# Patient Record
Sex: MALE | Race: WHITE | NOT HISPANIC OR LATINO | Employment: OTHER | URBAN - METROPOLITAN AREA
[De-identification: names, ages, dates, MRNs, and addresses within clinical notes are randomized per-mention and may not be internally consistent; named-entity substitution may affect disease eponyms.]

---

## 2017-11-06 ENCOUNTER — TRANSFERRED RECORDS (OUTPATIENT)
Dept: HEALTH INFORMATION MANAGEMENT | Facility: CLINIC | Age: 52
End: 2017-11-06

## 2018-05-03 LAB
CREAT SERPL-MCNC: 0.91 MG/DL (ref 0.6–1.2)
GLUCOSE SERPL-MCNC: 77 MG/DL (ref 70–115)
POTASSIUM SERPL-SCNC: 4.6 MMOL/L (ref 3.5–5.3)

## 2018-05-08 ENCOUNTER — OFFICE VISIT (OUTPATIENT)
Dept: ENDOCRINOLOGY | Facility: CLINIC | Age: 53
End: 2018-05-08
Payer: COMMERCIAL

## 2018-05-08 VITALS
WEIGHT: 151 LBS | HEIGHT: 68 IN | DIASTOLIC BLOOD PRESSURE: 80 MMHG | HEART RATE: 63 BPM | BODY MASS INDEX: 22.88 KG/M2 | SYSTOLIC BLOOD PRESSURE: 121 MMHG

## 2018-05-08 DIAGNOSIS — E03.9 ACQUIRED HYPOTHYROIDISM: ICD-10-CM

## 2018-05-08 DIAGNOSIS — E10.43 TYPE 1 DIABETES MELLITUS WITH DIABETIC AUTONOMIC NEUROPATHY (H): Primary | ICD-10-CM

## 2018-05-08 DIAGNOSIS — Z96.41 INSULIN PUMP STATUS: ICD-10-CM

## 2018-05-08 LAB — HBA1C MFR BLD: 9.3 % (ref 0–5.6)

## 2018-05-08 PROCEDURE — 83036 HEMOGLOBIN GLYCOSYLATED A1C: CPT | Performed by: INTERNAL MEDICINE

## 2018-05-08 PROCEDURE — 36415 COLL VENOUS BLD VENIPUNCTURE: CPT | Performed by: INTERNAL MEDICINE

## 2018-05-08 PROCEDURE — 84439 ASSAY OF FREE THYROXINE: CPT | Performed by: INTERNAL MEDICINE

## 2018-05-08 PROCEDURE — 80048 BASIC METABOLIC PNL TOTAL CA: CPT | Performed by: INTERNAL MEDICINE

## 2018-05-08 PROCEDURE — 84443 ASSAY THYROID STIM HORMONE: CPT | Performed by: INTERNAL MEDICINE

## 2018-05-08 PROCEDURE — 99215 OFFICE O/P EST HI 40 MIN: CPT | Performed by: INTERNAL MEDICINE

## 2018-05-08 RX ORDER — LEVOTHYROXINE SODIUM 137 UG/1
137 TABLET ORAL DAILY
Qty: 90 TABLET | Refills: 3 | Status: SHIPPED | OUTPATIENT
Start: 2018-05-08 | End: 2018-07-10

## 2018-05-08 NOTE — MR AVS SNAPSHOT
After Visit Summary   5/8/2018    Stone De Paz    MRN: [id number]           Patient Information     Date Of Birth          1965        Visit Information        Provider Department      5/8/2018 3:30 PM Frankie Best MD [facility]        Today's Diagnoses     Type 1 diabetes mellitus with diabetic autonomic neuropathy (H)    -  1    Acquired hypothyroidism        Insulin pump status           Follow-ups after your visit        Additional Services     DIABETES EDUCATOR REFERRAL       DIABETES SELF MANAGEMENT TRAINING (DSMT)      Your provider has referred you to Diabetes Education: FMG: Diabetes Education - All [facility] [phone]   [email or web address]   Note:  Lives in [place], needs to see CDE in [place] if possible    If an urgent visit is needed or A1C is above 12, Care Team to call the Diabetes  Education Team at [phone] or send an In Basket message to the Diabetes Education Pool (P DIAB ED-PATIENT CARE).    A  will call you to make your appointment. If it has been more than 3 business days since your referral was placed, please call the above phone number to schedule.    Type of training and number of hours: Previous Diagnosis: Follow-up DSMT - 2 hours.    Diabetes Type: Type 1   Medicare covers: 10 hours of initial DSMT in 12 month period from the time of first visit, plus 2 hours of follow-up DSMT annually, and additional hours as requested for insulin training.         Diabetes Co-Morbidities: none               A1C Goal:  <8.0       A1C is: Lab Results       Component                Value               Date                       A1C                      10.5                01/09/2014              MEDICAL NUTRITION THERAPY (MNT) for Diabetes    Medical Nutrition Therapy with a Registered Dietitian can be provided in coordination with Diabetes Self-Management Training to assist in achieving optimal  diabetes management.    MNT Type and Hours: Previous diagnosis: Annual follow-up MNT - 2 hours                       Medicare will cover: 3 hours initial MNT in 12 month period after first visit, plus 2 hours of follow-up MNT annually        Diabetes Education Topics: Insulin Pump Therapy: Current Pump User    Special Educational Needs Requiring Individual DSMT: None      Please be aware that coverage of these services is subject to the terms and limitations of your health insurance plan.  Call member services at your health plan to determine Diabetes Self-Management Training (Codes  and ) and Medical Nutrition Therapy (Codes 09835 and 11737) benefits and ask which blood glucose monitor brands are covered by your plan.  Please bring the following with you to your appointment:    (1)  List of current medications   (2)  List of Blood Glucose Monitor brands that are covered by your insurance plan  (3)  Blood Glucose Monitor and log book  (4)   Food records for the 3 days prior to your visit    The Certified Diabetes Educator may make diabetes medication adjustments per the CDE Protocol and Collaborative Practice Agreement.                  Follow-up notes from your care team     Return in about 3 months (around 8/8/2018).      Your next 10 appointments already scheduled     Aug 07, 2018  1:30 PM CDT   [facility]    [address] [place] [id number] [phone]              Who to contact     If you have questions or need follow up information about today's clinic visit or your schedule please contact [facility] directly at [phone].  Normal or non-critical lab and imaging results will be communicated to you by MyChart, letter or phone within 4 business days after the clinic has received the results. If you do not hear from us within 7 days, please contact the clinic through MyChart or phone.  "If you have a critical or abnormal lab result, we will notify you by phone as soon as possible.  Submit refill requests through [employer] or call your pharmacy and they will forward the refill request to us. Please allow 3 business days for your refill to be completed.          Additional Information About Your Visit        [employer] Information     [employer] lets you send messages to your doctor, view your test results, renew your prescriptions, schedule appointments and more. To sign up, go to [email or web address] . Click on \"Log in\" on the left side of the screen, which will take you to the Welcome page. Then click on \"Sign up Now\" on the right side of the page.     You will be asked to enter the access code listed below, as well as some personal information. Please follow the directions to create your username and password.     Your access code is: [email or web address]  Expires: 2018  8:22 PM     Your access code will  in 90 days. If you need help or a new code, please call your [facility] clinic or [phone].        Care EveryWhere ID     This is your Care EveryWhere ID. This could be used by other organizations to access your [facility] medical records  [id number]        Your Vitals Were     Pulse Height BMI (Body Mass Index)             63 5' 8\" (1.727 m) 22.96 kg/m2          Blood Pressure from Last 3 Encounters:   18 121/80   16 132/77   01/15/14 137/85    Weight from Last 3 Encounters:   18 151 lb (68.5 kg)   16 160 lb (72.6 kg)   14 153 lb (69.4 kg)              We Performed the Following     Basic metabolic panel     DIABETES EDUCATOR REFERRAL     Hemoglobin A1c     T4 free     TSH          Today's Medication Changes          These changes are accurate as of 18  8:22 PM.  If you have any questions, ask your nurse or doctor.               These medicines have changed or have updated prescriptions.        Dose/Directions    insulin aspart 100 UNITS/ML injection   Commonly " known as:  NovoLOG VIAL   This may have changed:    - medication strength  - how to take this  - additional instructions   Used for:  Type 1 diabetes mellitus with diabetic autonomic neuropathy (H)   Changed by:  Frankie Best MD        Uses with insulin pump, total daily dose approx 45 units, E10.9   Quantity:  20 mL   Refills:  11            Where to get your medicines      These medications were sent to [facility] [id number] - [place], [place] - [address]  [address], [facility] [address]     Phone:  [phone]     insulin aspart 100 UNITS/ML injection    levothyroxine 137 MCG tablet                Primary Care Provider Office Phone # Fax #    Marciano Steven Painting PA-C [phone]       [address] [place] [address]        Equal Access to Services     ROSA WHITLEY : Hadii jordyn elliott hadasho Soomaali, waaxda luqadaha, qaybta kaalmada adeegyada, britton witt hayronel nuñez . So [facility] [phone].    ATENCIÓN: Si habla español, tiene a banks disposición servicios gratuitos de asistencia lingüística. [facility] [phone].    We comply with applicable federal civil rights laws and [place] laws. We do not discriminate on the basis of race, color, national origin, age, disability, sex, sexual orientation, or gender identity.            Thank you!     Thank you for choosing [facility]  for your care. Our goal is always to provide you with excellent care. Hearing back from our patients is one way we can continue to improve our services. Please take a few minutes to complete the written survey that you may receive in the mail after your visit with us. Thank you!             Your Updated Medication List - Protect others around you: Learn how to safely use, store and throw away your medicines at www.disposemymeds.org.          This list is accurate as of 5/8/18  8:22 PM.  Always use your most recent med list.                   Brand Name Dispense Instructions for use Diagnosis     acetaminophen 325 MG tablet    TYLENOL    100 tablet    Take 2 tablets (650 mg) by mouth every 4 hours as needed    Empyema, right (H)       albuterol 108 (90 Base) MCG/ACT Inhaler    PROAIR HFA/PROVENTIL HFA/VENTOLIN HFA    1 Inhaler    Inhale 2 puffs into the lungs every 4 hours as needed for shortness of breath / dyspnea        citalopram 40 MG tablet    celeXA     Take 1 tablet by mouth daily        cyclobenzaprine 10 MG tablet    FLEXERIL     Take 1-2 tablets by mouth daily as needed        gabapentin 300 MG tablet    NEURONTIN     Take 1 tablet by mouth daily        insulin aspart 100 UNITS/ML injection    NovoLOG VIAL    20 mL    Uses with insulin pump, total daily dose approx 45 units, E10.9    Type 1 diabetes mellitus with diabetic autonomic neuropathy (H)       levothyroxine 137 MCG tablet    SYNTHROID/LEVOTHROID    90 tablet    Take 1 tablet (137 mcg) by mouth daily    Acquired hypothyroidism       lisinopril 20 MG tablet    PRINIVIL/ZESTRIL     Take 1 tablet by mouth daily        metoclopramide 10 MG tablet    REGLAN    20 tablet    Take 1 tablet (10 mg) by mouth 4 times daily as needed As needed        naproxen 500 MG tablet    NAPROSYN     Take 1 tablet by mouth 2 times daily (with meals)        omeprazole 20 MG tablet      Take 1 tablet by mouth daily        ondansetron 8 MG ODT tab    ZOFRAN-ODT     Take 1 tablet by mouth every 8 hours as needed        order for DME     1 Device    Equipment being ordered: Hospital Bed    Empyema, right (H)       oxyCODONE IR 5 MG tablet    ROXICODONE    28 tablet    Take 1-2 tablets (5-10 mg) by mouth every 3 hours as needed    Empyema, right (H)       simvastatin 40 MG tablet    ZOCOR     Take 1 tablet by mouth At Bedtime

## 2018-05-08 NOTE — PROGRESS NOTES
"Name: Stone De Paz is a 52 year old man, self-referred for evaluation of diabetes mellitus.  Previously seen by me at my former clinic ([facility]), here to establish care with [facility].    HPI:  Recent issues:  Here with ex-wife/girlfriend Ada for endocrinology evaluation  They spent winter months near [place], [place]        1995. Diagnosis of diabetes mellitus after 2nd back surgery, age 29  Initial treatment with insulin injections using NPH and Regular insulin  Switched to premixed 70/30 insulin BID dosing  1999. Started Medtronic insulin pump model 508, used base-dose Humalog insulin at meals  Had seen Dr. Iggy Briggs/[facility]    7/24/01. Initial evaluation with me at my former clinic ([facility])  Upgraded pump to Medtronic 523   Now using Medtronic 630G pump   Novolog in pump  Meals BID  Chronic high BG and hgbA1c trends despite dose changes  Tried square wave bolus  Uses carb \"exchanges\" with his pump settings  Has Contour Link? BG meter, variable testing pattern... Recently testing 4x/day  Tried Medtronic CGMS in the past, didn't like it    Previous  labs include:  Lab Results   Component Value Date    A1C 9.3 (H) 05/08/2018     01/29/2016    POTASSIUM 4.3 01/29/2016    CHLORIDE 97 01/29/2016    CO2 28 01/29/2016    ANIONGAP 10 01/29/2016     (H) 01/29/2016    BUN 14 01/29/2016    CR 0.78 01/29/2016    GFRESTIMATED >90  Non  GFR Calc   01/29/2016    GFRESTBLACK >90   GFR Calc   01/29/2016    EMILEE 9.4 01/29/2016     DM Complications:   Neuropathy    Peripheral neuropathy- decreased sensation at feet    Autonomic neuropathy- gastroparesis     Has Medtronic gastric stimulator     Sees physicians and Ms Olga Ramirez, PA/[facility]  Last eye exam date?       Thyroid:  1994. History of hypothyroidism  Chronic levothyroxine treatment, has used Levoxyl in the past  Recent labs include:  Lab Results "   Component Value Date    TSH 38.88 (H) 07/09/2018    TSH 2.28 05/08/2018    TSH 1.33 12/30/2013    TSH 0.26 10/30/2013    TSH 0.19 07/03/2013    T4 0.87 07/09/2018    T4 1.28 05/08/2018     Current dose:  Levothyroxine 0.137 mg po QAM    Also takes vit D 1000 IU QD      Lives in [place] with Ada, new dog Gayle- priest retriever  Sees Marciano Painting PAC/[facility] for general medicine evaluations  Also sees Olga Ramirez PAC/[facility].    PMH/PSH:  Past Medical History:   Diagnosis Date     Chronic neck pain     percocet for years now,      Gastroparesis due to DM (H)     gastric stimulator helps, [facility] manages that     Hypothyroidism      Neuropathy, diabetic (H)      Type 1 diabetes (H) age 31     Past Surgical History:   Procedure Laterality Date     C LUMBAR SPINE FUSION,ANTER APPRCH      2 L4-5 L5-21     COLONOSCOPY  07/18/12     FUSION CERVICAL ANTERIOR TWO LEVELS       THORACOSCOPIC DECORTICATION LUNG  1/9/2014    Procedure: THORACOSCOPIC DECORTICATION LUNG;  RIGHT VATS/RIGHT THORACOTOMY , DECORTICATION RIGHT LUNG ,WEDGE RESECTION RIGHT MIDDLE LOBE LUNG NODULE;  Surgeon: Harley Felix MD;  Location: [facility] OR       Family Hx:  No family history on file.      Social Hx:  Social History     Social History     Marital status:      Spouse name: N/A     Number of children: N/A     Years of education: N/A     Occupational History     Not on file.     Social History Main Topics     Smoking status: Former Smoker     Quit date: 1/1/2010     Smokeless tobacco: Never Used     Alcohol use No     Drug use: No     Sexual activity: Not on file     Other Topics Concern     Not on file     Social History Narrative          MEDICATIONS:  has a current medication list which includes the following prescription(s): acetaminophen, albuterol, citalopram, cyclobenzaprine, gabapentin, insulin aspart, levothyroxine, lisinopril, metoclopramide, naproxen, omeprazole, ondansetron, order for dme, oxycodone  "ir, and simvastatin.    ROS:     ROS: 10 point ROS neg other than the symptoms noted above in the HPI.    GENERAL: some fatigue, wt stable; denies fevers, chills, night sweats.   HEENT: no dysphagia, odonophagia, diplopia, neck pain  THYROID:  no apparent hyper or hypothyroid symptoms  CV: occasional chest pains; no pressure, palpitations  LUNGS: no SOB, KRAFT, cough, wheezing   ABDOMEN: some bloating and postmeal nausea; no diarrhea, constipation  EXTREMITIES: no rashes, ulcers, edema  NEUROLOGY: no headaches, denies changes in vision, tingling, extremitiy numbness   MSK: neck stiffness and back pains; no muscle weakness  SKIN: no rashes or lesions  PSYCH:  stable mood, no significant anxiety or depression  ENDOCRINE: no heat or cold intolerance    Physical Exam   VS: /80 (BP Location: Right arm, Cuff Size: Adult Regular)  Pulse 63  Ht 5' 8\" (1.727 m)  Wt 151 lb (68.5 kg)  BMI 22.96 kg/m2  GENERAL: AXOX3, NAD, slender, well dressed, answering questions appropriately, appears stated age.  THYROID:  normal gland, no apparent nodules or goiter  HEENT: neck stiff/ reduced neck ROM; neck non-tender, no exopthalmous, EOMI  CV: RRR, no rubs, gallops, no murmurs  LUNGS: CTAB, no wheezes, rales, or ronchi  ABDOMEN: soft, nontender, nondistended  EXTREMITIES: no edema, +pedal pulses  NEUROLOGY: CN grossly intact, no tremors  MSK: grossly intact  SKIN: no rashes, no lesions    LABS:    All pertinent notes, labs, and images personally reviewed by me.     A/P:  Encounter Diagnoses   Name Primary?     Type 1 diabetes mellitus with diabetic autonomic neuropathy (H) Yes     Acquired hypothyroidism      Insulin pump status        Comments:  Reviewed complicated health history and diabetes issues.  Recent avg  mg/dl    Plan:  Reviewed the overall T1DM management and insulin pump use.  Discussed optimal BG testing to assess glucose trends.  We reviewed insulin pump settings, basal rate and bolus dosing  Use of automated " "pump bolus dosing for meal/snack carb & correction dosing  Reviewed the Medtronic Carelink report data today    Recommend:  Continue insulin pump management plan.  Pump setting changes:     Bolus I:C 11a 1.2U/Ex to 1.1U/Ex, 5p 1.2U/Ex to 1.25U/Ex  Use lower Temp Basal rate for increased activity/exercise  Needs additional insulin pump and CDE diabetes management counseling   Will schedule Diabetes Education Referral at the nearby [facility]   Encourage him to bring list of food items that are challenging for carb estimating    (he uses \"exchanges\" and pump programed for Units per Exchange)  Patient makes frequent adjustments to insulin treatment regimen on basis of therapeutic glucose testing  Encouraged use of CGMS sensor:   Complete application for DexcomG5/G6.   Consider Freestyle Sukhi Pro CGMS option, place sensor for 1-2 weeks then f/u CDE evaluation.  Continue simvastatin 40 mg each evening  Arrange annual dilated eye exam, fasting lipid panel testing.    Continue current levothyroxine 0.137 mg daily dose plan  Check lab tests today for comparison  Updated the levothyroxine (and Novolog) Rx to his local pharmacy today    Addressed patient's questions today.    Labs ordered today:   Orders Placed This Encounter   Procedures     Basic metabolic panel     Hemoglobin A1c     T4 free     TSH     DIABETES EDUCATOR REFERRAL     Radiology/Consults ordered today: DIABETES EDUCATOR REFERRAL    More than 50% of the time spent with Mr. De Paz on counseling / coordinating his care.  Total appointment time was 40 minutes.    Follow-up:  3 mo.    Frankie Best MD  Endocrinology  [place]  CC: Marciano Painting       "

## 2018-05-09 LAB
ANION GAP SERPL CALCULATED.3IONS-SCNC: 8 MMOL/L (ref 3–14)
BUN SERPL-MCNC: 12 MG/DL (ref 7–30)
CALCIUM SERPL-MCNC: 9.5 MG/DL (ref 8.5–10.1)
CHLORIDE SERPL-SCNC: 99 MMOL/L (ref 94–109)
CO2 SERPL-SCNC: 28 MMOL/L (ref 20–32)
CREAT SERPL-MCNC: 0.96 MG/DL (ref 0.66–1.25)
GFR SERPL CREATININE-BSD FRML MDRD: 82 ML/MIN/1.7M2
GLUCOSE SERPL-MCNC: 172 MG/DL (ref 70–99)
POTASSIUM SERPL-SCNC: 4.1 MMOL/L (ref 3.4–5.3)
SODIUM SERPL-SCNC: 135 MMOL/L (ref 133–144)
T4 FREE SERPL-MCNC: 1.28 NG/DL (ref 0.76–1.46)
TSH SERPL DL<=0.005 MIU/L-ACNC: 2.28 MU/L (ref 0.4–4)

## 2018-05-14 ENCOUNTER — TELEPHONE (OUTPATIENT)
Dept: EDUCATION SERVICES | Facility: CLINIC | Age: 53
End: 2018-05-14

## 2018-05-14 NOTE — TELEPHONE ENCOUNTER
Diabetes Education Scheduling Outreach #1:    Call to patient to schedule. Left message with phone number to call to schedule.    Plan for 2nd outreach attempt within 1 week.    Flakita Miller  [facility]  Diabetes and Nutrition Scheduling

## 2018-05-18 ENCOUNTER — TRANSFERRED RECORDS (OUTPATIENT)
Dept: HEALTH INFORMATION MANAGEMENT | Facility: CLINIC | Age: 53
End: 2018-05-18

## 2018-06-13 NOTE — TELEPHONE ENCOUNTER
Diabetes Education Scheduling Outreach #2:    Call to patient to schedule. Left message with phone number to call to schedule.    Letter sent to patient requesting to call to schedule.    Flakita Miller  [facility]  Diabetes and Nutrition Scheduling

## 2018-06-15 ENCOUNTER — TELEPHONE (OUTPATIENT)
Dept: EDUCATION SERVICES | Facility: CLINIC | Age: 53
End: 2018-06-15

## 2018-06-15 NOTE — TELEPHONE ENCOUNTER
Message noted.  The plan to see one of the [place] Diabetes Educators (near his home) is not related to the plan for seeing a new PCP.  Regardless of his PCP practitioner, he should see the  Diabetes Educator.  Please relay message and assist with scheduling the Diabetes Education Referral.  Thanks for your assistance.    SHADE Best MD  [facility]

## 2018-06-15 NOTE — TELEPHONE ENCOUNTER
Pt returns our calls for scheduling diabetes education as referred by . Pt states he has been trying to establish a new PCP and will contact us in the future to set this up.    Flakita Miller, [facility]  Diabetes and Nutrition Scheduling

## 2018-07-09 ENCOUNTER — OFFICE VISIT (OUTPATIENT)
Dept: FAMILY MEDICINE | Facility: CLINIC | Age: 53
End: 2018-07-09
Payer: COMMERCIAL

## 2018-07-09 VITALS
RESPIRATION RATE: 16 BRPM | DIASTOLIC BLOOD PRESSURE: 70 MMHG | HEART RATE: 90 BPM | WEIGHT: 153.6 LBS | OXYGEN SATURATION: 98 % | BODY MASS INDEX: 23.35 KG/M2 | TEMPERATURE: 98.3 F | SYSTOLIC BLOOD PRESSURE: 132 MMHG

## 2018-07-09 DIAGNOSIS — G89.29 CHRONIC NECK PAIN: ICD-10-CM

## 2018-07-09 DIAGNOSIS — M54.2 CHRONIC NECK PAIN: ICD-10-CM

## 2018-07-09 DIAGNOSIS — M54.41 CHRONIC BILATERAL LOW BACK PAIN WITH BILATERAL SCIATICA: ICD-10-CM

## 2018-07-09 DIAGNOSIS — E78.5 HYPERLIPIDEMIA, UNSPECIFIED HYPERLIPIDEMIA TYPE: ICD-10-CM

## 2018-07-09 DIAGNOSIS — E10.42 TYPE 1 DIABETES MELLITUS WITH DIABETIC POLYNEUROPATHY (H): ICD-10-CM

## 2018-07-09 DIAGNOSIS — K21.9 GASTROESOPHAGEAL REFLUX DISEASE WITHOUT ESOPHAGITIS: ICD-10-CM

## 2018-07-09 DIAGNOSIS — E11.43 GASTROPARESIS DUE TO DM (H): ICD-10-CM

## 2018-07-09 DIAGNOSIS — E03.9 HYPOTHYROIDISM, UNSPECIFIED TYPE: ICD-10-CM

## 2018-07-09 DIAGNOSIS — E03.9 ACQUIRED HYPOTHYROIDISM: ICD-10-CM

## 2018-07-09 DIAGNOSIS — G44.209 TENSION HEADACHE: ICD-10-CM

## 2018-07-09 DIAGNOSIS — M54.42 CHRONIC BILATERAL LOW BACK PAIN WITH BILATERAL SCIATICA: ICD-10-CM

## 2018-07-09 DIAGNOSIS — K31.84 GASTROPARESIS DUE TO DM (H): ICD-10-CM

## 2018-07-09 DIAGNOSIS — G89.4 CHRONIC PAIN SYNDROME: Primary | ICD-10-CM

## 2018-07-09 DIAGNOSIS — G89.29 CHRONIC BILATERAL LOW BACK PAIN WITH BILATERAL SCIATICA: ICD-10-CM

## 2018-07-09 DIAGNOSIS — F32.0 MILD MAJOR DEPRESSION (H): ICD-10-CM

## 2018-07-09 LAB
ALBUMIN SERPL-MCNC: 3.6 G/DL (ref 3.4–5)
ALP SERPL-CCNC: 66 U/L (ref 40–150)
ALT SERPL W P-5'-P-CCNC: 31 U/L (ref 0–70)
ANION GAP SERPL CALCULATED.3IONS-SCNC: 8 MMOL/L (ref 3–14)
AST SERPL W P-5'-P-CCNC: 21 U/L (ref 0–45)
BILIRUB SERPL-MCNC: 0.3 MG/DL (ref 0.2–1.3)
BUN SERPL-MCNC: 8 MG/DL (ref 7–30)
CALCIUM SERPL-MCNC: 8.9 MG/DL (ref 8.5–10.1)
CHLORIDE SERPL-SCNC: 97 MMOL/L (ref 94–109)
CHOLEST SERPL-MCNC: 180 MG/DL
CO2 SERPL-SCNC: 30 MMOL/L (ref 20–32)
CREAT SERPL-MCNC: 0.92 MG/DL (ref 0.66–1.25)
CREAT UR-MCNC: 17 MG/DL
GFR SERPL CREATININE-BSD FRML MDRD: 86 ML/MIN/1.7M2
GLUCOSE SERPL-MCNC: 178 MG/DL (ref 70–99)
HDLC SERPL-MCNC: 67 MG/DL
LDLC SERPL CALC-MCNC: 99 MG/DL
MICROALBUMIN UR-MCNC: <5 MG/L
MICROALBUMIN/CREAT UR: NORMAL MG/G CR (ref 0–17)
NONHDLC SERPL-MCNC: 113 MG/DL
POTASSIUM SERPL-SCNC: 4.1 MMOL/L (ref 3.4–5.3)
PROT SERPL-MCNC: 7.3 G/DL (ref 6.8–8.8)
SODIUM SERPL-SCNC: 135 MMOL/L (ref 133–144)
T4 FREE SERPL-MCNC: 0.87 NG/DL (ref 0.76–1.46)
TRIGL SERPL-MCNC: 72 MG/DL
TSH SERPL DL<=0.005 MIU/L-ACNC: 38.88 MU/L (ref 0.4–4)

## 2018-07-09 PROCEDURE — 82043 UR ALBUMIN QUANTITATIVE: CPT | Performed by: FAMILY MEDICINE

## 2018-07-09 PROCEDURE — 84439 ASSAY OF FREE THYROXINE: CPT | Performed by: FAMILY MEDICINE

## 2018-07-09 PROCEDURE — 99205 OFFICE O/P NEW HI 60 MIN: CPT | Performed by: FAMILY MEDICINE

## 2018-07-09 PROCEDURE — 80053 COMPREHEN METABOLIC PANEL: CPT | Performed by: FAMILY MEDICINE

## 2018-07-09 PROCEDURE — 84443 ASSAY THYROID STIM HORMONE: CPT | Performed by: FAMILY MEDICINE

## 2018-07-09 PROCEDURE — 80061 LIPID PANEL: CPT | Performed by: FAMILY MEDICINE

## 2018-07-09 PROCEDURE — 36415 COLL VENOUS BLD VENIPUNCTURE: CPT | Performed by: FAMILY MEDICINE

## 2018-07-09 RX ORDER — CYCLOBENZAPRINE HCL 10 MG
10 TABLET ORAL 2 TIMES DAILY PRN
Qty: 90 TABLET | Refills: 1 | Status: SHIPPED | OUTPATIENT
Start: 2018-07-09 | End: 2019-04-10

## 2018-07-09 RX ORDER — GABAPENTIN 300 MG/1
300 CAPSULE ORAL 3 TIMES DAILY
Qty: 270 CAPSULE | Refills: 1 | COMMUNITY
Start: 2018-07-09 | End: 2018-09-21

## 2018-07-09 RX ORDER — OXYCODONE AND ACETAMINOPHEN 10; 325 MG/1; MG/1
1 TABLET ORAL EVERY 12 HOURS PRN
Refills: 0 | COMMUNITY
Start: 2018-06-11 | End: 2018-10-10

## 2018-07-09 ASSESSMENT — ANXIETY QUESTIONNAIRES
7. FEELING AFRAID AS IF SOMETHING AWFUL MIGHT HAPPEN: NOT AT ALL
3. WORRYING TOO MUCH ABOUT DIFFERENT THINGS: NOT AT ALL
5. BEING SO RESTLESS THAT IT IS HARD TO SIT STILL: NOT AT ALL
6. BECOMING EASILY ANNOYED OR IRRITABLE: NOT AT ALL
1. FEELING NERVOUS, ANXIOUS, OR ON EDGE: NOT AT ALL
IF YOU CHECKED OFF ANY PROBLEMS ON THIS QUESTIONNAIRE, HOW DIFFICULT HAVE THESE PROBLEMS MADE IT FOR YOU TO DO YOUR WORK, TAKE CARE OF THINGS AT HOME, OR GET ALONG WITH OTHER PEOPLE: SOMEWHAT DIFFICULT
2. NOT BEING ABLE TO STOP OR CONTROL WORRYING: NOT AT ALL
GAD7 TOTAL SCORE: 1

## 2018-07-09 ASSESSMENT — PATIENT HEALTH QUESTIONNAIRE - PHQ9: 5. POOR APPETITE OR OVEREATING: SEVERAL DAYS

## 2018-07-09 ASSESSMENT — PAIN SCALES - GENERAL: PAINLEVEL: SEVERE PAIN (7)

## 2018-07-09 NOTE — MR AVS SNAPSHOT
After Visit Summary   7/9/2018    Stone De Paz    MRN: [id number]           Patient Information     Date Of Birth          1965        Visit Information        Provider Department      7/9/2018 3:40 PM Ilan Raymond MD [facility]        Today's Diagnoses     Mild major depression (H)    -  1    Chronic neck pain        Tension headache        Chronic bilateral low back pain with bilateral sciatica        Gastroesophageal reflux disease without esophagitis        Hypothyroidism, unspecified type        Type 1 diabetes mellitus with diabetic polyneuropathy (H)           Follow-ups after your visit        Your next 10 appointments already scheduled     Jul 11, 2018  2:00 PM CDT   Diabetic Education with [facility] ED RESOURCE   [facility] ([facility])    [address] [place] [address] [address] [place] [id number] [phone]            Aug 07, 2018  1:30 PM CDT   Return Visit with Frankie Best MD   [facility] ([facility]    [address] [place] [id number] [phone]              Who to contact     If you have questions or need follow up information about today's clinic visit or your schedule please contact [facility] directly at [phone].  Normal or non-critical lab and imaging results will be communicated to you by MyChart, letter or phone within 4 business days after the clinic has received the results. If you do not hear from us within 7 days, please contact the clinic through MyChart or phone. If you have a critical or abnormal lab result, we will notify you by phone as soon as possible.  Submit refill requests through [employer] or call your pharmacy and they will forward the refill request to us. Please allow 3 business days for your refill to be completed.          Additional Information About Your Visit        Care EveryWhere ID     This is your Care EveryWhere ID. This  could be used by other organizations to access your [place] medical records  [id number]        Your Vitals Were     Pulse Temperature Respirations Pulse Oximetry BMI (Body Mass Index)       90 98.3  F (36.8  C) (Temporal) 16 98% 23.35 kg/m2        Blood Pressure from Last 3 Encounters:   07/09/18 132/70   05/08/18 121/80   01/29/16 132/77    Weight from Last 3 Encounters:   07/09/18 153 lb 9.6 oz (69.7 kg)   05/08/18 151 lb (68.5 kg)   01/29/16 160 lb (72.6 kg)              We Performed the Following     Albumin Random Urine Quantitative with Creat Ratio     Comprehensive metabolic panel (BMP + Alb, Alk Phos, ALT, AST, Total. Bili, TP)     DEPRESSION ACTION PLAN (DAP)     Lipid panel reflex to direct LDL Fasting     TSH with free T4 reflex          Today's Medication Changes          These changes are accurate as of 7/9/18  5:14 PM.  If you have any questions, ask your nurse or doctor.               Start taking these medicines.        Dose/Directions    cyclobenzaprine 10 MG tablet   Commonly known as:  FLEXERIL   Used for:  Tension headache   Started by:  Ilan Raymond MD        Dose:  10 mg   Take 1 tablet (10 mg) by mouth 2 times daily as needed for muscle spasms   Quantity:  90 tablet   Refills:  1       ranitidine 300 MG tablet   Commonly known as:  ZANTAC   Used for:  Gastroesophageal reflux disease without esophagitis   Started by:  Ilan Raymond MD        Dose:  300 mg   Take 1 tablet (300 mg) by mouth At Bedtime   Quantity:  30 tablet   Refills:  1            Where to get your medicines      These medications were sent to [facility], [place] [facility] [facility] [address] [facility] [address] [place] [address]     Phone:  [phone]     cyclobenzaprine 10 MG tablet    ranitidine 300 MG tablet               Information about OPIOIDS     PRESCRIPTION OPIOIDS: WHAT YOU NEED TO KNOW   We gave you an opioid (narcotic) pain medicine. It is important to manage your pain, but opioids are not always  the best choice. You should first try all the other options your care team gave you. Take this medicine for as short a time (and as few doses) as possible.     These medicines have risks:    DO NOT drive when on new or higher doses of pain medicine. These medicines can affect your alertness and reaction times, and you could be arrested for driving under the influence (DUI). If you need to use opioids long-term, talk to your care team about driving.    DO NOT operate heave machinery    DO NOT do any other dangerous activities while taking these medicines.     DO NOT drink any alcohol while taking these medicines.      If the opioid prescribed includes acetaminophen, DO NOT take with any other medicines that contain acetaminophen. Read all labels carefully. Look for the word  acetaminophen  or  Tylenol.  Ask your pharmacist if you have questions or are unsure.    You can get addicted to pain medicines, especially if you have a history of addiction (chemical, alcohol or substance dependence). Talk to your care team about ways to reduce this risk.    Store your pills in a secure place, locked if possible. We will not replace any lost or stolen medicine. If you don t finish your medicine, please throw away (dispose) as directed by your pharmacist. The [place] Pollution Control Agency has more information about safe disposal: https://www.pca.[email or web address].[place].us/living-green/managing-unwanted-medications.     All opioids tend to cause constipation. Drink plenty of water and eat foods that have a lot of fiber, such as fruits, vegetables, prune juice, apple juice and high-fiber cereal. Take a laxative (Miralax, milk of magnesia, Colace, Senna) if you don t move your bowels at least every other day.          Primary Care Provider Office Phone # Fax [facility] PA-C [phone]        [facility] [address] [place] [address]        Equal Access to Services     ROSA WHITLEY AH: Bell Dunne  wapaulda luetienneadaha, qaybta kaalmada lorena, britton whitakeraajacquelin ah. So [facility] [phone].    ATENCIÓN: Si fatuma lam, tiene a banks disposición servicios gratuitos de asistencia lingüística. Nik al [phone].    We comply with applicable federal civil rights laws and [place] laws. We do not discriminate on the basis of race, color, national origin, age, disability, sex, sexual orientation, or gender identity.            Thank you!     Thank you for choosing [facility]  for your care. Our goal is always to provide you with excellent care. Hearing back from our patients is one way we can continue to improve our services. Please take a few minutes to complete the written survey that you may receive in the mail after your visit with us. Thank you!             Your Updated Medication List - Protect others around you: Learn how to safely use, store and throw away your medicines at www.disposemymeds.org.          This list is accurate as of 7/9/18  5:14 PM.  Always use your most recent med list.                   Brand Name Dispense Instructions for use Diagnosis    acetaminophen 325 MG tablet    TYLENOL    100 tablet    Take 2 tablets (650 mg) by mouth every 4 hours as needed    Empyema, right (H)       albuterol 108 (90 Base) MCG/ACT Inhaler    PROAIR HFA/PROVENTIL HFA/VENTOLIN HFA    1 Inhaler    Inhale 2 puffs into the lungs every 4 hours as needed for shortness of breath / dyspnea        citalopram 40 MG tablet    celeXA     Take 1 tablet by mouth daily        cyclobenzaprine 10 MG tablet    FLEXERIL    90 tablet    Take 1 tablet (10 mg) by mouth 2 times daily as needed for muscle spasms    Tension headache       gabapentin 300 MG capsule    NEURONTIN    270 capsule    Take 1 capsule (300 mg) by mouth 3 times daily    Type 1 diabetes mellitus with diabetic polyneuropathy (H), Chronic bilateral low back pain with bilateral sciatica, Chronic neck pain       insulin aspart 100  UNITS/ML injection    NovoLOG VIAL    20 mL    Uses with insulin pump, total daily dose approx 45 units, E10.9    Type 1 diabetes mellitus with diabetic autonomic neuropathy (H)       levothyroxine 137 MCG tablet    SYNTHROID/LEVOTHROID    90 tablet    Take 1 tablet (137 mcg) by mouth daily    Acquired hypothyroidism       lisinopril 20 MG tablet    PRINIVIL/ZESTRIL     Take 40 mg by mouth daily        naproxen 500 MG tablet    NAPROSYN     Take 1 tablet by mouth 2 times daily (with meals)        omeprazole 20 MG tablet      Take 1 tablet by mouth daily        ondansetron 8 MG ODT tab    ZOFRAN-ODT     Take 1 tablet by mouth every 8 hours as needed        oxyCODONE-acetaminophen  MG per tablet    PERCOCET     Take 1 tablet by mouth every 12 hours as needed        ranitidine 300 MG tablet    ZANTAC    30 tablet    Take 1 tablet (300 mg) by mouth At Bedtime    Gastroesophageal reflux disease without esophagitis       simvastatin 40 MG tablet    ZOCOR     Take 1 tablet by mouth At Bedtime

## 2018-07-09 NOTE — LETTER
My Depression Action Plan  Name: Stone De Paz   Date of Birth 1965  Date: 7/9/2018    My doctor: [facility]   My clinic: [address] [address] [phone]          GREEN    ZONE   Good Control    What it looks like:     Things are going generally well. You have normal up s and down s. You may even feel depressed from time to time, but bad moods usually last less than a day.   What you need to do:  1. Continue to care for yourself (see self care plan)  2. Check your depression survival kit and update it as needed  3. Follow your physician s recommendations including any medication.  4. Do not stop taking medication unless you consult with your physician first.           YELLOW         ZONE Getting Worse    What it looks like:     Depression is starting to interfere with your life.     It may be hard to get out of bed; you may be starting to isolate yourself from others.    Symptoms of depression are starting to last most all day and this has happened for several days.     You may have suicidal thoughts but they are not constant.   What you need to do:     1. Call your care team, your response to treatment will improve if you keep your care team informed of your progress. Yellow periods are signs an adjustment may need to be made.     2. Continue your self-care, even if you have to fake it!    3. Talk to someone in your support network    4. Open up your depression survival kit           RED    ZONE Medical Alert - Get Help    What it looks like:     Depression is seriously interfering with your life.     You may experience these or other symptoms: You can t get out of bed most days, can t work or engage in other necessary activities, you have trouble taking care of basic hygiene, or basic responsibilities, thoughts of suicide or death that will not go away, self-injurious behavior.     What you need to do:  1. Call your care team and  request a same-day appointment. If they are not available (weekends or after hours) call your local crisis line, emergency room or 911.            Depression Self Care Plan / Survival Kit    Self-Care for Depression  Here s the deal. Your body and mind are really not as separate as most people think.  What you do and think affects how you feel and how you feel influences what you do and think. This means if you do things that people who feel good do, it will help you feel better.  Sometimes this is all it takes.  There is also a place for medication and therapy depending on how severe your depression is, so be sure to consult with your medical provider and/ or Behavioral Health Consultant if your symptoms are worsening or not improving.     In order to better manage my stress, I will:    Exercise  Get some form of exercise, every day. This will help reduce pain and release endorphins, the  feel good  chemicals in your brain. This is almost as good as taking antidepressants!  This is not the same as joining a gym and then never going! (they count on that by the way ) It can be as simple as just going for a walk or doing some gardening, anything that will get you moving.      Hygiene   Maintain good hygiene (Get out of bed in the morning, Make your bed, Brush your teeth, Take a shower, and Get dressed like you were going to work, even if you are unemployed).  If your clothes don't fit try to get ones that do.    Diet  I will strive to eat foods that are good for me, drink plenty of water, and avoid excessive sugar, caffeine, alcohol, and other mood-altering substances.  Some foods that are helpful in depression are: complex carbohydrates, B vitamins, flaxseed, fish or fish oil, fresh fruits and vegetables.    Psychotherapy  I agree to participate in Individual Therapy (if recommended).    Medication  If prescribed medications, I agree to take them.  Missing doses can result in serious side effects.  I understand that  drinking alcohol, or other illicit drug use, may cause potential side effects.  I will not stop my medication abruptly without first discussing it with my provider.    Staying Connected With Others  I will stay in touch with my friends, family members, and my primary care provider/team.    Use your imagination  Be creative.  We all have a creative side; it doesn t matter if it s oil painting, sand castles, or mud pies! This will also kick up the endorphins.    Witness Beauty  (AKA stop and smell the roses) Take a look outside, even in mid-winter. Notice colors, textures. Watch the squirrels and birds.     Service to others  Be of service to others.  There is always someone else in need.  By helping others we can  get out of ourselves  and remember the really important things.  This also provides opportunities for practicing all the other parts of the program.    Humor  Laugh and be silly!  Adjust your TV habits for less news and crime-drama and more comedy.    Control your stress  Try breathing deep, massage therapy, biofeedback, and meditation. Find time to relax each day.     My support system    Clinic Contact:  Phone number:    Contact 1:  Phone number:    Contact 2:  Phone number:    Ep[employer]opalian/:  Phone number:    Therapist:  Phone number:    Local crisis center:    Phone number:    Other community support:  Phone number:

## 2018-07-09 NOTE — PROGRESS NOTES
"  SUBJECTIVE:   Stone De Paz is a 52 year old male who presents to clinic today for the following health issues:    New Patient/Transfer of Care    Stone has complicated medical conditions including chronic back and neck pain, insulin-dependent diabetes type 1 which is complicated neuropathy, gastroparesis and hyperglycemia who is here today for establish care and pain management.  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  He fact had two lumbar fusions and 2 cervical laminectomies.  Failed PT and epidural injection. She has been on Percocet for years - was on much higher dose but taper down to 10/325 bid since he started medical marijuana.  He also has chronic abdominal pain due to diabetic gastroparesis.  He has a pain contract with a provider in [facility] who according the patient, prescribed him both medical marijuana and the Percocet 10/325.  Stated he moved to [place] for more than a year; is primary provider was able to resumed his medications since beginning of this year.  Stated that he has been taking the medication as prescribed and denied of drug use.  Recently, he was asked to stop by at the office for drug screen test and medication count.  Stated that his medication count was correct but the drug screens test was positive for marijuana.  Stated even though he was prescribed the medical marijuana by his primary care provider, \"his clinic decided to terminate his pain contract with is primary care provider\".  Has been able to get in touch with his primary care provider since then. Stated that he tried to appeal but \"they would not listen to him\".  He is concerned and scared and needs pain management.  Stated he still was able to get this month supply of Percocet which is 60 tablets.  He used sparingly (about 1 tablet a day) last month and still about 11 tablets left - so the total of 71 tablets left at home as of " today.  He was taken off the medical marijuana.  The pain is unbearable and taking percocet once is not adequate.  Stated that the pain is same kind of pain and its distrubution that he has had - more intense.  It is really affecting his life. He does not need the prescription today.  Stated that he was on much higher dose of Percocet for many years.  Was also on gabapentin and Flexeril. Once the medical marijuana started, he was able to stop the Flexeril and Gabapentin and drop the Percocet dose down to bid .  Stated medical marijuana has very effective with no side effect.  Still has quite a few gabapentin left at home.  Also been having a lot of muscle spasms with tension headache since stopped the medical marijuana.  Again no change in the nature and distribution of the pain.  No fever or chills.  No focal weakness.  No problem with controlling his bowel movement or urination.  Lower back that radiated down to his leg bilaterally.  Worse with weightbearing activities.  Neck pain radiates to arms with necks stiffness and bad headache.  He has been disabled for years because of this.  No diarrhea or constipation.  His gastroparesis is flaring up when the gastric stimulator is out of the battery.  He is working with the GI to get it fixed.  His heartburn is also has been acting up since the gastroparesis is acting up.    He also has diabetes mellitus type I and is currently on insulin pump.  It is being managed by the endocrinologist.  Blood sugars been high and his hemoglobin A1c 2 months ago was 8.6.  Continue to work with the endocrinologist closely. Is planning to have his eye exam in next month.  He also have diabetic neuropathy as well as radicular neuropathy on legs and arm.  He denies of chest pain or shortness of breath.  No headache or dizziness.  No acute change in his vision.  Has the nausea, but no vomiting.  No weakness.      Stated that he also had a colonoscopy done 2012 with polyps and colitis.  He  was recommended to repeat the colonoscopy in 5 years.  Denies of melena or hematochezia.  no other concern.    No medical record from [facility] available to review.    I reviewed the [place] prescription monitoring program, he has been getting 60 tablets of percocet 10/325 monthly since 7/2017.  No other controlled substance noted on the program    Problem list and histories reviewed & adjusted, as indicated.  Additional history: as documented    Patient Active Problem List   Diagnosis     Hypothyroidism     Gastroparesis due to DM (H)     Neuropathy, diabetic (H)     Chronic neck pain     Mild major depression (H)     Cervical stenosis of spine     Diabetes mellitus (H)     Exocrine pancreatic insufficiency     Diabetes mellitus with neurological manifestations, uncontrolled (H)     Hyperlipidemia     Past Surgical History:   Procedure Laterality Date     C LUMBAR SPINE FUSION,ANTER APPRCH      2 L4-5 L5-21     COLONOSCOPY  07/18/12     FUSION CERVICAL ANTERIOR TWO LEVELS       THORACOSCOPIC DECORTICATION LUNG  1/9/2014    Procedure: THORACOSCOPIC DECORTICATION LUNG;  RIGHT VATS/RIGHT THORACOTOMY , DECORTICATION RIGHT LUNG ,WEDGE RESECTION RIGHT MIDDLE LOBE LUNG NODULE;  Surgeon: Harley Felix MD;  Location:  OR       Social History   Substance Use Topics     Smoking status: Former Smoker     Quit date: 1/1/2010     Smokeless tobacco: Never Used     Alcohol use No     No family history on file.      Current Outpatient Prescriptions   Medication Sig Dispense Refill     acetaminophen (TYLENOL) 325 MG tablet Take 2 tablets (650 mg) by mouth every 4 hours as needed 100 tablet 0     citalopram (CELEXA) 40 MG tablet Take 1 tablet by mouth daily       cyclobenzaprine (FLEXERIL) 10 MG tablet Take 1 tablet (10 mg) by mouth 2 times daily as needed for muscle spasms 90 tablet 1     gabapentin (NEURONTIN) 300 MG capsule Take 1 capsule (300 mg) by mouth 3 times daily 270 capsule 1     insulin aspart (NOVOLOG VIAL) 100  UNITS/ML injection Uses with insulin pump, total daily dose approx 45 units, E10.9 20 mL 11     levothyroxine (SYNTHROID/LEVOTHROID) 137 MCG tablet Take 1 tablet (137 mcg) by mouth daily 90 tablet 3     lisinopril (PRINIVIL,ZESTRIL) 20 MG tablet Take 40 mg by mouth daily        naproxen (NAPROSYN) 500 MG tablet Take 1 tablet by mouth 2 times daily (with meals)       omeprazole 20 MG tablet Take 1 tablet by mouth daily       ondansetron (ZOFRAN-ODT) 8 MG disintegrating tablet Take 1 tablet by mouth every 8 hours as needed       oxyCODONE-acetaminophen (PERCOCET)  MG per tablet Take 1 tablet by mouth every 12 hours as needed  0     ranitidine (ZANTAC) 300 MG tablet Take 1 tablet (300 mg) by mouth At Bedtime 30 tablet 1     simvastatin (ZOCOR) 40 MG tablet Take 1 tablet by mouth At Bedtime       albuterol (PROAIR HFA, PROVENTIL HFA, VENTOLIN HFA) 108 (90 BASE) MCG/ACT inhaler Inhale 2 puffs into the lungs every 4 hours as needed for shortness of breath / dyspnea (Patient not taking: Reported on 7/9/2018) 1 Inhaler 0     [DISCONTINUED] gabapentin (NEURONTIN) 300 MG tablet Take 1 tablet by mouth daily       No Active Allergies  Recent Labs   Lab Test  07/09/18   1723  05/08/18   1633  01/29/16   1611   01/09/14   0730  01/08/14   1606   12/30/13   1045   A1C   --   9.3*   --    --   10.5*   --    --   10.2*   LDL  99   --    --    --    --    --    --    --    HDL  67   --    --    --    --    --    --    --    TRIG  72   --    --    --    --    --    --    --    ALT  31   --   49   --    --   23   < >   --    CR  0.92  0.96  0.78   < >  0.67  0.83   < >   --    GFRESTIMATED  86  82  >90  Non  GFR Calc     < >  >90  >90   < >   --    GFRESTBLACK  >90  >90  >90  African American GFR Calc     < >  >90  >90   < >   --    POTASSIUM  4.1  4.1  4.3   < >  4.6  4.4   < >   --    TSH  38.88*  2.28   --    --    --    --    --   1.33    < > = values in this interval not displayed.      BP Readings from Last  3 Encounters:   07/09/18 132/70   05/08/18 121/80   01/29/16 132/77    Wt Readings from Last 3 Encounters:   07/09/18 153 lb 9.6 oz (69.7 kg)   05/08/18 151 lb (68.5 kg)   01/29/16 160 lb (72.6 kg)          Reviewed and updated as needed this visit by clinical staff  Tobacco  Allergies  Meds  Soc Hx      Reviewed and updated as needed this visit by Provider         ROS:  Constitutional, HEENT, cardiovascular, pulmonary, gi and gu systems are negative, except as otherwise noted.    OBJECTIVE:     /70 (BP Location: Right arm, Patient Position: Sitting, Cuff Size: Adult Regular)  Pulse 90  Temp 98.3  F (36.8  C) (Temporal)  Resp 16  Wt 153 lb 9.6 oz (69.7 kg)  SpO2 98%  BMI 23.35 kg/m2  Body mass index is 23.35 kg/(m^2).   GENERAL: healthy, alert and no distress. Looks uncomfortable, constantly adjust his seat  Neck: Supple, no lymphadenopathy or thyromegaly    RESP: lungs clear to auscultation - no rales, rhonchi or wheezes. Decrease breath sound through out  CV: regular rate and rhythm, 2/6 systolic murmur,  ABDOMEN: soft, nontender, nondistended, no palpable masses and bowel sounds normal.  MS: walk with limping to the left. Uncomfortable, restless - constantly changing positions while sitting. Both legs are equally in strength. Normal plantar reflexes. No tender with palpation to knees, hips, or ankles. Positive straight leg maneuver on both legs at about 40 degree. Very tender with guarding with palpation to the lower spine. Also tender with palpation to the para-vertebral muscle bilaterally in the lower spine. No gross musculoskeletal defects and there is no edema.  Both hands are equally in strength.  Shoulder, elbow exam was normal.  Again tender with palpation to the cervical spine and its paraspinous muscle which is tight in by spastic.  No focal weakness  NEURO: Cranial nerves II through XII intact, DTR +2 throughout.  No focal neurological deficit.   Psy: Speech was normal, easily  comprehended.  Thought was intact, no suicidal/homicidal ideation.  No hallucination.  Affect was bright and anxious..    Diagnostic Test Results:  Results for orders placed or performed in visit on 07/09/18   Lipid panel reflex to direct LDL Fasting   Result Value Ref Range    Cholesterol 180 <200 mg/dL    Triglycerides 72 <150 mg/dL    HDL Cholesterol 67 >39 mg/dL    LDL Cholesterol Calculated 99 <100 mg/dL    Non HDL Cholesterol 113 <130 mg/dL   Albumin Random Urine Quantitative with Creat Ratio   Result Value Ref Range    Creatinine Urine 17 mg/dL    Albumin Urine mg/L <5 mg/L    Albumin Urine mg/g Cr Unable to calculate due to low value 0 - 17 mg/g Cr   Comprehensive metabolic panel (BMP + Alb, Alk Phos, ALT, AST, Total. Bili, TP)   Result Value Ref Range    Sodium 135 133 - 144 mmol/L    Potassium 4.1 3.4 - 5.3 mmol/L    Chloride 97 94 - 109 mmol/L    Carbon Dioxide 30 20 - 32 mmol/L    Anion Gap 8 3 - 14 mmol/L    Glucose 178 (H) 70 - 99 mg/dL    Urea Nitrogen 8 7 - 30 mg/dL    Creatinine 0.92 0.66 - 1.25 mg/dL    GFR Estimate 86 >60 mL/min/1.7m2    GFR Estimate If Black >90 >60 mL/min/1.7m2    Calcium 8.9 8.5 - 10.1 mg/dL    Bilirubin Total 0.3 0.2 - 1.3 mg/dL    Albumin 3.6 3.4 - 5.0 g/dL    Protein Total 7.3 6.8 - 8.8 g/dL    Alkaline Phosphatase 66 40 - 150 U/L    ALT 31 0 - 70 U/L    AST 21 0 - 45 U/L   TSH with free T4 reflex   Result Value Ref Range    TSH 38.88 (H) 0.40 - 4.00 mU/L   T4 free   Result Value Ref Range    T4 Free 0.87 0.76 - 1.46 ng/dL       ASSESSMENT/PLAN:     1. Chronic pain syndrome    He is known to have degenerative joint disease of the cervical and lumbar spine with radiculopathy.  Has had cervical laminectomies  and lumbar spinal fusion couple times each.  He also has chronic abdominal pain secondary to diabetes induced gastroparesis.  Been on narcotic for years.  Per patient, his pain was stable and tolerable with medical marijuana and Percocet 10/325 twice a day for which he  has been on for more than a year.  His pain contract was terminated recently by his primary care provider's office and according to the patient because of a positive marijuana on drug screen test even though he was prescribed medical marijuana by the same primary provider.   Been having more pain because has bethanie trying to use his left over pain med sparingly. No change in the nature of the pain as well is distribution.  No focal neurological deficit on the physical exam today.  He denied of drug use.  I reviewed the [place] prescription monitor program and noted that he has been getting the Percocet 10/325, 60 tablets per month regularly in the last year and a half.  There was no history of other filled controlled substance.  Stated that he was on muscle relaxant and gabapentin in the past, before he was started on medical majurana.  He did not feel the need for them anymore when he started the medical marijuana.  He still has quite a few gabapentin left at home.  He had no side effect from the gabapentin nor the Flexeril when he was taking them.    I informed him that I did not prescribe medical marijuana nor manage chronic pain.  I do not have any medical record from his previous provider to review.  Stated that he still has about 71 tablets of Percocet left which is expected to last for another 35 days.  I informed that I be happy to refer him to the pain specialist if he interested and he did.  I did not prescribe to him any pain medication today.  I will try to obtain medical records from his last provider to review.  Will consider to manage his pain until he see the pain specialist after the reviewing the medical record if appropriate.  I informed him that once he sees the pain specialist, all pain medications should be from the pain specialist.  In the meantime, restart the Flexeril and gabapentin.  Side effect discussed.  Recommend to continue his normal activities as tolerated but avoid activities that  make the symptoms worse.  Concerned symptoms need to be seen or go to the ER also discussed.  He feels comfortable with the plan and all his questions answered.  He appreciated the service today.    - oxyCODONE-acetaminophen (PERCOCET)  MG per tablet; Take 1 tablet by mouth every 12 hours as needed; Refill: 0  - cyclobenzaprine (FLEXERIL) 10 MG tablet; Take 1 tablet (10 mg) by mouth 2 times daily as needed for muscle spasms  Dispense: 90 tablet; Refill: 1  - gabapentin (NEURONTIN) 300 MG capsule; Take 1 capsule (300 mg) by mouth 3 times daily  Dispense: 270 capsule; Refill: 1    2. Mild major depression (H)  Currently taking the Celexa at 40 mg daily.  Will continue with it for now.  His PHQ 9 score 9 and FRANCISCO 7 score of 1 today.  He is known to have diabetic neuropathy with radicular neuropathy and chronic pain.  Consider to switch to Effexor or Cymbalta in the future which will have a positive effect on chronic pain and neuropathy.  Will address this more at the next visit.    - DEPRESSION ACTION PLAN (DAP)    3. Chronic neck pain  Please see #1 above for further details.  - gabapentin (NEURONTIN) 300 MG capsule; Take 1 capsule (300 mg) by mouth 3 times daily  Dispense: 270 capsule; Refill: 1    4. Tension headache  Most likely it is due to degenerative joint disease of the cervical spine.  Discussed with him about the nature of the  condition.  He has Imitrex to be to take as needed at home as he was diagnosed with migraine headache.  I discussed with him in detail about the symptom tension vs migraine headache and will continue with the Imitrex as needed.  In the meantime, start him on Flexeril for muscle relaxant.  Encouraged self massage with warm pack frequently as needed.  Stretching exercise was also demonstrated and recommended.  Discussed about physical therapy but he declined as it was not effective in the past.  Side effect from the Flexeril also discussed.    - cyclobenzaprine (FLEXERIL) 10 MG  tablet; Take 1 tablet (10 mg) by mouth 2 times daily as needed for muscle spasms  Dispense: 90 tablet; Refill: 1    5. Chronic bilateral low back pain with bilateral sciatica  Please see #1 above for further detail    - gabapentin (NEURONTIN) 300 MG capsule; Take 1 capsule (300 mg) by mouth 3 times daily  Dispense: 270 capsule; Refill: 1    6. Gastroesophageal reflux disease without esophagitis  His gastroparesis has gotten worse since the gastric stimulator ran out of the battery.  Since then, his reflux also has gotten worse.  The omeprazole has been helping and will continue with it.  Adding Zantac 300 mg at bedtime.  Encouraged to avoid spicy/greasy food.  Also recommend to avoid late meal, high sugar/caffeine intake.  He is strongly encouraged to stay away from alcohol as well.    - ranitidine (ZANTAC) 300 MG tablet; Take 1 tablet (300 mg) by mouth At Bedtime  Dispense: 30 tablet; Refill: 1    7. Hypothyroidism, unspecified type  TSH level today is high with normal free T4 level.  Will increase the levothyroxine flow from 137 to 150 mcg daily.  Recheck a TSH at that level in 2-3 months.    - TSH with free T4 reflex  - T4 free    8. Type 1 diabetes mellitus with diabetic polyneuropathy (H)  Not controlled.  Currently in on insulin pump.  Recommend to work with the endocrinologist closely.  Labs as ordered.  Gabapentin for diabetic neuropathy.    - Lipid panel reflex to direct LDL Fasting  - Albumin Random Urine Quantitative with Creat Ratio  - Comprehensive metabolic panel (BMP + Alb, Alk Phos, ALT, AST, Total. Bili, TP)  - gabapentin (NEURONTIN) 300 MG capsule; Take 1 capsule (300 mg) by mouth 3 times daily  Dispense: 270 capsule; Refill: 1    9. Hyperlipidemia, unspecified hyperlipidemia type  Continue with the simvastatin at 40 mg daily.  Check a lipid panel and CMP today.  Discussed about healthy diet and exercise.    - Lipid panel reflex to direct LDL Fasting  - Comprehensive metabolic panel (BMP + Alb, Alk  Phos, ALT, AST, Total. Bili, TP)    10.  Diabetic gastroparesis  Chronic condition that he has had for years.  Usually do better with a functional gastric stimulator.  The stimulator is running out of battery.  He overall tolerates oral intake well, no nausea or vomiting.  Informed him that both narcotic and marijuana can cause worsening of the gastroparesis.  He is aware of it.  Recommended to follow-up with his GI specialist for further management.   He has not seen the GI specialist for a while.      Ilan Chew Mai, MD  [facility]

## 2018-07-10 ENCOUNTER — TELEPHONE (OUTPATIENT)
Dept: FAMILY MEDICINE | Facility: CLINIC | Age: 53
End: 2018-07-10

## 2018-07-10 PROBLEM — G89.4 CHRONIC PAIN SYNDROME: Status: ACTIVE | Noted: 2018-07-10

## 2018-07-10 RX ORDER — LEVOTHYROXINE SODIUM 150 UG/1
137 TABLET ORAL DAILY
Qty: 30 TABLET | Refills: 0 | Status: SHIPPED | OUTPATIENT
Start: 2018-07-10 | End: 2019-02-17

## 2018-07-10 ASSESSMENT — PATIENT HEALTH QUESTIONNAIRE - PHQ9: SUM OF ALL RESPONSES TO PHQ QUESTIONS 1-9: 9

## 2018-07-10 ASSESSMENT — ANXIETY QUESTIONNAIRES: GAD7 TOTAL SCORE: 1

## 2018-07-10 NOTE — TELEPHONE ENCOUNTER
Patient returned call, message per Dr vargas was relayed to patient.  Patient states he has been taking the medication regularly, please advise new RX.  Thank you,  Urszula Harp  Patient Representative

## 2018-07-10 NOTE — TELEPHONE ENCOUNTER
Tried to reach patient, left message for patient to call the clinic back.  Ty Watson, [facility]

## 2018-07-10 NOTE — TELEPHONE ENCOUNTER
----- Message from Ilan Chew Mai, MD sent at 7/10/2018  6:56 AM CDT -----  Please let patient know that his labs showed there is no protein in his urine which is good.  His thyroid is slightly low and therefore the levothyroxine need to be readjusted.  Please find out if he has been taking the levothyroxine as scheduled.  If not, I encouraged her to take it daily as scheduled and recheck the laab gain in a month.  If he has been, please let me know so I can send a new prescription with a different dose.  His kidney function test and liver function tests were normal.  Electrolytes were also normal.  His blood sugar is high and he has been high for the last couple years.  Recommend screening for diabetes with hemoglobin A1c.  His cholesterol looks excellent.

## 2018-07-10 NOTE — TELEPHONE ENCOUNTER
1.  Please refer him to the pain management specialist as soon as possible.   Preferably Ipsine if possible.  Notes completed.     2.  Please obtain the medical record from [place] for me to review as soon as possible.  Thank you

## 2018-07-11 ENCOUNTER — ALLIED HEALTH/NURSE VISIT (OUTPATIENT)
Dept: EDUCATION SERVICES | Facility: CLINIC | Age: 53
End: 2018-07-11
Payer: COMMERCIAL

## 2018-07-11 PROCEDURE — G0108 DIAB MANAGE TRN  PER INDIV: HCPCS

## 2018-07-11 NOTE — PROGRESS NOTES
Diabetes Self Management Training: Individual Review Visit    Stone De Paz presents today for education and evaluation of glucose control related to Type 1 diabetes.    He is accompanied by self    Patient's diabetes management related comments/concerns: gastroparesis, struggle with eating out    Patient's emotional response to diabetes: expresses readiness to learn and concern for health and well-being    Patient would like this visit to be focused around the following diabetes-related behaviors and goals: Healthy Eating and Monitoring    ASSESSMENT:  Patient Problem List and Family Medical History reviewed for relevant medical history, current medical status, and diabetes risk factors.    Current Diabetes Management per Patient:  Taking diabetes medications?   yes:     Diabetes Medication(s)     Insulin Sig    insulin aspart (NOVOLOG VIAL) 100 UNITS/ML injection Uses with insulin pump, total daily dose approx 45 units, E10.9          Do you have any difficulty affording your medications or glucose monitoring supplies?     Yes - if not mostly covered     Patient glucose self monitoring as follows: not reviewed.   BG meter: Contour Next USB Link (with Medtronic Pump) meter  BG results: not reviewed     BG values are: not assessed  Patient's most recent   Lab Results   Component Value Date    A1C 9.3 05/08/2018    is not meeting goal of <8.0    Nutrition:  Patient has gastroparesis, is very sensitive to insulin.     He has been struggling lately because his gastric stimulator is not working.     Cultural/Baptism diet restrictions: No     Biggest Challenge to Healthy Eating: gastroparesis    Physical Activity:    Not discussed - has back pain, 2 fusion surgeries    Diabetes Risk Factors:  Not discussed    Relevant co-morbidities and related health problems:  Significant for:  depression, dyslipidemia and neuropathy    Diabetes Complications:  Not discussed today.    Recent health service and resource utilization  "related to diabetes (hyperglycemia, hypoglycemia, etc):   None    Vitals:  There were no vitals taken for this visit.  Estimated body mass index is 23.35 kg/(m^2) as calculated from the following:    Height as of 5/8/18: 1.727 m (5' 8\").    Weight as of 7/9/18: 69.7 kg (153 lb 9.6 oz).   Last 3 BP:   BP Readings from Last 3 Encounters:   07/09/18 132/70   05/08/18 121/80   01/29/16 132/77       History   Smoking Status     Former Smoker     Quit date: 1/1/2010   Smokeless Tobacco     Never Used       Labs:  Lab Results   Component Value Date    A1C 9.3 05/08/2018     Lab Results   Component Value Date     07/09/2018     Lab Results   Component Value Date    LDL 99 07/09/2018     HDL Cholesterol   Date Value Ref Range Status   07/09/2018 67 >39 mg/dL Final   ]  GFR Estimate   Date Value Ref Range Status   07/09/2018 86 >60 mL/min/1.7m2 Final     Comment:     Non  GFR Calc     GFR Estimate If Black   Date Value Ref Range Status   07/09/2018 >90 >60 mL/min/1.7m2 Final     Comment:      GFR Calc     Lab Results   Component Value Date    CR 0.92 07/09/2018     No results found for: MICROALBUMIN    Socio/Economic/Cultural Considerations:    Support system: not assessed    Cultural Influences/Ethnic Background:  American      Health Literacy/Numeracy:  \"With diabetes, it's helpful to use forms and log books to write down blood sugars and what you're eating at times to help understand how foods affect your blood sugars. With this in mind how confident are you at filling out medical forms, such as these, by yourself?  Quite a bit    Health Beliefs and Attitudes:   Patient Activation Measure Survey Score:  HORACIO Score (Last Two) 11/27/2013   HORACIO Raw Score 43   Activation Score 68.5   HORACIO Level 4       Stage of Change: PREPARATION (Decided to change - considering how)      Diabetes knowledge and skills assessment:     Patient is knowledgeable in diabetes management concepts related to: " Monitoring, Taking Medication and Problem Solving    Patient needs further education on the following diabetes management concepts: Healthy Eating, Monitoring and Healthy Coping    Barriers to Learning Assessment: Physical Disability and emotional/stress    Based on learning assessment above, most appropriate setting for further diabetes education would be: Individual setting.    INTERVENTION:   Education provided today on:  AADE Self-Care Behaviors:  Healthy Eating: discussion of trying juice based supplements  Monitoring: faxed order request for Dexcom personal CGM    Opportunities for ongoing education and support in diabetes-self management were discussed.    Pt verbalized understanding of concepts discussed and recommendations provided today.       Education Materials Provided:  Dexcom brochure    PLAN:  He may hear from Dexcom in next few days. Once we know if he can get that CGM we can move forward with discussing nutrition and glucose patterns further at next visit.     FOLLOW-UP:  Follow-up appointment scheduled on 7/25.    Ongoing plan for education and support: Online diabetes websites/forums, Written resources (magazines, books, etc.) and Follow-up visit with diabetes educator in 2 weeks    Destiny Warren RDN, ADRIA, FRANCAE    Time Spent: 60 minutes  Encounter Type: Individual    Any diabetes medication dose changes were made via the CDE Protocol and Collaborative Practice Agreement with the patient's primary care provider and endocrinology provider. A copy of this encounter was shared with the provider.

## 2018-07-11 NOTE — TELEPHONE ENCOUNTER
New prescription was sent to the pharmacy.  Please take it as prescribed.  Recheck the TSH level in 2-3 months.

## 2018-07-11 NOTE — MR AVS SNAPSHOT
After Visit Summary   7/11/2018    Stone De Paz    MRN: [id number]           Patient Information     Date Of Birth          1965        Visit Information        Provider Department      7/11/2018 2:00 PM NL DIABETIC ED RESOURCE [facility]        Today's Diagnoses     Diabetes mellitus with neurological manifestations, uncontrolled (H)    -  1       Follow-ups after your visit        Your next 10 appointments already scheduled     Jul 25, 2018  2:00 PM CDT   Diabetic Education with NL DIABETIC ED RESOURCE   [facility] ([facility])    [address] [place] [address] [id number] [phone]            Aug 07, 2018  1:30 PM CDT   [facility]    [address] [place] [id number] [phone]            Aug 09, 2018  2:20 PM CDT   Office Visit with Ilan Chew Mai, MD   [facility] ([facility])    [address] [id number] [phone]           Bring a current list of meds and any records pertaining to this visit. For Physicals, please bring immunization records and any forms needing to be filled out. Please arrive 10 minutes early to complete paperwork.              Who to contact     If you have questions or need follow up information about today's clinic visit or your schedule please contact [facility] directly at [phone].  Normal or non-critical lab and imaging results will be communicated to you by MyChart, letter or phone within 4 business days after the clinic has received the results. If you do not hear from us within 7 days, please contact the clinic through MyChart or phone. If you have a critical or abnormal lab result, we will notify you by phone as soon as possible.  Submit refill requests through [employer] or call your pharmacy and they will  forward the refill request to us. Please allow 3 business days for your refill to be completed.          Additional Information About Your Visit        Care EveryWhere ID     This is your Care EveryWhere ID. This could be used by other organizations to access your [place] medical records  [id number]         Blood Pressure from Last 3 Encounters:   07/09/18 132/70   05/08/18 121/80   01/29/16 132/77    Weight from Last 3 Encounters:   07/09/18 69.7 kg (153 lb 9.6 oz)   05/08/18 68.5 kg (151 lb)   01/29/16 72.6 kg (160 lb)              We Performed the Following     DIABETES EDUCATION - Individual  []        Primary Care Provider Office Phone # Fax #    Marciano Painting PA-C [phone] [address] [place] [address]        Equal Access to Services     [facility]: Hadii aad ku hadasho Soomaali, waaxda luqadaha, qaybta kaalmada adeegyada, britton pridein hayaan vidhya nuñez . So [place] -[phone].    ATENCIÓN: Si habla español, tiene a banks disposición servicios gratuitos de asistencia lingüística. Llame al [phone].    We comply with applicable federal civil rights laws and [place] laws. We do not discriminate on the basis of race, color, national origin, age, disability, sex, sexual orientation, or gender identity.            Thank you!     Thank you for choosing [facility]  for your care. Our goal is always to provide you with excellent care. Hearing back from our patients is one way we can continue to improve our services. Please take a few minutes to complete the written survey that you may receive in the mail after your visit with us. Thank you!             Your Updated Medication List - Protect others around you: Learn how to safely use, store and throw away your medicines at www.disposemymeds.org.          This list is accurate as of 7/11/18  4:53 PM.  Always use your most recent med list.                   Brand Name Dispense  Instructions for use Diagnosis    acetaminophen 325 MG tablet    TYLENOL    100 tablet    Take 2 tablets (650 mg) by mouth every 4 hours as needed    Empyema, right (H)       albuterol 108 (90 Base) MCG/ACT Inhaler    PROAIR HFA/PROVENTIL HFA/VENTOLIN HFA    1 Inhaler    Inhale 2 puffs into the lungs every 4 hours as needed for shortness of breath / dyspnea        citalopram 40 MG tablet    celeXA     Take 1 tablet by mouth daily        cyclobenzaprine 10 MG tablet    FLEXERIL    90 tablet    Take 1 tablet (10 mg) by mouth 2 times daily as needed for muscle spasms    Tension headache, Chronic pain syndrome       gabapentin 300 MG capsule    NEURONTIN    270 capsule    Take 1 capsule (300 mg) by mouth 3 times daily    Type 1 diabetes mellitus with diabetic polyneuropathy (H), Chronic bilateral low back pain with bilateral sciatica, Chronic neck pain, Chronic pain syndrome       insulin aspart 100 UNITS/ML injection    NovoLOG VIAL    20 mL    Uses with insulin pump, total daily dose approx 45 units, E10.9    Type 1 diabetes mellitus with diabetic autonomic neuropathy (H)       levothyroxine 150 MCG tablet    SYNTHROID/LEVOTHROID    30 tablet    Take 1 tablet (150 mcg) by mouth daily Please note the dose has increased.    Acquired hypothyroidism       lisinopril 20 MG tablet    PRINIVIL/ZESTRIL     Take 40 mg by mouth daily        naproxen 500 MG tablet    NAPROSYN     Take 1 tablet by mouth 2 times daily (with meals)        omeprazole 20 MG tablet      Take 1 tablet by mouth daily        ondansetron 8 MG ODT tab    ZOFRAN-ODT     Take 1 tablet by mouth every 8 hours as needed        oxyCODONE-acetaminophen  MG per tablet    PERCOCET     Take 1 tablet by mouth every 12 hours as needed    Chronic pain syndrome       ranitidine 300 MG tablet    ZANTAC    30 tablet    Take 1 tablet (300 mg) by mouth At Bedtime    Gastroesophageal reflux disease without esophagitis       simvastatin 40 MG tablet    ZOCOR     Take 1  tablet by mouth At Bedtime

## 2018-07-12 ENCOUNTER — TELEPHONE (OUTPATIENT)
Dept: FAMILY MEDICINE | Facility: CLINIC | Age: 53
End: 2018-07-12

## 2018-07-12 NOTE — TELEPHONE ENCOUNTER
Reviewed the medical record from [facility].  Drug screen test on 5/3/18, 5/18/18 showed positive for oxycodone which is appropriate.  Also positive for high-dose of marijuana - unclear if he was on medical marijuana during this time.  Based on the record, it sounds like his primary care provider was not the one who prescribed medical marijuana.  His primary care provider however was aware and in fact he/she was the who referred him to specialist for medical marijuana.  Reviewed the pain contract was signed on 5/3/18, no medical marijuana was noted.  It showed only Percocet 10/325 twice daily as needed.  It did not clearly stated who the medical marijuana subscriber was.  There is no documentation that his pain contract was terminated.      Please let patient know that I reviewed the medical record from his previous primarycare provider, I did not see an indication the his pain contract was terminated.  I also did not see that his primary care provider was the one who prescribed him the medical marijuana.  I need the medical marijuana refill history from a dispensary in the last 6 months.  I also need a note from his primary provider that he his pain contact was terminated.  This will allow me to help him with the pain management while waiting to see the pain specialist.  And again, once he sees the pain specialist, all pain medications should be through his pain specialist.

## 2018-07-13 NOTE — TELEPHONE ENCOUNTER
Please be sure that he is referred to pain specialist.  I would try to manage his pain until he is seeing the pain specialist if his pain contract with his previous primary provider terminated.

## 2018-07-13 NOTE — TELEPHONE ENCOUNTER
Patient called back stating that Dr. Mcadams was the one that prescribed it and he is fighting with them over this. He stated that his medical marijuana  in 2018 had some left over and had to go to [place] and wasn't able to bring it with him. So when he got back he started using the medical marijuana again. He last seen Dr. Mcadams on  he said he would refer him to the program again and then he got home and his nurse called saying the clinic could no longer do it. He is wondering if there is any way Dr. Raymond could help get him in with a pain specialist. Stone also said he would bring in the medical records to Dr. Raymond if he needed and would also bring the last vial of medical marijuana he has to show him. He is not looking for Dr. Raymond to prescribe the medical marijuana just wondering if Dr. Raymond can help with the pain in the mean time while trying to get set back up with a new Dr.     Stefanie Salas MA

## 2018-07-25 ENCOUNTER — MEDICAL CORRESPONDENCE (OUTPATIENT)
Dept: HEALTH INFORMATION MANAGEMENT | Facility: CLINIC | Age: 53
End: 2018-07-25

## 2018-07-26 ENCOUNTER — TELEPHONE (OUTPATIENT)
Dept: ENDOCRINOLOGY | Facility: CLINIC | Age: 53
End: 2018-07-26

## 2018-07-26 NOTE — TELEPHONE ENCOUNTER
Reason for Call:  Form, our goal is to have forms completed with 72 hours, however, some forms may require a visit or additional information.    Type of letter, form or note:  Driving [place] Dept of public safety    Who is the form from?: Patient    Where did the form come from: form was faxed in    What clinic location was the form placed at?: [facility]    Where the form was placed: Given to physician   Placed on Dr Best's Desk     What number is listed as a contact on the form?: Fax # [phone]       Additional comments: I have a copy onmy desk in case it gets lost and the family needs it filled out and faxed back tomorrow, she had faxed it in 3 weeks ago   No documentation about form from 2-3 weeks ago    Call taken on 7/26/2018 at 4:37 PM by Belem Strauss

## 2018-07-27 NOTE — TELEPHONE ENCOUNTER
Called the pt wife, both forms were faxed one on 6/22 and the other on 7/27. Will mail forms to home address so the pt can follow up with the Dmv.        Aniya Larson

## 2018-07-27 NOTE — TELEPHONE ENCOUNTER
Spoke wit Ada De Paz ( Spouse) she wants to confirm that Dr. Best received the form and filled it out, she states the pt  Is about to loose his license if form is not completed   Ph. [phone] VM is okay

## 2018-08-07 ENCOUNTER — OFFICE VISIT (OUTPATIENT)
Dept: ENDOCRINOLOGY | Facility: CLINIC | Age: 53
End: 2018-08-07
Payer: COMMERCIAL

## 2018-08-07 VITALS
DIASTOLIC BLOOD PRESSURE: 71 MMHG | SYSTOLIC BLOOD PRESSURE: 131 MMHG | BODY MASS INDEX: 22.58 KG/M2 | WEIGHT: 149 LBS | HEIGHT: 68 IN | HEART RATE: 76 BPM

## 2018-08-07 DIAGNOSIS — Z96.41 INSULIN PUMP STATUS: ICD-10-CM

## 2018-08-07 DIAGNOSIS — E10.43 TYPE 1 DIABETES MELLITUS WITH DIABETIC AUTONOMIC NEUROPATHY (H): Primary | ICD-10-CM

## 2018-08-07 DIAGNOSIS — E03.9 HYPOTHYROIDISM, UNSPECIFIED TYPE: ICD-10-CM

## 2018-08-07 PROCEDURE — 99215 OFFICE O/P EST HI 40 MIN: CPT | Performed by: INTERNAL MEDICINE

## 2018-08-07 NOTE — PROGRESS NOTES
"Name: Stone De Paz      HPI:  Recent issues:  Here with ex-wife/girlfriend Ada for endocrinology evaluation  They spent winter months near [place], [place]        1995. Diagnosis of diabetes mellitus after 2nd back surgery, age 29  Initial treatment with insulin injections using NPH and Regular insulin  Switched to premixed 70/30 insulin BID dosing  1999. Started Medtronic insulin pump model 508, used base-dose Humalog insulin at meals  Had seen Dr. Iggy Briggs/[facility]    7/24/01. Initial evaluation with me at my former clinic ([facility])  Upgraded pump to Medtronic 523   Now using Medtronic 630G pump   Novolog in pump  Meals BID  Chronic high BG and hgbA1c trends despite dose changes  Tried square wave bolus  Uses carb \"exchanges\" with his pump settings  Has Contour Link? BG meter, variable testing pattern... Recently testing 4x/day  Tried Medtronic CGMS in the past, didn't like it  Current pump settings:      Recent pump Carelink data:        Previous  labs include:  Lab Results   Component Value Date    A1C 9.3 (H) 05/08/2018     07/09/2018    POTASSIUM 4.1 07/09/2018    CHLORIDE 97 07/09/2018    CO2 30 07/09/2018    ANIONGAP 8 07/09/2018     (H) 07/09/2018    BUN 8 07/09/2018    CR 0.92 07/09/2018    GFRESTIMATED 86 07/09/2018    GFRESTBLACK >90 07/09/2018    EMILEE 8.9 07/09/2018    CHOL 180 07/09/2018    TRIG 72 07/09/2018    HDL 67 07/09/2018    LDL 99 07/09/2018    NHDL 113 07/09/2018    UCRR 17 07/09/2018    MICROL <5 07/09/2018    UMALCR Unable to calculate due to low value 07/09/2018 7/2018. Met with Destiny Warren RD, CDE at [facility]  DM Complications:   Neuropathy    Peripheral neuropathy- decreased sensation at feet    Autonomic neuropathy- gastroparesis     Has Medtronic gastric stimulator     Sees physicians and WARREN Gilmore/[facility]  Last eye exam date?       Thyroid:  1994. History of hypothyroidism  Chronic levothyroxine treatment, has used Levoxyl in " the past  Recent labs include:  Lab Results   Component Value Date    TSH 38.88 (H) 07/09/2018    TSH 2.28 05/08/2018    TSH 1.33 12/30/2013    TSH 0.26 10/30/2013    TSH 0.19 07/03/2013    T4 0.87 07/09/2018    T4 1.28 05/08/2018 7/2018. Had levothyroxine dose increase   Current dose:  Levothyroxine 0.15 mg po QAM    Also takes vit D 1000 IU QD      Lives in [place] with Ada new dog Gayle- priest retriever  Sees Marciano Painting PAC/[facility] for general medicine evaluations  Also sees Olga Ramirez PAC/[facility].    PMH/PSH:  Past Medical History:   Diagnosis Date     Chronic neck pain     percocet for years now,      Gastroparesis due to DM (H)     gastric stimulator helps, [facility] manages that     Hyperlipidemia      Hypertension      Hypothyroidism      Neuropathy, diabetic (H)      Type 1 diabetes (H) age 31     Past Surgical History:   Procedure Laterality Date     C LUMBAR SPINE FUSION,ANTER APPRCH      2 L4-5 L5-21     COLONOSCOPY  07/18/12     FUSION CERVICAL ANTERIOR TWO LEVELS       THORACOSCOPIC DECORTICATION LUNG  1/9/2014    Procedure: THORACOSCOPIC DECORTICATION LUNG;  RIGHT VATS/RIGHT THORACOTOMY , DECORTICATION RIGHT LUNG ,WEDGE RESECTION RIGHT MIDDLE LOBE LUNG NODULE;  Surgeon: Harley Felix MD;  Location:  OR       Family Hx:  No family history on file.      Social Hx:  Social History     Social History     Marital status:      Spouse name: N/A     Number of children: N/A     Years of education: N/A     Occupational History     Not on file.     Social History Main Topics     Smoking status: Former Smoker     Quit date: 1/1/2010     Smokeless tobacco: Never Used     Alcohol use No     Drug use: No     Sexual activity: No     Other Topics Concern     Not on file     Social History Narrative          MEDICATIONS:  has a current medication list which includes the following prescription(s): albuterol, citalopram, cyclobenzaprine, gabapentin, insulin aspart,  "levothyroxine, lisinopril, naproxen, omeprazole, ondansetron, ranitidine, simvastatin, and oxycodone-acetaminophen.    ROS:     ROS: 10 point ROS neg other than the symptoms noted above in the HPI.    GENERAL: some fatigue, wt stable; denies fevers, chills, night sweats.   HEENT: no dysphagia, odonophagia, diplopia, neck pain  THYROID:  no apparent hyper or hypothyroid symptoms  CV: occasional chest pains; no pressure, palpitations  LUNGS: no SOB, KRAFT, cough, wheezing   ABDOMEN: some bloating and postmeal nausea; no diarrhea, constipation  EXTREMITIES: no rashes, ulcers, edema  NEUROLOGY: no headaches, denies changes in vision, tingling, extremitiy numbness   MSK: neck stiffness and back pains; no muscle weakness  SKIN: no rashes or lesions  PSYCH:  stable mood, no significant anxiety or depression  ENDOCRINE: no heat or cold intolerance    Physical Exam   VS: /71 (BP Location: Right arm, Cuff Size: Adult Regular)  Pulse 76  Ht 1.727 m (5' 8\")  Wt 67.6 kg (149 lb)  BMI 22.66 kg/m2  GENERAL: AXOX3, NAD, slender, well dressed, answering questions appropriately, appears stated age.  THYROID:  normal gland, no apparent nodules or goiter  HEENT: neck stiff/ reduced neck ROM; neck non-tender, no exopthalmous, EOMI  ABDOMEN: soft, nontender, nondistended  NEUROLOGY: CN grossly intact, no tremors  MSK: grossly intact  SKIN: no rashes, no lesions    LABS:    All pertinent notes, labs, and images personally reviewed by me.     A/P:  Encounter Diagnoses   Name Primary?     Type 1 diabetes mellitus with diabetic autonomic neuropathy (H) Yes     Insulin pump status      Hypothyroidism, unspecified type        Comments:  Reviewed complicated health history and diabetes issues.    Plan:  Reviewed the overall T1DM management and insulin pump use.  Discussed optimal BG testing to assess glucose trends.  We reviewed insulin pump settings, basal rate and bolus dosing  Use of automated pump bolus dosing for meal/snack carb & " "correction dosing  Reviewed the Medtronic Carelink report data today    Recommend:  Continue Medtronic insulin pump management plan.  Pump setting changes:     Basals:  12p 0.55 to 0.6,    Bolus:  BG target [place] and 8p 100-175 to 100-150 range  Use lower Temp Basal rate for increased activity/exercise  Discussed use of new DexcomG5 CGMS system   Needs to finish internet instruction (or see  CDE), begin use   Discussed Dexcom glucose alarm settings, benefits   Reviewed the Dexcom and Clarity phone apps for monitoring glucose levels  Would benefit from additional CDE evaluation at [facility]   Encourage him to bring list of food items that are challenging for carb estimating    (he uses \"exchanges\" and pump programed for Units per Exchange)   Review Dexcom CGMS use  Patient makes frequent adjustments to insulin treatment regimen on basis of therapeutic glucose testing  Continue simvastatin 40 mg each evening  Arrange annual dilated eye exam, fasting lipid panel testing.    Continue current levothyroxine 0.15 mg daily dose plan  Needs repeat TSH, FT4, hgbA1c in approx 4-6 weeks also    Addressed patient's questions today.    Labs ordered today:   No orders of the defined types were placed in this encounter.    Radiology/Consults ordered today: None    More than 50% of the time spent with Mr. De Paz on counseling / coordinating his care.  Total appointment time was 40 minutes.    Follow-up:  3 mo.    Frankie Best MD  Endocrinology  [place]  CC:  Mandi SAPP      "

## 2018-08-07 NOTE — MR AVS SNAPSHOT
After Visit Summary   8/7/2018    Stone De Paz    MRN: [id number]           Patient Information     Date Of Birth          1965        Visit Information        Provider Department      8/7/2018 1:30 PM Frankie Best MD [facility]        Today's Diagnoses     Type 1 diabetes mellitus with diabetic autonomic neuropathy (H)    -  1    Insulin pump status        Hypothyroidism, unspecified type           Follow-ups after your visit        Follow-up notes from your care team     Return in about 2 months (around 10/7/2018).      Your next 10 appointments already scheduled     Aug 09, 2018  2:20 PM CDT   [facility]w [facility]   [facility]    [address] [place] [id number] [phone]           Bring a current list of meds and any records pertaining to this visit. For Physicals, please bring immunization records and any forms needing to be filled out. Please arrive 10 minutes early to complete paperwork.            Aug 09, 2018  3:00 PM CDT   Diabetic Education with [facility]   [facility] ([facility])    [address] [place] [id number] [phone]              Who to contact     If you have questions or need follow up information about today's clinic visit or your schedule please contact [facility] directly at [phone].  Normal or non-critical lab and imaging results will be communicated to you by MyChart, letter or phone within 4 business days after the clinic has received the results. If you do not hear from us within 7 days, please contact the clinic through MyChart or phone. If you have a critical or abnormal lab result, we will notify you by phone as soon as possible.  Submit refill requests through Play[employer] App or call your pharmacy and they will forward the refill request to us. Please allow 3 business days for your refill to be  "completed.          Additional Information About Your Visit        Care EveryWhere ID     This is your Care EveryWhere ID. This could be used by other organizations to access your [facility] medical records  [id number]        Your Vitals Were     Pulse Height BMI (Body Mass Index)             76 1.727 m (5' 8\") 22.66 kg/m2          Blood Pressure from Last 3 Encounters:   08/07/18 131/71   07/09/18 132/70   05/08/18 121/80    Weight from Last 3 Encounters:   08/07/18 67.6 kg (149 lb)   07/09/18 69.7 kg (153 lb 9.6 oz)   05/08/18 68.5 kg (151 lb)              Today, you had the following     No orders found for display       Primary Care Provider Office Phone # Fax #    Ilan Chew Mai, [phone] [address] [place] [address]        Equal Access to Services     [facility]: Hadii jordyn devlino Sokusum, waaxda luqadaha, qaybta kaalmada adewilenryada, britton nuñez . So [facility] [phone].    ATENCIÓN: Si habla español, tiene a banks disposición servicios gratuitos de asistencia lingüística. Llame al [phone].    We comply with applicable federal civil rights laws and [place] laws. We do not discriminate on the basis of race, color, national origin, age, disability, sex, sexual orientation, or gender identity.            Thank you!     Thank you for choosing [facility]  for your care. Our goal is always to provide you with excellent care. Hearing back from our patients is one way we can continue to improve our services. Please take a few minutes to complete the written survey that you may receive in the mail after your visit with us. Thank you!             Your Updated Medication List - Protect others around you: Learn how to safely use, store and throw away your medicines at www.disposemymeds.org.          This list is accurate as of 8/7/18  5:44 PM.  Always use your most recent med list.                   Brand Name Dispense Instructions for use " Diagnosis    albuterol 108 (90 Base) MCG/ACT Inhaler    PROAIR HFA/PROVENTIL HFA/VENTOLIN HFA    1 Inhaler    Inhale 2 puffs into the lungs every 4 hours as needed for shortness of breath / dyspnea        citalopram 40 MG tablet    celeXA     Take 1 tablet by mouth daily        cyclobenzaprine 10 MG tablet    FLEXERIL    90 tablet    Take 1 tablet (10 mg) by mouth 2 times daily as needed for muscle spasms    Tension headache, Chronic pain syndrome       gabapentin 300 MG capsule    NEURONTIN    270 capsule    Take 1 capsule (300 mg) by mouth 3 times daily    Type 1 diabetes mellitus with diabetic polyneuropathy (H), Chronic bilateral low back pain with bilateral sciatica, Chronic neck pain, Chronic pain syndrome       insulin aspart 100 UNITS/ML injection    NovoLOG VIAL    20 mL    Uses with insulin pump, total daily dose approx 45 units, E10.9    Type 1 diabetes mellitus with diabetic autonomic neuropathy (H)       levothyroxine 150 MCG tablet    SYNTHROID/LEVOTHROID    30 tablet    Take 1 tablet (150 mcg) by mouth daily Please note the dose has increased.    Acquired hypothyroidism       lisinopril 20 MG tablet    PRINIVIL/ZESTRIL     Take 40 mg by mouth daily        naproxen 500 MG tablet    NAPROSYN     Take 1 tablet by mouth 2 times daily (with meals)        omeprazole 20 MG tablet      Take 1 tablet by mouth daily        ondansetron 8 MG ODT tab    ZOFRAN-ODT     Take 1 tablet by mouth every 8 hours as needed        oxyCODONE-acetaminophen  MG per tablet    PERCOCET     Take 1 tablet by mouth every 12 hours as needed    Chronic pain syndrome       ranitidine 300 MG tablet    ZANTAC    30 tablet    Take 1 tablet (300 mg) by mouth At Bedtime    Gastroesophageal reflux disease without esophagitis       simvastatin 40 MG tablet    ZOCOR     Take 1 tablet by mouth At Bedtime

## 2018-08-07 NOTE — Clinical Note
Starting DexcomG5 today, needs data and smartphone catie review at upcoming CDE appt later this week.  TL

## 2018-08-09 ENCOUNTER — OFFICE VISIT (OUTPATIENT)
Dept: FAMILY MEDICINE | Facility: CLINIC | Age: 53
End: 2018-08-09
Payer: COMMERCIAL

## 2018-08-09 ENCOUNTER — TELEPHONE (OUTPATIENT)
Dept: FAMILY MEDICINE | Facility: CLINIC | Age: 53
End: 2018-08-09

## 2018-08-09 ENCOUNTER — ALLIED HEALTH/NURSE VISIT (OUTPATIENT)
Dept: EDUCATION SERVICES | Facility: CLINIC | Age: 53
End: 2018-08-09
Payer: COMMERCIAL

## 2018-08-09 VITALS
OXYGEN SATURATION: 98 % | RESPIRATION RATE: 16 BRPM | HEART RATE: 101 BPM | BODY MASS INDEX: 23.57 KG/M2 | TEMPERATURE: 99.1 F | WEIGHT: 155 LBS | DIASTOLIC BLOOD PRESSURE: 78 MMHG | SYSTOLIC BLOOD PRESSURE: 128 MMHG

## 2018-08-09 DIAGNOSIS — M65.30 TRIGGER FINGER, UNSPECIFIED FINGER, UNSPECIFIED LATERALITY: ICD-10-CM

## 2018-08-09 DIAGNOSIS — M65.30 TRIGGER FINGER, ACQUIRED: Primary | ICD-10-CM

## 2018-08-09 DIAGNOSIS — G89.29 CHRONIC NECK PAIN: ICD-10-CM

## 2018-08-09 DIAGNOSIS — E10.43 TYPE 1 DIABETES MELLITUS WITH DIABETIC AUTONOMIC NEUROPATHY (H): ICD-10-CM

## 2018-08-09 DIAGNOSIS — F32.0 MILD MAJOR DEPRESSION (H): ICD-10-CM

## 2018-08-09 DIAGNOSIS — G89.4 CHRONIC PAIN SYNDROME: Primary | ICD-10-CM

## 2018-08-09 DIAGNOSIS — M54.2 CHRONIC NECK PAIN: ICD-10-CM

## 2018-08-09 DIAGNOSIS — E10.43 TYPE 1 DIABETES MELLITUS WITH DIABETIC AUTONOMIC NEUROPATHY (H): Primary | ICD-10-CM

## 2018-08-09 DIAGNOSIS — M48.02 CERVICAL STENOSIS OF SPINE: ICD-10-CM

## 2018-08-09 DIAGNOSIS — E03.9 HYPOTHYROIDISM, UNSPECIFIED TYPE: ICD-10-CM

## 2018-08-09 PROCEDURE — G0108 DIAB MANAGE TRN  PER INDIV: HCPCS

## 2018-08-09 PROCEDURE — 99215 OFFICE O/P EST HI 40 MIN: CPT | Performed by: FAMILY MEDICINE

## 2018-08-09 PROCEDURE — 99207 C FOOT EXAM  NO CHARGE: CPT | Performed by: FAMILY MEDICINE

## 2018-08-09 RX ORDER — DULOXETIN HYDROCHLORIDE 20 MG/1
20 CAPSULE, DELAYED RELEASE ORAL 2 TIMES DAILY
Qty: 60 CAPSULE | Refills: 1 | Status: SHIPPED | OUTPATIENT
Start: 2018-08-09 | End: 2018-09-21

## 2018-08-09 RX ORDER — OXYCODONE HYDROCHLORIDE 5 MG/1
5 TABLET ORAL 2 TIMES DAILY PRN
Qty: 60 TABLET | Refills: 0 | Status: SHIPPED | OUTPATIENT
Start: 2018-08-09 | End: 2018-11-05

## 2018-08-09 RX ORDER — TOPIRAMATE 25 MG/1
25 TABLET, FILM COATED ORAL 2 TIMES DAILY
Qty: 60 TABLET | Refills: 1 | Status: SHIPPED | OUTPATIENT
Start: 2018-08-09 | End: 2018-09-21

## 2018-08-09 ASSESSMENT — PAIN SCALES - GENERAL: PAINLEVEL: MODERATE PAIN (5)

## 2018-08-09 NOTE — PROGRESS NOTES
SUBJECTIVE:   Stone De Paz is a 52 year old male who presents to clinic today for the following health issues:    Concern - Recheck from LOV 7/9/18 with Dr. Mandi Ritter is here today for follow-up on chronic pain and depression/anxiety.  He was seen on 7/9/18 and please see my last dictation for further details.  Since the last visit, he saw the endocrinologist and was recently put on the new insulin pump.  His blood sugars be monitored continuously.  Blood sugar remains fluctuating which is being managed closely by the endocrinologist.  Stated that he was told not to take any Tylenol products as it may cause false elevation of the blood sugar.  He stopped taking the Percocet completely, the last dose was 3 days ago.  No withdrawal symptoms.  He has chronic pain syndrome secondary to cervical stenosis for many years.  Also been on the pain medications for many years as well.  Did not followed up with the medical marijuana and is not interested to pursue that route.  In the still have a lot of pain which is affecting his life significantly.  Does not really want to see a pain specialist as it is too far of a drive.  He want to pursue alternative management.  He would like to see her neurosurgeon for possible injections or other options.  Not able to do MRI due to metal in his body. The same pain that he has had with unchanged distribution.  Gabapentin 300 mg 3 times daily has helped, but does not want higher dose due to its sedative side effect.  He takes Celexa for his anxiety/depression.and they both are well controlled.  Denied of suicidal or homicidal ideation.    He also has trigger fingers on both hands and painful.  Has had them for years and they are getting worse. Had surgery done on some of them and it helped.  He is interested to look into surgical intervention if appropriate.    Is overall doing well.  He denies of alcohol or drug use.  No headache or dizziness.  No acute change in his vision.  No  chest pain or shortness of breath.  No nausea, vomiting, diarrhea constipation.  No fever or chills.  No other concern.    Problem list and histories reviewed & adjusted, as indicated.  Additional history: as documented    Current Outpatient Prescriptions   Medication Sig Dispense Refill     albuterol (PROAIR HFA, PROVENTIL HFA, VENTOLIN HFA) 108 (90 BASE) MCG/ACT inhaler Inhale 2 puffs into the lungs every 4 hours as needed for shortness of breath / dyspnea 1 Inhaler 0     citalopram (CELEXA) 40 MG tablet Take 1 tablet by mouth daily       cyclobenzaprine (FLEXERIL) 10 MG tablet Take 1 tablet (10 mg) by mouth 2 times daily as needed for muscle spasms 90 tablet 1     insulin aspart (NOVOLOG VIAL) 100 UNITS/ML injection Uses with insulin pump, total daily dose approx 45 units, E10.9 20 mL 11     levothyroxine (SYNTHROID/LEVOTHROID) 150 MCG tablet Take 1 tablet (150 mcg) by mouth daily Please note the dose has increased. 30 tablet 0     naproxen (NAPROSYN) 500 MG tablet Take 1 tablet by mouth 2 times daily (with meals)       omeprazole 20 MG tablet Take 1 tablet by mouth daily       ondansetron (ZOFRAN-ODT) 8 MG disintegrating tablet Take 1 tablet by mouth every 8 hours as needed       ranitidine (ZANTAC) 300 MG tablet Take 1 tablet (300 mg) by mouth At Bedtime 30 tablet 1     simvastatin (ZOCOR) 40 MG tablet Take 1 tablet by mouth At Bedtime       gabapentin (NEURONTIN) 300 MG capsule Take 1 capsule (300 mg) by mouth 3 times daily 270 capsule 1     lisinopril (PRINIVIL,ZESTRIL) 20 MG tablet Take 40 mg by mouth daily        oxyCODONE-acetaminophen (PERCOCET)  MG per tablet Take 1 tablet by mouth every 12 hours as needed  0     No Known Allergies    Reviewed and updated as needed this visit by clinical staff  Tobacco  Allergies  Meds  Soc Hx      Reviewed and updated as needed this visit by Provider         ROS:  Constitutional, HEENT, cardiovascular, pulmonary, gi and gu systems are negative, except as  otherwise noted.    OBJECTIVE:     /78 (BP Location: Left arm, Patient Position: Sitting, Cuff Size: Adult Regular)  Pulse 101  Temp 99.1  F (37.3  C) (Temporal)  Resp 16  Wt 155 lb (70.3 kg)  SpO2 98%  BMI 23.57 kg/m2  Body mass index is 23.57 kg/(m^2).  GENERAL: healthy, alert and no distress  NECK: no adenopathy, supple, no lymphadenopathy or thyromegaly.  Tender with palpation to the cervical spine and its paraspinous muscle.  RESP: lungs clear to auscultation - no rales, rhonchi or wheezes  CV: regular rate and rhythm, no murmur, walk without limping.  All 4 extremities are equally in strength.  No peripheral edema and peripheral pulses strong  MS: no gross musculoskeletal defects noted, no edema.  Shoulders, elbow and wrist exams were normal.  Trigger finger was noted, especially the last 3 digits of both hands.  They are mild.  Tender with palpation however.  NEURO: Normal strength and tone, mentation intact and speech normal.  Cranial nerves II through XII intact.  DTR +2 throughout.  No focal neurological deficit.  PSYCH: mentation appears normal, affect normal/bright.  Thoughts intact, no suicidal/homicidal ideation.  No hallucination.   Foot exam: Normal monofilament test.  No dried, cracked or opened skin.    Diagnostic Test Results:  Results for orders placed or performed in visit on 07/09/18   Lipid panel reflex to direct LDL Fasting   Result Value Ref Range    Cholesterol 180 <200 mg/dL    Triglycerides 72 <150 mg/dL    HDL Cholesterol 67 >39 mg/dL    LDL Cholesterol Calculated 99 <100 mg/dL    Non HDL Cholesterol 113 <130 mg/dL   Albumin Random Urine Quantitative with Creat Ratio   Result Value Ref Range    Creatinine Urine 17 mg/dL    Albumin Urine mg/L <5 mg/L    Albumin Urine mg/g Cr Unable to calculate due to low value 0 - 17 mg/g Cr   Comprehensive metabolic panel (BMP + Alb, Alk Phos, ALT, AST, Total. Bili, TP)   Result Value Ref Range    Sodium 135 133 - 144 mmol/L    Potassium 4.1  3.4 - 5.3 mmol/L    Chloride 97 94 - 109 mmol/L    Carbon Dioxide 30 20 - 32 mmol/L    Anion Gap 8 3 - 14 mmol/L    Glucose 178 (H) 70 - 99 mg/dL    Urea Nitrogen 8 7 - 30 mg/dL    Creatinine 0.92 0.66 - 1.25 mg/dL    GFR Estimate 86 >60 mL/min/1.7m2    GFR Estimate If Black >90 >60 mL/min/1.7m2    Calcium 8.9 8.5 - 10.1 mg/dL    Bilirubin Total 0.3 0.2 - 1.3 mg/dL    Albumin 3.6 3.4 - 5.0 g/dL    Protein Total 7.3 6.8 - 8.8 g/dL    Alkaline Phosphatase 66 40 - 150 U/L    ALT 31 0 - 70 U/L    AST 21 0 - 45 U/L   TSH with free T4 reflex   Result Value Ref Range    TSH 38.88 (H) 0.40 - 4.00 mU/L   T4 free   Result Value Ref Range    T4 Free 0.87 0.76 - 1.46 ng/dL       ASSESSMENT/PLAN:     1. Chronic pain syndrome  Is known to have chronic neck and lower back pain secondary to the spinal stenosis for years.  Failed surgical interventions with couple of cervical laminectomies and couple of lumbar spinal fusion.  Has been on pain medication for years and was on Percocet 10/325 twice a day with medical marijuana.  No longer interested to pursue medical marijuana although was effective.  Stopped the Percocet because of its product and is interested to pursue non-opioid management.  Last dose of Percocet with 3 days ago. No withdrawal symptoms.  Failed physical therapy and injections in the past.  Not interested in physical therapy but is open to injection if it is appropriate.      Option for medical management discussed.  Continue with the gabapentin.  Start low-dose Cymbalta and Topamax.  Side effect discussed.  He is scheduled to see an ophthalmologist for eye exam next week.  Side effect of the medications discussed.  Continue with his normal activities as tolerated.  He was given him 60 tablets of oxycodone 5 mg to be taken as twice a day as needed.  He is aware that this is not intended for long-term treatment.  Hopefully will be able to stop it once the Cymbalta and the Topamax fully effective.    Also refer him to  "neurosurgeon for further evaluation and management.  Not a candidate for MRI due to per his report of \"metal in his body\".  Will let the neurosurgeon to determine whether CT scan is appropriate.    - DULoxetine (CYMBALTA) 20 MG EC capsule; Take 1 capsule (20 mg) by mouth 2 times daily  Dispense: 60 capsule; Refill: 1  - topiramate (TOPAMAX) 25 MG tablet; Take 1 tablet (25 mg) by mouth 2 times daily  Dispense: 60 tablet; Refill: 1  - oxyCODONE IR (ROXICODONE) 5 MG tablet; Take 1 tablet (5 mg) by mouth 2 times daily as needed for pain Should last for a month, tapering down  Dispense: 60 tablet; Refill: 0  - NEUROSURGERY REFERRAL    2. Cervical stenosis of spine  Please see #1 above for further details.    - topiramate (TOPAMAX) 25 MG tablet; Take 1 tablet (25 mg) by mouth 2 times daily  Dispense: 60 tablet; Refill: 1  - oxyCODONE IR (ROXICODONE) 5 MG tablet; Take 1 tablet (5 mg) by mouth 2 times daily as needed for pain Should last for a month, tapering down  Dispense: 60 tablet; Refill: 0  - NEUROSURGERY REFERRALH    3. Mild major depression (H)  Well controlled and is doing well with the Celexa.    Since he is known to have diabetic neuropathy with chronic pain syndrome, I discussed about option of switching to Cymbalta instead.  Never tried Cymbalta before and he is willing to give it a try.  Stop the Celexa and start him on Cymbalta 20 mg twice daily.  Follow-up in a month, will consider taper up slowly as tolerated.  Side effect also discussed.  Educated him symptoms the need to be seen or call in.    - DULoxetine (CYMBALTA) 20 MG EC capsule; Take 1 capsule (20 mg) by mouth 2 times daily  Dispense: 60 capsule; Refill: 1    4. Type 1 diabetes mellitus with diabetic autonomic neuropathy (H)  Management per the endocrinologist.  Continue with the insulin pump regimen.  Eye exam scheduled for next week.  Foot care and daily basis is recommended.  Foot exam today was normal.  Continue with the gabapentin for diabetic " neuropathy.  Cymbalta was added as mentionable.    - DULoxetine (CYMBALTA) 20 MG EC capsule; Take 1 capsule (20 mg) by mouth 2 times daily  Dispense: 60 capsule; Refill: 1  - FOOT EXAM    5. Trigger finger, unspecified finger, unspecified laterality  Symptomatic and is quite bothersome to him.  Refer to orthopedic for further evaluations and management.   - ORTHOPEDICS ADULT REFERRAL    6. Hypothyroidism, unspecified type  Recent TSH level was elevated.  Levothyroxine dose adjusted.  Recheck the TSH level in 1-2 months.      Ilan Chew Mai, MD  [facility]

## 2018-08-09 NOTE — Clinical Note
Patient started his Dexcom 2 days ago. Today we reviewed reports and strengthened carb ratios 10%, and he will experiment with dual wave bolus.  Do you think he would benefit from Fiasp or do you want to wait and discuss with him at your next visit with him?   Destiny Warren RDN, LD, CDE

## 2018-08-09 NOTE — NURSING NOTE
Chief Complaint   Patient presents with     RECHECK     LOV 7/9/18 Dr. Raymodn     Health Maintenance Due   Topic Date Due     FOOT EXAM Q1 YEAR  09/07/1966     EYE EXAM Q1 YEAR  09/07/1966     URINE DRUG SCREEN Q1 YR  09/07/1981     HIV SCREEN (SYSTEM ASSIGNED)  09/07/1984     Ty Watson, [facility]

## 2018-08-09 NOTE — MR AVS SNAPSHOT
After Visit Summary   8/9/2018    Stone De Paz    MRN: [id number]           Patient Information     Date Of Birth          1965        Visit Information        Provider Department      8/9/2018 2:20 PM Ilan Raymond MD [facility]        Today's Diagnoses     Chronic pain syndrome    -  1    Cervical stenosis of spine        Mild major depression (H)        Type 1 diabetes mellitus with diabetic autonomic neuropathy (H)           Follow-ups after your visit        Your next 10 appointments already scheduled     Aug 09, 2018  3:00 PM CDT   Diabetic Education with NL DIABETIC ED RESOURCE   [facility] [facility] ([facility])    [address] [place] [id number] [phone]              Who to contact     If you have questions or need follow up information about today's clinic visit or your schedule please contact [facility] directly at [phone].  Normal or non-critical lab and imaging results will be communicated to you by MyChart, letter or phone within 4 business days after the clinic has received the results. If you do not hear from us within 7 days, please contact the clinic through MyChart or phone. If you have a critical or abnormal lab result, we will notify you by phone as soon as possible.  Submit refill requests through [employer] or call your pharmacy and they will forward the refill request to us. Please allow 3 business days for your refill to be completed.          Additional Information About Your Visit        Care EveryWhere ID     This is your Care EveryWhere ID. This could be used by other organizations to access your [place] medical records  [id number]        Your Vitals Were     Pulse Temperature Respirations Pulse Oximetry BMI (Body Mass Index)       101 99.1  F (37.3  C) (Temporal) 16 98% 23.57 kg/m2        Blood Pressure from Last 3 Encounters:   08/09/18 128/78   08/07/18 131/71   07/09/18 132/70     Weight from Last 3 Encounters:   08/09/18 155 lb (70.3 kg)   08/07/18 149 lb (67.6 kg)   07/09/18 153 lb 9.6 oz (69.7 kg)              Today, you had the following     No orders found for display         Today's Medication Changes          These changes are accurate as of 8/9/18  2:44 PM.  If you have any questions, ask your nurse or doctor.               Start taking these medicines.        Dose/Directions    DULoxetine 20 MG EC capsule   Commonly known as:  CYMBALTA   Used for:  Chronic pain syndrome, Type 1 diabetes mellitus with diabetic autonomic neuropathy (H), Mild major depression (H)   Started by:  Ilan Raymond MD        Dose:  20 mg   Take 1 capsule (20 mg) by mouth 2 times daily   Quantity:  60 capsule   Refills:  1       oxyCODONE IR 5 MG tablet   Commonly known as:  ROXICODONE   Used for:  Chronic pain syndrome, Cervical stenosis of spine   Started by:  Ilan Raymond MD        Dose:  5 mg   Take 1 tablet (5 mg) by mouth 2 times daily as needed for pain Should last for a month, tapering down   Quantity:  60 tablet   Refills:  0       topiramate 25 MG tablet   Commonly known as:  TOPAMAX   Used for:  Cervical stenosis of spine, Chronic pain syndrome   Started by:  Ilan Raymond MD        Dose:  25 mg   Take 1 tablet (25 mg) by mouth 2 times daily   Quantity:  60 tablet   Refills:  1         Stop taking these medicines if you haven't already. Please contact your care team if you have questions.     citalopram 40 MG tablet   Commonly known as:  celeXA   Stopped by:  Ilan Raymond MD                Where to get your medicines      These medications were sent to [address]  [address] [facility] [address]     Phone:  [phone]     DULoxetine 20 MG EC capsule    topiramate 25 MG tablet         Some of these will need a paper prescription and others can be bought over the counter.  Ask your nurse if you have questions.     Bring a paper prescription for each of these  medications     oxyCODONE IR 5 MG tablet               Information about OPIOIDS     PRESCRIPTION OPIOIDS: WHAT YOU NEED TO KNOW   We gave you an opioid (narcotic) pain medicine. It is important to manage your pain, but opioids are not always the best choice. You should first try all the other options your care team gave you. Take this medicine for as short a time (and as few doses) as possible.    Some activities can increase your pain, such as bandage changes or therapy sessions. It may help to take your pain medicine 30 to 60 minutes before these activities. Reduce your stress by getting enough sleep, working on hobbies you enjoy and practicing relaxation or meditation. Talk to your care team about ways to manage your pain beyond prescription opioids.    These medicines have risks:    DO NOT drive when on new or higher doses of pain medicine. These medicines can affect your alertness and reaction times, and you could be arrested for driving under the influence (DUI). If you need to use opioids long-term, talk to your care team about driving.    DO NOT operate heavy machinery    DO NOT do any other dangerous activities while taking these medicines.    DO NOT drink any alcohol while taking these medicines.     If the opioid prescribed includes acetaminophen, DO NOT take with any other medicines that contain acetaminophen. Read all labels carefully. Look for the word  acetaminophen  or  Tylenol.  Ask your pharmacist if you have questions or are unsure.    You can get addicted to pain medicines, especially if you have a history of addiction (chemical, alcohol or substance dependence). Talk to your care team about ways to reduce this risk.    All opioids tend to cause constipation. Drink plenty of water and eat foods that have a lot of fiber, such as fruits, vegetables, prune juice, apple juice and high-fiber cereal. Take a laxative (Miralax, milk of magnesia, Colace, Senna) if you don t move your bowels at least  every other day. Other side effects include upset stomach, sleepiness, dizziness, throwing up, tolerance (needing more of the medicine to have the same effect), physical dependence and slowed breathing.    Store your pills in a secure place, locked if possible. We will not replace any lost or stolen medicine. If you don t finish your medicine, please throw away (dispose) as directed by your pharmacist. The [place] Pollution Control Agency has more information about safe disposal: [email or web address]://[email or web address].[place].us/living-green/[email or web address]-unwanted-medications         Primary Care Provider Office Phone # Fax #    Ilan Chew Mai, [phone] [address] [place] [address]        Equal Access to Services     ROSA WHITLEY : Bell Dunne, wapaulda tanjaadaha, qaybta kaalmada lorena, britton alvarez. So [facility] [phone].    ATENCIÓN: Si habla español, tiene a banks disposición servicios gratuitos de asistencia lingüística. Llame al [phone].    We comply with applicable federal civil rights laws and [place] laws. We do not discriminate on the basis of race, color, national origin, age, disability, sex, sexual orientation, or gender identity.            Thank you!     Thank you for choosing [facility]  for your care. Our goal is always to provide you with excellent care. Hearing back from our patients is one way we can continue to improve our services. Please take a few minutes to complete the written survey that you may receive in the mail after your visit with us. Thank you!             Your Updated Medication List - Protect others around you: Learn how to safely use, store and throw away your medicines at www.disposemymeds.org.          This list is accurate as of 8/9/18  2:44 PM.  Always use your most recent med list.                   Brand Name Dispense Instructions for use Diagnosis    albuterol 108 (90 Base) MCG/ACT Inhaler    PROAIR  HFA/PROVENTIL HFA/VENTOLIN HFA    1 Inhaler    Inhale 2 puffs into the lungs every 4 hours as needed for shortness of breath / dyspnea        cyclobenzaprine 10 MG tablet    FLEXERIL    90 tablet    Take 1 tablet (10 mg) by mouth 2 times daily as needed for muscle spasms    Tension headache, Chronic pain syndrome       DULoxetine 20 MG EC capsule    CYMBALTA    60 capsule    Take 1 capsule (20 mg) by mouth 2 times daily    Chronic pain syndrome, Type 1 diabetes mellitus with diabetic autonomic neuropathy (H), Mild major depression (H)       gabapentin 300 MG capsule    NEURONTIN    270 capsule    Take 1 capsule (300 mg) by mouth 3 times daily    Type 1 diabetes mellitus with diabetic polyneuropathy (H), Chronic bilateral low back pain with bilateral sciatica, Chronic neck pain, Chronic pain syndrome       insulin aspart 100 UNITS/ML injection    NovoLOG VIAL    20 mL    Uses with insulin pump, total daily dose approx 45 units, E10.9    Type 1 diabetes mellitus with diabetic autonomic neuropathy (H)       levothyroxine 150 MCG tablet    SYNTHROID/LEVOTHROID    30 tablet    Take 1 tablet (150 mcg) by mouth daily Please note the dose has increased.    Acquired hypothyroidism       lisinopril 20 MG tablet    PRINIVIL/ZESTRIL     Take 40 mg by mouth daily        naproxen 500 MG tablet    NAPROSYN     Take 1 tablet by mouth 2 times daily (with meals)        omeprazole 20 MG tablet      Take 1 tablet by mouth daily        ondansetron 8 MG ODT tab    ZOFRAN-ODT     Take 1 tablet by mouth every 8 hours as needed        oxyCODONE IR 5 MG tablet    ROXICODONE    60 tablet    Take 1 tablet (5 mg) by mouth 2 times daily as needed for pain Should last for a month, tapering down    Chronic pain syndrome, Cervical stenosis of spine       oxyCODONE-acetaminophen  MG per tablet    PERCOCET     Take 1 tablet by mouth every 12 hours as needed    Chronic pain syndrome       ranitidine 300 MG tablet    ZANTAC    30 tablet    Take 1  tablet (300 mg) by mouth At Bedtime    Gastroesophageal reflux disease without esophagitis       simvastatin 40 MG tablet    ZOCOR     Take 1 tablet by mouth At Bedtime        topiramate 25 MG tablet    TOPAMAX    60 tablet    Take 1 tablet (25 mg) by mouth 2 times daily    Cervical stenosis of spine, Chronic pain syndrome

## 2018-08-09 NOTE — PROGRESS NOTES
Diabetes Self Management Training: Insulin Pump Review Visit    Stone De Paz presents today for evaluation of glucose control and review of new Dexcom CGM related to Type 1 diabetes.    He is accompanied by self    Patient's diabetes management related comments/concerns: started his new Dexcom G5 2 days ago    Patient's emotional response to diabetes: expresses readiness to learn and concern for health and well-being    Patient would like this visit to be focused around the following diabetes-related behaviors and goals: how to approach the glucose spikes - after cereal, etc.  - spends whole day coming down.       ASSESSMENT:  Patient Problem List and Family Medical History reviewed for relevant medical history, current medical status, and diabetes risk factors.    Current Diabetes Management per Patient:  Insulin Pump Type: Medtronic Minimed 630G    BG meter: Contour Next USB Link (with Medtronic Pump)    Taking other diabetes medications?   yes:     Diabetes Medication(s)     Insulin Sig    insulin aspart (NOVOLOG VIAL) 100 UNITS/ML injection Uses with insulin pump, total daily dose approx 45 units, E10.9          Pump Problem Solving: not discussed    Blood Glucose Results and Insulin Use: See scanned pump report for details.    Current Pump Settings: See Insulin Pump - Outpatient in Medication List for complete list of settings.     Current Insulin Pump Regimen:     Insulin Use:      BG Data:      BG values are: Not in goal    Patient's most recent   Lab Results   Component Value Date    A1C 9.3 05/08/2018    is not meeting goal of <8.0    Nutrition:  Tuesday night had deep dish pizza [employer] and did 8 exchanges to bolus wizard.   Last night had [employer] double whopper and fries and did same 8 exchanges but did dual wave 50/50 for 30 minutes.   He has thought about meals on wheels. Hates cleanup after cooking so tries to do frozen dinners.     Beverages: diet beverages    Cultural/Rastafarian diet  "restrictions: No     Biggest Challenge to Healthy Eating: meal planning    Physical Activity:    Not discussed    Diabetes Risk Factors:  NA    Diabetes Complications:  Not discussed today.    Vitals:  There were no vitals taken for this visit.  Estimated body mass index is 23.57 kg/(m^2) as calculated from the following:    Height as of 8/7/18: 1.727 m (5' 8\").    Weight as of an earlier encounter on 8/9/18: 70.3 kg (155 lb).   Last 3 BP:   BP Readings from Last 3 Encounters:   08/09/18 128/78   08/07/18 131/71   07/09/18 132/70       History   Smoking Status     Former Smoker     Quit date: 1/1/2010   Smokeless Tobacco     Never Used       Labs:  Lab Results   Component Value Date    A1C 9.3 05/08/2018     Lab Results   Component Value Date     07/09/2018     Lab Results   Component Value Date    LDL 99 07/09/2018     HDL Cholesterol   Date Value Ref Range Status   07/09/2018 67 >39 mg/dL Final   ]  GFR Estimate   Date Value Ref Range Status   07/09/2018 86 >60 mL/min/1.7m2 Final     Comment:     Non  GFR Calc     GFR Estimate If Black   Date Value Ref Range Status   07/09/2018 >90 >60 mL/min/1.7m2 Final     Comment:      GFR Calc     Lab Results   Component Value Date    CR 0.92 07/09/2018     No results found for: MICROALBUMIN    Socio/Economic History:    Support system: ex wife that lives with him    Health Beliefs and Attitudes:   Patient Activation Measure Survey Score:  HORACIO Score (Last Two) 11/27/2013   HORACIO Raw Score 43   Activation Score 68.5   HORACIO Level 4       Stage of Change: ACTION (Actively working towards change)      Diabetes knowledge and skills assessment:     Patient is knowledgeable in diabetes management concepts related to: Monitoring, Taking Medication, Problem Solving and Healthy Coping    Patient needs further education on the following diabetes management concepts: Healthy Eating and Problem Solving    Barriers to Learning Assessment: No Barriers " identified    Based on learning assessment above, most appropriate setting for further diabetes education would be: Individual setting.    INTERVENTION:  Patient would benefit from increase in carbohydrate ratio and using a dual/square wave bolus.    Changes made to pump settings:  carb ratio: at 11am to 12.2 gram or 1.23 unit(s)/ex. , at 5pm to 10.8 gms/u or 1.39u/ex    Changes made to sensor settings:   none    Education provided today on:  AADE Self-Care Behaviors:  Healthy Eating: avoiding fatty foods and meal planning ideas    Education specific to insulin pump provided today on:   how to use a dual/square wave or extended bolus    Additional insulin pump education opportunities not covered today: none    Opportunities for ongoing education and support in diabetes-self management were discussed.    Pt verbalized understanding of concepts discussed and recommendations provided today.       Education Materials Provided:  No new materials provided today    PLAN:  Continue managing pump and sensor. See how more insulin/carb works.  CDE will ask endocrinologist about Fiasp.  Written instructions for pump setting changes given to patient.     FOLLOW-UP:  Follow-up appointment scheduled on 9/12.  Chart routed to referring provider.    Ongoing plan for education and support: Online diabetes websites/forums, Smartphone Navi(s), Follow-up visit with diabetes educator in 1 month and endo in 2 months    Destiny Warren RDN, ADRIA, CDE    Time Spent: 90 minutes  Encounter Type: Individual

## 2018-08-09 NOTE — MR AVS SNAPSHOT
After Visit Summary   8/9/2018    Stone De Paz    MRN: [id number]           Patient Information     Date Of Birth          1965        Visit Information        Provider Department      8/9/2018 3:00 PM NL DIABETIC ED RESOURCE [place] [facility]        Today's Diagnoses     Type 1 diabetes mellitus with diabetic autonomic neuropathy (H)    -  1       Follow-ups after your visit        Your next 10 appointments already scheduled     Aug 13, 2018  1:50 PM CDT   New Visit with Gurvinder Powell PA-C   [address] [id number] [phone]            Aug 14, 2018  1:40 PM CDT   New Visit with Jeremias Calabrese MD   [address] [id number] [phone]            Sep 07, 2018  2:40 PM CDT   Office Visit with Ilan Chew Mai, MD   [address] [id number] [phone]           Bring a current list of meds and any records pertaining to this visit. For Physicals, please bring immunization records and any forms needing to be filled out. Please arrive 10 minutes early to complete paperwork.            Sep 12, 2018  1:00 PM CDT   Diabetic Education with NL DIABETIC ED RESOURCE   [facility] [address]  [place] [id number] [phone]              Who to contact     If you have questions or need follow up information about today's clinic visit or your schedule please contact [facility] directly at [phone].  Normal or non-critical lab and imaging results will be communicated to you by MyChart, letter or phone within 4 business days after the clinic has received the results. If you do not hear from us within 7 days, please contact the clinic through [employer]  or phone. If you have a critical or abnormal lab result, we will notify you by phone as soon as possible.  Submit refill requests through [employer] or call your pharmacy and they will forward the refill request to us. Please allow 3 business days for your refill to be completed.          Additional Information About Your Visit        Care EveryWhere ID     This is your Care EveryWhere ID. This could be used by other organizations to access your [place] medical records  [id number]         Blood Pressure from Last 3 Encounters:   08/09/18 128/78   08/07/18 131/71   07/09/18 132/70    Weight from Last 3 Encounters:   08/09/18 70.3 kg (155 lb)   08/07/18 67.6 kg (149 lb)   07/09/18 69.7 kg (153 lb 9.6 oz)              We Performed the Following     DIABETES EDUCATION - Individual  []          Today's Medication Changes          These changes are accurate as of 8/9/18 11:59 PM.  If you have any questions, ask your nurse or doctor.               Start taking these medicines.        Dose/Directions    DULoxetine 20 MG EC capsule   Commonly known as:  CYMBALTA   Used for:  Chronic pain syndrome, Type 1 diabetes mellitus with diabetic autonomic neuropathy (H), Mild major depression (H)   Started by:  Ilan Raymond MD        Dose:  20 mg   Take 1 capsule (20 mg) by mouth 2 times daily   Quantity:  60 capsule   Refills:  1       oxyCODONE IR 5 MG tablet   Commonly known as:  ROXICODONE   Used for:  Chronic pain syndrome, Cervical stenosis of spine   Started by:  Ilan Raymond MD        Dose:  5 mg   Take 1 tablet (5 mg) by mouth 2 times daily as needed for pain Should last for a month, tapering down   Quantity:  60 tablet   Refills:  0       topiramate 25 MG tablet   Commonly known as:  TOPAMAX   Used for:  Cervical stenosis of spine, Chronic pain syndrome   Started by:  Ilan Raymond MD        Dose:  25 mg   Take 1 tablet (25 mg) by mouth 2 times daily   Quantity:  60 tablet   Refills:  1         Stop taking these  medicines if you haven't already. Please contact your care team if you have questions.     citalopram 40 MG tablet   Commonly known as:  celeXA   Stopped by:  Ilan Raymond MD                Where to get your medicines      These medications were sent to [facility] [place] - [address]  [address], [place] [address]     Phone:  [phone]     DULoxetine 20 MG EC capsule    topiramate 25 MG tablet         Some of these will need a paper prescription and others can be bought over the counter.  Ask your nurse if you have questions.     Bring a paper prescription for each of these medications     oxyCODONE IR 5 MG tablet                Primary Care Provider Office Phone # Fax #    Ilan Chew Mai, [phone]       [address] [place] [address]        Equal Access to Services     [facility]: Hadii jordyn elliott hadasho Soomaali, waaxda luqadaha, qaybta kaalmada adeegyada, waxcarroll nuñez . So [facility] [phone].    ATENCIÓN: Si habla español, tiene a banks disposición servicios gratuitos de asistencia lingüística. Llame al [phone].    We comply with applicable federal civil rights laws and [place] laws. We do not discriminate on the basis of race, color, national origin, age, disability, sex, sexual orientation, or gender identity.            Thank you!     Thank you for choosing [facility]  for your care. Our goal is always to provide you with excellent care. Hearing back from our patients is one way we can continue to improve our services. Please take a few minutes to complete the written survey that you may receive in the mail after your visit with us. Thank you!             Your Updated Medication List - Protect others around you: Learn how to safely use, store and throw away your medicines at www.disposemymeds.org.          This list is accurate as of 8/9/18 11:59 PM.  Always use your most recent med list.                    Brand Name Dispense Instructions for use Diagnosis    albuterol 108 (90 Base) MCG/ACT inhaler    PROAIR HFA/PROVENTIL HFA/VENTOLIN HFA    1 Inhaler    Inhale 2 puffs into the lungs every 4 hours as needed for shortness of breath / dyspnea        cyclobenzaprine 10 MG tablet    FLEXERIL    90 tablet    Take 1 tablet (10 mg) by mouth 2 times daily as needed for muscle spasms    Tension headache, Chronic pain syndrome       DULoxetine 20 MG EC capsule    CYMBALTA    60 capsule    Take 1 capsule (20 mg) by mouth 2 times daily    Chronic pain syndrome, Type 1 diabetes mellitus with diabetic autonomic neuropathy (H), Mild major depression (H)       gabapentin 300 MG capsule    NEURONTIN    270 capsule    Take 1 capsule (300 mg) by mouth 3 times daily    Type 1 diabetes mellitus with diabetic polyneuropathy (H), Chronic bilateral low back pain with bilateral sciatica, Chronic neck pain, Chronic pain syndrome       insulin aspart 100 UNITS/ML injection    NovoLOG VIAL    20 mL    Uses with insulin pump, total daily dose approx 45 units, E10.9    Type 1 diabetes mellitus with diabetic autonomic neuropathy (H)       levothyroxine 150 MCG tablet    SYNTHROID/LEVOTHROID    30 tablet    Take 1 tablet (150 mcg) by mouth daily Please note the dose has increased.    Acquired hypothyroidism       lisinopril 20 MG tablet    PRINIVIL/ZESTRIL     Take 40 mg by mouth daily        naproxen 500 MG tablet    NAPROSYN     Take 1 tablet by mouth 2 times daily (with meals)        omeprazole 20 MG tablet      Take 1 tablet by mouth daily        ondansetron 8 MG ODT tab    ZOFRAN-ODT     Take 1 tablet by mouth every 8 hours as needed        oxyCODONE IR 5 MG tablet    ROXICODONE    60 tablet    Take 1 tablet (5 mg) by mouth 2 times daily as needed for pain Should last for a month, tapering down    Chronic pain syndrome, Cervical stenosis of spine       oxyCODONE-acetaminophen  MG per tablet    PERCOCET     Take 1 tablet by mouth every 12  hours as needed    Chronic pain syndrome       ranitidine 300 MG tablet    ZANTAC    30 tablet    Take 1 tablet (300 mg) by mouth At Bedtime    Gastroesophageal reflux disease without esophagitis       simvastatin 40 MG tablet    ZOCOR     Take 1 tablet by mouth At Bedtime        topiramate 25 MG tablet    TOPAMAX    60 tablet    Take 1 tablet (25 mg) by mouth 2 times daily    Cervical stenosis of spine, Chronic pain syndrome

## 2018-08-10 NOTE — TELEPHONE ENCOUNTER
Patient calling back, gave message below and transferred patient to speciality to make appointments.

## 2018-08-10 NOTE — TELEPHONE ENCOUNTER
Referrals for Orthopedics and Neurosurgery have been made.    LONNIE on  for Stone to return call.  Please inform Stone when he returns call that these referrals have been made for him and he can schedule with Specialty whenever he is ready.

## 2018-08-10 NOTE — TELEPHONE ENCOUNTER
Please refer him to orthopedic and neurosurgeon for trigger finger and spinal stenosis with chronic neck pain neck pain respectively.  Referral ordered.

## 2018-08-20 ENCOUNTER — TRANSFERRED RECORDS (OUTPATIENT)
Dept: HEALTH INFORMATION MANAGEMENT | Facility: CLINIC | Age: 53
End: 2018-08-20

## 2018-09-11 ENCOUNTER — TELEPHONE (OUTPATIENT)
Dept: FAMILY MEDICINE | Facility: CLINIC | Age: 53
End: 2018-09-11

## 2018-09-11 NOTE — LETTER
[address] [address]  [phone]        Stone De Paz  [address] [facility] [address]      September 11, 2018      Dear Stone,    I care about your health and have reviewed your health plan, including your medical conditions, medication list, and lab results and am making recommendations based on this review, to better manage your health.    You are in particular need of attention regarding:  -Cholesterol  -Depression  -Diabetes  -Pain Management    If you've had the preventative screening completed at another facility or feel you're not due for this screening, please call our clinic at the number listed above or send us a My Chart message so we can update our records. We would like to thank you in advance for taking the time to take care of your health.  If you have any questions, please don t hesitate to contact our clinic.    Sincerely,       Ilan Raymond MD

## 2018-09-11 NOTE — TELEPHONE ENCOUNTER
Left message for patient call back. Patient is due for office visit for recheck for diabetes, pain, depression.  . Please assist patient in scheduling. If patient declines or has had elsewhere please note and route back to care team.        Ty Watson, CMA

## 2018-09-11 NOTE — TELEPHONE ENCOUNTER
Panel Management Review      Patient has the following on his problem list:     Depression / Dysthymia review    Measure:  Needs PHQ-9 score of 4 or less during index window.  Administer PHQ-9 and if score is 5 or more, send encounter to provider for next steps.    5 - 7 month window range:     PHQ-9 SCORE 11/27/2013 7/9/2018   Total Score 0 -   Total Score - 9       If PHQ-9 recheck is 5 or more, route to provider for next steps.    Patient is due for:  None    Diabetes    ASA: Failed    Last A1C  Lab Results   Component Value Date    A1C 9.3 05/08/2018    A1C 10.5 01/09/2014    A1C 10.2 12/30/2013    A1C 10.8 10/30/2013    A1C 9.8 07/03/2013     A1C tested: FAILED    Last LDL:    Lab Results   Component Value Date    CHOL 180 07/09/2018     Lab Results   Component Value Date    HDL 67 07/09/2018     Lab Results   Component Value Date    LDL 99 07/09/2018     Lab Results   Component Value Date    TRIG 72 07/09/2018     No results found for: CHOLHDLRATIO  Lab Results   Component Value Date    NHDL 113 07/09/2018       Is the patient on a Statin? YES             Is the patient on Aspirin? NO    Medications     HMG CoA Reductase Inhibitors    simvastatin (ZOCOR) 40 MG tablet          Last three blood pressure readings:  BP Readings from Last 3 Encounters:   08/09/18 128/78   08/07/18 131/71   07/09/18 132/70       Date of last diabetes office visit: 8/9/18     Tobacco History:     History   Smoking Status     Former Smoker     Quit date: 1/1/2010   Smokeless Tobacco     Never Used         Hypertension   Last three blood pressure readings:  BP Readings from Last 3 Encounters:   08/09/18 128/78   08/07/18 131/71   07/09/18 132/70     Blood pressure: Passed    HTN Guidelines:  Age 18-59 BP range:  Less than 140/90  Age 60-85 with Diabetes:  Less than 140/90  Age 60-85 without Diabetes:  less than 150/90      Composite cancer screening  Chart review shows that this patient is due/due soon for the following  None  Summary:    Patient is due/failing the following:   none    Action needed:   Patient needs office visit for recheck for diabetes, pain, depression.    Type of outreach:    Phone, left message for patient to call back.  and Sent letter.    Questions for provider review:    None                                                                                                                                    Ty Watson CMA       Chart routed to Care Team .

## 2018-09-21 ENCOUNTER — OFFICE VISIT (OUTPATIENT)
Dept: FAMILY MEDICINE | Facility: CLINIC | Age: 53
End: 2018-09-21
Payer: COMMERCIAL

## 2018-09-21 VITALS
SYSTOLIC BLOOD PRESSURE: 136 MMHG | DIASTOLIC BLOOD PRESSURE: 70 MMHG | WEIGHT: 152.8 LBS | HEART RATE: 96 BPM | TEMPERATURE: 98.7 F | OXYGEN SATURATION: 99 % | BODY MASS INDEX: 23.23 KG/M2 | RESPIRATION RATE: 16 BRPM

## 2018-09-21 DIAGNOSIS — E10.43 TYPE 1 DIABETES MELLITUS WITH DIABETIC AUTONOMIC NEUROPATHY (H): ICD-10-CM

## 2018-09-21 DIAGNOSIS — G89.4 CHRONIC PAIN SYNDROME: ICD-10-CM

## 2018-09-21 DIAGNOSIS — Z23 NEED FOR PROPHYLACTIC VACCINATION AND INOCULATION AGAINST INFLUENZA: ICD-10-CM

## 2018-09-21 DIAGNOSIS — E03.9 HYPOTHYROIDISM, UNSPECIFIED TYPE: Primary | ICD-10-CM

## 2018-09-21 DIAGNOSIS — F32.0 MILD MAJOR DEPRESSION (H): ICD-10-CM

## 2018-09-21 LAB
HBA1C MFR BLD: 8.6 % (ref 0–5.6)
T4 FREE SERPL-MCNC: 1.44 NG/DL (ref 0.76–1.46)
TSH SERPL DL<=0.005 MIU/L-ACNC: 0.15 MU/L (ref 0.4–4)

## 2018-09-21 PROCEDURE — 84439 ASSAY OF FREE THYROXINE: CPT | Performed by: FAMILY MEDICINE

## 2018-09-21 PROCEDURE — 84443 ASSAY THYROID STIM HORMONE: CPT | Performed by: FAMILY MEDICINE

## 2018-09-21 PROCEDURE — 36415 COLL VENOUS BLD VENIPUNCTURE: CPT | Performed by: FAMILY MEDICINE

## 2018-09-21 PROCEDURE — G0008 ADMIN INFLUENZA VIRUS VAC: HCPCS | Performed by: FAMILY MEDICINE

## 2018-09-21 PROCEDURE — 99214 OFFICE O/P EST MOD 30 MIN: CPT | Mod: 25 | Performed by: FAMILY MEDICINE

## 2018-09-21 PROCEDURE — 90682 RIV4 VACC RECOMBINANT DNA IM: CPT | Performed by: FAMILY MEDICINE

## 2018-09-21 PROCEDURE — 83036 HEMOGLOBIN GLYCOSYLATED A1C: CPT | Performed by: FAMILY MEDICINE

## 2018-09-21 RX ORDER — BUDESONIDE 3 MG/1
3 CAPSULE, COATED PELLETS ORAL EVERY MORNING
COMMUNITY
End: 2020-10-26

## 2018-09-21 RX ORDER — CITALOPRAM HYDROBROMIDE 40 MG/1
40 TABLET ORAL DAILY
COMMUNITY
End: 2019-06-12

## 2018-09-21 ASSESSMENT — PAIN SCALES - GENERAL: PAINLEVEL: SEVERE PAIN (6)

## 2018-09-21 ASSESSMENT — PATIENT HEALTH QUESTIONNAIRE - PHQ9: 5. POOR APPETITE OR OVEREATING: NEARLY EVERY DAY

## 2018-09-21 ASSESSMENT — ANXIETY QUESTIONNAIRES
3. WORRYING TOO MUCH ABOUT DIFFERENT THINGS: NOT AT ALL
6. BECOMING EASILY ANNOYED OR IRRITABLE: NOT AT ALL
1. FEELING NERVOUS, ANXIOUS, OR ON EDGE: SEVERAL DAYS
2. NOT BEING ABLE TO STOP OR CONTROL WORRYING: NOT AT ALL
7. FEELING AFRAID AS IF SOMETHING AWFUL MIGHT HAPPEN: NOT AT ALL
IF YOU CHECKED OFF ANY PROBLEMS ON THIS QUESTIONNAIRE, HOW DIFFICULT HAVE THESE PROBLEMS MADE IT FOR YOU TO DO YOUR WORK, TAKE CARE OF THINGS AT HOME, OR GET ALONG WITH OTHER PEOPLE: SOMEWHAT DIFFICULT
GAD7 TOTAL SCORE: 4
5. BEING SO RESTLESS THAT IT IS HARD TO SIT STILL: NOT AT ALL

## 2018-09-21 NOTE — MR AVS SNAPSHOT
After Visit Summary   9/21/2018    Stone De Paz    MRN: [id number]           Patient Information     Date Of Birth          1965        Visit Information        Provider Department      9/21/2018 1:00 PM Ilan Raymond MD [facility]        Today's Diagnoses     Need for prophylactic vaccination and inoculation against influenza    -  1    Hypothyroidism, unspecified type        Diabetes mellitus with neurological manifestations, uncontrolled (H)           Follow-ups after your visit        Who to contact     If you have questions or need follow up information about today's clinic visit or your schedule please contact [facility] directly at [phone].  Normal or non-critical lab and imaging results will be communicated to you by MyChart, letter or phone within 4 business days after the clinic has received the results. If you do not hear from us within 7 days, please contact the clinic through MyChart or phone. If you have a critical or abnormal lab result, we will notify you by phone as soon as possible.  Submit refill requests through [employer] or call your pharmacy and they will forward the refill request to us. Please allow 3 business days for your refill to be completed.          Additional Information About Your Visit        Care EveryWhere ID     This is your Care EveryWhere ID. This could be used by other organizations to access your [facility] medical records  [id number]        Your Vitals Were     Pulse Temperature Respirations Pulse Oximetry BMI (Body Mass Index)       96 98.7  F (37.1  C) (Temporal) 16 99% 23.23 kg/m2        Blood Pressure from Last 3 Encounters:   09/21/18 136/70   08/09/18 128/78   08/07/18 131/71    Weight from Last 3 Encounters:   09/21/18 152 lb 12.8 oz (69.3 kg)   08/09/18 155 lb (70.3 kg)   08/07/18 149 lb (67.6 kg)              We Performed the Following     FLU VACCINE, (RIV4) RECOMBINANT HA  , IM (FluBlok, egg free) [76302]- >18  YRS (FMG recommended  50-64 YRS)     Hemoglobin A1c     T4 free     T4 free     TSH with free T4 reflex     Vaccine Administration, Initial [85954]          Today's Medication Changes          These changes are accurate as of 9/21/18  3:14 PM.  If you have any questions, ask your nurse or doctor.               These medicines have changed or have updated prescriptions.        Dose/Directions    levothyroxine 150 MCG tablet   Commonly known as:  SYNTHROID/LEVOTHROID   This may have changed:    - how much to take  - additional instructions   Used for:  Acquired hypothyroidism        Dose:  137 mcg   Take 1 tablet (150 mcg) by mouth daily Please note the dose has increased.   Quantity:  30 tablet   Refills:  0         Stop taking these medicines if you haven't already. Please contact your care team if you have questions.     DULoxetine 20 MG EC capsule   Commonly known as:  CYMBALTA   Stopped by:  Ilan Raymond MD           gabapentin 300 MG capsule   Commonly known as:  NEURONTIN   Stopped by:  Ilan Raymond MD           topiramate 25 MG tablet   Commonly known as:  TOPAMAX   Stopped by:  Ilan Raymond MD                    Primary Care Provider Office Phone # Fax #    Ilan Chew Mai, [phone] [address] [place] [address]        Equal Access to Services     [facility] Center[facility] AH: Hadii jordyn devlino Sotaeali, waaxda luqadaha, qaybta kaalmada adeegyada, britton alvarez. So [facility] [phone].    ATENCIÓN: Si habla español, tiene a banks disposición servicios gratuitos de asistencia lingüística. [facility] [phone].    We comply with applicable federal civil rights laws and [place] laws. We do not discriminate on the basis of race, color, national origin, age, disability, sex, sexual orientation, or gender identity.            Thank you!     Thank you for choosing [facility]  for your care. Our goal is always to provide you with excellent care. Hearing  back from our patients is one way we can continue to improve our services. Please take a few minutes to complete the written survey that you may receive in the mail after your visit with us. Thank you!             Your Updated Medication List - Protect others around you: Learn how to safely use, store and throw away your medicines at www.disposemymeds.org.          This list is accurate as of 9/21/18  3:14 PM.  Always use your most recent med list.                   Brand Name Dispense Instructions for use Diagnosis    albuterol 108 (90 Base) MCG/ACT inhaler    PROAIR HFA/PROVENTIL HFA/VENTOLIN HFA    1 Inhaler    Inhale 2 puffs into the lungs every 4 hours as needed for shortness of breath / dyspnea        * amylase-lipase-protease 05926-06964 units Cpep per EC capsule    CREON 24     Take 1 capsule by mouth every other day        * amylase-lipase-protease 82369 units Cpep    PERTZYE     Take 1 capsule by mouth every other day        budesonide 3 MG EC capsule    ENTOCORT EC     Take 3 mg by mouth every morning Take 1 or 2 capsules        citalopram 40 MG tablet    celeXA     Take 40 mg by mouth daily        cyclobenzaprine 10 MG tablet    FLEXERIL    90 tablet    Take 1 tablet (10 mg) by mouth 2 times daily as needed for muscle spasms    Tension headache, Chronic pain syndrome       insulin aspart 100 UNITS/ML injection    NovoLOG VIAL    20 mL    Uses with insulin pump, total daily dose approx 45 units, E10.9    Type 1 diabetes mellitus with diabetic autonomic neuropathy (H)       levothyroxine 150 MCG tablet    SYNTHROID/LEVOTHROID    30 tablet    Take 1 tablet (150 mcg) by mouth daily Please note the dose has increased.    Acquired hypothyroidism       lisinopril 20 MG tablet    PRINIVIL/ZESTRIL     Take 40 mg by mouth daily        naproxen 500 MG tablet    NAPROSYN     Take 1 tablet by mouth 2 times daily (with meals)        omeprazole 20 MG tablet      Take 1 tablet by mouth daily        ondansetron 8 MG ODT  tab    ZOFRAN-ODT     Take 1 tablet by mouth every 8 hours as needed        oxyCODONE IR 5 MG tablet    ROXICODONE    60 tablet    Take 1 tablet (5 mg) by mouth 2 times daily as needed for pain Should last for a month, tapering down    Chronic pain syndrome, Cervical stenosis of spine       oxyCODONE-acetaminophen  MG per tablet    PERCOCET     Take 1 tablet by mouth every 12 hours as needed    Chronic pain syndrome       ranitidine 300 MG tablet    ZANTAC    30 tablet    Take 1 tablet (300 mg) by mouth At Bedtime    Gastroesophageal reflux disease without esophagitis       simvastatin 40 MG tablet    ZOCOR     Take 1 tablet by mouth At Bedtime        SUMATRIPTAN SUCCINATE PO      Take 100 mg by mouth 1/2 to 1 tablet by mouth at onset of headache. May repeat 1 dose after 2 hours if headache persists.        * Notice:  This list has 2 medication(s) that are the same as other medications prescribed for you. Read the directions carefully, and ask your doctor or other care provider to review them with you.

## 2018-09-21 NOTE — PROGRESS NOTES
SUBJECTIVE:   Stone De Paz is a 53 year old male who presents to clinic today for the following health issues:    Diabetes Follow-up    Patient is checking blood sugars: twice daily.    Blood sugar testing frequency justification: calibrate sensors of pump  Results are as follows:         am - 190-200         bedtime - 280    Diabetic concerns: blood sugar frequently over 200 and low blood sugar several less than 70 in the past few weeks     Symptoms of hypoglycemia (low blood sugar): shaky, dizzy, weak, lethargy, blurred vision, confusion     Paresthesias (numbness or burning in feet) or sores: Yes      Date of last diabetic eye exam: August 2018    BP Readings from Last 2 Encounters:   09/21/18 172/80   08/09/18 128/78     Hemoglobin A1C (%)   Date Value   05/08/2018 9.3 (H)   01/09/2014 10.5 (H)     LDL Cholesterol Calculated (mg/dL)   Date Value   07/09/2018 99     Currently being managed by the endocrinologist.  He is on the insulin pump and it is going well.  Also is seeing the diabetic educator.  He is concerned about his blood sugar, they are still high.  Denies of acute change in his vision and is up today for his eye exam.  No headache or dizziness.  No chest pain or shortness of breath.  Known to have diabetic neuropathy which is stable and tolerable.  He is not taking the gabapentin by choice.    Diabetes Management Resources  Depression and Anxiety Follow-Up    Status since last visit: No change    Other associated symptoms:None    Complicating factors:     Significant life event: No     Current substance abuse: None    He has no concern about his depression and anxiety.  He takes Celexa and as is going well.  Did not tolerate the Cymbalta.  No side effect from the Celexa.  No suicidal homicidal ideation.  The pain has limited him to exercise or do things that he used to like which caused depression at time, but it is tolerable.  He is sleeping well.      PHQ-9 7/9/2018 9/21/2018   Total Score 9 8    Q9: Suicide Ideation Not at all Not at all     FRANCISCO-7 SCORE 7/9/2018 9/21/2018   Total Score 1 4     PHQ-9  English  PHQ-9   Any Language  FRANCISCO-7  Suicide Assessment Five-step Evaluation and Treatment (SAFE-T)    Chronic Pain Follow-Up       Type / Location of Pain: mid back, neck and shoulders  Analgesia/pain control:       Recent changes:  worse      Overall control: Inadequate pain control  Activity level/function:      Daily activities:  Able to do all daily activities    Work:  not applicable  Adverse effects:  No  Adherance    Taking medication as directed?  No: states he is not taking the medication    Participating in other treatments: no -   Risk Factors:    Sleep:  Poor    Mood/anxiety:  controlled    Recent family or social stressors:  none noted    Other aggravating factors: none    Encounter-Level CSA - 11/27/2013:          Controlled Substance Agreement - Scan on 10/7/2014  2:00 PM : CONTROLLED MEDICATION AGREEMENT-11/27/13 (below)                Amount of exercise or physical activity: None    Problems taking medications regularly: No    Medication side effects: none    Diet: regular (no restrictions), carbohydrate counting and breakfast skipped    For his chronic pain wise, he opted not to see the pain specialist because of the distance driving.  He in fact has not been taking much of the pain medication as they are not really helping much. Still quite a bit of Percocet left from the last prescription.  He learned to live with it and as active as possible.  No change in its intensity or distribution.  Elected not to take the Topamax and gabapentin as well.  Not on medical marijuana currently because of the cost. It was working well for him in the past. Failed epidural injection and physical therapy, not interested in them for now. He is seeing orthopedic for his knee pain.  He is known to have severe gastroparesis with gastric pacer in situ.      He also is known to have hypothyroidism and has been  taking the medication as prescribed.  No significant change in his weight.  No heart palpitation.  No excessive hair loss, diarrhea or constipation.      Problem list and histories reviewed & adjusted, as indicated.  Additional history: as documented    Current Outpatient Prescriptions   Medication Sig Dispense Refill     albuterol (PROAIR HFA, PROVENTIL HFA, VENTOLIN HFA) 108 (90 BASE) MCG/ACT inhaler Inhale 2 puffs into the lungs every 4 hours as needed for shortness of breath / dyspnea 1 Inhaler 0     amylase-lipase-protease (CREON 24) 63772-58223 units CPEP per EC capsule Take 1 capsule by mouth every other day       amylase-lipase-protease (PERTZYE) 76556 units CPEP Take 1 capsule by mouth every other day       budesonide (ENTOCORT EC) 3 MG EC capsule Take 3 mg by mouth every morning Take 1 or 2 capsules       citalopram (CELEXA) 40 MG tablet Take 40 mg by mouth daily       cyclobenzaprine (FLEXERIL) 10 MG tablet Take 1 tablet (10 mg) by mouth 2 times daily as needed for muscle spasms 90 tablet 1     insulin aspart (NOVOLOG VIAL) 100 UNITS/ML injection Uses with insulin pump, total daily dose approx 45 units, E10.9 20 mL 11     levothyroxine (SYNTHROID/LEVOTHROID) 150 MCG tablet Take 1 tablet (150 mcg) by mouth daily Please note the dose has increased. (Patient taking differently: Take 137 mcg by mouth daily Please note the dose has increased. - taking 137 mcg) 30 tablet 0     lisinopril (PRINIVIL,ZESTRIL) 20 MG tablet Take 40 mg by mouth daily        naproxen (NAPROSYN) 500 MG tablet Take 1 tablet by mouth 2 times daily (with meals)       omeprazole 20 MG tablet Take 1 tablet by mouth daily       ondansetron (ZOFRAN-ODT) 8 MG disintegrating tablet Take 1 tablet by mouth every 8 hours as needed       ranitidine (ZANTAC) 300 MG tablet Take 1 tablet (300 mg) by mouth At Bedtime 30 tablet 1     simvastatin (ZOCOR) 40 MG tablet Take 1 tablet by mouth At Bedtime       SUMATRIPTAN SUCCINATE PO Take 100 mg by mouth  1/2 to 1 tablet by mouth at onset of headache. May repeat 1 dose after 2 hours if headache persists.       oxyCODONE IR (ROXICODONE) 5 MG tablet Take 1 tablet (5 mg) by mouth 2 times daily as needed for pain Should last for a month, tapering down (Patient not taking: Reported on 9/21/2018) 60 tablet 0     oxyCODONE-acetaminophen (PERCOCET)  MG per tablet Take 1 tablet by mouth every 12 hours as needed  0     No Known Allergies    Reviewed and updated as needed this visit by clinical staff  Tobacco  Allergies  Meds  Soc Hx      Reviewed and updated as needed this visit by Provider         ROS:  Constitutional, HEENT, cardiovascular, pulmonary, gi and gu systems are negative, except as otherwise noted.    OBJECTIVE:     /70  Pulse 96  Temp 98.7  F (37.1  C) (Temporal)  Resp 16  Wt 152 lb 12.8 oz (69.3 kg)  SpO2 99%  BMI 23.23 kg/m2  Body mass index is 23.23 kg/(m^2).  GENERAL: healthy, alert and no distress  RESP: lungs clear to auscultation - no rales, rhonchi or wheezes  CV: regular rate and rhythm, no murmur, no peripheral edema and peripheral pulses strong  ABDOMEN: soft, nontender, nondistended, normal bowel sounds.  MS: no gross musculoskeletal defects noted, no edema.  No focal weakness.  SKIN: no suspicious lesions or rashes  NEURO: Normal strength and tone, mentation intact and speech normal.  No focal neurological deficit.  PSYCH: mentation appears normal, affect normal/bright.  No suicidal/homicidal ideation.  No hallucination.    Diagnostic Test Results:  Results for orders placed or performed in visit on 09/21/18   TSH with free T4 reflex   Result Value Ref Range    TSH 0.15 (L) 0.40 - 4.00 mU/L   Hemoglobin A1c   Result Value Ref Range    Hemoglobin A1C 8.6 (H) 0 - 5.6 %   T4 free   Result Value Ref Range    T4 Free 1.44 0.76 - 1.46 ng/dL       ASSESSMENT/PLAN:     1. Hypothyroidism, unspecified type  Stable and no symptoms.  TSH is slightly low but the free T4 level is normal today.   Continue with current dose of Levothyroxine.  Recheck the THS and free T4 level in 3 months, will adjust his dose appropriate at that time.    - TSH with free T4 reflex  - **TSH with free T4 reflex FUTURE anytime; Future    2. Diabetes mellitus with neurological manifestations, uncontrolled (H)  Not controlled with a hemoglobin A1c of 8.6 today.  It has dropped down from 9.3 4 months ago.  This is being managed by the endocrinologist with the insulin pump.  He is also working with the diabetic educator.  The condition is complicated with severe gastroparesis.  I however informed him that his blood sugar and hemoglobin A1c are trending on right track.  I strongly encouraged him to work with endocrinologist and diabetic educator closely to get the blood sugar better control.  Also discussed about healthy diet and exercise.  He is up-to-date with his eye exam as well as immunizations.  Continue with the simvastatin, omeprazole with ranitidine and lisinopril.  Recommend to consider low-dose aspirin daily.  Follow-up with the endocrinologist and diabetic educator as per the recommendation.    - Hemoglobin A1c    3. Mild major depression (H)  Both of his depression and anxiety are stable and tolerable.  The PHQ 9 score and FRANCISCO 7 score today was 8 and 4 respectively.  He feels the depression and anxiety are caused by the chronic pain syndrome.  He has no suicidal/homicidal ideation.  Will continue with the Celexa.  Unfortunately, he did not tolerate the Cymbalta which would also be beneficial to his diabetic neuropathy and chronic pain syndrome.  Symptoms that need to be seen or call in discussed.  He feels comfortable with the plan.    4. Chronic pain syndrome  He has not been taking the Percocet as it has not been effective.  He used to be on higher dose of Percocet as well as medical marijuana.  He was referred to pain specialist but chose not to follow up that route because of the distance driving.  Medical marijuana  is not affordable to him.  Stated he learned to live with the pain and is doing okay.  He is interested to see the pain specialist once it is available locally.  In the meantime, encouraged him to continue his normal activities as tolerated.  Continue with naproxen as needed although I encouraged him to be very careful due to severe gastroparesis.  He elected not to take the gabapentin and the Topamax which was intended for chronic pain management.  Will continue to monitor at this point.    5. Type 1 diabetes mellitus with diabetic autonomic neuropathy (H)  Stable and is doing okay.  Did not tolerate the Cymbalta.  He does not want to be on gabapentin or Lyrica.  Recommended daily foot care.  Follow-up if the symptom is worse.    6. Need for prophylactic vaccination and inoculation against influenza  - FLU VACCINE, (RIV4) RECOMBINANT HA  , IM (FluBlok, egg free) [48898]- >18 YRS (FMG recommended  50-64 YRS)  - Vaccine Administration, Initial [[id number]]    Ilan Chew Mai, MD  [facility]

## 2018-09-21 NOTE — PROGRESS NOTES
Injectable Influenza Immunization Documentation    1.  Is the person to be vaccinated sick today?   No    2. Does the person to be vaccinated have an allergy to a component   of the vaccine?   No  Egg Allergy Algorithm Link    3. Has the person to be vaccinated ever had a serious reaction   to influenza vaccine in the past?   No    4. Has the person to be vaccinated ever had Guillain-Barré syndrome?   No    Form completed by Stefanie Salas MA   Prior to injection verified patient identity using patient's name and date of birth.  Due to injection administration, patient instructed to remain in clinic for 15 minutes  afterwards, and to report any adverse reaction to me immediately.    Stefanie Salas MA

## 2018-09-21 NOTE — NURSING NOTE
Chief Complaint   Patient presents with     Diabetes     Depression     Anxiety     Pain     Health Maintenance Due   Topic Date Due     URINE DRUG SCREEN Q1 YR  09/07/1980     INFLUENZA VACCINE (1) 09/01/2018     Ty Watson, [facility]

## 2018-09-22 ASSESSMENT — ANXIETY QUESTIONNAIRES: GAD7 TOTAL SCORE: 4

## 2018-09-22 ASSESSMENT — PATIENT HEALTH QUESTIONNAIRE - PHQ9: SUM OF ALL RESPONSES TO PHQ QUESTIONS 1-9: 8

## 2018-09-25 ENCOUNTER — TELEPHONE (OUTPATIENT)
Dept: FAMILY MEDICINE | Facility: CLINIC | Age: 53
End: 2018-09-25

## 2018-09-25 NOTE — TELEPHONE ENCOUNTER
----- Message from Ilan Chew Mai, MD sent at 9/24/2018  5:13 PM CDT -----  Please let patient know that his diabetes is improving with a hemoglobin A1c is of a 46, dropped out from 9.3 4 months ago.  Encouraged him to keep the good work.  His thyroid level is slightly high.  Continue with the current dose of the medication.  Recheck the TSH level in 3 months.

## 2018-09-25 NOTE — TELEPHONE ENCOUNTER
Stone returns call and message is relayed.  Stone states understanding and had no other questions or concerns.

## 2018-10-02 ENCOUNTER — PATIENT OUTREACH (OUTPATIENT)
Dept: EDUCATION SERVICES | Facility: CLINIC | Age: 53
End: 2018-10-02

## 2018-10-02 DIAGNOSIS — E10.43 TYPE 1 DIABETES MELLITUS WITH DIABETIC AUTONOMIC NEUROPATHY (H): Primary | ICD-10-CM

## 2018-10-02 NOTE — PROGRESS NOTES
Patient had called to ask about the faster acting insulin we had discussed. Left me message to call back.     Called patient back and left message that Dr. Best is not recommending it due to his gastroparesis.               Destiny:     Thanks for the message... And for seeing Stone.  He has significant gastroparesis, has an implanted [employer] gastric pacer.  I would not use Fiasp since it would work too fast with the delayed carbohydrate absorption.     SHADE Best MD   Endocrinology   [facility]      Left message for patient to call me back to schedule follow up appointment.     Destiny Warren RDN, LD, CDE

## 2018-10-15 ENCOUNTER — TELEPHONE (OUTPATIENT)
Dept: ENDOCRINOLOGY | Facility: CLINIC | Age: 53
End: 2018-10-15

## 2018-10-15 DIAGNOSIS — E10.43 TYPE 1 DIABETES MELLITUS WITH DIABETIC AUTONOMIC NEUROPATHY (H): Primary | ICD-10-CM

## 2018-10-15 NOTE — TELEPHONE ENCOUNTER
Fax from pharmacy requesting BG test strips (Contour Next, test 6x daily)    Not active in chart, but known diabetic    Per LOV notes 8/7/18 patient uses insulin pump    Pended generic order with pharmacy note to dispense Contour Next strips    Please review, add diagnosis and other pertinent information for insurance billing (need for 6x daily testing).    Pending Prescriptions:                       Disp   Refills    blood glucose monitoring (NO BRAND SPECIF*[address] st*3            Sig: Use to test blood sugars six times daily or as           directed          Last Written Prescription Date:  -  Last Fill Quantity: -,   # refills: -  Last Office Visit: 8-7-18  Future Office visit:       Routing refill request to provider for review/approval because:  Drug not active on patient's medication list    Billie De La Cruz RT (R)

## 2018-10-16 NOTE — TELEPHONE ENCOUNTER
Message noted.  Most recent chart note does not indicate BG testing 6x/day... Updated Ney Contour Next test strip Rx now sent to pharmacy, for testing 5x/day, with refills.    SHADE Best MD  Endocrinology  [facility]

## 2018-10-16 NOTE — TELEPHONE ENCOUNTER
Left message for patient that test strips for Contour Next meter were sent to [facility] pharmacy with refills and that if he has any questions to give us a call back at the clinic.    Immanuel Jackson CMA on 10/16/2018 at 9:57 AM

## 2018-10-26 ENCOUNTER — OFFICE VISIT (OUTPATIENT)
Dept: NEUROSURGERY | Facility: CLINIC | Age: 53
End: 2018-10-26
Payer: COMMERCIAL

## 2018-10-26 VITALS
SYSTOLIC BLOOD PRESSURE: 140 MMHG | BODY MASS INDEX: 23.46 KG/M2 | DIASTOLIC BLOOD PRESSURE: 68 MMHG | HEIGHT: 68 IN | WEIGHT: 154.8 LBS | TEMPERATURE: 96.7 F

## 2018-10-26 DIAGNOSIS — M54.2 CERVICALGIA: Primary | ICD-10-CM

## 2018-10-26 DIAGNOSIS — K21.9 GASTROESOPHAGEAL REFLUX DISEASE WITHOUT ESOPHAGITIS: ICD-10-CM

## 2018-10-26 PROCEDURE — 99203 OFFICE O/P NEW LOW 30 MIN: CPT | Performed by: PHYSICIAN ASSISTANT

## 2018-10-26 ASSESSMENT — PAIN SCALES - GENERAL: PAINLEVEL: EXTREME PAIN (8)

## 2018-10-26 NOTE — MR AVS SNAPSHOT
After Visit Summary   10/26/2018    Stone De Paz    MRN: [id number]           Patient Information     Date Of Birth          1965        Visit Information        Provider Department      10/26/2018 1:10 PM Gurvinder Powell PA-C [facility]        Today's Diagnoses     Cervicalgia    -  1       Follow-ups after your visit        Your next 10 appointments already scheduled     Nov 01, 2018  1:00 PM CDT   Office Visit with Ilan Chew Mai, MD   [facility] ([facility]    [address] [place] [id number] [phone]           Bring a current list of meds and any records pertaining to this visit. For Physicals, please bring immunization records and any forms needing to be filled out. Please arrive 10 minutes early to complete paperwork.              Future tests that were ordered for you today     Open Future Orders        Priority Expected Expires Ordered    CT Cervical Spine w/o Contrast Routine  10/26/2019 10/26/2018            Who to contact     If you have questions or need follow up information about today's clinic visit or your schedule please contact [facility] directly at [phone].  Normal or non-critical lab and imaging results will be communicated to you by MyChart, letter or phone within 4 business days after the clinic has received the results. If you do not hear from us within 7 days, please contact the clinic through MyChart or phone. If you have a critical or abnormal lab result, we will notify you by phone as soon as possible.  Submit refill requests through [employer] or call your pharmacy and they will forward the refill request to us. Please allow 3 business days for your refill to be completed.          Additional Information About Your Visit        Care EveryWhere ID     This is your Care EveryWhere ID. This could be used by other organizations to access your [facility] [employer] medical records  [id number]        Your  "Vitals Were     Temperature Height BMI (Body Mass Index)             96.7  F (35.9  C) (Temporal) 5' 8\" (1.727 m) 23.54 kg/m2          Blood Pressure from Last 3 Encounters:   10/26/18 140/68   09/21/18 136/70   08/09/18 128/78    Weight from Last 3 Encounters:   10/26/18 154 lb 12.8 oz (70.2 kg)   09/21/18 152 lb 12.8 oz (69.3 kg)   08/09/18 155 lb (70.3 kg)                 Today's Medication Changes          These changes are accurate as of 10/26/18  1:37 PM.  If you have any questions, ask your nurse or doctor.               These medicines have changed or have updated prescriptions.        Dose/Directions    levothyroxine 150 MCG tablet   Commonly known as:  SYNTHROID/LEVOTHROID   This may have changed:    - how much to take  - additional instructions   Used for:  Acquired hypothyroidism        Dose:  137 mcg   Take 1 tablet (150 mcg) by mouth daily Please note the dose has increased.   Quantity:  30 tablet   Refills:  0                Primary Care Provider Office Phone # Fax #    Ilan Chew Mai, [phone] [address] [place] [address]        Equal Access to Services     ROSA WHITLEY AH: Bell Dunne, brain chang, mounika kaalmada lorena, britton alvarez. So [facility] [phone].    ATENCIÓN: Si habla español, tiene a banks disposición servicios gratuitos de asistencia lingüística. Nik al [phone].    We comply with applicable federal civil rights laws and [place] laws. We do not discriminate on the basis of race, color, national origin, age, disability, sex, sexual orientation, or gender identity.            Thank you!     Thank you for choosing [facility]  for your care. Our goal is always to provide you with excellent care. Hearing back from our patients is one way we can continue to improve our services. Please take a few minutes to complete the written survey that you may receive in the mail after your visit with " us. Thank you!             Your Updated Medication List - Protect others around you: Learn how to safely use, store and throw away your medicines at www.disposemymeds.org.          This list is accurate as of 10/26/18  1:37 PM.  Always use your most recent med list.                   Brand Name Dispense Instructions for use Diagnosis    albuterol 108 (90 Base) MCG/ACT inhaler    PROAIR HFA/PROVENTIL HFA/VENTOLIN HFA    1 Inhaler    Inhale 2 puffs into the lungs every 4 hours as needed for shortness of breath / dyspnea        * amylase-lipase-protease 35082-52144 units Cpep per EC capsule    CREON 24     Take 1 capsule by mouth every other day        * amylase-lipase-protease 06894 units Cpep    PERTZYE     Take 1 capsule by mouth every other day        budesonide 3 MG EC capsule    ENTOCORT EC     Take 3 mg by mouth every morning Take 1 or 2 capsules        citalopram 40 MG tablet    celeXA     Take 40 mg by mouth daily        CONTOUR NEXT TEST test strip   Generic drug:  blood glucose monitoring     450 strip    Use to test blood sugar 5x times daily or as directed.    Type 1 diabetes mellitus with diabetic autonomic neuropathy (H)       cyclobenzaprine 10 MG tablet    FLEXERIL    90 tablet    Take 1 tablet (10 mg) by mouth 2 times daily as needed for muscle spasms    Tension headache, Chronic pain syndrome       insulin aspart 100 UNITS/ML injection    NovoLOG VIAL    20 mL    Uses with insulin pump, total daily dose approx 45 units, E10.9    Type 1 diabetes mellitus with diabetic autonomic neuropathy (H)       levothyroxine 150 MCG tablet    SYNTHROID/LEVOTHROID    30 tablet    Take 1 tablet (150 mcg) by mouth daily Please note the dose has increased.    Acquired hypothyroidism       lisinopril 20 MG tablet    PRINIVIL/ZESTRIL     Take 40 mg by mouth daily        naproxen 500 MG tablet    NAPROSYN     Take 1 tablet by mouth 2 times daily (with meals)        omeprazole 20 MG tablet      Take 1 tablet by mouth  daily        ondansetron 8 MG ODT tab    ZOFRAN-ODT     Take 1 tablet by mouth every 8 hours as needed        oxyCODONE IR 5 MG tablet    ROXICODONE    60 tablet    Take 1 tablet (5 mg) by mouth 2 times daily as needed for pain Should last for a month, tapering down    Chronic pain syndrome, Cervical stenosis of spine       ranitidine 300 MG tablet    ZANTAC    30 tablet    Take 1 tablet (300 mg) by mouth At Bedtime    Gastroesophageal reflux disease without esophagitis       simvastatin 40 MG tablet    ZOCOR     Take 1 tablet by mouth At Bedtime        SUMATRIPTAN SUCCINATE PO      Take 100 mg by mouth 1/2 to 1 tablet by mouth at onset of headache. May repeat 1 dose after 2 hours if headache persists.        * Notice:  This list has 2 medication(s) that are the same as other medications prescribed for you. Read the directions carefully, and ask your doctor or other care provider to review them with you.

## 2018-10-26 NOTE — LETTER
10/26/2018         RE: Stone De Paz  [address] [address] [place] [id number]        Dear Colleague,    Thank you for referring your patient, Stone De Paz, to the [facility]. Please see a copy of my visit note below.    Dr. Giorgio Noble  [facility]  Neurosurgery Clinic Visit      CC: neck pain     Primary care Provider: Ilan Raymond    Referring Provider:  Ilan Raymond      Reason For Visit:   I was asked to consult on the patient for neck pain .      HPI: Stone De Paz is a 53 year old male who presents for evaluation of his chief complaint of neck pain.  He has had chronic neck pain for several years, gradually worsening.  He does get flares of pain that he rates at 10 out of 10.  He is not having any radicular arm pain or paresthesias.  He has a history of a C5-6 ACDF done in 2012.  He has not had any recent treatment.  He has a gastric stimulator due to history of gastroparesis.  No bowel or bladder changes, no problems with balance or coordination.    Past Medical History:   Diagnosis Date     Chronic neck pain     percocet for years now,      Gastroparesis due to DM (H)     gastric stimulator helps, [facility] manages that     Hyperlipidemia      Hypertension      Hypothyroidism      Neuropathy, diabetic (H)      Type 1 diabetes (H) age 31       Past Medical History reviewed with patient during visit.    Past Surgical History:   Procedure Laterality Date     C LUMBAR SPINE FUSION,ANTER APPRCH      2 L4-5 L5-21     COLONOSCOPY  07/18/12     FUSION CERVICAL ANTERIOR TWO LEVELS       THORACOSCOPIC DECORTICATION LUNG  1/9/2014    Procedure: THORACOSCOPIC DECORTICATION LUNG;  RIGHT VATS/RIGHT THORACOTOMY , DECORTICATION RIGHT LUNG ,WEDGE RESECTION RIGHT MIDDLE LOBE LUNG NODULE;  Surgeon: Harley Felix MD;  Location: [facility] OR     Past Surgical History reviewed with patient during visit.    Current Outpatient Prescriptions   Medication     albuterol (PROAIR HFA, PROVENTIL HFA,  "VENTOLIN HFA) 108 (90 BASE) MCG/ACT inhaler     amylase-lipase-protease (CREON 24) 70305-08374 units CPEP per EC capsule     amylase-lipase-protease (PERTZYE) 81529 units CPEP     budesonide (ENTOCORT EC) 3 MG EC capsule     citalopram (CELEXA) 40 MG tablet     CONTOUR NEXT TEST test strip     cyclobenzaprine (FLEXERIL) 10 MG tablet     insulin aspart (NOVOLOG VIAL) 100 UNITS/ML injection     levothyroxine (SYNTHROID/LEVOTHROID) 150 MCG tablet     lisinopril (PRINIVIL,ZESTRIL) 20 MG tablet     naproxen (NAPROSYN) 500 MG tablet     omeprazole 20 MG tablet     ondansetron (ZOFRAN-ODT) 8 MG disintegrating tablet     ranitidine (ZANTAC) 300 MG tablet     simvastatin (ZOCOR) 40 MG tablet     SUMATRIPTAN SUCCINATE PO     oxyCODONE IR (ROXICODONE) 5 MG tablet     No current facility-administered medications for this visit.        No Known Allergies    Social History     Social History     Marital status:      Spouse name: N/A     Number of children: N/A     Years of education: N/A     Social History Main Topics     Smoking status: Former Smoker     Quit date: 1/1/2010     Smokeless tobacco: Never Used     Alcohol use No     Drug use: No     Sexual activity: No     Other Topics Concern     None     Social History Narrative       No family history on file.       ROS: 10 point ROS neg other than the symptoms noted above in the HPI.    Vital Signs: /68 (BP Location: Right arm, Patient Position: Chair, Cuff Size: Adult Regular)  Temp 96.7  F (35.9  C) (Temporal)  Ht 5' 8\" (1.727 m)  Wt 154 lb 12.8 oz (70.2 kg)  BMI 23.54 kg/m2    Examination:  Constitutional:  Alert, well nourished, NAD.  HEENT: Normocephalic, atraumatic.   Pulmonary:  Without shortness of breath, normal effort.   Lymph: no lymphadenopathy to low back or LE.   Integumentary: Skin is free of rashes or lesions.   Cardiovascular:  No pitting edema of BLE.    Psych: Normal affect, no apparent distress    Neurological:  Awake  Alert  Oriented x " 3  Speech clear  Cranial nerves II - XII grossly intact  Motor exam   Shoulder Abduction:  Right:  5/5   Left:  5/5  Biceps:                      Right:  5/5   Left:  5/5  Triceps:                     Right:  5/5   Left:  5/5  Wrist Extensors:       Right:  5/5   Left:  5/5  Wrist Flexors:           Right:  5/5   Left:  5/5  Intrinsics:                   Right:  5/5   Left:  5/5  Hip Flexor:                Right: 5/5  Left:  5/5  Hip Adductor:             Right:  5/5  Left:  5/5  Hip Abductor:             Right:  5/5  Left:  5/5  Gastroc Soleus:        Right:  5/5  Left:  5/5  Tib/Ant:                      Right:  5/5  Left:  5/5  EHL:                          Right:  5/5  Left:  5/5       Sensation normal to bilateral upper and lower extremities.    Reflexes are 2+ in the patellar and Achilles. There is no clonus. Downgoing Babinski.      Musculoskeletal:  Gait: Able to stand from a seated position. Normal non-antalgic, non-myelopathic gait.  Able to heel/toe walk without loss of balance  Cervical examination reveals decreased range of motion.  No tenderness to palpation of the cervical spine or paraspinous muscles bilaterally.      Imaging:   None available    Assessment/Plan:     Cervicalgia    Stone De Paz is a 53 year old male.  I did have a discussion with the patient regarding his symptoms.  He has had worsening neck pain over the last several years, as well as a history of a C5-6 ACDF in 2012.  He notes his pain to be around the thoracocervical junction.  He is not having any radiating arm pain or paresthesias.  He is unable to have an MRI, so we will check a CT scan.  We will anticipate recommending physical therapy and/or injections after the imaging.  He voiced agreement and understanding.          Gurvinder Powell PA-C  [facility]  [address] [address]    Tel [phone]  Pager [phone]      Again, thank you for allowing me to  participate in the care of your patient.        Sincerely,        Gurvinder Powell PA-C

## 2018-10-26 NOTE — PROGRESS NOTES
Dr. Giorgio Noble  [facility]  Neurosurgery Clinic Visit      CC: neck pain     Primary care Provider: Ilan Raymond    Referring Provider:  Ilan Raymond      Reason For Visit:   I was asked to consult on the patient for neck pain .      HPI: Stone De Paz is a 53 year old male who presents for evaluation of his chief complaint of neck pain.  He has had chronic neck pain for several years, gradually worsening.  He does get flares of pain that he rates at 10 out of 10.  He is not having any radicular arm pain or paresthesias.  He has a history of a C5-6 ACDF done in 2012.  He has not had any recent treatment.  He has a gastric stimulator due to history of gastroparesis.  No bowel or bladder changes, no problems with balance or coordination.    Past Medical History:   Diagnosis Date     Chronic neck pain     percocet for years now,      Gastroparesis due to DM (H)     gastric stimulator helps, [facility] manages that     Hyperlipidemia      Hypertension      Hypothyroidism      Neuropathy, diabetic (H)      Type 1 diabetes (H) age 31       Past Medical History reviewed with patient during visit.    Past Surgical History:   Procedure Laterality Date     C LUMBAR SPINE FUSION,ANTER APPRCH      2 L4-5 L5-21     COLONOSCOPY  07/18/12     FUSION CERVICAL ANTERIOR TWO LEVELS       THORACOSCOPIC DECORTICATION LUNG  1/9/2014    Procedure: THORACOSCOPIC DECORTICATION LUNG;  RIGHT VATS/RIGHT THORACOTOMY , DECORTICATION RIGHT LUNG ,WEDGE RESECTION RIGHT MIDDLE LOBE LUNG NODULE;  Surgeon: Harley Felix MD;  Location: [facility]     Past Surgical History reviewed with patient during visit.    Current Outpatient Prescriptions   Medication     albuterol (PROAIR HFA, PROVENTIL HFA, VENTOLIN HFA) 108 (90 BASE) MCG/ACT inhaler     amylase-lipase-protease (CREON 24) 92749-75480 units CPEP per EC capsule     amylase-lipase-protease (PERTZYE) 47525 units CPEP     budesonide (ENTOCORT EC) 3 MG EC capsule     citalopram  "(CELEXA) 40 MG tablet     CONTOUR NEXT TEST test strip     cyclobenzaprine (FLEXERIL) 10 MG tablet     insulin aspart (NOVOLOG VIAL) 100 UNITS/ML injection     levothyroxine (SYNTHROID/LEVOTHROID) 150 MCG tablet     lisinopril (PRINIVIL,ZESTRIL) 20 MG tablet     naproxen (NAPROSYN) 500 MG tablet     omeprazole 20 MG tablet     ondansetron (ZOFRAN-ODT) 8 MG disintegrating tablet     ranitidine (ZANTAC) 300 MG tablet     simvastatin (ZOCOR) 40 MG tablet     SUMATRIPTAN SUCCINATE PO     oxyCODONE IR (ROXICODONE) 5 MG tablet     No current facility-administered medications for this visit.        No Known Allergies    Social History     Social History     Marital status:      Spouse name: N/A     Number of children: N/A     Years of education: N/A     Social History Main Topics     Smoking status: Former Smoker     Quit date: 1/1/2010     Smokeless tobacco: Never Used     Alcohol use No     Drug use: No     Sexual activity: No     Other Topics Concern     None     Social History Narrative       No family history on file.       ROS: 10 point ROS neg other than the symptoms noted above in the HPI.    Vital Signs: /68 (BP Location: Right arm, Patient Position: Chair, Cuff Size: Adult Regular)  Temp 96.7  F (35.9  C) (Temporal)  Ht 5' 8\" (1.727 m)  Wt 154 lb 12.8 oz (70.2 kg)  BMI 23.54 kg/m2    Examination:  Constitutional:  Alert, well nourished, NAD.  HEENT: Normocephalic, atraumatic.   Pulmonary:  Without shortness of breath, normal effort.   Lymph: no lymphadenopathy to low back or LE.   Integumentary: Skin is free of rashes or lesions.   Cardiovascular:  No pitting edema of BLE.    Psych: Normal affect, no apparent distress    Neurological:  Awake  Alert  Oriented x 3  Speech clear  Cranial nerves II - XII grossly intact  Motor exam   Shoulder Abduction:  Right:  5/5   Left:  5/5  Biceps:                      Right:  5/5   Left:  5/5  Triceps:                     Right:  5/5   Left:  5/5  Wrist " Extensors:       Right:  5/5   Left:  5/5  Wrist Flexors:           Right:  5/5   Left:  5/5  Intrinsics:                   Right:  5/5   Left:  5/5  Hip Flexor:                Right: 5/5  Left:  5/5  Hip Adductor:             Right:  5/5  Left:  5/5  Hip Abductor:             Right:  5/5  Left:  5/5  Gastroc Soleus:        Right:  5/5  Left:  5/5  Tib/Ant:                      Right:  5/5  Left:  5/5  EHL:                          Right:  5/5  Left:  5/5       Sensation normal to bilateral upper and lower extremities.    Reflexes are 2+ in the patellar and Achilles. There is no clonus. Downgoing Babinski.      Musculoskeletal:  Gait: Able to stand from a seated position. Normal non-antalgic, non-myelopathic gait.  Able to heel/toe walk without loss of balance  Cervical examination reveals decreased range of motion.  No tenderness to palpation of the cervical spine or paraspinous muscles bilaterally.      Imaging:   None available    Assessment/Plan:     Cervicalgia    Stone De Paz is a 53 year old male.  I did have a discussion with the patient regarding his symptoms.  He has had worsening neck pain over the last several years, as well as a history of a C5-6 ACDF in 2012.  He notes his pain to be around the thoracocervical junction.  He is not having any radiating arm pain or paresthesias.  He is unable to have an MRI, so we will check a CT scan.  We will anticipate recommending physical therapy and/or injections after the imaging.  He voiced agreement and understanding.          Gurvinder Powell PA-C  [facility]  [address] [address]    Tel [phone]  Pager [phone]

## 2018-10-29 NOTE — TELEPHONE ENCOUNTER
"Zantac  Last Written Prescription Date:  7/9/2018  Last Fill Quantity: 30,  # refills: 1   Last office visit: 9/21/2018 with prescribing provider:  Mandi   Future Office Visit:  Next 5 appointments (look out 90 days)     Nov 01, 2018  1:00 PM CDT   [facility] Visit with Ilan Chew Mai, [facility]   [facility]    [address] [place] [id number] [phone]                 Prescription approved per [facility] Refill Protocol.    Requested Prescriptions   Pending Prescriptions Disp Refills     ranitidine (ZANTAC) 300 MG tablet [Pharmacy Med Name: RANITIDINE HCL 300MG TABS] 30 tablet 0     Sig: TAKE ONE TABLET BY MOUTH EVERY NIGHT AT BEDTIME    H2 Blockers Protocol Passed    10/26/2018  4:57 PM       Passed - Patient is age 12 or older       Passed - Recent (12 mo) or future (30 days) visit within the authorizing provider's specialty    Patient had office visit in the last 12 months or has a visit in the next 30 days with authorizing provider or within the authorizing provider's specialty.  See \"Patient Info\" tab in inbasket, or \"Choose Columns\" in Meds & Orders section of the refill encounter.              Юлия Carlos RN      "

## 2018-11-01 ENCOUNTER — HOSPITAL ENCOUNTER (OUTPATIENT)
Dept: CT IMAGING | Facility: CLINIC | Age: 53
Discharge: HOME | End: 2018-11-01
Attending: PHYSICIAN ASSISTANT | Admitting: PHYSICIAN ASSISTANT
Payer: MEDICARE

## 2018-11-01 ENCOUNTER — OFFICE VISIT (OUTPATIENT)
Dept: FAMILY MEDICINE | Facility: CLINIC | Age: 53
End: 2018-11-01
Payer: COMMERCIAL

## 2018-11-01 VITALS
RESPIRATION RATE: 16 BRPM | BODY MASS INDEX: 23.05 KG/M2 | TEMPERATURE: 98.6 F | SYSTOLIC BLOOD PRESSURE: 122 MMHG | DIASTOLIC BLOOD PRESSURE: 66 MMHG | HEART RATE: 82 BPM | WEIGHT: 151.6 LBS | OXYGEN SATURATION: 98 %

## 2018-11-01 DIAGNOSIS — Z53.9 ERRONEOUS ENCOUNTER--DISREGARD: Primary | ICD-10-CM

## 2018-11-01 DIAGNOSIS — M54.2 CERVICALGIA: ICD-10-CM

## 2018-11-01 PROCEDURE — 72125 CT NECK SPINE W/O DYE: CPT

## 2018-11-01 ASSESSMENT — PAIN SCALES - GENERAL: PAINLEVEL: MODERATE PAIN (5)

## 2018-11-01 NOTE — PROGRESS NOTES
SUBJECTIVE:   Stone De Paz is a 53 year old male who presents to clinic today for the following health issues:    Disability Insurance Paperwork     Rescheduled due to emergency surgery at the hospital.

## 2018-11-01 NOTE — NURSING NOTE
Chief Complaint   Patient presents with     Forms     Health Maintenance Due   Topic Date Due     URINE DRUG SCREEN Q1 YR  09/07/1981     Ty Watson, [facility]

## 2018-11-01 NOTE — MR AVS SNAPSHOT
After Visit Summary   11/1/2018    Stone De Paz    MRN: [id number]           Patient Information     Date Of Birth          1965        Visit Information        Provider Department      11/1/2018 1:00 PM Ilan Raymond MD [facility]        Today's Diagnoses     ERRONEOUS ENCOUNTER--DISREGARD    -  1       Follow-ups after your visit        Your next 10 appointments already scheduled     Nov 05, 2018  2:40 PM CST   Office Visit with Ilan Chew Mai, MD   [facility]    [address] [place] [id number] [phone]           Bring a current list of meds and any records pertaining to this visit. For Physicals, please bring immunization records and any forms needing to be filled out. Please arrive 10 minutes early to complete paperwork.              Who to contact     If you have questions or need follow up information about today's clinic visit or your schedule please contact [facility] directly at [phone].  Normal or non-critical lab and imaging results will be communicated to you by MyChart, letter or phone within 4 business days after the clinic has received the results. If you do not hear from us within 7 days, please contact the clinic through [employer] or phone. If you have a critical or abnormal lab result, we will notify you by phone as soon as possible.  Submit refill requests through [employer] or call your pharmacy and they will forward the refill request to us. Please allow 3 business days for your refill to be completed.          Additional Information About Your Visit        Care EveryWhere ID     This is your Care EveryWhere ID. This could be used by other organizations to access your [place] medical records  [id number]        Your Vitals Were     Pulse Temperature Respirations Pulse Oximetry BMI (Body Mass Index)       82 98.6  F (37  C) (Temporal) 16 98% 23.05 kg/m2        Blood Pressure from Last  3 Encounters:   11/01/18 122/66   10/26/18 140/68   09/21/18 136/70    Weight from Last 3 Encounters:   11/01/18 151 lb 9.6 oz (68.8 kg)   10/26/18 154 lb 12.8 oz (70.2 kg)   09/21/18 152 lb 12.8 oz (69.3 kg)              Today, you had the following     No orders found for display         Today's Medication Changes          These changes are accurate as of 11/1/18  8:39 PM.  If you have any questions, ask your nurse or doctor.               These medicines have changed or have updated prescriptions.        Dose/Directions    levothyroxine 150 MCG tablet   Commonly known as:  SYNTHROID/LEVOTHROID   This may have changed:    - how much to take  - additional instructions   Used for:  Acquired hypothyroidism        Dose:  137 mcg   Take 1 tablet (150 mcg) by mouth daily Please note the dose has increased.   Quantity:  30 tablet   Refills:  0                Primary Care Provider Office Phone # Fax #    Mehdiwilly Chew Mai, [phone]       [address] [place] [address]        Equal Access to Services     [facility]: Hadsima toscano Sokusum, waaxda luqpatric, qaybta kaalerik carrillo, britton nuñez . So [facility] [phone].    ATENCIÓN: Si habla español, tiene a banks disposición servicios gratuitos de asistencia lingüística. Llame al [phone].    We comply with applicable federal civil rights laws and [place] laws. We do not discriminate on the basis of race, color, national origin, age, disability, sex, sexual orientation, or gender identity.            Thank you!     Thank you for choosing [facility]  for your care. Our goal is always to provide you with excellent care. Hearing back from our patients is one way we can continue to improve our services. Please take a few minutes to complete the written survey that you may receive in the mail after your visit with us. Thank you!             Your Updated Medication List - Protect others around you:  Learn how to safely use, store and throw away your medicines at www.disposemymeds.org.          This list is accurate as of 11/1/18  8:39 PM.  Always use your most recent med list.                   Brand Name Dispense Instructions for use Diagnosis    albuterol 108 (90 Base) MCG/ACT inhaler    PROAIR HFA/PROVENTIL HFA/VENTOLIN HFA    1 Inhaler    Inhale 2 puffs into the lungs every 4 hours as needed for shortness of breath / dyspnea        * amylase-lipase-protease 91904-28704 units Cpep per EC capsule    CREON 24     Take 1 capsule by mouth every other day        * amylase-lipase-protease 72322 units Cpep    PERTZYE     Take 1 capsule by mouth every other day        budesonide 3 MG EC capsule    ENTOCORT EC     Take 3 mg by mouth every morning Take 1 or 2 capsules        citalopram 40 MG tablet    celeXA     Take 40 mg by mouth daily        CONTOUR NEXT TEST test strip   Generic drug:  blood glucose monitoring     450 strip    Use to test blood sugar 5x times daily or as directed.    Type 1 diabetes mellitus with diabetic autonomic neuropathy (H)       cyclobenzaprine 10 MG tablet    FLEXERIL    90 tablet    Take 1 tablet (10 mg) by mouth 2 times daily as needed for muscle spasms    Tension headache, Chronic pain syndrome       insulin aspart 100 UNITS/ML injection    NovoLOG VIAL    20 mL    Uses with insulin pump, total daily dose approx 45 units, E10.9    Type 1 diabetes mellitus with diabetic autonomic neuropathy (H)       levothyroxine 150 MCG tablet    SYNTHROID/LEVOTHROID    30 tablet    Take 1 tablet (150 mcg) by mouth daily Please note the dose has increased.    Acquired hypothyroidism       lisinopril 20 MG tablet    PRINIVIL/ZESTRIL     Take 40 mg by mouth daily        naproxen 500 MG tablet    NAPROSYN     Take 1 tablet by mouth 2 times daily (with meals)        omeprazole 20 MG tablet      Take 1 tablet by mouth daily        ondansetron 8 MG ODT tab    ZOFRAN-ODT     Take 1 tablet by mouth every 8  hours as needed        oxyCODONE IR 5 MG tablet    ROXICODONE    60 tablet    Take 1 tablet (5 mg) by mouth 2 times daily as needed for pain Should last for a month, tapering down    Chronic pain syndrome, Cervical stenosis of spine       ranitidine 300 MG tablet    ZANTAC    30 tablet    TAKE ONE TABLET BY MOUTH EVERY NIGHT AT BEDTIME    Gastroesophageal reflux disease without esophagitis       simvastatin 40 MG tablet    ZOCOR     Take 1 tablet by mouth At Bedtime        SUMATRIPTAN SUCCINATE PO      Take 100 mg by mouth 1/2 to 1 tablet by mouth at onset of headache. May repeat 1 dose after 2 hours if headache persists.        * Notice:  This list has 2 medication(s) that are the same as other medications prescribed for you. Read the directions carefully, and ask your doctor or other care provider to review them with you.

## 2018-11-02 ENCOUNTER — TELEPHONE (OUTPATIENT)
Dept: NEUROSURGERY | Facility: CLINIC | Age: 53
End: 2018-11-02

## 2018-11-02 DIAGNOSIS — M12.88 OTHER SPECIFIC ARTHROPATHIES, NOT ELSEWHERE CLASSIFIED, OTHER SPECIFIED SITE: ICD-10-CM

## 2018-11-02 DIAGNOSIS — M47.812 ARTHROPATHY OF CERVICAL SPINE: Primary | ICD-10-CM

## 2018-11-02 NOTE — TELEPHONE ENCOUNTER
Reason for Call:  Request for results:    Name of test or procedure: CT    Date of test of procedure: 11/01    Location of the test or procedure: [facility] to leave the result message on voice mail or with a family member? YES    Phone number Patient can be reached at:  Home number on file [phone] (home)    Additional comments: Please call pt with results. Thank You Venessa      Call taken on 11/2/2018 at 9:30 AM by Venessa Hatfield

## 2018-11-05 ENCOUNTER — OFFICE VISIT (OUTPATIENT)
Dept: FAMILY MEDICINE | Facility: CLINIC | Age: 53
End: 2018-11-05
Payer: COMMERCIAL

## 2018-11-05 VITALS
SYSTOLIC BLOOD PRESSURE: 136 MMHG | OXYGEN SATURATION: 99 % | WEIGHT: 155.4 LBS | TEMPERATURE: 98.7 F | DIASTOLIC BLOOD PRESSURE: 72 MMHG | RESPIRATION RATE: 16 BRPM | BODY MASS INDEX: 23.63 KG/M2 | HEART RATE: 88 BPM

## 2018-11-05 DIAGNOSIS — M48.02 CERVICAL STENOSIS OF SPINE: Primary | ICD-10-CM

## 2018-11-05 DIAGNOSIS — E11.43 GASTROPARESIS DUE TO DM (H): ICD-10-CM

## 2018-11-05 DIAGNOSIS — G89.4 CHRONIC PAIN SYNDROME: ICD-10-CM

## 2018-11-05 DIAGNOSIS — J98.4 LUNG DISEASE, RESTRICTIVE: ICD-10-CM

## 2018-11-05 DIAGNOSIS — K31.84 GASTROPARESIS DUE TO DM (H): ICD-10-CM

## 2018-11-05 PROCEDURE — 99213 OFFICE O/P EST LOW 20 MIN: CPT | Performed by: FAMILY MEDICINE

## 2018-11-05 RX ORDER — ALBUTEROL SULFATE 90 UG/1
2 AEROSOL, METERED RESPIRATORY (INHALATION) EVERY 4 HOURS PRN
Qty: 1 INHALER | Refills: 11 | Status: SHIPPED | OUTPATIENT
Start: 2018-11-05 | End: 2019-02-22

## 2018-11-05 RX ORDER — NAPROXEN 500 MG/1
500 TABLET ORAL 2 TIMES DAILY PRN
Qty: 180 TABLET | Refills: 1 | Status: SHIPPED | OUTPATIENT
Start: 2018-11-05 | End: 2019-10-29

## 2018-11-05 RX ORDER — OXYCODONE HYDROCHLORIDE 5 MG/1
5 TABLET ORAL 2 TIMES DAILY PRN
Qty: 60 TABLET | Refills: 0 | Status: SHIPPED | OUTPATIENT
Start: 2018-11-05 | End: 2019-01-28

## 2018-11-05 ASSESSMENT — PAIN SCALES - GENERAL: PAINLEVEL: SEVERE PAIN (7)

## 2018-11-05 NOTE — PROGRESS NOTES
SUBJECTIVE:   Stone De Paz is a 53 year old male who presents to clinic today for the following health issues:    Concern - disability Paperwork    Stone is here today with his wife for follow-up on his chronic pain.  He also has the [facility] Disability Insurance form to be filled out on his behalf.  He has been on this disability for several years due to chronic neck pain and gastroperesis.  He brought in the copy of the same disability forms that were filled out by his previous provider and I reviewed them personally.  He has a chronic pain due to cervical stenosis with history of cervical spine surgery.  He also had uncontrolled diabetes type 1 with severe gastroparesis.  The stomach stimulator has not been working.  He has not been able to work because of pain and because of the persistent nausea, vomiting due to uncontrolled gastroparesis.  Comorbidities including hypothyroidism, diabetic neuropathy, depression pancreatic insufficiency, and hyperlipidemia.  She has not been taking the pain meds much.  He felt the Percocet is no longer effective - takes it only when he is in severe pain.  No longer on medical marijuana.  He is hoping to see the pain specialist locally in the near future.  Not able to go to see the pain specialist because of the distance driving.  Same kind of pain he has had.  No fever or chills.  He requested a refill of the Percocet today.  The last prescription was 4 months ago with 60 tablets.  He has couple tablets left.  He also is seeing the spine specialist.  He refused conventional alternative management due to the side effect of medications.  Used to be on gabapentin, Cymbalta and other medications for years and they made him more sick.  He takes Flexeril and naproxen as needed.  No other concern today.    Problem list and histories reviewed & adjusted, as indicated.  Additional history: as documented    Current Outpatient Prescriptions   Medication Sig Dispense Refill      albuterol (PROAIR HFA/PROVENTIL HFA/VENTOLIN HFA) 108 (90 Base) MCG/ACT inhaler Inhale 2 puffs into the lungs every 4 hours as needed for shortness of breath / dyspnea 1 Inhaler 11     amylase-lipase-protease (PERTZYE) 52875 units CPEP Take 1 capsule by mouth every other day       budesonide (ENTOCORT EC) 3 MG EC capsule Take 3 mg by mouth every morning Take 1 or 2 capsules       citalopram (CELEXA) 40 MG tablet Take 40 mg by mouth daily       CONTOUR NEXT TEST test strip Use to test blood sugar 5x times daily or as directed. 450 strip 3     cyclobenzaprine (FLEXERIL) 10 MG tablet Take 1 tablet (10 mg) by mouth 2 times daily as needed for muscle spasms 90 tablet 1     insulin aspart (NOVOLOG VIAL) 100 UNITS/ML injection Uses with insulin pump, total daily dose approx 45 units, E10.9 20 mL 11     levothyroxine (SYNTHROID/LEVOTHROID) 150 MCG tablet Take 1 tablet (150 mcg) by mouth daily Please note the dose has increased. (Patient taking differently: Take 137 mcg by mouth daily Please note the dose has increased. - taking 137 mcg) 30 tablet 0     lisinopril (PRINIVIL,ZESTRIL) 20 MG tablet Take 40 mg by mouth daily        naproxen (NAPROSYN) 500 MG tablet Take 1 tablet (500 mg) by mouth 2 times daily as needed for moderate pain 180 tablet 1     omeprazole 20 MG tablet Take 1 tablet by mouth daily       ondansetron (ZOFRAN-ODT) 8 MG disintegrating tablet Take 1 tablet by mouth every 8 hours as needed       oxyCODONE IR (ROXICODONE) 5 MG tablet Take 1 tablet (5 mg) by mouth 2 times daily as needed for pain Should last for a month, tapering down 60 tablet 0     ranitidine (ZANTAC) 300 MG tablet TAKE ONE TABLET BY MOUTH EVERY NIGHT AT BEDTIME 30 tablet 10     simvastatin (ZOCOR) 40 MG tablet Take 1 tablet by mouth At Bedtime       SUMATRIPTAN SUCCINATE PO Take 100 mg by mouth 1/2 to 1 tablet by mouth at onset of headache. May repeat 1 dose after 2 hours if headache persists.       amylase-lipase-protease (CREON 24)  80674-58301 units CPEP per EC capsule Take 1 capsule by mouth every other day       No Known Allergies    Reviewed and updated as needed this visit by clinical staff  Allergies  Meds       Reviewed and updated as needed this visit by Provider         ROS:  Constitutional, HEENT, cardiovascular, pulmonary, gi and gu systems are negative, except as otherwise noted.    OBJECTIVE:     /72 (BP Location: Left arm, Patient Position: Sitting, Cuff Size: Adult Regular)  Pulse 88  Temp 98.7  F (37.1  C) (Temporal)  Resp 16  Wt 155 lb 6.4 oz (70.5 kg)  SpO2 99%  BMI 23.63 kg/m2  Body mass index is 23.63 kg/(m^2).  GENERAL: healthy, alert and no distress  NECK: no adenopathy, supple. Tender with palpation to the cervical spine and its para-spinous muscle.  RESP: lungs clear to auscultation - no rales, rhonchi or wheezes  CV: regular rate and rhythm, no murmur.  ABDOMEN: soft, nontender, no palpable masses or organomegaly with normal bowel sounds.  MS: no gross musculoskeletal defects noted, no edema.  No focal weakness.  NEURO: Normal strength and tone, mentation intact and speech normal.  No focal weakness.  PSYCH: mentation appears normal, affect normal/bright    Diagnostic Test Results:  No results found for this or any previous visit (from the past 24 hour(s)).    ASSESSMENT/PLAN:       ICD-10-CM    1. Cervical stenosis of spine M48.02 oxyCODONE IR (ROXICODONE) 5 MG tablet     naproxen (NAPROSYN) 500 MG tablet   2. Chronic pain syndrome G89.4 oxyCODONE IR (ROXICODONE) 5 MG tablet     naproxen (NAPROSYN) 500 MG tablet   3. Lung disease, restrictive J98.4 albuterol (PROAIR HFA/PROVENTIL HFA/VENTOLIN HFA) 108 (90 Base) MCG/ACT inhaler   4. Gastroparesis due to DM (H) E11.43     K31.84      Overall stable.  No change in medication.  The disability insurance form will fill out on her behalf.  Encouraged him to use oxycodone only as needed.  Will refer him to the pain specialist as well.      Ilan Chew Mai,  MD  [facility]

## 2018-11-05 NOTE — NURSING NOTE
Chief Complaint   Patient presents with     Forms     Health Maintenance Due   Topic Date Due     URINE DRUG SCREEN Q1 YR  09/07/1981     Ty Watson, [facility]

## 2018-11-05 NOTE — MR AVS SNAPSHOT
After Visit Summary   11/5/2018    Stone De Paz    MRN: [id number]           Patient Information     Date Of Birth          1965        Visit Information        Provider Department      11/5/2018 2:40 PM Ilan Raymond MD [facility]        Today's Diagnoses     Cervical stenosis of spine    -  1    Chronic pain syndrome        Lung disease, restrictive        Gastroparesis due to DM (H)           Follow-ups after your visit        Follow-up notes from your care team     Return if symptoms worsen or fail to improve.      Who to contact     If you have questions or need follow up information about today's clinic visit or your schedule please contact [facility] directly at [phone].  Normal or non-critical lab and imaging results will be communicated to you by MyChart, letter or phone within 4 business days after the clinic has received the results. If you do not hear from us within 7 days, please contact the clinic through MyChart or phone. If you have a critical or abnormal lab result, we will notify you by phone as soon as possible.  Submit refill requests through [employer] or call your pharmacy and they will forward the refill request to us. Please allow 3 business days for your refill to be completed.          Additional Information About Your Visit        Care EveryWhere ID     This is your Care EveryWhere ID. This could be used by other organizations to access your [place] medical records  [id number]        Your Vitals Were     Pulse Temperature Respirations Pulse Oximetry BMI (Body Mass Index)       88 98.7  F (37.1  C) (Temporal) 16 99% 23.63 kg/m2        Blood Pressure from Last 3 Encounters:   11/05/18 136/72   11/01/18 122/66   10/26/18 140/68    Weight from Last 3 Encounters:   11/05/18 155 lb 6.4 oz (70.5 kg)   11/01/18 151 lb 9.6 oz (68.8 kg)   10/26/18 154 lb 12.8 oz (70.2 kg)              Today, you had the following     No orders found for display          Today's Medication Changes          These changes are accurate as of 11/5/18 11:59 PM.  If you have any questions, ask your nurse or doctor.               These medicines have changed or have updated prescriptions.        Dose/Directions    levothyroxine 150 MCG tablet   Commonly known as:  SYNTHROID/LEVOTHROID   This may have changed:    - how much to take  - additional instructions   Used for:  Acquired hypothyroidism        Dose:  137 mcg   Take 1 tablet (150 mcg) by mouth daily Please note the dose has increased.   Quantity:  30 tablet   Refills:  0       naproxen 500 MG tablet   Commonly known as:  NAPROSYN   This may have changed:    - when to take this  - reasons to take this   Used for:  Chronic pain syndrome, Cervical stenosis of spine   Changed by:  Ilan Raymond MD        Dose:  500 mg   Take 1 tablet (500 mg) by mouth 2 times daily as needed for moderate pain   Quantity:  180 tablet   Refills:  1            Where to get your medicines      These medications were sent [facility] [address]  [facility] [address]     Phone:  [phone]     albuterol 108 (90 Base) MCG/ACT inhaler    naproxen 500 MG tablet         Some of these will need a paper prescription and others can be bought over the counter.  Ask your nurse if you have questions.     Bring a paper prescription for each of these medications     oxyCODONE IR 5 MG tablet               Information about OPIOIDS     PRESCRIPTION OPIOIDS: WHAT YOU NEED TO KNOW   We gave you an opioid (narcotic) pain medicine. It is important to manage your pain, but opioids are not always the best choice. You should first try all the other options your care team gave you. Take this medicine for as short a time (and as few doses) as possible.    Some activities can increase your pain, such as bandage changes or therapy sessions. It may help to take your pain medicine 30 to 60 minutes before these activities. Reduce your  stress by getting enough sleep, working on hobbies you enjoy and practicing relaxation or meditation. Talk to your care team about ways to manage your pain beyond prescription opioids.    These medicines have risks:    DO NOT drive when on new or higher doses of pain medicine. These medicines can affect your alertness and reaction times, and you could be arrested for driving under the influence (DUI). If you need to use opioids long-term, talk to your care team about driving.    DO NOT operate heavy machinery    DO NOT do any other dangerous activities while taking these medicines.    DO NOT drink any alcohol while taking these medicines.     If the opioid prescribed includes acetaminophen, DO NOT take with any other medicines that contain acetaminophen. Read all labels carefully. Look for the word  acetaminophen  or  Tylenol.  Ask your pharmacist if you have questions or are unsure.    You can get addicted to pain medicines, especially if you have a history of addiction (chemical, alcohol or substance dependence). Talk to your care team about ways to reduce this risk.    All opioids tend to cause constipation. Drink plenty of water and eat foods that have a lot of fiber, such as fruits, vegetables, prune juice, apple juice and high-fiber cereal. Take a laxative (Miralax, milk of magnesia, Colace, Senna) if you don t move your bowels at least every other day. Other side effects include upset stomach, sleepiness, dizziness, throwing up, tolerance (needing more of the medicine to have the same effect), physical dependence and slowed breathing.    Store your pills in a secure place, locked if possible. We will not replace any lost or stolen medicine. If you don t finish your medicine, please throw away (dispose) as directed by your pharmacist. The [place] Pollution [employer] Agency has more information about safe disposal: https://www.pca.[facility].[place].us/living-green/managing-unwanted-medications         Primary Care  Provider Office Phone # Fax #    Ilan Chew Mai, [phone] [address] [place] [address]        Equal Access to Services     [facility] : Hadsima jordyn elliott dorcas Sotaeali, waaxda luqadaha, qaybta kaalmada lorena, britton cyrjacquelin kim. So [facility] [phone].    ATENCIÓN: Si habla español, tiene a banks disposición servicios gratuitos de asistencia lingüística. Llame al [phone].    We comply with applicable federal civil rights laws and [place] laws. We do not discriminate on the basis of race, color, national origin, age, disability, sex, sexual orientation, or gender identity.            Thank you!     Thank you for choosing [facility]  for your care. Our goal is always to provide you with excellent care. Hearing back from our patients is one way we can continue to improve our services. Please take a few minutes to complete the written survey that you may receive in the mail after your visit with us. Thank you!             Your Updated Medication List - Protect others around you: Learn how to safely use, store and throw away your medicines at www.disposemymeds.org.          This list is accurate as of 11/5/18 11:59 PM.  Always use your most recent med list.                   Brand Name Dispense Instructions for use Diagnosis    albuterol 108 (90 Base) MCG/ACT inhaler    PROAIR HFA/PROVENTIL HFA/VENTOLIN HFA    1 Inhaler    Inhale 2 puffs into the lungs every 4 hours as needed for shortness of breath / dyspnea    Lung disease, restrictive       * amylase-lipase-protease 82180-02760 units Cpep per EC capsule    CREON 24     Take 1 capsule by mouth every other day        * amylase-lipase-protease 59953 units Cpep    PERTZYE     Take 1 capsule by mouth every other day        budesonide 3 MG EC capsule    ENTOCORT EC     Take 3 mg by mouth every morning Take 1 or 2 capsules        citalopram 40 MG tablet    celeXA     Take 40 mg by mouth daily         CONTOUR NEXT TEST test strip   Generic drug:  blood glucose monitoring     450 strip    Use to test blood sugar 5x times daily or as directed.    Type 1 diabetes mellitus with diabetic autonomic neuropathy (H)       cyclobenzaprine 10 MG tablet    FLEXERIL    90 tablet    Take 1 tablet (10 mg) by mouth 2 times daily as needed for muscle spasms    Tension headache, Chronic pain syndrome       insulin aspart 100 UNITS/ML injection    NovoLOG VIAL    20 mL    Uses with insulin pump, total daily dose approx 45 units, E10.9    Type 1 diabetes mellitus with diabetic autonomic neuropathy (H)       levothyroxine 150 MCG tablet    SYNTHROID/LEVOTHROID    30 tablet    Take 1 tablet (150 mcg) by mouth daily Please note the dose has increased.    Acquired hypothyroidism       lisinopril 20 MG tablet    PRINIVIL/ZESTRIL     Take 40 mg by mouth daily        naproxen 500 MG tablet    NAPROSYN    180 tablet    Take 1 tablet (500 mg) by mouth 2 times daily as needed for moderate pain    Chronic pain syndrome, Cervical stenosis of spine       omeprazole 20 MG tablet      Take 1 tablet by mouth daily        ondansetron 8 MG ODT tab    ZOFRAN-ODT     Take 1 tablet by mouth every 8 hours as needed        oxyCODONE IR 5 MG tablet    ROXICODONE    60 tablet    Take 1 tablet (5 mg) by mouth 2 times daily as needed for pain Should last for a month, tapering down    Chronic pain syndrome, Cervical stenosis of spine       ranitidine 300 MG tablet    ZANTAC    30 tablet    TAKE ONE TABLET BY MOUTH EVERY NIGHT AT BEDTIME    Gastroesophageal reflux disease without esophagitis       simvastatin 40 MG tablet    ZOCOR     Take 1 tablet by mouth At Bedtime        SUMATRIPTAN SUCCINATE PO      Take 100 mg by mouth 1/2 to 1 tablet by mouth at onset of headache. May repeat 1 dose after 2 hours if headache persists.        * Notice:  This list has 2 medication(s) that are the same as other medications prescribed for you. Read the directions carefully,  and ask your doctor or other care provider to review them with you.

## 2018-11-07 NOTE — TELEPHONE ENCOUNTER
I called and left detailed message, recommend he try MBB at C4-5 bilaterally prior to seeing Dr. Noble. Thanks.     Gurvinder Powell PA-C

## 2018-11-07 NOTE — TELEPHONE ENCOUNTER
Called and spoke with patient went over MRI results and Simone's PA-C recommendations. Patient would like to proceed with bilateral MBB @ C4-5. He will f/u with us if there is no improvement for next steps.

## 2018-11-07 NOTE — TELEPHONE ENCOUNTER
Pt would like a call back on his results and what his next steps are. I did set him up with an apt Aide on 11-29-18

## 2018-11-08 ENCOUNTER — TELEPHONE (OUTPATIENT)
Dept: FAMILY MEDICINE | Facility: CLINIC | Age: 53
End: 2018-11-08

## 2018-11-08 NOTE — TELEPHONE ENCOUNTER
Patients wife came and got paperwork and yes it was faxed to insurance.  Maria Eugenia Johnson MA 11/8/2018

## 2018-11-08 NOTE — TELEPHONE ENCOUNTER
Tried to reach patient, left message for patient to call the clinic back.  Patient has forms to  at the .  Ty Watson CMA

## 2018-11-08 NOTE — TELEPHONE ENCOUNTER
Patient calling and states his wife will  the forms, Ada De Paz. Patient also wondering if they were faxed to the insurance company?

## 2018-11-20 ENCOUNTER — HOSPITAL ENCOUNTER (OUTPATIENT)
Facility: CLINIC | Age: 53
Discharge: HOME | End: 2018-11-20
Attending: ANESTHESIOLOGY | Admitting: ANESTHESIOLOGY
Payer: MEDICARE

## 2018-11-20 ENCOUNTER — HOSPITAL ENCOUNTER (OUTPATIENT)
Dept: GENERAL RADIOLOGY | Facility: CLINIC | Age: 53
End: 2018-11-20
Attending: ANESTHESIOLOGY | Admitting: ANESTHESIOLOGY
Payer: MEDICARE

## 2018-11-20 VITALS
DIASTOLIC BLOOD PRESSURE: 82 MMHG | OXYGEN SATURATION: 97 % | RESPIRATION RATE: 20 BRPM | SYSTOLIC BLOOD PRESSURE: 128 MMHG

## 2018-11-20 DIAGNOSIS — M47.812 ARTHROPATHY OF CERVICAL SPINE: ICD-10-CM

## 2018-11-20 PROCEDURE — 64492 INJ PARAVERT F JNT C/T 3 LEV: CPT | Mod: 50 | Performed by: ANESTHESIOLOGY

## 2018-11-20 PROCEDURE — 64491 INJ PARAVERT F JNT C/T 2 LEV: CPT | Performed by: ANESTHESIOLOGY

## 2018-11-20 PROCEDURE — 64490 INJ PARAVERT F JNT C/T 1 LEV: CPT | Performed by: ANESTHESIOLOGY

## 2018-11-20 PROCEDURE — 64490 INJ PARAVERT F JNT C/T 1 LEV: CPT | Mod: 50 | Performed by: ANESTHESIOLOGY

## 2018-11-20 PROCEDURE — 77003 FLUOROGUIDE FOR SPINE INJECT: CPT | Mod: TC

## 2018-11-20 PROCEDURE — 25000128 H RX IP 250 OP 636: Performed by: ANESTHESIOLOGY

## 2018-11-20 PROCEDURE — 64491 INJ PARAVERT F JNT C/T 2 LEV: CPT | Mod: 50 | Performed by: ANESTHESIOLOGY

## 2018-11-20 PROCEDURE — 25000125 ZZHC RX 250: Performed by: ANESTHESIOLOGY

## 2018-11-20 PROCEDURE — 64492 INJ PARAVERT F JNT C/T 3 LEV: CPT | Mod: 50

## 2018-11-20 RX ORDER — IOPAMIDOL 612 MG/ML
INJECTION, SOLUTION INTRATHECAL PRN
Status: DISCONTINUED | OUTPATIENT
Start: 2018-11-20 | End: 2018-11-20 | Stop reason: HOSPADM

## 2018-11-20 RX ORDER — LIDOCAINE 40 MG/G
CREAM TOPICAL
Status: DISCONTINUED | OUTPATIENT
Start: 2018-11-20 | End: 2018-11-20 | Stop reason: HOSPADM

## 2018-11-20 RX ORDER — BUPIVACAINE HYDROCHLORIDE 7.5 MG/ML
INJECTION, SOLUTION EPIDURAL; RETROBULBAR PRN
Status: DISCONTINUED | OUTPATIENT
Start: 2018-11-20 | End: 2018-11-20 | Stop reason: HOSPADM

## 2018-11-20 RX ADMIN — LIDOCAINE HYDROCHLORIDE 1 ML: 10 INJECTION, SOLUTION EPIDURAL; INFILTRATION; INTRACAUDAL; PERINEURAL at 13:35

## 2018-11-20 NOTE — IP AVS SNAPSHOT
MRN:[id number]                      After Visit Summary   11/20/2018    Stone De Paz    MRN: [id number]           Thank you!     Thank you for choosing [facility] for your care. Our goal is always to provide you with excellent care. Hearing back from our patients is one way we can continue to improve our services. Please take a few minutes to complete the written survey that you may receive in the mail after you visit with us. Thank you!        Patient Information     Date Of Birth          1965        About your hospital stay     You were admitted on:  November 20, 2018 You last received care in the:  [facility] Phase II    You were discharged on:  November 20, 2018       Who to Call     For medical emergencies, please call 911.  For non-urgent questions about your medical care, please call your primary care provider or clinic, [phone]  For questions related to your surgery, please call your surgery clinic        Attending Provider     Provider Specialty    Jef [facility]       Primary Care Provider Office Phone # Fax #    Ilan Chew Mai, [phone]      After Care Instructions     Discharge Instructions       Review outpatient procedure discharge instructions as directed by Provider.                  Further instructions from your care team       Home Care Instructions     Please fax or mail this form to the [facility] fax: [phone]. The mailing address is : [facility], [address] [place] [address]            Procedure:  Diagnostic Block (which includes medial branch blocks, SI joint injection blocks, and nerve root injections)    Activity:  The injection was used to identify the cause of your pain:    Resume normal activity  You are to engage in activity that would have normally caused you discomfort to determine the effectiveness of the block/injection.  It is imperative to evaluate and rate your pain the  first 2 hours after the injection.     Pain:    You may experience soreness at the injection site for one or two days    You may use an ice pack for 20 minutes every 2 hours for the first 24 hours    You may use a heating pad after the first 24 hours    You may use Tylenol  (acetaminophen) every 4 hours or other pain medicines as directed by your physician after your block wears off.    Safety  You may experience numbness radiating into your legs or arms, (depending on the procedure location)  This numbness may last several hours.  Until the numb sensation returns to normal please use caution in walking, climbing stairs, stepping out of your vehicle, etc.    Please contact us if you have:  Severe pain   Fever more than 101.5 degrees Fahrenheit  Signs of infection (redness, swelling or drainage)       If you need immediate attention we recommend that you go to a hospital emergency room or dial 911.    _____ Please contact our office one week after your injection to report your percent of pain relief.   See attached One Week Procedure Injection Report  __X__ Please return your Nerve Block Pain Relief Form the day after your injection.     See attached Nerve Block Pain Relief Form     Please fax or mail this form to the [facility] fax: [phone]. Address [facility] [place] [address].     (If you do not have access to a fax machine, return form by mail to the [facility])        ## PLEASE SEND NERVE BLOCK PAIN RELIEF REPORT WITH PATIENT ##      Pending Results     No orders found from 11/18/2018 to 11/21/2018.            Admission Information     Date & Time Provider Department Dept. Phone    11/20/2018 Jef Edwards MD [facility] Phase [phone]      Your Vitals Were     Blood Pressure Respirations Pulse Oximetry             128/82 20 97%         Care EveryWhere ID     This is your Care EveryWhere ID. This could be used by other organizations to access your  [place] medical records  [id number]        Equal Access to Services     JONATHONJUNIE ANGI : Hadii aad ku hadmelvindora Dunne, wapaulda charlene, qadaysita sinapatiencesona carrillo, britton salehivanzackary alvarez. So [facility] [phone].    ATENCIÓN: Si habla español, tiene a banks disposición servicios gratuitos de asistencia lingüística. Llame al [phone].    We comply with applicable federal civil rights laws and [place] laws. We do not discriminate on the basis of race, color, national origin, age, disability, sex, sexual orientation, or gender identity.               Review of your medicines      CONTINUE these medicines which may have CHANGED, or have new prescriptions. If we are uncertain of the size of tablets/capsules you have at home, strength may be listed as something that might have changed.        Dose / Directions    levothyroxine 150 MCG tablet   Commonly known as:  SYNTHROID/LEVOTHROID   This may have changed:    - how much to take  - additional instructions   Used for:  Acquired hypothyroidism        Dose:  137 mcg   Take 1 tablet (150 mcg) by mouth daily Please note the dose has increased.   Quantity:  30 tablet   Refills:  0         CONTINUE these medicines which have NOT CHANGED        Dose / Directions    albuterol 108 (90 Base) MCG/ACT inhaler   Commonly known as:  PROAIR HFA/PROVENTIL HFA/VENTOLIN HFA   Used for:  Lung disease, restrictive        Dose:  2 puff   Inhale 2 puffs into the lungs every 4 hours as needed for shortness of breath / dyspnea   Quantity:  1 Inhaler   Refills:  11       * amylase-lipase-protease 00122-06630 units Cpep per EC capsule   Commonly known as:  CREON 24        Dose:  1 capsule   Take 1 capsule by mouth every other day   Refills:  0       * amylase-lipase-protease 95169 units Cpep   Commonly known as:  PERTZYE        Dose:  1 capsule   Take 1 capsule by mouth every other day   Refills:  0       budesonide 3 MG EC capsule   Commonly known as:  ENTOCORT EC        Dose:   3 mg   Take 3 mg by mouth every morning Take 1 or 2 capsules   Refills:  0       citalopram 40 MG tablet   Commonly known as:  celeXA        Dose:  40 mg   Take 40 mg by mouth daily   Refills:  0       CONTOUR NEXT TEST test strip   Used for:  Type 1 diabetes mellitus with diabetic autonomic neuropathy (H)   Generic drug:  blood glucose monitoring        Use to test blood sugar 5x times daily or as directed.   Quantity:  450 strip   Refills:  3       cyclobenzaprine 10 MG tablet   Commonly known as:  FLEXERIL   Used for:  Tension headache, Chronic pain syndrome        Dose:  10 mg   Take 1 tablet (10 mg) by mouth 2 times daily as needed for muscle spasms   Quantity:  90 tablet   Refills:  1       insulin aspart 100 UNITS/ML injection   Commonly known as:  NovoLOG VIAL   Used for:  Type 1 diabetes mellitus with diabetic autonomic neuropathy (H)        Uses with insulin pump, total daily dose approx 45 units, E10.9   Quantity:  20 mL   Refills:  11       lisinopril 20 MG tablet   Commonly known as:  PRINIVIL/ZESTRIL        Dose:  40 mg   Take 40 mg by mouth daily   Refills:  0       naproxen 500 MG tablet   Commonly known as:  NAPROSYN   Used for:  Chronic pain syndrome, Cervical stenosis of spine        Dose:  500 mg   Take 1 tablet (500 mg) by mouth 2 times daily as needed for moderate pain   Quantity:  180 tablet   Refills:  1       omeprazole 20 MG tablet        Dose:  1 tablet   Take 1 tablet by mouth daily   Refills:  0       ondansetron 8 MG ODT tab   Commonly known as:  ZOFRAN-ODT        Dose:  1 tablet   Take 1 tablet by mouth every 8 hours as needed   Refills:  0       oxyCODONE IR 5 MG tablet   Commonly known as:  ROXICODONE   Used for:  Chronic pain syndrome, Cervical stenosis of spine        Dose:  5 mg   Take 1 tablet (5 mg) by mouth 2 times daily as needed for pain Should last for a month, tapering down   Quantity:  60 tablet   Refills:  0       ranitidine 300 MG tablet   Commonly known as:  ZANTAC    Used for:  Gastroesophageal reflux disease without esophagitis        TAKE ONE TABLET BY MOUTH EVERY NIGHT AT BEDTIME   Quantity:  30 tablet   Refills:  10       simvastatin 40 MG tablet   Commonly known as:  ZOCOR        Dose:  1 tablet   Take 1 tablet by mouth At Bedtime   Refills:  0       SUMATRIPTAN SUCCINATE PO        Dose:  100 mg   Take 100 mg by mouth 1/2 to 1 tablet by mouth at onset of headache. May repeat 1 dose after 2 hours if headache persists.   Refills:  0       * Notice:  This list has 2 medication(s) that are the same as other medications prescribed for you. Read the directions carefully, and ask your doctor or other care provider to review them with you.             Protect others around you: Learn how to safely use, store and throw away your medicines at www.SearchMeemFitmoeds.org.             Medication List: This is a list of all your medications and when to take them. Check marks below indicate your daily home schedule. Keep this list as a reference.      Medications           Morning Afternoon Evening Bedtime As Needed    albuterol 108 (90 Base) MCG/ACT inhaler   Commonly known as:  PROAIR HFA/PROVENTIL HFA/VENTOLIN HFA   Inhale 2 puffs into the lungs every 4 hours as needed for shortness of breath / dyspnea                                * amylase-lipase-protease 48573-42176 units Cpep per EC capsule   Commonly known as:  CREON 24   Take 1 capsule by mouth every other day                                * amylase-lipase-protease 11629 units Cpep   Commonly known as:  PERTZYE   Take 1 capsule by mouth every other day                                budesonide 3 MG EC capsule   Commonly known as:  ENTOCORT EC   Take 3 mg by mouth every morning Take 1 or 2 capsules                                citalopram 40 MG tablet   Commonly known as:  celeXA   Take 40 mg by mouth daily                                CONTOUR NEXT TEST test strip   Use to test blood sugar 5x times daily or as directed.    Generic drug:  blood glucose monitoring                                cyclobenzaprine 10 MG tablet   Commonly known as:  FLEXERIL   Take 1 tablet (10 mg) by mouth 2 times daily as needed for muscle spasms                                insulin aspart 100 UNITS/ML injection   Commonly known as:  NovoLOG VIAL   Uses with insulin pump, total daily dose approx 45 units, E10.9                                levothyroxine 150 MCG tablet   Commonly known as:  SYNTHROID/LEVOTHROID   Take 1 tablet (150 mcg) by mouth daily Please note the dose has increased.                                lisinopril 20 MG tablet   Commonly known as:  PRINIVIL/ZESTRIL   Take 40 mg by mouth daily                                naproxen 500 MG tablet   Commonly known as:  NAPROSYN   Take 1 tablet (500 mg) by mouth 2 times daily as needed for moderate pain                                omeprazole 20 MG tablet   Take 1 tablet by mouth daily                                ondansetron 8 MG ODT tab   Commonly known as:  ZOFRAN-ODT   Take 1 tablet by mouth every 8 hours as needed                                oxyCODONE IR 5 MG tablet   Commonly known as:  ROXICODONE   Take 1 tablet (5 mg) by mouth 2 times daily as needed for pain Should last for a month, tapering down                                ranitidine 300 MG tablet   Commonly known as:  ZANTAC   TAKE ONE TABLET BY MOUTH EVERY NIGHT AT BEDTIME                                simvastatin 40 MG tablet   Commonly known as:  ZOCOR   Take 1 tablet by mouth At Bedtime                                SUMATRIPTAN SUCCINATE PO   Take 100 mg by mouth 1/2 to 1 tablet by mouth at onset of headache. May repeat 1 dose after 2 hours if headache persists.                                * Notice:  This list has 2 medication(s) that are the same as other medications prescribed for you. Read the directions carefully, and ask your doctor or other care provider to review them with you.

## 2018-11-20 NOTE — OP NOTE
CHIEF COMPLAINT:  Neck pain secondary to cervical spondylosis  INTERVAL HISTORY:     The history was discussed with the patient. I agree with above. Risks were discussed including risks of infection, bleeding, nerve injury, no help , or worse pain and they were willing to take these risks and proceed. They also understand this is a strictly diagnostic block.    PROCEDURE:  Cervical Medial branch blocks of the bilateral C3, C4, C5 medial branches   utilizing fluoroscopic guidance with contrast dye.   PROCEDURE DETAILS: After written informed consent was obtained from the patient, the patient was escorted to the procedure room.  The patient was placed in the sitting position. A time out was conducted to verify the patient identity, procedure to be performed, side, site, allergies and any special requirements.  The skin over the neck was prepped and draped in normal sterile fashion. Fluoroscopy was used to identify the mid point of the articular pillar with a lateral view.   The skin was anesthetized with 0.5 mL of 1% lidocaine with bicarbonate buffer.  A 25-gauge 3.5 inch Quincke needle was advanced under fluoroscopic guidance.  <0.3 cc of Omnipaque contrast dye was injected without evidence of intrathecal or intravascular spread.  A- 0.5 mL solution of 0.75% bupivacaine was injected at each articular pillar.  Then, the needle was removed.   The patient did not receive sedation during the procedure. The patient was monitored with blood pressure and pulse oximetry machines with the assistance of an RN throughout the procedure.  The patient was alert and responsive to questions throughout the procedure.   The patient tolerated the procedure well and was observed in the post-procedural area.  The patient was dismissed without apparent complications.     DIAGNOSIS:  1. Neck pain secondary to cervical spondylosis  PLAN:  1. Performed   medial branch blocks to treat the   facet joints with 0.75% bupivacaine.  We will have  the patient fill out an injection report. If there is benefit with medial branch block with 0.75% bupivacaine, will use 4% lidocaine for the 2nd diagnostic medial branch block.  If both blocks are diagnostically positive, I will proceed with radiofrequency neurolysis.  For the screening test with bupivacaine we are looking for % pain relief for at least 2 hours to be considered diagnostically positive.  For a lidocaine challenge confirmatory block we are looking for 1 hour in the % range.  2. The patient will fax or mail in their pain relief form to the [facility] to determine the next step in their care.   I did talk to him about his particular pathoanatomy and he does have some significant foraminal stenosis at these levels as well.  If the medial branch block screening test becomes a diagnostically negative resolved then I think focusing on these nerve roots in his neck would be the next step.    Jef Edwards MD  Diplomate of the American Board of Anesthesiology, Pain Medicine

## 2018-11-20 NOTE — IP AVS SNAPSHOT
[facility] Phase [facility]    [address]     [place] [address]    Phone:  [phone]                                       After Visit Summary   11/20/2018    Stone De Paz    MRN: [id number]           After Visit Summary Signature Page     I have received my discharge instructions, and my questions have been answered. I have discussed any challenges I see with this plan with the nurse or doctor.    ..........................................................................................................................................  Patient/Patient Representative Signature      ..........................................................................................................................................  Patient Representative Print Name and Relationship to Patient    ..................................................               ................................................  Date                                   Time    ..........................................................................................................................................  Reviewed by Signature/Title    ...................................................              ..............................................  Date                                               Time          22EPIC Rev 08/18

## 2018-11-20 NOTE — DISCHARGE INSTRUCTIONS
Home Care Instructions     Please fax or mail this form to the [facility] fax: [phone]. The mailing address is : [facility], [address], [place] [address]            Procedure:  Diagnostic Block (which includes medial branch blocks, SI joint injection blocks, and nerve root injections)    Activity:  The injection was used to identify the cause of your pain:    Resume normal activity  You are to engage in activity that would have normally caused you discomfort to determine the effectiveness of the block/injection.  It is imperative to evaluate and rate your pain the first 2 hours after the injection.     Pain:    You may experience soreness at the injection site for one or two days    You may use an ice pack for 20 minutes every 2 hours for the first 24 hours    You may use a heating pad after the first 24 hours    You may use Tylenol  (acetaminophen) every 4 hours or other pain medicines as directed by your physician after your block wears off.    Safety  You may experience numbness radiating into your legs or arms, (depending on the procedure location)  This numbness may last several hours.  Until the numb sensation returns to normal please use caution in walking, climbing stairs, stepping out of your vehicle, etc.    Please contact us if you have:  Severe pain   Fever more than 101.5 degrees Fahrenheit  Signs of infection (redness, swelling or drainage)       If you need immediate attention we recommend that you go to a hospital emergency room or dial 911.    _____ Please contact our office one week after your injection to report your percent of pain relief.   See attached One Week Procedure Injection Report  __X__ Please return your Nerve Block Pain Relief Form the day after your injection.     See attached Nerve Block Pain Relief Form     Please fax or mail this form to the [facility] fax: [phone]. Address [address] [place], [place] [address].      (If you do not have access to a fax machine, return form by mail to the [facility])        ## PLEASE SEND NERVE BLOCK PAIN RELIEF REPORT WITH PATIENT ##

## 2018-11-26 DIAGNOSIS — E78.5 HYPERLIPIDEMIA, UNSPECIFIED HYPERLIPIDEMIA TYPE: Primary | ICD-10-CM

## 2018-11-26 NOTE — TELEPHONE ENCOUNTER
"Simvastatin  Last office visit: 11/5/2018 with prescribing provider:  Mandi   Future Office Visit:  None  Routing refill request to provider for review/approval because:  Medication is reported/historical    Requested Prescriptions   Pending Prescriptions Disp Refills     simvastatin (ZOCOR) 40 MG tablet 90 tablet 3     Sig: Take 1 tablet (40 mg) by mouth At Bedtime    Statins Protocol Passed    11/26/2018 11:37 AM       Passed - LDL on file in past 12 months    Recent Labs   Lab Test  07/09/18   1723   LDL  99          Passed - No abnormal creatine kinase in past 12 months    No lab results found.        Passed - Recent (12 mo) or future (30 days) visit within the authorizing provider's specialty    Patient had office visit in the last 12 months or has a visit in the next 30 days with authorizing provider or within the authorizing provider's specialty.  See \"Patient Info\" tab in inbasket, or \"Choose Columns\" in Meds & Orders section of the refill encounter.         Passed - Patient is age 18 or older      Dolores Ferro RN   "

## 2018-11-27 RX ORDER — SIMVASTATIN 40 MG
40 TABLET ORAL AT BEDTIME
Qty: 90 TABLET | Refills: 1 | Status: SHIPPED | OUTPATIENT
Start: 2018-11-27 | End: 2019-05-27

## 2019-01-03 DIAGNOSIS — K21.9 GASTROESOPHAGEAL REFLUX DISEASE WITHOUT ESOPHAGITIS: ICD-10-CM

## 2019-01-03 NOTE — TELEPHONE ENCOUNTER
"Zantac  Last Written Prescription Date:  10/29/2018  Last Fill Quantity: 30,  # refills: 10   Last office visit: 11/5/2018 with prescribing provider:  Mandi   Future Office Visit:  None  Routing refill request to provider for review/approval because:  Medication requested and current medication list are different.     Requested Prescriptions   Pending Prescriptions Disp Refills     ranitidine (ZANTAC) 300 MG tablet 60 tablet 10     Sig: Take 0.5 tablets (150 mg) by mouth 2 times daily    H2 Blockers Protocol Passed - 1/3/2019 10:38 AM       Passed - Patient is age 12 or older       Passed - Recent (12 mo) or future (30 days) visit within the authorizing provider's specialty    Patient had office visit in the last 12 months or has a visit in the next 30 days with authorizing provider or within the authorizing provider's specialty.  See \"Patient Info\" tab in inbasket, or \"Choose Columns\" in Meds & Orders section of the refill encounter.        Dolores Ferro RN   "

## 2019-01-04 DIAGNOSIS — K21.9 GASTROESOPHAGEAL REFLUX DISEASE WITHOUT ESOPHAGITIS: ICD-10-CM

## 2019-01-04 NOTE — TELEPHONE ENCOUNTER
Pharmacy sent over a request stating that patient would like to take Ranitidine 300mg  BID.   Please advise on dosage and send new rx.  MP/MA

## 2019-01-10 ENCOUNTER — TELEPHONE (OUTPATIENT)
Dept: NEUROSURGERY | Facility: CLINIC | Age: 54
End: 2019-01-10

## 2019-01-10 DIAGNOSIS — M12.88 OTHER SPECIFIC ARTHROPATHIES, NOT ELSEWHERE CLASSIFIED, OTHER SPECIFIED SITE: ICD-10-CM

## 2019-01-10 DIAGNOSIS — M47.812 ARTHROPATHY OF CERVICAL SPINE: Primary | ICD-10-CM

## 2019-01-10 NOTE — TELEPHONE ENCOUNTER
Patient had 1st MBB on 11/20/18. Could not locate pain diary results from 1st MBB in chart. Called patient, LVM. Awaiting call back to discuss.

## 2019-01-10 NOTE — TELEPHONE ENCOUNTER
Reason for Call:  Other call back    Detailed comments: pt can get in tomorrow for his 2nd set of injections if the orders are placed. Thank You Venessa      Phone Number Patient can be reached at: Home number on file [phone] (home)    Best Time: any    Can we leave a detailed message on this number? YES    Call taken on 1/10/2019 at 9:36 AM by Venessa Hatfield

## 2019-01-10 NOTE — TELEPHONE ENCOUNTER
Spoke with Patient's EC Ada. She said the pain diary was faxed to our [facility]. She reports that Stone had 100 % complete relief from 1st block with Dr Edwards . He did bilateral C3,4,5. Order placed for 2nd MBB in [facility].

## 2019-01-11 ENCOUNTER — MEDICAL CORRESPONDENCE (OUTPATIENT)
Dept: HEALTH INFORMATION MANAGEMENT | Facility: CLINIC | Age: 54
End: 2019-01-11

## 2019-01-11 ENCOUNTER — HOSPITAL ENCOUNTER (OUTPATIENT)
Facility: CLINIC | Age: 54
Discharge: HOME | End: 2019-01-11
Attending: ANESTHESIOLOGY | Admitting: ANESTHESIOLOGY
Payer: MEDICARE

## 2019-01-11 ENCOUNTER — HOSPITAL ENCOUNTER (OUTPATIENT)
Dept: GENERAL RADIOLOGY | Facility: CLINIC | Age: 54
End: 2019-01-11
Attending: ANESTHESIOLOGY | Admitting: ANESTHESIOLOGY
Payer: MEDICARE

## 2019-01-11 VITALS
OXYGEN SATURATION: 97 % | DIASTOLIC BLOOD PRESSURE: 84 MMHG | TEMPERATURE: 97.8 F | RESPIRATION RATE: 14 BRPM | SYSTOLIC BLOOD PRESSURE: 129 MMHG

## 2019-01-11 DIAGNOSIS — M47.812 ARTHROPATHY OF CERVICAL SPINE: ICD-10-CM

## 2019-01-11 DIAGNOSIS — G89.29 CHRONIC NECK PAIN: Primary | ICD-10-CM

## 2019-01-11 DIAGNOSIS — M54.2 CHRONIC NECK PAIN: Primary | ICD-10-CM

## 2019-01-11 PROCEDURE — 64492 INJ PARAVERT F JNT C/T 3 LEV: CPT | Performed by: ANESTHESIOLOGY

## 2019-01-11 PROCEDURE — 25000128 H RX IP 250 OP 636: Performed by: ANESTHESIOLOGY

## 2019-01-11 PROCEDURE — 64490 INJ PARAVERT F JNT C/T 1 LEV: CPT | Performed by: ANESTHESIOLOGY

## 2019-01-11 PROCEDURE — 25000125 ZZHC RX 250: Performed by: NURSE ANESTHETIST, CERTIFIED REGISTERED

## 2019-01-11 PROCEDURE — 25000125 ZZHC RX 250: Performed by: ANESTHESIOLOGY

## 2019-01-11 PROCEDURE — 77003 FLUOROGUIDE FOR SPINE INJECT: CPT

## 2019-01-11 PROCEDURE — 64491 INJ PARAVERT F JNT C/T 2 LEV: CPT | Performed by: ANESTHESIOLOGY

## 2019-01-11 PROCEDURE — 25000125 ZZHC RX 250

## 2019-01-11 RX ORDER — IOPAMIDOL 612 MG/ML
INJECTION, SOLUTION INTRATHECAL PRN
Status: DISCONTINUED | OUTPATIENT
Start: 2019-01-11 | End: 2019-01-11 | Stop reason: HOSPADM

## 2019-01-11 RX ADMIN — LIDOCAINE HYDROCHLORIDE 1 ML: 10 INJECTION, SOLUTION EPIDURAL; INFILTRATION; INTRACAUDAL; PERINEURAL at 11:55

## 2019-01-11 NOTE — DISCHARGE INSTRUCTIONS
Home Care Instructions     Please fax or mail this form to the [facility] fax: [phone]. The mailing address is : [facility], [address], [place] [address]            Procedure:  Diagnostic Block (which includes medial branch blocks, SI joint injection blocks, and nerve root injections)    Activity:  The injection was used to identify the cause of your pain:    Resume normal activity  You are to engage in activity that would have normally caused you discomfort to determine the effectiveness of the block/injection.  It is imperative to evaluate and rate your pain the first 2 hours after the injection.     Pain:    You may experience soreness at the injection site for one or two days    You may use an ice pack for 20 minutes every 2 hours for the first 24 hours    You may use a heating pad after the first 24 hours    You may use Tylenol  (acetaminophen) every 4 hours or other pain medicines as directed by your physician after your block wears off.    Safety  You may experience numbness radiating into your legs or arms, (depending on the procedure location)  This numbness may last several hours.  Until the numb sensation returns to normal please use caution in walking, climbing stairs, stepping out of your vehicle, etc.    Please contact us if you have:  Severe pain   Fever more than 101.5 degrees Fahrenheit  Signs of infection (redness, swelling or drainage)       If you need immediate attention we recommend that you go to a hospital emergency room or dial 911.    _____ Please contact our office one week after your injection to report your percent of pain relief.   See attached One Week Procedure Injection Report  __X__ Please return your Nerve Block Pain Relief Form the day after your injection.     See attached Nerve Block Pain Relief Form     Please fax or mail this form to the [facility] fax: [phone]. Address [address] [place], [place] [address].      (If you do not have access to a fax machine, return form by mail to the [facility])        ## PLEASE SEND NERVE BLOCK PAIN RELIEF REPORT WITH PATIENT ##

## 2019-01-11 NOTE — OP NOTE
CHIEF COMPLAINT:  Neck pain secondary to cervical spondylosis  INTERVAL HISTORY:     The history was discussed with the patient. I agree with above. Risks were discussed including risks of infection, bleeding, nerve injury, no help , or worse pain and they were willing to take these risks and proceed. They also understand this is a strictly diagnostic block.    PROCEDURE:  Cervical Medial branch blocks of the bilateral C3, C4, and C5 and the bilateral third occipital nerves   utilizing fluoroscopic guidance with contrast dye.   PROCEDURE DETAILS: After written informed consent was obtained from the patient, the patient was escorted to the procedure room.  The patient was placed in the sitting position. A time out was conducted to verify the patient identity, procedure to be performed, side, site, allergies and any special requirements.  The skin over the neck was prepped and draped in normal sterile fashion. Fluoroscopy was used to identify the mid point of the articular pillar with a lateral view.   The skin was anesthetized with 0.5 mL of 1% lidocaine with bicarbonate buffer.  A 25-gauge 3.5 inch Quincke needle was advanced under fluoroscopic guidance.  <0.3 cc of Omnipaque contrast dye was injected without evidence of intrathecal or intravascular spread.  A- 0.5 mL solution of 4% lidocaine was injected at each articular pillar.  Then, the needle was removed.   The patient did not receive sedation during the procedure. The patient was monitored with blood pressure and pulse oximetry machines with the assistance of an RN throughout the procedure.  The patient was alert and responsive to questions throughout the procedure.   The patient tolerated the procedure well and was observed in the post-procedural area.  The patient was dismissed without apparent complications.     DIAGNOSIS:  1. Neck pain secondary to cervical spondylosis with a history of one positive diagnostic screening test with bupivacaine.  PLAN:  1.  Performed   medial branch blocks to treat the   facet joints with 4% lidocaine for the 2nd diagnostic medial branch block.  If both blocks are diagnostically positive, I will proceed with radiofrequency neurolysis.  2. The patient will send his pain relief form back to the [facility] for diagnostic interpretation.  Based upon the response from today's injection we will be determining the next step in his care.

## 2019-01-17 ENCOUNTER — TELEPHONE (OUTPATIENT)
Dept: NEUROSURGERY | Facility: CLINIC | Age: 54
End: 2019-01-17

## 2019-01-17 DIAGNOSIS — M12.88 OTHER SPECIFIC ARTHROPATHIES, NOT ELSEWHERE CLASSIFIED, OTHER SPECIFIED SITE: ICD-10-CM

## 2019-01-17 DIAGNOSIS — M47.812 ARTHROPATHY OF CERVICAL SPINE: Primary | ICD-10-CM

## 2019-01-17 NOTE — TELEPHONE ENCOUNTER
LvM informing patient that we received pain log scale from patient's recent MBB on 1/11/19. Patient had % relief for 3 hours, reviewed with Gurvinder SEYMOUR who also consulted with Dr. Edwards and made the recommendation to proceed with RFA.      Informed patient that he would need to complete the physical therapy that was ordered prior to proceeding with the RFA per insurance requirements. I left the phone number in message for patient to call and schedule PT. Asked patient to return call to our office with any questions or concerns. Order for RFA Placed.

## 2019-01-23 ENCOUNTER — HOSPITAL ENCOUNTER (OUTPATIENT)
Dept: PHYSICAL THERAPY | Facility: CLINIC | Age: 54
Setting detail: THERAPIES SERIES
End: 2019-01-23
Attending: ANESTHESIOLOGY
Payer: MEDICARE

## 2019-01-23 DIAGNOSIS — M54.2 CHRONIC NECK PAIN: ICD-10-CM

## 2019-01-23 DIAGNOSIS — G89.29 CHRONIC NECK PAIN: ICD-10-CM

## 2019-01-23 PROCEDURE — 97530 THERAPEUTIC ACTIVITIES: CPT | Mod: GP

## 2019-01-23 PROCEDURE — 97162 PT EVAL MOD COMPLEX 30 MIN: CPT | Mod: GP

## 2019-01-24 NOTE — PROGRESS NOTES
[facility]          OUTPATIENT PHYSICAL THERAPY ORTHOPEDIC EVALUATION  PLAN OF TREATMENT FOR OUTPATIENT REHABILITATION  (COMPLETE FOR INITIAL CLAIMS ONLY)  Patient's Last Name, First Name, M.I.  YOB: 1965  Corky De Paze  FREDO    Provider s Name:  [facility]   Medical Record No.  [id number]   Start of Care Date:  01/23/19   Onset Date:  07/23/18(History of cervical and lumbar fusions)   Type:     _X__PT   ___OT   ___SLP Medical Diagnosis:  Chronic neck pain     PT Diagnosis:  Neck pain d/t limited ROM and flexibility   Visits from SOC:  1      _________________________________________________________________________________  Plan of Treatment/Functional Goals:  joint mobilization, manual therapy, neuromuscular re-education, ROM, strengthening, stretching  Other interventions as indicated  Cryotherapy  Other modalities as indicated  Goals  Goal Identifier: 1  Goal Description: Patient will improve NDI score by at least 15 points in order to demonstrate functional improvements  Target Date: 02/20/19    Goal Identifier: 2  Goal Description: Patient will exhibit increase in cervical AROM by at least 25% in order to improve tolerance to activities such as driving and reading.   Target Date: 03/06/19    Goal Identifier: 3  Goal Description: Patient will be able to sleep 6-7 hours per night without an increase in neck pain above 2/10 upon awakening.  Target Date: 03/06/19           Therapy Frequency:  2 times/Week  Predicted Duration of Therapy Intervention:  6 weeks    Ariana Judge PT                 I CERTIFY THE NEED FOR THESE SERVICES FURNISHED UNDER        THIS PLAN OF TREATMENT AND WHILE UNDER MY CARE     (Physician co-signature of this document indicates review and certification of the therapy plan).                       Certification Date From:  01/23/19   Certification Date To:  03/09/19    Referring Provider:  Jef Edwards MD    Initial Assessment        See  Epic Evaluation Start of Care Date: 01/23/19                                                                          Thank you for your referral.     Ariana Judge PT, DPT    [facility]    O: [phone]  E: [email or web address]

## 2019-01-24 NOTE — PROGRESS NOTES
" 01/23/19 1259   General Information   Type of Visit Initial OP Ortho PT Evaluation   Start of Care Date 01/23/19   Referring Physician Jef Edwards MD   Patient/Family Goals Statement Decrease pain in neck, increase ROM, would like to ride motorcycle without pain   Orders Evaluate and Treat   Orders Comment Needs physical therapy for his neck prior to possibly having a radiofrequency ablation if he fails physical therapy.  He should go twice per week for 6 weeks unless he is not tolerating the PT.   Date of Order 01/11/19   Insurance Type Medicare;[employer]   Medical Diagnosis Chronic neck pain   Surgical/Medical history reviewed Yes   Precautions/Limitations no known precautions/limitations   Weight-Bearing Status - LUE full weight-bearing   Weight-Bearing Status - RUE full weight-bearing   Weight-Bearing Status - LLE full weight-bearing   Weight-Bearing Status - RLE full weight-bearing       Present No   Body Part(s)   Body Part(s) Cervical Spine   Presentation and Etiology   Pertinent history of current problem (include personal factors and/or comorbidities that impact the POC) Patient presents today with history of chronic neck pain. PMH: C3-5 nerve block on 1/11/19 & 11/20/18, C5-6 anterior fusion in 2012, L4-5 and L5-S1 fusion \"many years ago\", Thoracotomy d/t lung injury in 2012, Type 1 DM with neuropathy, and gastroparesis with gastric stimulator in place. Patient does not have high hopes for physical therapy due to his level of neck pain as well as his complex medical history and spinal surgeries. Dr. Edwards would like for patient to attempt conservative approach in order to avoid cervical ablation. Patient believes PT may help the tension in his neck muscles.    Impairments A. Pain;D. Decreased ROM;E. Decreased flexibility;F. Decreased strength and endurance;J. Burning;K. Numbness;L. Tingling   Functional Limitations perform activities of daily living;perform desired leisure / " "sports activities   Symptom Location Neck   How/Where did it occur From insidious onset;From Degenerative Joint Disease   Onset date of current episode/exacerbation 07/23/18  (History of cervical and lumbar fusions)   Chronicity Chronic   Pain rating (0-10 point scale) Best (/10);Worst (/10)   Best (/10) 2   Worst (/10) 9-10   Pain quality A. Sharp;B. Dull;C. Aching;D. Burning;E. Shooting;F. Stabbing   Frequency of pain/symptoms C. With activity   Pain/symptoms are: Worse during the day   Pain/symptoms exacerbated by C. Lifting;G. Certain positions   Pain/symptoms eased by F. Certain positions;I. OTC medication(s);K. Other  (Naproxen 2x/day)   Pain eased by comment Percocet (no longer taking this)   Progression of symptoms since onset: Unchanged   Current / Previous Interventions   Diagnostic Tests: X-ray   X-ray Results Results   X-ray results Multiple levels of cervical spine degeneration, fusion of C5-C6 from past sx   Prior Level of Function   Prior Level of Function-Mobility Independent   Prior Level of Function-ADLs Independent   Current Level of Function   Current Community Support Family/friend caregiver  (Lives with ex-wife)   Patient role/employment history E. Unemployed   Living environment House[facility]   Home/community accessibility 1 flight, does not use stairs   Current equipment-Gait/Locomotion None   Current equipment-ADL Shower/tub chair   Fall Risk Screen   Fall screen completed by PT   Have you fallen 2 or more times in the past year? No   Have you fallen and had an injury in the past year? No   Is patient a fall risk? No   Fall screen comments Patient reports he stood up 2 months ago and \"blacked out\" and fell to the floor, no injuries from this fall. Patient reports he does not feel he struggles with balance.    System Outcome Measures   Outcome Measures (NDI)   Cervical Spine   Observation Patient alone eval, in no apparent distress. Patient leans to L side while seated in chair. Exhibits " "very limited neck ROM while talking. Very mild tremor in hands, possible sign of opioid withdrawal as patient has recently been taken off of Percocet.    Integumentary  Healed incisions in anterior neck from past cervical fusion, Abrasions on posterior right hand \"from cooking accident\"    Posture Upright posture, upper traps elevated in seated position, patient prefers to lean to L side while seated due to rib pain.    Cervical Flexion ROM 50% limited   Cervical Extension ROM 50% limited   Cervical Right Side Bending ROM 50% limited   Cervical Left Side Bending ROM 50% limited   Cervical Right Rotation ROM 50% limited   Cervical Left Rotation ROM 50% limited   Thoracic Right Side Bending ROM WNL   Thoracic Left Sidebending ROM  25% limited   Thoracic Right Rotation 25% limited    Thoracic Left Rotation WNL   Shoulder AROM Screen Patient unable to perform AROM bilateral shoulder flexion >90 deg without increasing neck pain to 9/10. This motion brought patient to tears.    Cervical/Thoracic/Shoulder ROM Comments Unable to perform shoulder flexion past 90 degrees without 8-9/10 shooting pain into neck   Shoulder Shrug (C2-C4) Strength 5/5   Shoulder Abd (C5) Strength 4+/5   Shoulder ER (C5, C6) Strength 5/5   Shoulder IR (C5, C6) Strength 5/5   Elbow Flexion (C5, C6) Strength 5/5   Elbow Extension (C7) Strength 5/5   Wrist Extension (C6) Strength 5/5   Wrist Flexion (C7) Strength 5/5   Shoulder/Wrist/Hand Strength Comments  dynamometer: R 28-26-26, L 16-22-18   Upper Trapezius Flexibility Limited   Levator Scapula Flexibility Limited   Palpation TTP at upper traps, erector spinae, all cervical spinous processes   Planned Therapy Interventions   Planned Therapy Interventions joint mobilization;manual therapy;neuromuscular re-education;ROM;strengthening;stretching   Planned Therapy Interventions Comment Other interventions as indicated   Planned Modality Interventions   Planned Modality Interventions Cryotherapy "   Planned Modality Interventions Comments Other modalities as indicated   Clinical Impression   Criteria for Skilled Therapeutic Interventions Met yes, treatment indicated   PT Diagnosis Neck pain d/t limited ROM and flexibility   Influenced by the following impairments ROM, strength, pain, flexibility, posture   Functional limitations due to impairments ADLs, driving, sleeping, hobbies, lifting   Clinical Presentation Evolving/Changing   Clinical Presentation Rationale Patient is 53 y.o. male with history of chronic neck pain. Patient exhibits deficits in cervical ROM in all planes of motion, cervical flexibility, shoulder and thoracic spine ROM, and UE and  strength. In addition, patient demonstrates tenderness and palpable tension along bilateral cervical and shoulder musculature. Patient will benefit from skilled physical therapy intervention to address aforementioned deficits and limitations.    Clinical Decision Making (Complexity) Moderate complexity   Therapy Frequency 2 times/Week   Predicted Duration of Therapy Intervention (days/wks) 6 weeks   Risk & Benefits of therapy have been explained Yes   Patient, Family & other staff in agreement with plan of care Yes   Education Assessment   Preferred Learning Style Listening;Reading   Barriers to Learning No barriers   ORTHO GOALS   PT Ortho Eval Goals 1;2;3   Ortho Goal 1   Goal Identifier 1   Goal Description Patient will improve NDI score by at least 15 points in order to demonstrate functional improvements   Target Date 02/20/19   Ortho Goal 2   Goal Identifier 2   Goal Description Patient will exhibit increase in cervical AROM by at least 25% in order to improve tolerance to activities such as driving and reading.    Target Date 03/06/19   Ortho Goal 3   Goal Identifier 3   Goal Description Patient will be able to sleep 6-7 hours per night without an increase in neck pain above 2/10 upon awakening.   Target Date 03/06/19   Total Evaluation Time   PT  Jayde Moderate Complexity Minutes (33770) 45   Therapy Certification   Certification date from 01/23/19   Certification date to 03/09/19   Medical Diagnosis Chronic neck pain

## 2019-01-28 ENCOUNTER — OFFICE VISIT (OUTPATIENT)
Dept: FAMILY MEDICINE | Facility: CLINIC | Age: 54
End: 2019-01-28
Payer: COMMERCIAL

## 2019-01-28 VITALS
OXYGEN SATURATION: 99 % | WEIGHT: 154.6 LBS | RESPIRATION RATE: 16 BRPM | SYSTOLIC BLOOD PRESSURE: 122 MMHG | DIASTOLIC BLOOD PRESSURE: 60 MMHG | HEART RATE: 106 BPM | BODY MASS INDEX: 23.43 KG/M2 | HEIGHT: 68 IN | TEMPERATURE: 98.9 F

## 2019-01-28 DIAGNOSIS — E55.9 VITAMIN D DEFICIENCY: ICD-10-CM

## 2019-01-28 DIAGNOSIS — G89.4 CHRONIC PAIN SYNDROME: Primary | ICD-10-CM

## 2019-01-28 DIAGNOSIS — M48.02 CERVICAL STENOSIS OF SPINE: ICD-10-CM

## 2019-01-28 DIAGNOSIS — E03.9 HYPOTHYROIDISM, UNSPECIFIED TYPE: ICD-10-CM

## 2019-01-28 DIAGNOSIS — F32.0 MILD MAJOR DEPRESSION (H): ICD-10-CM

## 2019-01-28 PROCEDURE — 99214 OFFICE O/P EST MOD 30 MIN: CPT | Performed by: FAMILY MEDICINE

## 2019-01-28 RX ORDER — BUPROPION HYDROCHLORIDE 150 MG/1
150 TABLET ORAL EVERY MORNING
Qty: 90 TABLET | Refills: 3 | Status: SHIPPED | OUTPATIENT
Start: 2019-01-28 | End: 2019-12-11

## 2019-01-28 RX ORDER — OXYCODONE HYDROCHLORIDE 5 MG/1
5 TABLET ORAL 2 TIMES DAILY PRN
Qty: 60 TABLET | Refills: 0 | Status: SHIPPED | OUTPATIENT
Start: 2019-01-28 | End: 2019-04-02

## 2019-01-28 ASSESSMENT — ANXIETY QUESTIONNAIRES
7. FEELING AFRAID AS IF SOMETHING AWFUL MIGHT HAPPEN: NOT AT ALL
1. FEELING NERVOUS, ANXIOUS, OR ON EDGE: SEVERAL DAYS
6. BECOMING EASILY ANNOYED OR IRRITABLE: NOT AT ALL
2. NOT BEING ABLE TO STOP OR CONTROL WORRYING: SEVERAL DAYS
GAD7 TOTAL SCORE: 6
5. BEING SO RESTLESS THAT IT IS HARD TO SIT STILL: NOT AT ALL
3. WORRYING TOO MUCH ABOUT DIFFERENT THINGS: SEVERAL DAYS
IF YOU CHECKED OFF ANY PROBLEMS ON THIS QUESTIONNAIRE, HOW DIFFICULT HAVE THESE PROBLEMS MADE IT FOR YOU TO DO YOUR WORK, TAKE CARE OF THINGS AT HOME, OR GET ALONG WITH OTHER PEOPLE: EXTREMELY DIFFICULT

## 2019-01-28 ASSESSMENT — PATIENT HEALTH QUESTIONNAIRE - PHQ9
SUM OF ALL RESPONSES TO PHQ QUESTIONS 1-9: 18
5. POOR APPETITE OR OVEREATING: NEARLY EVERY DAY

## 2019-01-28 ASSESSMENT — MIFFLIN-ST. JEOR: SCORE: 1525.76

## 2019-01-28 ASSESSMENT — PAIN SCALES - GENERAL: PAINLEVEL: SEVERE PAIN (6)

## 2019-01-28 NOTE — NURSING NOTE
Chief Complaint   Patient presents with     Neck Pain     recheck     Health Maintenance Due   Topic Date Due     URINE DRUG SCREEN Q1 YR  09/07/1981     ZOSTER IMMUNIZATION (1 of 2) 09/07/2016     Ty Watson CMA'

## 2019-01-28 NOTE — PROGRESS NOTES
SUBJECTIVE:   Stone De Paz is a 53 year old male who presents to clinic today for the following health issues:    Chronic Pain Follow-Up       Type / Location of Pain: neck   Analgesia/pain control:       Recent changes:  worse      Overall control: Inadequate pain control  Activity level/function:      Daily activities:  Unable to perform most daily activities - chores, hobbies, social activities, driving    Work:  Unable to work  Adverse effects:  No  Adherance    Taking medication as directed?  Yes    Participating in other treatments: yes  Risk Factors:    Sleep:  Poor    Mood/anxiety:  worsened    Recent family or social stressors:  none noted    Other aggravating factors: started physical therapy  PHQ-9 SCORE 7/9/2018 9/21/2018 1/28/2019   PHQ-9 Total Score - - -   PHQ-9 Total Score 9 8 18     FRANCISCO-7 SCORE 7/9/2018 9/21/2018 1/28/2019   Total Score 1 4 6     Encounter-Level CSA - 11/27/2013:    Controlled Substance Agreement - Scan on 10/7/2014  2:00 PM: CONTROLLED MEDICATION AGREEMENT-11/27/13 (below)       Patient-Level CSA:    There are no patient-level csa.         Amount of exercise or physical activity: None    Problems taking medications regularly: No    Medication side effects: none    Diet: carbohydrate counting and breakfast skipped      Stone is here today for couple concerns.  He is known to have chronic pain secondary spine cervical spinal stenosis.  Used to be on pain medication chronically.  Been off the medication and been taking the Percocet rarely only as needed for severe pain.  Used to be on medical marijuana which he also stopped taking in the last several years.The last Percocet prescription was 3 months ago with 60 tablets that lasted until couple days ago.  Since his last clinic visit, he is had a diagnostic ablation which he responded well for about three hours which was expected.  He was recommended physical therapy before surgery could be consider due to insurance requirement.   Started physical therapy yesterday and since then the pain has unbearable.  He run out of the pain medication and needs its refill today.  He is hoping to get the surgery in the near future.  The pain affected his life tremendously.  The same kind of pain that he has had.  Pain started on the neck, radiates down to his arms bilaterally.  Tried gabapentin in the past but did not like the side effect.  Refused to try the gabapentin again.  Was prescribed Cymbalta at for diabetic neuropathy but could not afford for the co-pay.  He takes Celexa for his depression which has helped.  Been having more anxiety and depression due to the pain. Denied of suicidal/homicidal ideation although he at times was wondering if it would be better for him to be off than suffering the pain.  Stated that he has no intentions nor has any plan to hurt himself or other people.  No history of suicidal attempt and denied of active suicidal/homicidal ideation.  No hallucination.  The pain keeps him up at night and it makes him feel very depressed.    He is also is known to have diabetes mellitus and is currently on insulin pump.  Has not seen his endocrinologist for a while.  Been monitoring his blood pressure sugar and it has been doing pretty good.  Also has not seen the diabetic educator for awhile.  No acute change his vision.  He is known to have bad diabetic neuropathy but does not want to be on gabapentin or Lyrica due to their side effect.  Could not afford the Cymbalta as mentioned above.  No appointment with the endocrinologist was set up yet.    Is also known to have is has hypothyroidism.  Take medication as prescribed with no side effect.  The last thyroid check was more than 3 months ago.  Medication review was readjusted at that time.  No heart palpitation.  No significant weight change.  No diarrhea constipation or excessive hair loss.  No other concerns today.        Problem list and histories reviewed & adjusted, as  indicated.  Additional history: as documented    Current Outpatient Medications   Medication Sig Dispense Refill     amylase-lipase-protease (CREON 24) 87034-76027 units CPEP per EC capsule Take 1 capsule by mouth every other day       amylase-lipase-protease (PERTZYE) 59786 units CPEP Take 1 capsule by mouth every other day       budesonide (ENTOCORT EC) 3 MG EC capsule Take 3 mg by mouth every morning Take 1 or 2 capsules       buPROPion (WELLBUTRIN XL) 150 MG 24 hr tablet Take 1 tablet (150 mg) by mouth every morning 90 tablet 3     citalopram (CELEXA) 40 MG tablet Take 40 mg by mouth daily       CONTOUR NEXT TEST test strip Use to test blood sugar 5x times daily or as directed. 450 strip 3     cyclobenzaprine (FLEXERIL) 10 MG tablet Take 1 tablet (10 mg) by mouth 2 times daily as needed for muscle spasms 90 tablet 1     insulin aspart (NOVOLOG VIAL) 100 UNITS/ML injection Uses with insulin pump, total daily dose approx 45 units, E10.9 20 mL 11     levothyroxine (SYNTHROID/LEVOTHROID) 150 MCG tablet Take 1 tablet (150 mcg) by mouth daily Please note the dose has increased. (Patient taking differently: Take 137 mcg by mouth daily Please note the dose has increased. - taking 137 mcg) 30 tablet 0     lisinopril (PRINIVIL,ZESTRIL) 20 MG tablet Take 40 mg by mouth daily        naproxen (NAPROSYN) 500 MG tablet Take 1 tablet (500 mg) by mouth 2 times daily as needed for moderate pain 180 tablet 1     omeprazole 20 MG tablet Take 1 tablet by mouth daily       ondansetron (ZOFRAN-ODT) 8 MG disintegrating tablet Take 1 tablet by mouth every 8 hours as needed       oxyCODONE (ROXICODONE) 5 MG tablet Take 1 tablet (5 mg) by mouth 2 times daily as needed for pain Should last for a month, tapering down 60 tablet 0     ranitidine (ZANTAC) 150 MG tablet Take 1 tablet (150 mg) by mouth 2 times daily 180 tablet 3     simvastatin (ZOCOR) 40 MG tablet Take 1 tablet (40 mg) by mouth At Bedtime 90 tablet 1     SUMATRIPTAN SUCCINATE  "PO Take 100 mg by mouth 1/2 to 1 tablet by mouth at onset of headache. May repeat 1 dose after 2 hours if headache persists.       albuterol (PROAIR HFA/PROVENTIL HFA/VENTOLIN HFA) 108 (90 Base) MCG/ACT inhaler Inhale 2 puffs into the lungs every 4 hours as needed for shortness of breath / dyspnea (Patient not taking: Reported on 1/28/2019) 1 Inhaler 11     No Known Allergies    Reviewed and updated as needed this visit by clinical staff  Tobacco  Allergies  Meds  Soc Hx      Reviewed and updated as needed this visit by Provider         ROS:  Constitutional, HEENT, cardiovascular, pulmonary, gi and gu systems are negative, except as otherwise noted.    OBJECTIVE:     /60 (BP Location: Right arm, Patient Position: Sitting, Cuff Size: Adult Regular)   Pulse 106   Temp 98.9  F (37.2  C) (Temporal)   Resp 16   Ht 1.727 m (5' 8\")   Wt 70.1 kg (154 lb 9.6 oz)   SpO2 99%   BMI 23.51 kg/m    Body mass index is 23.51 kg/m .  GENERAL: healthy, alert and no distress.  Patient was tearful as he was talking about his pain and how it affects his life.  He was not in an acute respiratory distress.  He answers questions appropriate.  EYES: Eyes grossly normal to inspection, PERRL and conjunctivae and sclerae normal  HENT: ear canals and TM's normal.  Nares are non-congested. Oropharynx is pink and moist. No tender with palpation to the sinuses.  NECK: no adenopathy, supple, no lymphadenopathy or thyromegaly.  Tender with palpation of cervical spine and its paraspinous muscle.  RESP: lungs clear to auscultation - no rales, rhonchi or wheezes  CV: regular rate and rhythm, no murmur,  ABDOMEN: soft, nontender, nondistended, no palpable masses or organomegaly with normal bowel sounds.  MS: no gross musculoskeletal defects noted, no edema.  Both hands equally in strength.  Shoulder exam was normal.  Tender with palpation to thoracic spine as well.  No focal weakness.  NEURO: Normal strength and tone, mentation intact and " speech normal.  Cranial nerves II through XII intact.  DTRs +2 throughout.  No focal neurological deficit.  PSYCH: mentation appears normal, affect normal/bright.  Occasional suicidal ideation due to pain but no hallucination or homicidal ideation.  No active suicidal/homicidal ideation.  No plan or intention to hurt himself or others.  No history of suicidal attempt.    Diagnostic Test Results:  No results found for this or any previous visit (from the past 24 hour(s)).    ASSESSMENT/PLAN:       ICD-10-CM    1. Chronic pain syndrome G89.4 oxyCODONE (ROXICODONE) 5 MG tablet   2. Cervical stenosis of spine M48.02 oxyCODONE (ROXICODONE) 5 MG tablet   3. Mild major depression (H) F32.0 buPROPion (WELLBUTRIN XL) 150 MG 24 hr tablet   4. Hypothyroidism, unspecified type E03.9 TSH   5. Diabetes mellitus with neurological manifestations, uncontrolled (H) E11.49 Hemoglobin A1c    E11.65    6. Vitamin D deficiency E55.9 Vitamin D Deficiency     Is known to have chronic pain for years with spinal stenosis.  He used to be on pain med and medical marijuana chronically.  Has been off these medications for several months - tried to live with the pain. Unable to the pain specialist due to the travel distance.  He had a nerve ablation for diagnostic and which was positive.  The neurosurgeon will not consider surgery unless failing physical therapy.  He started physical therapy couple days ago and the pain has gotten significantly worse.  He been taking the Flexeril at night for muscle spasm.  Did not tolerated gabapentin or Lyrica and unable to get Cymbalta.  He is not really interested in long-term pain meds.  Physical exam today showed no focal neurological deficit.  Unchanged in the pain's nature and its distribution.  Will continue with physical therapy, normal activity as tolerated.  Continue with ibuprofen as needed.  The Percocet refilled today - no sign of misuse identified.  The last refill with 60 tablets about 3 months  ago. Denied of drug use.  Refill the Percocet today.  Also encouraged him to work with the neurosurgeon closely    Also is known to have depression and it is not controlled due to uncontrolled pain.  He is taking Celexa which has helped.  Could not afford the Cymbalta as mentioned above.  Been having on and off suicidal thoughts but never has any intentions or plan to hurt himself or others.  No hallucination.  No history of suicide attempt.  His PHQ 9 score of 18 and FRANCISCO score of 5 today.  Discussed with him about the nature of the conditions.  He again reassured me that he is not suicidal or homicidal today.  Does not want counseling.  Continue with the Celexa.  Adding the Wellbutrin; side effect discussed.  No history of seizure disorder.    Diabetes wise, I strongly encouraged him to follow with his endocrinologist and the diabetic educator as per irma recommendation.  No change his insulin regimen.  He is on insulin pump.  Check a hemoglobin A1c today and will go from there.    He feels comfortable with the plan and all his questions were answered.        Ilan Chew Mai, MD  [facility]

## 2019-01-29 ENCOUNTER — MEDICAL CORRESPONDENCE (OUTPATIENT)
Dept: HEALTH INFORMATION MANAGEMENT | Facility: CLINIC | Age: 54
End: 2019-01-29

## 2019-01-29 ENCOUNTER — HOSPITAL ENCOUNTER (OUTPATIENT)
Dept: PHYSICAL THERAPY | Facility: CLINIC | Age: 54
Setting detail: THERAPIES SERIES
End: 2019-01-29
Attending: ANESTHESIOLOGY
Payer: MEDICARE

## 2019-01-29 PROCEDURE — 97110 THERAPEUTIC EXERCISES: CPT | Mod: GP

## 2019-01-29 PROCEDURE — 97140 MANUAL THERAPY 1/> REGIONS: CPT | Mod: GP

## 2019-01-29 ASSESSMENT — ANXIETY QUESTIONNAIRES: GAD7 TOTAL SCORE: 6

## 2019-02-05 ENCOUNTER — HOSPITAL ENCOUNTER (OUTPATIENT)
Dept: PHYSICAL THERAPY | Facility: CLINIC | Age: 54
Setting detail: THERAPIES SERIES
End: 2019-02-05
Attending: ANESTHESIOLOGY
Payer: MEDICARE

## 2019-02-05 PROCEDURE — 97110 THERAPEUTIC EXERCISES: CPT | Mod: GP

## 2019-02-05 PROCEDURE — 97140 MANUAL THERAPY 1/> REGIONS: CPT | Mod: GP

## 2019-02-15 ENCOUNTER — OFFICE VISIT (OUTPATIENT)
Dept: ENDOCRINOLOGY | Facility: CLINIC | Age: 54
End: 2019-02-15
Payer: COMMERCIAL

## 2019-02-15 VITALS
WEIGHT: 149.4 LBS | HEIGHT: 68 IN | HEART RATE: 91 BPM | DIASTOLIC BLOOD PRESSURE: 75 MMHG | SYSTOLIC BLOOD PRESSURE: 115 MMHG | BODY MASS INDEX: 22.64 KG/M2

## 2019-02-15 DIAGNOSIS — K90.9 INTESTINAL MALABSORPTION, UNSPECIFIED TYPE: ICD-10-CM

## 2019-02-15 DIAGNOSIS — E03.9 HYPOTHYROIDISM, UNSPECIFIED TYPE: ICD-10-CM

## 2019-02-15 DIAGNOSIS — M54.9 ARTHRALGIA OF BACK: ICD-10-CM

## 2019-02-15 DIAGNOSIS — E10.43 TYPE 1 DIABETES MELLITUS WITH DIABETIC AUTONOMIC NEUROPATHY (H): Primary | ICD-10-CM

## 2019-02-15 LAB
ANION GAP SERPL CALCULATED.3IONS-SCNC: 6 MMOL/L (ref 3–14)
BUN SERPL-MCNC: 15 MG/DL (ref 7–30)
CALCIUM SERPL-MCNC: 9.1 MG/DL (ref 8.5–10.1)
CHLORIDE SERPL-SCNC: 98 MMOL/L (ref 94–109)
CO2 SERPL-SCNC: 27 MMOL/L (ref 20–32)
CREAT SERPL-MCNC: 0.88 MG/DL (ref 0.66–1.25)
GFR SERPL CREATININE-BSD FRML MDRD: >90 ML/MIN/{1.73_M2}
GLUCOSE SERPL-MCNC: 220 MG/DL (ref 70–99)
HBA1C MFR BLD: 8.6 % (ref 0–5.6)
POTASSIUM SERPL-SCNC: 4.5 MMOL/L (ref 3.4–5.3)
SODIUM SERPL-SCNC: 131 MMOL/L (ref 133–144)
T4 FREE SERPL-MCNC: 1.63 NG/DL (ref 0.76–1.46)
TSH SERPL DL<=0.005 MIU/L-ACNC: 0.12 MU/L (ref 0.4–4)

## 2019-02-15 PROCEDURE — 83036 HEMOGLOBIN GLYCOSYLATED A1C: CPT | Performed by: INTERNAL MEDICINE

## 2019-02-15 PROCEDURE — 84439 ASSAY OF FREE THYROXINE: CPT | Performed by: INTERNAL MEDICINE

## 2019-02-15 PROCEDURE — 99214 OFFICE O/P EST MOD 30 MIN: CPT | Performed by: INTERNAL MEDICINE

## 2019-02-15 PROCEDURE — 84443 ASSAY THYROID STIM HORMONE: CPT | Performed by: INTERNAL MEDICINE

## 2019-02-15 PROCEDURE — 80048 BASIC METABOLIC PNL TOTAL CA: CPT | Performed by: INTERNAL MEDICINE

## 2019-02-15 PROCEDURE — 36415 COLL VENOUS BLD VENIPUNCTURE: CPT | Performed by: INTERNAL MEDICINE

## 2019-02-15 PROCEDURE — 82306 VITAMIN D 25 HYDROXY: CPT | Performed by: INTERNAL MEDICINE

## 2019-02-15 PROCEDURE — 95251 CONT GLUC MNTR ANALYSIS I&R: CPT | Performed by: INTERNAL MEDICINE

## 2019-02-15 ASSESSMENT — MIFFLIN-ST. JEOR: SCORE: 1502.17

## 2019-02-15 NOTE — PROGRESS NOTES
"Name: Stone De Paz      HPI:  Recent issues:  Here for f/u diabetes and thyroid evaluation  Having more digestive symptoms recent months, wonders if gastric pacer battery dead  Wearing Dexcom sensor, likes it        1995. Diagnosis of diabetes mellitus after 2nd back surgery, age 29  Initial treatment with insulin injections using NPH and Regular insulin  Switched to premixed 70/30 insulin BID dosing  1999. Started Medtronic insulin pump model 508, used base-dose Humalog insulin at meals  Had seen Dr. Iggy Briggs/[facility]    7/24/01. Initial evaluation with me at my former clinic ([facility])  Upgraded pump to Medtronic 523   Now using Medtronic 630G pump   Novolog in pump    Meals BID, Uses carb \"exchanges\" with his pump settings  Chronic high BG and hgbA1c trends despite dose changes  Tried square wave bolus  Has Contour Link? BG meter, variable testing pattern... Recently testing 4x/day for at least 30 days  Tried Medtronic CGMS in the past, didn't like it  Current pump settings:      Recent pump Carelink data:          Recent Dexcom CGMS data:    Previous  labs include:  Lab Results   Component Value Date    A1C 8.6 (H) 02/15/2019     07/09/2018    POTASSIUM 4.1 07/09/2018    CHLORIDE 97 07/09/2018    CO2 30 07/09/2018    ANIONGAP 8 07/09/2018     (H) 07/09/2018    BUN 8 07/09/2018    CR 0.92 07/09/2018    GFRESTIMATED 86 07/09/2018    GFRESTBLACK >90 07/09/2018    EMILEE 8.9 07/09/2018    CHOL 180 07/09/2018    TRIG 72 07/09/2018    HDL 67 07/09/2018    LDL 99 07/09/2018    NHDL 113 07/09/2018    UCRR 17 07/09/2018    MICROL <5 07/09/2018    UMALCR Unable to calculate due to low value 07/09/2018     Last eye exam date?  Goes to eye clinic in [facility]  7/2018. Met with Destiny Warren RD, FRANCAE at [facility]  DM Complications:   Neuropathy    Peripheral neuropathy     Decreased sensation at feet    Autonomic neuropathy- gastroparesis     Symptoms of nausea, bloating, episodes of " diarrhea and emesis     Has Medtronic gastric stimulator     Sees physicians and Ms Olga Ramirez, PA/[facility]       Thyroid:  1994. History of hypothyroidism  Chronic levothyroxine treatment, has used Levoxyl in the past  Recent labs include:  Lab Results   Component Value Date    TSH 0.15 (L) 09/21/2018    TSH 38.88 (H) 07/09/2018    TSH 2.28 05/08/2018    TSH 1.33 12/30/2013    TSH 0.26 10/30/2013    T4 1.44 09/21/2018    T4 0.87 07/09/2018    T4 1.28 05/08/2018     Recent [facility] labs include:  Lab Results   Component Value Date    TSH 0.15 (L) 09/21/2018    T4 1.44 09/21/2018     Supplements:  Takes vitamin D 1000 U daily  7/2018. Had levothyroxine dose increase   Current dose:  Levothyroxine 0.15 mg po QAFREDO      Lives in [place], [place] with Ada, also (1) dog Gayle- priest retriever/lab mix  Sees Dr. Ilan Chew Mai/[facility] for general medicine evaluations  Also sees Olga Ramirez PAC/[facility].    PMH/PSH:  Past Medical History:   Diagnosis Date     Chronic neck pain     percocet for years now,      Gastroparesis due to DM (H)     gastric stimulator helps, [facility] manages that     Hyperlipidemia      Hypertension      Hypothyroidism      Neuropathy, diabetic (H)      Type 1 diabetes (H) age 31     Past Surgical History:   Procedure Laterality Date     C LUMBAR SPINE FUSION,ANTER APPRCH      2 L4-5 L5-21     COLONOSCOPY  07/18/12     FUSION CERVICAL ANTERIOR TWO LEVELS       INJECT BLOCK MEDIAL BRANCH CERVICAL/THORACIC/LUMBAR Bilateral 11/20/2018    Procedure: Bilateral Cervical 3-4-5 medial branch block;  Surgeon: Jef Edwards MD;  Location: [facility] OR     INJECT BLOCK MEDIAL BRANCH CERVICAL/THORACIC/LUMBAR N/A 1/11/2019    Procedure: Cervical 3,4,5 Bilateral Medial branch blocks;  Surgeon: Jef Edwards MD;  Location: [facility] OR     THORACOSCOPIC DECORTICATION LUNG  1/9/2014    Procedure: THORACOSCOPIC DECORTICATION LUNG;  RIGHT VATS/RIGHT THORACOTOMY , DECORTICATION RIGHT LUNG ,WEDGE RESECTION RIGHT MIDDLE  LOBE LUNG NODULE;  Surgeon: Harley Felix MD;  Location: [facility] OR       Family Hx:  No family history on file.      Social Hx:  Social History     Socioeconomic History     Marital status:      Spouse name: Not on file     Number of children: Not on file     Years of education: Not on file     Highest education level: Not on file   Social Needs     Financial resource strain: Not on file     Food insecurity - worry: Not on file     Food insecurity - inability: Not on file     Transportation needs - medical: Not on file     Transportation needs - non-medical: Not on file   Occupational History     Not on file   Tobacco Use     Smoking status: Former Smoker     Last attempt to quit: 2010     Years since quittin.1     Smokeless tobacco: Never Used   Substance and Sexual Activity     Alcohol use: No     Drug use: No     Sexual activity: No   Other Topics Concern     Not on file   Social History Narrative     Not on file          MEDICATIONS:  has a current medication list which includes the following prescription(s): albuterol, amylase-lipase-protease, amylase-lipase-protease, budesonide, bupropion, citalopram, contour next test, cyclobenzaprine, insulin aspart, levothyroxine, lisinopril, naproxen, omeprazole, ondansetron, oxycodone, ranitidine, simvastatin, and sumatriptan succinate.    ROS:     ROS: 10 point ROS neg other than the symptoms noted above in the HPI.    GENERAL: some fatigue, wt stable; denies fevers, chills, night sweats.   HEENT: no dysphagia, odonophagia, diplopia, neck pain  THYROID:  no apparent hyper or hypothyroid symptoms  CV: occasional chest pains; no pressure, palpitations  LUNGS: no SOB, KRAFT, cough, wheezing   ABDOMEN: some bloating and postmeal nausea; no diarrhea, constipation  EXTREMITIES: no rashes, ulcers, edema  NEUROLOGY: no headaches, denies changes in vision, tingling, extremitiy numbness   MSK: neck stiffness and back pains; no muscle weakness  SKIN: no rashes  "or lesions  PSYCH:  stable mood, no significant anxiety or depression  ENDOCRINE: no heat or cold intolerance    Physical Exam   VS: /75   Pulse 91   Ht 1.727 m (5' 8\")   Wt 67.8 kg (149 lb 6.4 oz)   BMI 22.72 kg/m    GENERAL: AXOX3, NAD, slender, well dressed, answering questions appropriately, appears stated age.  THYROID:  normal gland, no apparent nodules or goiter  HEENT: neck stiff/ reduced neck ROM; neck non-tender, no exopthalmous, EOMI  ABDOMEN: soft, nontender, nondistended, LLQ abdomen mild bulge from pacer placement  NEUROLOGY: CN grossly intact, no tremors  MSK: grossly intact  SKIN: no rashes, no lesions    LABS:    All pertinent notes, labs, and images personally reviewed by me.     A/P:  Encounter Diagnoses   Name Primary?     Type 1 diabetes mellitus with diabetic autonomic neuropathy (H) Yes     Hypothyroidism, unspecified type      Arthralgia of back      Intestinal malabsorption, unspecified type        Comments:  Reviewed complicated health history and diabetes issues.    Plan:  Reviewed the overall T1DM management and insulin pump use.  Discussed optimal BG testing to assess glucose trends.  We reviewed insulin pump settings, basal rate and bolus dosing  Use of automated pump bolus dosing for meal/snack carb & correction dosing  Reviewed the Medtronic Carelink report data today    Recommend:  Continue Medtronic insulin pump and diabetes management plan.  Pump setting changes:     Basals:  [place] 0.9 to 0.85 unit(s)/hr   Bolus:  I:C 11a 1.23 U/EX to 1.3 U/EX  Use lower Temp Basal rate for increased activity/exercise  Continue use of the DexcomG5 CGMS system, very helpful   Discussed Dexcom glucose alarm settings, benefits   Reviewed the Dexcom and Clarity phone apps for monitoring glucose levels  Would benefit from additional CDE evaluation at [facility]   Encourage him to bring list of food items that are challenging for carb estimating    (he uses \"exchanges\" and pump programed " for Units per Exchange)   Review Dexcom CGMS use  Patient makes frequent adjustments to insulin treatment regimen on basis of therapeutic glucose testing  Continue simvastatin 40 mg each evening  Continue current levothyroxine 0.15 mg daily dose plan  Arrange annual dilated eye exam, fasting lipid panel testing.  We reviewed importance of timely clinic appointments with me Q3 months, to satisfy Medicare with his diabetes device coverage    Encouraged him to make appt with [facility] to have gastric pacer interrogated, consider replacement of device  Addressed patient's questions today.    Labs ordered today:   Orders Placed This Encounter   Procedures     GLUCOSE MONITOR, 72 HOUR, PHYS INTERP     Hemoglobin A1c     TSH     T4 free     Basic metabolic panel     25- OH-Vitamin D     Radiology/Consults ordered today: None    More than 50% of the time spent with Mr. De Paz on counseling / coordinating his care.  Total appointment time was 35 minutes.    Follow-up:  3 mo.    Frankie Best MD  Endocrinology  [facility]  CC:  Mandi SAPP

## 2019-02-17 DIAGNOSIS — E03.9 ACQUIRED HYPOTHYROIDISM: ICD-10-CM

## 2019-02-17 LAB — DEPRECATED CALCIDIOL+CALCIFEROL SERPL-MC: 40 UG/L (ref 20–75)

## 2019-02-17 RX ORDER — LEVOTHYROXINE SODIUM 137 UG/1
TABLET ORAL
Qty: 90 TABLET | Refills: 3 | Status: SHIPPED | OUTPATIENT
Start: 2019-02-17 | End: 2019-02-25

## 2019-02-20 ENCOUNTER — TELEPHONE (OUTPATIENT)
Dept: NEUROSURGERY | Facility: CLINIC | Age: 54
End: 2019-02-20

## 2019-02-20 NOTE — TELEPHONE ENCOUNTER
Ex wife Ada returned call she states that patient is looking to schedule the RFA that Gurvinder SEYMOUR recommended. Patient has complete 2 MBB prior and has done PT, which has exacerbated his symptoms. Patient would like to proceed with RFA with Dr. Edwards, provided number for patient to schedule.

## 2019-02-20 NOTE — TELEPHONE ENCOUNTER
Reason for Call:  Other appointment    Detailed comments: Patient hasn't had any relief with the injections or therapy. Wondering if he could see Dr. Noble for a consultation to discuss surgery?    Phone Number Patient can be reached at: Home number on file [phone] (home)     Best Time: any    Can we leave a detailed message on this number? YES    Call taken on 2/20/2019 at 9:10 AM by Cecilia Dorsey

## 2019-02-25 ENCOUNTER — OFFICE VISIT (OUTPATIENT)
Dept: FAMILY MEDICINE | Facility: CLINIC | Age: 54
End: 2019-02-25
Payer: COMMERCIAL

## 2019-02-25 VITALS
RESPIRATION RATE: 16 BRPM | SYSTOLIC BLOOD PRESSURE: 134 MMHG | HEART RATE: 98 BPM | DIASTOLIC BLOOD PRESSURE: 76 MMHG | TEMPERATURE: 97.7 F | BODY MASS INDEX: 23.26 KG/M2 | WEIGHT: 153 LBS | OXYGEN SATURATION: 98 %

## 2019-02-25 VITALS
SYSTOLIC BLOOD PRESSURE: 134 MMHG | DIASTOLIC BLOOD PRESSURE: 76 MMHG | BODY MASS INDEX: 23.26 KG/M2 | RESPIRATION RATE: 16 BRPM | TEMPERATURE: 97.7 F | HEART RATE: 98 BPM | OXYGEN SATURATION: 98 % | WEIGHT: 153 LBS

## 2019-02-25 DIAGNOSIS — G89.29 CHRONIC NECK PAIN: ICD-10-CM

## 2019-02-25 DIAGNOSIS — G89.4 CHRONIC PAIN SYNDROME: ICD-10-CM

## 2019-02-25 DIAGNOSIS — M48.02 CERVICAL STENOSIS OF SPINE: ICD-10-CM

## 2019-02-25 DIAGNOSIS — K86.81 EXOCRINE PANCREATIC INSUFFICIENCY: ICD-10-CM

## 2019-02-25 DIAGNOSIS — E10.43 TYPE 1 DIABETES MELLITUS WITH DIABETIC AUTONOMIC NEUROPATHY (H): ICD-10-CM

## 2019-02-25 DIAGNOSIS — F32.0 MILD MAJOR DEPRESSION (H): Primary | ICD-10-CM

## 2019-02-25 DIAGNOSIS — E03.9 HYPOTHYROIDISM, UNSPECIFIED TYPE: ICD-10-CM

## 2019-02-25 DIAGNOSIS — M54.2 CHRONIC NECK PAIN: ICD-10-CM

## 2019-02-25 DIAGNOSIS — K21.9 GASTROESOPHAGEAL REFLUX DISEASE WITHOUT ESOPHAGITIS: ICD-10-CM

## 2019-02-25 DIAGNOSIS — Z01.818 PREOP GENERAL PHYSICAL EXAM: Primary | ICD-10-CM

## 2019-02-25 DIAGNOSIS — E11.43 GASTROPARESIS DUE TO DM (H): ICD-10-CM

## 2019-02-25 DIAGNOSIS — K31.84 GASTROPARESIS DUE TO DM (H): ICD-10-CM

## 2019-02-25 LAB
ANION GAP SERPL CALCULATED.3IONS-SCNC: 7 MMOL/L (ref 3–14)
BUN SERPL-MCNC: 10 MG/DL (ref 7–30)
CALCIUM SERPL-MCNC: 9.2 MG/DL (ref 8.5–10.1)
CHLORIDE SERPL-SCNC: 100 MMOL/L (ref 94–109)
CO2 SERPL-SCNC: 29 MMOL/L (ref 20–32)
CREAT SERPL-MCNC: 0.8 MG/DL (ref 0.66–1.25)
GFR SERPL CREATININE-BSD FRML MDRD: >90 ML/MIN/{1.73_M2}
GLUCOSE SERPL-MCNC: 143 MG/DL (ref 70–99)
POTASSIUM SERPL-SCNC: 4.8 MMOL/L (ref 3.4–5.3)
SODIUM SERPL-SCNC: 136 MMOL/L (ref 133–144)

## 2019-02-25 PROCEDURE — 80048 BASIC METABOLIC PNL TOTAL CA: CPT | Performed by: FAMILY MEDICINE

## 2019-02-25 PROCEDURE — 36415 COLL VENOUS BLD VENIPUNCTURE: CPT | Performed by: FAMILY MEDICINE

## 2019-02-25 PROCEDURE — 99215 OFFICE O/P EST HI 40 MIN: CPT | Performed by: FAMILY MEDICINE

## 2019-02-25 RX ORDER — LEVOTHYROXINE SODIUM 125 UG/1
TABLET ORAL
Qty: 90 TABLET | Refills: 0 | Status: SHIPPED | OUTPATIENT
Start: 2019-02-25 | End: 2019-05-27

## 2019-02-25 ASSESSMENT — ANXIETY QUESTIONNAIRES
IF YOU CHECKED OFF ANY PROBLEMS ON THIS QUESTIONNAIRE, HOW DIFFICULT HAVE THESE PROBLEMS MADE IT FOR YOU TO DO YOUR WORK, TAKE CARE OF THINGS AT HOME, OR GET ALONG WITH OTHER PEOPLE: NOT DIFFICULT AT ALL
6. BECOMING EASILY ANNOYED OR IRRITABLE: NOT AT ALL
GAD7 TOTAL SCORE: 1
1. FEELING NERVOUS, ANXIOUS, OR ON EDGE: NOT AT ALL
5. BEING SO RESTLESS THAT IT IS HARD TO SIT STILL: NOT AT ALL
3. WORRYING TOO MUCH ABOUT DIFFERENT THINGS: NOT AT ALL
2. NOT BEING ABLE TO STOP OR CONTROL WORRYING: NOT AT ALL
7. FEELING AFRAID AS IF SOMETHING AWFUL MIGHT HAPPEN: NOT AT ALL

## 2019-02-25 ASSESSMENT — PATIENT HEALTH QUESTIONNAIRE - PHQ9
SUM OF ALL RESPONSES TO PHQ QUESTIONS 1-9: 10
5. POOR APPETITE OR OVEREATING: SEVERAL DAYS

## 2019-02-25 ASSESSMENT — PAIN SCALES - GENERAL
PAINLEVEL: SEVERE PAIN (6)
PAINLEVEL: MODERATE PAIN (4)

## 2019-02-25 NOTE — NURSING NOTE
Chief Complaint   Patient presents with     Pre-Op Exam     Health Maintenance Due   Topic Date Due     URINE DRUG SCREEN Q1 YR  09/07/1981     ZOSTER IMMUNIZATION (1 of 2) 09/07/2016     Ty Watson, CMA

## 2019-02-25 NOTE — PROGRESS NOTES
SUBJECTIVE:   Stone De Paz is a 53 year old male who presents to clinic today for the following health issues:    Depression and Anxiety Follow-Up    Status since last visit: Improved     Other associated symptoms:None    Complicating factors:     Significant life event: No     Current substance abuse: None    PHQ 9/21/2018 1/28/2019 2/25/2019   PHQ-9 Total Score 8 18 10   Q9: Suicide Ideation Not at all More than half the days Not at all   F/U: Thoughts of suicide or self-harm - Yes -   F/U: Self harm-plan - No -   F/U: Self-harm action - No -   F/U: Safety concerns - No -     FRANCISCO-7 SCORE 9/21/2018 1/28/2019 2/25/2019   Total Score 4 6 1     In the past two weeks have you had thoughts of suicide or self-harm?  No.    Do you have concerns about your personal safety or the safety of others?   No  PHQ-9  English  PHQ-9   Any Language  FRANCISCO-7  Suicide Assessment Five-step Evaluation and Treatment (SAFE-T)    Amount of exercise or physical activity: None    Problems taking medications regularly: No    Medication side effects: none    Diet: regular (no restrictions) and breakfast skipped      Stone is here today for follow-up on depression, diabetes and hypothyroidism.    1.  Depression and anxiety - stated he is feeling much better since last visit.  He has been taking the Celexa and Wellbutrin as prescribed with no side effect.  He feels the addition of the Wellbutrin has helped his depression and anxiety tremendously.  He is not as anxious and he has feeling been feeling more energy.  No more anxiety attacks and is no longer having the suicidal ideation in the last 2-3 weeks which are very unusual for him.  Sleeping better.  Been more active and involved with family functions and activities around the house.  Denied of drugs or alcohol use.  No hallucination.  Overall he is very happy with the Celexa and Wellbutrin.    2.  He is known to have hypothyroidism and has been taking the levothyroxine as 137 mcg daily for  2-3 months.  Recent lab work showed that the THS level was low and her free T4 level is high.  His endocrinologist has not addressed about the result.  He is wondering if the levothyroxine dose needs to be changed.  No heart palpitation or significant weight loss.  No excessive hair loss or diarrhea.  No heat or cold intolerance.    3.  He also saw the endocrinologist recently for his DM and his hemoglobin A1c was 8.6 which was the same as it was 3 months before.  He been working with diabetic educator on insulin pump.  He feels that his blood sugar has been doing much better.  No symptoms of hypoglycemia the blood sugar has been stable around 140-150s.  No headache or dizziness.  No acute change in his vision.  No leg swelling, dyspnea or orthopnea.  His diabetes is complicated with gastroparesis and diabetic neuropathy.  He has a gastric pump that he feels not functioning right.  He is planning to follow-up with his GI specialist soon.  Tolerates oral intake well.  Decrease in appetite.  No nausea vomiting.  Continued to have diabetic neuropathy.  Did not like the side effect of the Gabapentin, Lyrica and Cymbalta and does not want to try it again.  He tolerated the neuropathy okay at this point.    4.  Overall he is doing well.  GERD is well controlled with ranitidine and omeprazole.  I feels the ranitidine actually is more effective than omeprazole.  He takes oxycodone rarely for his chronic pain and it has worked well.  He is scheduled for nerve ablation in a couple days.  He takes pancreatic enzyme supplement for his pancreatic insufficiency which also has been going well.  No other concerns today.    Problem list and histories reviewed & adjusted, as indicated.  Additional history: as documented    Current Outpatient Medications   Medication Sig Dispense Refill     amylase-lipase-protease (CREON 24) 80685-22422 units CPEP per EC capsule Take 1 capsule by mouth every other day       amylase-lipase-protease  (PERTZYE) 79372 units CPEP Take 1 capsule by mouth every other day       budesonide (ENTOCORT EC) 3 MG EC capsule Take 3 mg by mouth every morning Take 1 or 2 capsules       buPROPion (WELLBUTRIN XL) 150 MG 24 hr tablet Take 1 tablet (150 mg) by mouth every morning 90 tablet 3     citalopram (CELEXA) 40 MG tablet Take 40 mg by mouth daily       CONTOUR NEXT TEST test strip Use to test blood sugar 5x times daily or as directed. 450 strip 3     cyclobenzaprine (FLEXERIL) 10 MG tablet Take 1 tablet (10 mg) by mouth 2 times daily as needed for muscle spasms 90 tablet 1     insulin aspart (NOVOLOG VIAL) 100 UNITS/ML injection Uses with insulin pump, total daily dose approx 45 units, E10.9 20 mL 11     levothyroxine (SYNTHROID/LEVOTHROID) 125 MCG tablet Take 1-tablet by mouth daily as directed 90 tablet 0     lisinopril (PRINIVIL,ZESTRIL) 20 MG tablet Take 40 mg by mouth daily        naproxen (NAPROSYN) 500 MG tablet Take 1 tablet (500 mg) by mouth 2 times daily as needed for moderate pain 180 tablet 1     omeprazole 20 MG tablet Take 1 tablet by mouth daily       ondansetron (ZOFRAN-ODT) 8 MG disintegrating tablet Take 1 tablet by mouth every 8 hours as needed       oxyCODONE (ROXICODONE) 5 MG tablet Take 1 tablet (5 mg) by mouth 2 times daily as needed for pain Should last for a month, tapering down 60 tablet 0     ranitidine (ZANTAC) 150 MG tablet Take 1 tablet (150 mg) by mouth 2 times daily 180 tablet 3     simvastatin (ZOCOR) 40 MG tablet Take 1 tablet (40 mg) by mouth At Bedtime 90 tablet 1     SUMATRIPTAN SUCCINATE PO Take 100 mg by mouth 1/2 to 1 tablet by mouth at onset of headache. May repeat 1 dose after 2 hours if headache persists.       No Known Allergies    Reviewed and updated as needed this visit by clinical staff  Tobacco  Allergies  Meds  Soc Hx      Reviewed and updated as needed this visit by Provider         ROS:  Constitutional, HEENT, cardiovascular, pulmonary, gi and gu systems are negative,  except as otherwise noted.    OBJECTIVE:     /76 (BP Location: Right arm, Patient Position: Sitting, Cuff Size: Adult Regular)   Pulse 98   Temp 97.7  F (36.5  C) (Temporal)   Resp 16   Wt 69.4 kg (153 lb)   SpO2 98%   BMI 23.26 kg/m    Body mass index is 23.26 kg/m .  GENERAL: healthy, alert and no distress  NECK: no adenopathy, supple.  Tender with light in the cervical spine is paraspinous muscle.  RESP: lungs clear to auscultation - no rales, rhonchi or wheezes  CV: regular rate and rhythm, no murmur  ABDOMEN: soft, non-distended, mild tender with palpation diffusely with no guarding or rebound.  Normal bowel sounds.  No CVA tenderness.  MS: no gross musculoskeletal defects noted, no edema.  No focal weakness.  NEURO: Normal strength and tone, mentation intact and speech normal.  Cranial nerves II through XII intact.  DTRs +2 throughout.  No focal neurological deficit.  PSYCH: mentation appears normal, affect normal/bright.  No suicidal or homicidal ideation.  No hallucination.    Diagnostic Test Results:  Results for orders placed or performed in visit on 02/25/19   Basic metabolic panel  (Ca, Cl, CO2, Creat, Gluc, K, Na, BUN)   Result Value Ref Range    Sodium 136 133 - 144 mmol/L    Potassium 4.8 3.4 - 5.3 mmol/L    Chloride 100 94 - 109 mmol/L    Carbon Dioxide 29 20 - 32 mmol/L    Anion Gap 7 3 - 14 mmol/L    Glucose 143 (H) 70 - 99 mg/dL    Urea Nitrogen 10 7 - 30 mg/dL    Creatinine 0.80 0.66 - 1.25 mg/dL    GFR Estimate >90 >60 mL/min/[1.73_m2]    GFR Estimate If Black >90 >60 mL/min/[1.73_m2]    Calcium 9.2 8.5 - 10.1 mg/dL       ASSESSMENT/PLAN:     1. Mild major depression (H)  Also has anxiety.  Overall, he is feeling much better with  the Wellbutrin and Celexa.  No suicidal or homicidal ideation.  No hallucination.  His PHQ 9 score of 11 and a FRANCISCO 7 score of 1 today.  Tolerates the medications well.  Did not want counseling.  Will continue with the Celexa and Wellbutrin at the current  doses.  Symptoms that need to be seen or call in discussed.  Follow-up in 6 months, earlier as needed.    2. Hypothyroidism, unspecified type  Recent labs showed low TSH and elevated free T4 level.  He is not symptomatic.  Been on the levothyroxine as 137 mcg daily for 2-3 months.  Decreased its dose to 125 mcg daily and recheck the TSH level in 3 months.    - **TSH with free T4 reflex FUTURE anytime; Future    3. Type 1 diabetes mellitus with diabetic autonomic neuropathy (H)  Encouraged to continue with diabetic educator and the endocrinologist for insulin pump management.  He did not tolerate the gabapentin, Lyrica or Cymbalta in the past and does not want to try it again.  The neuropathy is tolerable at this point and therefore will continue to monitor.  Also recommend him to follow-up with the GI specialist for gastroparesis.      4. Chronic pain syndrome  Due to degenerative joint disease of the spine with stenosis.  He is scheduled to have a nerve ablation with ablation in a couple days.  Encouraged him to take the Percocet only as needed for severe pain.  Informed him that long use term of narcotic may worsen his gastroparesis.  Consider to refer to pain specialist if the ablation is ineffective.    5. Gastroparesis due to DM (H)  Due to diabetes.  No longer on merit medical marijuana.  Again, strongly encouraged him to avoid taking the opioid/narcotic chronically.  Follow-up with the GI specialist for further evaluation and management    6. GERD  Been taking both ranitidine and omeprazole.  He feels the ranitidine is more effective than the omeprazole.  Recommended to take the ranitidine twice a day and omeprazole as needed.  Emphasized on diet modification, avoiding greasy or spicy food as well as late meals.  Also avoid high caffeine/sugar intake.    7. Pancreatic insufficiency.    Stable.  Continue with the pancreatic enzyme supplement.      Ilan Chew Mai, MD  [facility]

## 2019-02-25 NOTE — NURSING NOTE
Chief Complaint   Patient presents with     Depression     Anxiety     Health Maintenance Due   Topic Date Due     URINE DRUG SCREEN Q1 YR  09/07/1981     ZOSTER IMMUNIZATION (1 of 2) 09/07/2016     Ty Watson, CMA

## 2019-02-25 NOTE — PROGRESS NOTES
[address] [address]  [phone]  Dept: [phone]    PRE-OP EVALUATION:  Today's date: 2019    Stone De Paz (: 1965) presents for pre-operative evaluation assessment as requested by Dr. Edwards.  He requires evaluation and anesthesia risk assessment prior to undergoing surgery/procedure for treatment of Arthropathy of cervical spine.    Fax number for surgical facility: available electronically  Primary Physician: Ilan Raymond  Type of Anesthesia Anticipated: to be determined    Patient has a Health Care Directive or Living Will:  NO    Preop Questions 2019   Who is doing your surgery? dr Gomez   What are you having done? Radio Frequency ablation pulsed, any nerve   Date of Surgery/Procedure: 19   Facility or Hospital where procedure/surgery will be performed: [facility]   1.  Do you have a history of Heart attack, stroke, stent, coronary bypass surgery, or other heart surgery? No   2.  Do you ever have any pain or discomfort in your chest? No   3.  Do you have a history of  Heart Failure? No   4.   Are you troubled by shortness of breath when:  walking on a level surface, or up a slight hill, or at night? No   5.  Do you currently have a cold, bronchitis or other respiratory infection? No   6.  Do you have a cough, shortness of breath, or wheezing? No   7.  Do you sometimes get pains in the calves of your legs when you walk? No   8. Do you or anyone in your family have previous history of blood clots? No   9.  Do you or does anyone in your family have a serious bleeding problem such as prolonged bleeding following surgeries or cuts? No   10. Have you ever had problems with anemia or been told to take iron pills? No   11. Have you had any abnormal blood loss such as black, tarry or bloody stools? No   12. Have you ever had a blood transfusion? No   13. Have you or any of your relatives ever had problems with anesthesia? No   14.  Do you have sleep apnea, excessive snoring or daytime drowsiness? UNKNOWN - possible - post for air gasping/snore   15. Do you have any prosthetic heart valves? No   16. Do you have prosthetic joints? No         HPI:     HPI related to upcoming procedure:     Stone is here today for pre-op physical. He is scheduled for radiofrequency ablation at the cervical spine level on 02/28/2019 with Dr. Edwards at [facility].  Stated that he has chronic neck pain with radiculopathy secondary to his degenerative joint disease of spine with spinal and foraminal stenosis for years and has gotten worse slowly.  Failed conservative management and the pain has affected his life significantly. It is expected to be the same day procedure with MAC and local anesthesia. No personal or family history of anesthesia complication or pre-matured CAD or MI.  Been taking the Entocort on and off as needed for gastroparesis - takes it rarely however.  Not taking Aspirin or other form of blood thinner.  Last dose of NSAID was last week.   Stated that he was well informed about the procedure and is ready to have the procedure done.    Prevalent chronic medical conditions include diabetes type 1, on insulin pump.  Last hemoglobin A1c about a month ago was 8.6.  His blood sugar has been prescription clearly states stable in the range of 140-150s.  Complication from her diabetes including diabetic neuropathy and gastroparesis.  He also has exocrine pancreatic insufficiency and hypothyroidism.  Overall, these chronic medical conditions are stable.    He generally is doing well and has no concern today. No headache or dizziness. No URI symptoms include running nose, nasal congestion, ST, coughing, fever or chill.  No chest pain or SOB.  No N/V/D/C and denied of having problem with urination.  He does not smoke and denied of having breathing problem.  No history of asthma or COPD.        MEDICAL HISTORY:     Patient Active Problem List     Diagnosis Date Noted     Type 1 diabetes mellitus with diabetic autonomic neuropathy (H) 08/07/2018     Priority: Medium     Insulin pump status 08/07/2018     Priority: Medium     Chronic pain syndrome 07/10/2018     Priority: Medium     Exocrine pancreatic insufficiency 11/24/2016     Priority: Medium     Mild major depression (H) 11/14/2014     Priority: Medium     Hypothyroidism      Priority: Medium     Dr Best       Gastroparesis due to DM (H)      Priority: Medium     gastric stimulator helps, [facility] manages that       Chronic neck pain      Priority: Medium     percocet for years now,        Cervical stenosis of spine 04/16/2012     Priority: Medium     Hyperlipidemia 06/06/2008     Priority: Medium      Past Medical History:   Diagnosis Date     Chronic neck pain     percocet for years now,      Gastroparesis      Gastroparesis due to DM (H)     gastric stimulator helps, [facility] manages that     Hyperlipidemia      Hypertension      Hypothyroidism      Neuropathy, diabetic (H)      Type 1 diabetes (H) age 31     Past Surgical History:   Procedure Laterality Date     C LUMBAR SPINE FUSION,ANTER APPRCH      2 L4-5 L5-21     COLONOSCOPY  07/18/12     FUSION CERVICAL ANTERIOR TWO LEVELS       INJECT BLOCK MEDIAL BRANCH CERVICAL/THORACIC/LUMBAR Bilateral 11/20/2018    Procedure: Bilateral Cervical 3-4-5 medial branch block;  Surgeon: Jef Edwards MD;  Location: [facility] OR     INJECT BLOCK MEDIAL BRANCH CERVICAL/THORACIC/LUMBAR N/A 1/11/2019    Procedure: Cervical 3,4,5 Bilateral Medial branch blocks;  Surgeon: Jef Edwards MD;  Location: [facility] OR     THORACOSCOPIC DECORTICATION LUNG  1/9/2014    Procedure: THORACOSCOPIC DECORTICATION LUNG;  RIGHT VATS/RIGHT THORACOTOMY , DECORTICATION RIGHT LUNG ,WEDGE RESECTION RIGHT MIDDLE LOBE LUNG NODULE;  Surgeon: Harley Felix MD;  Location: [facility] OR     Current Outpatient Medications   Medication Sig Dispense Refill     amylase-lipase-protease (CREON 24) 88876-88040  units CPEP per EC capsule Take 1 capsule by mouth every other day       amylase-lipase-protease (PERTZYE) 79606 units CPEP Take 1 capsule by mouth every other day       budesonide (ENTOCORT EC) 3 MG EC capsule Take 3 mg by mouth every morning Take 1 or 2 capsules       buPROPion (WELLBUTRIN XL) 150 MG 24 hr tablet Take 1 tablet (150 mg) by mouth every morning 90 tablet 3     citalopram (CELEXA) 40 MG tablet Take 40 mg by mouth daily       CONTOUR NEXT TEST test strip Use to test blood sugar 5x times daily or as directed. 450 strip 3     cyclobenzaprine (FLEXERIL) 10 MG tablet Take 1 tablet (10 mg) by mouth 2 times daily as needed for muscle spasms 90 tablet 1     insulin aspart (NOVOLOG VIAL) 100 UNITS/ML injection Uses with insulin pump, total daily dose approx 45 units, E10.9 20 mL 11     lisinopril (PRINIVIL,ZESTRIL) 20 MG tablet Take 40 mg by mouth daily        naproxen (NAPROSYN) 500 MG tablet Take 1 tablet (500 mg) by mouth 2 times daily as needed for moderate pain 180 tablet 1     omeprazole 20 MG tablet Take 1 tablet by mouth daily       ondansetron (ZOFRAN-ODT) 8 MG disintegrating tablet Take 1 tablet by mouth every 8 hours as needed       oxyCODONE (ROXICODONE) 5 MG tablet Take 1 tablet (5 mg) by mouth 2 times daily as needed for pain Should last for a month, tapering down 60 tablet 0     ranitidine (ZANTAC) 150 MG tablet Take 1 tablet (150 mg) by mouth 2 times daily 180 tablet 3     simvastatin (ZOCOR) 40 MG tablet Take 1 tablet (40 mg) by mouth At Bedtime 90 tablet 1     SUMATRIPTAN SUCCINATE PO Take 100 mg by mouth 1/2 to 1 tablet by mouth at onset of headache. May repeat 1 dose after 2 hours if headache persists.       levothyroxine (SYNTHROID/LEVOTHROID) 125 MCG tablet Take 1-tablet by mouth daily as directed 90 tablet 0     OTC products: None, except as noted above    No Known Allergies   Latex Allergy: NO    Social History     Tobacco Use     Smoking status: Former Smoker     Last attempt to quit:  2010     Years since quittin.1     Smokeless tobacco: Never Used   Substance Use Topics     Alcohol use: No     History   Drug Use No       REVIEW OF SYSTEMS:   Constitutional, neuro, ENT, endocrine, pulmonary, cardiac, gastrointestinal, genitourinary, musculoskeletal, integument and psychiatric systems are negative, except as otherwise noted.    EXAM:   /76 (BP Location: Right arm, Patient Position: Sitting, Cuff Size: Adult Regular)   Pulse 98   Temp 97.7  F (36.5  C) (Temporal)   Resp 16   Wt 69.4 kg (153 lb)   SpO2 98%   BMI 23.26 kg/m        GENERAL APPEARANCE: healthy, alert and no distress     EYES: EOMI,- PERRL     HENT: ear canals and TM's normal. Nares are non-congested. Oropharynx is pink and moist, no ulcers or lesions. No tender with palpation to the sinuses.     NECK: no adenopathy, no asymmetry.  Tender with palpation to the cervical spine and its para-spinous muscle bilaterally.     RESP: lungs clear to auscultation - no rales, rhonchi or wheezes     CV: regular rates and rhythm and no murmur.     ABDOMEN:  soft, nontender, nondistended with no palpable masses and bowel sounds normal     MS: extremities normal- no gross deformities noted.  No edema.  All 4 extremities are equally in strength.     NEURO: Normal strength and tone,  mentation intact and speech normal.  No focal neurological deficit     PSYCH: mentation appears normal. and affect normal/bright         DIAGNOSTICS:     EKG: Not indicated due to non-vascular surgery and low risk of event (age <65 and without cardiac risk factors)  Labs Resulted Today:   Results for orders placed or performed in visit on 19   Basic metabolic panel  (Ca, Cl, CO2, Creat, Gluc, K, Na, BUN)   Result Value Ref Range    Sodium 136 133 - 144 mmol/L    Potassium 4.8 3.4 - 5.3 mmol/L    Chloride 100 94 - 109 mmol/L    Carbon Dioxide 29 20 - 32 mmol/L    Anion Gap 7 3 - 14 mmol/L    Glucose 143 (H) 70 - 99 mg/dL    Urea Nitrogen 10 7 - 30  mg/dL    Creatinine 0.80 0.66 - 1.25 mg/dL    GFR Estimate >90 >60 mL/min/[1.73_m2]    GFR Estimate If Black >90 >60 mL/min/[1.73_m2]    Calcium 9.2 8.5 - 10.1 mg/dL       Recent Labs   Lab Test 02/15/19  1622 09/21/18  1355 07/09/18  1723  01/29/16  1611 01/21/14  1400  01/09/14  1009  12/24/13  1235   HGB  --   --   --   --  14.4 11.2*   < >  --    < >  --    PLT  --   --   --   --  259 786*   < >  --    < >  --    INR  --   --   --   --   --   --   --  1.09  --  1.1   *  --  135   < > 135  --    < >  --    < >  --    POTASSIUM 4.5  --  4.1   < > 4.3  --    < >  --    < >  --    CR 0.88  --  0.92   < > 0.78 0.80   < >  --    < >  --    A1C 8.6* 8.6*  --    < >  --   --   --   --    < >  --     < > = values in this interval not displayed.        IMPRESSION:   Reason for surgery/procedure: Chronic neck pain with radiculopathy secondary to spinal stenosis  Diagnosis/reason for consult: pre-op physical to evaluate for anesthesia and its jayy-operative risks.    The proposed surgical procedure is considered LOW risk.    REVISED CARDIAC RISK INDEX  The patient has the following serious cardiovascular risks for perioperative complications such as (MI, PE, VFib and 3  AV Block):  Diabetes Mellitus (on Insulin)  INTERPRETATION: 1 risks: Class II (low risk - 0.9% complication rate)    The patient has the following additional risks for perioperative complications:    The 10-year ASCVD risk score (Kalenvick AYALA Jr., et al., 2013) is: 5.7%    Values used to calculate the score:      Age: 53 years      Sex: Male      Is Non- : No      Diabetic: Yes      Tobacco smoker: No      Systolic Blood Pressure: 134 mmHg      Is BP treated: No      HDL Cholesterol: 67 mg/dL      Total Cholesterol: 180 mg/dL      ICD-10-CM    1. Preop general physical exam Z01.818 Basic metabolic panel  (Ca, Cl, CO2, Creat, Gluc, K, Na, BUN)   2. Chronic neck pain M54.2     G89.29    3. Cervical stenosis of spine M48.02         RECOMMENDATIONS:     --No history of DVT or PE history and the procedure is low risk, DVT/PE prophylaxis per surgeon's recommendation    Cardiovascular Risk  No significant cardiovascular risk identified except of insulin-dependent diabetes which is fairly stable with last month's hemoglobin A1c of 8.6.  Blood pressure has been normal and stable.  No histories of MI/CAD.  No further workup is needed for this procedure.      Pulmonary Risk  No pulmonary risk identified.  He does not smoke and there are no history of asthma/COPD.  No breathing problem.      Obstructive Sleep Apnea (or suspected sleep apnea)  Hospital staff are advised to monitor for sleep related oxygen desaturations due to suspicion of MASON.  He displayed symptoms of sleep apnea    Anemia  No history of anemia or blood transfusion.  He is okay to be transfused if necessary.      --Patient is to take all scheduled medications on the day of surgery EXCEPT for modifications listed below.  Please take your medication as prescribed.    No aspirin and NSAID (Ibuprofen, Aleve, Motrin, Naproxen, ect.) until after the the procedure  Decrease the insulin dose by 1/3 of the current dose (take around 65-70% of your current dose) for 12 hrs before the procedure  May take other meds as prescribed on am of procedure with small sip of water  Nothing to eat or drink except taking med with small sip of water for 8 hrs before the procedure.      APPROVAL GIVEN to proceed with proposed procedure, without further diagnostic evaluation    Stone is overall doing well.  He is clear for the procedure as scheduled.  No further work up is needed.  Reviewed his medication and instruction as above. A written instruction was given. May resume all of his medications after the surgery.  All of his questions were answered.         Signed Electronically by: Ilan Chew Mai, MD    Copy of this evaluation report is provided to requesting physician.    [place] Preop  Guidelines    Revised Cardiac Risk Index

## 2019-02-25 NOTE — PATIENT INSTRUCTIONS
Before Your Surgery      Call your surgeon if there is any change in your health. This includes signs of a cold or flu (such as a sore throat, runny nose, cough, rash or fever).    Do not smoke, drink alcohol or take over the counter medicine (unless your surgeon or primary care doctor tells you to) for the 24 hours before and after surgery.    If you take prescribed drugs: Follow your doctor s orders about which medicines to take and which to stop until after surgery.    Eating and drinking prior to surgery: follow the instructions from your surgeon    Take a shower or bath the night before surgery. Use the soap your surgeon gave you to gently clean your skin. If you do not have soap from your surgeon, use your regular soap. Do not shave or scrub the surgery site.  Wear clean pajamas and have clean sheets on your bed.         Please take your medication as prescribed.    No aspirin and NSAID (Ibuprofen, Aleve, Motrin, Naproxen, ect.) until after the the procedure  Decrease the insulin dose by 1/3 dose (take around 65-70% of your dose) for 12 hrs before the procedure  May take other meds as prescribed on am of procedure with small sip of water  Nothing to eat or drink except taking med with small sip of water for 8 hrs before the procedure.

## 2019-02-25 NOTE — LETTER
February 25, 2019      Stone De Paz  [address] [address] [place] [id number]        Dear ,    We are writing to inform you of your test results.    lab today showed your kidney function test and electrolytes were normal.  Blood sugar is slightly elevated but not too bad.    Resulted Orders   Basic metabolic panel  (Ca, Cl, CO2, Creat, Gluc, K, Na, BUN)   Result Value Ref Range    Sodium 136 133 - 144 mmol/L    Potassium 4.8 3.4 - 5.3 mmol/L    Chloride 100 94 - 109 mmol/L    Carbon Dioxide 29 20 - 32 mmol/L    Anion Gap 7 3 - 14 mmol/L    Glucose 143 (H) 70 - 99 mg/dL    Urea Nitrogen 10 7 - 30 mg/dL    Creatinine 0.80 0.66 - 1.25 mg/dL    GFR Estimate >90 >60 mL/min/[1.73_m2]      Comment:      Non  GFR Calc  Starting 12/18/2018, serum creatinine based estimated GFR (eGFR) will be   calculated using the Chronic Kidney Disease Epidemiology Collaboration   (CKD-EPI) equation.      GFR Estimate If Black >90 >60 mL/min/[1.73_m2]      Comment:       GFR Calc  Starting 12/18/2018, serum creatinine based estimated GFR (eGFR) will be   calculated using the Chronic Kidney Disease Epidemiology Collaboration   (CKD-EPI) equation.      Calcium 9.2 8.5 - 10.1 mg/dL       If you have any questions or concerns, please call the clinic at the number listed above.       Sincerely,        Ilan Chew Mai, MD

## 2019-02-26 PROBLEM — K21.9 GASTROESOPHAGEAL REFLUX DISEASE WITHOUT ESOPHAGITIS: Status: ACTIVE | Noted: 2019-02-26

## 2019-02-26 ASSESSMENT — ANXIETY QUESTIONNAIRES: GAD7 TOTAL SCORE: 1

## 2019-02-27 ENCOUNTER — ANESTHESIA EVENT (OUTPATIENT)
Dept: SURGERY | Facility: CLINIC | Age: 54
End: 2019-02-27
Payer: MEDICARE

## 2019-02-27 ASSESSMENT — ENCOUNTER SYMPTOMS: DYSRHYTHMIAS: 0

## 2019-02-27 ASSESSMENT — LIFESTYLE VARIABLES: TOBACCO_USE: 1

## 2019-02-27 NOTE — ADDENDUM NOTE
Encounter addended by: Ariana Judge, PT on: 2/27/2019 3:48 PM   Actions taken: Pend clinical note, Sign clinical note, Episode resolved

## 2019-02-27 NOTE — PROGRESS NOTES
Outpatient Physical Therapy Discharge Note     Patient: Stone De Paz  : 1965    Beginning/End Dates of Reporting Period:  19 to 2019    Referring Provider: Jef Edwards MD    Therapy Diagnosis: Neck pain d/t limited ROM and flexibility     Client Self Report: Patient reports neck pain has not improved. He has had several stressful situations occur this week along with cold weather, not allowing him to pause and relax. HEP is going well, no complaints with exercises.     Objective Measurements:     No Objective Measurements assessed due to limited number of follow up treatment visits.     Outcome Measures (most recent score):    Neck Disability Index (NDI): 32/50 (64%) on 19    Goals:  Goal Identifier 1   Goal Description Patient will improve NDI score by at least 15 points in order to demonstrate functional improvements   Target Date 19   Date Met      Progress: Unable to assess due to limited number of follow up visits.     Goal Identifier 2   Goal Description Patient will exhibit increase in cervical AROM by at least 25% in order to improve tolerance to activities such as driving and reading.    Target Date 19   Date Met      Progress: Unable to assess due to limited number of follow up visits.     Goal Identifier 3   Goal Description Patient will be able to sleep 6-7 hours per night without an increase in neck pain above 2/10 upon awakening.   Target Date 19   Date Met      Progress: Unable to assess due to limited number of follow up visits.       Progress Toward Goals:     Progress limited due to patient only returned for two follow up physical therapy visits. Patient has elected to go through with cervical ablation surgery, scheduled for 19.       Plan:  Discharge from therapy.    Discharge:    Reason for Discharge: Patient chooses to discontinue therapy.  Change in medical status.    Equipment Issued: None    Discharge Plan: Patient to continue home  program.        Thank you for your referral.     Ariana Judge PT, DPT    [facility]    O: [phone]  E: [email or web address]

## 2019-02-28 ENCOUNTER — HOSPITAL ENCOUNTER (OUTPATIENT)
Facility: CLINIC | Age: 54
Discharge: HOME | End: 2019-02-28
Attending: ANESTHESIOLOGY | Admitting: ANESTHESIOLOGY
Payer: MEDICARE

## 2019-02-28 ENCOUNTER — ANESTHESIA (OUTPATIENT)
Dept: SURGERY | Facility: CLINIC | Age: 54
End: 2019-02-28
Payer: MEDICARE

## 2019-02-28 ENCOUNTER — HOSPITAL ENCOUNTER (OUTPATIENT)
Dept: GENERAL RADIOLOGY | Facility: CLINIC | Age: 54
End: 2019-02-28
Attending: ANESTHESIOLOGY | Admitting: ANESTHESIOLOGY
Payer: MEDICARE

## 2019-02-28 VITALS
DIASTOLIC BLOOD PRESSURE: 89 MMHG | HEIGHT: 68 IN | WEIGHT: 153 LBS | TEMPERATURE: 98.4 F | BODY MASS INDEX: 23.19 KG/M2 | SYSTOLIC BLOOD PRESSURE: 124 MMHG | HEART RATE: 80 BPM | RESPIRATION RATE: 16 BRPM

## 2019-02-28 DIAGNOSIS — M47.812 SPONDYLOSIS OF CERVICAL JOINT WITHOUT MYELOPATHY: Primary | ICD-10-CM

## 2019-02-28 DIAGNOSIS — M47.812 ARTHROPATHY OF CERVICAL SPINE: ICD-10-CM

## 2019-02-28 PROCEDURE — 37000009 ZZH ANESTHESIA TECHNICAL FEE, EACH ADDTL 15 MIN: Performed by: ANESTHESIOLOGY

## 2019-02-28 PROCEDURE — 25800030 ZZH RX IP 258 OP 636: Performed by: ANESTHESIOLOGY

## 2019-02-28 PROCEDURE — 64634 DESTROY C/TH FACET JNT ADDL: CPT | Mod: RT | Performed by: ANESTHESIOLOGY

## 2019-02-28 PROCEDURE — 36000048 ZZH SURGERY LEVEL 1 W FLUORO 1ST 30 MIN: Performed by: ANESTHESIOLOGY

## 2019-02-28 PROCEDURE — 37000008 ZZH ANESTHESIA TECHNICAL FEE, 1ST 30 MIN: Performed by: ANESTHESIOLOGY

## 2019-02-28 PROCEDURE — 71000027 ZZH RECOVERY PHASE 2 EACH 15 MINS: Performed by: ANESTHESIOLOGY

## 2019-02-28 PROCEDURE — 25000128 H RX IP 250 OP 636: Performed by: NURSE ANESTHETIST, CERTIFIED REGISTERED

## 2019-02-28 PROCEDURE — 36000046 ZZH SURGERY LEVEL 1 EA 15 ADDTL MIN: Performed by: ANESTHESIOLOGY

## 2019-02-28 PROCEDURE — 40000277 XR SURGERY CARM FLUORO LESS THAN 5 MIN W STILLS: Mod: TC

## 2019-02-28 PROCEDURE — 27211024 ZZHC OR SUPPLY OTHER OPNP: Performed by: ANESTHESIOLOGY

## 2019-02-28 PROCEDURE — 25000125 ZZHC RX 250: Performed by: ANESTHESIOLOGY

## 2019-02-28 PROCEDURE — 64633 DESTROY CERV/THOR FACET JNT: CPT | Mod: RT | Performed by: ANESTHESIOLOGY

## 2019-02-28 PROCEDURE — 40000306 ZZH STATISTIC PRE PROC ASSESS II: Performed by: ANESTHESIOLOGY

## 2019-02-28 RX ORDER — ONDANSETRON 4 MG/1
4 TABLET, ORALLY DISINTEGRATING ORAL EVERY 30 MIN PRN
Status: DISCONTINUED | OUTPATIENT
Start: 2019-02-28 | End: 2019-02-28 | Stop reason: HOSPADM

## 2019-02-28 RX ORDER — SODIUM CHLORIDE, SODIUM LACTATE, POTASSIUM CHLORIDE, CALCIUM CHLORIDE 600; 310; 30; 20 MG/100ML; MG/100ML; MG/100ML; MG/100ML
INJECTION, SOLUTION INTRAVENOUS CONTINUOUS
Status: DISCONTINUED | OUTPATIENT
Start: 2019-02-28 | End: 2019-02-28 | Stop reason: HOSPADM

## 2019-02-28 RX ORDER — NALOXONE HYDROCHLORIDE 0.4 MG/ML
.1-.4 INJECTION, SOLUTION INTRAMUSCULAR; INTRAVENOUS; SUBCUTANEOUS
Status: DISCONTINUED | OUTPATIENT
Start: 2019-02-28 | End: 2019-02-28 | Stop reason: HOSPADM

## 2019-02-28 RX ORDER — FENTANYL CITRATE 50 UG/ML
INJECTION, SOLUTION INTRAMUSCULAR; INTRAVENOUS PRN
Status: DISCONTINUED | OUTPATIENT
Start: 2019-02-28 | End: 2019-02-28

## 2019-02-28 RX ORDER — OXYCODONE AND ACETAMINOPHEN 5; 325 MG/1; MG/1
1 TABLET ORAL EVERY 6 HOURS PRN
Qty: 12 TABLET | Refills: 0 | Status: SHIPPED | OUTPATIENT
Start: 2019-02-28 | End: 2019-04-10

## 2019-02-28 RX ORDER — SODIUM CHLORIDE 9 MG/ML
INJECTION, SOLUTION INTRAVENOUS CONTINUOUS
Status: DISCONTINUED | OUTPATIENT
Start: 2019-02-28 | End: 2019-02-28 | Stop reason: HOSPADM

## 2019-02-28 RX ORDER — LIDOCAINE HYDROCHLORIDE 20 MG/ML
INJECTION, SOLUTION INFILTRATION; PERINEURAL PRN
Status: DISCONTINUED | OUTPATIENT
Start: 2019-02-28 | End: 2019-02-28 | Stop reason: HOSPADM

## 2019-02-28 RX ORDER — ONDANSETRON 2 MG/ML
4 INJECTION INTRAMUSCULAR; INTRAVENOUS EVERY 30 MIN PRN
Status: DISCONTINUED | OUTPATIENT
Start: 2019-02-28 | End: 2019-02-28 | Stop reason: HOSPADM

## 2019-02-28 RX ADMIN — FENTANYL CITRATE 50 MCG: 50 INJECTION, SOLUTION INTRAMUSCULAR; INTRAVENOUS at 14:21

## 2019-02-28 RX ADMIN — FENTANYL CITRATE 50 MCG: 50 INJECTION, SOLUTION INTRAMUSCULAR; INTRAVENOUS at 13:55

## 2019-02-28 RX ADMIN — FENTANYL CITRATE 50 MCG: 50 INJECTION, SOLUTION INTRAMUSCULAR; INTRAVENOUS at 14:29

## 2019-02-28 RX ADMIN — SODIUM CHLORIDE, POTASSIUM CHLORIDE, SODIUM LACTATE AND CALCIUM CHLORIDE: 600; 310; 30; 20 INJECTION, SOLUTION INTRAVENOUS at 13:05

## 2019-02-28 RX ADMIN — MIDAZOLAM 1 MG: 1 INJECTION INTRAMUSCULAR; INTRAVENOUS at 13:55

## 2019-02-28 RX ADMIN — FENTANYL CITRATE 50 MCG: 50 INJECTION, SOLUTION INTRAMUSCULAR; INTRAVENOUS at 13:51

## 2019-02-28 RX ADMIN — MIDAZOLAM 1 MG: 1 INJECTION INTRAMUSCULAR; INTRAVENOUS at 13:56

## 2019-02-28 ASSESSMENT — MIFFLIN-ST. JEOR: SCORE: 1518.5

## 2019-02-28 NOTE — PROGRESS NOTES
Suggested to patient a post procedure blood glucose check because patient has a insulin pump.  Pt states he doesn't need it checked.  It was over 200 during procedure.  Given a muffin with butter post procedure and coffee.

## 2019-02-28 NOTE — OP NOTE
CHIEF COMPLAINT:  Neck pain secondary to cervical spondylosis  INTERVAL HISTORY:     The history was discussed with the patient. I agree with above. Risks were discussed including risks of infection, bleeding, nerve injury, no help , or worse pain and they were willing to take these risks and proceed.  Discussed with him that the studies show success rates are 75-80% for roughly 9-12 months.  Pain relief and success is defined is 50% or greater than the index pain that he is coming in for today.  He still wanted to proceed after these risks were discussed.    PROCEDURE:  Cervical radio frequency ablation of the right third occipital nerve and right C3, C4, C5 medial branch nerves to fully denervated at the C2-3, C3-4, C4-5 facet joints.    PROCEDURE DETAILS: After written informed consent was obtained from the patient, the patient was escorted to the procedure room.  The patient was placed in the sitting position. A time out was conducted to verify the patient identity, procedure to be performed, side, site, allergies and any special requirements.  The skin over the neck was prepped and draped in normal sterile fashion using ChloraPrep. Fluoroscopy was used to identify the mid point of the articular pillar with a lateral view.   The skin was anesthetized with 0.5 mL of 1% lidocaine with bicarbonate buffer.  I then advanced a 318-gauge radiofrequency needles with 10 mm active tips and along the right side at the 0 degree and 20 degrees ipsilateral oblique angle until they are resting at the midpoint of the C2-3 facet joint line at the waist at C3 in the waist at C4 and waist at C5.  AP and lateral films show proper needle placement away from the nerve roots motor stimulation at 2 Hz up to 2 V ruled out nerve root involvement.  I then anesthetize each nerve with 1 cc 2% lidocaine and did 3 slightly overlapping lesions at 80  C for 60 seconds lesioning all 4 nerves.  He tolerated the procedure very well once the  lesions were completed the needles were removed.  Just to reiterate what I did I did lesion each nerve and from 3 slightly different positions and from 2 different angles for a total of 6 lesions over each nerve.  Again he tolerated this very well.  Once the needles were removed sterile bandages were placed over the needle insertion site and he was brought to the recovery room in stable condition.    DIAGNOSIS:  1. Neck pain secondary to cervical spondylosis with a history of 2+ diagnostic medial branch blocks and failure of more conservative physical therapy to provide high-grade pain relief  2.  In situ C5-6 fusion.  PLAN:  1. Performed a radiofrequency ablation at 3 facet joint levels.  This fully denervated his C2 through C5 facet joints on the right side.  He will be discharged home with precautions for infection and bleeding and told to seek medical attention should any of these symptoms develop.  He also be given a small dose of an opioid for expected postoperative discomfort as well as instructed to take his Neurontin 300 mg twice daily for 10 days.  He should follow-up with me in 2 weeks to have the left side treated.

## 2019-02-28 NOTE — DISCHARGE INSTRUCTIONS
"Home Care Instructions                Procedure: Radiofrequency ablation    Activity:    Rest today    Do not work today    Resume normal activity tomorrow    Pain:    You may experience soreness at the suture site for 2-3 weeks.  There is an expected increase in pain after this procedure that most patients described as a \"sunburn feeling\".  This will nila after 2-3 weeks.    You may use an ice pack for 20 minutes every 2 hours for the first 24 hours    You may use a heating pad after the first 24 hours    You may use Tylenol  (acetaminophen) every 4 hours or other pain medicines as directed by your physician    Safety  Sedation medicine, if given may remain active for many hours.    It is important for the next 24 hours that you do not:    Drive a car    Operate machines or power tools    Consume alcohol, including beer    Sign any important papers or legal documents    You may experience numbness radiating into your legs or arms, (depending on the procedure location)  This numbness may last several hours.  Until the numb sensation returns to normal please use caution in walking, climbing stairs, stepping out of your vehicle, etc.    Common side effects of steroids:  Not everyone will experience corticosteroid side effects. If side effects are experienced they will gradually subside in the 7-10 day period following an injection.    Most common side effects include:    Flushed face and/or chest    Feeling of warmth, particularly in face but could be overall feeling of warmth    Increased blood sugar in diabetic patients    Menstrual irregularities may occur.  If taking hormone based birth control an alternate method of birth control is recommended    Sleep disturbances and/or mood swings are possible    Leg cramps    Please contact us if you have:  Severe pain   Fever more than 101.5 degrees Fahrenheit  Signs of infection (redness, swelling or drainage)      If you have questions during normal business hours " (8am-5pm Monday-Friday) contact the [facility] at [phone]. If you need help after hours, we recommend that you go to a hospital emergency room or dial 911.

## 2019-02-28 NOTE — ANESTHESIA CARE TRANSFER NOTE
Patient: Stone De Paz    Procedure(s):  RADIO FREQUENCY ABLATION CERVICAL THREE, FOUR, FIVE, AND THIRD OCCUPITAL NERVE RIGHT SIDE    Diagnosis: Arthropathy of cervical spine,Other specific arthropathies  Diagnosis Additional Information: No value filed.    Anesthesia Type:   MAC     Note:  Airway :Room Air  Patient transferred to:Phase II  Handoff Report: Identifed the Patient, Identified the Reponsible Provider, Reviewed the pertinent medical history, Discussed the surgical course, Reviewed Intra-OP anesthesia mangement and issues during anesthesia, Set expectations for post-procedure period and Allowed opportunity for questions and acknowledgement of understanding      Vitals: (Last set prior to Anesthesia Care Transfer)    CRNA VITALS  2/28/2019 1400 - 2/28/2019 1437      2/28/2019             Pulse:  84    Temp:  98.6  F (37  C)    SpO2:  99 %    Resp Rate (observed):  16    EKG:  Sinus rhythm                Electronically Signed By: CHEN Golden CRNA  February 28, 2019  2:37 PM

## 2019-02-28 NOTE — ANESTHESIA POSTPROCEDURE EVALUATION
Patient: Stone De Paz    Procedure(s):  RADIO FREQUENCY ABLATION CERVICAL THREE, FOUR, FIVE, AND THIRD OCCUPITAL NERVE RIGHT SIDE    Diagnosis:Arthropathy of cervical spine,Other specific arthropathies  Diagnosis Additional Information: No value filed.    Anesthesia Type:  MAC    Note:  Anesthesia Post Evaluation    Patient location during evaluation: Phase 2  Patient participation: Able to fully participate in evaluation  Level of consciousness: awake and alert  Pain management: adequate  Airway patency: patent  Cardiovascular status: blood pressure returned to baseline  Respiratory status: spontaneous ventilation and room air  Hydration status: acceptable  PONV: none     Anesthetic complications: None    Comments: Patient was pleased with his anesthetic today. No anesthesia concerns.         Last vitals:  Vitals:    02/28/19 1256   BP: 127/77   Resp: 16   Temp: 98.4  F (36.9  C)         Electronically Signed By: CHEN Golden CRNA  February 28, 2019  2:40 PM

## 2019-02-28 NOTE — ANESTHESIA PREPROCEDURE EVALUATION
Anesthesia Evaluation     . Pt has had prior anesthetic. Type: General and MAC    No history of anesthetic complications          ROS/MED HX    ENT/Pulmonary:     (+)tobacco use, Past use , recent URI unresolved . Other pulmonary disease (loculated pleural effusion) .   (-) asthma and sleep apnea   Neurologic:  - neg neurologic ROS     Cardiovascular:     (+) Dyslipidemia, hypertension----. : . . . :. . Previous cardiac testing Echodate:1/8/14results:Echocardiogram   Order: [id number]   Status: Final result   Visible to patient: No (Not Released) Next appt: 02/28/2019 at 01:30 PM in Radiology. ([facility])   Component 5yr ago  XCELERA      Interpretation Summary  This was essentially a normal study.  The left ventricle is normal in structure, function and size. The pericardium   appears normal. There is no pericardial effusion. There is no pericardial   constriction.  PatientHeight: 68 in  PatientWeight: 153 lbs  SystolicPressure: 113 mmHg  DiastolicPressure: 84 mmHg  HeartRate: 95 bpm  BSA 1.8 m^2        Left Ventricle  The left ventricle is normal in structure, function and size.  There is normal left ventricular wall thickness.  Left ventricular systolic function is normal.  The visual ejection fraction is estimated at 55-60%.  Normal left ventricular wall motion.  There is no thrombus seen in the left ventricle.     Right Ventricle  The right ventricle is normal in structure, function and size.     Atria  Normal left atrial size.  Right atrial size is normal.  There is no color Doppler evidence of an atrial shunt.     Mitral Valve  The mitral valve is normal in structure and function.     Tricuspid Valve  Normal tricuspid valve.  Right ventricular systolic pressure could not be approximated due to   inadequate tricuspid regurgitation.     Aortic Valve  The aortic valve is normal in structure and function.     Pulmonic Valve  The pulmonic valve is not well seen, but is grossly  normal.     Vessels  Normal size aorta.  The IVC is normal in size and reactivity with respiration, suggesting normal   central venous pressure.     Pericardium  The pericardium appears normal.  There is no pericardial effusion.  There is no pericardial constriction.     Rhythm  The rhythm was normal sinus.     Procedure  Complete Portable Echo Adult.     MMode 2D Measurements & Calculations  IVSd: 0.90 cm  LVIDd: 4.4 cm  LVIDs: 2.9 cm  LVPWd: 0.94 cm  FS: 35 %  LV mass(C)d: 133 grams  Ao root diam: 3.3 cm  LA dimension: 3.4 cm  LA/Ao: 1.0   Doppler Measurements & Calculations  MV E point: 102 cm/sec  MV A point: 85 cm/sec  MV E/A: 1.2   MV dec time: 0.16 sec     Interpreting Physician:  Augustine Block MD electronically signed on           date: results: date: results: date: results:         (-) CAD, arrhythmias, irregular heartbeat/palpitations and valvular problems/murmurs   METS/Exercise Tolerance:     Hematologic:  - neg hematologic  ROS       Musculoskeletal:   (+) , , other musculoskeletal- chronic neck pain      GI/Hepatic:     (+) GERD Asymptomatic on medication, Other GI/Hepatic (gastropareisis, has had nausea recently)       Renal/Genitourinary:  - ROS Renal section negative       Endo:     (+) type I DM, Last HgA1c: 8.6 date: 2/15/19 Using insulin - using insulin pump Normal glucose range: ,  BS today at 13:34 = 236 not previously admitted for DM/DKA Diabetic complications: gastroparesis, thyroid problem hypothyroidism, .      Psychiatric:     (+) psychiatric history depression      Infectious Disease:  - neg infectious disease ROS       Malignancy:      - no malignancy   Other:    (+) No chance of pregnancy C-spine cleared: N/A, H/O Chronic Pain,H/O chronic opiod use , no other significant disability                    Physical Exam  Normal systems: cardiovascular and pulmonary    Airway   Mallampati: II  TM distance: >3 FB  Neck ROM: limited    Dental   (+) missing and caps    Cardiovascular    Rhythm and rate: regular and normal      Pulmonary    breath sounds clear to auscultation                    Anesthesia Plan      History & Physical Review  History and physical reviewed and following examination; no interval change.    ASA Status:  3 .        Plan for MAC with Propofol induction. Reason for MAC:  Deep or markedly invasive procedure (G8)         Postoperative Care      Consents  Anesthetic plan, risks, benefits and alternatives discussed with:  Patient or representative and Patient.  Use of blood products discussed: No .   .                            .    Anesthesia Plan      History & Physical Review  History and physical reviewed and following examination; no interval change.    ASA Status:  3 .        Plan for MAC with Propofol induction. Reason for MAC:  Deep or markedly invasive procedure (G8)         Postoperative Care      Consents  Anesthetic plan, risks, benefits and alternatives discussed with:  Patient or representative and Patient.  Use of blood products discussed: No .   .

## 2019-03-15 ENCOUNTER — ANESTHESIA EVENT (OUTPATIENT)
Dept: SURGERY | Facility: CLINIC | Age: 54
End: 2019-03-15
Payer: MEDICARE

## 2019-03-15 ENCOUNTER — HOSPITAL ENCOUNTER (OUTPATIENT)
Dept: GENERAL RADIOLOGY | Facility: CLINIC | Age: 54
End: 2019-03-15
Attending: ANESTHESIOLOGY | Admitting: ANESTHESIOLOGY
Payer: MEDICARE

## 2019-03-15 ENCOUNTER — ANESTHESIA (OUTPATIENT)
Dept: SURGERY | Facility: CLINIC | Age: 54
End: 2019-03-15
Payer: MEDICARE

## 2019-03-15 ENCOUNTER — HOSPITAL ENCOUNTER (OUTPATIENT)
Facility: CLINIC | Age: 54
Discharge: HOME | End: 2019-03-15
Attending: ANESTHESIOLOGY | Admitting: ANESTHESIOLOGY
Payer: MEDICARE

## 2019-03-15 VITALS
RESPIRATION RATE: 16 BRPM | OXYGEN SATURATION: 98 % | DIASTOLIC BLOOD PRESSURE: 82 MMHG | SYSTOLIC BLOOD PRESSURE: 131 MMHG

## 2019-03-15 DIAGNOSIS — M54.2 CHRONIC NECK PAIN: Primary | ICD-10-CM

## 2019-03-15 DIAGNOSIS — G89.29 CHRONIC NECK PAIN: Primary | ICD-10-CM

## 2019-03-15 DIAGNOSIS — M47.812 ARTHROPATHY OF CERVICAL SPINE: ICD-10-CM

## 2019-03-15 LAB — GLUCOSE BLDC GLUCOMTR-MCNC: 263 MG/DL (ref 70–99)

## 2019-03-15 PROCEDURE — 25800030 ZZH RX IP 258 OP 636: Performed by: ANESTHESIOLOGY

## 2019-03-15 PROCEDURE — 71000027 ZZH RECOVERY PHASE 2 EACH 15 MINS: Performed by: ANESTHESIOLOGY

## 2019-03-15 PROCEDURE — 64633 DESTROY CERV/THOR FACET JNT: CPT | Mod: LT | Performed by: ANESTHESIOLOGY

## 2019-03-15 PROCEDURE — 37000008 ZZH ANESTHESIA TECHNICAL FEE, 1ST 30 MIN: Performed by: ANESTHESIOLOGY

## 2019-03-15 PROCEDURE — 36000046 ZZH SURGERY LEVEL 1 EA 15 ADDTL MIN: Performed by: ANESTHESIOLOGY

## 2019-03-15 PROCEDURE — 36000048 ZZH SURGERY LEVEL 1 W FLUORO 1ST 30 MIN: Performed by: ANESTHESIOLOGY

## 2019-03-15 PROCEDURE — 40000306 ZZH STATISTIC PRE PROC ASSESS II: Performed by: ANESTHESIOLOGY

## 2019-03-15 PROCEDURE — 40000277 XR SURGERY CARM FLUORO LESS THAN 5 MIN W STILLS: Mod: TC

## 2019-03-15 PROCEDURE — 27211024 ZZHC OR SUPPLY OTHER OPNP: Performed by: ANESTHESIOLOGY

## 2019-03-15 PROCEDURE — 25000128 H RX IP 250 OP 636: Performed by: NURSE ANESTHETIST, CERTIFIED REGISTERED

## 2019-03-15 PROCEDURE — 37000009 ZZH ANESTHESIA TECHNICAL FEE, EACH ADDTL 15 MIN: Performed by: ANESTHESIOLOGY

## 2019-03-15 PROCEDURE — 64634 DESTROY C/TH FACET JNT ADDL: CPT | Mod: LT | Performed by: ANESTHESIOLOGY

## 2019-03-15 PROCEDURE — 82962 GLUCOSE BLOOD TEST: CPT

## 2019-03-15 PROCEDURE — 25000125 ZZHC RX 250: Performed by: ANESTHESIOLOGY

## 2019-03-15 RX ORDER — OXYCODONE AND ACETAMINOPHEN 5; 325 MG/1; MG/1
1 TABLET ORAL EVERY 6 HOURS PRN
Qty: 18 TABLET | Refills: 0 | Status: SHIPPED | OUTPATIENT
Start: 2019-03-15 | End: 2019-04-10

## 2019-03-15 RX ORDER — SODIUM CHLORIDE, SODIUM LACTATE, POTASSIUM CHLORIDE, CALCIUM CHLORIDE 600; 310; 30; 20 MG/100ML; MG/100ML; MG/100ML; MG/100ML
INJECTION, SOLUTION INTRAVENOUS CONTINUOUS
Status: DISCONTINUED | OUTPATIENT
Start: 2019-03-15 | End: 2019-03-15 | Stop reason: HOSPADM

## 2019-03-15 RX ORDER — LIDOCAINE HYDROCHLORIDE 20 MG/ML
INJECTION, SOLUTION INFILTRATION; PERINEURAL PRN
Status: DISCONTINUED | OUTPATIENT
Start: 2019-03-15 | End: 2019-03-15 | Stop reason: HOSPADM

## 2019-03-15 RX ORDER — FENTANYL CITRATE 50 UG/ML
INJECTION, SOLUTION INTRAMUSCULAR; INTRAVENOUS PRN
Status: DISCONTINUED | OUTPATIENT
Start: 2019-03-15 | End: 2019-03-15

## 2019-03-15 RX ORDER — PROPOFOL 10 MG/ML
INJECTION, EMULSION INTRAVENOUS PRN
Status: DISCONTINUED | OUTPATIENT
Start: 2019-03-15 | End: 2019-03-15

## 2019-03-15 RX ADMIN — FENTANYL CITRATE 50 MCG: 50 INJECTION, SOLUTION INTRAMUSCULAR; INTRAVENOUS at 12:39

## 2019-03-15 RX ADMIN — SODIUM CHLORIDE, POTASSIUM CHLORIDE, SODIUM LACTATE AND CALCIUM CHLORIDE: 600; 310; 30; 20 INJECTION, SOLUTION INTRAVENOUS at 11:36

## 2019-03-15 RX ADMIN — FENTANYL CITRATE 50 MCG: 50 INJECTION, SOLUTION INTRAMUSCULAR; INTRAVENOUS at 13:15

## 2019-03-15 RX ADMIN — MIDAZOLAM 1 MG: 1 INJECTION INTRAMUSCULAR; INTRAVENOUS at 12:39

## 2019-03-15 RX ADMIN — PROPOFOL 10 MG: 10 INJECTION, EMULSION INTRAVENOUS at 12:45

## 2019-03-15 RX ADMIN — SODIUM CHLORIDE, POTASSIUM CHLORIDE, SODIUM LACTATE AND CALCIUM CHLORIDE: 600; 310; 30; 20 INJECTION, SOLUTION INTRAVENOUS at 12:39

## 2019-03-15 RX ADMIN — MIDAZOLAM 1 MG: 1 INJECTION INTRAMUSCULAR; INTRAVENOUS at 13:18

## 2019-03-15 ASSESSMENT — ENCOUNTER SYMPTOMS: DYSRHYTHMIAS: 0

## 2019-03-15 ASSESSMENT — LIFESTYLE VARIABLES: TOBACCO_USE: 1

## 2019-03-15 NOTE — PROGRESS NOTES
notified of patient's elevated blood sugar. Pt has an insulin pump.  Pt states that he has purposefully let his blood sugars run high today because he doesn't want them to drop during the procedure.   is okay with going ahead with the procedure.

## 2019-03-15 NOTE — ANESTHESIA CARE TRANSFER NOTE
Patient: Stone De Paz    Procedure(s):  radiofrequency ablation cervical 3,4,5    Diagnosis: Arthropathy of cervical spine,Other specific arthropathies  Diagnosis Additional Information: No value filed.    Anesthesia Type:   MAC     Note:  Airway :Room Air  Patient transferred to:Phase II  Comments: Care of Transfer report given to RN.  Spont Respirations. Responds   Easy.Handoff Report: Identifed the Patient, Identified the Reponsible Provider, Reviewed the pertinent medical history, Discussed the surgical course, Reviewed Intra-OP anesthesia mangement and issues during anesthesia, Set expectations for post-procedure period and Allowed opportunity for questions and acknowledgement of understanding      Vitals: (Last set prior to Anesthesia Care Transfer)    CRNA VITALS  3/15/2019 1250 - 3/15/2019 1328      3/15/2019             Pulse:  73    SpO2:  99 %    Resp Rate (observed):  19                Electronically Signed By: CHEN HEAD CRNA  March 15, 2019  1:28 PM

## 2019-03-15 NOTE — ANESTHESIA POSTPROCEDURE EVALUATION
Patient: Stone De Paz    Procedure(s):  radiofrequency ablation cervical 3,4,5    Diagnosis:Arthropathy of cervical spine,Other specific arthropathies  Diagnosis Additional Information: No value filed.    Anesthesia Type:  MAC    Note:  Anesthesia Post Evaluation    Patient location during evaluation: Phase 2  Patient participation: Able to fully participate in evaluation  Level of consciousness: awake and alert  Pain management: adequate  Airway patency: patent  Cardiovascular status: acceptable  Respiratory status: acceptable  Hydration status: acceptable  PONV: none             Last vitals:  Vitals:    03/15/19 1126   BP: (!) 155/95   Resp: 16   SpO2: 98%         Electronically Signed By: CHEN HEAD CRNA  March 15, 2019  1:29 PM

## 2019-03-15 NOTE — DISCHARGE INSTRUCTIONS
"Home Care Instructions                Procedure: Cervical radiofrequency ablation    Activity:    Rest today    Do not work today    Resume normal activity tomorrow    Pain:    You may experience soreness at the procedure site for 2-3 weeks.  Most patients describe this as a \"sunburn feeling\".  This will nila over time.    You may use an ice pack for 20 minutes every 2 hours for the first 24 hours    You may use a heating pad after the first 24 hours    You may use Tylenol  (acetaminophen) every 4 hours or other pain medicines as directed by your physician    Safety  Sedation medicine, if given may remain active for many hours.    It is important for the next 24 hours that you do not:    Drive a car    Operate machines or power tools    Consume alcohol, including beer    Sign any important papers or legal documents    You may experience numbness radiating into your legs or arms, (depending on the procedure location)  This numbness may last several hours.  Until the numb sensation returns to normal please use caution in walking, climbing stairs, stepping out of your vehicle, etc.    Common side effects of steroids:  Not everyone will experience corticosteroid side effects. If side effects are experienced they will gradually subside in the 7-10 day period following an injection.    Most common side effects include:    Flushed face and/or chest    Feeling of warmth, particularly in face but could be overall feeling of warmth    Increased blood sugar in diabetic patients    Menstrual irregularities may occur.  If taking hormone based birth control an alternate method of birth control is recommended    Sleep disturbances and/or mood swings are possible    Leg cramps    Please contact us if you have:  Severe pain   Fever more than 101.5 degrees Fahrenheit  Signs of infection (redness, swelling or drainage)      If you have questions during normal business hours (8am-5pm Monday-Friday) contact the [facility] at " [phone]. If you need help after hours, we recommend that you go to a hospital emergency room or dial 911.

## 2019-03-15 NOTE — ANESTHESIA PREPROCEDURE EVALUATION
Anesthesia Pre-Procedure Evaluation    Patient: Stone De Paz   MRN: [id number] : 1965          Preoperative Diagnosis: Arthropathy of cervical spine,Other specific arthropathies    Procedure(s):  radiofrequency ablation cervical 3 levels    Past Medical History:   Diagnosis Date     Chronic neck pain     percocet for years now,      Gastroparesis      Gastroparesis due to DM (H)     gastric stimulator helps, [facility] manages that     Hyperlipidemia      Hypertension      Hypothyroidism      Neuropathy, diabetic (H)      Type 1 diabetes (H) age 31     Past Surgical History:   Procedure Laterality Date     C LUMBAR SPINE FUSION,ANTER APPRCH      2 L4-5 L5-21     COLONOSCOPY  12     FUSION CERVICAL ANTERIOR TWO LEVELS       INJECT BLOCK MEDIAL BRANCH CERVICAL/THORACIC/LUMBAR Bilateral 2018    Procedure: Bilateral Cervical 3-4-5 medial branch block;  Surgeon: Jef Edwards MD;  Location: [facility]     INJECT BLOCK MEDIAL BRANCH CERVICAL/THORACIC/LUMBAR N/A 2019    Procedure: Cervical 3,4,5 Bilateral Medial branch blocks;  Surgeon: Jef Edwards MD;  Location: [facility]     RADIO FREQUENCY ABLATION PULSED CERVICAL Right 2019    Procedure: RADIO FREQUENCY ABLATION CERVICAL THREE, FOUR, FIVE, AND THIRD OCCUPITAL NERVE RIGHT SIDE;  Surgeon: Jef Edwards MD;  Location: [facility]     THORACOSCOPIC DECORTICATION LUNG  2014    Procedure: THORACOSCOPIC DECORTICATION LUNG;  RIGHT VATS/RIGHT THORACOTOMY , DECORTICATION RIGHT LUNG ,WEDGE RESECTION RIGHT MIDDLE LOBE LUNG NODULE;  Surgeon: Harley Felix MD;  Location: [facility]       Anesthesia Evaluation     . Pt has had prior anesthetic. Type: General and MAC    No history of anesthetic complications          ROS/MED HX    ENT/Pulmonary:     (+)tobacco use, Past use , recent URI unresolved . Other pulmonary disease (loculated pleural effusion) .   (-) asthma and sleep apnea   Neurologic:  - neg neurologic ROS     Cardiovascular:     (+) Dyslipidemia,  hypertension----. : . . . :. . Previous cardiac testing Echodate:1/8/14results:Echocardiogram   Order: [id number]   Status: Final result   Visible to patient: No (Not Released) Next appt: 02/28/2019 at 01:30 PM in Radiology. ([facility])   Component 5yr ago  XCELERA      Interpretation Summary  This was essentially a normal study.  The left ventricle is normal in structure, function and size. The pericardium   appears normal. There is no pericardial effusion. There is no pericardial   constriction.  PatientHeight: 68 in  PatientWeight: 153 lbs  SystolicPressure: 113 mmHg  DiastolicPressure: 84 mmHg  HeartRate: 95 bpm  BSA 1.8 m^2        Left Ventricle  The left ventricle is normal in structure, function and size.  There is normal left ventricular wall thickness.  Left ventricular systolic function is normal.  The visual ejection fraction is estimated at 55-60%.  Normal left ventricular wall motion.  There is no thrombus seen in the left ventricle.     Right Ventricle  The right ventricle is normal in structure, function and size.     Atria  Normal left atrial size.  Right atrial size is normal.  There is no color Doppler evidence of an atrial shunt.     Mitral Valve  The mitral valve is normal in structure and function.     Tricuspid Valve  Normal tricuspid valve.  Right ventricular systolic pressure could not be approximated due to   inadequate tricuspid regurgitation.     Aortic Valve  The aortic valve is normal in structure and function.     Pulmonic Valve  The pulmonic valve is not well seen, but is grossly normal.     Vessels  Normal size aorta.  The IVC is normal in size and reactivity with respiration, suggesting normal   central venous pressure.     Pericardium  The pericardium appears normal.  There is no pericardial effusion.  There is no pericardial constriction.     Rhythm  The rhythm was normal sinus.     Procedure  Complete Portable Echo Adult.     MMode 2D Measurements &  Calculations  IVSd: 0.90 cm  LVIDd: 4.4 cm  LVIDs: 2.9 cm  LVPWd: 0.94 cm  FS: 35 %  LV mass(C)d: 133 grams  Ao root diam: 3.3 cm  LA dimension: 3.4 cm  LA/Ao: 1.0   Doppler Measurements & Calculations  MV E point: 102 cm/sec  MV A point: 85 cm/sec  MV E/A: 1.2   MV dec time: 0.16 sec     Interpreting Physician:  Augustine Block MD electronically signed on           date: results: date: results: date: results:         (-) CAD, arrhythmias, irregular heartbeat/palpitations and valvular problems/murmurs   METS/Exercise Tolerance:     Hematologic:  - neg hematologic  ROS       Musculoskeletal:   (+) , , other musculoskeletal- chronic neck pain      GI/Hepatic:     (+) GERD Asymptomatic on medication, Other GI/Hepatic (gastropareisis, has had nausea recently)       Renal/Genitourinary:  - ROS Renal section negative       Endo:     (+) type I DM, Last HgA1c: 8.6 date: 2/15/19 Using insulin - using insulin pump Normal glucose range: ,  BS today at 13:34 = 236 not previously admitted for DM/DKA Diabetic complications: gastroparesis, thyroid problem hypothyroidism, .      Psychiatric:     (+) psychiatric history depression      Infectious Disease:  - neg infectious disease ROS       Malignancy:      - no malignancy   Other:    (+) No chance of pregnancy C-spine cleared: N/A, H/O Chronic Pain,H/O chronic opiod use , no other significant disability                         Physical Exam  Normal systems: cardiovascular and pulmonary    Airway   Mallampati: II  TM distance: >3 FB  Neck ROM: limited    Dental   (+) missing and caps    Cardiovascular   Rhythm and rate: regular and normal      Pulmonary    breath sounds clear to auscultation            Lab Results   Component Value Date    WBC 11.7 (H) 01/29/2016    HGB 14.4 01/29/2016    HCT 40.9 01/29/2016     01/29/2016    CRP 6.7 01/21/2014    SED 31 (H) 01/21/2014     02/25/2019    POTASSIUM 4.8 02/25/2019    CHLORIDE 100 02/25/2019    CO2 29 02/25/2019     "BUN 10 02/25/2019    CR 0.80 02/25/2019     (H) 02/25/2019    EMILEE 9.2 02/25/2019    ALBUMIN 3.6 07/09/2018    PROTTOTAL 7.3 07/09/2018    ALT 31 07/09/2018    AST 21 07/09/2018    ALKPHOS 66 07/09/2018    BILITOTAL 0.3 07/09/2018    LIPASE 44 (L) 01/29/2016    INR 1.09 01/09/2014    TSH 0.12 (L) 02/15/2019    T4 1.63 (H) 02/15/2019       Preop Vitals  BP Readings from Last 3 Encounters:   02/28/19 124/89   02/25/19 134/76   02/25/19 134/76    Pulse Readings from Last 3 Encounters:   02/28/19 80   02/25/19 98   02/25/19 98      Resp Readings from Last 3 Encounters:   02/28/19 16   02/25/19 16   02/25/19 16    SpO2 Readings from Last 3 Encounters:   02/25/19 98%   02/25/19 98%   01/28/19 99%      Temp Readings from Last 1 Encounters:   02/28/19 98.4  F (36.9  C) (Oral)    Ht Readings from Last 1 Encounters:   02/28/19 1.727 m (5' 8\")      Wt Readings from Last 1 Encounters:   02/28/19 69.4 kg (153 lb)    Estimated body mass index is 23.26 kg/m  as calculated from the following:    Height as of 2/28/19: 1.727 m (5' 8\").    Weight as of 2/28/19: 69.4 kg (153 lb).       Anesthesia Plan      History & Physical Review  History and physical reviewed and following examination; no interval change.    ASA Status:  3 .        Plan for MAC with Propofol induction. Maintenance will be TIVA.  Reason for MAC:  Deep or markedly invasive procedure (G8)         Postoperative Care  Postoperative pain management:  IV analgesics and Oral pain medications.      Consents  Anesthetic plan, risks, benefits and alternatives discussed with:  Patient or representative and Patient.  Use of blood products discussed: No .   .                 CHEN Rivera CRNA  "

## 2019-03-15 NOTE — OP NOTE
CHIEF COMPLAINT:  Neck pain secondary to cervical spondylosis  INTERVAL HISTORY:     The history was discussed with the patient. I agree with above. Risks were discussed including risks of infection, bleeding, nerve injury, no help , or worse pain and they were willing to take these risks and proceed.  He failed conservative care including physical therapy and then underwent medial branch blocks which were both diagnostically positive.  He meets spinal injection Society criteria for denervation of these facet joints.  He also had his right side done several weeks ago and he is having significant pain relief from those levels that have been treated.    PROCEDURE: Left-sided radiofrequency ablation of the left third occipital nerve, left C3, left C4, left C5 medial branch radiofrequency ablation.  All under fluoroscopic guidance.  PROCEDURE DETAILS: After written informed consent was obtained from the patient, the patient was escorted to the procedure room.  The patient was placed in the sitting position. A time out was conducted to verify the patient identity, procedure to be performed, side, site, allergies and any special requirements.  The skin over the neck was prepped and draped in normal sterile fashion. Fluoroscopy was used to identify the mid point of the articular pillar with a lateral view.   The skin was anesthetized with 0.5 mL of 1% lidocaine with bicarbonate buffer.  I then advanced 318-gauge radiofrequency needles with 10 mm active tips into the waists of C3, C4, C5 as well as the junction of the C2-3 facet joint line from the 0 degree and 20 degree left-sided ipsilateral oblique angle.  AP and lateral films show proper needle placement away from the nerve roots and spinal cord.  Motor stimulation up to 2 V at 2 Hz ruled out nerve root involvement.  I then anesthetize each nerve with 1 cc of 2% lidocaine and then ablated each nerve at 80  C for 60 seconds.  Each nerve was ablated from 2 different  angles and 2 slightly overlapping lesions for a total of 4 lesions over each nerve.  He tolerated all this very well there were no apparent complications is fully denervated his left C2-3, left C3-4, and left C4-5 facet joints.  Once the lesioning was completed the needles were removed and sterile bandages placed over the needle insertion site.  He was brought to the recovery room in stable condition.  DIAGNOSIS:  1. Neck pain secondary to cervical spondylosis  PLAN:  1.  Completed a radiofrequency ablation of the left C2-3, C3-4, C4-5 facet joints.  He will follow-up with me on an as-needed basis.  The normal period of time for patients to derive benefit from this is 9-15 months.  Most patients will need to have this repeated roughly once per year to manage the pain.  He was discharged home with precaution for infection and bleeding and told to seek my medical attention should any of these symptoms develop.    Complications: None

## 2019-03-25 DIAGNOSIS — M47.812 SPONDYLOSIS OF CERVICAL JOINT WITHOUT MYELOPATHY: ICD-10-CM

## 2019-03-25 DIAGNOSIS — G89.4 CHRONIC PAIN SYNDROME: ICD-10-CM

## 2019-03-25 DIAGNOSIS — M48.02 CERVICAL STENOSIS OF SPINE: ICD-10-CM

## 2019-03-25 RX ORDER — OXYCODONE AND ACETAMINOPHEN 5; 325 MG/1; MG/1
1 TABLET ORAL EVERY 6 HOURS PRN
Qty: 12 TABLET | Refills: 0 | OUTPATIENT
Start: 2019-03-25

## 2019-03-25 NOTE — TELEPHONE ENCOUNTER
oxyCODONE-acetaminophen (PERCOCET) 5-325 MG tablet      Last Written Prescription Date:  3/15/2019  Last Fill Quantity: 18,   # refills: 0  Last Office Visit: 2/25/2019  Future Office visit:    Next 5 appointments (look out 90 days)    May 13, 2019  1:00 PM CDT  Return Visit with Frankie Best MD  [facility] ([address] [address]  [phone]           Routing refill request to provider for review/approval because:  Drug not on the [facility], [facility] or [facility] refill protocol or controlled substance

## 2019-03-25 NOTE — TELEPHONE ENCOUNTER
Looks like he has been getting this prescription from Dr. Edwards.  I am not really sure who is managing his pain.  Pain medication should come from only one provider.  I do not feel comfortable to prescribe his pain medication if he keeps getting pain medication from a different providers.

## 2019-03-25 NOTE — TELEPHONE ENCOUNTER
Reason for Call:  Medication or medication refill:    Do you use a [facility]?  Name of the pharmacy and phone number for the current request:  [facility] - [phone]    Name of the medication requested: oxyCODONE-acetaminophen (PERCOCET) 5-325 MG tablet    Other request: Ada calling in request for refill    Can we leave a detailed message on this number? Not Applicable    Phone number patient can be reached at: Cell number on file:    Telephone Information:   Mobile [phone]       Best Time: any     Call taken on 3/25/2019 at 8:50 AM by Elizabeth Duran

## 2019-03-27 ENCOUNTER — TRANSFERRED RECORDS (OUTPATIENT)
Dept: HEALTH INFORMATION MANAGEMENT | Facility: CLINIC | Age: 54
End: 2019-03-27

## 2019-04-01 NOTE — TELEPHONE ENCOUNTER
Wife calling to check the statis of this. C2C on file.  She states that he only got meds from Dr. Edwards after his neck procedure. He will not be getting any more from him. He is out. She states you manage his pain meds.   Thank you,  Sydni Moctezuma- Pt Rep.

## 2019-04-02 RX ORDER — OXYCODONE HYDROCHLORIDE 5 MG/1
5 TABLET ORAL 2 TIMES DAILY PRN
Qty: 60 TABLET | Refills: 0 | Status: SHIPPED | OUTPATIENT
Start: 2019-04-02 | End: 2019-05-30

## 2019-04-02 NOTE — TELEPHONE ENCOUNTER
Ada, Stone's wife, is calling for the 3rd time, Stone has been out of his pain medication for 2 days and asking that this be refilled today asap. Please notify Ada if you are going to fill this for him, [phone].

## 2019-04-03 ENCOUNTER — TELEPHONE (OUTPATIENT)
Dept: PODIATRY | Facility: CLINIC | Age: 54
End: 2019-04-03

## 2019-04-03 NOTE — TELEPHONE ENCOUNTER
LM for patient to schedule new patient evaluation        Leesa Ely    [facility]

## 2019-04-05 ENCOUNTER — TELEPHONE (OUTPATIENT)
Dept: FAMILY MEDICINE | Facility: CLINIC | Age: 54
End: 2019-04-05

## 2019-04-05 NOTE — TELEPHONE ENCOUNTER
Reason for Call:  Same Day Appointment, Requested Provider:  Ilan Raymond M.D.     PCP: Ilan Raymond    Reason for visit: preop 4/9/19 gastric stimulator replacement Dr Kristopher GONZALEZ    Duration of symptoms: N/A    Have you been treated for this in the past? Yes    Additional comments: Patient needs to be seen today 4/5/19 or Monday 4/8/2019.  No openings with any providers in [place]    Can we leave a detailed message on this number? YES    Phone number patient can be reached at: Cell number on file:    Telephone Information:   Mobile [phone]       Best Time:     Call taken on 4/5/2019 at 11:47 AM by Michelle Cerda

## 2019-04-05 NOTE — TELEPHONE ENCOUNTER
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
## 2019-04-10 ENCOUNTER — TELEPHONE (OUTPATIENT)
Dept: FAMILY MEDICINE | Facility: CLINIC | Age: 54
End: 2019-04-10

## 2019-04-10 ENCOUNTER — OFFICE VISIT (OUTPATIENT)
Dept: FAMILY MEDICINE | Facility: CLINIC | Age: 54
End: 2019-04-10
Payer: COMMERCIAL

## 2019-04-10 VITALS
WEIGHT: 152 LBS | OXYGEN SATURATION: 99 % | BODY MASS INDEX: 23.11 KG/M2 | HEART RATE: 96 BPM | DIASTOLIC BLOOD PRESSURE: 62 MMHG | RESPIRATION RATE: 14 BRPM | SYSTOLIC BLOOD PRESSURE: 120 MMHG | TEMPERATURE: 98.1 F

## 2019-04-10 DIAGNOSIS — M54.2 CHRONIC NECK PAIN: ICD-10-CM

## 2019-04-10 DIAGNOSIS — G89.29 CHRONIC NECK PAIN: ICD-10-CM

## 2019-04-10 DIAGNOSIS — Z01.818 PREOP GENERAL PHYSICAL EXAM: Primary | ICD-10-CM

## 2019-04-10 DIAGNOSIS — G89.4 CHRONIC PAIN SYNDROME: ICD-10-CM

## 2019-04-10 DIAGNOSIS — K31.84 GASTROPARESIS DUE TO DM (H): ICD-10-CM

## 2019-04-10 DIAGNOSIS — E11.43 GASTROPARESIS DUE TO DM (H): ICD-10-CM

## 2019-04-10 DIAGNOSIS — E10.43 TYPE 1 DIABETES MELLITUS WITH DIABETIC AUTONOMIC NEUROPATHY (H): ICD-10-CM

## 2019-04-10 LAB
ANION GAP SERPL CALCULATED.3IONS-SCNC: 6 MMOL/L (ref 3–14)
BUN SERPL-MCNC: 10 MG/DL (ref 7–30)
CALCIUM SERPL-MCNC: 9.1 MG/DL (ref 8.5–10.1)
CHLORIDE SERPL-SCNC: 99 MMOL/L (ref 94–109)
CO2 SERPL-SCNC: 28 MMOL/L (ref 20–32)
CREAT SERPL-MCNC: 0.92 MG/DL (ref 0.66–1.25)
GFR SERPL CREATININE-BSD FRML MDRD: >90 ML/MIN/{1.73_M2}
GLUCOSE SERPL-MCNC: 252 MG/DL (ref 70–99)
POTASSIUM SERPL-SCNC: 4.2 MMOL/L (ref 3.4–5.3)
SODIUM SERPL-SCNC: 133 MMOL/L (ref 133–144)

## 2019-04-10 PROCEDURE — 99214 OFFICE O/P EST MOD 30 MIN: CPT | Performed by: FAMILY MEDICINE

## 2019-04-10 PROCEDURE — 93000 ELECTROCARDIOGRAM COMPLETE: CPT | Performed by: FAMILY MEDICINE

## 2019-04-10 PROCEDURE — 36415 COLL VENOUS BLD VENIPUNCTURE: CPT | Performed by: FAMILY MEDICINE

## 2019-04-10 PROCEDURE — 80048 BASIC METABOLIC PNL TOTAL CA: CPT | Performed by: FAMILY MEDICINE

## 2019-04-10 ASSESSMENT — PAIN SCALES - GENERAL: PAINLEVEL: SEVERE PAIN (7)

## 2019-04-10 NOTE — TELEPHONE ENCOUNTER
----- Message from Ilan Chew Mai, MD sent at 4/10/2019  5:42 PM CDT -----  Please let patient know that his labs showed normal kidney function test and electrolytes.  Blood sugar was high.

## 2019-04-10 NOTE — PATIENT INSTRUCTIONS
Before Your Surgery      Call your surgeon if there is any change in your health. This includes signs of a cold or flu (such as a sore throat, runny nose, cough, rash or fever).    Do not smoke, drink alcohol or take over the counter medicine (unless your surgeon or primary care doctor tells you to) for the 24 hours before and after surgery.    If you take prescribed drugs: Follow your doctor s orders about which medicines to take and which to stop until after surgery.    Eating and drinking prior to surgery: follow the instructions from your surgeon    Take a shower or bath the night before surgery. Use the soap your surgeon gave you to gently clean your skin. If you do not have soap from your surgeon, use your regular soap. Do not shave or scrub the surgery site.  Wear clean pajamas and have clean sheets on your bed.         Please take your medications as prescribed except below:     No Aspirin or NSAID (Ibuprofen, Aleve, Motrin, Naproxen, ect.) for 7 days before the procedure  Decrease the insulin dose by 40-50% of the current dose while you are fasting  May take other meds as prescribed on am of procedure with small sips of water  Nothing to eat or drink except taking med with small sip of water for 8 hrs before the procedure.

## 2019-04-10 NOTE — TELEPHONE ENCOUNTER
We havent sent anything faxed yet as patient was just seen and nothing is finished we will fax when finished.     Maria Eugenia Johnson MA 4/10/2019

## 2019-04-10 NOTE — TELEPHONE ENCOUNTER
Reason for Call:  Other pre op     Detailed comments: Rajani calling from [facility] calling. They received the pre op via fax today. It is not completed. Please complete and fax again to [phone]    Phone Number Rajani can be reached at: [phone]    Best Time: any    Can we leave a detailed message on this number? YES    Call taken on 4/10/2019 at 2:37 PM by Sydni Moctezuma

## 2019-04-10 NOTE — PROGRESS NOTES
[address] [id number] [phone]  Dept: [phone]    PRE-OP EVALUATION:  Today's date: 4/10/2019     Stone De Paz (: 1965) presents for pre-operative evaluation assessment as requested by Dr. Summers.  He requires evaluation and anesthesia risk assessment prior to undergoing surgery/procedure for treatment of left abdominal wall gastric stimulator replacement     .    Fax number for surgical facility:   Primary Physician: Ilan Raymond  Type of Anesthesia Anticipated: General    Patient has a Health Care Directive or Living Will:  NO    Preop Questions 4/10/2019   Who is doing your surgery? Dr. Summers   What are you having done? [facility]   Date of Surgery/Procedure: 2019   Facility or Hospital where procedure/surgery will be performed: [facility], [facility]   1.  Do you have a history of Heart attack, stroke, stent, coronary bypass surgery, or other heart surgery? No   2.  Do you ever have any pain or discomfort in your chest? YES - due to thoracotomy - chronic and the same   3.  Do you have a history of  Heart Failure? No   4.   Are you troubled by shortness of breath when:  walking on a level surface, or up a slight hill, or at night? YES - winded easily due decondition and chronic pain   5.  Do you currently have a cold, bronchitis or other respiratory infection? No   6.  Do you have a cough, shortness of breath, or wheezing? No   7.  Do you sometimes get pains in the calves of your legs when you walk? YES - due to back pain - sciatica.  Chronic. No leg swelling   8. Do you or anyone in your family have previous history of blood clots? No   9.  Do you or does anyone in your family have a serious bleeding problem such as prolonged bleeding following surgeries or cuts? No   10. Have you ever had problems with anemia or been told to take iron pills? No   11. Have you had any abnormal blood loss such as black, tarry or bloody stools? No   12.  "Have you ever had a blood transfusion? No   13. Have you or any of your relatives ever had problems with anesthesia? No   14. Do you have sleep apnea, excessive snoring or daytime drowsiness? UNKNOWN - snores louds   15. Do you have any prosthetic heart valves? No   16. Do you have prosthetic joints? No         HPI:     HPI related to upcoming procedure:     Sotne is here today for pre-op physical. He is scheduled for left abdominal wall gastric stimulator replacement on 4/16/19 with Dr. Summesr at [facility].  He has DM gastroparesis and has had the gastric stimulator placement for years.  It is time to replace the new one.  It is expected to be the same day procedure with general anesthesia. No personal or family history of anesthesia complication or pre-matured CAD or MI.  He takes Budesonide as needed for his \"stomach inflammation\" and the last dose was 10 days ago.  He takes it rarely.  Not taking Aspirin or other form of blood thinner.  Last dose of NSAID was this morning.   Stated that he was well informed about the procedure and is ready to have the procedure done.    Prevalent chronic medical conditions include diabetes type 1, on insulin pump.  Last hemoglobin A1c couple months ago was 8.6.  His blood sugar has been stable in the range of 140-150s.  Complication from her diabetes including diabetic neuropathy and gastroparesis.  He also has exocrine pancreatic insufficiency and hypothyroidism.  Also has chronic pain and depression and and anxiety.  He takes Wellbutrin, Celexa and Oxycodone.  Overall, his chronic medical conditions are stable.    He generally is doing well and has no concern today. No headache or dizziness. No URI symptoms include running nose, nasal congestion, ST, coughing, fever or chill.  No chest pain or SOB.  No N/V/D/C and denied of having problem with urination.  Does not smoke and denied of having breathing problem.  No history of asthma or COPD.      MEDICAL " HISTORY:     Patient Active Problem List    Diagnosis Date Noted     Gastroesophageal reflux disease without esophagitis 02/26/2019     Priority: Medium     Type 1 diabetes mellitus with diabetic autonomic neuropathy (H) 08/07/2018     Priority: Medium     Insulin pump status 08/07/2018     Priority: Medium     Chronic pain syndrome 07/10/2018     Priority: Medium     Exocrine pancreatic insufficiency 11/24/2016     Priority: Medium     Mild major depression (H) 11/14/2014     Priority: Medium     Hypothyroidism      Priority: Medium     Dr Best       Gastroparesis due to DM (H)      Priority: Medium     gastric stimulator helps, [facility] manages that       Chronic neck pain      Priority: Medium     percocet for years now,        Cervical stenosis of spine 04/16/2012     Priority: Medium     Hyperlipidemia 06/06/2008     Priority: Medium      Past Medical History:   Diagnosis Date     Arthritis      Chronic neck pain     percocet for years now,      Depressive disorder      Gastroparesis      Gastroparesis due to DM (H)     gastric stimulator helps, [facility] manages that     Hyperlipidemia      Hypertension      Hypothyroidism      Neuropathy, diabetic (H)      Type 1 diabetes (H) age 31     Past Surgical History:   Procedure Laterality Date     C LUMBAR SPINE FUSION,ANTER APPRCH      2 L4-5 L5-21     COLONOSCOPY  07/18/12     FUSION CERVICAL ANTERIOR TWO LEVELS       INJECT BLOCK MEDIAL BRANCH CERVICAL/THORACIC/LUMBAR Bilateral 11/20/2018    Procedure: Bilateral Cervical 3-4-5 medial branch block;  Surgeon: Jef Edwards MD;  Location: [facility]     INJECT BLOCK MEDIAL BRANCH CERVICAL/THORACIC/LUMBAR N/A 1/11/2019    Procedure: Cervical 3,4,5 Bilateral Medial branch blocks;  Surgeon: Jef Edwards MD;  Location: [facility]     RADIO FREQUENCY ABLATION PULSED CERVICAL Right 2/28/2019    Procedure: RADIO FREQUENCY ABLATION CERVICAL THREE, FOUR, FIVE, AND THIRD OCCUPITAL NERVE RIGHT SIDE;  Surgeon: Jef Edwards MD;   Location: [facility]     RADIO FREQUENCY ABLATION PULSED CERVICAL Left 3/15/2019    Procedure: radiofrequency ablation cervical 3,4,5;  Surgeon: Jef Edwards MD;  Location: [facility]     THORACOSCOPIC DECORTICATION LUNG  1/9/2014    Procedure: THORACOSCOPIC DECORTICATION LUNG;  RIGHT VATS/RIGHT THORACOTOMY , DECORTICATION RIGHT LUNG ,WEDGE RESECTION RIGHT MIDDLE LOBE LUNG NODULE;  Surgeon: Harley Felix MD;  Location: [facility] OR     Current Outpatient Medications   Medication Sig Dispense Refill     amylase-lipase-protease (CREON 24) [address] units CPEP per EC capsule Take 1 capsule by mouth every other day       amylase-lipase-protease (PERTZYE) 29305 units CPEP Take 1 capsule by mouth every other day       budesonide (ENTOCORT EC) 3 MG EC capsule Take 3 mg by mouth every morning Take 1 or 2 capsules       buPROPion (WELLBUTRIN XL) 150 MG 24 hr tablet Take 1 tablet (150 mg) by mouth every morning 90 tablet 3     citalopram (CELEXA) 40 MG tablet Take 40 mg by mouth daily       CONTOUR NEXT TEST test strip Use to test blood sugar 5x times daily or as directed. 450 strip 3     cyclobenzaprine (FLEXERIL) 10 MG tablet Take 1 tablet (10 mg) by mouth 2 times daily as needed for muscle spasms 90 tablet 1     insulin aspart (NOVOLOG VIAL) 100 UNITS/ML injection Uses with insulin pump, total daily dose approx 45 units, E10.9 20 mL 11     levothyroxine (SYNTHROID/LEVOTHROID) 125 MCG tablet Take 1-tablet by mouth daily as directed 90 tablet 0     lisinopril (PRINIVIL,ZESTRIL) 20 MG tablet Take 40 mg by mouth daily        naproxen (NAPROSYN) 500 MG tablet Take 1 tablet (500 mg) by mouth 2 times daily as needed for moderate pain 180 tablet 1     omeprazole 20 MG tablet Take 1 tablet by mouth daily       ondansetron (ZOFRAN-ODT) 8 MG disintegrating tablet Take 1 tablet by mouth every 8 hours as needed       oxyCODONE (ROXICODONE) 5 MG tablet Take 1 tablet (5 mg) by mouth 2 times daily as needed for pain Should last for a  month.  Follow up before med runs out 60 tablet 0     ranitidine (ZANTAC) 150 MG tablet Take 1 tablet (150 mg) by mouth 2 times daily 180 tablet 3     simvastatin (ZOCOR) 40 MG tablet Take 1 tablet (40 mg) by mouth At Bedtime 90 tablet 1     SUMATRIPTAN SUCCINATE PO Take 100 mg by mouth 1/2 to 1 tablet by mouth at onset of headache. May repeat 1 dose after 2 hours if headache persists.       OTC products: None, except as noted above    No Known Allergies   Latex Allergy: NO    Social History     Tobacco Use     Smoking status: Former Smoker     Last attempt to quit: 2010     Years since quittin.2     Smokeless tobacco: Never Used   Substance Use Topics     Alcohol use: No     History   Drug Use No       REVIEW OF SYSTEMS:   Constitutional, neuro, ENT, endocrine, pulmonary, cardiac, gastrointestinal, genitourinary, musculoskeletal, integument and psychiatric systems are negative, except as otherwise noted.    EXAM:   /62 (BP Location: Left arm, Patient Position: Sitting, Cuff Size: Adult Regular)   Pulse 96   Temp 98.1  F (36.7  C) (Temporal)   Resp 14   Wt 68.9 kg (152 lb)   SpO2 99%   BMI 23.11 kg/m        GENERAL APPEARANCE: healthy, alert and no distress     EYES: EOMI,- PERRL     HENT: ear canals and TM's normal. Nares are non-congested. Oropharynx is pink and moist, no ulcers or lesions. No tender with palpation to the sinuses.     NECK: no adenopathy, no asymmetry.  Tender with palpation to the cervical spine and its para-spinous muscle bilaterally - chronic     RESP: lungs clear to auscultation - no rales, rhonchi or wheezes     CV: regular rates and rhythm and no murmur.     ABDOMEN:  soft, nontender, nondistended with no palpable masses and bowel sounds normal     MS: extremities normal- no gross deformities noted.  No edema.  All 4 extremities are equally in strength.     NEURO: Normal strength and tone,  mentation intact and speech normal.  No focal neurological deficit     PSYCH:  mentation appears normal. and affect anxious         DIAGNOSTICS:     EKG: Normal sinus rhythm, no ST elevations or depression.    Labs Resulted Today:   Results for orders placed or performed in visit on 04/10/19   Basic metabolic panel  (Ca, Cl, CO2, Creat, Gluc, K, Na, BUN)   Result Value Ref Range    Sodium 133 133 - 144 mmol/L    Potassium 4.2 3.4 - 5.3 mmol/L    Chloride 99 94 - 109 mmol/L    Carbon Dioxide 28 20 - 32 mmol/L    Anion Gap 6 3 - 14 mmol/L    Glucose 252 (H) 70 - 99 mg/dL    Urea Nitrogen 10 7 - 30 mg/dL    Creatinine 0.92 0.66 - 1.25 mg/dL    GFR Estimate >90 >60 mL/min/[1.73_m2]    GFR Estimate If Black >90 >60 mL/min/[1.73_m2]    Calcium 9.1 8.5 - 10.1 mg/dL     Labs Drawn and in Process:   Unresulted Labs Ordered in the Past 30 Days of this Admission     No orders found from 2/9/2019 to 4/11/2019.          Recent Labs   Lab Test 02/25/19  1146 02/15/19  1622 09/21/18  1355  01/29/16  1611 01/21/14  1400  01/09/14  1009  12/24/13  1235   HGB  --   --   --   --  14.4 11.2*   < >  --    < >  --    PLT  --   --   --   --  259 786*   < >  --    < >  --    INR  --   --   --   --   --   --   --  1.09  --  1.1    131*  --    < > 135  --    < >  --    < >  --    POTASSIUM 4.8 4.5  --    < > 4.3  --    < >  --    < >  --    CR 0.80 0.88  --    < > 0.78 0.80   < >  --    < >  --    A1C  --  8.6* 8.6*   < >  --   --   --   --    < >  --     < > = values in this interval not displayed.        IMPRESSION:   Reason for surgery/procedure: DM Gastroparesis  Diagnosis/reason for consult: pre-op physical to evaluate for anesthesia and its jayy-operative risks.    The proposed surgical procedure is considered INTERMEDIATE risk.    REVISED CARDIAC RISK INDEX  The patient has the following serious cardiovascular risks for perioperative complications such as (MI, PE, VFib and 3  AV Block):  Diabetes Mellitus (on Insulin)  INTERPRETATION: 1 risks: Class II (low risk - 0.9% complication rate)    The patient has  the following additional risks for perioperative complications:  High tolerance to opioid analgesics due to on pain med chronically for neck pain      ICD-10-CM    1. Preop general physical exam Z01.818 EKG 12-lead complete w/read - Clinics     Basic metabolic panel  (Ca, Cl, CO2, Creat, Gluc, K, Na, BUN)   2. Gastroparesis due to DM (H) E11.43     K31.84    3. Chronic neck pain M54.2     G89.29    4. Chronic pain syndrome G89.4    5. Type 1 diabetes mellitus with diabetic autonomic neuropathy (H) E10.43 EKG 12-lead complete w/read - Clinics     Basic metabolic panel  (Ca, Cl, CO2, Creat, Gluc, K, Na, BUN)       RECOMMENDATIONS:     --Consult hospital rounder / IM to assist post-op medical management if inpatient is required - DM, GERD, hypothyroidism, depression and chronic pain.    --No history of DVT or PE history, DVT/PE prophylaxis post the surgeon's recommendation.  Recommended to consider leg pumps during and after the procedure until he is fully ambulatory.    Cardiovascular Risk  No cardiovascular risk identified except of insulin-dependent diabetes which is fairly stable; his last hemoglobin A1c was  8.6 couple months ago.  Blood pressure has been normal and stable.  No history of MI/CAD.  No further cardiac workup is needed for this procedure.      Pulmonary Risk  No pulmonary risk identified.  He never smokes and there is no history of asthma or COPD.  No breathing problems.      Obstructive Sleep Apnea (or suspected sleep apnea)  Hospital staff are advised to monitor for sleep related oxygen desaturations due to suspicion of MASON.  He reports symptoms of sleep apnea.      Anemia  No histories of anemia or blood transfusion.  He is okay to be transfused if necessary.      --Patient is to take all scheduled medications on the day of surgery EXCEPT for modifications listed below.    Please take your medications as prescribed except below:  No Aspirin or NSAID (Ibuprofen, Aleve, Motrin, Naproxen, ect.) for 7  days before the procedure  Decrease the insulin dose by 40-50% of the current dose while you are fasting  May take other meds as prescribed on am of procedure with small sips of water  Nothing to eat or drink except taking med with small sip of water for 8 hrs before the procedure.    APPROVAL GIVEN to proceed with proposed procedure, without further diagnostic evaluation     Stone is overall doing well.  He is clear for the procedure as scheduled.  No further work up is needed.  Reviewed his medication and instruction as above. A written instruction was given. May resume all of his medications after the surgery.  All of his questions were answered.       Signed Electronically by: Ilan Chew Mai, MD    Copy of this evaluation report is provided to requesting physician.    [facility] Preop Guidelines    Revised Cardiac Risk Index

## 2019-04-11 ENCOUNTER — OFFICE VISIT (OUTPATIENT)
Dept: PODIATRY | Facility: CLINIC | Age: 54
End: 2019-04-11
Attending: FAMILY MEDICINE
Payer: COMMERCIAL

## 2019-04-11 VITALS
TEMPERATURE: 98.8 F | WEIGHT: 150.5 LBS | DIASTOLIC BLOOD PRESSURE: 80 MMHG | BODY MASS INDEX: 22.81 KG/M2 | SYSTOLIC BLOOD PRESSURE: 132 MMHG | HEIGHT: 68 IN

## 2019-04-11 DIAGNOSIS — G89.4 CHRONIC PAIN SYNDROME: ICD-10-CM

## 2019-04-11 DIAGNOSIS — M79.18 MYOFASCIAL PAIN: ICD-10-CM

## 2019-04-11 DIAGNOSIS — G89.12 POST-THORACOTOMY PAIN: ICD-10-CM

## 2019-04-11 DIAGNOSIS — M54.50 CHRONIC BILATERAL LOW BACK PAIN WITHOUT SCIATICA: ICD-10-CM

## 2019-04-11 DIAGNOSIS — F11.90 CHRONIC, CONTINUOUS USE OF OPIOIDS: ICD-10-CM

## 2019-04-11 DIAGNOSIS — M54.2 CERVICALGIA: Primary | ICD-10-CM

## 2019-04-11 DIAGNOSIS — G89.29 CHRONIC BILATERAL LOW BACK PAIN WITHOUT SCIATICA: ICD-10-CM

## 2019-04-11 PROCEDURE — 99000 SPECIMEN HANDLING OFFICE-LAB: CPT | Performed by: PHYSICIAN ASSISTANT

## 2019-04-11 PROCEDURE — 99204 OFFICE O/P NEW MOD 45 MIN: CPT | Performed by: PHYSICIAN ASSISTANT

## 2019-04-11 PROCEDURE — 80307 DRUG TEST PRSMV CHEM ANLYZR: CPT | Mod: 90 | Performed by: PHYSICIAN ASSISTANT

## 2019-04-11 RX ORDER — TIZANIDINE 2 MG/1
2 TABLET ORAL 3 TIMES DAILY
Qty: 60 TABLET | Refills: 0 | Status: SHIPPED | OUTPATIENT
Start: 2019-04-11 | End: 2019-07-02

## 2019-04-11 ASSESSMENT — PAIN SCALES - GENERAL: PAINLEVEL: MODERATE PAIN (5)

## 2019-04-11 ASSESSMENT — MIFFLIN-ST. JEOR: SCORE: 1507.16

## 2019-04-11 NOTE — PATIENT INSTRUCTIONS
After Visit Instructions:     Thank you for coming to [facility] for your care. It is my goal to partner with you to help you reach your optimal state of health.     I am recommending multidisciplinary care at this time.  The focus of care will be to continue gradual rehabilitation and pain management with medication adjustments as needed.    Continue daily self-care, identifying contributing factors, and monitoring variations in pain level. Continue to integrate self-care into your life.      1. Schedule pain psychology assessment/visit: Try guided meditation for 3-10 minutes/day. There is an catie called ReviewZAP that can assist with this.   2. Schedule physical therapy assessment/visit: Not at this time, but we can consider in future.   3. Schedule follow-up with Natasha Padgett PA-C in 3 weeks. You will need to make this appointment.   4. Labs: urine drug screen today  5. Procedures recommended: give your nerve burning procedure more time to work.    6. Medication recommendations:   1. Tizanidine 2mg-take 1 tablet 3 times a day as needed  2. Continue Oxycodone 5mg 1-2/day  3. Try 1-2 Lidocaine patches on chest area. On for 12 hours off for 12 hours      Natasha Padgett PA-C  [facility]  [facility]    Contact information: [facility]  Clinic Number:  [phone]   Call this number with any questions about your care and for scheduling assistance. Calls are returned Monday through Friday between 8 AM and 4:30 PM. We usually get back to you within 2 business days depending on the issue/request.           Medication Refills Policy:    For non-narcotic medications, please your pharmacy directly to request a refill and the pharmacy will call the [facility] for authorization. Please allow 3-4 days for these refills.    For narcotic refills, call the nurse triage line and leave a message requesting your refill along with the name of  the pharmacy that you use. Narcotic prescriptions will be mailed to your pharmacy or you may pick them up at the clinic.  Please call 7-10 days before your refill is due  The above policy allows adequate time so that you do not run out of medication.    No Show - Late Cancellation - Late Arrival Policy  We believe regular attendance is key to your success in our program.    Any time you are unable to keep your appointment we ask that you call us at least 24 hours in advance to let us know. This will allow us to offer the appointment time to another patient. The following is our policy for missed appointments. This also applies to appointments cancelled with less than 24 hours notice.    After missing 3 appointments without calling first, we will cancel all of your future appointments at [facility].    At that point, you will not be able to resume services unless approved by your care team  We understand that unforseen circumstances arise, however, out of respect for all concerned and to provide this appointment to another patient, this policy will be enforced.    Please note that most follow up appointments are 30 minutes long. If you arrive late, your provider may not be able to see you for the entire 30 minutes. Please also note that if you arrive more than 15 minutes for any appointment, it may be rescheduled.
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
## 2019-04-16 LAB — PAIN DRUG SCR UR W RPTD MEDS: NORMAL

## 2019-05-01 NOTE — PROGRESS NOTES
"[facility]    CHIEF COMPLAINT:   Chronic neck pain    INTERVAL HISTORY:  Last seen on 4/11/19.        Recommendations/plan at the last visit included:  1. Physical Therapy:  Not at this time, but we will readdress  2. Clinical Health Psychologist to address issues of relaxation, behavorial change, coping style, and other factors important to improvement: not at this time, but I would like him to try guided meditation daily  3. Diagnostic Studies:  None at this time  4. Medication Management:   1. Continue his Oxycodone as previously prescribed  2. We can try Tizanidine 2mg three times a day as needed for muscle pain/spasms  3. He can discuss with his gastroenterologist or endocrinologist regarding the medical marijuana. I educated him that I certify for medical marijuana for intractable pain. I cannot certify him at this time for intractable pain as he has other options for pain management at this time. If he feels he benefits from it for his gastroparesis/nausea, he would need to speak with his gastroenterologist    5. Urine toxicology screen today-positive for oxycodone and THC (recreational)  6. Potential procedures: give the cervical RFA more time. He was educated that it can take up to 8 weeks for full benefit  7. Recommendations to PCP: I will take over the Oxycodone after I get his urine drug screen back.      Follow up: 3 Weeks     Since his last visit, Stone De Paz reports:    - His stomach is doing better. His neck is doing better as well. The \"burning\" pain is dying down. He states that things are doing well. He is still having issues with his left shoulder. His hat blew off his head and states that he went to grab it and it caused a lot of pain. He would like to see ortho for this. Now that his stomach is better, he needs to work on his blood sugars. He sees endocrinology this month.     He is finding that if he takes the Oxycodone if making him really itchy. He states that it has " "been driving him crazy. He states that he the continuous glucose company has recommended that he avoid acetaminophen if possible. He states that if he has Percocet, he does not get the itching. He was told that he can take acetaminophen but to keep it at lower doses and to recalibrate his glucose monitor.     Pain Information:   Pain quality: Shooting    Pain rating: intensity ranges from 2/10 to 8/10, and averages 6/10 on a 0-10 scale.   Pain today 2/10    SELF CARE:   How often do you practice SELF-CARE (relaxing, stretching, pacing, monitoring posture, taking mini-breaks) in a typical day:  daily    Annual Controlled Substance Agreement: signed 5/2/2019  UDS: 4/11/2019-reviewed and as expected    CURRENT RELEVANT PAIN MEDICATIONS:   Naproxen 500mg-taking 1/day  Oxycodone 5mg-taking 1/day-occasionally 2/day.   Imitrex-last used prior to the neck procedures    Patient is using the medication as prescribed:  YES  Is your medication helpful? YES   Medication side effects? itching    Previous Medications: (H--helped; HI--Helped initially; SWH-- somewhat helpful, NH--No help; W--worse; SE--side effects)   Opiates: Oxycodone H, Tylenol #3 NH, Hydrocodone NH, Dilaudid H SE \"ick\", Morphine H SE nausea, Tramadol NH  NSAIDS: Naproxen H SE stomach upset  Muscle Relaxants: Flexeril H SE incontinence  Anti-migraine mediations: Imitrex H  Anti-depressants: Amitriptyline NH SE rapid heart  Sleep aids: none  Anxiolytics: Valium [facility]  Neuropathics: Gabapentin NH SE \"goofy\", Lyrica ? Was too expensive  Topicals: Voltaren Gel NH, Lidocaine NH  Other medications not covered above: Tylenol H, Prednisone H SE high sugars, Medical marijuana H SE cost constraints        Past Pain Treatments:  Pain Clinic:   No   PT: Yes, last went this year  Psychologist: No, but he did have to do some psychology prior to his back surgeryes  Relaxation techniques/biofeedback: No, but tries to do meditation at times   Chiropractor: No  Acupuncture: " No  Pharmacotherapy:               Opioids: Yes                Non-opioids:    Yes   TENs Unit:Yes, was not helpful  Injections: Yes, right 3rd occipital nerve and right C3, C4, C5 RFA 02/28/2019, left 3rd occipital nerve, and left C3, C4, C5 RFA 03/15/2019. Had done low back injections in [place] as well (steroids mess with sugars therefore tries to avoid if possible).   Self-care:   Yes, cold works better (heat worsens pain)  Surgeries related to pain: Yes, C5-6 ACDF 2012. Disk replacement cervical spine 2006, low back fusions (2000, 2002)        [place] Board of Pharmacy Data Base Reviewed:    YES; As expected, no concern for misuse/abuse of controlled medications based on this report.        THE 4 As OF OPIOID MAINTENANCE ANALGESIA    Analgesia: Is pain relief clinically significant? YES   Activity: Is patient functional and able to perform Activities of Daily Living? YES   Adverse effects: Is patient free from adverse side effects from opiates? NO   Adherence to Rx protocol: Is patient adhering to Controlled Substance Agreement and taking medications ONLY as ordered? YES       Is Narcan prescribed for opiate use >50 MME daily? N/A      Daily MME: 7.5-15    Medications:  Current Outpatient Medications   Medication Sig Dispense Refill     amylase-lipase-protease (CREON 24) 87872-13951 units CPEP per EC capsule Take 1 capsule by mouth every other day       amylase-lipase-protease (PERTZYE) 53110 units CPEP Take 1 capsule by mouth every other day       budesonide (ENTOCORT EC) 3 MG EC capsule Take 3 mg by mouth every morning Take 1 or 2 capsules       buPROPion (WELLBUTRIN XL) 150 MG 24 hr tablet Take 1 tablet (150 mg) by mouth every morning 90 tablet 3     citalopram (CELEXA) 40 MG tablet Take 40 mg by mouth daily       CONTOUR NEXT TEST test strip Use to test blood sugar 5x times daily or as directed. 450 strip 3     insulin aspart (NOVOLOG VIAL) 100 UNITS/ML injection Uses with insulin pump, total daily dose  "approx 45 units, E10.9 20 mL 11     levothyroxine (SYNTHROID/LEVOTHROID) 125 MCG tablet Take 1-tablet by mouth daily as directed 90 tablet 0     lisinopril (PRINIVIL,ZESTRIL) 20 MG tablet Take 40 mg by mouth daily        naproxen (NAPROSYN) 500 MG tablet Take 1 tablet (500 mg) by mouth 2 times daily as needed for moderate pain 180 tablet 1     omeprazole 20 MG tablet Take 1 tablet by mouth daily       ondansetron (ZOFRAN-ODT) 8 MG disintegrating tablet Take 1 tablet by mouth every 8 hours as needed       oxyCODONE (ROXICODONE) 5 MG tablet Take 1 tablet (5 mg) by mouth 2 times daily as needed for pain Should last for a month.  Follow up before med runs out 60 tablet 0     ranitidine (ZANTAC) 150 MG tablet Take 1 tablet (150 mg) by mouth 2 times daily 180 tablet 3     simvastatin (ZOCOR) 40 MG tablet Take 1 tablet (40 mg) by mouth At Bedtime 90 tablet 1     SUMATRIPTAN SUCCINATE PO Take 100 mg by mouth 1/2 to 1 tablet by mouth at onset of headache. May repeat 1 dose after 2 hours if headache persists.       tiZANidine (ZANAFLEX) 2 MG tablet Take 1 tablet (2 mg) by mouth 3 times daily 60 tablet 0       Review of Systems: A 10-point review of systems was negative, with the exception of chronic pain issues.      Social History: Reviewed; unchanged from previous consultation.      Family history: Reviewed; unchanged from previous consultation.     PHYSICAL EXAM:     Vitals:   /68   Temp 98.5  F (36.9  C) (Temporal)   Ht 1.727 m (5' 8\")   Wt 68.5 kg (151 lb)   BMI 22.96 kg/m    Body mass index is 22.96 kg/m .  5' 8\"  151 lbs 0 oz      Constitutional: healthy, alert and no distress  HEENT: Head atraumatic, normocephalic. Eyes without conjunctival injection or jaundice. Neck supple. No obvious neck masses.  Skin: No rash, lesions, or petechiae of exposed skin.   Psychiatric/mental status: Alert, without lethargy or stupor. Appropriate affect. Mood normal.     DIAGNOSTIC TESTS:  Imaging Studies:   No new imaging to " review    Assessment:  Stone De Paz is a 53 year old male who presents today for follow up regarding his:    1.  Cervicalgia  2.  Myofascial pain  3.  Chronic low back pain  4.  Chronic pain syndrome  5. Chronic use of opioids      Mood and pain has been under better control.  He is also feeling significantly better now that he is no longer having the nausea vomiting issue as his gastroparesis is under better control.  He has been finding that since he is taking the oxycodone without the acetaminophen he has a lot of itching.  We did look up the user guide for his glucose monitoring system and it does not recommend using acetaminophen due to giving falsely elevated glucose readings.  Discussed with the patient that if he were to take a Percocet twice a day that means he would likely have a steady state of acetaminophen within his system therefore his glucose monitor that likely would be accurate.  I am not comfortable prescribing him and acetaminophen containing prescription.  He verbalizes understanding.  We talked about different options  I think we could try tramadol.  He is open to this.  We talked about the dosing differences between oxycodone and tramadol.    Plan:    Diagnosis reviewed, treatment option addressed, and risk/benifits discussed.  Self-care instructions given.  I am recommending a multidisciplinary treatment plan to help this patient better manage pain.      1. Physical Therapy:  NO   2. Clinical Health Psychologist:  NO    3. Diagnostic Studies:  None at this time  4. Medication Management:    1. Stop Oxycodone  2. Tramadol 50mg- take 1-2 tablets twice daily as needed for severe pain. Max of 4 tablets/day.   5. Recommendations to PCP. I will take over pain prescritpions  6. Opioid contract today.   7. Urine drug screen next visit to monitor THC levels.     Follow up with this provider:  4 Weeks     Total time spent face to face was 30 minutes and more than 50% of face to face time was spent in  counseling and/or coordination of care regarding the diagnosis and recommendations above.      Natasha Pagdett PA-C   [facility]

## 2019-05-02 ENCOUNTER — OFFICE VISIT (OUTPATIENT)
Dept: PODIATRY | Facility: CLINIC | Age: 54
End: 2019-05-02
Payer: COMMERCIAL

## 2019-05-02 VITALS
WEIGHT: 151 LBS | DIASTOLIC BLOOD PRESSURE: 68 MMHG | BODY MASS INDEX: 22.88 KG/M2 | HEIGHT: 68 IN | SYSTOLIC BLOOD PRESSURE: 124 MMHG | TEMPERATURE: 98.5 F

## 2019-05-02 DIAGNOSIS — G89.29 CHRONIC BILATERAL LOW BACK PAIN WITHOUT SCIATICA: ICD-10-CM

## 2019-05-02 DIAGNOSIS — M54.2 CERVICALGIA: ICD-10-CM

## 2019-05-02 DIAGNOSIS — M79.18 MYOFASCIAL PAIN: ICD-10-CM

## 2019-05-02 DIAGNOSIS — F11.90 CHRONIC, CONTINUOUS USE OF OPIOIDS: ICD-10-CM

## 2019-05-02 DIAGNOSIS — M54.50 CHRONIC BILATERAL LOW BACK PAIN WITHOUT SCIATICA: ICD-10-CM

## 2019-05-02 DIAGNOSIS — G89.4 CHRONIC PAIN SYNDROME: Primary | ICD-10-CM

## 2019-05-02 PROCEDURE — 99214 OFFICE O/P EST MOD 30 MIN: CPT | Performed by: PHYSICIAN ASSISTANT

## 2019-05-02 RX ORDER — TRAMADOL HYDROCHLORIDE 50 MG/1
TABLET ORAL
Qty: 75 TABLET | Refills: 0 | Status: SHIPPED | OUTPATIENT
Start: 2019-05-02 | End: 2019-05-30

## 2019-05-02 ASSESSMENT — MIFFLIN-ST. JEOR: SCORE: 1509.43

## 2019-05-02 ASSESSMENT — PAIN SCALES - GENERAL: PAINLEVEL: MILD PAIN (2)

## 2019-05-02 NOTE — LETTER
"    5/2/2019         RE: Stone De Paz  [address] [address] [place] [id number]        Dear Colleague,    Thank you for referring your patient, Stone De Paz, to the [facility]. Please see a copy of my visit note below.    [facility]    CHIEF COMPLAINT:   Chronic neck pain    INTERVAL HISTORY:  Last seen on 4/11/19.        Recommendations/plan at the last visit included:  1. Physical Therapy:  Not at this time, but we will readdress  2. Clinical Health Psychologist to address issues of relaxation, behavorial change, coping style, and other factors important to improvement: not at this time, but I would like him to try guided meditation daily  3. Diagnostic Studies:  None at this time  4. Medication Management:   1. Continue his Oxycodone as previously prescribed  2. We can try Tizanidine 2mg three times a day as needed for muscle pain/spasms  3. He can discuss with his gastroenterologist or endocrinologist regarding the medical marijuana. I educated him that I certify for medical marijuana for intractable pain. I cannot certify him at this time for intractable pain as he has other options for pain management at this time. If he feels he benefits from it for his gastroparesis/nausea, he would need to speak with his gastroenterologist    5. Urine toxicology screen today-positive for oxycodone and THC (recreational)  6. Potential procedures: give the cervical RFA more time. He was educated that it can take up to 8 weeks for full benefit  7. Recommendations to PCP: I will take over the Oxycodone after I get his urine drug screen back.      Follow up: 3 Weeks     Since his last visit, Stone De Paz reports:    - His stomach is doing better. His neck is doing better as well. The \"burning\" pain is dying down. He states that things are doing well. He is still having issues with his left shoulder. His hat blew off his head and states that he went to grab it and it caused a lot of pain. He " "would like to see ortho for this. Now that his stomach is better, he needs to work on his blood sugars. He sees endocrinology this month.     He is finding that if he takes the Oxycodone if making him really itchy. He states that it has been driving him crazy. He states that he the continuous glucose company has recommended that he avoid acetaminophen if possible. He states that if he has Percocet, he does not get the itching. He was told that he can take acetaminophen but to keep it at lower doses and to recalibrate his glucose monitor.     Pain Information:   Pain quality: Shooting    Pain rating: intensity ranges from 2/10 to 8/10, and averages 6/10 on a 0-10 scale.   Pain today 2/10    SELF CARE:   How often do you practice SELF-CARE (relaxing, stretching, pacing, monitoring posture, taking mini-breaks) in a typical day:  daily    Annual Controlled Substance Agreement: signed 5/2/2019  UDS: 4/11/2019-reviewed and as expected    CURRENT RELEVANT PAIN MEDICATIONS:   Naproxen 500mg-taking 1/day  Oxycodone 5mg-taking 1/day-occasionally 2/day.   Imitrex-last used prior to the neck procedures    Patient is using the medication as prescribed:  YES  Is your medication helpful? YES   Medication side effects? itching    Previous Medications: (H--helped; HI--Helped initially; SWH-- somewhat helpful, NH--No help; W--worse; SE--side effects)   Opiates: Oxycodone H, Tylenol #3 NH, Hydrocodone NH, Dilaudid H SE \"ick\", Morphine H SE nausea, Tramadol NH  NSAIDS: Naproxen H SE stomach upset  Muscle Relaxants: Flexeril H SE incontinence  Anti-migraine mediations: Imitrex H  Anti-depressants: Amitriptyline NH SE rapid heart  Sleep aids: none  Anxiolytics: Valium [facility]  Neuropathics: Gabapentin NH SE \"goofy\", Lyrica ? Was too expensive  Topicals: Voltaren Gel NH, Lidocaine NH  Other medications not covered above: Tylenol H, Prednisone H SE high sugars, Medical marijuana H SE cost constraints        Past Pain Treatments:  Pain Clinic:  "  No   PT: Yes, last went this year  Psychologist: No, but he did have to do some psychology prior to his back surgeryes  Relaxation techniques/biofeedback: No, but tries to do meditation at times   Chiropractor: No  Acupuncture: No  Pharmacotherapy:               Opioids: Yes                Non-opioids:    Yes   TENs Unit:Yes, was not helpful  Injections: Yes, right 3rd occipital nerve and right C3, C4, C5 RFA 02/28/2019, left 3rd occipital nerve, and left C3, C4, C5 RFA 03/15/2019. Had done low back injections in [place] as well (steroids mess with sugars therefore tries to avoid if possible).   Self-care:   Yes, cold works better (heat worsens pain)  Surgeries related to pain: Yes, C5-6 ACDF 2012. Disk replacement cervical spine 2006, low back fusions (2000, 2002)        [place] Board of Pharmacy Data Base Reviewed:    YES; As expected, no concern for misuse/abuse of controlled medications based on this report.        THE 4 As OF OPIOID MAINTENANCE ANALGESIA    Analgesia: Is pain relief clinically significant? YES   Activity: Is patient functional and able to perform Activities of Daily Living? YES   Adverse effects: Is patient free from adverse side effects from opiates? NO   Adherence to Rx protocol: Is patient adhering to Controlled Substance Agreement and taking medications ONLY as ordered? YES       Is Narcan prescribed for opiate use >50 MME daily? N/A      Daily MME: 7.5-15    Medications:  Current Outpatient Medications   Medication Sig Dispense Refill     amylase-lipase-protease (CREON 24) 25442-61141 units CPEP per EC capsule Take 1 capsule by mouth every other day       amylase-lipase-protease (PERTZYE) 70007 units CPEP Take 1 capsule by mouth every other day       budesonide (ENTOCORT EC) 3 MG EC capsule Take 3 mg by mouth every morning Take 1 or 2 capsules       buPROPion (WELLBUTRIN XL) 150 MG 24 hr tablet Take 1 tablet (150 mg) by mouth every morning 90 tablet 3     citalopram (CELEXA) 40 MG  "tablet Take 40 mg by mouth daily       CONTOUR NEXT TEST test strip Use to test blood sugar 5x times daily or as directed. 450 strip 3     insulin aspart (NOVOLOG VIAL) 100 UNITS/ML injection Uses with insulin pump, total daily dose approx 45 units, E10.9 20 mL 11     levothyroxine (SYNTHROID/LEVOTHROID) 125 MCG tablet Take 1-tablet by mouth daily as directed 90 tablet 0     lisinopril (PRINIVIL,ZESTRIL) 20 MG tablet Take 40 mg by mouth daily        naproxen (NAPROSYN) 500 MG tablet Take 1 tablet (500 mg) by mouth 2 times daily as needed for moderate pain 180 tablet 1     omeprazole 20 MG tablet Take 1 tablet by mouth daily       ondansetron (ZOFRAN-ODT) 8 MG disintegrating tablet Take 1 tablet by mouth every 8 hours as needed       oxyCODONE (ROXICODONE) 5 MG tablet Take 1 tablet (5 mg) by mouth 2 times daily as needed for pain Should last for a month.  Follow up before med runs out 60 tablet 0     ranitidine (ZANTAC) 150 MG tablet Take 1 tablet (150 mg) by mouth 2 times daily 180 tablet 3     simvastatin (ZOCOR) 40 MG tablet Take 1 tablet (40 mg) by mouth At Bedtime 90 tablet 1     SUMATRIPTAN SUCCINATE PO Take 100 mg by mouth 1/2 to 1 tablet by mouth at onset of headache. May repeat 1 dose after 2 hours if headache persists.       tiZANidine (ZANAFLEX) 2 MG tablet Take 1 tablet (2 mg) by mouth 3 times daily 60 tablet 0       Review of Systems: A 10-point review of systems was negative, with the exception of chronic pain issues.      Social History: Reviewed; unchanged from previous consultation.      Family history: Reviewed; unchanged from previous consultation.     PHYSICAL EXAM:     Vitals:   /68   Temp 98.5  F (36.9  C) (Temporal)   Ht 1.727 m (5' 8\")   Wt 68.5 kg (151 lb)   BMI 22.96 kg/m     Body mass index is 22.96 kg/m .  5' 8\"  151 lbs 0 oz      Constitutional: healthy, alert and no distress  HEENT: Head atraumatic, normocephalic. Eyes without conjunctival injection or jaundice. Neck supple. No " obvious neck masses.  Skin: No rash, lesions, or petechiae of exposed skin.   Psychiatric/mental status: Alert, without lethargy or stupor. Appropriate affect. Mood normal.     DIAGNOSTIC TESTS:  Imaging Studies:   No new imaging to review    Assessment:  Stone De Paz is a 53 year old male who presents today for follow up regarding his:    1.  Cervicalgia  2.  Myofascial pain  3.  Chronic low back pain  4.  Chronic pain syndrome  5. Chronic use of opioids      Mood and pain has been under better control.  He is also feeling significantly better now that he is no longer having the nausea vomiting issue as his gastroparesis is under better control.  He has been finding that since he is taking the oxycodone without the acetaminophen he has a lot of itching.  We did look up the user guide for his glucose monitoring system and it does not recommend using acetaminophen due to giving falsely elevated glucose readings.  Discussed with the patient that if he were to take a Percocet twice a day that means he would likely have a steady state of acetaminophen within his system therefore his glucose monitor that likely would be accurate.  I am not comfortable prescribing him and acetaminophen containing prescription.  He verbalizes understanding.  We talked about different options  I think we could try tramadol.  He is open to this.  We talked about the dosing differences between oxycodone and tramadol.    Plan:    Diagnosis reviewed, treatment option addressed, and risk/benifits discussed.  Self-care instructions given.  I am recommending a multidisciplinary treatment plan to help this patient better manage pain.      1. Physical Therapy:  NO   2. Clinical Health Psychologist:  NO    3. Diagnostic Studies:  None at this time  4. Medication Management:    1. Stop Oxycodone  2. Tramadol 50mg- take 1-2 tablets twice daily as needed for severe pain. Max of 4 tablets/day.   5. Recommendations to PCP. I will take over pain  prescritpions  6. Opioid contract today.   7. Urine drug screen next visit to monitor THC levels.     Follow up with this provider:  4 Weeks     Total time spent face to face was 30 minutes and more than 50% of face to face time was spent in counseling and/or coordination of care regarding the diagnosis and recommendations above.      Natasha Padgett PA-C   [facility]          Again, thank you for allowing me to participate in the care of your patient.        Sincerely,        Natasha Padgett PA-C

## 2019-05-02 NOTE — PATIENT INSTRUCTIONS
After Visit Instructions:     Thank you for coming to [facility] for your care. It is my goal to partner with you to help you reach your optimal state of health.     I am recommending multidisciplinary care at this time.  The focus of care will be to continue gradual rehabilitation and pain management with medication adjustments as needed.    Continue daily self-care, identifying contributing factors, and monitoring variations in pain level. Continue to integrate self-care into your life.        1. Schedule follow-up with Natasha Padgett PA-C in 4 weeks. You will need to make this appointment.   2. Medication recommendations:   1. Stop the Oxycodone  2. Will try Tramadol 50mg, 1-2 tablets twice daily as needed for severe pain. Max of 4 tablets/day.      Natasha Padgett PA-C  [facility]    Contact information: [facility]  Clinic Number:  [phone]   Call this number with any questions about your care and for scheduling assistance. Calls are returned Monday through Friday between 8 AM and 4:30 PM. We usually get back to you within 2 business days depending on the issue/request.           Medication Refills Policy:    For non-narcotic medications, please your pharmacy directly to request a refill and the pharmacy will call the Pain Management Center for authorization. Please allow 3-4 days for these refills.    For narcotic refills, call the nurse triage line and leave a message requesting your refill along with the name of the pharmacy that you use. Narcotic prescriptions will be mailed to your pharmacy or you may pick them up at the clinic.  Please call 7-10 days before your refill is due  The above policy allows adequate time so that you do not run out of medication.    No Show - Late Cancellation - Late Arrival Policy  We believe regular attendance is key to your success in our program.    Any time you are unable to keep  your appointment we ask that you call us at least 24 hours in advance to let us know. This will allow us to offer the appointment time to another patient. The following is our policy for missed appointments. This also applies to appointments cancelled with less than 24 hours notice.    After missing 3 appointments without calling first, we will cancel all of your future appointments at [facility].    At that point, you will not be able to resume services unless approved by your care team  We understand that unforseen circumstances arise, however, out of respect for all concerned and to provide this appointment to another patient, this policy will be enforced.    Please note that most follow up appointments are 30 minutes long. If you arrive late, your provider may not be able to see you for the entire 30 minutes. Please also note that if you arrive more than 15 minutes for any appointment, it may be rescheduled.

## 2019-05-02 NOTE — LETTER
[facility]  05/02/19    Patient: Stone De Paz  YOB: 1965  Medical Record Number: [id number]                                                                  Opioid / Opioid Plus Controlled Substance Agreement    I understand that my care provider has prescribed an opioid (narcotic) controlled substance to help manage my condition(s). I am taking this medicine to help me function or work. I know this is strong medicine, and that it can cause serious side effects. Opioid medicine can be sedating, addicting and may cause a dependency on the drug. They can affect my ability to drive or think, and cause depression. They need to be taken exactly as prescribed. Combining opioids with certain medicines or chemicals (such as cocaine, sedatives and tranquilizers, sleeping pills, meth) can be dangerous or even fatal. Also, if I stop opioids suddenly, I may have severe withdrawal symptoms. Last, I understand that opioids do not work for all types of pain nor for all patients. If not helpful, I may be asked to stop them.    The risks, benefits, and side effects of these medicine(s) were explained to me. I agree that:    1. I will take part in other treatments as advised by my care team. This may be psychiatry or counseling, physical therapy, behavioral therapy, group treatment or a referral to a pain clinic. I will reduce or stop my medicine when my care team tells me to do so.  2. I will take my medicines as prescribed. I will not change the dose or schedule unless my care team tells me to. There will be no refills if I  run out early.   I may be contactedwithout warning and asked to complete a urine drug test or pill count at any time.   3. I will keep all my appointments, and understand this is part of the monitoring of opioids. My care team may require an office visit for EVERY opioid/controlled substance refill. If I miss appointments or don t follow instructions, my care team  may stop my medicine.  4. I will not ask other providers to prescribe controlled substances, and I will not accept controlled substances from other people. If I need another prescribed controlled substance for a new reason, I will tell my care team within 1 business day.  5. I will use one pharmacy to fill all of my controlled substance prescriptions, and it is up to me to make sure that I do not run out of my medicines on weekends or holidays. If my care team is willing to refill my opioid prescription without a visit, I must request refills only during office hours, refills may take up to 3 days to process, and it may take up to 5 to 7 days for my medicine to be mailed and ready at my pharmacy. Prescriptions will not be mailed anywhere except my pharmacy.        [id number]  Rev 12/18         Registration to scan to EHR                             Page 1 of 2               Controlled Substance Agreement Opioid        [facility]  05/02/19  Patient: Stone De Paz  YOB: 1965  Medical Record Number: [id number]                                                                  6. I am responsible for my prescriptions. If the medicine/prescription is lost or stolen, it will not be replaced. I also agree not to share controlled substance medicines with anyone.  7. I agree to not use ANY illegal or recreational drugs. This includes marijuana, cocaine, bath salts or other drugs. I agree not to use alcohol unless my care team says I may.          I agree to give urine samples whenever asked. If I don t give a urine sample, the care team may stop my medicine.    8. If I enroll in the [place] Medical Marijuana program, I will tell my care team. I will also sign an agreement to share my medical records with my care team.   9. I will bring in my list of medicines (or my medicine bottles) each time I come to the clinic.   10. I will tell my care team right away if I become pregnant or have a  new medical problem treated outside of my regular clinic.  11. I understand that this medicine can affect my thinking and judgment. It may be unsafe for me to drive, use machinery and do dangerous tasks. I will not do any of these things until I know how the medicine affects me. If my dose changes, I will wait to see how it affects me. I will contact my care team if I have concerns about medicine side effects.    I understand that if I do not follow any of the conditions above, my prescriptions or treatment may be stopped.      I agree that my provider, clinic care team, and pharmacy may work with any city, state or federal law enforcement agency that investigates the misuse, sale, or other diversion of my controlled medicine. I will allow my provider to discuss my care with or share a copy of this agreement with any other treating provider, pharmacy or emergency room where I receive care. I agree to give up (waive) any right of privacy or confidentiality with respect to these consents.     I have read this agreement and have asked questions about anything I did not understand.      ________________________________________________________________________  Patient signature - Date/Time -  Stone De Paz                                      ________________________________________________________________________  Witness signature                                                            ________________________________________________________________________  Provider signature - Natasha Padgett PA-C      [id number]  Rev 12/18         Registration to scan to EHR                         Page 2 of 2                   Controlled Substance Agreement Opioid           Page 1 of 2  Opioid Pain Medicines (also known as Narcotics)  What You Need to Know    What are opioids?   Opioids are pain medicines that must be prescribed by a doctor.  They are also known as narcotics.    Examples are:     morphine (MS Contin,  Sneha)    oxycodone (Oxycontin)    oxycodone and acetaminophen (Percocet)    hydrocodone and acetaminophen (Vicodin, Norco)     fentanyl patch (Duragesic)     hydromorphone (Dilaudid)     methadone     What do opioids do well?   Opioids are best for short-term pain after a surgery or injury. They also work well for cancer pain. Unlike other pain medicines, they do not cause liver or kidney failure or ulcers. They may help some people with long-lasting (chronic) pain.     What do opioids NOT do well?   Opioids never get rid of pain entirely, and they do not work well for most patients with chronic pain. Opioids do not reduce swelling, one of the causes of pain. They also don t work well for nerve pain.                           For informational purposes only.  Not to replace the advice of your care provider.  Copyright [facility]. All right reserved. [employer] [id number] 02/18.      Page 2 of 2    Risks and side effects   Talk to your doctor before you start or decide to keep taking one of these medicines. Side effects include:    Lowering your breathing rate enough to cause death    Overdose, including death, especially if taking higher than prescribed doses    Long-term opioid use    Worse depression symptoms; less pleasure in things you usually enjoy    Feeling tired or sluggish    Slower thoughts or cloudy thinking    Being more sensitive to pain over time; pain is harder to control    Trouble sleeping or restless sleep    Changes in hormone levels (for example, less testosterone)    Changes in sex drive or ability to have sex    Constipation    Unsafe driving    Itching and sweating    Feeling dizzy    Nausea, vomiting and dry mouth    What else should I know about opioids?  When someone takes opioids for too long or too often, they become dependent. This means that if you stop or reduce the medicine too quickly, you will have withdrawal symptoms.    Dependence is not the same as addiction.  Addiction is when people keep using a substance that harms their body, their mind or their relations with others. If you have a history of drug or alcohol abuse, taking opioids can cause a relapse.    Over time, opioids don t work as well. Most people will need higher and higher doses. The higher the dose, the more serious the side effects. We don t know the long-term effects of opioids.      Prescribed opioids aren't the best way to manage chronic pain    Other ways to manage pain include:      Ibuprofen or acetaminophen.  You should always try this first.      Treat health problems that may be causing pain.      acupuncture or massage, deep breathing, meditation, visual imagery, aromatherapy.      Use heat or ice at the pain site      Physical therapy and exercise      Stop smoking      See a counselor or therapist                                                  People who have used opioids for a long time may have a lower quality of life, worse depression, higher levels of pain and more visits to doctors.    Never share your opioids with others. Be sure to store opioids in a secure place, locked if possible.Young children can easily swallow them and overdose.     You can overdose on opioids.  Signs of overdose include decrease or loss of consciousness, slowed breathing, trouble waking and blue lips.  If someone is worried about overdose, they should call 911.    If you are at risk for overdose, you may get naloxone (Narcan, a medicine that reverses the effects of opioids.  If you overdose, a friend or family member can give you Narcan while waiting for the ambulance.  They need to know the signs of overdose and how to give Narcan.    While you're taking opioids:    Don't use alcohol or street drugs. Taking them together can cause death.    Don't take any of these medicines unless your doctor says its okay.  Taking these with opioids can cause death.    Benzodiazepines (such as lorazepam         or  diazepam)    Muscle relaxers (such as cyclobenzaprine)    sleeping pills    other opioids    Safe disposal of opioids  Find your area drug take-back program, your pharmacy mail-back program, buy a special disposal bag (such as Deterra) from your pharmacy or flush them down the toilet.  Use the guidelines at:  www.fda.gov/drugs/resourcesforyou

## 2019-05-03 ENCOUNTER — TELEPHONE (OUTPATIENT)
Dept: PODIATRY | Facility: CLINIC | Age: 54
End: 2019-05-03

## 2019-05-03 DIAGNOSIS — M25.512 LEFT SHOULDER PAIN, UNSPECIFIED CHRONICITY: Primary | ICD-10-CM

## 2019-05-03 NOTE — TELEPHONE ENCOUNTER
Received call from patient who is following up to see if his referral for orthopedics has been sent. Phone #[phone]        Leesa Ely    [facility]

## 2019-05-13 ENCOUNTER — TELEPHONE (OUTPATIENT)
Dept: ENDOCRINOLOGY | Facility: CLINIC | Age: 54
End: 2019-05-13

## 2019-05-13 NOTE — TELEPHONE ENCOUNTER
"Reason for Call:  Other     Detailed comments: pt needs a \"quick 2 minuet appointment for insurance purposes\".  He was scheduled for 5/13, but had to cancel.   Ada is requesting for Stone to be squeezed in.    Phone Number Patient can be reached at: Ada (ex wife) CC on file: [phone]    Best Time: any    Can we leave a detailed message on this number? YES    Call taken on 5/13/2019 at 9:04 AM by Barbara Moreno      "

## 2019-05-14 NOTE — TELEPHONE ENCOUNTER
Left detailed message.    TL booked 5/15 afternoon - no availability    [facility] at 3:45pm    Please help pt schedule.    Janneth Begum MA

## 2019-05-14 NOTE — TELEPHONE ENCOUNTER
PT's wife Ada called/ pt has been very sick in the mornings and they feel he will not be able to make it at 1045/ please call to advise on afternoon appt/     [phone] ok to leave voicemail

## 2019-05-14 NOTE — TELEPHONE ENCOUNTER
"Message noted.  Unfortunately, I don't have \"squeeze in\" appointments and his diabetes evaluation is complicated.  I can see him for a brief appt at 10:45am on Wednesday 5/15/19 at the [facility] clinic.  Please offer this appt time/date/place and if OK with him... Create appt on my [facility] schedule.  Thanks.    SHADE Best MD  Endocrinology  [facility]        "

## 2019-05-20 DIAGNOSIS — I10 ESSENTIAL HYPERTENSION: Primary | ICD-10-CM

## 2019-05-20 RX ORDER — LISINOPRIL 40 MG/1
40 TABLET ORAL DAILY
Qty: 90 TABLET | Refills: 0 | Status: SHIPPED | OUTPATIENT
Start: 2019-05-20 | End: 2019-08-18

## 2019-05-22 ENCOUNTER — OFFICE VISIT (OUTPATIENT)
Dept: ENDOCRINOLOGY | Facility: CLINIC | Age: 54
End: 2019-05-22
Payer: COMMERCIAL

## 2019-05-22 VITALS
SYSTOLIC BLOOD PRESSURE: 122 MMHG | WEIGHT: 153 LBS | DIASTOLIC BLOOD PRESSURE: 71 MMHG | BODY MASS INDEX: 23.19 KG/M2 | HEART RATE: 72 BPM | HEIGHT: 68 IN

## 2019-05-22 DIAGNOSIS — Z96.41 INSULIN PUMP STATUS: ICD-10-CM

## 2019-05-22 DIAGNOSIS — E10.43 TYPE 1 DIABETES MELLITUS WITH DIABETIC AUTONOMIC NEUROPATHY (H): ICD-10-CM

## 2019-05-22 DIAGNOSIS — E03.9 ACQUIRED HYPOTHYROIDISM: Primary | ICD-10-CM

## 2019-05-22 LAB — HBA1C MFR BLD: 8.8 % (ref 0–5.6)

## 2019-05-22 PROCEDURE — 83036 HEMOGLOBIN GLYCOSYLATED A1C: CPT | Performed by: INTERNAL MEDICINE

## 2019-05-22 PROCEDURE — 36415 COLL VENOUS BLD VENIPUNCTURE: CPT | Performed by: INTERNAL MEDICINE

## 2019-05-22 PROCEDURE — 84443 ASSAY THYROID STIM HORMONE: CPT | Performed by: INTERNAL MEDICINE

## 2019-05-22 PROCEDURE — 99214 OFFICE O/P EST MOD 30 MIN: CPT | Performed by: INTERNAL MEDICINE

## 2019-05-22 PROCEDURE — 80048 BASIC METABOLIC PNL TOTAL CA: CPT | Performed by: INTERNAL MEDICINE

## 2019-05-22 PROCEDURE — 84439 ASSAY OF FREE THYROXINE: CPT | Performed by: INTERNAL MEDICINE

## 2019-05-22 ASSESSMENT — MIFFLIN-ST. JEOR: SCORE: 1518.5

## 2019-05-22 NOTE — PROGRESS NOTES
[facility]    CHIEF COMPLAINT:   Chronic neck pain    INTERVAL HISTORY:  Last seen on 5/2/19.        Recommendations/plan at the last visit included:  1. Physical Therapy:  NO   2. Clinical Health Psychologist:  NO    3. Diagnostic Studies:  None at this time  4. Medication Management:    1. Stop Oxycodone  2. Tramadol 50mg- take 1-2 tablets twice daily as needed for severe pain. Max of 4 tablets/day.   5. Recommendations to PCP. I will take over pain prescritpions  6. Opioid contract today.   7. Urine drug screen next visit to monitor THC levels.     Follow up with this provider:  4 Weeks     Since his last visit, Stone De Paz reports:    -Worsening activity due to his shoulder issues. He is working with Dr. Boston on this. Since the cervical ablation in February, he has had numbness in the back of the neck, but an itching sensation that he is unable to satisfy the itchy. He also reports having pain in the neck as well. He does however report that a lot of the pain that he is having he thinks is more from the shoulder. The burning pain is gone, but there is still a stinging sensation. No headaches, but feels that someone is forcefully pulling at his hair. The Tramadol was not helpful, therefore he went back to Oxycodone. He does not have the itching with the Oxycodone if he only sticks at 2/day.      Pain Information:   Pain quality: miserable    Pain rating: intensity ranges from 2/10 to 10/10, and averages 6/10 on a 0-10 scale.   Pain today 4/10    SELF CARE:   How often do you practice SELF-CARE (relaxing, stretching, pacing, monitoring posture, taking mini-breaks) in a typical day:  daily    Annual Controlled Substance Agreement: signed 5/2/2019  UDS: 4/11/2019-reviewed and as expected    CURRENT RELEVANT PAIN MEDICATIONS:   Naproxen 500mg-taking 1/day  Oxycodone 5mg-taking 1/day-occasionally 2/day.   Imitrex-last used prior to the neck procedures    Patient is using the medication as  "prescribed:  YES  Is your medication helpful? YES   Medication side effects? itching    Previous Medications: (H--helped; HI--Helped initially; SWH-- somewhat helpful, NH--No help; W--worse; SE--side effects)   Opiates: Oxycodone H, Tylenol #3 NH, Hydrocodone NH, Dilaudid H SE \"ick\", Morphine H SE nausea, Tramadol NH  NSAIDS: Naproxen H SE stomach upset  Muscle Relaxants: Flexeril H SE incontinence  Anti-migraine mediations: Imitrex H  Anti-depressants: Amitriptyline NH SE rapid heart  Sleep aids: none  Anxiolytics: Valium [facility]  Neuropathics: Gabapentin NH SE \"goofy\", Lyrica ? Was too expensive  Topicals: Voltaren Gel NH, Lidocaine NH  Other medications not covered above: Tylenol H, Prednisone H SE high sugars, Medical marijuana H SE cost constraints      Past Pain Treatments:  Pain Clinic:   No   PT: Yes, last went this year  Psychologist: No, but he did have to do some psychology prior to his back surgeryes  Relaxation techniques/biofeedback: No, but tries to do meditation at times   Chiropractor: No  Acupuncture: No  Pharmacotherapy:               Opioids: Yes                Non-opioids:    Yes   TENs Unit:Yes, was not helpful  Injections: Yes, right 3rd occipital nerve and right C3, C4, C5 RFA 02/28/2019, left 3rd occipital nerve, and left C3, C4, C5 RFA 03/15/2019. Had [place] back injections [place] as [place] (steroids mess with sugars therefore tries to avoid if possible).   Self-care:   Yes, cold works better (heat worsens pain)  Surgeries related to pain: Yes, C5-6 ACDF 2012. Disk replacement cervical spine 2006, low back fusions (2000, 2002)        [place] Board of Pharmacy Data Base Reviewed:    YES; As expected, no concern for misuse/abuse of controlled medications based on this report.        THE 4 As OF OPIOID MAINTENANCE ANALGESIA    Analgesia: Is pain relief clinically significant? YES   Activity: Is patient functional and able to perform Activities of Daily Living? YES   Adverse effects: Is " patient free from adverse side effects from opiates? NO   Adherence to Rx protocol: Is patient adhering to Controlled Substance Agreement and taking medications ONLY as ordered? YES       Is Narcan prescribed for opiate use >50 MME daily? N/A      Daily MME: 7.5-15    Medications:  Current Outpatient Medications   Medication Sig Dispense Refill     amylase-lipase-protease (CREON 24) 27393-11925 units CPEP per EC capsule Take 1 capsule by mouth every other day       amylase-lipase-protease (PERTZYE) 18134 units CPEP Take 1 capsule by mouth every other day       budesonide (ENTOCORT EC) 3 MG EC capsule Take 3 mg by mouth every morning Take 1 or 2 capsules       buPROPion (WELLBUTRIN XL) 150 MG 24 hr tablet Take 1 tablet (150 mg) by mouth every morning 90 tablet 3     citalopram (CELEXA) 40 MG tablet Take 40 mg by mouth daily       CONTOUR NEXT TEST test strip Use to test blood sugar 5x times daily or as directed. 450 strip 3     insulin aspart (NOVOLOG VIAL) 100 UNITS/ML injection Uses with insulin pump, total daily dose approx 45 units, E10.9 20 mL 11     levothyroxine (SYNTHROID/LEVOTHROID) 125 MCG tablet Take 1-tablet by mouth daily as directed 90 tablet 0     lisinopril (PRINIVIL,ZESTRIL) 20 MG tablet Take 40 mg by mouth daily        lisinopril (PRINIVIL/ZESTRIL) 40 MG tablet Take 1 tablet (40 mg) by mouth daily 90 tablet 0     naproxen (NAPROSYN) 500 MG tablet Take 1 tablet (500 mg) by mouth 2 times daily as needed for moderate pain 180 tablet 1     omeprazole 20 MG tablet Take 1 tablet by mouth daily       ondansetron (ZOFRAN-ODT) 8 MG disintegrating tablet Take 1 tablet by mouth every 8 hours as needed       oxyCODONE (ROXICODONE) 5 MG tablet Take 1 tablet (5 mg) by mouth 2 times daily as needed for pain Should last for a month.  Follow up before med runs out 60 tablet 0     ranitidine (ZANTAC) 150 MG tablet Take 1 tablet (150 mg) by mouth 2 times daily 180 tablet 3     simvastatin (ZOCOR) 40 MG tablet Take 1  "tablet (40 mg) by mouth At Bedtime 90 tablet 1     SUMATRIPTAN SUCCINATE PO Take 100 mg by mouth 1/2 to 1 tablet by mouth at onset of headache. May repeat 1 dose after 2 hours if headache persists.       tiZANidine (ZANAFLEX) 2 MG tablet Take 1 tablet (2 mg) by mouth 3 times daily 60 tablet 0     traMADol (ULTRAM) 50 MG tablet 1-2 tablets every 6 hours as needed for severe pain. Max 4/day. 75 tablet 0       Review of Systems: A 10-point review of systems was negative, with the exception of chronic pain issues.      Social History: Reviewed; unchanged from previous consultation.      Family history: Reviewed; unchanged from previous consultation.     PHYSICAL EXAM:     Vitals:   /58   Temp 98.6  F (37  C) (Temporal)   Ht 1.727 m (5' 8\")   Wt 69.4 kg (153 lb)   BMI 23.26 kg/m        Constitutional: healthy, alert and no distress  HEENT: Head atraumatic, normocephalic. Eyes without conjunctival injection or jaundice. Neck supple. No obvious neck masses.  Skin: No rash, lesions, or petechiae of exposed skin.   Psychiatric/mental status: Alert, without lethargy or stupor. Appropriate affect. Mood normal.     DIAGNOSTIC TESTS:  Imaging Studies:   No new imaging to review    Assessment:  Stone De Paz is a 53 year old male who presents today for follow up regarding his:    1.  Cervical stenosis   2.  Myofascial pain  3.  Chronic low back pain  4.  Chronic pain syndrome  5. Chronic use of opioids    Pain is worse due to shoulder issues. He will be following with orthopedics today regarding that. Oxycodone works better for pain. He will limit to 2/day as he gets itching if he has more. He has not tried Voltaren gel on his neck yet.       Plan:    Diagnosis reviewed, treatment option addressed, and risk/benifits discussed.  Self-care instructions given.  I am recommending a multidisciplinary treatment plan to help this patient better manage pain.      1. Physical Therapy:  NO   2. Clinical Health Psychologist:  NO  "   3. Diagnostic Studies:  None at this time  4. Medication Management:    1. Oxycodone 2/day. Stop Tramadol.  2. Try Voltaren Gel 1%, apply 2g to skin 4 times daily as needed  5. Recommendations to PCP. See above  6. Urine drug screen today to monitor THC levels.     Follow up with this provider:  4 Weeks     Total time spent face to face was 20 minutes and more than 50% of face to face time was spent in counseling and/or coordination of care regarding the diagnosis and recommendations above.      Natasha Padgett PA-C   [facility]

## 2019-05-22 NOTE — PROGRESS NOTES
"Name: Stone De Paz      HPI:  Recent issues:  Here for f/u diabetes and thyroid evaluation  Had gastric pacer batteries replaced approx 6 weeks ago   Feeling better, but wonders about \"collitis\", takes budesonide (enteric steroid) but higher glucose levels  More neck pain/stiffness today  Wearing Dexcom sensor, likes it        1995. Diagnosis of diabetes mellitus after 2nd back surgery, age 29  Initial treatment with insulin injections using NPH and Regular insulin  Switched to premixed 70/30 insulin BID dosing  1999. Started Medtronic insulin pump model 508, used base-dose Humalog insulin at meals  Had seen Dr. Iggy Briggs/[facility]    7/24/01. Initial evaluation with me at my former clinic ([facility])  Upgraded pump to Medtronic 523   Now using Medtronic 630G pump   Novolog in pump    Meals BID, Uses carb \"exchanges\" with his pump settings  Chronic high BG and hgbA1c trends despite dose changes  Tried square wave bolus  Has Contour Link? BG meter, variable testing pattern... Recently testing 4x/day for at least 30 days  Tried Medtronic CGMS in the past, didn't like it  Previous pump settings:      Recent pump Carelink data:    Recent Dexcom CGMS data:      Previous  labs include:  Lab Results   Component Value Date    A1C 8.8 (H) 05/22/2019     04/10/2019    POTASSIUM 4.2 04/10/2019    CHLORIDE 99 04/10/2019    CO2 28 04/10/2019    ANIONGAP 6 04/10/2019     (H) 04/10/2019    BUN 10 04/10/2019    CR 0.92 04/10/2019    GFRESTIMATED >90 04/10/2019    GFRESTBLACK >90 04/10/2019    EMILEE 9.1 04/10/2019    CHOL 180 07/09/2018    TRIG 72 07/09/2018    HDL 67 07/09/2018    LDL 99 07/09/2018    NHDL 113 07/09/2018    UCRR 17 07/09/2018    MICROL <5 07/09/2018    UMALCR Unable to calculate due to low value 07/09/2018     Last eye exam date?  Goes to eye clinic in [facility]  7/2018. Met with Destiny aWrren RD, CDE at [facility]  DM Complications:   Neuropathy    Peripheral " neuropathy     Decreased sensation at feet    Autonomic neuropathy- gastroparesis     Symptoms of nausea, bloating, episodes of diarrhea and emesis     Has Medtronic gastric stimulator     Sees physicians and Ms WARREN Rosario/[facility]       Thyroid:  1994. History of hypothyroidism  Chronic levothyroxine treatment, has used Levoxyl in the past  Recent labs include:  Lab Results   Component Value Date    TSH 0.12 (L) 02/15/2019    TSH 0.15 (L) 09/21/2018    TSH 38.88 (H) 07/09/2018    TSH 2.28 05/08/2018    TSH 1.33 12/30/2013    T4 1.63 (H) 02/15/2019    T4 1.44 09/21/2018    T4 0.87 07/09/2018    T4 1.28 05/08/2018     Recent [facility] labs include:  Lab Results   Component Value Date    TSH 0.12 (L) 02/15/2019    T4 1.63 (H) 02/15/2019     Supplements:  Takes vitamin D 1000 U daily  7/2018. Had levothyroxine dose increase   Current dose:  Levothyroxine 0.137 mg po MP      Lives in [place], [place] with Ada, also (1) dog Gayle- priest retriever/lab mix  Sees Dr. Ilan Chew Mai/[facility] for general medicine evaluations  Also sees Olga VIRK/[place] [facility].    PMH/PSH:  Past Medical History:   Diagnosis Date     Arthritis      Chronic neck pain     percocet for years now,      Depressive disorder      Gastroparesis      Gastroparesis due to DM (H)     gastric stimulator helps, [facility] manages that     Hyperlipidemia      Hypertension      Hypothyroidism      Neuropathy, diabetic (H)      Type 1 diabetes (H) age 31     Past Surgical History:   Procedure Laterality Date     C LUMBAR SPINE FUSION,ANTER APPRCH      2 L4-5 L5-21     COLONOSCOPY  07/18/12     FUSION CERVICAL ANTERIOR TWO LEVELS       INJECT BLOCK MEDIAL BRANCH CERVICAL/THORACIC/LUMBAR Bilateral 11/20/2018    Procedure: Bilateral Cervical 3-4-5 medial branch block;  Surgeon: Jef Edwards MD;  Location: [facility] OR     INJECT BLOCK MEDIAL BRANCH CERVICAL/THORACIC/LUMBAR N/A 1/11/2019    Procedure: Cervical 3,4,5 Bilateral Medial branch blocks;  Surgeon:  Jef Edwards MD;  Location: [facility] OR     RADIO FREQUENCY ABLATION PULSED CERVICAL Right 2019    Procedure: RADIO FREQUENCY ABLATION CERVICAL THREE, FOUR, FIVE, AND THIRD OCCUPITAL NERVE RIGHT SIDE;  Surgeon: Jef Edwards MD;  Location: [facility] OR     RADIO FREQUENCY ABLATION PULSED CERVICAL Left 3/15/2019    Procedure: radiofrequency ablation cervical 3,4,5;  Surgeon: Jef Edwards MD;  Location: [facility] OR     THORACOSCOPIC DECORTICATION LUNG  2014    Procedure: THORACOSCOPIC DECORTICATION LUNG;  RIGHT VATS/RIGHT THORACOTOMY , DECORTICATION RIGHT LUNG ,WEDGE RESECTION RIGHT MIDDLE LOBE LUNG NODULE;  Surgeon: Harley Felix MD;  Location: [facility] OR       Family Hx:  Family History   Problem Relation Age of Onset     Hypertension Mother      Diabetes Father         type 2     Hypertension Father      Cerebrovascular Disease Maternal Grandmother      Unknown/Adopted Maternal Grandfather      Unknown/Adopted Paternal Grandmother      Unknown/Adopted Paternal Grandfather      Liver Disease Brother      Heart Disease Sister      Thyroid Disease Sister      Thyroid Disease Sister      Thyroid Disease Sister          Social Hx:  Social History     Socioeconomic History     Marital status:      Spouse name: Not on file     Number of children: Not on file     Years of education: Not on file     Highest education level: Not on file   Occupational History     Not on file   Social Needs     Financial resource strain: Not on file     Food insecurity:     Worry: Not on file     Inability: Not on file     Transportation needs:     Medical: Not on file     Non-medical: Not on file   Tobacco Use     Smoking status: Former Smoker     Last attempt to quit: 2010     Years since quittin.3     Smokeless tobacco: Never Used   Substance and Sexual Activity     Alcohol use: No     Drug use: No     Sexual activity: Never   Lifestyle     Physical activity:     Days per week: Not on file     Minutes per session: Not  "on file     Stress: Not on file   Relationships     Social connections:     Talks on phone: Not on file     Gets together: Not on file     Attends Caodaism service: Not on file     Active member of club or organization: Not on file     Attends meetings of clubs or organizations: Not on file     Relationship status: Not on file     Intimate partner violence:     Fear of current or ex partner: Not on file     Emotionally abused: Not on file     Physically abused: Not on file     Forced sexual activity: Not on file   Other Topics Concern     Parent/sibling w/ CABG, MI or angioplasty before 65F 55M? Not Asked   Social History Narrative     Not on file          MEDICATIONS:  has a current medication list which includes the following prescription(s): amylase-lipase-protease, amylase-lipase-protease, budesonide, bupropion, citalopram, contour next test, insulin aspart, levothyroxine, lisinopril, naproxen, ondansetron, ranitidine, simvastatin, sumatriptan succinate, tizanidine, tramadol, omeprazole, and oxycodone.    ROS:     ROS: 10 point ROS neg other than the symptoms noted above in the HPI.    GENERAL: some fatigue, wt stable; denies fevers, chills, night sweats.   HEENT: no dysphagia, odonophagia, diplopia, neck pain  THYROID:  no apparent hyper or hypothyroid symptoms  CV: occasional chest pains; no pressure, palpitations  LUNGS: no SOB, KRAFT, cough, wheezing   ABDOMEN: some diarrhea \"collitis\" and postmeal nausea; no diarrhea, constipation  EXTREMITIES: no rashes, ulcers, edema  NEUROLOGY: no headaches, denies changes in vision, tingling, extremitiy numbness   MSK: neck pain/stiffness and back pains; no muscle weakness  SKIN: no rashes or lesions  PSYCH:  stable mood, no significant anxiety or depression  ENDOCRINE: no heat or cold intolerance    Physical Exam   VS: /71   Pulse 72   Ht 1.727 m (5' 8\")   Wt 69.4 kg (153 lb)   BMI 23.26 kg/m    GENERAL: AXOX3, NAD, slender, well dressed, answering questions " "appropriately, appears stated age.  THYROID:  normal gland, no apparent nodules or goiter  HEENT: neck stiff/ reduced neck ROM; neck non-tender, no exopthalmous  ABDOMEN: soft, nontender, nondistended, LLQ abdomen mild bulge from pacer placement  NEUROLOGY: CN grossly intact, no tremors  MSK: grossly intact  SKIN: no rashes, no lesions    LABS:    All pertinent notes, labs, and images personally reviewed by me.     A/P:  Encounter Diagnoses   Name Primary?     Type 1 diabetes mellitus with diabetic autonomic neuropathy (H)      Insulin pump status      Acquired hypothyroidism Yes       Comments:  Reviewed complicated health history and diabetes issues.    Plan:  Reviewed the overall T1DM management and insulin pump use.  Discussed optimal BG testing to assess glucose trends.  We reviewed insulin pump settings, basal rate and bolus dosing  Use of automated pump bolus dosing for meal/snack carb & correction dosing  Reviewed the [employer] Carelink report data today  Reviewed recent Dexcom CGMS glucose sensor trends, in detail    Recommend:  Continue Medtronic insulin pump and diabetes management plan.  Pump setting basal rate changes:   Standard basal- for budesonide med use and higher glucose trends    Change MN 0.85 to 0.925, 10p 0.75 to 0.825 unit(s)/hr   Pattern A- use when not on budesonide med, and don't use lower Temp Basal rate    Same settings as Standard, but change MN to 0.5 and 10p to 0.5 unit(s)/hr  Use the lower Temp Basal rate for increased activity/exercise  Continue use of the DexcomG5 CGMS system, very helpful   Discussed Dexcom glucose alarm settings, benefits   Patient makes frequent adjustments to insulin regimen on basis of therapeutic glucose testing  Would benefit from additional CDE evaluation at [facility]   Encourage him to bring list of food items that are challenging for carb estimating    (he uses \"exchanges\" and pump programed for Units per Exchange)   Review Dexcom CGMS " use  Check labs today  Continue simvastatin 40 mg each evening  Continue current levothyroxine 0.137 mg daily dose plan  Arrange annual dilated eye exam, fasting lipid panel testing.  We reviewed importance of timely clinic appointments with me Q3 months, to satisfy Medicare with his diabetes device coverage    Encouraged him to make routine f/u appointments with [place] [facility]   Addressed patient's questions today.    Patient Instructions   Use Standard Basal Pattern when taking budesonide capsule   For collitis symptoms  Use Pattern A when NOT taking budesonide capsule      Next endocrinology evaluation in late August 2019    I will be reassigned from the [facility] to the [facility] starting in August 2019.  I will no longer be seeing patients in [place] after August 1, 2019.  My work schedule:  [facility] -Monday, Tuesday, Friday and [facility]- Wednesdays    A new endocrinologist will be working at [facility] and [facility], starting in late July or early August 2019.  You may schedule future appointments with the new endocrinologist or travel to the [facility] or [facility] to see me for evaluations.   Appointment scheduling:  [facility]    [phone]  [facility]):  [phone]  [facility]   [phone]        Labs ordered today:   Orders Placed This Encounter   Procedures     Hemoglobin A1c     Basic metabolic panel     TSH     T4 free     Radiology/Consults ordered today: None    More than 50% of the time spent with Mr. De Paz on counseling / coordinating his care.  Total appointment time was 35 minutes.    Follow-up:  3 mo.    Frankie Best MD  Endocrinology  [facility]/[place]

## 2019-05-22 NOTE — PATIENT INSTRUCTIONS
Use Standard Basal Pattern when taking budesonide capsule   For collitis symptoms  Use Pattern A when NOT taking budesonide capsule      Next endocrinology evaluation in late August 2019    I will be reassigned from the [facility] to the [facility] starting in August 2019.  I will no longer be seeing patients in [place] after August 1, 2019.  My work schedule:  [facility] -Monday, Tuesday, Friday and [facility]- Wednesdays    A new endocrinologist will be working at [facility] and [facility], starting in late July or early August 2019.  You may schedule future appointments with the new endocrinologist or travel to the [facility] or [facility] to see me for evaluations.   Appointment scheduling:  [facility]    [phone]  [facility]):  [phone]  [facility]   [phone]

## 2019-05-23 ENCOUNTER — TELEPHONE (OUTPATIENT)
Dept: FAMILY MEDICINE | Facility: CLINIC | Age: 54
End: 2019-05-23

## 2019-05-23 ENCOUNTER — TELEPHONE (OUTPATIENT)
Dept: PALLIATIVE MEDICINE | Facility: OTHER | Age: 54
End: 2019-05-23

## 2019-05-23 DIAGNOSIS — G43.109 MIGRAINE WITH AURA AND WITHOUT STATUS MIGRAINOSUS, NOT INTRACTABLE: Primary | ICD-10-CM

## 2019-05-23 LAB
ANION GAP SERPL CALCULATED.3IONS-SCNC: 6 MMOL/L (ref 3–14)
BUN SERPL-MCNC: 12 MG/DL (ref 7–30)
CALCIUM SERPL-MCNC: 9.3 MG/DL (ref 8.5–10.1)
CHLORIDE SERPL-SCNC: 96 MMOL/L (ref 94–109)
CO2 SERPL-SCNC: 29 MMOL/L (ref 20–32)
CREAT SERPL-MCNC: 0.9 MG/DL (ref 0.66–1.25)
GFR SERPL CREATININE-BSD FRML MDRD: >90 ML/MIN/{1.73_M2}
GLUCOSE SERPL-MCNC: 202 MG/DL (ref 70–99)
POTASSIUM SERPL-SCNC: 4.7 MMOL/L (ref 3.4–5.3)
SODIUM SERPL-SCNC: 131 MMOL/L (ref 133–144)
T4 FREE SERPL-MCNC: 1.49 NG/DL (ref 0.76–1.46)
TSH SERPL DL<=0.005 MIU/L-ACNC: 0.16 MU/L (ref 0.4–4)

## 2019-05-23 RX ORDER — SUMATRIPTAN 100 MG/1
100 TABLET, FILM COATED ORAL
Qty: 9 TABLET | Refills: 3 | Status: SHIPPED | OUTPATIENT
Start: 2019-05-23

## 2019-05-23 NOTE — TELEPHONE ENCOUNTER
Called pt. States that he is taking 2 tramadol in the morning and, more often than not, 2 tabs later in the afternoon. States Natasha did not want him taking 4/day but he has not been able to do that. States that the tramadol really has not helped to relieve the pain. Denies side effects.     Discussed the itching from oxycodone that he reported at the last visit. Pt states that it was worse when he was taking 3-4/day. Pt believes that if he just uses 2/day, the itching will be minimal and tolerable.     Pt states that he still has some oxycodone left and would have enough to make it to the 5/30 appt with Natasha but wanted to get the OK to return to using oxycodone before making any changes.     Let him know that I will have Natasha review this message and get back to him with the plan.     WILLIAM ArndtN, RN-BC  Patient Care Supervisor/Care Coordinator  [facility] [place]

## 2019-05-23 NOTE — TELEPHONE ENCOUNTER
Reason for Call:  Other call back    Detailed comments: pt would like to know if he can restart his Oxycodone, the tramadol is not working     Phone Number Patient can be reached at: Cell number on file:    Telephone Information:   Mobile [phone]       Best Time: any     Can we leave a detailed message on this number? YES    Call taken on 5/23/2019 at 8:52 AM by Venessa Hatfield

## 2019-05-23 NOTE — TELEPHONE ENCOUNTER
Reason for Call:  Medication or medication refill:    Do you use a [facility]?  Name of the pharmacy and phone number for the current request:  [facility] - [phone]    Name of the medication requested: SUMATRIPTAN    Other request: aware VM out of clinic today    Can we leave a detailed message on this number? YES    Phone number patient can be reached at: Home number on file [phone] (home)    Best Time:     Call taken on 5/23/2019 at 8:45 AM by Kamryn Menjivar

## 2019-05-24 NOTE — TELEPHONE ENCOUNTER
I am okay with the Oxycodone instead of the Tramadol. He cannot take both of them. He should also check and make sure the Oxycodone that he has does not have acetaminophen in it due to the potential of having inaccurate blood glucose readings with his monitor.     Natasha Padgett PA-C on 5/24/2019 at 8:02 AM

## 2019-05-24 NOTE — TELEPHONE ENCOUNTER
Called pt and relayed provider's message. Pt states that he has the  Oxycodone without tylenol.     Pt will plan to come to clinic as scheduled next week.     KAROL Arndt, RN-BC  Patient Care Supervisor/Care Coordinator  [facility] [place]

## 2019-05-27 DIAGNOSIS — E78.5 HYPERLIPIDEMIA, UNSPECIFIED HYPERLIPIDEMIA TYPE: ICD-10-CM

## 2019-05-27 DIAGNOSIS — E03.9 HYPOTHYROIDISM, UNSPECIFIED TYPE: Primary | ICD-10-CM

## 2019-05-27 RX ORDER — LEVOTHYROXINE SODIUM 125 UG/1
TABLET ORAL
Qty: 90 TABLET | Refills: 1 | Status: SHIPPED | OUTPATIENT
Start: 2019-05-27 | End: 2019-08-02

## 2019-05-29 RX ORDER — SIMVASTATIN 40 MG
TABLET ORAL
Qty: 90 TABLET | Refills: 1 | Status: SHIPPED | OUTPATIENT
Start: 2019-05-29 | End: 2019-11-20

## 2019-05-29 NOTE — TELEPHONE ENCOUNTER
Stone calls to check on the status of this refill.  Chart states prescription was faxed to [facility].  Per Stone [facility] has not received anything.

## 2019-05-29 NOTE — TELEPHONE ENCOUNTER
"Zocor  Last Written Prescription Date:  11/27/2018  Last Fill Quantity: 90,  # refills: 1   Last office visit: 4/10/2019 with prescribing provider:  Mandi   Future Office Visit:   Next 5 appointments (look out 90 days)    May 30, 2019 10:00 AM CDT  Return Visit with Natasha Padgett PA-C  [facility] ([facility] [address] [place] [id number] [phone]      Prescription approved per [facility] Refill Protocol.    Requested Prescriptions   Pending Prescriptions Disp Refills     simvastatin (ZOCOR) 40 MG tablet [Pharmacy Med Name: SIMVASTATIN 40MG TABS] 90 tablet 1     Sig: TAKE ONE TABLET BY MOUTH AT BEDTIME       [facility] Protocol Passed - 5/27/2019  2:07 PM        Passed - LDL on file in past 12 months     Recent Labs   Lab Test 07/09/18  1723   LDL 99           Passed - No abnormal creatine kinase in past 12 months     No lab results found.         Passed - Recent (12 mo) or future (30 days) visit within the authorizing provider's specialty     Patient had office visit in the last 12 months or has a visit in the next 30 days with authorizing provider or within the authorizing provider's specialty.  See \"Patient Info\" tab in inbasket, or \"Choose Columns\" in Meds & Orders section of the refill encounter.          Passed - Medication is active on med list        Passed - Patient is age 18 or older      Dolores Ferro RN   "

## 2019-05-30 ENCOUNTER — OFFICE VISIT (OUTPATIENT)
Dept: PODIATRY | Facility: CLINIC | Age: 54
End: 2019-05-30
Payer: COMMERCIAL

## 2019-05-30 ENCOUNTER — OFFICE VISIT (OUTPATIENT)
Dept: ORTHOPEDICS | Facility: CLINIC | Age: 54
End: 2019-05-30
Payer: COMMERCIAL

## 2019-05-30 ENCOUNTER — ANCILLARY PROCEDURE (OUTPATIENT)
Dept: GENERAL RADIOLOGY | Facility: CLINIC | Age: 54
End: 2019-05-30
Attending: ORTHOPAEDIC SURGERY
Payer: COMMERCIAL

## 2019-05-30 VITALS
DIASTOLIC BLOOD PRESSURE: 58 MMHG | WEIGHT: 153 LBS | SYSTOLIC BLOOD PRESSURE: 100 MMHG | HEIGHT: 68 IN | TEMPERATURE: 98.6 F | BODY MASS INDEX: 23.19 KG/M2

## 2019-05-30 VITALS
SYSTOLIC BLOOD PRESSURE: 100 MMHG | HEIGHT: 68 IN | WEIGHT: 153 LBS | BODY MASS INDEX: 23.19 KG/M2 | DIASTOLIC BLOOD PRESSURE: 58 MMHG

## 2019-05-30 DIAGNOSIS — M25.512 CHRONIC LEFT SHOULDER PAIN: Primary | ICD-10-CM

## 2019-05-30 DIAGNOSIS — M79.18 MYOFASCIAL PAIN: Primary | ICD-10-CM

## 2019-05-30 DIAGNOSIS — M54.50 CHRONIC BILATERAL LOW BACK PAIN WITHOUT SCIATICA: ICD-10-CM

## 2019-05-30 DIAGNOSIS — M25.512 LEFT SHOULDER PAIN: ICD-10-CM

## 2019-05-30 DIAGNOSIS — G89.29 CHRONIC BILATERAL LOW BACK PAIN WITHOUT SCIATICA: ICD-10-CM

## 2019-05-30 DIAGNOSIS — G89.29 CHRONIC LEFT SHOULDER PAIN: Primary | ICD-10-CM

## 2019-05-30 DIAGNOSIS — M48.02 CERVICAL STENOSIS OF SPINE: ICD-10-CM

## 2019-05-30 DIAGNOSIS — E10.43 TYPE 1 DIABETES MELLITUS WITH DIABETIC AUTONOMIC NEUROPATHY (H): ICD-10-CM

## 2019-05-30 DIAGNOSIS — G89.4 CHRONIC PAIN SYNDROME: ICD-10-CM

## 2019-05-30 DIAGNOSIS — F11.90 CHRONIC, CONTINUOUS USE OF OPIOIDS: ICD-10-CM

## 2019-05-30 PROCEDURE — 73030 X-RAY EXAM OF SHOULDER: CPT | Mod: TC

## 2019-05-30 PROCEDURE — 99213 OFFICE O/P EST LOW 20 MIN: CPT | Performed by: PHYSICIAN ASSISTANT

## 2019-05-30 PROCEDURE — 99000 SPECIMEN HANDLING OFFICE-LAB: CPT | Performed by: PHYSICIAN ASSISTANT

## 2019-05-30 PROCEDURE — 80307 DRUG TEST PRSMV CHEM ANLYZR: CPT | Mod: 90 | Performed by: PHYSICIAN ASSISTANT

## 2019-05-30 PROCEDURE — 99203 OFFICE O/P NEW LOW 30 MIN: CPT | Performed by: ORTHOPAEDIC SURGERY

## 2019-05-30 RX ORDER — OXYCODONE HYDROCHLORIDE 5 MG/1
5 TABLET ORAL 2 TIMES DAILY PRN
Qty: 60 TABLET | Refills: 0 | Status: SHIPPED | OUTPATIENT
Start: 2019-05-30 | End: 2019-06-28

## 2019-05-30 ASSESSMENT — MIFFLIN-ST. JEOR
SCORE: 1518.5
SCORE: 1518.5

## 2019-05-30 ASSESSMENT — PAIN SCALES - GENERAL: PAINLEVEL: MODERATE PAIN (4)

## 2019-05-30 NOTE — TELEPHONE ENCOUNTER
Reason for Call:  Medication or medication refill:    Do you use a [facility]?  Name of the pharmacy and phone number for the current request:       JAN 2019 - [place], [place] - [address]      Name of the medication requested: Novalog    Other request:     Can we leave a detailed message on this number? YES    Phone number patient can be reached at: Home number on file [phone] (home)    Best Time: any    Call taken on 5/30/2019 at 8:48 AM by Barbara Moreno
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
## 2019-05-30 NOTE — TELEPHONE ENCOUNTER
"Last Written Prescription Date:  5/08/18  Last Fill Quantity: 20 mL,  # refills: 11   Last office visit: 5/22/2019 with prescribing provider:  Estephania   Future Office Visit:   Next 5 appointments (look out 90 days)    Jun 27, 2019 11:30 AM CDT  Return Visit with Natasha Padgett PA-C  [facility] [address] [place] [id number] [phone]         Requested Prescriptions   Pending Prescriptions Disp Refills     insulin aspart (NOVOLOG VIAL) 100 UNITS/ML vial 20 mL 11     Sig: Uses with insulin pump, total daily dose approx 45 units, E10.9       Short Acting Insulin Protocol Passed - 5/30/2019  9:34 AM        Passed - Blood pressure less than 140/90 in past 6 months     BP Readings from Last 3 Encounters:   05/30/19 100/58   05/30/19 100/58   05/22/19 122/71                 Passed - LDL on file in past 12 months     Recent Labs   Lab Test 07/09/18  1723   LDL 99             Passed - Microalbumin on file in past 12 months     Recent Labs   Lab Test 07/09/18  1734   MICROL <5   UMALCR Unable to calculate due to low value             Passed - Serum creatinine on file in past 12 months     Recent Labs   Lab Test 05/22/19  1644   CR 0.90             Passed - HgbA1C in past 3 or 6 months     If HgbA1C is 8 or greater, it needs to be on file within the past 3 months.  If less than 8, must be on file within the past 6 months.     Recent Labs   Lab Test 05/22/19  1644   A1C 8.8*             Passed - Medication is active on med list        Passed - Patient is age 18 or older        Passed - Recent (6 mo) or future (30 days) visit within the authorizing provider's specialty     Patient had office visit in the last 6 months or has a visit in the next 30 days with authorizing provider or within the authorizing provider's specialty.  See \"Patient Info\" tab in inbasket, or \"Choose Columns\" in Meds & Orders section of the refill encounter.              "

## 2019-05-30 NOTE — PATIENT INSTRUCTIONS
After Visit Instructions:     Thank you for coming to [facility] for your care. It is my goal to partner with you to help you reach your optimal state of health.     I am recommending multidisciplinary care at this time.  The focus of care will be to continue gradual rehabilitation and pain management with medication adjustments as needed.    Continue daily self-care, identifying contributing factors, and monitoring variations in pain level. Continue to integrate self-care into your life.        1. Schedule follow-up with Natasha Padgett PA-C in 4 weeks. You will need to make this appointment.   2. Labs: urine drug screen  3. Medication recommendations:   1. Stop the Tramadol  2. Oxycodone 5mg twice daily  3. Try voltaren gel on your neck. Apply 2g 4 times daily as needed      Natasha Padgett PA-C  [facility]    Contact information: [facility]  Clinic Number:  [phone]   Call this number with any questions about your care and for scheduling assistance. Calls are returned Monday through Friday between 8 AM and 4:30 PM. We usually get back to you within 2 business days depending on the issue/request.           Medication Refills Policy:    For non-narcotic medications, please your pharmacy directly to request a refill and the pharmacy will call the [facility] for authorization. Please allow 3-4 days for these refills.    For narcotic refills, call the nurse triage line and leave a message requesting your refill along with the name of the pharmacy that you use. Narcotic prescriptions will be mailed to your pharmacy or you may pick them up at the clinic.  Please call 7-10 days before your refill is due  The above policy allows adequate time so that you do not run out of medication.    No Show - Late Cancellation - Late Arrival Policy  We believe regular attendance is key to your success in our program.    Any time  you are unable to keep your appointment we ask that you call us at least 24 hours in advance to let us know. This will allow us to offer the appointment time to another patient. The following is our policy for missed appointments. This also applies to appointments cancelled with less than 24 hours notice.    After missing 3 appointments without calling first, we will cancel all of your future appointments at [facility].    At that point, you will not be able to resume services unless approved by your care team  We understand that unforseen circumstances arise, however, out of respect for all concerned and to provide this appointment to another patient, this policy will be enforced.    Please note that most follow up appointments are 30 minutes long. If you arrive late, your provider may not be able to see you for the entire 30 minutes. Please also note that if you arrive more than 15 minutes for any appointment, it may be rescheduled.

## 2019-05-30 NOTE — LETTER
5/30/2019         RE: Stone De Paz  [address] [address] [place] [id number]        Dear Colleague,    Thank you for referring your patient, Stone De Paz, to the [facility]. Please see a copy of my visit note below.    [facility]    CHIEF COMPLAINT:   Chronic neck pain    INTERVAL HISTORY:  Last seen on 5/2/19.        Recommendations/plan at the last visit included:  1. Physical Therapy:  NO   2. Clinical Health Psychologist:  NO    3. Diagnostic Studies:  None at this time  4. Medication Management:    1. Stop Oxycodone  2. Tramadol 50mg- take 1-2 tablets twice daily as needed for severe pain. Max of 4 tablets/day.   5. Recommendations to PCP. I will take over pain prescritpions  6. Opioid contract today.   7. Urine drug screen next visit to monitor THC levels.     Follow up with this provider:  4 Weeks     Since his last visit, Stone De Pza reports:    -Worsening activity due to his shoulder issues. He is working with Dr. [place] on this. Since the cervical ablation in February, he has had numbness in the back of the neck, but an itching sensation that he is unable to satisfy the itchy. He also reports having pain in the neck as well. He does however report that a lot of the pain that he is having he thinks is more from the shoulder. The burning pain is gone, but there is still a stinging sensation. No headaches, but feels that someone is forcefully pulling at his hair. The Tramadol was not helpful, therefore he went back to Oxycodone. He does not have the itching with the Oxycodone if he only sticks at 2/day.      Pain Information:   Pain quality: miserable    Pain rating: intensity ranges from 2/10 to 10/10, and averages 6/10 on a 0-10 scale.   Pain today 4/10    SELF CARE:   How often do you practice SELF-CARE (relaxing, stretching, pacing, monitoring posture, taking mini-breaks) in a typical day:  daily    Annual Controlled Substance Agreement: signed 5/2/2019  UDS:  "4/11/2019-reviewed and as expected    CURRENT RELEVANT PAIN MEDICATIONS:   Naproxen 500mg-taking 1/day  Oxycodone 5mg-taking 1/day-occasionally 2/day.   Imitrex-last used prior to the neck procedures    Patient is using the medication as prescribed:  YES  Is your medication helpful? YES   Medication side effects? itching    Previous Medications: (H--helped; HI--Helped initially; SWH-- somewhat helpful, NH--No help; W--worse; SE--side effects)   Opiates: Oxycodone H, Tylenol #3 NH, Hydrocodone NH, Dilaudid H SE \"ick\", Morphine H SE nausea, Tramadol NH  NSAIDS: Naproxen H SE stomach upset  Muscle Relaxants: Flexeril H SE incontinence  Anti-migraine mediations: Imitrex H  Anti-depressants: Amitriptyline NH SE rapid heart  Sleep aids: none  Anxiolytics: Valium [facility]  Neuropathics: Gabapentin NH SE \"goofy\", Lyrica ? Was too expensive  Topicals: Voltaren Gel NH, Lidocaine NH  Other medications not covered above: Tylenol H, Prednisone H SE high sugars, Medical marijuana H SE cost constraints      Past Pain Treatments:  Pain Clinic:   No   PT: Yes, last went this year  Psychologist: No, but he did have to do some psychology prior to his back surgeryes  Relaxation techniques/biofeedback: No, but tries to do meditation at times   Chiropractor: No  Acupuncture: No  Pharmacotherapy:               Opioids: Yes                Non-opioids:    Yes   TENs Unit:Yes, was not helpful  Injections: Yes, right 3rd occipital nerve and right C3, C4, C5 RFA 02/28/2019, left 3rd occipital nerve, and left C3, C4, C5 RFA 03/15/2019. Had done low back injections in [place] as well (steroids mess with sugars therefore tries to avoid if possible).   Self-care:   Yes, cold works better (heat worsens pain)  Surgeries related to pain: Yes, C5-6 ACDF 2012. Disk replacement cervical spine 2006, low back fusions (2000, 2002)        [place] Board of Pharmacy Data Base Reviewed:    YES; As expected, no concern for misuse/abuse of controlled medications " based on this report.        THE 4 As OF OPIOID MAINTENANCE ANALGESIA    Analgesia: Is pain relief clinically significant? YES   Activity: Is patient functional and able to perform Activities of Daily Living? YES   Adverse effects: Is patient free from adverse side effects from opiates? NO   Adherence to Rx protocol: Is patient adhering to Controlled Substance Agreement and taking medications ONLY as ordered? YES       Is Narcan prescribed for opiate use >50 MME daily? N/A      Daily MME: 7.5-15    Medications:  Current Outpatient Medications   Medication Sig Dispense Refill     amylase-lipase-protease (CREON 24) 25145-77300 units CPEP per EC capsule Take 1 capsule by mouth every other day       amylase-lipase-protease (PERTZYE) 58808 units CPEP Take 1 capsule by mouth every other day       budesonide (ENTOCORT EC) 3 MG EC capsule Take 3 mg by mouth every morning Take 1 or 2 capsules       buPROPion (WELLBUTRIN XL) 150 MG 24 hr tablet Take 1 tablet (150 mg) by mouth every morning 90 tablet 3     citalopram (CELEXA) 40 MG tablet Take 40 mg by mouth daily       CONTOUR NEXT TEST test strip Use to test blood sugar 5x times daily or as directed. 450 strip 3     insulin aspart (NOVOLOG VIAL) 100 UNITS/ML injection Uses with insulin pump, total daily dose approx 45 units, E10.9 20 mL 11     levothyroxine (SYNTHROID/LEVOTHROID) 125 MCG tablet Take 1-tablet by mouth daily as directed 90 tablet 0     lisinopril (PRINIVIL,ZESTRIL) 20 MG tablet Take 40 mg by mouth daily        lisinopril (PRINIVIL/ZESTRIL) 40 MG tablet Take 1 tablet (40 mg) by mouth daily 90 tablet 0     naproxen (NAPROSYN) 500 MG tablet Take 1 tablet (500 mg) by mouth 2 times daily as needed for moderate pain 180 tablet 1     omeprazole 20 MG tablet Take 1 tablet by mouth daily       ondansetron (ZOFRAN-ODT) 8 MG disintegrating tablet Take 1 tablet by mouth every 8 hours as needed       oxyCODONE (ROXICODONE) 5 MG tablet Take 1 tablet (5 mg) by mouth 2 times  "daily as needed for pain Should last for a month.  Follow up before med runs out 60 tablet 0     ranitidine (ZANTAC) 150 MG tablet Take 1 tablet (150 mg) by mouth 2 times daily 180 tablet 3     simvastatin (ZOCOR) 40 MG tablet Take 1 tablet (40 mg) by mouth At Bedtime 90 tablet 1     SUMATRIPTAN SUCCINATE PO Take 100 mg by mouth 1/2 to 1 tablet by mouth at onset of headache. May repeat 1 dose after 2 hours if headache persists.       tiZANidine (ZANAFLEX) 2 MG tablet Take 1 tablet (2 mg) by mouth 3 times daily 60 tablet 0     traMADol (ULTRAM) 50 MG tablet 1-2 tablets every 6 hours as needed for severe pain. Max 4/day. 75 tablet 0       Review of Systems: A 10-point review of systems was negative, with the exception of chronic pain issues.      Social History: Reviewed; unchanged from previous consultation.      Family history: Reviewed; unchanged from previous consultation.     PHYSICAL EXAM:     Vitals:   /58   Temp 98.6  F (37  C) (Temporal)   Ht 1.727 m (5' 8\")   Wt 69.4 kg (153 lb)   BMI 23.26 kg/m         Constitutional: healthy, alert and no distress  HEENT: Head atraumatic, normocephalic. Eyes without conjunctival injection or jaundice. Neck supple. No obvious neck masses.  Skin: No rash, lesions, or petechiae of exposed skin.   Psychiatric/mental status: Alert, without lethargy or stupor. Appropriate affect. Mood normal.     DIAGNOSTIC TESTS:  Imaging Studies:   No new imaging to review    Assessment:  Stone De Paz is a 53 year old male who presents today for follow up regarding his:    1.  Cervical stenosis   2.  Myofascial pain  3.  Chronic low back pain  4.  Chronic pain syndrome  5. Chronic use of opioids    Pain is worse due to shoulder issues. He will be following with orthopedics today regarding that. Oxycodone works better for pain. He will limit to 2/day as he gets itching if he has more. He has not tried Voltaren gel on his neck yet.       Plan:    Diagnosis reviewed, treatment option " addressed, and risk/benifits discussed.  Self-care instructions given.  I am recommending a multidisciplinary treatment plan to help this patient better manage pain.      1. Physical Therapy:  NO   2. Clinical Health Psychologist:  NO    3. Diagnostic Studies:  None at this time  4. Medication Management:    1. Oxycodone 2/day. Stop Tramadol.  2. Try Voltaren Gel 1%, apply 2g to skin 4 times daily as needed  5. Recommendations to PCP. See above  6. Urine drug screen today to monitor THC levels.     Follow up with this provider:  4 Weeks     Total time spent face to face was 20 minutes and more than 50% of face to face time was spent in counseling and/or coordination of care regarding the diagnosis and recommendations above.      Natasha Padgett PA-C   [facility]        Again, thank you for allowing me to participate in the care of your patient.        Sincerely,        Natasha Padgett PA-C

## 2019-05-31 NOTE — TELEPHONE ENCOUNTER
Pharmacy didn't receive it so faxed again to fax number [phone].  I got this number from the pharmacy.    I called pharmacy and they have received it now.    Okay to leave a detailed message so I informed patient that the script has been received by the pharmacy.  Audi Cardozo, CMA

## 2019-05-31 NOTE — TELEPHONE ENCOUNTER
[facility] is calling the prescription is not there.  They are very frustrated.    Please refax it and call to verify that they have it, after faxing it.  Then call Stone to confirm that they have it.    Thank you,  Michelle ROSARIO

## 2019-06-05 LAB — PAIN DRUG SCR UR W RPTD MEDS: NORMAL

## 2019-06-06 ENCOUNTER — TELEPHONE (OUTPATIENT)
Dept: ENDOCRINOLOGY | Facility: CLINIC | Age: 54
End: 2019-06-06

## 2019-06-06 NOTE — TELEPHONE ENCOUNTER
"Dr. Best,    Please clarify Levothyroxine dose. Per OV note 5/22/19 \"Continue current levothyroxine 0.137 mg daily dose plan\"  Per med list, Levothyroxine dose is 125 mcg, as of 5/27/19.    Is pt to lower dose to 125 instead of 137?    Please advise    Thank you,  Gadiel YOO RN  "

## 2019-06-06 NOTE — TELEPHONE ENCOUNTER
Reason for Call:  Med Question    Detailed comments: [facility] is calling on behalf of patient to clarify what dosing the patient should be on for levothyroxine (SYNTHROID/LEVOTHROID) 125 MCG tablet    Pt is currently on 137       Phone Number Patient can be reached at: [phone] ( [facility])    Best Time: any    Can we leave a detailed message on this number? YES    Call taken on 6/6/2019 at 9:08 AM by Jazmine Krishna

## 2019-06-11 DIAGNOSIS — F32.0 MILD MAJOR DEPRESSION (H): Primary | ICD-10-CM

## 2019-06-11 NOTE — TELEPHONE ENCOUNTER
"Requested Prescriptions   Pending Prescriptions Disp Refills     citalopram (CELEXA) 40 MG tablet       Sig: Take 1 tablet (40 mg) by mouth daily   Last Written Prescription Date:  NA  Last Fill Quantity: NA,  # refills: NA   Last office visit: 4/10/2019 with prescribing provider:     Future Office Visit:   Next 5 appointments (look out 90 days)    Jun 27, 2019 11:30 AM CDT  Return Visit with Natasha Padgett PA-C  [facility] ([address] [address]  [phone]             SSRIs Protocol Passed - 6/11/2019  3:07 PM        Passed - Recent (12 mo) or future (30 days) visit within the authorizing provider's specialty     Patient had office visit in the last 12 months or has a visit in the next 30 days with authorizing provider or within the authorizing provider's specialty.  See \"Patient Info\" tab in inbasket, or \"Choose Columns\" in Meds & Orders section of the refill encounter.              Passed - Medication is active on med list        Passed - Patient is age 18 or older        Routing refill request to provider for review/approval because:  Medication is reported/historical    Юлия Carlos RN        "

## 2019-06-12 RX ORDER — CITALOPRAM HYDROBROMIDE 40 MG/1
40 TABLET ORAL DAILY
Qty: 31 TABLET | Refills: 0 | Status: SHIPPED | OUTPATIENT
Start: 2019-06-12 | End: 2019-08-22

## 2019-06-26 NOTE — PROGRESS NOTES
[facility]    CHIEF COMPLAINT:   Chronic neck pain    INTERVAL HISTORY:  Last seen on 5/30/19.        Recommendations/plan at the last visit included:  1. Physical Therapy:  NO   2. Clinical Health Psychologist:  NO    3. Diagnostic Studies:  None at this time  4. Medication Management:    1. Oxycodone 2/day. Stop Tramadol.  2. Try Voltaren Gel 1%, apply 2g to skin 4 times daily as needed  5. Recommendations to PCP. See above  6. Urine drug screen today to monitor THC levels.     Follow up with this provider:  4 Weeks     Since his last visit, Stone De Paz reports:    -Overall things are about the same. He states that he has been really frustrated over his pain.  He states that he does not feel that he can stop the cannabis as it significantly helps with the nausea and vomiting.  He also feels that it helps with his pain.  He feels that the combination of the oxycodone and the cannabis manages his pain well.  He is frustrated as he knows I want him to come off of the marijuana but he finds it to be somewhat helpful.  No other new medical conditions since I saw him last.  He was told he is limited on what can be further done for his shoulder.        Pain Information:   Pain quality: miserable    Pain rating: intensity ranges from 3/10 to 10/10, and averages 6/10 on a 0-10 scale.   Pain today 4/10    SELF CARE:   How often do you practice SELF-CARE (relaxing, stretching, pacing, monitoring posture, taking mini-breaks) in a typical day:  As much as able-has been less lately    Annual Controlled Substance Agreement: signed 5/2/2019  UDS: 4/11/2019-reviewed and as expected    CURRENT RELEVANT PAIN MEDICATIONS:   Naproxen 500mg-taking 1/day  Oxycodone 5mg-taking 1-2/day-lately has been more 2/day   Imitrex-last used prior to the neck procedures    Patient is using the medication as prescribed:  YES  Is your medication helpful? YES   Medication side effects? itching    Previous Medications: (H--helped;  "HI--Helped initially; SWH-- somewhat helpful, NH--No help; W--worse; SE--side effects)   Opiates: Oxycodone H, Tylenol #3 NH, Hydrocodone NH, Dilaudid H SE \"ick\", Morphine H SE nausea, Tramadol NH  NSAIDS: Naproxen H SE stomach upset  Muscle Relaxants: Flexeril H SE incontinence  Anti-migraine mediations: Imitrex H  Anti-depressants: Amitriptyline NH SE rapid heart  Sleep aids: none  Anxiolytics: Valium [facility]  Neuropathics: Gabapentin NH SE \"goofy\", Lyrica ? Was too expensive  Topicals: Voltaren Gel NH, Lidocaine NH  Other medications not covered above: Tylenol H, Prednisone H SE high sugars, Medical marijuana H SE cost constraints      Past Pain Treatments:  Pain Clinic:   No   PT: Yes, last went this year  Psychologist: No, but he did have to do some psychology prior to his back surgeryes  Relaxation techniques/biofeedback: No, but tries to do meditation at times   Chiropractor: No  Acupuncture: No  Pharmacotherapy:               Opioids: Yes                Non-opioids:    Yes   TENs Unit:Yes, was not helpful  Injections: Yes, right 3rd occipital nerve and right C3, C4, C5 RFA 02/28/2019, left 3rd occipital nerve, and left C3, C4, C5 RFA 03/15/2019. Had done low back injections in [place] as well (steroids mess with sugars therefore tries to avoid if possible).   Self-care:   Yes, cold works better (heat worsens pain)  Surgeries related to pain: Yes, C5-6 ACDF 2012. Disk replacement cervical spine 2006, low back fusions (2000, 2002)        [place] Board of Pharmacy Data Base Reviewed:    YES; As expected, no concern for misuse/abuse of controlled medications based on this report.        THE 4 As OF OPIOID MAINTENANCE ANALGESIA    Analgesia: Is pain relief clinically significant? YES   Activity: Is patient functional and able to perform Activities of Daily Living? YES   Adverse effects: Is patient free from adverse side effects from opiates? NO   Adherence to Rx protocol: Is patient adhering to Controlled " Substance Agreement and taking medications ONLY as ordered? YES       Is Narcan prescribed for opiate use >50 MME daily? N/A      Daily MME: 7.5-15    Medications:  Current Outpatient Medications   Medication Sig Dispense Refill     amylase-lipase-protease (CREON 24) 76930-37936 units CPEP per EC capsule Take 1 capsule by mouth every other day       amylase-lipase-protease (PERTZYE) 27605 units CPEP Take 1 capsule by mouth every other day       budesonide (ENTOCORT EC) 3 MG EC capsule Take 3 mg by mouth every morning Take 1 or 2 capsules       buPROPion (WELLBUTRIN XL) 150 MG 24 hr tablet Take 1 tablet (150 mg) by mouth every morning 90 tablet 3     citalopram (CELEXA) 40 MG tablet Take 1 tablet (40 mg) by mouth daily 31 tablet 0     CONTOUR NEXT TEST test strip Use to test blood sugar 5x times daily or as directed. 450 strip 3     insulin aspart (NOVOLOG VIAL) 100 UNITS/ML vial Uses with insulin pump, total daily dose approx 45 units, E10.9 20 mL 6     levothyroxine (SYNTHROID/LEVOTHROID) 125 MCG tablet Take 1-tablet by mouth daily as directed 90 tablet 1     lisinopril (PRINIVIL/ZESTRIL) 40 MG tablet Take 1 tablet (40 mg) by mouth daily 90 tablet 0     naproxen (NAPROSYN) 500 MG tablet Take 1 tablet (500 mg) by mouth 2 times daily as needed for moderate pain 180 tablet 1     omeprazole 20 MG tablet Take 1 tablet by mouth daily       ondansetron (ZOFRAN-ODT) 8 MG disintegrating tablet Take 1 tablet by mouth every 8 hours as needed       oxyCODONE (ROXICODONE) 5 MG tablet Take 1 tablet (5 mg) by mouth 2 times daily as needed for pain Should last for a month.  Follow up before med runs out 60 tablet 0     ranitidine (ZANTAC) 150 MG tablet Take 1 tablet (150 mg) by mouth 2 times daily 180 tablet 3     simvastatin (ZOCOR) 40 MG tablet TAKE ONE TABLET BY MOUTH AT BEDTIME 90 tablet 1     SUMAtriptan (IMITREX) 100 MG tablet Take 1 tablet (100 mg) by mouth at onset of headache 1/2 to 1 tablet by mouth at onset of headache.  "May repeat 1 dose after 2 hours if headache persists. 9 tablet 3     tiZANidine (ZANAFLEX) 2 MG tablet Take 1 tablet (2 mg) by mouth 3 times daily 60 tablet 0       Review of Systems: A 10-point review of systems was negative, with the exception of chronic pain issues, headache, chest pain, abdominal pain, nausea, vomiting, diarrhea, depression, diabetes, steroid use, thyroid disease, urinary frequency and incontinence, weakness and numbness and tingling, and stress.      Social History: Reviewed; unchanged from previous consultation.      Family history: Reviewed; unchanged from previous consultation.     PHYSICAL EXAM:     Vitals:   /74   Temp 98.3  F (36.8  C) (Temporal)   Ht 1.727 m (5' 8\")   Wt 68.5 kg (151 lb)   BMI 22.96 kg/m        Constitutional: healthy, alert and no distress  HEENT: Head atraumatic, normocephalic. Eyes without conjunctival injection or jaundice. Neck supple. No obvious neck masses.  Skin: No rash, lesions, or petechiae of exposed skin.   Psychiatric/mental status: Alert, without lethargy or stupor. Appropriate affect. Mood normal.  Tearful at times.    DIAGNOSTIC TESTS:  Imaging Studies:   No new imaging to review    Assessment:  Stone De Paz is a 53 year old male who presents today for follow up regarding his:    1.  Cervical stenosis   2.  Myofascial pain  3.  Chronic low back pain  4.  Chronic pain syndrome  5. Chronic use of opioids    Overall the patient's condition is the same.  We had a discussion today about recreational cannabis use while he is on opiate pain medications.  I educated the patient that if he does not stop recreational marijuana I will not continue to prescribe opiates.  Discussed the risks of the combination including overdose from the marijuana being laced with other drugs.  He verbalizes understanding of this.  He states that he would prefer to go back on medical cannabis.        Plan:    Diagnosis reviewed, treatment option addressed, and risk/benifits " discussed.  Self-care instructions given.  I am recommending a multidisciplinary treatment plan to help this patient better manage pain.      1. Physical Therapy:  NO   2. Clinical Health Psychologist:  NO    3. Diagnostic Studies:  None at this time  4. Medication Management:    1. Oxycodone 2/day. Okay to use 3 occasionally if needed, however continue 60 tabs/month  2. I attempted to certify the patient for medical cannabis today however he was previously certified (certification  in 2018), therefore due to his email address [place] being within the system he did not let me certified.  He will contact the [place] Department of Health to see what needs to be done so he can get certified for medical cannabis.  I again educated the patient that I will not continue to prescribe the oxycodone if he uses recreational marijuana.  He verbalized his understanding.  5. Recommendations to PCP. See above      Follow up with this provider: 4 Weeks     Total time spent face to face was 25 minutes and more than 50% of face to face time was spent in counseling and/or coordination of care regarding the diagnosis and recommendations above.      Natasha Padgett PA-C   [facility]

## 2019-06-28 ENCOUNTER — OFFICE VISIT (OUTPATIENT)
Dept: PALLIATIVE MEDICINE | Facility: CLINIC | Age: 54
End: 2019-06-28
Payer: COMMERCIAL

## 2019-06-28 ENCOUNTER — TELEPHONE (OUTPATIENT)
Dept: FAMILY MEDICINE | Facility: OTHER | Age: 54
End: 2019-06-28

## 2019-06-28 VITALS
BODY MASS INDEX: 22.88 KG/M2 | DIASTOLIC BLOOD PRESSURE: 74 MMHG | TEMPERATURE: 98.3 F | HEIGHT: 68 IN | WEIGHT: 151 LBS | SYSTOLIC BLOOD PRESSURE: 136 MMHG

## 2019-06-28 DIAGNOSIS — G89.29 CHRONIC BILATERAL LOW BACK PAIN WITHOUT SCIATICA: ICD-10-CM

## 2019-06-28 DIAGNOSIS — M54.50 CHRONIC BILATERAL LOW BACK PAIN WITHOUT SCIATICA: ICD-10-CM

## 2019-06-28 DIAGNOSIS — G89.4 CHRONIC PAIN SYNDROME: ICD-10-CM

## 2019-06-28 DIAGNOSIS — M48.02 CERVICAL STENOSIS OF SPINE: ICD-10-CM

## 2019-06-28 DIAGNOSIS — M79.18 MYOFASCIAL PAIN: Primary | ICD-10-CM

## 2019-06-28 DIAGNOSIS — F11.90 CHRONIC, CONTINUOUS USE OF OPIOIDS: ICD-10-CM

## 2019-06-28 PROCEDURE — 99214 OFFICE O/P EST MOD 30 MIN: CPT | Performed by: PHYSICIAN ASSISTANT

## 2019-06-28 RX ORDER — OXYCODONE HYDROCHLORIDE 5 MG/1
5 TABLET ORAL EVERY 6 HOURS PRN
Qty: 60 TABLET | Refills: 0 | Status: SHIPPED | OUTPATIENT
Start: 2019-06-28 | End: 2019-07-25

## 2019-06-28 ASSESSMENT — MIFFLIN-ST. JEOR: SCORE: 1509.43

## 2019-06-28 ASSESSMENT — PAIN SCALES - GENERAL: PAINLEVEL: MODERATE PAIN (4)

## 2019-06-28 NOTE — TELEPHONE ENCOUNTER
Pt called about Medical Cannabis Program.    Pt called them and they told Pt to have Natasha place a 1 at the end of his email and continue as she is certifying him for the first time.      The program will merge Pt's accoumts after this is done.    Pt stated that the email should be [email or web address]    Will forward to Natasha Alejandre RN  Care Coordinator   [facility]

## 2019-06-28 NOTE — TELEPHONE ENCOUNTER
Please let patient know that I certified him for medical cannabis.     Natasha Padgett PA-C on 6/28/2019 at 3:16 PM

## 2019-06-28 NOTE — PATIENT INSTRUCTIONS
After Visit Instructions:     Thank you for coming to [facility] for your care. It is my goal to partner with you to help you reach your optimal state of health.     I am recommending multidisciplinary care at this time.  The focus of care will be to continue gradual rehabilitation and pain management with medication adjustments as needed.    Continue daily self-care, identifying contributing factors, and monitoring variations in pain level. Continue to integrate self-care into your life.        Schedule follow-up with Natasha Padgett PA-C in 4 weeks. You will need to make this appointment.     Contact the [facility] for medical cannabis regarding your email address in their system.    Medication recommendations:     Oxycodone 5mg every 6 hours as needed for severe pain. Max of 3/day. 60 tabs to last 30 days.      Natasha Padgett PA-C  [facility]  [facility]    Contact information: [facility]  Clinic Number:  [phone]     Call with any questions about your care and for scheduling assistance.     Calls are returned Monday through Friday between 8 AM and 4:30 PM. We usually get back to you within 2 business days depending on the issue/request.    If we are prescribing your medications:    For opioid medication refills, call the clinic or send a [employer] message 7 days in advance.  Please include:    Name of requested medication    Name of the pharmacy.    For non-opioid medications, call your pharmacy directly to request a refill. Please allow 3-4 days to be processed.     Per [place] State Law:    All controlled substance prescriptions must be filled within 30 days of being written.      For those controlled substances allowing refills, pickup must occur within 30 days of last fill.      We believe regular attendance is key to your success in our program!      Any time you are unable to keep your appointment we ask that  you call us at least 24 hours in advance to cancel.This will allow us to offer the appointment time to another patient.   Multiple missed appointments may lead to dismissal from the clinic.

## 2019-07-02 ENCOUNTER — OFFICE VISIT (OUTPATIENT)
Dept: FAMILY MEDICINE | Facility: CLINIC | Age: 54
End: 2019-07-02
Payer: COMMERCIAL

## 2019-07-02 VITALS
DIASTOLIC BLOOD PRESSURE: 68 MMHG | SYSTOLIC BLOOD PRESSURE: 128 MMHG | BODY MASS INDEX: 22.56 KG/M2 | WEIGHT: 148.38 LBS | OXYGEN SATURATION: 96 % | RESPIRATION RATE: 14 BRPM | TEMPERATURE: 98.2 F | HEART RATE: 78 BPM

## 2019-07-02 DIAGNOSIS — H60.501 ACUTE OTITIS EXTERNA OF RIGHT EAR, UNSPECIFIED TYPE: Primary | ICD-10-CM

## 2019-07-02 PROCEDURE — 99213 OFFICE O/P EST LOW 20 MIN: CPT | Performed by: FAMILY MEDICINE

## 2019-07-02 RX ORDER — OFLOXACIN 3 MG/ML
5 SOLUTION AURICULAR (OTIC) DAILY
Qty: 5 ML | Refills: 0 | Status: SHIPPED | OUTPATIENT
Start: 2019-07-02 | End: 2019-12-11

## 2019-07-02 ASSESSMENT — PAIN SCALES - GENERAL: PAINLEVEL: SEVERE PAIN (7)

## 2019-07-02 NOTE — PROGRESS NOTES
Subjective     Stone De Paz is a 53 year old male who presents to clinic today for the following health issues:    HPI   Chief Complaint   Patient presents with     Otalgia     x2d. Rt ear pain. Has had a bloody discharge. Swelling. Cant hear.      Stone is here today for right ear pain.  Been there for 2 days and it is getting worse.  No known triggering factor.  Sharp pain that is usually worse with lifting of the pina.  Noticed some bloody drainage earlier.  Used the Q-tip occasionally.  No runny nose or nasal congestion.  No sinus pain or pressure.  No problem with hearing.  The right ear feels plugged up however.  No headache or dizziness.  No coughing, chest pain or shortness of breath.  No fever or chill.  No other concerns.    Current Outpatient Medications   Medication Sig Dispense Refill     amylase-lipase-protease (CREON 24) 87186-56370 units CPEP per EC capsule Take 1 capsule by mouth every other day       amylase-lipase-protease (PERTZYE) 16240 units CPEP Take 1 capsule by mouth every other day       budesonide (ENTOCORT EC) 3 MG EC capsule Take 3 mg by mouth every morning Take 1 or 2 capsules       buPROPion (WELLBUTRIN XL) 150 MG 24 hr tablet Take 1 tablet (150 mg) by mouth every morning 90 tablet 3     citalopram (CELEXA) 40 MG tablet Take 1 tablet (40 mg) by mouth daily 31 tablet 0     CONTOUR NEXT TEST test strip Use to test blood sugar 5x times daily or as directed. 450 strip 3     insulin aspart (NOVOLOG VIAL) 100 UNITS/ML vial Uses with insulin pump, total daily dose approx 45 units, E10.9 20 mL 6     levothyroxine (SYNTHROID/LEVOTHROID) 125 MCG tablet Take 1-tablet by mouth daily as directed 90 tablet 1     lisinopril (PRINIVIL/ZESTRIL) 40 MG tablet Take 1 tablet (40 mg) by mouth daily 90 tablet 0     naproxen (NAPROSYN) 500 MG tablet Take 1 tablet (500 mg) by mouth 2 times daily as needed for moderate pain 180 tablet 1     ofloxacin (FLOXIN) 0.3 % otic solution Place 5 drops into the right ear  daily 5 mL 0     oxyCODONE (ROXICODONE) 5 MG tablet Take 1 tablet (5 mg) by mouth every 6 hours as needed for pain Should last for a month.  MAX of 3/day. Okay to fill 6/28/2019. To last until 7/29/2019. 60 tablet 0     ranitidine (ZANTAC) 150 MG tablet Take 1 tablet (150 mg) by mouth 2 times daily 180 tablet 3     simvastatin (ZOCOR) 40 MG tablet TAKE ONE TABLET BY MOUTH AT BEDTIME 90 tablet 1     ondansetron (ZOFRAN-ODT) 8 MG disintegrating tablet Take 1 tablet by mouth every 8 hours as needed       SUMAtriptan (IMITREX) 100 MG tablet Take 1 tablet (100 mg) by mouth at onset of headache 1/2 to 1 tablet by mouth at onset of headache. May repeat 1 dose after 2 hours if headache persists. (Patient not taking: Reported on 7/2/2019) 9 tablet 3     No Known Allergies    Reviewed and updated as needed this visit by Provider         Review of Systems   ROS COMP: Constitutional, HEENT, cardiovascular, pulmonary, gi and gu systems are negative, except as otherwise noted.      Objective    /68   Pulse 78   Temp 98.2  F (36.8  C) (Tympanic)   Resp 14   Wt 67.3 kg (148 lb 6 oz)   SpO2 96%   BMI 22.56 kg/m    Body mass index is 22.56 kg/m .  Physical Exam    GENERAL: healthy, alert and no distress.  Speak in full sentences.  HENT: left ear canal and TM are normal.  Right TM was also normal.  Right ear canal is irritated with redness and swelling.  No drainage.  A lot of pain with lifting up the pina.  Nares are non-congestive.  Oropharynx is pink and moist.  No tonsilar redness, exudate or hypertrophy.  No tender with palpation to the sinuses.  NECK: no adenopathy.  RESP: lungs clear to auscultation - no rales, rhonchi or wheezes  CV: regular rate and rhythm, no murmur.    Diagnostic Test Results:  none         Assessment & Plan       ICD-10-CM    1. Acute otitis externa of right ear, unspecified type H60.501 ofloxacin (FLOXIN) 0.3 % otic solution     Most likely due to irritation from using a Q-tip.  Discussed with  him about the nature of the condition.  Encourage to avoid using the Q-tip.  No submerge his head underneath the water until the symptoms resolved.  Will start him on Ofloxacin otic. Tylenol or Motrin as needed for pain and fever.  Follow-up as needed.      No follow-ups on file.    Ilan Chew Mai, MD  [facility]

## 2019-07-22 NOTE — PROGRESS NOTES
[facility]    CHIEF COMPLAINT:   Chronic neck pain    INTERVAL HISTORY:  Last seen on 19.        Recommendations/plan at the last visit included:  1. Physical Therapy:  NO   2. Clinical Health Psychologist:  NO    3. Diagnostic Studies:  None at this time  4. Medication Management:    1. Oxycodone 2/day. Okay to use 3 occasionally if needed, however continue 60 tabs/month  2. I attempted to certify the patient for medical cannabis today however he was previously certified (certification  in 2018), therefore due to his email address [place] being within the system he did not let me certified.  He will contact the [place] Department of Health to see what needs to be done so he can get certified for medical cannabis.  I again educated the patient that I will not continue to prescribe the oxycodone if he uses recreational marijuana.  He verbalized his understanding.  5. Recommendations to PCP. See above      Follow up with this provider: 4 Weeks    Since his last visit, Stone De Paz reports:    -he is no change to slightly worse.     He did start the medical cannabis. He states that he had a couple of days without any THC and had a lot of nausea and vomiting. Within hours of starting the medical cannabis he felt much better. He states that he doesn't do well in the heat, so he hasn't been feeling well from that.  He states that he would like more Oxycodone.       Pain Information:   Pain quality: miserable    Pain rating: intensity ranges from 2-3/10 to 10/10, and averages 7/10 on a 0-10 scale.   Pain today 7/10    SELF CARE:   How often do you practice SELF-CARE (relaxing, stretching, pacing, monitoring posture, taking mini-breaks) in a typical day:  As much as able-has been less lately    Annual Controlled Substance Agreement: signed 2019  UDS: 2019-reviewed and as expected    CURRENT RELEVANT PAIN MEDICATIONS:   Naproxen 500mg-taking 1/day  Oxycodone 5mg-taking  "1-2/day-lately has been more 2-3/day   Imitrex-last used prior to the neck procedures    Patient is using the medication as prescribed:  YES  Is your medication helpful? YES   Medication side effects? itching    Previous Medications: (H--helped; HI--Helped initially; SWH-- somewhat helpful, NH--No help; W--worse; SE--side effects)   Opiates: Oxycodone H, Tylenol #3 NH, Hydrocodone NH, Dilaudid H SE \"ick\", Morphine H SE nausea, Tramadol NH  NSAIDS: Naproxen H SE stomach upset  Muscle Relaxants: Flexeril H SE incontinence  Anti-migraine mediations: Imitrex H  Anti-depressants: Amitriptyline NH SE rapid heart  Sleep aids: none  Anxiolytics: Valium [facility]  Neuropathics: Gabapentin NH SE \"goofy\", Lyrica ? Was too expensive  Topicals: Voltaren Gel NH, Lidocaine NH  Other medications not covered above: Tylenol H, Prednisone H SE high sugars, Medical marijuana H SE cost constraints      Past Pain Treatments:  Pain Clinic:   No   PT: Yes, last went this year  Psychologist: No, but he did have to do some psychology prior to his back surgeryes  Relaxation techniques/biofeedback: No, but tries to do meditation at times   Chiropractor: No  Acupuncture: No  Pharmacotherapy:               Opioids: Yes                Non-opioids:    Yes   TENs Unit:Yes, was not helpful  Injections: Yes, right 3rd occipital nerve and right C3, C4, C5 RFA 02/28/2019, left 3rd occipital nerve, and left C3, C4, C5 RFA 03/15/2019. Had done low back injections in [place] as well (steroids mess with sugars therefore tries to avoid if possible).   Self-care:   Yes, cold works better (heat worsens pain)  Surgeries related to pain: Yes, C5-6 ACDF 2012. Disk replacement cervical spine 2006, low back fusions (2000, 2002)        [place] Board of Pharmacy Data Base Reviewed:    YES; As expected, no concern for misuse/abuse of controlled medications based on this report.        THE 4 As OF OPIOID MAINTENANCE ANALGESIA    Analgesia: Is pain relief clinically " significant? YES   Activity: Is patient functional and able to perform Activities of Daily Living? YES   Adverse effects: Is patient free from adverse side effects from opiates? NO   Adherence to Rx protocol: Is patient adhering to Controlled Substance Agreement and taking medications ONLY as ordered? YES       Is Narcan prescribed for opiate use >50 MME daily? N/A      Daily MME: 7.5-15    Medications:  Current Outpatient Medications   Medication Sig Dispense Refill     amylase-lipase-protease (CREON 24) 52761-16137 units CPEP per EC capsule Take 1 capsule by mouth every other day       amylase-lipase-protease (PERTZYE) 20407 units CPEP Take 1 capsule by mouth every other day       budesonide (ENTOCORT EC) 3 MG EC capsule Take 3 mg by mouth every morning Take 1 or 2 capsules       buPROPion (WELLBUTRIN XL) 150 MG 24 hr tablet Take 1 tablet (150 mg) by mouth every morning 90 tablet 3     citalopram (CELEXA) 40 MG tablet Take 1 tablet (40 mg) by mouth daily 31 tablet 0     CONTOUR NEXT TEST test strip Use to test blood sugar 5x times daily or as directed. 450 strip 3     insulin aspart (NOVOLOG VIAL) 100 UNITS/ML vial Uses with insulin pump, total daily dose approx 45 units, E10.9 20 mL 6     levothyroxine (SYNTHROID/LEVOTHROID) 125 MCG tablet Take 1-tablet by mouth daily as directed 90 tablet 1     lisinopril (PRINIVIL/ZESTRIL) 40 MG tablet Take 1 tablet (40 mg) by mouth daily 90 tablet 0     medical cannabis (Patient's own supply) See Admin Instructions (The purpose of this order is to document that the patient reports taking medical cannabis.  This is not a prescription, and is not used to certify that the patient has a qualifying medical condition.)       naproxen (NAPROSYN) 500 MG tablet Take 1 tablet (500 mg) by mouth 2 times daily as needed for moderate pain 180 tablet 1     ofloxacin (FLOXIN) 0.3 % otic solution Place 5 drops into the right ear daily 5 mL 0     ondansetron (ZOFRAN-ODT) 8 MG disintegrating  "tablet Take 1 tablet by mouth every 8 hours as needed       oxyCODONE (ROXICODONE) 5 MG tablet Take 1 tablet (5 mg) by mouth every 6 hours as needed for pain Should last for a month.  MAX of 3/day. Okay to fill 7/26/2019. To last until 8/28/2019. 70 tablet 0     ranitidine (ZANTAC) 150 MG tablet Take 1 tablet (150 mg) by mouth 2 times daily 180 tablet 3     simvastatin (ZOCOR) 40 MG tablet TAKE ONE TABLET BY MOUTH AT BEDTIME 90 tablet 1     SUMAtriptan (IMITREX) 100 MG tablet Take 1 tablet (100 mg) by mouth at onset of headache 1/2 to 1 tablet by mouth at onset of headache. May repeat 1 dose after 2 hours if headache persists. (Patient not taking: Reported on 7/2/2019) 9 tablet 3       Review of Systems: A 10-point review of systems was negative, with the exception of chronic pain issues, headache, dizziness, itching, abdominal pain, nausea, diabetes, steroid use, depression, thyroid disease, urinary frequency, urgency and incontinence, weakness and numbness and tingling, and depression.      Social History: Reviewed; unchanged from previous consultation.      Family history: Reviewed; unchanged from previous consultation.     PHYSICAL EXAM:     Vitals:   /70   Temp 98.8  F (37.1  C) (Temporal)   Ht 1.727 m (5' 8\")   Wt 66.5 kg (146 lb 8 oz)   BMI 22.28 kg/m        Constitutional: healthy, alert and no distress  HEENT: Head atraumatic, normocephalic. Eyes without conjunctival injection or jaundice. Neck supple. No obvious neck masses.  Skin: No rash, lesions, or petechiae of exposed skin.   Psychiatric/mental status: Alert, without lethargy or stupor. Appropriate affect. Mood normal.  Tearful at times.    DIAGNOSTIC TESTS:  Imaging Studies:   No new imaging to review    Assessment:  Stone De Paz is a 53 year old male who presents today for follow up regarding his:    1.  Cervical stenosis   2.  Myofascial pain  3.  Chronic low back pain  4.  Chronic pain syndrome  5. Chronic use of opioids    Overall the " patient's condition is the same.  He started the medical cannabis and finds it to be helpful. Discussed the goal is to reduce Oxycodone use while on the cannabis. I did agree to 1 month increase on the Oxycodone. Discussed that we can try Cymbalta, but I would like him to discuss this change with his PCP.      Plan:    Diagnosis reviewed, treatment option addressed, and risk/benifits discussed.  Self-care instructions given.  I am recommending a multidisciplinary treatment plan to help this patient better manage pain.      6. Physical Therapy:  NO   7. Clinical Health Psychologist:  NO, but I do think this is something we should consider    8. Diagnostic Studies:  None at this time  9. Medication Management:    1. Oxycodone 2/day. Okay to use 3 occasionally if needed, increase 70 tabs/month for this month only  2. Continue medical cannabis  3.  Talk with Dr. Raymond about switching Celexa to Cymbalta.   10. Recommendations to PCP. See above  11. Stone to follow up with Primary Care provider regarding elevated blood pressure.        Follow up with this provider: 4 Weeks     Total time spent face to face was 15 minutes and more than 50% of face to face time was spent in counseling and/or coordination of care regarding the diagnosis and recommendations above.      Natasha Padgett PA-C   [facility]

## 2019-07-25 ENCOUNTER — TELEPHONE (OUTPATIENT)
Dept: FAMILY MEDICINE | Facility: CLINIC | Age: 54
End: 2019-07-25

## 2019-07-25 ENCOUNTER — OFFICE VISIT (OUTPATIENT)
Dept: PALLIATIVE MEDICINE | Facility: CLINIC | Age: 54
End: 2019-07-25
Payer: COMMERCIAL

## 2019-07-25 VITALS
BODY MASS INDEX: 22.2 KG/M2 | HEIGHT: 68 IN | TEMPERATURE: 98.8 F | WEIGHT: 146.5 LBS | DIASTOLIC BLOOD PRESSURE: 70 MMHG | SYSTOLIC BLOOD PRESSURE: 146 MMHG

## 2019-07-25 DIAGNOSIS — G89.4 CHRONIC PAIN SYNDROME: ICD-10-CM

## 2019-07-25 DIAGNOSIS — F11.90 CHRONIC, CONTINUOUS USE OF OPIOIDS: ICD-10-CM

## 2019-07-25 DIAGNOSIS — F33.1 MODERATE EPISODE OF RECURRENT MAJOR DEPRESSIVE DISORDER (H): Primary | ICD-10-CM

## 2019-07-25 DIAGNOSIS — M48.02 CERVICAL STENOSIS OF SPINE: Primary | ICD-10-CM

## 2019-07-25 DIAGNOSIS — M79.18 MYOFASCIAL PAIN: ICD-10-CM

## 2019-07-25 DIAGNOSIS — G89.29 CHRONIC BILATERAL LOW BACK PAIN WITHOUT SCIATICA: ICD-10-CM

## 2019-07-25 DIAGNOSIS — M54.50 CHRONIC BILATERAL LOW BACK PAIN WITHOUT SCIATICA: ICD-10-CM

## 2019-07-25 PROCEDURE — 99214 OFFICE O/P EST MOD 30 MIN: CPT | Performed by: PHYSICIAN ASSISTANT

## 2019-07-25 RX ORDER — DULOXETIN HYDROCHLORIDE 20 MG/1
20 CAPSULE, DELAYED RELEASE ORAL 2 TIMES DAILY
Qty: 60 CAPSULE | Refills: 1 | Status: SHIPPED | OUTPATIENT
Start: 2019-07-25 | End: 2019-10-17

## 2019-07-25 RX ORDER — OXYCODONE HYDROCHLORIDE 5 MG/1
5 TABLET ORAL EVERY 6 HOURS PRN
Qty: 70 TABLET | Refills: 0 | Status: SHIPPED | OUTPATIENT
Start: 2019-07-25 | End: 2019-08-22

## 2019-07-25 ASSESSMENT — PAIN SCALES - GENERAL: PAINLEVEL: SEVERE PAIN (7)

## 2019-07-25 ASSESSMENT — MIFFLIN-ST. JEOR: SCORE: 1489.02

## 2019-07-25 NOTE — PATIENT INSTRUCTIONS
After Visit Instructions:     Thank you for coming to [facility] for your care. It is my goal to partner with you to help you reach your optimal state of health.     I am recommending multidisciplinary care at this time.  The focus of care will be to continue gradual rehabilitation and pain management with medication adjustments as needed.    Continue daily self-care, identifying contributing factors, and monitoring variations in pain level. Continue to integrate self-care into your life.        Schedule pain psychology assessment/visit: may want to consider     Schedule follow-up with Natasha Padgett PA-C in 4 weeks. You will need to make this appointment.     Medication recommendations:     Oxycodone 5mg-1-2 tabs daily. Max of 3/day. 70 tablets to last 30 days    Continue Medical cannabis    Talk with Dr. Raymond regarding switching from Celexa to Cymbalta      Natasha Padgett PA-C  [facility]    Contact information: [facility]  Clinic Number:  [phone]     Call with any questions about your care and for scheduling assistance.     Calls are returned Monday through Friday between 8 AM and 4:30 PM. We usually get back to you within 2 business days depending on the issue/request.    If we are prescribing your medications:    For opioid medication refills, call the clinic or send a [employer] message 7 days in advance.  Please include:    Name of requested medication    Name of the pharmacy.    For non-opioid medications, call your pharmacy directly to request a refill. Please allow 3-4 days to be processed.     Per [place] State Law:    All controlled substance prescriptions must be filled within 30 days of being written.      For those controlled substances allowing refills, pickup must occur within 30 days of last fill.      We believe regular attendance is key to your success in our program!      Any time you are unable to keep your  appointment we ask that you call us at least 24 hours in advance to cancel.This will allow us to offer the appointment time to another patient.   Multiple missed appointments may lead to dismissal from the clinic.

## 2019-07-25 NOTE — TELEPHONE ENCOUNTER
Reason for Call:  Other prescription    Detailed comments: Stone had his visit with Pain Specialist today and was advised to stop taking Citalopram and change to Cymbalta.      [facility]    Stone is aware Dr Raymond is not in today.    Phone Number Patient can be reached at: Home number on file [phone] (home)    Best Time: any    Can we leave a detailed message on this number? YES    Call taken on 7/25/2019 at 12:18 PM by Brigida Arreola

## 2019-07-26 NOTE — TELEPHONE ENCOUNTER
Per chart- okay to leave detailed message on VM- Advised patient of providers note. Informed to call clinic if patient has any further questions or concerns.   Delaney Torres on 7/26/2019 at 9:15 AM

## 2019-07-26 NOTE — TELEPHONE ENCOUNTER
That would be fine with me.  Prescription was sent to the pharmacy - please take it as prescribed.  Stop the Celexa

## 2019-08-02 ENCOUNTER — TELEPHONE (OUTPATIENT)
Dept: FAMILY MEDICINE | Facility: CLINIC | Age: 54
End: 2019-08-02

## 2019-08-02 DIAGNOSIS — E03.9 HYPOTHYROIDISM, UNSPECIFIED TYPE: ICD-10-CM

## 2019-08-02 RX ORDER — LEVOTHYROXINE SODIUM 125 UG/1
TABLET ORAL
Qty: 90 TABLET | Refills: 0 | Status: SHIPPED | OUTPATIENT
Start: 2019-08-02 | End: 2019-12-02

## 2019-08-02 NOTE — TELEPHONE ENCOUNTER
Last Written Prescription Date:  5/27/2019  Last Fill Quantity: 90,  # refills: 1   Last office visit: 5/22/2019 with prescribing provider:    Future Office Visit:   Next 5 appointments (look out 90 days)    Aug 22, 2019 11:30 AM CDT  [facility] Visit with Natasha Padgett PA-C  [facility] [address] [place] [id number] [phone]           Requested Prescriptions   Pending Prescriptions Disp Refills     levothyroxine (SYNTHROID/LEVOTHROID) 125 MCG tablet 90 tablet 1     Sig: Take 1-tablet by mouth daily as directed       There is no refill protocol information for this order

## 2019-08-02 NOTE — TELEPHONE ENCOUNTER
Reason for Call:  Medication or medication refill:    Do you use a [facility]?  Name of the pharmacy and phone number for the current request:        [facility] [address] [facility] [address] [facility]          Name of the medication requested: levothyroxine (SYNTHROID/LEVOTHROID) 125 MCG tablet   (90 tablets)    Other request: Pt is out of the medication and needs the refill sent asap     Can we leave a detailed message on this number? YES    Phone number patient can be reached at: Home number on file [phone] (home)    Best Time: anytime    Call taken on 8/2/2019 at 11:02 AM by Dinah Taveras

## 2019-08-02 NOTE — TELEPHONE ENCOUNTER
Prescription approved per [facility] Refill Protocol.  Sent to new pharmacy.      Rosi DELAROSA RN,BSN

## 2019-08-06 ENCOUNTER — TELEPHONE (OUTPATIENT)
Dept: ENDOCRINOLOGY | Facility: CLINIC | Age: 54
End: 2019-08-06

## 2019-08-06 DIAGNOSIS — F33.1 MODERATE EPISODE OF RECURRENT MAJOR DEPRESSIVE DISORDER (H): Primary | ICD-10-CM

## 2019-08-06 NOTE — TELEPHONE ENCOUNTER
Reason for Call:  Medication or medication refill:    Do you use a [facility]?  Name of the pharmacy and phone number for the current request:  [facility] - [phone]    Name of the medication requested: buPROPion (WELLBUTRIN XL    Other request: Due to cost is transferring to [facility].  Needs new script written for buPROPion (WELLBUTRIN XL 75 mg, to take two tabs daily, quantity of 60 with 11 refills     Can we leave a detailed message on this number? call Stone if questions    Phone number patient can be reached at: Other phone number:  [phone]     Best Time: any time    Call taken on 8/6/2019 at 9:37 AM by Aranza Rob

## 2019-08-06 NOTE — TELEPHONE ENCOUNTER
Called the [facility] pharmacy and they will be getting the medication from [facility]. NO further action is needed as of right now.     Flakita Gooden, CMA

## 2019-08-06 NOTE — TELEPHONE ENCOUNTER
Reason for Call:  Form, our goal is to have forms completed with 72 hours, however, some forms may require a visit or additional information.    Type of letter, form or note:  [employer] Public [employer] Diabetes Form    Who is the form from?: Patient    Where did the form come from: form was mailed in    What clinic location was the form placed at?: [facility]    Where the form was placed: Placed on Dr Best's  MA Desk    What number is listed as a contact on the form?: [phone] Please call if any questions       Additional comments: They asked that the form be faxed in to Fax # [phone]  also Mail a copy to [employer] envelope enclosed and mail a copy to Stone Envelope enclosed     Call taken on 8/6/2019 at 5:26 PM by Belem Strauss

## 2019-08-08 RX ORDER — BUPROPION HYDROCHLORIDE 75 MG/1
75 TABLET ORAL 2 TIMES DAILY
Qty: 60 TABLET | Refills: 5 | Status: SHIPPED | OUTPATIENT
Start: 2019-08-08 | End: 2019-12-11

## 2019-08-08 NOTE — TELEPHONE ENCOUNTER
I talked to the patients ex spouse Ada, consent to communicate verified.  She stated that in order to do the good Rx at [facility] his script needs to be changed to the 75 mg tabs two times daily.  I have bryan up the script.  Dr. Raymond please sign if you feel appropriate.  Gerald Goff MA

## 2019-08-18 DIAGNOSIS — I10 ESSENTIAL HYPERTENSION: ICD-10-CM

## 2019-08-19 RX ORDER — LISINOPRIL 40 MG/1
TABLET ORAL
Qty: 90 TABLET | Refills: 0 | Status: SHIPPED | OUTPATIENT
Start: 2019-08-19 | End: 2019-11-20

## 2019-08-19 NOTE — TELEPHONE ENCOUNTER
"Lisinopril  Last Written Prescription Date:  5/20/2019  Last Fill Quantity: 90,  # refills: 0   Last office visit: 7/2/2019 with prescribing provider:  Mandi   Future Office Visit:   Next 5 appointments (look out 90 days)    Aug 22, 2019 11:30 AM CDT  [facility] Nat[facility] PA-C  [facility] [address] [place] [id number] [phone]   Oct 29, 2019  1:30 PM CDT  Return Visit with Frankie Best MD  [facility] [address] [place] [id number] [phone]           Requested Prescriptions   Pending Prescriptions Disp Refills     lisinopril (PRINIVIL/ZESTRIL) 40 MG tablet [Pharmacy Med Name: LISINOPRIL 40MG TABS] 90 tablet 0     Sig: TAKE ONE TABLET BY MOUTH ONCE DAILY       ACE Inhibitors (Including Combos) Protocol Failed - 8/18/2019  7:36 AM        Failed - Blood pressure under 140/90 in past 12 months     BP Readings from Last 3 Encounters:   07/25/19 146/70   07/02/19 128/68   06/28/19 136/74                 Passed - Recent (12 mo) or future (30 days) visit within the authorizing provider's specialty     Patient had office visit in the last 12 months or has a visit in the next 30 days with authorizing provider or within the authorizing provider's specialty.  See \"Patient Info\" tab in inbasket, or \"Choose Columns\" in Meds & Orders section of the refill encounter.              Passed - Medication is active on med list        Passed - Patient is age 18 or older        Passed - Normal serum creatinine on file in past 12 months     Recent Labs   Lab Test 05/22/19  1644   CR 0.90             Passed - Normal serum potassium on file in past 12 months     Recent Labs   Lab Test 05/22/19  1644   POTASSIUM 4.7             Prescription approved per RN refill protocol.    Alyssa Cerrato RN on 8/19/2019 at 2:00 PM    "

## 2019-08-21 NOTE — PROGRESS NOTES
[facility]    CHIEF COMPLAINT:   Chronic neck pain    INTERVAL HISTORY:  Last seen on 7/25/19.        Recommendations/plan at the last visit included:  1. Physical Therapy:  NO   2. Clinical Health Psychologist:  NO, but I do think this is something we should consider    3. Diagnostic Studies:  None at this time  4. Medication Management:    1. Oxycodone 2/day. Okay to use 3 occasionally if needed, increase 70 tabs/month for this month only  2. Continue medical cannabis  3.  Talk with Dr. Raymond about switching Celexa to Cymbalta.   5. Recommendations to PCP. See above  6. Stone to follow up with Primary Care provider regarding elevated blood pressure.        Follow up with this provider: 4 Weeks    Since his last visit, Stone De Paz reports:    -He states that everything has been much better. He states that the cannabis has been very helpful for him. Having this consistently has been much easier for him. He states that this is very beneficial for him. He reports a night and day difference.     He started Cymbalta this week. He is tolerating this well so far.       Pain Information:   Pain quality: exhausting    Pain rating: intensity ranges from 3/10 to 10/10, and averages 4/10 on a 0-10 scale.   Pain today 4/10    SELF CARE:   How often do you practice SELF-CARE (relaxing, stretching, pacing, monitoring posture, taking mini-breaks) in a typical day:  Has been able to do more    Annual Controlled Substance Agreement: signed 5/2/2019  UDS: 4/11/2019-reviewed and as expected    CURRENT RELEVANT PAIN MEDICATIONS:   Naproxen 500mg-taking 1/day  Oxycodone 5mg-taking 2 in AM and will occasionally take a 3rd in the afternoon    Imitrex-last used prior to the neck procedures    Patient is using the medication as prescribed:  YES  Is your medication helpful? YES   Medication side effects? itching    Previous Medications: (H--helped; HI--Helped initially; SWH-- somewhat helpful, NH--No help; W--worse;  "SE--side effects)   Opiates: Oxycodone H, Tylenol #3 NH, Hydrocodone NH, Dilaudid H SE \"ick\", Morphine H SE nausea, Tramadol NH  NSAIDS: Naproxen H SE stomach upset  Muscle Relaxants: Flexeril H SE incontinence  Anti-migraine mediations: Imitrex H  Anti-depressants: Amitriptyline NH SE rapid heart  Sleep aids: none  Anxiolytics: Valium [facility]  Neuropathics: Gabapentin NH SE \"goofy\", Lyrica ? Was too expensive  Topicals: Voltaren Gel NH, Lidocaine NH  Other medications not covered above: Tylenol H, Prednisone H SE high sugars, Medical marijuana H SE cost constraints      Past Pain Treatments:  Pain Clinic:   No   PT: Yes, last went this year  Psychologist: No, but he did have to do some psychology prior to his back surgeryes  Relaxation techniques/biofeedback: No, but tries to do meditation at times   Chiropractor: No  Acupuncture: No  Pharmacotherapy:               Opioids: Yes                Non-opioids:    Yes   TENs Unit:Yes, was not helpful  Injections: Yes, right 3rd occipital nerve and right C3, C4, C5 RFA 02/28/2019, left 3rd occipital nerve, and left C3, C4, C5 RFA 03/15/2019. Had done low back injections in [place] as well (steroids mess with sugars therefore tries to avoid if possible).   Self-care:   Yes, cold works better (heat worsens pain)  Surgeries related to pain: Yes, C5-6 ACDF 2012. Disk replacement cervical spine 2006, low back fusions (2000, 2002)        [place] Board of Pharmacy Data Base Reviewed:    YES; As expected, no concern for misuse/abuse of controlled medications based on this report.        THE 4 As OF OPIOID MAINTENANCE ANALGESIA    Analgesia: Is pain relief clinically significant? YES   Activity: Is patient functional and able to perform Activities of Daily Living? YES   Adverse effects: Is patient free from adverse side effects from opiates? NO   Adherence to Rx protocol: Is patient adhering to Controlled Substance Agreement and taking medications ONLY as ordered? YES       Is " Narcan prescribed for opiate use >50 MME daily? N/A      Daily MME: 15-22.5    Medications:  Current Outpatient Medications   Medication Sig Dispense Refill     amylase-lipase-protease (CREON 24) 43097-26233 units CPEP per EC capsule Take 1 capsule by mouth every other day       amylase-lipase-protease (PERTZYE) 70824 units CPEP Take 1 capsule by mouth every other day       budesonide (ENTOCORT EC) 3 MG EC capsule Take 3 mg by mouth every morning Take 1 or 2 capsules       buPROPion (WELLBUTRIN XL) 150 MG 24 hr tablet Take 1 tablet (150 mg) by mouth every morning 90 tablet 3     buPROPion (WELLBUTRIN) 75 MG tablet Take 1 tablet (75 mg) by mouth 2 times daily 60 tablet 5     CONTOUR NEXT TEST test strip Use to test blood sugar 5x times daily or as directed. 450 strip 3     DULoxetine (CYMBALTA) 20 MG capsule Take 1 capsule (20 mg) by mouth 2 times daily Please stop the the Celexa 60 capsule 1     insulin aspart (NOVOLOG VIAL) 100 UNITS/ML vial Uses with insulin pump, total daily dose approx 45 units, E10.9 20 mL 6     levothyroxine (SYNTHROID/LEVOTHROID) 125 MCG tablet Take 1-tablet by mouth daily as directed 90 tablet 0     lisinopril (PRINIVIL/ZESTRIL) 40 MG tablet TAKE ONE TABLET BY MOUTH ONCE DAILY 90 tablet 0     medical cannabis (Patient's own supply) See Admin Instructions (The purpose of this order is to document that the patient reports taking medical cannabis.  This is not a prescription, and is not used to certify that the patient has a qualifying medical condition.)       naproxen (NAPROSYN) 500 MG tablet Take 1 tablet (500 mg) by mouth 2 times daily as needed for moderate pain 180 tablet 1     ofloxacin (FLOXIN) 0.3 % otic solution Place 5 drops into the right ear daily 5 mL 0     ondansetron (ZOFRAN-ODT) 8 MG disintegrating tablet Take 1 tablet by mouth every 8 hours as needed       oxyCODONE (ROXICODONE) 5 MG tablet Take 1 tablet (5 mg) by mouth every 6 hours as needed for pain Should last for a month.  " MAX of 3/day. Okay to fill 7/26/2019. To last until 8/28/2019. 70 tablet 0     ranitidine (ZANTAC) 150 MG tablet Take 1 tablet (150 mg) by mouth 2 times daily 180 tablet 3     simvastatin (ZOCOR) 40 MG tablet TAKE ONE TABLET BY MOUTH AT BEDTIME 90 tablet 1     SUMAtriptan (IMITREX) 100 MG tablet Take 1 tablet (100 mg) by mouth at onset of headache 1/2 to 1 tablet by mouth at onset of headache. May repeat 1 dose after 2 hours if headache persists. (Patient not taking: Reported on 7/2/2019) 9 tablet 3       Review of Systems: A 10-point review of systems was negative, with the exception of chronic pain issues, fatigue, headache, itching, shortness of breath, chest pain, abdominal pain, nausea, vomiting, diarrhea, diabetes, steroid use, thyroid disease, urinary frequency, weakness, numbness/tingling, and stress       Social History: Reviewed; unchanged from previous consultation.      Family history: Reviewed; unchanged from previous consultation.     PHYSICAL EXAM:     Vitals:   /68   Temp 99.2  F (37.3  C) (Temporal)   Ht 1.727 m (5' 8\")   Wt 66.2 kg (146 lb)   BMI 22.20 kg/m        Constitutional: healthy, alert and no distress  HEENT: Head atraumatic, normocephalic. Eyes without conjunctival injection or jaundice. Neck supple. No obvious neck masses.  Skin: No rash, lesions, or petechiae of exposed skin.   Psychiatric/mental status: Alert, without lethargy or stupor. Appropriate affect. Mood normal.  Tearful at times.    DIAGNOSTIC TESTS:  Imaging Studies:   No new imaging to review    Assessment:  Stone De Paz is a 53 year old male who presents today for follow up regarding his:    1.  Cervical stenosis   2.  Myofascial pain  3.  Chronic low back pain  4.  Chronic pain syndrome  5. Chronic use of opioids    Pain is better since his last visit.  He would like to continue on this current treatment plan.  Okay to continue on this current plan.  We will continue this for 2 months and meet up again.  At the " next visit we will discuss if we need to increase his Cymbalta or continue on the current dosing.      Plan:    Diagnosis reviewed, treatment option addressed, and risk/benifits discussed.  Self-care instructions given.  I am recommending a multidisciplinary treatment plan to help this patient better manage pain.      1. Physical Therapy:  NO   2. Clinical Health Psychologist:  NO, but I do think this is something we should consider    3. Diagnostic Studies:  None at this time  4. Medication Management:    1. Oxycodone 2/day. Okay to use 3 occasionally if needed  2. Continue medical cannabis  3.  Continue Cymbalta, will discuss at next visit if we need to increase.   5. Recommendations to PCP. See above          Follow up with this provider: 8 Weeks     Total time spent face to face was 10 minutes and more than 50% of face to face time was spent in counseling and/or coordination of care regarding the diagnosis and recommendations above.      Natasha Padgett PA-C   [facility]

## 2019-08-22 ENCOUNTER — OFFICE VISIT (OUTPATIENT)
Dept: PALLIATIVE MEDICINE | Facility: CLINIC | Age: 54
End: 2019-08-22
Payer: COMMERCIAL

## 2019-08-22 VITALS
WEIGHT: 146 LBS | DIASTOLIC BLOOD PRESSURE: 68 MMHG | SYSTOLIC BLOOD PRESSURE: 118 MMHG | BODY MASS INDEX: 22.13 KG/M2 | TEMPERATURE: 99.2 F | HEIGHT: 68 IN

## 2019-08-22 DIAGNOSIS — G89.4 CHRONIC PAIN SYNDROME: ICD-10-CM

## 2019-08-22 DIAGNOSIS — M48.02 CERVICAL STENOSIS OF SPINE: Primary | ICD-10-CM

## 2019-08-22 DIAGNOSIS — F11.90 CHRONIC, CONTINUOUS USE OF OPIOIDS: ICD-10-CM

## 2019-08-22 DIAGNOSIS — M54.50 CHRONIC BILATERAL LOW BACK PAIN WITHOUT SCIATICA: ICD-10-CM

## 2019-08-22 DIAGNOSIS — G89.29 CHRONIC BILATERAL LOW BACK PAIN WITHOUT SCIATICA: ICD-10-CM

## 2019-08-22 DIAGNOSIS — M79.18 MYOFASCIAL PAIN: ICD-10-CM

## 2019-08-22 PROCEDURE — 99213 OFFICE O/P EST LOW 20 MIN: CPT | Performed by: PHYSICIAN ASSISTANT

## 2019-08-22 RX ORDER — OXYCODONE HYDROCHLORIDE 5 MG/1
5 TABLET ORAL EVERY 6 HOURS PRN
Qty: 70 TABLET | Refills: 0 | Status: SHIPPED | OUTPATIENT
Start: 2019-08-22 | End: 2019-09-25

## 2019-08-22 ASSESSMENT — PAIN SCALES - GENERAL: PAINLEVEL: MODERATE PAIN (4)

## 2019-08-22 ASSESSMENT — MIFFLIN-ST. JEOR: SCORE: 1486.75

## 2019-08-22 NOTE — PATIENT INSTRUCTIONS
After Visit Instructions:     Thank you for coming to [facility] for your care. It is my goal to partner with you to help you reach your optimal state of health.     I am recommending multidisciplinary care at this time.  The focus of care will be to continue gradual rehabilitation and pain management with medication adjustments as needed.    Continue daily self-care, identifying contributing factors, and monitoring variations in pain level. Continue to integrate self-care into your life.        Schedule follow-up with Natasha Padgett PA-C in 8 weeks. You will need to make this appointment.     Medication recommendations:     Continue medical cannabis    Oxycodone 5mg-take 2 tablets in the morning. Can occasionally take a 3rd tablet. 70 tablets to last 30 days. (make sure you let me know 1 week prior to needing a refill)      Natasha Padgett PA-C  [facility]  [facility]    Contact information: [facility]  Clinic Number:  [phone]     Call with any questions about your care and for scheduling assistance.     Calls are returned Monday through Friday between 8 AM and 4:30 PM. We usually get back to you within 2 business days depending on the issue/request.    If we are prescribing your medications:    For opioid medication refills, call the clinic or send a Clinicbook message 7 days in advance.  Please include:    Name of requested medication    Name of the pharmacy.    For non-opioid medications, call your pharmacy directly to request a refill. Please allow 3-4 days to be processed.     Per [place] State Law:    All controlled substance prescriptions must be filled within 30 days of being written.      For those controlled substances allowing refills, pickup must occur within 30 days of last fill.      We believe regular attendance is key to your success in our program!      Any time you are unable to keep your appointment we ask that you call  us at least 24 hours in advance to cancel.This will allow us to offer the appointment time to another patient.   Multiple missed appointments may lead to dismissal from the clinic.

## 2019-09-13 ENCOUNTER — TRANSFERRED RECORDS (OUTPATIENT)
Dept: HEALTH INFORMATION MANAGEMENT | Facility: CLINIC | Age: 54
End: 2019-09-13

## 2019-09-17 ENCOUNTER — TELEPHONE (OUTPATIENT)
Dept: FAMILY MEDICINE | Facility: CLINIC | Age: 54
End: 2019-09-17

## 2019-09-17 NOTE — TELEPHONE ENCOUNTER
Patient is due for a PHQ-9.  Index start date:5/29/2019  Index end date:9/26/2019    Please call patient.

## 2019-09-23 DIAGNOSIS — M48.02 CERVICAL STENOSIS OF SPINE: ICD-10-CM

## 2019-09-23 DIAGNOSIS — G89.4 CHRONIC PAIN SYNDROME: ICD-10-CM

## 2019-09-23 NOTE — TELEPHONE ENCOUNTER
Reason for Call:  Other call back    Detailed comments: Pt would like Refill of his Oxy    Phone Number Patient can be reached at: Home number on file [phone] (home)    Best Time: NA    Can we leave a detailed message on this number? YES    Call taken on 9/23/2019 at 12:55 PM by Letitia Alfaro

## 2019-09-24 ASSESSMENT — PATIENT HEALTH QUESTIONNAIRE - PHQ9: SUM OF ALL RESPONSES TO PHQ QUESTIONS 1-9: 0

## 2019-09-24 NOTE — TELEPHONE ENCOUNTER
I have attempted to contact pt to update a PHQ-9. I left a message for pt to return my call. Please transfer pt to the [facility] team if I'm unavailable to take the call.

## 2019-09-24 NOTE — TELEPHONE ENCOUNTER
Pt returned my call and updated a PHQ-9.    PHQ-9 SCORE 9/24/2019   PHQ-9 Total Score -   PHQ-9 Total Score 0     Ambar Matthews CMA ([facility])

## 2019-09-25 RX ORDER — OXYCODONE HYDROCHLORIDE 5 MG/1
5 TABLET ORAL EVERY 6 HOURS PRN
Qty: 70 TABLET | Refills: 0 | Status: SHIPPED | OUTPATIENT
Start: 2019-09-25 | End: 2019-10-17

## 2019-09-25 NOTE — TELEPHONE ENCOUNTER
Will route to the [facility] for processing a refill of oxycodone to the [facility] pharmacy in [place].    WILLIAM DacostaN, RN  Care Coordinator  [facility]

## 2019-09-25 NOTE — TELEPHONE ENCOUNTER
Refill appropriate. Sent to [facility].    Natasha Padgett PA-C on 9/25/2019 at 2:37 PM

## 2019-09-25 NOTE — TELEPHONE ENCOUNTER
Received call from patient requesting refill(s) of oxyCODONE (ROXICODONE) 5 MG tablet     Last picked up from pharmacy on 08/28/19     Pt last seen by prescribing provider on 08/22/19    Next appt scheduled for 10/17/19     checked in the past 6 months? Yes If no, print current report and give to RN    Last urine drug screen date 05/30/19  Current opioid agreement on file (completed within the last year) Yes Date of opioid agreement: 05/03/19    Processing (pick one and delete the others):      E-prescribe to     [facility] [id number] [facility] [address]  Phone: [phone] Fax: [phone]    Will route to nursing [place] for review and preparation of prescription(s).     -----  Patient has only picked up Rx from   [facility] [address] [place] [address] in the past.    Shari Bain [facility]  [place]

## 2019-09-25 NOTE — TELEPHONE ENCOUNTER
Medication refill information reviewed.     Due date for oxyCODONE (ROXICODONE) 5 MG tablet  is 9/27/19     Prescriptions prepped for review.     Will route to provider.

## 2019-09-25 NOTE — TELEPHONE ENCOUNTER
Patient calling to inquire about the fact that this can't be called into the pharmacy? Please advise patient.

## 2019-10-16 NOTE — PROGRESS NOTES
"[facility]    CHIEF COMPLAINT:   Chronic neck pain    INTERVAL HISTORY:  Last seen on 8/22/19.        Recommendations/plan at the last visit included:  1. Physical Therapy:  NO   2. Clinical Health Psychologist:  NO, but I do think this is something we should consider    3. Diagnostic Studies:  None at this time  4. Medication Management:    1. Oxycodone 2/day. Okay to use 3 occasionally if needed  2. Continue medical cannabis  3.  Continue Cymbalta, will discuss at next visit if we need to increase.   5. Recommendations to PCP. See above      Follow up with this provider: 8 Weeks    Since his last visit, Stone De Paz reports:    -He states that overall things have been going well. The Cymbalta is going well. He states that his pain has been stable. He states that he thinks the neck ablation is wearing off, but it is still tolerable.     He states that he has been profusely sweating as well.     Pain Information:   Pain quality: miserable    Pain rating: intensity ranges from 2/10 to 10/10, and averages 5/10 on a 0-10 scale.   Pain today 3/10    SELF CARE:   How often do you practice SELF-CARE (relaxing, stretching, pacing, monitoring posture, taking mini-breaks) in a typical day:  Is learning to pace himself.     Annual Controlled Substance Agreement: signed 5/2/2019  UDS: 4/11/2019-reviewed and as expected    CURRENT RELEVANT PAIN MEDICATIONS:   Naproxen 500mg-taking 1/day  Oxycodone 5mg-taking 2 in AM and will occasionally take a 3rd in the afternoon    Imitrex-last used prior to the neck procedures    Patient is using the medication as prescribed:  YES  Is your medication helpful? YES   Medication side effects? itching    Previous Medications: (H--helped; HI--Helped initially; SWH-- somewhat helpful, NH--No help; W--worse; SE--side effects)   Opiates: Oxycodone H, Tylenol #3 NH, Hydrocodone NH, Dilaudid H SE \"ick\", Morphine H SE nausea, Tramadol NH  NSAIDS: Naproxen H SE stomach " "upset  Muscle Relaxants: Flexeril H SE incontinence  Anti-migraine mediations: Imitrex H  Anti-depressants: Amitriptyline NH SE rapid heart  Sleep aids: none  Anxiolytics: Valium [facility]  Neuropathics: Gabapentin NH SE \"goofy\", Lyrica ? Was too expensive  Topicals: Voltaren Gel NH, Lidocaine NH  Other medications not covered above: Tylenol H, Prednisone H SE high sugars, Medical marijuana H SE cost constraints      Past Pain Treatments:  Pain Clinic:   No   PT: Yes, last went this year  Psychologist: No, but he did have to do some psychology prior to his back surgeryes  Relaxation techniques/biofeedback: No, but tries to do meditation at times   Chiropractor: No  Acupuncture: No  Pharmacotherapy:               Opioids: Yes                Non-opioids:    Yes   TENs Unit:Yes, was not helpful  Injections: Yes, right 3rd occipital nerve and right C3, C4, C5 RFA 02/28/2019, left 3rd occipital nerve, and left C3, C4, C5 RFA 03/15/2019. Had done low back injections in [place] as well (steroids mess with sugars therefore tries to avoid if possible).   Self-care:   Yes, cold works better (heat worsens pain)  Surgeries related to pain: Yes, C5-6 ACDF 2012. Disk replacement cervical spine 2006, low back fusions (2000, 2002)    [place] Board of Pharmacy Data Base Reviewed:    YES; As expected, no concern for misuse/abuse of controlled medications based on this report.        THE 4 As OF OPIOID MAINTENANCE ANALGESIA    Analgesia: Is pain relief clinically significant? YES   Activity: Is patient functional and able to perform Activities of Daily Living? YES   Adverse effects: Is patient free from adverse side effects from opiates? NO   Adherence to Rx protocol: Is patient adhering to Controlled Substance Agreement and taking medications ONLY as ordered? YES       Is Narcan prescribed for opiate use >50 MME daily? N/A      Daily MME: 15-22.5    Medications:  Current Outpatient Medications   Medication Sig Dispense Refill     " amylase-lipase-protease (CREON 24) 57864-61576 units CPEP per EC capsule Take 1 capsule by mouth every other day       amylase-lipase-protease (PERTZYE) 97892 units CPEP Take 1 capsule by mouth every other day       budesonide (ENTOCORT EC) 3 MG EC capsule Take 3 mg by mouth every morning Take 1 or 2 capsules       buPROPion (WELLBUTRIN XL) 150 MG 24 hr tablet Take 1 tablet (150 mg) by mouth every morning 90 tablet 3     buPROPion (WELLBUTRIN) 75 MG tablet Take 1 tablet (75 mg) by mouth 2 times daily 60 tablet 5     CONTOUR NEXT TEST test strip Use to test blood sugar 5x times daily or as directed. 450 strip 3     DULoxetine (CYMBALTA) 20 MG capsule Take 1 capsule (20 mg) by mouth 2 times daily Please stop the the Celexa 60 capsule 1     insulin aspart (NOVOLOG VIAL) 100 UNITS/ML vial Uses with insulin pump, total daily dose approx 45 units, E10.9 20 mL 6     levothyroxine (SYNTHROID/LEVOTHROID) 125 MCG tablet Take 1-tablet by mouth daily as directed 90 tablet 0     lisinopril (PRINIVIL/ZESTRIL) 40 MG tablet TAKE ONE TABLET BY MOUTH ONCE DAILY 90 tablet 0     medical cannabis (Patient's own supply) See Admin Instructions (The purpose of this order is to document that the patient reports taking medical cannabis.  This is not a prescription, and is not used to certify that the patient has a qualifying medical condition.)       naproxen (NAPROSYN) 500 MG tablet Take 1 tablet (500 mg) by mouth 2 times daily as needed for moderate pain 180 tablet 1     ofloxacin (FLOXIN) 0.3 % otic solution Place 5 drops into the right ear daily 5 mL 0     ondansetron (ZOFRAN-ODT) 8 MG disintegrating tablet Take 1 tablet by mouth every 8 hours as needed       oxyCODONE (ROXICODONE) 5 MG tablet Take 1 tablet (5 mg) by mouth every 6 hours as needed for pain MAX of 3/day. Fill 9/25/2019. Start. 9/27/19. 30 day supply. 70 tablet 0     ranitidine (ZANTAC) 150 MG tablet Take 1 tablet (150 mg) by mouth 2 times daily 180 tablet 3     simvastatin  "(ZOCOR) 40 MG tablet TAKE ONE TABLET BY MOUTH AT BEDTIME 90 tablet 1     SUMAtriptan (IMITREX) 100 MG tablet Take 1 tablet (100 mg) by mouth at onset of headache 1/2 to 1 tablet by mouth at onset of headache. May repeat 1 dose after 2 hours if headache persists. (Patient not taking: Reported on 7/2/2019) 9 tablet 3       Review of Systems: A 10-point review of systems was negative, with the exception of chronic pain issues, headache, vision changes, itching, abdominal pain, nausea, vomiting, diabetes, thyroid disease, urinary frequency, weakness, numbness/tingling, and stress     Social History: Reviewed; unchanged from previous consultation.      Family history: Reviewed; unchanged from previous consultation.     PHYSICAL EXAM:     Vitals:   /68   Temp 98.6  F (37  C) (Temporal)   Ht 1.727 m (5' 8\")   Wt 69.9 kg (154 lb)   BMI 23.42 kg/m        Constitutional: healthy, alert and no distress  HEENT: Head atraumatic, normocephalic. Eyes without conjunctival injection or jaundice. Neck supple. No obvious neck masses.  Skin: No rash, lesions, or petechiae of exposed skin.   Psychiatric/mental status: Alert, without lethargy or stupor. Appropriate affect. Mood normal.      DIAGNOSTIC TESTS:  Imaging Studies:   No new imaging to review    Assessment:  Stone De Paz is a 53 year old male who presents today for follow up regarding his:    1.  Cervical stenosis   2.  Myofascial pain  3.  Chronic low back pain  4.  Chronic pain syndrome  5. Chronic use of opioids    Patient's pain continues to be stable.  He is tolerating his medications well.  We discussed the importance of pacing himself and doing activities.  He states that he is learning to do this.  He does overdo it at times.    He also reports that he has been sweating profusely.  He has been treating this to his diabetes however it sounds like to be getting worse.  Strongly encouraged him to follow-up with his primary care provider regarding " this.      Plan:    Diagnosis reviewed, treatment option addressed, and risk/benifits discussed.  Self-care instructions given.  I am recommending a multidisciplinary treatment plan to help this patient better manage pain.      1. Physical Therapy:  NO   2. Clinical Health Psychologist:  NO  3. Diagnostic Studies:  None at this time  4. Medication Management:    1. Oxycodone 2/day. Okay to use 3 occasionally if needed  2. Continue medical cannabis  3.  Continue Cymbalta at current dosing  5. Recommendations to PCP. See above          Follow up with this provider: 8 Weeks     Total time spent face to face was 15 minutes and more than 50% of face to face time was spent in counseling and/or coordination of care regarding the diagnosis and recommendations above.      Natasha Padgett PA-C   [facility]

## 2019-10-17 ENCOUNTER — OFFICE VISIT (OUTPATIENT)
Dept: PALLIATIVE MEDICINE | Facility: CLINIC | Age: 54
End: 2019-10-17
Payer: COMMERCIAL

## 2019-10-17 VITALS
WEIGHT: 154 LBS | DIASTOLIC BLOOD PRESSURE: 68 MMHG | BODY MASS INDEX: 23.34 KG/M2 | TEMPERATURE: 98.6 F | HEIGHT: 68 IN | SYSTOLIC BLOOD PRESSURE: 122 MMHG

## 2019-10-17 DIAGNOSIS — G89.29 CHRONIC BILATERAL LOW BACK PAIN WITHOUT SCIATICA: ICD-10-CM

## 2019-10-17 DIAGNOSIS — F11.90 CHRONIC, CONTINUOUS USE OF OPIOIDS: ICD-10-CM

## 2019-10-17 DIAGNOSIS — M79.18 MYOFASCIAL PAIN: Primary | ICD-10-CM

## 2019-10-17 DIAGNOSIS — M48.02 CERVICAL STENOSIS OF SPINE: ICD-10-CM

## 2019-10-17 DIAGNOSIS — M54.50 CHRONIC BILATERAL LOW BACK PAIN WITHOUT SCIATICA: ICD-10-CM

## 2019-10-17 DIAGNOSIS — G89.4 CHRONIC PAIN SYNDROME: ICD-10-CM

## 2019-10-17 PROCEDURE — 99213 OFFICE O/P EST LOW 20 MIN: CPT | Performed by: PHYSICIAN ASSISTANT

## 2019-10-17 RX ORDER — OXYCODONE HYDROCHLORIDE 5 MG/1
5 TABLET ORAL EVERY 6 HOURS PRN
Qty: 70 TABLET | Refills: 0 | Status: SHIPPED | OUTPATIENT
Start: 2019-10-17 | End: 2019-10-25

## 2019-10-17 RX ORDER — DULOXETIN HYDROCHLORIDE 20 MG/1
20 CAPSULE, DELAYED RELEASE ORAL 2 TIMES DAILY
Qty: 60 CAPSULE | Refills: 1 | Status: SHIPPED | OUTPATIENT
Start: 2019-10-17 | End: 2020-02-10

## 2019-10-17 ASSESSMENT — MIFFLIN-ST. JEOR: SCORE: 1518.04

## 2019-10-17 ASSESSMENT — PAIN SCALES - GENERAL: PAINLEVEL: MILD PAIN (3)

## 2019-10-17 NOTE — PATIENT INSTRUCTIONS
After Visit Instructions:     Thank you for coming to [facility] for your care. It is my goal to partner with you to help you reach your optimal state of health.     I am recommending multidisciplinary care at this time.  The focus of care will be to continue gradual rehabilitation and pain management with medication adjustments as needed.    Continue daily self-care, identifying contributing factors, and monitoring variations in pain level. Continue to integrate self-care into your life.          Schedule follow-up with Natasha Padgett PA-C in 8 weeks. You will need to make this appointment.     Medication recommendations:     Continue Cymbalta    Continue Oxycodone    Continue medical cannabis      Natasha Padgett PA-C  [facility]  [place]    Contact information: [facility] [place]  Clinic Number:  [phone]     Call with any questions about your care and for scheduling assistance.     Calls are returned Monday through Friday between 8 AM and 4:30 PM. We usually get back to you within 2 business days depending on the issue/request.    If we are prescribing your medications:    For opioid medication refills, call the clinic or send a [employer] message 7 days in advance.  Please include:    Name of requested medication    Name of the pharmacy.    For non-opioid medications, call your pharmacy directly to request a refill. Please allow 3-4 days to be processed.     Per [place] State Law:    All controlled substance prescriptions must be filled within 30 days of being written.      For those controlled substances allowing refills, pickup must occur within 30 days of last fill.      We believe regular attendance is key to your success in our program!      Any time you are unable to keep your appointment we ask that you call us at least 24 hours in advance to cancel.This will allow us to offer the appointment time to another patient.   Multiple missed  appointments may lead to dismissal from the clinic.

## 2019-10-25 DIAGNOSIS — G89.4 CHRONIC PAIN SYNDROME: ICD-10-CM

## 2019-10-25 DIAGNOSIS — M48.02 CERVICAL STENOSIS OF SPINE: ICD-10-CM

## 2019-10-25 RX ORDER — OXYCODONE HYDROCHLORIDE 5 MG/1
5 TABLET ORAL EVERY 6 HOURS PRN
Qty: 70 TABLET | Refills: 0 | Status: SHIPPED | OUTPATIENT
Start: 2019-10-25 | End: 2019-11-18

## 2019-10-25 NOTE — TELEPHONE ENCOUNTER
PAtient would like Oxycodone Rx sent to [facility] instead of [facility], [place]. Will cancel previously ordered Rx and send request to provider.  Hannah Toney CMA

## 2019-10-25 NOTE — TELEPHONE ENCOUNTER
This is an appropriate change request. Refill sent to [facility] in [place].    Natasha Padgett PA-C on 10/25/2019 at 2:14 PM

## 2019-10-29 DIAGNOSIS — M48.02 CERVICAL STENOSIS OF SPINE: ICD-10-CM

## 2019-10-29 DIAGNOSIS — G89.4 CHRONIC PAIN SYNDROME: ICD-10-CM

## 2019-10-29 RX ORDER — NAPROXEN 500 MG/1
TABLET ORAL
Qty: 180 TABLET | Refills: 1 | Status: SHIPPED | OUTPATIENT
Start: 2019-10-29 | End: 2020-09-25

## 2019-10-29 NOTE — TELEPHONE ENCOUNTER
"Naproxen  Last Written Prescription Date:  11/05/2018  Last Fill Quantity: 180,  # refills: 1   Last office visit: 7/2/2019 with prescribing provider:  Mandi   Future Office Visit:   Next 5 appointments (look out 90 days)    Dec 16, 2019 11:30 AM CST  Return Visit with Naatsha Padgett PA-C  [facility] ([facility] [address] [place] [id number] [phone]      Routing refill request to provider for review/approval because:  Labs not current:  CBC, ALT, AST.    Requested Prescriptions   Pending Prescriptions Disp Refills     naproxen (NAPROSYN) 500 MG tablet [Pharmacy Med Name: NAPROXEN 500MG TABS] 180 tablet 1     Sig: TAKE ONE TABLET BY MOUTH TWICE A DAY AS NEEDED FOR MODERATE PAIN       NSAID Medications Failed - 10/29/2019  9:48 AM        Failed - Normal ALT on file in past 12 months     Recent Labs   Lab Test 07/09/18  1723   ALT 31           Failed - Normal AST on file in past 12 months     Recent Labs   Lab Test 07/09/18  1723   AST 21           Failed - Normal CBC on file in past 12 months     Recent Labs   Lab Test 01/29/16  1611   WBC 11.7*   RBC 4.62   HGB 14.4   HCT 40.9              Passed - Blood pressure under 140/90 in past 12 months     BP Readings from Last 3 Encounters:   10/17/19 122/68   08/22/19 118/68   07/25/19 146/70           Passed - Recent (12 mo) or future (30 days) visit within the authorizing provider's specialty     Patient has had an office visit with the authorizing provider or a provider within the authorizing providers department within the previous 12 mos or has a future within next 30 days. See \"Patient Info\" tab in inbasket, or \"Choose Columns\" in Meds & Orders section of the refill encounter.          Passed - Patient is age 6-64 years        Passed - Medication is active on med list        Passed - Normal serum creatinine on file in past 12 months     Recent Labs   Lab Test 05/22/19  1644   CR 0.90         Dolores Ferro " RN

## 2019-11-18 ENCOUNTER — OFFICE VISIT (OUTPATIENT)
Dept: PALLIATIVE MEDICINE | Facility: CLINIC | Age: 54
End: 2019-11-18
Payer: COMMERCIAL

## 2019-11-18 VITALS
SYSTOLIC BLOOD PRESSURE: 150 MMHG | BODY MASS INDEX: 23.34 KG/M2 | TEMPERATURE: 98.4 F | WEIGHT: 154 LBS | HEIGHT: 68 IN | DIASTOLIC BLOOD PRESSURE: 90 MMHG

## 2019-11-18 DIAGNOSIS — G89.4 CHRONIC PAIN SYNDROME: ICD-10-CM

## 2019-11-18 DIAGNOSIS — M62.838 SPASM OF MUSCLE: Primary | ICD-10-CM

## 2019-11-18 DIAGNOSIS — M48.02 CERVICAL STENOSIS OF SPINE: ICD-10-CM

## 2019-11-18 PROCEDURE — 99214 OFFICE O/P EST MOD 30 MIN: CPT | Performed by: PHYSICIAN ASSISTANT

## 2019-11-18 RX ORDER — METHOCARBAMOL 500 MG/1
TABLET, FILM COATED ORAL
Qty: 60 TABLET | Refills: 0 | Status: SHIPPED | OUTPATIENT
Start: 2019-11-18 | End: 2020-08-31

## 2019-11-18 RX ORDER — OXYCODONE HYDROCHLORIDE 5 MG/1
5 TABLET ORAL EVERY 6 HOURS PRN
Qty: 70 TABLET | Refills: 0 | Status: SHIPPED | OUTPATIENT
Start: 2019-11-18 | End: 2019-12-16

## 2019-11-18 ASSESSMENT — MIFFLIN-ST. JEOR: SCORE: 1518.04

## 2019-11-18 ASSESSMENT — PAIN SCALES - GENERAL: PAINLEVEL: EXTREME PAIN (8)

## 2019-11-18 NOTE — PATIENT INSTRUCTIONS
After Visit Instructions:     Thank you for coming to [facility] for your care. It is my goal to partner with you to help you reach your optimal state of health.     I am recommending multidisciplinary care at this time.  The focus of care will be to continue gradual rehabilitation and pain management with medication adjustments as needed.    Continue daily self-care, identifying contributing factors, and monitoring variations in pain level. Continue to integrate self-care into your life.        Schedule follow-up with Natasha Padgett PA-C in 5 weeks. You will need to make this appointment.     Medication recommendations:     Stop the Flexeril and Tizanidine. Start Methocarbamol 500mg: take 1000mg three times daily for the next 2-3 days then go down to 1-1.5 tablets three times a day      Natasha Padgett PA-C  [facility]  [place]    Contact information: [facility]  Clinic Number:  [phone]     Call with any questions about your care and for scheduling assistance.     Calls are returned Monday through Friday between 8 AM and 4:30 PM. We usually get back to you within 2 business days depending on the issue/request.    If we are prescribing your medications:    For opioid medication refills, call the clinic or send a [employer] message 7 days in advance.  Please include:    Name of requested medication    Name of the pharmacy.    For non-opioid medications, call your pharmacy directly to request a refill. Please allow 3-4 days to be processed.     Per [place] State Law:    All controlled substance prescriptions must be filled within 30 days of being written.      For those controlled substances allowing refills, pickup must occur within 30 days of last fill.      We believe regular attendance is key to your success in our program!      Any time you are unable to keep your appointment we ask that you call us at least 24 hours in advance to  cancel.This will allow us to offer the appointment time to another patient.   Multiple missed appointments may lead to dismissal from the clinic.

## 2019-11-18 NOTE — PROGRESS NOTES
[facility]    CHIEF COMPLAINT:   Chronic neck pain    INTERVAL HISTORY:  Last seen on 8/22/19.        Recommendations/plan at the last visit included:  1. Physical Therapy:  NO   2. Clinical Health Psychologist:  NO  3. Diagnostic Studies:  None at this time  4. Medication Management:    1.   Oxycodone 2/day. Okay to use 3 occasionally if needed  2.   Continue medical cannabis  3.  Continue Cymbalta at current dosing  5. Recommendations to PCP. See above           Follow up with this provider: 8 Weeks     Since his last visit, Stone De Paz reports:    -Patient presents on an acute basis for concerns of muscle spasms in his chest wall. He reports the last 3 days have been unbearable. He had a thoracotomy years ago, he had an infection and his lung collapsed. He states that it hurts to breathe as his whole chest hurts. He denies any shortness of breath. He states that the muscle continues to spasm. He has tried flexeril and tizanidine. He states that they have not helped. He states that he was up all night crying. He states it settled down a little bit this morning, but it came right back. He states that once in awhile it just kicks and his rib pushes out. He states that he feels that something is wrong with his lung. He denies any fevers or coughing. He was vomiting last week and is not sure if this triggered the spasm.     He has been off of the medical cannabis for 2 weeks now due to cost.      Pain Information:   Pain quality: spasms   Pain today 8/10    SELF CARE:   How often do you practice SELF-CARE (relaxing, stretching, pacing, monitoring posture, taking mini-breaks) in a typical day:  Decreased due to severe pain    Annual Controlled Substance Agreement: signed 5/2/2019  UDS: 4/11/2019-reviewed and as expected    CURRENT RELEVANT PAIN MEDICATIONS:   Naproxen 500mg-taking 1/day  Oxycodone 5mg-taking 2 in AM and will occasionally take a 3rd in the afternoon    Imitrex-last used prior to  "the neck procedures  Medical Cannabis    Patient is using the medication as prescribed:  YES  Is your medication helpful? YES   Medication side effects? itching    Previous Medications: (H--helped; HI--Helped initially; SWH-- somewhat helpful, NH--No help; W--worse; SE--side effects)   Opiates: Oxycodone H, Tylenol #3 NH, Hydrocodone NH, Dilaudid H SE \"ick\", Morphine H SE nausea, Tramadol NH  NSAIDS: Naproxen H SE stomach upset  Muscle Relaxants: Flexeril H SE incontinence, Tizanidine NH  Anti-migraine mediations: Imitrex H  Anti-depressants: Amitriptyline NH SE rapid heart  Sleep aids: none  Anxiolytics: Valium [facility]  Neuropathics: Gabapentin NH SE \"goofy\", Lyrica ? Was too expensive  Topicals: Voltaren Gel NH, Lidocaine NH  Other medications not covered above: Tylenol H, Prednisone H SE high sugars, Medical marijuana H SE cost constraints      Past Pain Treatments:  Pain Clinic:   No   PT: Yes, last went this year  Psychologist: No, but he did have to do some psychology prior to his back surgeryes  Relaxation techniques/biofeedback: No, but tries to do meditation at times   Chiropractor: No  Acupuncture: No  Pharmacotherapy:               Opioids: Yes                Non-opioids:    Yes   TENs Unit:Yes, was not helpful  Injections: Yes, right 3rd occipital nerve and right C3, C4, C5 RFA 02/28/2019, left 3rd occipital nerve, and left C3, C4, C5 RFA 03/15/2019. Had done low back injections in [place] as well (steroids mess with sugars therefore tries to avoid if possible).   Self-care:   Yes, cold works better (heat worsens pain)  Surgeries related to pain: Yes, C5-6 ACDF 2012. Disk replacement cervical spine 2006, low back fusions (2000, 2002)    [place] Board of Pharmacy Data Base Reviewed:    YES; As expected, no concern for misuse/abuse of controlled medications based on this report.        THE 4 As OF OPIOID MAINTENANCE ANALGESIA    Analgesia: Is pain relief clinically significant? YES   Activity: Is patient " functional and able to perform Activities of Daily Living? YES   Adverse effects: Is patient free from adverse side effects from opiates? NO   Adherence to Rx protocol: Is patient adhering to Controlled Substance Agreement and taking medications ONLY as ordered? YES       Is Narcan prescribed for opiate use >50 MME daily? N/A      Daily MME: 15-22.5    Medications:  Current Outpatient Medications   Medication Sig Dispense Refill     methocarbamol (ROBAXIN) 500 MG tablet Take 1-2 tablets three times daily as needed 60 tablet 0     oxyCODONE (ROXICODONE) 5 MG tablet Take 1 tablet (5 mg) by mouth every 6 hours as needed for pain MAX of 3/day. Fill 11/25/2019. Start. 11/26/19. 30 day supply. 70 tablet 0     amylase-lipase-protease (CREON 24) [address]308-34507 units CPEP per EC capsule Take 1 capsule by mouth every other day       amylase-lipase-protease (PERTZYE) 34513 units CPEP Take 1 capsule by mouth every other day       budesonide (ENTOCORT EC) 3 MG EC capsule Take 3 mg by mouth every morning Take 1 or 2 capsules       buPROPion (WELLBUTRIN XL) 150 MG 24 hr tablet Take 1 tablet (150 mg) by mouth every morning 90 tablet 3     buPROPion (WELLBUTRIN) 75 MG tablet Take 1 tablet (75 mg) by mouth 2 times daily 60 tablet 5     CONTOUR NEXT TEST test strip Use to test blood sugar 5x times daily or as directed. 450 strip 3     DULoxetine (CYMBALTA) 20 MG capsule Take 1 capsule (20 mg) by mouth 2 times daily 60 capsule 1     insulin aspart (NOVOLOG VIAL) 100 UNITS/ML vial Uses with insulin pump, total daily dose approx 45 units, E10.9 20 mL 6     levothyroxine (SYNTHROID/LEVOTHROID) 125 MCG tablet Take 1-tablet by mouth daily as directed 90 tablet 0     lisinopril (PRINIVIL/ZESTRIL) 40 MG tablet TAKE ONE TABLET BY MOUTH ONCE DAILY 90 tablet 0     medical cannabis (Patient's own supply) See Admin Instructions (The purpose of this order is to document that the patient reports taking medical cannabis.  This is not a prescription, and  "is not used to certify that the patient has a qualifying medical condition.)       naproxen (NAPROSYN) 500 MG tablet TAKE ONE TABLET BY MOUTH TWICE A DAY AS NEEDED FOR MODERATE PAIN 180 tablet 1     ofloxacin (FLOXIN) 0.3 % otic solution Place 5 drops into the right ear daily 5 mL 0     ondansetron (ZOFRAN-ODT) 8 MG disintegrating tablet Take 1 tablet by mouth every 8 hours as needed       ranitidine (ZANTAC) 150 MG tablet Take 1 tablet (150 mg) by mouth 2 times daily 180 tablet 3     simvastatin (ZOCOR) 40 MG tablet TAKE ONE TABLET BY MOUTH AT BEDTIME 90 tablet 1     SUMAtriptan (IMITREX) 100 MG tablet Take 1 tablet (100 mg) by mouth at onset of headache 1/2 to 1 tablet by mouth at onset of headache. May repeat 1 dose after 2 hours if headache persists. (Patient not taking: Reported on 7/2/2019) 9 tablet 3       Review of Systems: A 10-point review of systems was negative, with the exception of chronic pain issues, headache, vision changes, itching, abdominal pain, nausea, vomiting, diabetes, thyroid disease, urinary frequency, weakness, numbness/tingling, sweating and stress     Social History: Reviewed; unchanged from previous consultation.      Family history: Reviewed; unchanged from previous consultation.     PHYSICAL EXAM:     Vitals:   BP (!) 150/90   Temp 98.4  F (36.9  C) (Temporal)   Ht 1.727 m (5' 8\")   Wt 69.9 kg (154 lb)   BMI 23.42 kg/m        Constitutional: healthy, alert and no distress  HEENT: Head atraumatic, normocephalic. Eyes without conjunctival injection or jaundice. Neck supple. No obvious neck masses.  Skin: No rash, lesions, or petechiae of exposed skin.   Psychiatric/mental status: Alert, without lethargy or stupor. Appropriate affect. Mood normal.    Respiratory: Lungs are clear to auscultation bilaterally  Musculoskeletal: hi has muscle twitching in the right lower rib.    DIAGNOSTIC TESTS:  Imaging Studies:   No new imaging to review    Assessment:  Stone De Paz is a 53 year old " male who presents today for follow up regarding his:    1.   Muscle spasm  2.   Chronic Pain Syndrome  3. Cervical stenosis of spine    Patient is here today on an acute basis for concerns of muscle spasm on his right lower rib.  He does state that he had vomiting last week around the time that this started.  He does also report that he is prone to dehydration due to his diabetes.  He also has been sweating a lot.  I think the combination of these things has likely made the patient somewhat dehydrated.  I encouraged the patient to drink lots of water.  We will also try different muscle relaxant.  He can try methocarbamol 500 mg with instructions to take 1 to 2 tablets 3 times daily.  I encouraged him to take the thousand milligrams for the next couple of days.  Discussed that if he starts having any diffuse chest pain or pressure or shortness of breath he needs to be evaluated immediately.  Certainly if this pain progresses into something more than the muscle spasm that should be further evaluated in the emergency room as well.  He verbalized his understanding.        Plan:    Diagnosis reviewed, treatment option addressed, and risk/benifits discussed.  Self-care instructions given.  I am recommending a multidisciplinary treatment plan to help this patient better manage pain.      1. Physical Therapy:  NO   2. Clinical Health Psychologist:  NO  3. Diagnostic Studies:  None at this time  4. Medication Management:    1. Oxycodone 2/day. Okay to use 3 occasionally if needed  2. Continue medical cannabis when able  3.  Continue Cymbalta at current dosing  4. Robaxin 500mg: take 2 tablets TID for the next 2-3 days, then 1 tablet TID PRN  5. Recommendations to PCP. See above  6. Stone to follow up with Primary Care provider regarding elevated blood pressure.        Follow up with this provider: 5 Weeks     Total time spent face to face was 15 minutes and more than 50% of face to face time was spent in counseling and/or  coordination of care regarding the diagnosis and recommendations above.      Natasha Padgett PA-C   [facility]

## 2019-11-20 DIAGNOSIS — E78.5 HYPERLIPIDEMIA, UNSPECIFIED HYPERLIPIDEMIA TYPE: ICD-10-CM

## 2019-11-20 DIAGNOSIS — I10 ESSENTIAL HYPERTENSION: ICD-10-CM

## 2019-11-20 RX ORDER — SIMVASTATIN 40 MG
TABLET ORAL
Qty: 30 TABLET | Refills: 0 | Status: SHIPPED | OUTPATIENT
Start: 2019-11-20 | End: 2020-07-21

## 2019-11-20 RX ORDER — LISINOPRIL 40 MG/1
TABLET ORAL
Qty: 30 TABLET | Refills: 0 | Status: SHIPPED | OUTPATIENT
Start: 2019-11-20 | End: 2020-01-06

## 2019-11-20 NOTE — TELEPHONE ENCOUNTER
"Za refill given per RN protocol.   Patient has an appt scheduled for: 12/9/2019    Zocor  Last Written Prescription Date:  5/29/2019  Last Fill Quantity: 90,  # refills: 1   Last office visit: 7/2/2019 with prescribing provider:  Mandi   lisinopril  Last Written Prescription Date:  8/19/19  Last Fill Quantity: 90,  # refills: 0   Last office visit: 7/2/2019 with prescribing provider:  Mandi   Future Office Visit:   Next 5 appointments (look out 90 days)    Dec 09, 2019  1:40 PM CST  [facility] Ilan Chew Mai, [facility]  [facility] [address] [place] [id number]  [phone]   Dec 16, 2019 11:30 AM CST  Return Visit with Natasha Padgett PA-C  [facility] ([address] [id number] [phone]   Jan 28, 2020  1:30 PM CST  Return Visit with Frankie Best MD  [facility] ([facility]) [address] [place] [id number] [phone]           Requested Prescriptions   Pending Prescriptions Disp Refills     simvastatin (ZOCOR) 40 MG tablet [Pharmacy Med Name: SIMVASTATIN 40MG TABS] 90 tablet 1     Sig: TAKE ONE TABLET BY MOUTH AT BEDTIME       Statins Protocol Failed - 11/20/2019  7:33 AM        Failed - LDL on file in past 12 months     Recent Labs   Lab Test 07/09/18  1723   LDL 99             Passed - No abnormal creatine kinase in past 12 months     No lab results found.             Passed - Recent (12 mo) or future (30 days) visit within the authorizing provider's specialty     Patient has had an office visit with the authorizing provider or a provider within the authorizing providers department within the previous 12 mos or has a future within next 30 days. See \"Patient Info\" tab in inbasket, or \"Choose Columns\" in Meds & Orders section of the refill encounter.              Passed - Medication is active on med list        Passed - Patient is " "age 18 or older        lisinopril (PRINIVIL/ZESTRIL) 40 MG tablet [Pharmacy Med Name: LISINOPRIL 40MG TABS] 90 tablet 0     Sig: TAKE ONE TABLET BY MOUTH ONCE DAILY       ACE Inhibitors (Including Combos) Protocol Failed - 11/20/2019  7:33 AM        Failed - Blood pressure under 140/90 in past 12 months     BP Readings from Last 3 Encounters:   11/18/19 (!) 150/90   10/17/19 122/68   08/22/19 118/68                 Passed - Recent (12 mo) or future (30 days) visit within the authorizing provider's specialty     Patient has had an office visit with the authorizing provider or a provider within the authorizing providers department within the previous 12 mos or has a future within next 30 days. See \"Patient Info\" tab in inbasket, or \"Choose Columns\" in Meds & Orders section of the refill encounter.              Passed - Medication is active on med list        Passed - Patient is age 18 or older        Passed - Normal serum creatinine on file in past 12 months     Recent Labs   Lab Test 05/22/19  1644   CR 0.90             Passed - Normal serum potassium on file in past 12 months     Recent Labs   Lab Test 05/22/19  1644   POTASSIUM 4.7             Alyssa Cerrato RN on 11/20/2019 at 12:58 PM    "

## 2019-12-02 DIAGNOSIS — E03.9 HYPOTHYROIDISM, UNSPECIFIED TYPE: ICD-10-CM

## 2019-12-02 RX ORDER — LEVOTHYROXINE SODIUM 125 UG/1
TABLET ORAL
Qty: 30 TABLET | Refills: 0 | Status: SHIPPED | OUTPATIENT
Start: 2019-12-02 | End: 2020-01-06

## 2019-12-03 NOTE — TELEPHONE ENCOUNTER
"Levothyroxine  Last Written Prescription Date:  08/02/2019  Last Fill Quantity: 90,  # refills: 0   Last office visit: 7/2/2019 with prescribing provider:  Mandi   Future Office Visit:   Next 5 appointments (look out 90 days)    Dec 10, 2019  2:40 PM CST  Office Visit with Ilan Chew Mai, MD  [facility] ([address] [address]  [phone]   Dec 16, 2019 11:30 AM CST  Return Visit with Natasha Padgett PA-C  [facility] ([facility]) [address] [place] [id number] [phone]   Jan 28, 2020  1:30 PM CST  Return Visit with Frankie Best MD  [facility] ([facility]) [address] [place] [phone]      Routing refill request to provider for review/approval because:  Labs out of range:  TSH    Requested Prescriptions   Pending Prescriptions Disp Refills     levothyroxine (SYNTHROID/LEVOTHROID) 125 MCG tablet 90 tablet 0     Sig: Take 1-tablet by mouth daily as directed       Thyroid Protocol Failed - 12/2/2019 10:34 AM        Failed - Normal TSH on file in past 12 months     Recent Labs   Lab Test 05/22/19  1644   TSH 0.16*           Passed - Patient is 12 years or older        Passed - Recent (12 mo) or future (30 days) visit within the authorizing provider's specialty     Patient has had an office visit with the authorizing provider or a provider within the authorizing providers department within the previous 12 mos or has a future within next 30 days. See \"Patient Info\" tab in inbasket, or \"Choose Columns\" in Meds & Orders section of the refill encounter.          Passed - Medication is active on med list      Dolores Ferro RN   "

## 2019-12-06 ENCOUNTER — NURSE TRIAGE (OUTPATIENT)
Dept: FAMILY MEDICINE | Facility: CLINIC | Age: 54
End: 2019-12-06

## 2019-12-06 DIAGNOSIS — E10.43 TYPE 1 DIABETES MELLITUS WITH DIABETIC AUTONOMIC NEUROPATHY (H): ICD-10-CM

## 2019-12-06 NOTE — TELEPHONE ENCOUNTER
"  Additional Information    Systolic BP >= 160 OR Diastolic >= 100    Answer Assessment - Initial Assessment Questions  1. BLOOD PRESSURE: \"What is the blood pressure?\" \"Did you take at least two measurements 5 minutes apart?\"      186/92  2. ONSET: \"When did you take your blood pressure?\"      Just now  3. HOW: \"How did you obtain the blood pressure?\" (e.g., visiting nurse, automatic home BP monitor)      bp at home monitor cuff  4. HISTORY: \"Do you have a history of high blood pressure?\"      Yes on meds  5. MEDICATIONS: \"Are you taking any medications for blood pressure?\" \"Have you missed any doses recently?\"      yes  6. OTHER SYMPTOMS: \"Do you have any symptoms?\" (e.g., headache, chest pain, blurred vision, difficulty breathing, weakness)      Chronic pain  7. PREGNANCY: \"Is there any chance you are pregnant?\" \"When was your last menstrual period?\"    Protocols used: HIGH BLOOD PRESSURE-A-OH    "

## 2019-12-06 NOTE — TELEPHONE ENCOUNTER
Reason for Call:  Medication refill:     Name of the pharmacy and phone number for the current request:     JAN 2019 - [place], [place] - [address]    Name of the medication requested:   CONTOUR NEXT TEST test strip 450        Can we leave a detailed message on this number? YES    Phone number patient can be reached at: Cell number on file:    Telephone Information:   Mobile [phone]       Best Time: any    Call taken on 12/6/2019 at 4:18 PM by Emily Márquez

## 2019-12-06 NOTE — TELEPHONE ENCOUNTER
RN TRIAGE CALL:    Patient Contact    Attempt # 1    Was call answered?  No.  Left message on both [employer] voicemail and [employer] voicemail with information to call clinic triage RN back at [phone]    Юлия Carlos RN

## 2019-12-06 NOTE — TELEPHONE ENCOUNTER
Contour Next test strips    Last Written Prescription Date:  10/15/18  Last Fill Quantity: 450 strips,  # refills: 3   Last office visit: 5/22/2019 with prescribing provider:  Estephania   Future Office Visit:   Next 5 appointments (look out 90 days)    Dec 10, 2019  2:40 PM CST  Office Visit with Ilan Chew Mai, MD  [facility] [address] [place] [id number] [phone]   Dec 16, 2019 11:30 AM CST  Return Visit with Natasha Padgett PA-C  [facility] [address] [place] [id number] [phone]   Jan 28, 2020  1:30 PM CST  [facility] with [facility] Best [facility] [address] [place] [id number] [phone]

## 2019-12-06 NOTE — TELEPHONE ENCOUNTER
Reason for call:  Patient reporting a symptom    Symptom or request: elevated BP this /102 & neck pain     Duration (how long have symptoms been present):     Have you been treated for this before? No    Additional comments: Wife calling to speak to a nurse. She was not with pt at time of call. She asks that a triage nurse call Stone with advice.     Phone Number patient can be reached at:  [phone]    Best Time:  Any     Can we leave a detailed message on this number:  YES    Call taken on 12/6/2019 at 11:17 AM by Sydni Moctezuma

## 2019-12-06 NOTE — TELEPHONE ENCOUNTER
Patient blood pressure elevated today at 186/92. He is in a lot of pain and has a history of chronic pain.  He has no other symptoms of shortness of breath, chest pain, blurred vision.    He will try to get pain controlled and use non pharmaceutical relaxation and pain relief techniques. He has an appointment with PCP on Tuesday and will tack his blood pressure for provider to review. He will contact clinic with any changes or increase of symptoms.  Sarah Adan RN BSN

## 2019-12-11 ENCOUNTER — OFFICE VISIT (OUTPATIENT)
Dept: FAMILY MEDICINE | Facility: CLINIC | Age: 54
End: 2019-12-11
Payer: COMMERCIAL

## 2019-12-11 VITALS
WEIGHT: 162.8 LBS | SYSTOLIC BLOOD PRESSURE: 126 MMHG | OXYGEN SATURATION: 99 % | BODY MASS INDEX: 24.75 KG/M2 | HEART RATE: 87 BPM | TEMPERATURE: 96.4 F | RESPIRATION RATE: 16 BRPM | DIASTOLIC BLOOD PRESSURE: 70 MMHG

## 2019-12-11 DIAGNOSIS — E10.43 TYPE 1 DIABETES MELLITUS WITH DIABETIC AUTONOMIC NEUROPATHY (H): Primary | ICD-10-CM

## 2019-12-11 DIAGNOSIS — R06.81 APNEA: ICD-10-CM

## 2019-12-11 DIAGNOSIS — G89.4 CHRONIC PAIN SYNDROME: ICD-10-CM

## 2019-12-11 DIAGNOSIS — E78.5 HYPERLIPIDEMIA, UNSPECIFIED HYPERLIPIDEMIA TYPE: ICD-10-CM

## 2019-12-11 DIAGNOSIS — E03.9 HYPOTHYROIDISM, UNSPECIFIED TYPE: ICD-10-CM

## 2019-12-11 DIAGNOSIS — R61 NIGHT SWEATS: ICD-10-CM

## 2019-12-11 DIAGNOSIS — Z23 NEED FOR PROPHYLACTIC VACCINATION AND INOCULATION AGAINST INFLUENZA: ICD-10-CM

## 2019-12-11 PROCEDURE — 99214 OFFICE O/P EST MOD 30 MIN: CPT | Mod: 25 | Performed by: FAMILY MEDICINE

## 2019-12-11 PROCEDURE — 99207 C FOOT EXAM  NO CHARGE: CPT | Performed by: FAMILY MEDICINE

## 2019-12-11 PROCEDURE — G0008 ADMIN INFLUENZA VIRUS VAC: HCPCS | Performed by: FAMILY MEDICINE

## 2019-12-11 PROCEDURE — 90682 RIV4 VACC RECOMBINANT DNA IM: CPT | Performed by: FAMILY MEDICINE

## 2019-12-11 ASSESSMENT — PAIN SCALES - GENERAL: PAINLEVEL: MODERATE PAIN (5)

## 2019-12-11 NOTE — PROGRESS NOTES
Subjective     Stone De Paz is a 54 year old male who presents to clinic today for the following health issues:    HPI   Chief Complaint   Patient presents with     Sleep Apnea     has concerns about possible sleep apnea, states that he doesn't feel rested once he wakes up and states that sometimes he can feel  himself taking a deep breath and it wakes him up sometimes.     Excessive Sweating     states that he has night sweats and sweats excessively in the summer time when he is out working. States that he gets chest pain/pressure with excercise.  States that he has neuropathy from diabetes so he is unsure of what is causing this.      Stone is here today with his wife for couple concerns.  First concern is about possible having sleep apnea.  Per his wife, although his does not snore very much, he has had multiple episodes of gasping for air while sleeping pretty much every night and at times, he stopped breathing.  Patient stated that he also at time woke himself up from gasping for air. He had a sleep study many years ago and it was normal.  No significant weight change.  Patient states that he does not feeling rested in the morning despite of many hours of sleeping.  He feels excessive fatigue and sleepiness during the day.  He has complicated medical condition include chronic pain syndrome.  He is interested to be referred for evaluation for sleep apnea.    Also has concern about excessive sweating, mainly at night.  It was very bad over the summer, it is getting better slowly.  He also sweated easily and excessively with minimal exertion.  He typically is not active because of chronic pain, not sure if it was because of deconditioning.  He also is known to have diabetic neuropathy.      He also has hypothyroidism and has been taking medication as prescribed.  No weight change.  No heart palpitation.  No diarrhea or constipation.  He is seeing the pain specialist for his pain chronic pain syndrome.  He takes  Robaxin, oxycodone and medical marijuana for his pain and it is controlled for the most part.    He also have diabetes type 1 and has been on insulin pump.  He has been working with the endocrinologist and diabetic educator.  States that his BS has been pretty good and stable.  No problem with using the pump.  Denied of headache or dizziness.  No acute change in his vision.  No chest pain or shortness of breath.  UTD for his eye exam.  Diabetic neuropathy is stable and tolerable.  No other concerns today.        Current Outpatient Medications   Medication Sig Dispense Refill     amylase-lipase-protease (CREON 24) 11889-43545 units CPEP per EC capsule Take 1 capsule by mouth every other day       amylase-lipase-protease (PERTZYE) 13888 units CPEP Take 1 capsule by mouth every other day       budesonide (ENTOCORT EC) 3 MG EC capsule Take 3 mg by mouth every morning Take 1 or 2 capsules       CONTOUR NEXT TEST test strip Use to test blood sugar 5x times daily or as directed. 450 strip 3     DULoxetine (CYMBALTA) 20 MG capsule Take 1 capsule (20 mg) by mouth 2 times daily 60 capsule 1     insulin aspart (NOVOLOG VIAL) 100 UNITS/ML vial Uses with insulin pump, total daily dose approx 45 units, E10.9 20 mL 6     levothyroxine (SYNTHROID/LEVOTHROID) 125 MCG tablet Take 1-tablet by mouth daily as directed.  Please follow up as scheduled 30 tablet 0     lisinopril (PRINIVIL/ZESTRIL) 40 MG tablet TAKE ONE TABLET BY MOUTH ONCE DAILY 30 tablet 0     medical cannabis (Patient's own supply) See Admin Instructions (The purpose of this order is to document that the patient reports taking medical cannabis.  This is not a prescription, and is not used to certify that the patient has a qualifying medical condition.)       methocarbamol (ROBAXIN) 500 MG tablet Take 1-2 tablets three times daily as needed 60 tablet 0     naproxen (NAPROSYN) 500 MG tablet TAKE ONE TABLET BY MOUTH TWICE A DAY AS NEEDED FOR MODERATE PAIN 180 tablet 1      ondansetron (ZOFRAN-ODT) 8 MG disintegrating tablet Take 1 tablet by mouth every 8 hours as needed       simvastatin (ZOCOR) 40 MG tablet TAKE ONE TABLET BY MOUTH AT BEDTIME 30 tablet 0     SUMAtriptan (IMITREX) 100 MG tablet Take 1 tablet (100 mg) by mouth at onset of headache 1/2 to 1 tablet by mouth at onset of headache. May repeat 1 dose after 2 hours if headache persists. 9 tablet 3     oxyCODONE (ROXICODONE) 5 MG tablet Take 1 tablet (5 mg) by mouth every 6 hours as needed for pain MAX of 3/day. Fill 12/23/2019. Start. 12/26/19. 30 day supply. 70 tablet 0     No Known Allergies  Recent Labs   Lab Test 05/22/19  1644 04/10/19  1431  02/15/19  1622 09/21/18  1355 07/09/18  1723  01/29/16  1611  01/08/14  1606   A1C 8.8*  --   --  8.6* 8.6*  --    < >  --    < >  --    LDL  --   --   --   --   --  99  --   --   --   --    HDL  --   --   --   --   --  67  --   --   --   --    TRIG  --   --   --   --   --  72  --   --   --   --    ALT  --   --   --   --   --  31  --  49  --  23   CR 0.90 0.92   < > 0.88  --  0.92   < > 0.78   < > 0.83   GFRESTIMATED >90 >90   < > >90  --  86   < > >90  Non  GFR Calc     < > >90   GFRESTBLACK >90 >90   < > >90  --  >90   < > >90  African American GFR Calc     < > >90   POTASSIUM 4.7 4.2   < > 4.5  --  4.1   < > 4.3   < > 4.4   TSH 0.16*  --   --  0.12* 0.15* 38.88*   < >  --   --   --     < > = values in this interval not displayed.      BP Readings from Last 3 Encounters:   12/16/19 122/74   12/11/19 126/70   11/18/19 (!) 150/90    Wt Readings from Last 3 Encounters:   12/16/19 75.8 kg (167 lb)   12/11/19 73.8 kg (162 lb 12.8 oz)   11/18/19 69.9 kg (154 lb)         Reviewed and updated as needed this visit by Provider         Review of Systems   ROS COMP: Constitutional, HEENT, cardiovascular, pulmonary, gi and gu systems are negative, except as otherwise noted.      Objective    /70   Pulse 87   Temp 96.4  F (35.8  C) (Temporal)   Resp 16   Wt 73.8 kg  (162 lb 12.8 oz)   SpO2 99%   BMI 24.75 kg/m    Body mass index is 24.75 kg/m .  Physical Exam   GENERAL: alert and no distress  EYES: Eyes grossly normal to inspection, PERRL and conjunctivae and sclerae normal  HENT: ear canals and TM's normal.  Nares are non-congested. Oropharynx is pink and moist. No tender with palpation to the sinuses.  NECK: no adenopathy and thyroid normal to palpation  RESP: lungs clear to auscultation - no rales, rhonchi or wheezes  CV: regular rate and rhythm, no murmur  ABDOMEN: soft, nontender, normal bowel sound  MS: no gross musculoskeletal defects noted.  No focal weakness  NEURO: Normal strength and tone, mentation intact and speech normal.  Cranial nerves II through XII intact.  DTR +2 throughout.  No focal neurological deficit.  Diabetic foot exam: Monofilament exam was normal, no cracked or open skin.       Diagnostic Test Results:  Labs reviewed in Epic  No results found for any visits on 12/11/19.        Assessment & Plan     1. Type 1 diabetes mellitus with diabetic autonomic neuropathy (H)  Last hemoglobin A1c was 7 months ago and it was 8.2.  I encouraged him to work closely with the endocrinologist and diabetic educator to get his blood sugar better control.  I emphrasized the importance of getting his DM under controlled, educated him about the potential long and short term consequences of uncontrolled DM as well as the goal for his fasting blood sugar and HgB A1C.  Continue with the insulin pump.  Encourage to eat 3 healthy meals a day and avoid high sugar/carb food or snacks.  Also enocurage to excercise daily.  UTD for retinal exam and recommend to have have it done yearly.  Foot care daily is recommended.  Continue with Lisinopril.  UTD for his immunizations.  Labs as ordered.      - Albumin Random Urine Quantitative with Creat Ratio; Future  - **Comprehensive metabolic panel FUTURE anytime; Future  - **A1C FUTURE anytime; Future  - FOOT EXAM    2. Hypothyroidism,  unspecified type  Stable and no symptoms.  Last TSH check was more than a year ago.  Continue with current dose of Levothyroxine.  TSH level checked today and will readjust its dose appropriately once the result is available.     - **TSH with free T4 reflex FUTURE anytime; Future    3. Hyperlipidemia, unspecified hyperlipidemia type  Stable and is doing well with the simvastatin at 40 mg daily.  Last cholesterol check was over a year ago.  Rechecked the cholesterol level today and will adjust the medication dose appropriately based on its result.  In the meantime, emphasized on healthy diet and daily exercise.    - Lipid panel reflex to direct LDL Fasting; Future  - **Comprehensive metabolic panel FUTURE anytime; Future    4. Apnea  Clinical presentation is suggestive of sleep apnea.  He is not overweight or obese.  He is on multiple high-risk medications.  Referred him to sleep specialist for further evaluation.    - SLEEP ENT REFERRAL    5. Night sweats  Most likely due to deconditioning and it was worse over the summer when he was more active.  It is getting better slowly now.  Certainly it could be due to medication side effect as well as diabetic neuropathy.  Check thyroid level, CMP, CBC and magnesium level.  Since it is getting better slowly, will continue to monitor for now.  Call in or follow-up if gets worse or persists.    - **TSH with free T4 reflex FUTURE anytime; Future  - **Comprehensive metabolic panel FUTURE anytime; Future  - Magnesium; Future  - CBC with platelets and differential; Future    6. Chronic pain syndrome  Being managed by the pain specialist and I encouraged him to work with her closely.    7. Need for prophylactic vaccination and inoculation against influenza    - INFLUENZA QUAD, RECOMBINANT, P-FREE (RIV4) (FLUBLOCK) [09572]  - Vaccine Administration, Initial [13878]     BMI:   Estimated body mass index is 24.75 kg/m  as calculated from the following:    Height as of 11/18/19: 1.727 m  "(5' 8\").    Weight as of this encounter: 73.8 kg (162 lb 12.8 oz).     Return in about 3 months (around 3/11/2020) for Physical Exam.    Ilan Chew Mai, MD  [facility]        "

## 2019-12-16 ENCOUNTER — OFFICE VISIT (OUTPATIENT)
Dept: PALLIATIVE MEDICINE | Facility: CLINIC | Age: 54
End: 2019-12-16
Payer: COMMERCIAL

## 2019-12-16 VITALS
SYSTOLIC BLOOD PRESSURE: 122 MMHG | WEIGHT: 167 LBS | BODY MASS INDEX: 25.31 KG/M2 | TEMPERATURE: 98.3 F | HEIGHT: 68 IN | DIASTOLIC BLOOD PRESSURE: 74 MMHG

## 2019-12-16 DIAGNOSIS — F11.90 CHRONIC, CONTINUOUS USE OF OPIOIDS: ICD-10-CM

## 2019-12-16 DIAGNOSIS — G89.29 CHRONIC BILATERAL LOW BACK PAIN WITHOUT SCIATICA: ICD-10-CM

## 2019-12-16 DIAGNOSIS — G89.4 CHRONIC PAIN SYNDROME: ICD-10-CM

## 2019-12-16 DIAGNOSIS — M48.02 CERVICAL STENOSIS OF SPINE: ICD-10-CM

## 2019-12-16 DIAGNOSIS — M79.18 MYOFASCIAL PAIN: Primary | ICD-10-CM

## 2019-12-16 DIAGNOSIS — M54.50 CHRONIC BILATERAL LOW BACK PAIN WITHOUT SCIATICA: ICD-10-CM

## 2019-12-16 PROCEDURE — 99213 OFFICE O/P EST LOW 20 MIN: CPT | Performed by: PHYSICIAN ASSISTANT

## 2019-12-16 RX ORDER — OXYCODONE HYDROCHLORIDE 5 MG/1
5 TABLET ORAL EVERY 6 HOURS PRN
Qty: 70 TABLET | Refills: 0 | Status: SHIPPED | OUTPATIENT
Start: 2019-12-16 | End: 2020-01-20

## 2019-12-16 ASSESSMENT — MIFFLIN-ST. JEOR: SCORE: 1577.01

## 2019-12-16 ASSESSMENT — PAIN SCALES - GENERAL: PAINLEVEL: MILD PAIN (2)

## 2019-12-16 NOTE — PROGRESS NOTES
[facility]    CHIEF COMPLAINT:   Chronic neck pain    INTERVAL HISTORY:  Last seen on 10/17/19.        Recommendations/plan at the last visit included:  1. Physical Therapy:  NO   2. Clinical Health Psychologist:  NO  3. Diagnostic Studies:  None at this time  4. Medication Management:    1. Oxycodone 2/day. Okay to use 3 occasionally if needed  2. Continue medical cannabis  3.  Continue Cymbalta at current dosing  5. Recommendations to PCP. See above        Follow up with this provider: 8 Weeks     Since his last visit, Stone De Paz reports:    -He states that the chest spasms have been coming and going. He states that they have not been aggravating. The robaxin has been helpful. He states that he did have some dizziness and nausea with it. He states that he took 2 at a time when he had that. He is now taking 1 as needed and it helps without the side effects. He states that his biggest problem is sleeping. He states that he feels he has sleep apnea. He states that he feels he has had this for a really long time. He will be having a sleep study done.     He did talk to his PCP regarding the excessive sweating. He will have some labs checked in 2-3 weeks.     Pain Information:   Pain quality: miserable    Pain rating: intensity ranges from 1/10 to 10/10, and averages 5/10 on a 0-10 scale.   Pain today 2/10    SELF CARE:   How often do you practice SELF-CARE (relaxing, stretching, pacing, monitoring posture, taking mini-breaks) in a typical day:  Is learning to pace himself.     Annual Controlled Substance Agreement: signed 5/2/2019  UDS: 4/11/2019-reviewed and as expected    CURRENT RELEVANT PAIN MEDICATIONS:  Robaxin 500mg-takes 1 as needed   Naproxen 500mg-taking 1/day  Oxycodone 5mg-taking 2 in AM and will occasionally take a 3rd in the afternoon    Imitrex-last used prior to the neck procedures  Medical Cannabis    Patient is using the medication as prescribed:  YES  Is your medication  "helpful? YES   Medication side effects? itching    Previous Medications: (H--helped; HI--Helped initially; [facility]-- somewhat helpful, NH--No help; W--worse; SE--side effects)   Opiates: Oxycodone H, Tylenol #3 NH, Hydrocodone NH, Dilaudid H SE \"ick\", Morphine H SE nausea, Tramadol NH  NSAIDS: Naproxen H SE stomach upset  Muscle Relaxants: Flexeril H SE incontinence  Anti-migraine mediations: Imitrex H  Anti-depressants: Amitriptyline NH SE rapid heart  Sleep aids: none  Anxiolytics: Valium [facility]  Neuropathics: Gabapentin [place] SE \"goofy\", Lyrica ? Was too expensive  Topicals: Voltaren Gel [place], Lidocaine [place]  Other medications not covered above: Tylenol H, Prednisone H SE high sugars, Medical marijuana H SE cost constraints    Past Pain Treatments:  Pain Clinic:   No   PT: Yes, last went this year  Psychologist: No, but he did have to do some psychology prior to his back surgeryes  Relaxation techniques/biofeedback: No, but tries to do meditation at times   Chiropractor: No  Acupuncture: No  Pharmacotherapy:               Opioids: Yes                Non-opioids:    Yes   TENs Unit:Yes, was not helpful  Injections: Yes, right 3rd occipital nerve and right C3, C4, C5 RFA 02/28/2019, left 3rd occipital nerve, and left C3, C4, C5 RFA 03/15/2019. Had done low back injections in [place] as well (steroids mess with sugars therefore tries to avoid if possible).   Self-care:   Yes, cold works better (heat worsens pain)  Surgeries related to pain: Yes, C5-6 ACDF 2012. Disk replacement cervical spine 2006, low back fusions (2000, 2002)    [place] Board of Pharmacy Data Base Reviewed:    YES; As expected, no concern for misuse/abuse of controlled medications based on this report.    THE 4 As OF OPIOID MAINTENANCE ANALGESIA    Analgesia: Is pain relief clinically significant? YES   Activity: Is patient functional and able to perform Activities of Daily Living? YES   Adverse effects: Is patient free from adverse side effects from " opiates? NO   Adherence to Rx protocol: Is patient adhering to Controlled Substance Agreement and taking medications ONLY as ordered? YES       Is Narcan prescribed for opiate use >50 MME daily? N/A      Daily MME: 15-22.5    Medications:  Current Outpatient Medications   Medication Sig Dispense Refill     amylase-lipase-protease (CREON 24) 06569-67184 units CPEP per EC capsule Take 1 capsule by mouth every other day       amylase-lipase-protease (PERTZYE) 02322 units CPEP Take 1 capsule by mouth every other day       budesonide (ENTOCORT EC) 3 MG EC capsule Take 3 mg by mouth every morning Take 1 or 2 capsules       CONTOUR NEXT TEST test strip Use to test blood sugar 5x times daily or as directed. 450 strip 3     DULoxetine (CYMBALTA) 20 MG capsule Take 1 capsule (20 mg) by mouth 2 times daily 60 capsule 1     insulin aspart (NOVOLOG VIAL) 100 UNITS/ML vial Uses with insulin pump, total daily dose approx 45 units, E10.9 20 mL 6     levothyroxine (SYNTHROID/LEVOTHROID) 125 MCG tablet Take 1-tablet by mouth daily as directed.  Please follow up as scheduled 30 tablet 0     lisinopril (PRINIVIL/ZESTRIL) 40 MG tablet TAKE ONE TABLET BY MOUTH ONCE DAILY 30 tablet 0     medical cannabis (Patient's own supply) See Admin Instructions (The purpose of this order is to document that the patient reports taking medical cannabis.  This is not a prescription, and is not used to certify that the patient has a qualifying medical condition.)       methocarbamol (ROBAXIN) 500 MG tablet Take 1-2 tablets three times daily as needed 60 tablet 0     naproxen (NAPROSYN) 500 MG tablet TAKE ONE TABLET BY MOUTH TWICE A DAY AS NEEDED FOR MODERATE PAIN 180 tablet 1     ondansetron (ZOFRAN-ODT) 8 MG disintegrating tablet Take 1 tablet by mouth every 8 hours as needed       oxyCODONE (ROXICODONE) 5 MG tablet Take 1 tablet (5 mg) by mouth every 6 hours as needed for pain MAX of 3/day. Fill 11/25/2019. Start. 11/26/19. 30 day supply. 70 tablet 0  "    simvastatin (ZOCOR) 40 MG tablet TAKE ONE TABLET BY MOUTH AT BEDTIME 30 tablet 0     SUMAtriptan (IMITREX) 100 MG tablet Take 1 tablet (100 mg) by mouth at onset of headache 1/2 to 1 tablet by mouth at onset of headache. May repeat 1 dose after 2 hours if headache persists. 9 tablet 3       Review of Systems: A 10-point review of systems was negative, with the exception of chronic pain issues, fatigue, headache, dizziness, itching, chest pain, abdominal pain, nausea, vomiting, diabetes, urinary frequency, weakness, and numbness/tingling.      Social History: Reviewed; unchanged from previous consultation.      Family history: Reviewed; unchanged from previous consultation.     PHYSICAL EXAM:     Vitals:   /74   Temp 98.3  F (36.8  C) (Temporal)   Ht 1.727 m (5' 8\")   Wt 75.8 kg (167 lb)   BMI 25.39 kg/m        Constitutional: healthy, alert and no distress  HEENT: Head atraumatic, normocephalic. Eyes without conjunctival injection or jaundice. Neck supple. No obvious neck masses.  Skin: No rash, lesions, or petechiae of exposed skin.   Psychiatric/mental status: Alert, without lethargy or stupor. Appropriate affect. Mood normal.      DIAGNOSTIC TESTS:  Imaging Studies:   No new imaging to review    Assessment:  Stone De Paz is a 53 year old male who presents today for follow up regarding his:    1.  Cervical stenosis   2.  Myofascial pain  3.  Chronic low back pain  4.  Chronic pain syndrome  5. Chronic use of opioids    Pain is overall stable. No changes today. He is tolerating his medications well.       Plan:    Diagnosis reviewed, treatment option addressed, and risk/benifits discussed.  Self-care instructions given.  I am recommending a multidisciplinary treatment plan to help this patient better manage pain.      1. Physical Therapy:  NO   2. Clinical Health Psychologist:  NO  3. Diagnostic Studies:  None at this time  4. Medication Management:    1. Oxycodone 2/day. Okay to use 3 occasionally if " needed  2. Continue medical cannabis  3.  Continue Cymbalta at current dosing  4. Continue Robaxin 500mg-1 tablet TID PRN  5. Recommendations to PCP. See above      Follow up with this provider: 8 Weeks     Total time spent face to face was 15 minutes and more than 50% of face to face time was spent in counseling and/or coordination of care regarding the diagnosis and recommendations above.      Natasha Padgett PA-C   [facility]

## 2020-01-06 ENCOUNTER — TELEPHONE (OUTPATIENT)
Dept: FAMILY MEDICINE | Facility: CLINIC | Age: 55
End: 2020-01-06

## 2020-01-06 DIAGNOSIS — F41.1 GENERALIZED ANXIETY DISORDER: ICD-10-CM

## 2020-01-06 DIAGNOSIS — E10.43 TYPE 1 DIABETES MELLITUS WITH DIABETIC AUTONOMIC NEUROPATHY (H): ICD-10-CM

## 2020-01-06 DIAGNOSIS — E78.5 HYPERLIPIDEMIA, UNSPECIFIED HYPERLIPIDEMIA TYPE: ICD-10-CM

## 2020-01-06 DIAGNOSIS — I10 ESSENTIAL HYPERTENSION: ICD-10-CM

## 2020-01-06 DIAGNOSIS — E78.5 HYPERLIPIDEMIA LDL GOAL <100: Primary | ICD-10-CM

## 2020-01-06 DIAGNOSIS — R61 NIGHT SWEATS: ICD-10-CM

## 2020-01-06 DIAGNOSIS — E03.9 HYPOTHYROIDISM, UNSPECIFIED TYPE: ICD-10-CM

## 2020-01-06 LAB
ALBUMIN SERPL-MCNC: 3.7 G/DL (ref 3.4–5)
ALP SERPL-CCNC: 67 U/L (ref 40–150)
ALT SERPL W P-5'-P-CCNC: 20 U/L (ref 0–70)
ANION GAP SERPL CALCULATED.3IONS-SCNC: 5 MMOL/L (ref 3–14)
AST SERPL W P-5'-P-CCNC: 12 U/L (ref 0–45)
BASOPHILS # BLD AUTO: 0.1 10E9/L (ref 0–0.2)
BASOPHILS NFR BLD AUTO: 0.7 %
BILIRUB SERPL-MCNC: 0.4 MG/DL (ref 0.2–1.3)
BUN SERPL-MCNC: 16 MG/DL (ref 7–30)
CALCIUM SERPL-MCNC: 9.2 MG/DL (ref 8.5–10.1)
CHLORIDE SERPL-SCNC: 101 MMOL/L (ref 94–109)
CHOLEST SERPL-MCNC: 223 MG/DL
CO2 SERPL-SCNC: 28 MMOL/L (ref 20–32)
CREAT SERPL-MCNC: 0.97 MG/DL (ref 0.66–1.25)
CREAT UR-MCNC: 39 MG/DL
DIFFERENTIAL METHOD BLD: NORMAL
EOSINOPHIL NFR BLD AUTO: 5.5 %
ERYTHROCYTE [DISTWIDTH] IN BLOOD BY AUTOMATED COUNT: 12.5 % (ref 10–15)
GFR SERPL CREATININE-BSD FRML MDRD: 89 ML/MIN/{1.73_M2}
GLUCOSE SERPL-MCNC: 272 MG/DL (ref 70–99)
HBA1C MFR BLD: 10.2 % (ref 0–5.6)
HCT VFR BLD AUTO: 40 % (ref 40–53)
HDLC SERPL-MCNC: 53 MG/DL
HGB BLD-MCNC: 13.5 G/DL (ref 13.3–17.7)
IMM GRANULOCYTES # BLD: 0 10E9/L (ref 0–0.4)
IMM GRANULOCYTES NFR BLD: 0.3 %
LDLC SERPL CALC-MCNC: 155 MG/DL
LYMPHOCYTES # BLD AUTO: 2.3 10E9/L (ref 0.8–5.3)
LYMPHOCYTES NFR BLD AUTO: 31.2 %
MAGNESIUM SERPL-MCNC: 1.6 MG/DL (ref 1.6–2.3)
MCH RBC QN AUTO: 29.9 PG (ref 26.5–33)
MCHC RBC AUTO-ENTMCNC: 33.8 G/DL (ref 31.5–36.5)
MCV RBC AUTO: 89 FL (ref 78–100)
MICROALBUMIN UR-MCNC: <5 MG/L
MICROALBUMIN/CREAT UR: NORMAL MG/G CR (ref 0–17)
MONOCYTES # BLD AUTO: 0.6 10E9/L (ref 0–1.3)
MONOCYTES NFR BLD AUTO: 7.4 %
NEUTROPHILS # BLD AUTO: 4.1 10E9/L (ref 1.6–8.3)
NEUTROPHILS NFR BLD AUTO: 54.9 %
NONHDLC SERPL-MCNC: 170 MG/DL
NRBC # BLD AUTO: 0 10*3/UL
NRBC BLD AUTO-RTO: 0 /100
PLATELET # BLD AUTO: 238 10E9/L (ref 150–450)
POTASSIUM SERPL-SCNC: 4.3 MMOL/L (ref 3.4–5.3)
PROT SERPL-MCNC: 7.6 G/DL (ref 6.8–8.8)
RBC # BLD AUTO: 4.52 10E12/L (ref 4.4–5.9)
SODIUM SERPL-SCNC: 134 MMOL/L (ref 133–144)
TRIGL SERPL-MCNC: 76 MG/DL
TSH SERPL DL<=0.005 MIU/L-ACNC: 1.5 MU/L (ref 0.4–4)
WBC # BLD AUTO: 7.4 10E9/L (ref 4–11)

## 2020-01-06 PROCEDURE — 82043 UR ALBUMIN QUANTITATIVE: CPT | Performed by: FAMILY MEDICINE

## 2020-01-06 PROCEDURE — 85025 COMPLETE CBC W/AUTO DIFF WBC: CPT | Performed by: FAMILY MEDICINE

## 2020-01-06 PROCEDURE — 80061 LIPID PANEL: CPT | Performed by: FAMILY MEDICINE

## 2020-01-06 PROCEDURE — 83036 HEMOGLOBIN GLYCOSYLATED A1C: CPT | Performed by: FAMILY MEDICINE

## 2020-01-06 PROCEDURE — 83735 ASSAY OF MAGNESIUM: CPT | Performed by: FAMILY MEDICINE

## 2020-01-06 PROCEDURE — 36415 COLL VENOUS BLD VENIPUNCTURE: CPT | Performed by: FAMILY MEDICINE

## 2020-01-06 PROCEDURE — 80050 GENERAL HEALTH PANEL: CPT | Performed by: FAMILY MEDICINE

## 2020-01-06 RX ORDER — CITALOPRAM HYDROBROMIDE 40 MG/1
40 TABLET ORAL DAILY
Qty: 90 TABLET | Refills: 1 | Status: SHIPPED | OUTPATIENT
Start: 2020-01-06 | End: 2020-06-29

## 2020-01-06 RX ORDER — LISINOPRIL 40 MG/1
40 TABLET ORAL DAILY
Qty: 90 TABLET | Refills: 1 | Status: SHIPPED | OUTPATIENT
Start: 2020-01-06 | End: 2020-06-29

## 2020-01-06 RX ORDER — ATORVASTATIN CALCIUM 80 MG/1
80 TABLET, FILM COATED ORAL DAILY
Qty: 90 TABLET | Refills: 1 | Status: SHIPPED | OUTPATIENT
Start: 2020-01-06 | End: 2020-06-29

## 2020-01-06 RX ORDER — LEVOTHYROXINE SODIUM 125 UG/1
TABLET ORAL
Qty: 90 TABLET | Refills: 1 | Status: SHIPPED | OUTPATIENT
Start: 2020-01-06 | End: 2020-07-06

## 2020-01-06 NOTE — TELEPHONE ENCOUNTER
----- Message from Ilan Chew Mai, MD sent at 1/6/2020 12:54 PM CST -----  Please call with results. Please ensure that his CBC was normal, no anemia.      Ilan Raymond MD.

## 2020-01-06 NOTE — TELEPHONE ENCOUNTER
Patient called back and was given the message.  He will check with this insurance formulary and get back to you on the medication switch.

## 2020-01-06 NOTE — TELEPHONE ENCOUNTER
Patient would like to go ahead with the Atorvastatin, please call to [facility] in [place]. Is requesting a 90 day supply.   Also is requesting refills for 90 days supply on the following, Lisinopril, Levothyroxine, and would like to go back to Citalopram, due to cost of Duloxetine.

## 2020-01-06 NOTE — TELEPHONE ENCOUNTER
----- Message from Ilan Chew Mai, MD sent at 1/6/2020 12:19 PM CST -----  Please call with results. Please ensure that his labs showed that the protein level in the urine was normal.  Thyroid level was also normal, no change in medication dose.  Kidney function test, liver function test and electrolyte were normal.  Diabetes is not controlled with a hemoglobin A1c of 10.2, went up from 8.8 about 7 months ago.  The goal for his hemoglobin A1c to be less than 7.0.  I encourage him work with the endocrinologist and diabetic educator closely to get the diabetes better control.  Magnesium level was normal.  Cholesterol level remained to be quite elevated.  I recommend to change medication from simvastatin to atorvastatin 80 mg daily, let me know if he is interested.  Encouraged him to continue working on healthy diet and try to be as active as he can.  Recheck the cholesterol level in 6 months.  Thank you     Ilan Raymond MD.

## 2020-01-20 ENCOUNTER — TELEPHONE (OUTPATIENT)
Dept: FAMILY MEDICINE | Facility: OTHER | Age: 55
End: 2020-01-20

## 2020-01-20 DIAGNOSIS — M48.02 CERVICAL STENOSIS OF SPINE: ICD-10-CM

## 2020-01-20 DIAGNOSIS — G89.4 CHRONIC PAIN SYNDROME: ICD-10-CM

## 2020-01-20 RX ORDER — OXYCODONE HYDROCHLORIDE 5 MG/1
5 TABLET ORAL EVERY 6 HOURS PRN
Qty: 70 TABLET | Refills: 0 | Status: SHIPPED | OUTPATIENT
Start: 2020-01-20 | End: 2020-02-10 | Stop reason: ALTCHOICE

## 2020-01-20 NOTE — TELEPHONE ENCOUNTER
Received call from patient requesting refill(s) of oxyCODONE (ROXICODONE) 5 MG tablet     Last dispensed from pharmacy on 12/23/2019    Pt last seen by prescribing provider on 12/16/2019  Next appt scheduled for 2/10/2020     checked in the past 6 months? Yes If no, print current report and give to RN    Last urine drug screen date 5/30/2019  Current opioid agreement on file (completed within the last year) Yes Date of opioid agreement: 5/2/2019    Processing (pick one and delete the others):      E-prescribe to [facility], [place] [facility]    Hannah Toney MA on 1/20/2020 at 9:06 AM

## 2020-01-20 NOTE — TELEPHONE ENCOUNTER
Reason for call:  Medication   If this is a refill request, has the caller requested the refill from the pharmacy already? No  Will the patient be using a [facility]? No  Name of the pharmacy and phone number for the current request: [facility] in [place]    Name of the medication requested: oxyCODONE (ROXICODONE) 5 MG tablet    Other request:     Phone number to reach patient:  Cell number on file:    Telephone Information:   Mobile [phone]       Best Time:      Can we leave a detailed message on this number?  YES         Za Salinas    [facility]

## 2020-01-28 ENCOUNTER — OFFICE VISIT (OUTPATIENT)
Dept: ENDOCRINOLOGY | Facility: CLINIC | Age: 55
End: 2020-01-28
Payer: COMMERCIAL

## 2020-01-28 VITALS
BODY MASS INDEX: 23.79 KG/M2 | WEIGHT: 157 LBS | HEART RATE: 71 BPM | SYSTOLIC BLOOD PRESSURE: 138 MMHG | DIASTOLIC BLOOD PRESSURE: 82 MMHG | HEIGHT: 68 IN

## 2020-01-28 DIAGNOSIS — E10.43 TYPE 1 DIABETES MELLITUS WITH DIABETIC AUTONOMIC NEUROPATHY (H): Primary | ICD-10-CM

## 2020-01-28 DIAGNOSIS — Z96.41 INSULIN PUMP STATUS: ICD-10-CM

## 2020-01-28 DIAGNOSIS — E03.9 HYPOTHYROIDISM, UNSPECIFIED TYPE: ICD-10-CM

## 2020-01-28 PROCEDURE — 95250 CONT GLUC MNTR PHYS/QHP EQP: CPT | Performed by: INTERNAL MEDICINE

## 2020-01-28 PROCEDURE — 99214 OFFICE O/P EST MOD 30 MIN: CPT | Performed by: INTERNAL MEDICINE

## 2020-01-28 ASSESSMENT — MIFFLIN-ST. JEOR: SCORE: 1531.65

## 2020-01-28 NOTE — PROGRESS NOTES
"Name: Stone De Paz      HPI:  Recent issues:  Here for f/u diabetes and thyroid evaluation  Had gastric pacer batteries replaced approx 6 weeks ago   Feeling better, but wonders about \"collitis\", takes budesonide (enteric steroid) but higher glucose levels  More neck pain/stiffness today  Wearing Dexcom sensor, likes it        1995. Diagnosis of diabetes mellitus after 2nd back surgery, age 29  Initial treatment with insulin injections using NPH and Regular insulin  Switched to premixed 70/30 insulin BID dosing  1999. Started Medtronic insulin pump model 508, used base-dose Humalog insulin at meals  Had seen Dr. Iggy Briggs/[facility]    7/24/01. Initial evaluation with me at my former clinic ([facility])  Upgraded pump to Medtronic 523   Now using Medtronic 630G pump   Novolog in pump    Meals BID, Uses carb \"exchanges\" with his pump settings  Chronic high BG and hgbA1c trends despite dose changes  Tried square wave bolus  Has Contour Link? BG meter, variable testing pattern... Recently testing 4x/day for at least 30 days  Tried Medtronic CGMS in the past, didn't like it  Previous pump settings:      Recent pump Carelink data:        Not using Dexcom sensor device now    Previous  labs include:  Lab Results   Component Value Date    A1C 10.2 (H) 01/06/2020     01/06/2020    POTASSIUM 4.3 01/06/2020    CHLORIDE 101 01/06/2020    CO2 28 01/06/2020    ANIONGAP 5 01/06/2020     (H) 01/06/2020    BUN 16 01/06/2020    CR 0.97 01/06/2020    GFRESTIMATED 89 01/06/2020    GFRESTBLACK >90 01/06/2020    EMILEE 9.2 01/06/2020    CHOL 223 (H) 01/06/2020    TRIG 76 01/06/2020    HDL 53 01/06/2020     (H) 01/06/2020    NHDL 170 (H) 01/06/2020    UCRR 39 01/06/2020    MICROL <5 01/06/2020    UMALCR Unable to calculate due to low value 01/06/2020     Last eye exam date?  Goes to eye clinic in [facility]  7/2018. Met with Destiny Warren RD, CDE at [facility]  DM " Complications:   Neuropathy    Peripheral neuropathy     Decreased sensation at feet    Autonomic neuropathy- gastroparesis     Symptoms of nausea, bloating, episodes of diarrhea and emesis     Has Medtronic gastric stimulator     Sees physicians and Ms WARREN Rosario/[facility]       Thyroid:  1994. History of hypothyroidism  Chronic levothyroxine treatment, has used Levoxyl in the past  Recent labs include:  Lab Results   Component Value Date    TSH 1.50 01/06/2020    TSH 0.16 (L) 05/22/2019    TSH 0.12 (L) 02/15/2019    TSH 0.15 (L) 09/21/2018    TSH 38.88 (H) 07/09/2018    T4 1.49 (H) 05/22/2019    T4 1.63 (H) 02/15/2019    T4 1.44 09/21/2018    T4 0.87 07/09/2018    T4 1.28 05/08/2018     Recent [facility] labs include:  Lab Results   Component Value Date    TSH 1.50 01/06/2020    T4 1.49 (H) 05/22/2019     Supplements:  Takes vitamin D 1000 U daily  7/2018. Had levothyroxine dose increase   Current dose:  Levothyroxine 0.137 mg po MP      Lives in [place], [place] with Ada, also (1) adopted dog   Sees Dr. Ilan Chew Mai/[facility] for general medicine evaluations  Also sees Olga VIRK/[facility].    PMH/PSH:  Past Medical History:   Diagnosis Date     Arthritis      Chronic neck pain     percocet for years now,      Depressive disorder      Gastroparesis      Gastroparesis due to DM (H)     gastric stimulator helps, [facility] manages that     Hyperlipidemia      Hypertension      Hypothyroidism      Neuropathy, diabetic (H)      Type 1 diabetes (H) age 31     Past Surgical History:   Procedure Laterality Date     C LUMBAR SPINE FUSION,ANTER APPRCH      2 L4-5 L5-21     COLONOSCOPY  07/18/12     FUSION CERVICAL ANTERIOR TWO LEVELS       INJECT BLOCK MEDIAL BRANCH CERVICAL/THORACIC/LUMBAR Bilateral 11/20/2018    Procedure: Bilateral Cervical 3-4-5 medial branch block;  Surgeon: Jef Edwards MD;  Location: [facility] OR     INJECT BLOCK MEDIAL BRANCH CERVICAL/THORACIC/LUMBAR N/A 1/11/2019    Procedure: Cervical  3,4,5 Bilateral Medial branch blocks;  Surgeon: Jef Edwards MD;  Location: [facility]     RADIO FREQUENCY ABLATION PULSED CERVICAL Right 2/28/2019    Procedure: RADIO FREQUENCY ABLATION CERVICAL THREE, FOUR, FIVE, AND THIRD OCCUPITAL NERVE RIGHT SIDE;  Surgeon: Jef Edwards MD;  Location: [facility]     RADIO FREQUENCY ABLATION PULSED CERVICAL Left 3/15/2019    Procedure: radiofrequency ablation cervical 3,4,5;  Surgeon: Jef Edwards MD;  Location: [facility]     THORACOSCOPIC DECORTICATION LUNG  1/9/2014    Procedure: THORACOSCOPIC DECORTICATION LUNG;  RIGHT VATS/RIGHT THORACOTOMY , DECORTICATION RIGHT LUNG ,WEDGE RESECTION RIGHT MIDDLE LOBE LUNG NODULE;  Surgeon: Harley Felix MD;  Location: [facility] OR       Family Hx:  Family History   Problem Relation Age of Onset     Hypertension Mother      Diabetes Father         type 2     Hypertension Father      Cerebrovascular Disease Maternal Grandmother      Unknown/Adopted Maternal Grandfather      Unknown/Adopted Paternal Grandmother      Unknown/Adopted Paternal Grandfather      Liver Disease Brother      Heart Disease Sister      Thyroid Disease Sister      Thyroid Disease Sister      Thyroid Disease Sister          Social Hx:  Social History     Socioeconomic History     Marital status:      Spouse name: Not on file     Number of children: Not on file     Years of education: Not on file     Highest education level: Not on file   Occupational History     Not on file   Social Needs     Financial resource strain: Not on file     Food insecurity:     Worry: Not on file     Inability: Not on file     Transportation needs:     Medical: Not on file     Non-medical: Not on file   Tobacco Use     Smoking status: Former Smoker     Last attempt to quit: 1/1/2010     Years since quitting: 10.0     Smokeless tobacco: Never Used   Substance and Sexual Activity     Alcohol use: No     Drug use: Yes     Types: Marijuana     Sexual activity: Never   Lifestyle     Physical  "activity:     Days per week: Not on file     Minutes per session: Not on file     Stress: Not on file   Relationships     Social connections:     Talks on phone: Not on file     Gets together: Not on file     Attends Taoist service: Not on file     Active member of club or organization: Not on file     Attends meetings of clubs or organizations: Not on file     Relationship status: Not on file     Intimate partner violence:     Fear of current or ex partner: Not on file     Emotionally abused: Not on file     Physically abused: Not on file     Forced sexual activity: Not on file   Other Topics Concern     Parent/sibling w/ CABG, MI or angioplasty before 65F 55M? Not Asked   Social History Narrative     Not on file          MEDICATIONS:  has a current medication list which includes the following prescription(s): amylase-lipase-protease, atorvastatin, budesonide, citalopram, contour next test, duloxetine, insulin aspart, levothyroxine, lisinopril, medical cannabis, methocarbamol, naproxen, ondansetron, oxycodone, simvastatin, sumatriptan, and amylase-lipase-protease.    ROS:     ROS: 10 point ROS neg other than the symptoms noted above in the HPI.    GENERAL: some fatigue, wt stable; denies fevers, chills, night sweats.   HEENT: no dysphagia, odonophagia, diplopia, neck pain  THYROID:  no apparent hyper or hypothyroid symptoms  CV: occasional chest pains; no pressure, palpitations  LUNGS: no SOB, KRAFT, cough, wheezing   ABDOMEN: some diarrhea \"collitis\" and postmeal nausea; no diarrhea, constipation  EXTREMITIES: no rashes, ulcers, edema  NEUROLOGY: no headaches, denies changes in vision, tingling, extremitiy numbness   MSK: neck pain/stiffness and back pains; no muscle weakness  SKIN: no rashes or lesions  PSYCH:  stable mood, no significant anxiety or depression  ENDOCRINE: no heat or cold intolerance    Physical Exam   VS: /82   Pulse 71   Ht 1.727 m (5' 8\")   Wt 71.2 kg (157 lb)   BMI 23.87 kg/m  "   GENERAL: AXOX3, NAD, slender, well dressed, answering questions appropriately, appears stated age.  THYROID:  normal gland, no apparent nodules or goiter  HEENT: neck stiff/ reduced neck ROM; neck non-tender, no exopthalmous  ABDOMEN: soft, nontender, nondistended, LLQ abdomen mild bulge from pacer placement  NEUROLOGY: CN grossly intact, no tremors  MSK: grossly intact  SKIN: no rashes, no lesions    LABS:    All pertinent notes, labs, and images personally reviewed by me.     A/P:  Encounter Diagnoses   Name Primary?     Type 1 diabetes mellitus with diabetic autonomic neuropathy (H) Yes     Insulin pump status      Hypothyroidism, unspecified type        Comments:  Reviewed complicated health history and diabetes issues.    Plan:  Reviewed the overall T1DM management and insulin pump use.  Discussed optimal BG testing to assess glucose trends.  We reviewed insulin pump settings, basal rate and bolus dosing  Use of automated pump bolus dosing for meal/snack carb & correction dosing  Reviewed the Medtronic Carelink report data today  Reviewed recent Dexcom CGMS glucose sensor trends, in detail    Recommend:  Continue Medtronic insulin pump and diabetes management plan.  Pump setting changes:   Bolus AI 4 to 3.5 hrs  Use basal pattern 1 when more exercise/activity and pattern 2 when less activity/inactive  Contact our office with update if restarting the budesinide medication for diarrhea problem  Discussed CGMS sensor options   Patient makes frequent adjustments to insulin regimen on basis of therapeutic glucose testing   Advised starting the LibrePersonal CGMS sensor, discussed and he's interested   Gave him free Sukhi sensor, placed on his right upper arm.  Activated sensor and gave him [place]   Discussed the smart phone Sukhi catie for use with sensor   Message me with Sukhi update in 1 week, then consider Sukhi Rx to local pharmacy  Would benefit from additional CDE evaluation at [facility]  No labs  ordered today  Continue simvastatin 40 mg each evening  Continue current levothyroxine 0.137 mg daily dose plan  Arrange annual dilated eye exam, fasting lipid panel testing.  We reviewed importance of timely clinic appointments with me Q3 months, to satisfy Medicare with his diabetes device coverage    Encouraged him to make routine f/u appointments with [facility]   Addressed patient's questions today.    Labs ordered today:   Orders Placed This Encounter   Procedures     GLUCOSE MONITORING CONTINUOUS, >=72 HR     Radiology/Consults ordered today: None    More than 50% of the time spent with Mr. De Paz on counseling / coordinating his care.  Total appointment time was 30 minutes.    Follow-up:  2 mo.    VIKTOR Best MD, MS  Endocrinology  [facility]

## 2020-01-30 NOTE — PROGRESS NOTES
[facility]    CHIEF COMPLAINT:   Chronic neck pain    INTERVAL HISTORY:  Last seen on 12/16/19.        Recommendations/plan at the last visit included:  1. Physical Therapy:  NO   2. Clinical Health Psychologist:  NO  3. Diagnostic Studies:  None at this time  4. Medication Management:    1. Oxycodone 2/day. Okay to use 3 occasionally if needed  2. Continue medical cannabis  3.  Continue Cymbalta at current dosing  4. Continue Robaxin 500mg-1 tablet TID PRN  5. Recommendations to PCP. See above      Follow up with this provider: 8 Weeks    Since his last visit, Stone De Paz reports:    -He has been doing really well. He still does get some shooting pains. He states that the spasms have been better. He is using the robaxin as needed but states that overall they have been a lot better. He denies any new changes since his last visit. He feels that his neck is getting worse, but would like to wait before proceeding with a repeat RFA.    He states that he tenses up worse in the winter. [place]     Pain Information:   Pain quality: miserable    Pain rating: intensity ranges from 2/10 to 10/10, and averages 5/10 on a 0-10 scale.   Pain today 2/10    SELF CARE:   How often do you practice SELF-CARE (relaxing, stretching, pacing, monitoring posture, taking mini-breaks) in a typical day:  Is learning to pace himself. He states the winter is hard for him.     Annual Controlled Substance Agreement: signed 2/10/2020  UDS: 2/10/2020    CURRENT RELEVANT PAIN MEDICATIONS:  Robaxin 500mg-takes 1 as needed   Naproxen 500mg-taking 1/day  Oxycodone 5mg-taking 2 in AM and will occasionally take a 3rd in the afternoon    Imitrex-last used prior to the neck procedures  Medical Cannabis    Patient is using the medication as prescribed:  YES  Is your medication helpful? YES   Medication side effects? itching    Previous Medications: (H--helped; HI--Helped initially; SWH--  "somewhat helpful, NH--No help; W--worse; SE--side effects)   Opiates: Oxycodone H, Tylenol #3 NH, Hydrocodone NH, Dilaudid H SE \"ick\", Morphine H SE nausea, Tramadol NH  NSAIDS: Naproxen H SE stomach upset  Muscle Relaxants: Flexeril H SE incontinence  Anti-migraine mediations: Imitrex H  Anti-depressants: Amitriptyline NH SE rapid heart  Sleep aids: none  Anxiolytics: Valium [facility]  Neuropathics: Gabapentin NH SE \"goofy\", Lyrica ? Was too expensive  Topicals: Voltaren Gel NH, Lidocaine NH  Other medications not covered above: Tylenol H, Prednisone H SE high sugars, Medical marijuana H SE cost constraints    Past Pain Treatments:  Pain Clinic:   No   PT: Yes, last went this year  Psychologist: No, but he did have to do some psychology prior to his back surgeryes  Relaxation techniques/biofeedback: No, but tries to do meditation at times   Chiropractor: No  Acupuncture: No  Pharmacotherapy:               Opioids: Yes                Non-opioids:    Yes   TENs Unit:Yes, was not helpful  Injections: Yes, right 3rd occipital nerve and right C3, C4, C5 RFA 02/28/2019, left 3rd occipital nerve, and left C3, C4, C5 RFA 03/15/2019. Had done low back injections in [place] as well (steroids mess with sugars therefore tries to avoid if possible).   Self-care:   Yes, cold works better (heat worsens pain)  Surgeries related to pain: Yes, C5-6 ACDF 2012. Disk replacement cervical spine 2006, low back fusions (2000, 2002)    [place] Board of Pharmacy Data Base Reviewed:    YES; As expected, no concern for misuse/abuse of controlled medications based on this report.    THE 4 As OF OPIOID MAINTENANCE ANALGESIA    Analgesia: Is pain relief clinically significant? YES   Activity: Is patient functional and able to perform Activities of Daily Living? YES   Adverse effects: Is patient free from adverse side effects from opiates? NO   Adherence to Rx protocol: Is patient adhering to Controlled Substance Agreement and taking medications " ONLY as ordered? YES       Is Narcan prescribed for opiate use >50 MME daily? N/A      Daily MME: 15-22.5    Medications:  Current Outpatient Medications   Medication Sig Dispense Refill     amylase-lipase-protease (CREON 24) 30022-49059 units CPEP per EC capsule Take 1 capsule by mouth every other day       amylase-lipase-protease (PERTZYE) 53981 units CPEP Take 1 capsule by mouth every other day       atorvastatin (LIPITOR) 80 MG tablet Take 1 tablet (80 mg) by mouth daily Please stop the Simvastatin 90 tablet 1     budesonide (ENTOCORT EC) 3 MG EC capsule Take 3 mg by mouth every morning Take 1 or 2 capsules       citalopram (CELEXA) 40 MG tablet Take 1 tablet (40 mg) by mouth daily 90 tablet 1     Continuous Blood Gluc  (FREESTYLE REUBEN READER) AMBER 1 Device continuous prn (replace annually if needed) 1 Device 1     Continuous Blood Gluc Sensor (FREESTYLE REUBEN 14 DAY SENSOR) MISC 1 each every 14 days 2 each 11     CONTOUR NEXT TEST test strip Use to test blood sugar 5x times daily or as directed. 450 strip 3     DULoxetine (CYMBALTA) 20 MG capsule Take 1 capsule (20 mg) by mouth 2 times daily 60 capsule 1     insulin aspart (NOVOLOG VIAL) 100 UNITS/ML vial Uses with insulin pump, total daily dose approx 45 units, E10.9 20 mL 6     levothyroxine (SYNTHROID/LEVOTHROID) 125 MCG tablet Take 1-tablet by mouth daily as directed.  Please follow up as scheduled 90 tablet 1     lisinopril (PRINIVIL/ZESTRIL) 40 MG tablet Take 1 tablet (40 mg) by mouth daily 90 tablet 1     medical cannabis (Patient's own supply) See Admin Instructions (The purpose of this order is to document that the patient reports taking medical cannabis.  This is not a prescription, and is not used to certify that the patient has a qualifying medical condition.)       methocarbamol (ROBAXIN) 500 MG tablet Take 1-2 tablets three times daily as needed 60 tablet 0     naproxen (NAPROSYN) 500 MG tablet TAKE ONE TABLET BY MOUTH TWICE A DAY AS  "NEEDED FOR MODERATE PAIN 180 tablet 1     ondansetron (ZOFRAN-ODT) 8 MG ODT tab Take 1 tablet (8 mg) by mouth every 8 hours as needed (for nauesa) 60 tablet 0     oxyCODONE (ROXICODONE) 5 MG tablet Take 1 tablet (5 mg) by mouth every 6 hours as needed for pain MAX of 3/day. Fill 1/24/2020. Start 1/25/2020. 30 day supply. 70 tablet 0     simvastatin (ZOCOR) 40 MG tablet TAKE ONE TABLET BY MOUTH AT BEDTIME 30 tablet 0     SUMAtriptan (IMITREX) 100 MG tablet Take 1 tablet (100 mg) by mouth at onset of headache 1/2 to 1 tablet by mouth at onset of headache. May repeat 1 dose after 2 hours if headache persists. 9 tablet 3       Review of Systems: A 10-point review of systems was negative, with the exception of chronic pain issues, headache, dizziness, itching, chest pain, abdominal pain, nausea, vomiting, diarrhea, diabetes, thyroid disease,  urinary frequency and urgency, weakness, and numbness/tingling.      Social History: Reviewed; unchanged from previous consultation.      Family history: Reviewed; unchanged from previous consultation.     PHYSICAL EXAM:     Vitals:   /76   Temp 98  F (36.7  C)   Ht 1.727 m (5' 8\")   Wt 73.9 kg (163 lb)   BMI 24.78 kg/m      Constitutional: healthy, alert and no distress  HEENT: Head atraumatic, normocephalic. Eyes without conjunctival injection or jaundice. Neck supple. No obvious neck masses.  Skin: No rash, lesions, or petechiae of exposed skin.   Psychiatric/mental status: Alert, without lethargy or stupor. Appropriate affect. Mood normal.      DIAGNOSTIC TESTS:  Imaging Studies:   No new imaging to review    Assessment:  Stone De Paz is a 53 year old male who presents today for follow up regarding his:    1.  Cervical stenosis   2.  Myofascial pain  3.  Chronic low back pain  4.  Chronic pain syndrome  5. Chronic use of opioids    Pain is overall stable. No changes today. He is tolerating his medications well.  Discussed that he can talk with his pharmacy regarding a " caregiver for his medications for when he is in [place]. Another option would be to find a provider in [place] while he is there.        Plan:    Diagnosis reviewed, treatment option addressed, and risk/benifits discussed.  Self-care instructions given.  I am recommending a multidisciplinary treatment plan to help this patient better manage pain.      1. Physical Therapy:  NO   2. Clinical Health Psychologist:  NO  3. Diagnostic Studies:  None at this time  4. Medication Management:    1. Percocet 5-325 2/day. Okay to use 3 occasionally if needed. Changed to Percocet as he got a new glucose monitor therefore does not need to avoid tylenol  2. Continue medical cannabis  3. Continue Robaxin 500mg-1 tablet TID PRN  5. Recommendations to PCP. See above  6. CSA re-signed today  7. UDS updated today      Follow up with this provider: 12 Weeks     Total time spent face to face was 20 minutes and more than 50% of face to face time was spent in counseling and/or coordination of care regarding the diagnosis and recommendations above.      Natasha Padgett PA-C   [facility]

## 2020-01-31 DIAGNOSIS — E10.43 TYPE 1 DIABETES MELLITUS WITH DIABETIC AUTONOMIC NEUROPATHY (H): Primary | ICD-10-CM

## 2020-01-31 RX ORDER — FLASH GLUCOSE SENSOR
1 KIT MISCELLANEOUS
Qty: 2 EACH | Refills: 11 | Status: SHIPPED | OUTPATIENT
Start: 2020-01-31 | End: 2020-04-22

## 2020-01-31 RX ORDER — FLASH GLUCOSE SENSOR
1 KIT MISCELLANEOUS CONTINUOUS PRN
Qty: 1 DEVICE | Refills: 1 | Status: SHIPPED | OUTPATIENT
Start: 2020-01-31 | End: 2022-07-14

## 2020-01-31 NOTE — TELEPHONE ENCOUNTER
Routing refill request to provider for review/approval because:  Device not on pt's med list and not on the [facility] refill protocol     Brigida TALBOT RN

## 2020-01-31 NOTE — TELEPHONE ENCOUNTER
Reason for Call:  Medication or medication refill:    Do you use a [facility]?  Name of the pharmacy and phone number for the current request:     [facility] - [place], [place] - [address]    Name of the medication requested: Sukhi fernandes    Other request:     Can we leave a detailed message on this number? YES    Phone number Ada velez can be reached at: [phone]  *C2C verified    Best Time: any    Call taken on 1/31/2020 at 8:08 AM by Oma Gann

## 2020-02-10 ENCOUNTER — OFFICE VISIT (OUTPATIENT)
Dept: PALLIATIVE MEDICINE | Facility: CLINIC | Age: 55
End: 2020-02-10
Payer: COMMERCIAL

## 2020-02-10 VITALS
BODY MASS INDEX: 24.71 KG/M2 | DIASTOLIC BLOOD PRESSURE: 76 MMHG | HEIGHT: 68 IN | SYSTOLIC BLOOD PRESSURE: 127 MMHG | WEIGHT: 163 LBS | TEMPERATURE: 98 F

## 2020-02-10 DIAGNOSIS — M79.18 MYOFASCIAL PAIN: ICD-10-CM

## 2020-02-10 DIAGNOSIS — G89.29 CHRONIC BILATERAL LOW BACK PAIN WITHOUT SCIATICA: ICD-10-CM

## 2020-02-10 DIAGNOSIS — G89.4 CHRONIC PAIN SYNDROME: ICD-10-CM

## 2020-02-10 DIAGNOSIS — M48.02 CERVICAL STENOSIS OF SPINE: Primary | ICD-10-CM

## 2020-02-10 DIAGNOSIS — F11.90 CHRONIC, CONTINUOUS USE OF OPIOIDS: ICD-10-CM

## 2020-02-10 DIAGNOSIS — M54.50 CHRONIC BILATERAL LOW BACK PAIN WITHOUT SCIATICA: ICD-10-CM

## 2020-02-10 PROCEDURE — 99213 OFFICE O/P EST LOW 20 MIN: CPT | Performed by: PHYSICIAN ASSISTANT

## 2020-02-10 PROCEDURE — 80307 DRUG TEST PRSMV CHEM ANLYZR: CPT | Performed by: PHYSICIAN ASSISTANT

## 2020-02-10 PROCEDURE — 99000 SPECIMEN HANDLING OFFICE-LAB: CPT | Performed by: PHYSICIAN ASSISTANT

## 2020-02-10 RX ORDER — OXYCODONE AND ACETAMINOPHEN 5; 325 MG/1; MG/1
1 TABLET ORAL EVERY 6 HOURS PRN
Qty: 70 TABLET | Refills: 0 | Status: SHIPPED | OUTPATIENT
Start: 2020-02-10 | End: 2020-03-20

## 2020-02-10 ASSESSMENT — PAIN SCALES - GENERAL: PAINLEVEL: MILD PAIN (2)

## 2020-02-10 ASSESSMENT — MIFFLIN-ST. JEOR: SCORE: 1558.86

## 2020-02-10 NOTE — LETTER
[facility]  02/10/20    Patient: Stone De Paz  YOB: 1965  Medical Record Number: [id number]                                                                  Opioid / Opioid Plus Controlled Substance Agreement    I understand that my care provider has prescribed an opioid (narcotic) controlled substance to help manage my condition(s). I am taking this medicine to help me function or work. I know this is strong medicine, and that it can cause serious side effects. Opioid medicine can be sedating, addicting and may cause a dependency on the drug. They can affect my ability to drive or think, and cause depression. They need to be taken exactly as prescribed. Combining opioids with certain medicines or chemicals (such as cocaine, sedatives and tranquilizers, sleeping pills, meth) can be dangerous or even fatal. Also, if I stop opioids suddenly, I may have severe withdrawal symptoms. Last, I understand that opioids do not work for all types of pain nor for all patients. If not helpful, I may be asked to stop them.      The risks, benefits, and side effects of these medicine(s) were explained to me. I agree that:    1. I will take part in other treatments as advised by my care team. This may be psychiatry or counseling, physical therapy, behavioral therapy, group treatment or a referral to a pain clinic. I will reduce or stop my medicine when my care team tells me to do so.  2. I will take my medicines as prescribed. I will not change the dose or schedule unless my care team tells me to. There will be no refills if I  run out early.   I may be contactedwithout warning and asked to complete a urine drug test or pill count at any time.   3. I will keep all my appointments, and understand this is part of the monitoring of opioids. My care team may require an office visit for EVERY opioid/controlled substance refill. If I miss appointments or don t follow instructions, my care team  may stop my medicine.  4. I will not ask other providers to prescribe controlled substances, and I will not accept controlled substances from other people. If I need another prescribed controlled substance for a new reason, I will tell my care team within 1 business day.  5. I will use one pharmacy to fill all of my controlled substance prescriptions, and it is up to me to make sure that I do not run out of my medicines on weekends or holidays. If my care team is willing to refill my opioid prescription without a visit, I must request refills only during office hours, refills may take up to 3 days to process, and it may take up to 5 to 7 days for my medicine to be mailed and ready at my pharmacy. Prescriptions will not be mailed anywhere except my pharmacy.        [id number]  Rev 12/18         Registration to scan to EHR                             Page 1 of 2               Controlled Substance Agreement Opioid        [facility]  02/10/20  Patient: Stone De Paz  YOB: 1965  Medical Record Number: [id number]                                                                  6. I am responsible for my prescriptions. If the medicine/prescription is lost or stolen, it will not be replaced. I also agree not to share controlled substance medicines with anyone.  7. I agree to not use ANY illegal or recreational drugs. This includes marijuana, cocaine, bath salts or other drugs. I agree not to use alcohol unless my care team says I may.          I agree to give urine samples whenever asked. If I don t give a urine sample, the care team may stop my medicine.    8. If I enroll in the [place] Medical Marijuana program, I will tell my care team. I will also sign an agreement to share my medical records with my care team.   9. I will bring in my list of medicines (or my medicine bottles) each time I come to the clinic.   10. I will tell my care team right away if I become pregnant or have a  new medical problem treated outside of my regular clinic.  11. I understand that this medicine can affect my thinking and judgment. It may be unsafe for me to drive, use machinery and do dangerous tasks. I will not do any of these things until I know how the medicine affects me. If my dose changes, I will wait to see how it affects me. I will contact my care team if I have concerns about medicine side effects.    I understand that if I do not follow any of the conditions above, my prescriptions or treatment may be stopped.      I agree that my provider, clinic care team, and pharmacy may work with any city, state or federal law enforcement agency that investigates the misuse, sale, or other diversion of my controlled medicine. I will allow my provider to discuss my care with or share a copy of this agreement with any other treating provider, pharmacy or emergency room where I receive care. I agree to give up (waive) any right of privacy or confidentiality with respect to these consents.     I have read this agreement and have asked questions about anything I did not understand.      ________________________________________________________________________  Patient signature - Date/Time -  Stone De Paz                                      ________________________________________________________________________  Witness signature                                                            ________________________________________________________________________  Provider signature - Natasha Padgett PA-C      [id number]  Rev 12/18         Registration to scan to EHR                         Page 2 of 2                   Controlled Substance Agreement Opioid           Page 1 of 2  Opioid Pain Medicines (also known as Narcotics)  What You Need to Know    What are opioids?   Opioids are pain medicines that must be prescribed by a doctor.  They are also known as narcotics.    Examples are:     morphine (MS Contin,  Sneha)    oxycodone (Oxycontin)    oxycodone and acetaminophen (Percocet)    hydrocodone and acetaminophen (Vicodin, Norco)     fentanyl patch (Duragesic)     hydromorphone (Dilaudid)     methadone     What do opioids do well?   Opioids are best for short-term pain after a surgery or injury. They also work well for cancer pain. Unlike other pain medicines, they do not cause liver or kidney failure or ulcers. They may help some people with long-lasting (chronic) pain.     What do opioids NOT do well?   Opioids never get rid of pain entirely, and they do not work well for most patients with chronic pain. Opioids do not reduce swelling, one of the causes of pain. They also don t work well for nerve pain.                           For informational purposes only.  Not to replace the advice of your care provider.  Copyright 201 [facility]. All right reserved. "[employer] 175858-Hnc 02/18.      Page 2 of 2    Risks and side effects   Talk to your doctor before you start or decide to keep taking one of these medicines. Side effects include:    Lowering your breathing rate enough to cause death    Overdose, including death, especially if taking higher than prescribed doses    Long-term opioid use    Worse depression symptoms; less pleasure in things you usually enjoy    Feeling tired or sluggish    Slower thoughts or cloudy thinking    Being more sensitive to pain over time; pain is harder to control    Trouble sleeping or restless sleep    Changes in hormone levels (for example, less testosterone)    Changes in sex drive or ability to have sex    Constipation    Unsafe driving    Itching and sweating    Feeling dizzy    Nausea, vomiting and dry mouth    What else should I know about opioids?  When someone takes opioids for too long or too often, they become dependent. This means that if you stop or reduce the medicine too quickly, you will have withdrawal symptoms.    Dependence is not the same as addiction.  Addiction is when people keep using a substance that harms their body, their mind or their relations with others. If you have a history of drug or alcohol abuse, taking opioids can cause a relapse.    Over time, opioids don t work as well. Most people will need higher and higher doses. The higher the dose, the more serious the side effects. We don t know the long-term effects of opioids.      Prescribed opioids aren't the best way to manage chronic pain    Other ways to manage pain include:      Ibuprofen or acetaminophen.  You should always try this first.      Treat health problems that may be causing pain.      acupuncture or massage, deep breathing, meditation, visual imagery, aromatherapy.      Use heat or ice at the pain site      Physical therapy and exercise      Stop smoking      See a counselor or therapist                                                  People who have used opioids for a long time may have a lower quality of life, worse depression, higher levels of pain and more visits to doctors.    Never share your opioids with others. Be sure to store opioids in a secure place, locked if possible.Young children can easily swallow them and overdose.     You can overdose on opioids.  Signs of overdose include decrease or loss of consciousness, slowed breathing, trouble waking and blue lips.  If someone is worried about overdose, they should call 911.    If you are at risk for overdose, you may get naloxone (Narcan, a medicine that reverses the effects of opioids.  If you overdose, a friend or family member can give you Narcan while waiting for the ambulance.  They need to know the signs of overdose and how to give Narcan.    While you're taking opioids:    Don't use alcohol or street drugs. Taking them together can cause death.    Don't take any of these medicines unless your doctor says its okay.  Taking these with opioids can cause death.    Benzodiazepines (such as lorazepam         or  diazepam)    Muscle relaxers (such as cyclobenzaprine)    sleeping pills    other opioids    Safe disposal of opioids  Find your area drug take-back program, your pharmacy mail-back program, buy a special disposal bag (such as Deterra) from your pharmacy or flush them down the toilet.  Use the guidelines at:  www.fda.gov/drugs/resourcesforyou

## 2020-02-10 NOTE — PATIENT INSTRUCTIONS
After Visit Instructions:     Thank you for coming to [facility] for your care. It is my goal to partner with you to help you reach your optimal state of health.     I am recommending multidisciplinary care at this time.  The focus of care will be to continue gradual rehabilitation and pain management with medication adjustments as needed.    Continue daily self-care, identifying contributing factors, and monitoring variations in pain level. Continue to integrate self-care into your life.        Schedule follow-up with Natasha Padgett PA-C in 12 weeks. You will need to make this appointment.     Labs: urine drug screen    Procedures recommended: none at this time     Medication recommendations:     Change to Percocet 5-325: 1 tab every 6 hours as needed. Max of 3/day.     Continue medical cannabis      Natasha Padgett PA-C  [facility]    Contact information: [facility]  Clinic Number:  [phone]     Call with any questions about your care and for scheduling assistance.     Calls are returned Monday through Friday between 8 AM and 4:30 PM. We usually get back to you within 2 business days depending on the issue/request.    If we are prescribing your medications:    For opioid medication refills, call the clinic or send a [employer] message 7 days in advance.  Please include:    Name of requested medication    Name of the pharmacy.    For non-opioid medications, call your pharmacy directly to request a refill. Please allow 3-4 days to be processed.     Per [place] State Law:    All controlled substance prescriptions must be filled within 30 days of being written.      For those controlled substances allowing refills, pickup must occur within 30 days of last fill.      We believe regular attendance is key to your success in our program!      Any time you are unable to keep your appointment we ask that you call us at least 24 hours in  advance to cancel.This will allow us to offer the appointment time to another patient.   Multiple missed appointments may lead to dismissal from the clinic.

## 2020-02-13 LAB — PAIN DRUG SCR UR W RPTD MEDS: NORMAL

## 2020-02-20 ENCOUNTER — NURSE TRIAGE (OUTPATIENT)
Dept: FAMILY MEDICINE | Facility: CLINIC | Age: 55
End: 2020-02-20

## 2020-02-20 DIAGNOSIS — K21.9 GASTROESOPHAGEAL REFLUX DISEASE WITHOUT ESOPHAGITIS: Primary | ICD-10-CM

## 2020-02-20 NOTE — TELEPHONE ENCOUNTER
Reason for Call:  Medication or medication refill:    Do you use a [facility]?  Name of the pharmacy and phone number for the current request:  [facility] - [phone]    Name of the medication requested: generic for pepcid     Other request: Ada calling on behalf of Stone stating he has been experiencing severe stomach pain and requesting this to be sent tin to help. Patient was advised Dr Raymond will be returning tomorrow. Please advise     Can we leave a detailed message on this number? Not Applicable    Phone number patient can be reached at: Home number on file [phone] (home) or Other phone number:       Best Time:      Call taken on 2/20/2020 at 9:02 AM by Elizabeth Duran

## 2020-02-24 RX ORDER — OMEPRAZOLE 20 MG/1
20 TABLET, DELAYED RELEASE ORAL DAILY
Qty: 30 TABLET | Refills: 3 | Status: SHIPPED | OUTPATIENT
Start: 2020-02-24 | End: 2020-06-16

## 2020-02-24 NOTE — TELEPHONE ENCOUNTER
"S-(situation): pt calling for RX to replace his recalled Ranitidine for PPI.      B-(background):  Hx of stomach ulcers. Off med for 2 weeks and now symptomatic. Asking for RX.     A-(assessment): He said his insurance suggested Cimetidine.    R-(recommendations): RN will forward to Dr. Raymond as out of RN scope of practice to prescribe . I do NOT see the Ranitidine on his active med list.  Note: He  Uses [facility] in [place].  IRINA Magaña      Answer Assessment - Initial Assessment Questions  1. LOCATION: \"Where does it hurt?\"       \" I have been taking Ranitidine for upset stomach. They said it is recalled now and I want to know what else I can take?\"   2. RADIATION: \"Does the pain shoot anywhere else?\" (e.g., chest, back)      He is having stomach pains since not taking meds. Hx of stomach ulcers in the past.   3. ONSET: \"When did the pain begin?\" (Minutes, hours or days ago)       2 weeks.   4. SUDDEN: \"Gradual or sudden onset?\"      Gradual onset of pain.s   5. PATTERN \"Does the pain come and go, or is it constant?\"     - If constant: \"Is it getting better, staying the same, or worsening?\"       (Note: Constant means the pain never goes away completely; most serious pain is constant and it progresses)      - If intermittent: \"How long does it last?\" \"Do you have pain now?\"      (Note: Intermittent means the pain goes away completely between bouts)      Intermittent.   6. SEVERITY: \"How bad is the pain?\"  (e.g., Scale 1-10; mild, moderate, or severe)     - MILD (1-3): doesn't interfere with normal activities, abdomen soft and not tender to touch      - MODERATE (4-7): interferes with normal activities or awakens from sleep, tender to touch      - SEVERE (8-10): excruciating pain, doubled over, unable to do any normal activities        Moderate pain, 4 to 7   7. RECURRENT SYMPTOM: \"Have you ever had this type of abdominal pain before?\" If so, ask: \"When was the last time?\" and \"What happened that time?\" " "      Yes, with his ulcers.   8. CAUSE: \"What do you think is causing the abdominal pain?\"       Ulcer?  9. RELIEVING/AGGRAVATING FACTORS: \"What makes it better or worse?\" (e.g., movement, antacids, bowel movement)       Thinks its because he is NOT using the medication.   10. OTHER SYMPTOMS: \"Has there been any vomiting, diarrhea, constipation, or urine problems?\"        He is vomiting at times, but has gastroparesis.    Protocols used: ABDOMINAL PAIN - MALE-A-OH    "

## 2020-02-25 NOTE — TELEPHONE ENCOUNTER
Omeprazole was sent to the pharmacy, please take it as prescribed.  Follow-up if not improve or get worse.

## 2020-02-25 NOTE — TELEPHONE ENCOUNTER
Called patient and relayed information.  No further action is needed as of right now.     Flakita Gooden, CMA

## 2020-03-20 DIAGNOSIS — M48.02 CERVICAL STENOSIS OF SPINE: ICD-10-CM

## 2020-03-20 DIAGNOSIS — G89.4 CHRONIC PAIN SYNDROME: ICD-10-CM

## 2020-03-20 NOTE — TELEPHONE ENCOUNTER
Reason for Call:  Other call back    Detailed comments: Pt would like refill of percocet sent to [facility] [place]     Phone Number Patient can be reached at: Home number on file [phone] (home)    Best Time: NA    Can we leave a detailed message on this number? Not Applicable    Call taken on 3/20/2020 at 7:45 AM by Letitia Alfaro

## 2020-03-20 NOTE — TELEPHONE ENCOUNTER
Medication refill information reviewed.     Due date for oxyCODONE-acetaminophen (PERCOCET) 5-325 MG tablet is 3/24/20     Prescriptions prepped for review.     Will route to provider.

## 2020-03-20 NOTE — TELEPHONE ENCOUNTER
Received call from patient requesting refill(s) of oxyCODONE-acetaminophen (PERCOCET) 5-325 MG tablet     Last dispensed from pharmacy on 02/21/2020    Pt last seen by prescribing provider on 02/10/2020  Next appt scheduled for 05/11/2020     checked in the past 6 months? Yes If no, print current report and give to RN    Last urine drug screen date 02/10/2020  Current opioid agreement on file (completed within the last year) Yes Date of opioid agreement: 02/10/2020    Processing (pick one and delete the others):      [facility] [place] [facility]      Will route to nursing pool for review and preparation of prescription(s).     Delaney Torres on 3/20/2020 at 9:42 AM

## 2020-03-23 RX ORDER — OXYCODONE AND ACETAMINOPHEN 5; 325 MG/1; MG/1
1 TABLET ORAL EVERY 6 HOURS PRN
Qty: 70 TABLET | Refills: 0 | Status: SHIPPED | OUTPATIENT
Start: 2020-03-23 | End: 2020-04-22

## 2020-04-15 DIAGNOSIS — E10.43 TYPE 1 DIABETES MELLITUS WITH DIABETIC AUTONOMIC NEUROPATHY (H): ICD-10-CM

## 2020-04-16 NOTE — TELEPHONE ENCOUNTER
"Requested Prescriptions   Pending Prescriptions Disp Refills     insulin aspart (NOVOLOG VIAL) 100 UNITS/ML vial [Pharmacy Med Name: NOVOLOG 100UNIT/ML SOLN] 20 mL 6     Sig: USE WITH INSULIN PUMP, TOTAL DAILY DOSE APPROX 45 UNITS       Short Acting Insulin Protocol Failed - 4/15/2020 12:55 PM        Failed - HgbA1C in past 3 or 6 months     If HgbA1C is 8 or greater, it needs to be on file within the past 3 months.  If less than 8, must be on file within the past 6 months.     Recent Labs   Lab Test 01/06/20  1046   A1C 10.2*             Passed - Serum creatinine on file in past 12 months     Recent Labs   Lab Test 01/06/20  1046   CR 0.97       Ok to refill medication if creatinine is low          Passed - Medication is active on med list        Passed - Patient is age 18 or older        Passed - Recent (6 mo) or future (30 days) visit within the authorizing provider's specialty     Patient had office visit in the last 6 months or has a visit in the next 30 days with authorizing provider or within the authorizing provider's specialty.  See \"Patient Info\" tab in inbasket, or \"Choose Columns\" in Meds & Orders section of the refill encounter.             Medication is being filled for 1 time refill only due to:  Patient needs video visit with Dr. Best for follow up.  "

## 2020-04-21 ENCOUNTER — NURSE TRIAGE (OUTPATIENT)
Dept: NURSING | Facility: CLINIC | Age: 55
End: 2020-04-21

## 2020-04-21 DIAGNOSIS — G89.4 CHRONIC PAIN SYNDROME: ICD-10-CM

## 2020-04-21 DIAGNOSIS — M48.02 CERVICAL STENOSIS OF SPINE: ICD-10-CM

## 2020-04-21 NOTE — TELEPHONE ENCOUNTER
Patients ex-wife Ada  calling as he is due for  for refill on Percocet. Consent to communicate is on file.   [facility] [place] is where the Rx is filled. I will route note to Natasha Padgett who usually writes the Rx.   Ada states he does still have some Perocet left for today.     Neeta Larios RN on 4/21/2020 at 4:21 PM

## 2020-04-22 ENCOUNTER — TELEPHONE (OUTPATIENT)
Dept: ENDOCRINOLOGY | Facility: CLINIC | Age: 55
End: 2020-04-22

## 2020-04-22 DIAGNOSIS — E10.43 TYPE 1 DIABETES MELLITUS WITH DIABETIC AUTONOMIC NEUROPATHY (H): ICD-10-CM

## 2020-04-22 RX ORDER — FLASH GLUCOSE SENSOR
1 KIT MISCELLANEOUS
Qty: 6 EACH | Refills: 2 | Status: SHIPPED | OUTPATIENT
Start: 2020-04-22 | End: 2021-05-10

## 2020-04-22 RX ORDER — OXYCODONE AND ACETAMINOPHEN 5; 325 MG/1; MG/1
1 TABLET ORAL EVERY 6 HOURS PRN
Qty: 70 TABLET | Refills: 0 | Status: SHIPPED | OUTPATIENT
Start: 2020-04-22 | End: 2020-05-11

## 2020-04-22 NOTE — TELEPHONE ENCOUNTER
Reason for Call:  Medication or medication refill:    Do you use a [facility]?  Name of the pharmacy and phone number for the current request:  [facility] - [place], [place] - [address]    Name of the medication requested: Continuous Blood Gluc Sensor (FREESTYLE REUBEN 14 DAY SENSOR) [facility]     Other request: is requesting in case one falls off    Can we leave a detailed message on this number? YES    Phone number patient's wife can be reached at: [phone]  *C2C verified    Best Time: any    Call taken on 4/22/2020 at 9:35 AM by Oma Gann

## 2020-04-22 NOTE — TELEPHONE ENCOUNTER
Spoke with Pt:     Pt requests 3 month supplies-     Will send and he can see if insurance will allow this type of fill     Cindi TAMAYO RN

## 2020-04-22 NOTE — TELEPHONE ENCOUNTER
Left message on voicemail . Rx was E-Prepcribed to:       [facility] - [place] - [address]  [address]  [facility] [address]  Phone: [phone] Fax: [phone]      Patient notified of dispense and start date.

## 2020-05-11 ENCOUNTER — VIRTUAL VISIT (OUTPATIENT)
Dept: PALLIATIVE MEDICINE | Facility: CLINIC | Age: 55
End: 2020-05-11
Payer: COMMERCIAL

## 2020-05-11 VITALS — WEIGHT: 150 LBS | HEIGHT: 68 IN | BODY MASS INDEX: 22.73 KG/M2

## 2020-05-11 DIAGNOSIS — M48.02 CERVICAL STENOSIS OF SPINE: ICD-10-CM

## 2020-05-11 DIAGNOSIS — G89.4 CHRONIC PAIN SYNDROME: ICD-10-CM

## 2020-05-11 PROCEDURE — 99213 OFFICE O/P EST LOW 20 MIN: CPT | Mod: 95 | Performed by: PHYSICIAN ASSISTANT

## 2020-05-11 RX ORDER — OXYCODONE AND ACETAMINOPHEN 5; 325 MG/1; MG/1
1 TABLET ORAL EVERY 6 HOURS PRN
Qty: 70 TABLET | Refills: 0 | Status: SHIPPED | OUTPATIENT
Start: 2020-05-11 | End: 2020-06-23

## 2020-05-11 ASSESSMENT — MIFFLIN-ST. JEOR: SCORE: 1499.9

## 2020-05-11 ASSESSMENT — PAIN SCALES - GENERAL: PAINLEVEL: MODERATE PAIN (4)

## 2020-05-11 NOTE — PROGRESS NOTES
"Stone De Paz is a 54 year old male who is being evaluated via a billable telephone visit.      The patient has been notified of following:     \"This telephone visit will be conducted via a call between you and your physician/provider. We have found that certain health care needs can be provided without the need for a physical exam.  This service lets us provide the care you need with a short phone conversation.  If a prescription is necessary we can send it directly to your pharmacy.  If lab work is needed we can place an order for that and you can then stop by our lab to have the test done at a later time.    Telephone visits are billed at different rates depending on your insurance coverage. During this emergency period, for some insurers they may be billed the same as an in-person visit.  Please reach out to your insurance provider with any questions.    If during the course of the call the physician/provider feels a telephone visit is not appropriate, you will not be charged for this service.\"    Patient has given verbal consent for Telephone visit?  Yes    What phone number would you like to be contacted at? [phone]    How would you like to obtain your AVS? [facility]    CHIEF COMPLAINT:   Chronic neck pain    INTERVAL HISTORY:  Last seen on 02/10/2020.        Recommendations/plan at the last visit included:  1. Physical Therapy:  NO   2. Clinical Health Psychologist:  NO  3. Diagnostic Studies:  None at this time  4. Medication Management:    1.   Percocet 5-325 2/day. Okay to use 3 occasionally if needed. Changed to Percocet as he got a new glucose monitor therefore does not need to avoid tylenol  2.   Continue medical cannabis  3. Continue Robaxin 500mg-1 tablet TID PRN  5. Recommendations to PCP. See above  6. CSA re-signed today  7. UDS updated today        Follow up with this provider: 12 Weeks     Since his last visit, Stone De Paz reports:    He has been about the " "same. He states that nothing changed. He states that he has not had issues getting his medical cannabis although it takes a little bit to get it ordered. He states that with the marijuana is helping with his nausea. He has not been using as much zofran. He states that due to not using as much zofran he is not having as much diarrhea.      Annual Controlled Substance Agreement: signed 2/10/2020  UDS: 2/10/2020    CURRENT RELEVANT PAIN MEDICATIONS:  Robaxin 500mg-takes 1 as needed   Naproxen 500mg-taking 1/day  Oxycodone 5mg-taking 2 in AM and will occasionally take a 3rd in the afternoon    Imitrex-last used prior to the neck procedures  Medical Cannabis    Patient is using the medication as prescribed:  YES  Is your medication helpful? YES   Medication side effects? itching    Previous Medications: (H--helped; HI--Helped initially; SWH-- somewhat helpful, NH--No help; W--worse; SE--side effects)   Opiates: Oxycodone H, Tylenol #3 NH, Hydrocodone NH, Dilaudid H SE \"ick\", Morphine H SE nausea, Tramadol NH  NSAIDS: Naproxen H SE stomach upset  Muscle Relaxants: Flexeril H SE incontinence  Anti-migraine mediations: Imitrex H  Anti-depressants: Amitriptyline NH SE rapid heart  Sleep aids: none  Anxiolytics: Valium [facility]  Neuropathics: Gabapentin NH SE \"goofy\", Lyrica ? Was too expensive  Topicals: Voltaren Gel NH, Lidocaine NH  Other medications not covered above: Tylenol H, Prednisone H SE high sugars, Medical marijuana H SE cost constraints    Past Pain Treatments:  Pain Clinic:   No   PT: Yes, last went this year  Psychologist: No, but he did have to do some psychology prior to his back surgeryes  Relaxation techniques/biofeedback: No, but tries to do meditation at times   Chiropractor: No  Acupuncture: No  Pharmacotherapy:               Opioids: Yes                Non-opioids:    Yes   TENs Unit:Yes, was not helpful  Injections: Yes, right 3rd occipital nerve and right C3, C4, C5 RFA 02/28/2019, left 3rd occipital " nerve, and left C3, C4, C5 RFA 03/15/2019. Had done low back injections in [place] as well (steroids mess with sugars therefore tries to avoid if possible).   Self-care:   Yes, cold works better (heat worsens pain)  Surgeries related to pain: Yes, C5-6 ACDF 2012. Disk replacement cervical spine 2006, low back fusions (2000, 2002)    [place] Board of Pharmacy Data Base Reviewed:    YES; As expected, no concern for misuse/abuse of controlled medications based on this report.    THE 4 As OF OPIOID MAINTENANCE ANALGESIA    Analgesia: Is pain relief clinically significant? YES   Activity: Is patient functional and able to perform Activities of Daily Living? YES   Adverse effects: Is patient free from adverse side effects from opiates? NO   Adherence to Rx protocol: Is patient adhering to Controlled Substance Agreement and taking medications ONLY as ordered? YES       Is Narcan prescribed for opiate use >50 MME daily? N/A      Daily MME: 15-22.5    Medications:  Current Outpatient Medications   Medication Sig Dispense Refill     amylase-lipase-protease (CREON 24) 26777-28286 units CPEP per EC capsule Take 1 capsule by mouth every other day       amylase-lipase-protease (PERTZYE) 68703 units CPEP Take 1 capsule by mouth every other day       atorvastatin (LIPITOR) 80 MG tablet Take 1 tablet (80 mg) by mouth daily Please stop the Simvastatin 90 tablet 1     budesonide (ENTOCORT EC) 3 MG EC capsule Take 3 mg by mouth every morning Take 1 or 2 capsules       citalopram (CELEXA) 40 MG tablet Take 1 tablet (40 mg) by mouth daily 90 tablet 1     Continuous Blood Gluc  (FREESTYLE REUBEN READER) AMBER 1 Device continuous prn (replace annually if needed) 1 Device 1     Continuous Blood Gluc Sensor (FREESTYLE REUBEN 14 DAY SENSOR) MISC 1 each every 14 days 6 each 2     CONTOUR NEXT TEST test strip Use to test blood sugar 5x times daily or as directed. 450 strip 3     insulin aspart (NOVOLOG VIAL) 100 UNITS/ML vial USE WITH  INSULIN PUMP, TOTAL DAILY DOSE APPROX 45 UNITS 20 mL 3     levothyroxine (SYNTHROID/LEVOTHROID) 125 MCG tablet Take 1-tablet by mouth daily as directed.  Please follow up as scheduled 90 tablet 1     lisinopril (PRINIVIL/ZESTRIL) 40 MG tablet Take 1 tablet (40 mg) by mouth daily 90 tablet 1     medical cannabis (Patient's own supply) See Admin Instructions (The purpose of this order is to document that the patient reports taking medical cannabis.  This is not a prescription, and is not used to certify that the patient has a qualifying medical condition.)       methocarbamol (ROBAXIN) 500 MG tablet Take 1-2 tablets three times daily as needed 60 tablet 0     naproxen (NAPROSYN) 500 MG tablet TAKE ONE TABLET BY MOUTH TWICE A DAY AS NEEDED FOR MODERATE PAIN 180 tablet 1     omeprazole (PRILOSEC OTC) 20 MG EC tablet Take 1 tablet (20 mg) by mouth daily 30 tablet 3     ondansetron (ZOFRAN-ODT) 8 MG ODT tab Take 1 tablet (8 mg) by mouth every 8 hours as needed (for nauesa) 60 tablet 0     oxyCODONE-acetaminophen (PERCOCET) 5-325 MG tablet Take 1 tablet by mouth every 6 hours as needed for severe pain . Max of 3/day. Fill 4/22/20. Start 4/23/20 70 tablet 0     simvastatin (ZOCOR) 40 MG tablet TAKE ONE TABLET BY MOUTH AT BEDTIME 30 tablet 0     SUMAtriptan (IMITREX) 100 MG tablet Take 1 tablet (100 mg) by mouth at onset of headache 1/2 to 1 tablet by mouth at onset of headache. May repeat 1 dose after 2 hours if headache persists. 9 tablet 3     DIAGNOSTIC TESTS:  Imaging Studies:   No new imaging to review    Assessment:  Stone De Paz is a 53 year old male who presents today for follow up regarding his:    1.  Cervical stenosis   2.  Myofascial pain  3.  Chronic low back pain  4.  Chronic pain syndrome  5. Chronic use of opioids      Pain is overall stable. No changes today. He is tolerating his medications well. Re-certified for medical cannabis today.       Plan:    Diagnosis reviewed, treatment option addressed, and  risk/benifits discussed.  Self-care instructions given.  I am recommending a multidisciplinary treatment plan to help this patient better manage pain.      1. Physical Therapy:  NO   2. Clinical Health Psychologist:  NO  3. Diagnostic Studies:  None at this time  4. Medication Management:    1. Percocet 5-325 2/day. Okay to use 3 occasionally if needed.   2. Continue medical cannabis-recertified today  3. Continue Robaxin 500mg-1 tablet TID PRN  5. Recommendations to PCP. See above        Follow up with this provider: 12 Weeks   Phone call duration: 12 minutes    Natasha Padgett PA-C   [facility]

## 2020-06-15 DIAGNOSIS — K21.9 GASTROESOPHAGEAL REFLUX DISEASE WITHOUT ESOPHAGITIS: ICD-10-CM

## 2020-06-16 NOTE — TELEPHONE ENCOUNTER
Prescription approved per [facility] Refill Protocol.  Christiane Duran RN

## 2020-06-22 DIAGNOSIS — M48.02 CERVICAL STENOSIS OF SPINE: ICD-10-CM

## 2020-06-22 DIAGNOSIS — G89.4 CHRONIC PAIN SYNDROME: ICD-10-CM

## 2020-06-22 NOTE — TELEPHONE ENCOUNTER
Received call from patient requesting refill(s) of oxyCODONE-acetaminophen (PERCOCET) 5-325 MG tablet     Last picked up from pharmacy on 05/22/20    Pt last seen by prescribing provider on 05/11/20    Next appt scheduled for 08/03/20     checked in the past 6 months? Yes If no, print current report and give to RN    Last urine drug screen date 02/10/20  Current opioid agreement on file (completed within the last year) Yes Date of opioid agreement: 02/10/20    Processing (pick one and delete the others):      E-prescribe to     [address] - [address]  [address]  [facility] [address]  Phone: [phone] Fax: [phone]        Will route to nursing pool for review and preparation of prescription(s).

## 2020-06-22 NOTE — TELEPHONE ENCOUNTER
Reason for Call:  Other prescription    Detailed comments: Percocet refill request    Phone Number Patient can be reached at: Home number on file [phone] (home)    Best Time: any    Can we leave a detailed message on this number? YES    Call taken on 6/22/2020 at 9:00 AM by Malou Waldron

## 2020-06-22 NOTE — TELEPHONE ENCOUNTER
Please process a refill of Percocet 5-325 mg to [facility] in [place].     WILLIAM DacostaN, RN  Care Coordinator  [facility] [place]

## 2020-06-23 RX ORDER — OXYCODONE AND ACETAMINOPHEN 5; 325 MG/1; MG/1
1 TABLET ORAL EVERY 6 HOURS PRN
Qty: 70 TABLET | Refills: 0 | Status: SHIPPED | OUTPATIENT
Start: 2020-06-23 | End: 2020-07-20

## 2020-06-23 NOTE — TELEPHONE ENCOUNTER
Left message for patient informing him of medication refill.   Delaney Torres, CMA on 6/23/2020 at 3:22 PM

## 2020-06-23 NOTE — TELEPHONE ENCOUNTER
Medication refill information reviewed.     Due date for percocet 5/325 mg is anytime after 6/22/20     Prescription prepped for review.     Will route to provider.     WILLIAM ArndtN, RN-BC  Patient Care Supervisor  [facility] [place]

## 2020-06-29 DIAGNOSIS — I10 ESSENTIAL HYPERTENSION: ICD-10-CM

## 2020-06-29 DIAGNOSIS — F41.1 GENERALIZED ANXIETY DISORDER: ICD-10-CM

## 2020-06-29 DIAGNOSIS — E78.5 HYPERLIPIDEMIA LDL GOAL <100: ICD-10-CM

## 2020-06-29 RX ORDER — CITALOPRAM HYDROBROMIDE 40 MG/1
TABLET ORAL
Qty: 90 TABLET | Refills: 1 | Status: SHIPPED | OUTPATIENT
Start: 2020-06-29 | End: 2020-12-28

## 2020-06-29 RX ORDER — LISINOPRIL 40 MG/1
TABLET ORAL
Qty: 90 TABLET | Refills: 1 | Status: SHIPPED | OUTPATIENT
Start: 2020-06-29 | End: 2020-12-28

## 2020-06-29 RX ORDER — ATORVASTATIN CALCIUM 80 MG/1
TABLET, FILM COATED ORAL
Qty: 90 TABLET | Refills: 1 | Status: SHIPPED | OUTPATIENT
Start: 2020-06-29 | End: 2020-12-28

## 2020-06-29 NOTE — TELEPHONE ENCOUNTER
Prescription approved per [facility] Refill Protocol.    Dolores Ferro RN

## 2020-07-06 DIAGNOSIS — E03.9 HYPOTHYROIDISM, UNSPECIFIED TYPE: ICD-10-CM

## 2020-07-06 RX ORDER — LEVOTHYROXINE SODIUM 125 UG/1
TABLET ORAL
Qty: 90 TABLET | Refills: 1 | Status: SHIPPED | OUTPATIENT
Start: 2020-07-06 | End: 2021-01-11

## 2020-07-20 DIAGNOSIS — G89.4 CHRONIC PAIN SYNDROME: ICD-10-CM

## 2020-07-20 DIAGNOSIS — M48.02 CERVICAL STENOSIS OF SPINE: ICD-10-CM

## 2020-07-20 RX ORDER — OXYCODONE AND ACETAMINOPHEN 5; 325 MG/1; MG/1
1 TABLET ORAL EVERY 6 HOURS PRN
Qty: 70 TABLET | Refills: 0 | Status: SHIPPED | OUTPATIENT
Start: 2020-07-20 | End: 2020-08-03

## 2020-07-20 NOTE — TELEPHONE ENCOUNTER
Medication refill information reviewed.     Due date for oxyCODONE-acetaminophen (PERCOCET) 5-325 MG tablet   is 7/23/20     Prescriptions prepped for review.     Will route to provider.

## 2020-07-20 NOTE — TELEPHONE ENCOUNTER
Received call from patient requesting refill(s) of oxyCODONE-acetaminophen (PERCOCET) 5-325 MG tablet      Last dispensed from pharmacy on 06/23/2020    Pt last seen by prescribing provider on 05/11/2020  Next appt scheduled for 08/03/2020     checked in the past 6 months? Yes If no, print current report and give to RN    Last urine drug screen date 02/10/2020  Current opioid agreement on file (completed within the last year) Yes Date of opioid agreement: 02/10/2020    Processing (pick one and delete the others):      E-prescribe to [facility] pharmacy    Will route to nursing [place] for review and preparation of prescription(s).       Delaney Torrse, CMA on 7/20/2020 at 12:24 PM

## 2020-07-21 ENCOUNTER — VIRTUAL VISIT (OUTPATIENT)
Dept: ENDOCRINOLOGY | Facility: CLINIC | Age: 55
End: 2020-07-21
Payer: COMMERCIAL

## 2020-07-21 ENCOUNTER — TELEPHONE (OUTPATIENT)
Dept: FAMILY MEDICINE | Facility: CLINIC | Age: 55
End: 2020-07-21

## 2020-07-21 DIAGNOSIS — E03.9 HYPOTHYROIDISM, UNSPECIFIED TYPE: ICD-10-CM

## 2020-07-21 DIAGNOSIS — E10.43 TYPE 1 DIABETES MELLITUS WITH DIABETIC AUTONOMIC NEUROPATHY (H): Primary | ICD-10-CM

## 2020-07-21 DIAGNOSIS — Z96.41 INSULIN PUMP STATUS: ICD-10-CM

## 2020-07-21 PROCEDURE — 99213 OFFICE O/P EST LOW 20 MIN: CPT | Mod: 95 | Performed by: INTERNAL MEDICINE

## 2020-07-21 NOTE — PROGRESS NOTES
"Stone De Paz is a 54 year old male who is being evaluated via a billable telephone visit.      The patient has been notified of following:     \"This telephone visit will be conducted via a call between you and your physician/provider. We have found that certain health care needs can be provided without the need for a physical exam.  This service lets us provide the care you need with a short phone conversation.  If a prescription is necessary we can send it directly to your pharmacy.  If lab work is needed we can place an order for that and you can then stop by our lab to have the test done at a later time.    Telephone visits are billed at different rates depending on your insurance coverage. During this emergency period, for some insurers they may be billed the same as an in-person visit.  Please reach out to your insurance provider with any questions.    If during the course of the call the physician/provider feels a telephone visit is not appropriate, you will not be charged for this service.\"    Patient has given verbal consent for Telephone visit?  Yes    What phone number would you like to be contacted at? [phone]    How would you like to obtain your AVS? Review verbally      Recent issues:  Here for f/u diabetes and thyroid evaluation, with Ada today  Using the Freestyle Sukhi sensor and likes it  More neck pain/stiffness today           1995. Diagnosis of diabetes mellitus after 2nd back surgery, age 29  Initial treatment with insulin injections using NPH and Regular insulin  Switched to premixed 70/30 insulin BID dosing  1999. Started Medtronic insulin pump model 508, used base-dose Humalog insulin at meals  Had seen Dr. Iggy Briggs/[facility]     7/24/01. Initial evaluation with me at my former clinic ([facility])  Upgraded pump to Medtronic 523   Now using Medtronic 630G pump              Novolog in pump     Meals BID, Uses carb \"exchanges\" with his pump settings  Chronic high BG and " hgbA1c trends despite dose changes  Tried square wave bolus  Has Contour Link? BG meter, variable testing pattern... Recently testing 4x/day for at least 30 days  Tried Medtronic CGMS in the past, didn't like it    Previous pump settings:        Recent pump Carelink data:  Information not available     Had used the Dexcom sensor, but issues getting supplies and Rx documentation  ~2/2020. Changed to the Freestyle Sukhi sensor with [place] device   Recent 7d avg  mg/dl, 14d 213 mg/dl, 30d 217 mg/d    Previous  labs include:  Lab Results   Component Value Date    A1C 10.2 (H) 01/06/2020     01/06/2020    POTASSIUM 4.3 01/06/2020    CHLORIDE 101 01/06/2020    CO2 28 01/06/2020    ANIONGAP 5 01/06/2020     (H) 01/06/2020    BUN 16 01/06/2020    CR 0.97 01/06/2020    GFRESTIMATED 89 01/06/2020    GFRESTBLACK >90 01/06/2020    EMILEE 9.2 01/06/2020    CHOL 223 (H) 01/06/2020    TRIG 76 01/06/2020    HDL 53 01/06/2020     (H) 01/06/2020    NHDL 170 (H) 01/06/2020    UCRR 39 01/06/2020    MICROL <5 01/06/2020    UMALCR Unable to calculate due to low value 01/06/2020     Last eye exam date?  Goes to eye clinic in [facility]  7/2018. Met with Destiny Warren RD, CDE at [facility]  DM Complications:              Neuropathy                          Peripheral neuropathy                                      Decreased sensation at feet                          Autonomic neuropathy- gastroparesis                                      Symptoms of nausea, bloating, episodes of diarrhea and emesis                                      Has Medtronic gastric stimulator                                      Sees physicians and WARREN Gilmore/[place] [facility]                   Thyroid:  1994. History of hypothyroidism  Chronic levothyroxine treatment, has used Levoxyl in the past    Recent  labs include:  Lab Results   Component Value Date    TSH 1.50 01/06/2020    T4 1.49 (H) 05/22/2019     Supplements:   Takes vitamin D 1000 U daily  7/2018. Had levothyroxine dose increase   Current dose:  Levothyroxine 0.125 mg each morning          Lives in [place], [place] with Ada, also (1) adopted dog   Sees Dr. Ilan Chew Mai/[facility] for general medicine evaluations  Also sees Olga Ramirez [facility]/[place] [facility].        ROS: 10 point ROS neg other than the symptoms noted above in the HPI.     GENERAL: some fatigue, wt stable; denies fevers, chills, night sweats.   HEENT: no dysphagia, odonophagia, diplopia, neck pain  THYROID:  no apparent hyper or hypothyroid symptoms  CV: occasional chest pains; no pressure, palpitations  LUNGS: no SOB, KRAFT, cough, wheezing   ABDOMEN: some diarrhea and postmeal nausea; no diarrhea, constipation  EXTREMITIES: no rashes, ulcers, edema  NEUROLOGY: no headaches, denies changes in vision, tingling, extremitiy numbness   MSK: neck pain/stiffness and back pains; no muscle weakness  SKIN: no rashes or lesions  PSYCH:  stable mood, no significant anxiety or depression  ENDOCRINE: no heat or cold intolerance        LABS:     All pertinent notes, labs, and images personally reviewed by me.      A/P:       Encounter Diagnoses   Name      Type 1 diabetes mellitus with diabetic autonomic neuropathy (H)      Insulin pump status      Hypothyroidism, unspecified type          Comments:  Reviewed complicated health history and diabetes issues.     Plan:  Reviewed the overall T1DM management and insulin pump use.  Discussed optimal BG testing to assess glucose trends.  We reviewed insulin pump settings, basal rate and bolus dosing  Use of automated pump bolus dosing for meal/snack carb & correction dosing  Reviewed the Medtronic Carelink report data today  Reviewed recent Dexcom CGMS glucose sensor trends, in detail     Recommend:  Continue Medtronic insulin pump and diabetes management plan.  No pump setting changes at this time    We have discussed using basal pattern 1 when more exercise/activity and  pattern 2 when less activity/inactive  Contact our office with update if future restart of the budesinide medication for diarrhea problem  Continue use of the Freestyle Sukhi sensor   Consider upgrading his smartphone to enable use of Sukhi catie   Use SG value entered into Medtronic pump for bolus wizard use  Plan fasting lab appt at his local  clinic soon   Lab orders placed  Would benefit from additional CDE evaluation at [facility]  Continue the atorvastatin med use  Continue current levothyroxine 0.125 mg daily dose plan  Arrange annual dilated eye exam, fasting lipid panel testing.  We reviewed importance of timely clinic appointments with me Q3 months, to satisfy Medicare with his diabetes device coverage     Encouraged him to make routine f/u appointments with [place] [facility] clinic   Addressed patient's questions today      Total time of call between patient and provider was 15 minutes     This telephone visit chart note is the equivalent of a 24497 office visit billing code      VIKTOR Best MD, MS  Endocrinology  [facility]

## 2020-07-21 NOTE — TELEPHONE ENCOUNTER
Wife Ada is calling on behalf of Stone to see if Dr. Raymond wants to add any more labs. They only want to come into the clinic once to have them completed. Dr. Best is ordering labs and they want to make sure they get everything done at one time.     Please call the home number and you may leave a message if labs were added or not. They will scheduled a lab visit later this week.

## 2020-07-21 NOTE — LETTER
"    7/21/2020         RE: Stone De Paz  [address] [place] [id number]        Dear Colleague,    Thank you for referring your patient, Stone De Paz, to the [facility] [place]. Please see a copy of my visit note below.    Stone De Paz is a 54 year old male who is being evaluated via a billable telephone visit.      The patient has been notified of following:     \"This telephone visit will be conducted via a call between you and your physician/provider. We have found that certain health care needs can be provided without the need for a physical exam.  This service lets us provide the care you need with a short phone conversation.  If a prescription is necessary we can send it directly to your pharmacy.  If lab work is needed we can place an order for that and you can then stop by our lab to have the test done at a later time.    Telephone visits are billed at different rates depending on your insurance coverage. During this emergency period, for some insurers they may be billed the same as an in-person visit.  Please reach out to your insurance provider with any questions.    If during the course of the call the physician/provider feels a telephone visit is not appropriate, you will not be charged for this service.\"    Patient has given verbal consent for Telephone visit?  Yes    What phone number would you like to be contacted at? [phone]    How would you like to obtain your AVS? Review verbally      Recent issues:  Here for f/u diabetes and thyroid evaluation, with Ada today  Using the Freestyle Sukhi sensor and likes it  More neck pain/stiffness today           1995. Diagnosis of diabetes mellitus after 2nd back surgery, age 29  Initial treatment with insulin injections using NPH and Regular insulin  Switched to premixed 70/30 insulin BID dosing  1999. Started Medtronic insulin pump model 508, used base-dose Humalog insulin at meals  Had seen Dr. Iggy Briggs/[facility]     7/24/01. " "Initial evaluation with me at my former clinic ([facility])  Upgraded pump to Medtronic 523   Now using Medtronic 630G pump              Novolog in pump     Meals BID, Uses carb \"exchanges\" with his pump settings  Chronic high BG and hgbA1c trends despite dose changes  Tried square wave bolus  Has Contour Link? BG meter, variable testing pattern... Recently testing 4x/day for at least 30 days  Tried Medtronic CGMS in the past, didn't like it    Previous pump settings:        Recent pump Carelink data:  Information not available     Had used the Dexcom sensor, but issues getting supplies and Rx documentation  ~2/2020. Changed to the Freestyle Sukhi sensor with [place] device   Recent 7d avg  mg/dl, 14d 213 mg/dl, 30d 217 mg/d    Previous  labs include:  Lab Results   Component Value Date    A1C 10.2 (H) 01/06/2020     01/06/2020    POTASSIUM 4.3 01/06/2020    CHLORIDE 101 01/06/2020    CO2 28 01/06/2020    ANIONGAP 5 01/06/2020     (H) 01/06/2020    BUN 16 01/06/2020    CR 0.97 01/06/2020    GFRESTIMATED 89 01/06/2020    GFRESTBLACK >90 01/06/2020    EMILEE 9.2 01/06/2020    CHOL 223 (H) 01/06/2020    TRIG 76 01/06/2020    HDL 53 01/06/2020     (H) 01/06/2020    NHDL 170 (H) 01/06/2020    UCRR 39 01/06/2020    MICROL <5 01/06/2020    UMALCR Unable to calculate due to low value 01/06/2020     Last eye exam date?  Goes to eye clinic in [facility]  7/2018. Met with Destiny Warren RD, CDE at [facility]  DM Complications:              Neuropathy                          Peripheral neuropathy                                      Decreased sensation at feet                          Autonomic neuropathy- gastroparesis                                      Symptoms of nausea, bloating, episodes of diarrhea and emesis                                      Has Medtronic gastric stimulator                                      Sees physicians and WARREN Gilmore/[place] [facility]              "      Thyroid:  1994. History of hypothyroidism  Chronic levothyroxine treatment, has used Levoxyl in the past    Recent [facility] labs include:  Lab Results   Component Value Date    TSH 1.50 01/06/2020    T4 1.49 (H) 05/22/2019     Supplements:  Takes vitamin D 1000 U daily  7/2018. Had levothyroxine dose increase   Current dose:  Levothyroxine 0.125 mg each morning          Lives in [place], [place] with Ada, also (1) adopted dog   Sees Dr. Ilan Chew Mai/[facility] for general medicine evaluations  Also sees Olga Ramirez PAC/[place] [facility].        ROS: 10 point ROS neg other than the symptoms noted above in the HPI.     GENERAL: some fatigue, wt stable; denies fevers, chills, night sweats.   HEENT: no dysphagia, odonophagia, diplopia, neck pain  THYROID:  no apparent hyper or hypothyroid symptoms  CV: occasional chest pains; no pressure, palpitations  LUNGS: no SOB, KRAFT, cough, wheezing   ABDOMEN: some diarrhea and postmeal nausea; no diarrhea, constipation  EXTREMITIES: no rashes, ulcers, edema  NEUROLOGY: no headaches, denies changes in vision, tingling, extremitiy numbness   MSK: neck pain/stiffness and back pains; no muscle weakness  SKIN: no rashes or lesions  PSYCH:  stable mood, no significant anxiety or depression  ENDOCRINE: no heat or cold intolerance        LABS:     All pertinent notes, labs, and images personally reviewed by me.      A/P:       Encounter Diagnoses   Name      Type 1 diabetes mellitus with diabetic autonomic neuropathy (H)      Insulin pump status      Hypothyroidism, unspecified type          Comments:  Reviewed complicated health history and diabetes issues.     Plan:  Reviewed the overall T1DM management and insulin pump use.  Discussed optimal BG testing to assess glucose trends.  We reviewed insulin pump settings, basal rate and bolus dosing  Use of automated pump bolus dosing for meal/snack carb & correction dosing  Reviewed the [facility] Carelink report data today  Reviewed  recent Dexcom CGMS glucose sensor trends, in detail     Recommend:  Continue Medtronic insulin pump and diabetes management plan.  No pump setting changes at this time    We have discussed using basal pattern 1 when more exercise/activity and pattern 2 when less activity/inactive  Contact our office with update if future restart of the budesinide medication for diarrhea problem  Continue use of the Freestyle Sukhi sensor   Consider upgrading his smartphone to enable use of Sukhi catie   Use SG value entered into Medtronic pump for bolus wizard use  Plan fasting lab appt at his local  clinic soon   Lab orders placed  Would benefit from additional CDE evaluation at [facility]  Continue the atorvastatin med use  Continue current levothyroxine 0.125 mg daily dose plan  Arrange annual dilated eye exam, fasting lipid panel testing.  We reviewed importance of timely clinic appointments with me Q3 months, to satisfy Medicare with his diabetes device coverage     Encouraged him to make routine f/u appointments with [place] [facility]   Addressed patient's questions today      Total time of call between patient and provider was 15 minutes     This telephone visit chart note is the equivalent of a 46541 office visit billing code      VIKTOR Best MD, MS  Endocrinology  [facility]    Again, thank you for allowing me to participate in the care of your patient.        Sincerely,        Frankie Best MD

## 2020-07-27 NOTE — TELEPHONE ENCOUNTER
I reviewed the labs that were ordered by Dr. Best - no new lab is needed from me.   Please get these labs done asap.  thanks

## 2020-08-03 ENCOUNTER — VIRTUAL VISIT (OUTPATIENT)
Dept: PALLIATIVE MEDICINE | Facility: CLINIC | Age: 55
End: 2020-08-03
Payer: COMMERCIAL

## 2020-08-03 VITALS — BODY MASS INDEX: 22.73 KG/M2 | HEIGHT: 68 IN | WEIGHT: 150 LBS

## 2020-08-03 DIAGNOSIS — M48.02 CERVICAL STENOSIS OF SPINE: Primary | ICD-10-CM

## 2020-08-03 DIAGNOSIS — M47.812 ARTHROPATHY OF CERVICAL FACET JOINT: ICD-10-CM

## 2020-08-03 DIAGNOSIS — G89.29 CHRONIC BILATERAL LOW BACK PAIN WITHOUT SCIATICA: ICD-10-CM

## 2020-08-03 DIAGNOSIS — M54.50 CHRONIC BILATERAL LOW BACK PAIN WITHOUT SCIATICA: ICD-10-CM

## 2020-08-03 DIAGNOSIS — M79.18 MYOFASCIAL PAIN: ICD-10-CM

## 2020-08-03 DIAGNOSIS — G89.4 CHRONIC PAIN SYNDROME: ICD-10-CM

## 2020-08-03 DIAGNOSIS — F11.90 CHRONIC, CONTINUOUS USE OF OPIOIDS: ICD-10-CM

## 2020-08-03 PROCEDURE — 99213 OFFICE O/P EST LOW 20 MIN: CPT | Mod: 95 | Performed by: PHYSICIAN ASSISTANT

## 2020-08-03 RX ORDER — OXYCODONE AND ACETAMINOPHEN 5; 325 MG/1; MG/1
1 TABLET ORAL EVERY 6 HOURS PRN
Qty: 70 TABLET | Refills: 0 | Status: SHIPPED | OUTPATIENT
Start: 2020-08-03 | End: 2020-09-22

## 2020-08-03 ASSESSMENT — PAIN SCALES - GENERAL: PAINLEVEL: MILD PAIN (3)

## 2020-08-03 ASSESSMENT — MIFFLIN-ST. JEOR: SCORE: 1499.9

## 2020-08-03 NOTE — PROGRESS NOTES
"Stone De Paz is a 54 year old male who is being evaluated via a billable telephone visit.      The patient has been notified of following:     \"This telephone visit will be conducted via a call between you and your physician/provider. We have found that certain health care needs can be provided without the need for a physical exam.  This service lets us provide the care you need with a short phone conversation.  If a prescription is necessary we can send it directly to your pharmacy.  If lab work is needed we can place an order for that and you can then stop by our lab to have the test done at a later time.    Telephone visits are billed at different rates depending on your insurance coverage. During this emergency period, for some insurers they may be billed the same as an in-person visit.  Please reach out to your insurance provider with any questions.    If during the course of the call the physician/provider feels a telephone visit is not appropriate, you will not be charged for this service.\"    Patient has given verbal consent for Telephone visit?  Yes    What phone number would you like to be contacted at? [phone]    How would you like to obtain your AVS? [facility]    CHIEF COMPLAINT:   Chronic neck pain    INTERVAL HISTORY:  Last seen on 05/11/2020.        Recommendations/plan at the last visit included:  1. Physical Therapy:  NO   2. Clinical Health Psychologist:  NO  3. Diagnostic Studies:  None at this time  4. Medication Management:    1.   Percocet 5-325 2/day. Okay to use 3 occasionally if needed.   2.   Continue medical cannabis-recertified today  3. Continue Robaxin 500mg-1 tablet TID PRN  5. Recommendations to PCP. See above           Follow up with this provider: 12 Weeks     Since his last visit, Stone De Paz reports:    He has been relatively the same. He states that his pain is manageable. He denies any new side effects since his last visit. He is " "having both neck and low back pain right now.       Pain Information:              Pain today 3/10       Annual Controlled Substance Agreement: signed 2/10/2020  UDS: 2/10/2020    CURRENT RELEVANT PAIN MEDICATIONS:  Naproxen 500mg-taking 1/day  Oxycodone 5mg-taking 2 in AM and will occasionally take a 3rd in the afternoon    Imitrex-last used about 3-4 days ago  Medical Cannabis    Patient is using the medication as prescribed:  YES  Is your medication helpful? YES   Medication side effects? itching    Previous Medications: (H--helped; HI--Helped initially; SWH-- somewhat helpful, NH--No help; W--worse; SE--side effects)   Opiates: Oxycodone H, Tylenol #3 NH, Hydrocodone NH, Dilaudid H SE \"ick\", Morphine H SE nausea, Tramadol NH  NSAIDS: Naproxen H SE stomach upset  Muscle Relaxants: Flexeril H SE incontinence  Anti-migraine mediations: Imitrex H  Anti-depressants: Amitriptyline NH SE rapid heart  Sleep aids: none  Anxiolytics: Valium [facility]  Neuropathics: Gabapentin NH SE \"goofy\", Lyrica ? Was too expensive  Topicals: Voltaren Gel NH, Lidocaine NH  Other medications not covered above: Tylenol H, Prednisone H SE high sugars, Medical marijuana H SE cost constraints    Past Pain Treatments:  Pain Clinic:   No   PT: Yes, last went this year  Psychologist: No, but he did have to do some psychology prior to his back surgeryes  Relaxation techniques/biofeedback: No, but tries to do meditation at times   Chiropractor: No  Acupuncture: No  Pharmacotherapy:               Opioids: Yes                Non-opioids:    Yes   TENs Unit:Yes, was not helpful  Injections: Yes, right 3rd occipital nerve and right C3, C4, C5 RFA 02/28/2019, left 3rd occipital nerve, and left C3, C4, C5 RFA 03/15/2019. Had done low back injections in [place] as well (steroids mess with sugars therefore tries to avoid if possible).   Self-care:   Yes, cold works better (heat worsens pain)  Surgeries related to pain: Yes, C5-6 ACDF 2012. Disk replacement " cervical spine 2006, low back fusions (2000, 2002)    [place] Board of Pharmacy Data Base Reviewed:    YES; As expected, no concern for misuse/abuse of controlled medications based on this report.    THE 4 As OF OPIOID MAINTENANCE ANALGESIA    Analgesia: Is pain relief clinically significant? YES   Activity: Is patient functional and able to perform Activities of Daily Living? YES   Adverse effects: Is patient free from adverse side effects from opiates? NO   Adherence to Rx protocol: Is patient adhering to Controlled Substance Agreement and taking medications ONLY as ordered? YES       Is Narcan prescribed for opiate use >50 MME daily? N/A      Daily MME: 15-22.5    Medications:  Current Outpatient Medications   Medication Sig Dispense Refill     amylase-lipase-protease (CREON 24) 31141-22559 units CPEP per EC capsule Take 1 capsule by mouth every other day       amylase-lipase-protease (PERTZYE) 34773 units CPEP Take 1 capsule by mouth every other day       atorvastatin (LIPITOR) 80 MG tablet TAKE ONE TABLET BY MOUTH AT BEDTIME - PLEASE STOP THE SIMVASTATIN 90 tablet 1     budesonide (ENTOCORT EC) 3 MG EC capsule Take 3 mg by mouth every morning Take 1 or 2 capsules       citalopram (CELEXA) 40 MG tablet TAKE ONE TABLET BY MOUTH ONCE DAILY 90 tablet 1     Continuous Blood Gluc  (FREESTYLE REUBEN READER) AMBER 1 Device continuous prn (replace annually if needed) 1 Device 1     Continuous Blood Gluc Sensor (FREESTYLE REUBEN 14 DAY SENSOR) MISC 1 each every 14 days 6 each 2     CONTOUR NEXT TEST test strip Use to test blood sugar 5x times daily or as directed. 450 strip 3     insulin aspart (NOVOLOG VIAL) 100 UNITS/ML vial USE WITH INSULIN PUMP, TOTAL DAILY DOSE APPROX 45 UNITS 20 mL 3     levothyroxine (SYNTHROID/LEVOTHROID) 125 MCG tablet TAKE ONE TABLET BY MOUTH ONCE DAILY AS DIRECTED - FOLLOW UP AS SCHEDULED 90 tablet 1     lisinopril (ZESTRIL) 40 MG tablet TAKE ONE TABLET BY MOUTH ONCE DAILY 90 tablet 1      medical cannabis (Patient's own supply) See Admin Instructions (The purpose of this order is to document that the patient reports taking medical cannabis.  This is not a prescription, and is not used to certify that the patient has a qualifying medical condition.)       methocarbamol (ROBAXIN) 500 MG tablet Take 1-2 tablets three times daily as needed 60 tablet 0     naproxen (NAPROSYN) 500 MG tablet TAKE ONE TABLET BY MOUTH TWICE A DAY AS NEEDED FOR MODERATE PAIN 180 tablet 1     omeprazole (PRILOSEC) 20 MG DR capsule TAKE ONE CAPSULE BY MOUTH ONCE DAILY 30 capsule 3     ondansetron (ZOFRAN-ODT) 8 MG ODT tab Take 1 tablet (8 mg) by mouth every 8 hours as needed (for nauesa) 60 tablet 0     oxyCODONE-acetaminophen (PERCOCET) 5-325 MG tablet Take 1 tablet by mouth every 6 hours as needed for severe pain . Max of 3/day.May fill 7/21/20 and start 7/23/20. 70 tablet 0     SUMAtriptan (IMITREX) 100 MG tablet Take 1 tablet (100 mg) by mouth at onset of headache 1/2 to 1 tablet by mouth at onset of headache. May repeat 1 dose after 2 hours if headache persists. 9 tablet 3     DIAGNOSTIC TESTS:  Imaging Studies:   No new imaging to review    Assessment:  Stone De Paz is a 54 year old male who presents today for follow up regarding his:    1.  Cervical stenosis   2.  Myofascial pain  3.  Chronic low back pain  4 arthropathy of cervical facet joint  5.  Chronic pain syndrome  6. Chronic use of opioids    Pain is overall stable. No changes today. He is tolerating his medications well.     Patient got around 80% pain relief for over a year from his previous RFA in 02/03 of 2019. He would like to repeat it.        Plan:    Diagnosis reviewed, treatment option addressed, and risk/benifits discussed.  Self-care instructions given.  I am recommending a multidisciplinary treatment plan to help this patient better manage pain.      1. Physical Therapy:  NO   2. Clinical Health Psychologist:  NO  3. Diagnostic Studies:  None at  this time  4. Medication Management:    1. Percocet 5-325 2/day. Okay to use 3 occasionally if needed.   2. Continue medical cannabis    5. Recommendations to PCP. See above  6. Procedures: referred for repeat cervical RFA        Follow up with this provider: 12 Weeks   Phone call duration: 17 minutes    Natasha Padgett PA-C   [facility]

## 2020-08-06 DIAGNOSIS — E03.9 HYPOTHYROIDISM, UNSPECIFIED TYPE: ICD-10-CM

## 2020-08-06 DIAGNOSIS — E10.43 TYPE 1 DIABETES MELLITUS WITH DIABETIC AUTONOMIC NEUROPATHY (H): ICD-10-CM

## 2020-08-06 LAB
ANION GAP SERPL CALCULATED.3IONS-SCNC: 6 MMOL/L (ref 3–14)
BUN SERPL-MCNC: 13 MG/DL (ref 7–30)
CALCIUM SERPL-MCNC: 8.8 MG/DL (ref 8.5–10.1)
CHLORIDE SERPL-SCNC: 101 MMOL/L (ref 94–109)
CHOLEST SERPL-MCNC: 134 MG/DL
CO2 SERPL-SCNC: 28 MMOL/L (ref 20–32)
CREAT SERPL-MCNC: 0.88 MG/DL (ref 0.66–1.25)
CREAT UR-MCNC: 24 MG/DL
GFR SERPL CREATININE-BSD FRML MDRD: >90 ML/MIN/{1.73_M2}
GLUCOSE SERPL-MCNC: 137 MG/DL (ref 70–99)
HBA1C MFR BLD: NORMAL % (ref 0–5.6)
HDLC SERPL-MCNC: 69 MG/DL
LDLC SERPL CALC-MCNC: 57 MG/DL
MICROALBUMIN UR-MCNC: <5 MG/L
MICROALBUMIN/CREAT UR: NORMAL MG/G CR (ref 0–17)
NONHDLC SERPL-MCNC: 65 MG/DL
POTASSIUM SERPL-SCNC: 4.6 MMOL/L (ref 3.4–5.3)
SODIUM SERPL-SCNC: 135 MMOL/L (ref 133–144)
T4 FREE SERPL-MCNC: 1.13 NG/DL (ref 0.76–1.46)
TRIGL SERPL-MCNC: 41 MG/DL
TSH SERPL DL<=0.005 MIU/L-ACNC: 1.27 MU/L (ref 0.4–4)

## 2020-08-06 PROCEDURE — 82043 UR ALBUMIN QUANTITATIVE: CPT | Performed by: INTERNAL MEDICINE

## 2020-08-06 PROCEDURE — 36415 COLL VENOUS BLD VENIPUNCTURE: CPT | Performed by: INTERNAL MEDICINE

## 2020-08-06 PROCEDURE — 84439 ASSAY OF FREE THYROXINE: CPT | Performed by: INTERNAL MEDICINE

## 2020-08-06 PROCEDURE — 80048 BASIC METABOLIC PNL TOTAL CA: CPT | Performed by: INTERNAL MEDICINE

## 2020-08-06 PROCEDURE — 83036 HEMOGLOBIN GLYCOSYLATED A1C: CPT | Mod: QW | Performed by: INTERNAL MEDICINE

## 2020-08-06 PROCEDURE — 84443 ASSAY THYROID STIM HORMONE: CPT | Performed by: INTERNAL MEDICINE

## 2020-08-06 PROCEDURE — 80061 LIPID PANEL: CPT | Performed by: INTERNAL MEDICINE

## 2020-08-11 ENCOUNTER — TELEPHONE (OUTPATIENT)
Dept: SURGERY | Facility: CLINIC | Age: 55
End: 2020-08-11

## 2020-08-11 DIAGNOSIS — Z11.59 ENCOUNTER FOR SCREENING FOR OTHER VIRAL DISEASES: Primary | ICD-10-CM

## 2020-08-11 NOTE — TELEPHONE ENCOUNTER
Pt scheduled for  Left RFA  Date: 9/4/20   Time: 1230  Dr. Edwards    Pt scheduled for RIGHT RFA   Date: 9/10/20  Time: 1130  Dr. Edwards    Instructed pt to have H&P and  for procedure.  Patient informed of COVID testing process.

## 2020-08-31 ENCOUNTER — OFFICE VISIT (OUTPATIENT)
Dept: INTERNAL MEDICINE | Facility: CLINIC | Age: 55
End: 2020-08-31
Payer: COMMERCIAL

## 2020-08-31 VITALS
RESPIRATION RATE: 16 BRPM | TEMPERATURE: 97.6 F | WEIGHT: 148 LBS | DIASTOLIC BLOOD PRESSURE: 66 MMHG | SYSTOLIC BLOOD PRESSURE: 114 MMHG | HEIGHT: 68 IN | BODY MASS INDEX: 22.43 KG/M2 | HEART RATE: 79 BPM | OXYGEN SATURATION: 98 %

## 2020-08-31 DIAGNOSIS — E10.43 TYPE 1 DIABETES MELLITUS WITH DIABETIC AUTONOMIC NEUROPATHY (H): ICD-10-CM

## 2020-08-31 DIAGNOSIS — G89.4 CHRONIC PAIN SYNDROME: ICD-10-CM

## 2020-08-31 DIAGNOSIS — G89.29 CHRONIC NECK PAIN: ICD-10-CM

## 2020-08-31 DIAGNOSIS — Z11.59 ENCOUNTER FOR SCREENING FOR OTHER VIRAL DISEASES: ICD-10-CM

## 2020-08-31 DIAGNOSIS — Z96.41 INSULIN PUMP STATUS: ICD-10-CM

## 2020-08-31 DIAGNOSIS — K31.84 GASTROPARESIS: ICD-10-CM

## 2020-08-31 DIAGNOSIS — M54.2 CHRONIC NECK PAIN: ICD-10-CM

## 2020-08-31 DIAGNOSIS — Z01.818 PREOP GENERAL PHYSICAL EXAM: Primary | ICD-10-CM

## 2020-08-31 DIAGNOSIS — E03.9 HYPOTHYROIDISM, UNSPECIFIED TYPE: ICD-10-CM

## 2020-08-31 PROCEDURE — 99214 OFFICE O/P EST MOD 30 MIN: CPT | Performed by: INTERNAL MEDICINE

## 2020-08-31 PROCEDURE — U0003 INFECTIOUS AGENT DETECTION BY NUCLEIC ACID (DNA OR RNA); SEVERE ACUTE RESPIRATORY SYNDROME CORONAVIRUS 2 (SARS-COV-2) (CORONAVIRUS DISEASE [COVID-19]), AMPLIFIED PROBE TECHNIQUE, MAKING USE OF HIGH THROUGHPUT TECHNOLOGIES AS DESCRIBED BY CMS-2020-01-R: HCPCS | Performed by: ANESTHESIOLOGY

## 2020-08-31 ASSESSMENT — MIFFLIN-ST. JEOR: SCORE: 1490.82

## 2020-08-31 ASSESSMENT — PATIENT HEALTH QUESTIONNAIRE - PHQ9: SUM OF ALL RESPONSES TO PHQ QUESTIONS 1-9: 0

## 2020-08-31 ASSESSMENT — PAIN SCALES - GENERAL: PAINLEVEL: MODERATE PAIN (4)

## 2020-08-31 NOTE — PROGRESS NOTES
[address] [address]  [phone]  Dept: [phone]    PRE-OP EVALUATION:  Today's date: 2020    Stone De Paz (: 1965) presents for pre-operative evaluation assessment as requested by Dr. Edwards.  He requires evaluation and anesthesia risk assessment prior to undergoing surgery/procedure for treatment of neck pain .    Proposed Surgery/ Procedure: RADIOFREQUENCY ABLATION CERVICAL  Date of Surgery/ Procedure: 2020 & 9/10/2020  Hospital/Surgical Facility: [facility]  Surgery Fax Number: Note does not need to be faxed, will be available electronically in Epic.  Primary Physician: Ilan Raymond  Type of Anesthesia Anticipated: Local with MAC    Preoperative Questionnaire:   No - Have you ever had a heart attack or stroke?  No - Have you ever had surgery on your heart or blood vessels, such as a stent, coronary (heart) bypass, or surgery on an artery in the head, neck, heart, or legs?  Yes - Do you have chest pain when you are physically active?  No - Do you have a history of heart failure?  No - Do you currently have a cold, bronchitis, or symptoms of other respiratory (head and chest) infections?  No - Do you have a cough, shortness of breath, or wheezing?  No - Do you or anyone in your family have a history of blood clots?  No - Do you or anyone in your family have a serious bleeding problem, such as long-lasting bleeding after surgeries or cuts?  No - Have you ever had anemia or been told to take iron pills?  No - Have you had any abnormal blood loss such as black, tarry or bloody stools, or abnormal vaginal bleeding?  Yes - Have you ever had a blood transfusion?  Yes - Are you willing to have a blood transfusion if it is medically needed before, during, or after your surgery?  No - Have you or anyone in your family ever had problems with anesthesia (sedation for surgery)?  Yes - Do you have sleep apnea, excessive snoring, or daytime drowsiness?    Abdominal simulator, Hardware in back - Do you have any artifical heart valves or other implanted medical devices, such as a pacemaker, defibrillator, or continuous glucose monitor?  No - Do you have any artifical joints?  No - Are you allergic to latex?  No - Is there any chance that you may be pregnant?    Patient has a Health Care Directive or Living Will:  NO    HPI:     HPI related to upcoming procedure: Patient is a history of chronic neck pain believed to be the result of cervical radiculopathy.  He is anticipating epidural steroid injection for the treatment of this.      See problem list for active medical problems.  Problems all longstanding and stable, except as noted/documented.  See ROS for pertinent symptoms related to these conditions.      MEDICAL HISTORY:     Patient Active Problem List    Diagnosis Date Noted     Gastroesophageal reflux disease without esophagitis 02/26/2019     Priority: Medium     Type 1 diabetes mellitus with diabetic autonomic neuropathy (H) 08/07/2018     Priority: Medium     Insulin pump status 08/07/2018     Priority: Medium     Chronic pain syndrome 07/10/2018     Priority: Medium     Exocrine pancreatic insufficiency 11/24/2016     Priority: Medium     Mild major depression (H) 11/14/2014     Priority: Medium     Hypothyroidism      Priority: Medium     Dr Best       Gastroparesis      Priority: Medium     gastric stimulator helps, [facility] manages that       Chronic neck pain      Priority: Medium     percocet for years now,        Cervical stenosis of spine 04/16/2012     Priority: Medium     Hyperlipidemia 06/06/2008     Priority: Medium      Past Medical History:   Diagnosis Date     Arthritis      Chronic neck pain     percocet for years now,      Depressive disorder      Gastroparesis      Gastroparesis due to DM (H)     gastric stimulator helps, [facility] manages that     Hyperlipidemia      Hypertension      Hypothyroidism      Neuropathy, diabetic (H)      Type 1  diabetes (H) age 31     Past Surgical History:   Procedure Laterality Date     C LUMBAR SPINE FUSION,ANTER APPRCH      2 L4-5 L5-21     COLONOSCOPY  07/18/12     FUSION CERVICAL ANTERIOR TWO LEVELS       INJECT BLOCK MEDIAL BRANCH CERVICAL/THORACIC/LUMBAR Bilateral 11/20/2018    Procedure: Bilateral Cervical 3-4-5 medial branch block;  Surgeon: Jef Edwards MD;  Location: [facility]     INJECT BLOCK MEDIAL BRANCH CERVICAL/THORACIC/LUMBAR N/A 1/11/2019    Procedure: Cervical 3,4,5 Bilateral Medial branch blocks;  Surgeon: Jef Edwards MD;  Location[facility]     RADIO FREQUENCY ABLATION PULSED CERVICAL Right 2/28/2019    Procedure: RADIO FREQUENCY ABLATION CERVICAL THREE, FOUR, FIVE, AND THIRD OCCUPITAL NERVE RIGHT SIDE;  Surgeon: Jef Edwards MD;  [facility]     RADIO FREQUENCY ABLATION PULSED CERVICAL Left 3/15/2019    Procedure: radiofrequency ablation cervical 3,4,5;  Surgeon: Jef Edwards MD;  [facility]     THORACOSCOPIC DECORTICATION LUNG  1/9/2014    Procedure: THORACOSCOPIC DECORTICATION LUNG;  RIGHT VATS/RIGHT THORACOTOMY , DECORTICATION RIGHT LUNG ,WEDGE RESECTION RIGHT MIDDLE LOBE LUNG NODULE;  Surgeon: Harley Felix MD;  Location: [facility]     Current Outpatient Medications   Medication Sig Dispense Refill     amylase-lipase-protease (PERTZYE) 45474 units CPEP Take 1 capsule by mouth every other day       atorvastatin (LIPITOR) 80 MG tablet TAKE ONE TABLET BY MOUTH AT BEDTIME - PLEASE STOP THE SIMVASTATIN 90 tablet 1     citalopram (CELEXA) 40 MG tablet TAKE ONE TABLET BY MOUTH ONCE DAILY 90 tablet 1     Continuous Blood Gluc  (FREESTYLE REUBEN READER) AMBER 1 Device continuous prn (replace annually if needed) 1 Device 1     Continuous Blood Gluc Sensor (FREESTYLE REUBEN 14 DAY SENSOR) MISC 1 each every 14 days 6 each 2     CONTOUR NEXT TEST test strip Use to test blood sugar 5x times daily or as directed. 450 strip 3     insulin aspart (NOVOLOG VIAL) 100 UNITS/ML vial USE WITH  INSULIN PUMP, TOTAL DAILY DOSE APPROX 45 UNITS 20 mL 3     levothyroxine (SYNTHROID/LEVOTHROID) 125 MCG tablet TAKE ONE TABLET BY MOUTH ONCE DAILY AS DIRECTED - FOLLOW UP AS SCHEDULED 90 tablet 1     lisinopril (ZESTRIL) 40 MG tablet TAKE ONE TABLET BY MOUTH ONCE DAILY 90 tablet 1     medical cannabis (Patient's own supply) See Admin Instructions (The purpose of this order is to document that the patient reports taking medical cannabis.  This is not a prescription, and is not used to certify that the patient has a qualifying medical condition.)       naproxen (NAPROSYN) 500 MG tablet TAKE ONE TABLET BY MOUTH TWICE A DAY AS NEEDED FOR MODERATE PAIN 180 tablet 1     ondansetron (ZOFRAN-ODT) 8 MG ODT tab Take 1 tablet (8 mg) by mouth every 8 hours as needed (for nauesa) 60 tablet 0     oxyCODONE-acetaminophen (PERCOCET) 5-325 MG tablet Take 1 tablet by mouth every 6 hours as needed for severe pain . Max of 3/day.May fill 8/21/20 and start 8/22/20. 70 tablet 0     SUMAtriptan (IMITREX) 100 MG tablet Take 1 tablet (100 mg) by mouth at onset of headache 1/2 to 1 tablet by mouth at onset of headache. May repeat 1 dose after 2 hours if headache persists. 9 tablet 3     amylase-lipase-protease (CREON 24) 63127-11756 units CPEP per EC capsule Take 1 capsule by mouth every other day       budesonide (ENTOCORT EC) 3 MG EC capsule Take 3 mg by mouth every morning Take 1 or 2 capsules       omeprazole (PRILOSEC) 20 MG DR capsule TAKE ONE CAPSULE BY MOUTH ONCE DAILY 30 capsule 3     OTC products: None, except as noted above    No Known Allergies   Latex Allergy: NO    Social History     Tobacco Use     Smoking status: Former Smoker     Last attempt to quit: 1/1/2010     Years since quitting: 10.6     Smokeless tobacco: Never Used   Substance Use Topics     Alcohol use: No     History   Drug Use     Types: Marijuana       REVIEW OF SYSTEMS:   CONSTITUTIONAL: NEGATIVE for fever, chills, change in weight  INTEGUMENTARY/SKIN: NEGATIVE  "for worrisome rashes, moles or lesions  EYES: NEGATIVE for vision changes or irritation  ENT/MOUTH: NEGATIVE for ear, mouth and throat problems  RESP: NEGATIVE for significant cough or SOB  CV: NEGATIVE for chest pain, palpitations or peripheral edema  GI: Patient has a history of diabetic gastroparesis with flow through diarrhea.  Has a gastric stimulator in place but it is nonfunctional.  : NEGATIVE for frequency, dysuria, or hematuria  MUSCULOSKELETAL: Has chronic neck pain as well as some chronic low back pain.  He has cervical stenosis.  NEURO: NEGATIVE for weakness, dizziness or paresthesias  ENDOCRINE: NEGATIVE for temperature intolerance, skin/hair changes  HEME: NEGATIVE for bleeding problems  PSYCHIATRIC: NEGATIVE for changes in mood or affect    EXAM:   /66 (BP Location: Right arm, Patient Position: Sitting, Cuff Size: Adult Regular)   Pulse 79   Temp 97.6  F (36.4  C) (Temporal)   Resp 16   Ht 1.727 m (5' 8\")   Wt 67.1 kg (148 lb)   SpO2 98%   BMI 22.50 kg/m      GENERAL APPEARANCE: healthy, alert and no distress     EYES: EOMI,  PERRL     HENT: ear canals and TM's normal and nose and mouth without ulcers or lesions     NECK: no adenopathy, no asymmetry, masses, or scars and thyroid normal to palpation     RESP: lungs clear to auscultation - no rales, rhonchi or wheezes     CV: regular rates and rhythm, normal S1 S2, no S3 or S4 and no murmur, click or rub     ABDOMEN:  soft, nontender, no HSM or masses and bowel sounds normal     MS: Cervical range of motion is markedly decreased secondary to pain.     SKIN: no suspicious lesions or rashes     NEURO: Normal strength and tone, sensory exam grossly normal, mentation intact and speech normal     PSYCH: mentation appears normal. and affect normal/bright     LYMPHATICS: No cervical adenopathy    DIAGNOSTICS:   No labs or EKG required for low risk surgery (cataract, skin procedure, breast biopsy, etc)    Recent Labs   Lab Test 08/06/20  1248 " 01/06/20  1046  01/29/16  1611  01/09/14  1009  12/24/13  1235   HGB  --  13.5  --  14.4   < >  --    < >  --    PLT  --  238  --  259   < >  --    < >  --    INR  --   --   --   --   --  1.09  --  1.1    134   < > 135   < >  --    < >  --    POTASSIUM 4.6 4.3   < > 4.3   < >  --    < >  --    CR 0.88 0.97   < > 0.78   < >  --    < >  --    A1C Results confirmed by repeat test 10.2*   < >  --   --   --    < >  --     < > = values in this interval not displayed.        IMPRESSION:   Reason for surgery/procedure: Persistent cervical pain and radiation in the shoulders as result of cervical stenosis.  Diagnosis/reason for consult: Perioperative risk assessment in a 54-year-old male with:  1. Preop general physical exam    2. Chronic neck pain      3. Chronic pain syndrome      4. Type 1 diabetes mellitus with diabetic autonomic neuropathy (H)    5. Insulin pump status    6. Gastroparesis    7. Hypothyroidism, unspecified type      The proposed surgical procedure is considered LOW risk.    REVISED CARDIAC RISK INDEX  The patient has the following serious cardiovascular risks for perioperative complications such as (MI, PE, VFib and 3  AV Block):  No serious cardiac risks  INTERPRETATION: 0 risks: Class I (very low risk - 0.4% complication rate)    The patient has the following additional risks for perioperative complications:  No identified additional risks      ICD-10-CM    1. Preop general physical exam  Z01.818        RECOMMENDATIONS:         --Patient is to take all scheduled medications on the day of surgery EXCEPT for modifications listed below.  Patient is instructed to discontinue insulin pump the morning of the procedure.  Patient is instructed to discontinue lisinopril the day before procedure.  Patient is not on antiplatelet therapy at this time.  All other medications may be taken scheduled.            APPROVAL GIVEN to proceed with proposed procedure, without further diagnostic evaluation       Signed  Electronically by: Monty Kumar DO    Copy of this evaluation report is provided to requesting physician.    [facility] Preop Guidelines    Revised Cardiac Risk Index

## 2020-08-31 NOTE — PATIENT INSTRUCTIONS

## 2020-09-02 LAB
SARS-COV-2 RNA SPEC QL NAA+PROBE: NOT DETECTED
SPECIMEN SOURCE: NORMAL

## 2020-09-03 ENCOUNTER — ANESTHESIA EVENT (OUTPATIENT)
Dept: SURGERY | Facility: CLINIC | Age: 55
End: 2020-09-03
Payer: MEDICARE

## 2020-09-03 ASSESSMENT — LIFESTYLE VARIABLES: TOBACCO_USE: 1

## 2020-09-03 ASSESSMENT — ENCOUNTER SYMPTOMS: DYSRHYTHMIAS: 0

## 2020-09-04 ENCOUNTER — ANESTHESIA (OUTPATIENT)
Dept: SURGERY | Facility: CLINIC | Age: 55
End: 2020-09-04
Payer: MEDICARE

## 2020-09-04 ENCOUNTER — HOSPITAL ENCOUNTER (OUTPATIENT)
Facility: CLINIC | Age: 55
Discharge: HOME | End: 2020-09-04
Attending: ANESTHESIOLOGY | Admitting: ANESTHESIOLOGY
Payer: MEDICARE

## 2020-09-04 ENCOUNTER — HOSPITAL ENCOUNTER (OUTPATIENT)
Dept: GENERAL RADIOLOGY | Facility: CLINIC | Age: 55
End: 2020-09-04
Attending: ANESTHESIOLOGY | Admitting: ANESTHESIOLOGY
Payer: MEDICARE

## 2020-09-04 ENCOUNTER — TELEPHONE (OUTPATIENT)
Dept: PALLIATIVE MEDICINE | Facility: CLINIC | Age: 55
End: 2020-09-04

## 2020-09-04 VITALS
OXYGEN SATURATION: 97 % | SYSTOLIC BLOOD PRESSURE: 126 MMHG | DIASTOLIC BLOOD PRESSURE: 77 MMHG | RESPIRATION RATE: 16 BRPM | HEART RATE: 81 BPM | TEMPERATURE: 98 F

## 2020-09-04 DIAGNOSIS — G89.29 CHRONIC NECK PAIN: Primary | ICD-10-CM

## 2020-09-04 DIAGNOSIS — M47.812 ARTHROPATHY OF CERVICAL FACET JOINT: ICD-10-CM

## 2020-09-04 DIAGNOSIS — M54.2 CHRONIC NECK PAIN: Primary | ICD-10-CM

## 2020-09-04 PROCEDURE — 64633 DESTROY CERV/THOR FACET JNT: CPT | Mod: RT | Performed by: ANESTHESIOLOGY

## 2020-09-04 PROCEDURE — 25000125 ZZHC RX 250: Performed by: ANESTHESIOLOGY

## 2020-09-04 PROCEDURE — 36000046 ZZH SURGERY LEVEL 1 EA 15 ADDTL MIN: Performed by: ANESTHESIOLOGY

## 2020-09-04 PROCEDURE — 25000128 H RX IP 250 OP 636: Performed by: NURSE ANESTHETIST, CERTIFIED REGISTERED

## 2020-09-04 PROCEDURE — 37000009 ZZH ANESTHESIA TECHNICAL FEE, EACH ADDTL 15 MIN: Performed by: ANESTHESIOLOGY

## 2020-09-04 PROCEDURE — 36000048 ZZH SURGERY LEVEL 1 W FLUORO 1ST 30 MIN: Performed by: ANESTHESIOLOGY

## 2020-09-04 PROCEDURE — 71000027 ZZH RECOVERY PHASE 2 EACH 15 MINS: Performed by: ANESTHESIOLOGY

## 2020-09-04 PROCEDURE — 40000277 XR SURGERY CARM FLUORO LESS THAN 5 MIN W STILLS: Mod: TC

## 2020-09-04 PROCEDURE — 37000008 ZZH ANESTHESIA TECHNICAL FEE, 1ST 30 MIN: Performed by: ANESTHESIOLOGY

## 2020-09-04 PROCEDURE — 40000306 ZZH STATISTIC PRE PROC ASSESS II: Performed by: ANESTHESIOLOGY

## 2020-09-04 PROCEDURE — 25800030 ZZH RX IP 258 OP 636: Performed by: PAIN MEDICINE

## 2020-09-04 PROCEDURE — 64634 DESTROY C/TH FACET JNT ADDL: CPT | Mod: RT | Performed by: ANESTHESIOLOGY

## 2020-09-04 RX ORDER — OXYCODONE AND ACETAMINOPHEN 5; 325 MG/1; MG/1
1 TABLET ORAL EVERY 6 HOURS PRN
Qty: 12 TABLET | Refills: 0 | Status: SHIPPED | OUTPATIENT
Start: 2020-09-04 | End: 2020-09-07

## 2020-09-04 RX ORDER — LIDOCAINE HYDROCHLORIDE 20 MG/ML
INJECTION, SOLUTION INFILTRATION; PERINEURAL PRN
Status: DISCONTINUED | OUTPATIENT
Start: 2020-09-04 | End: 2020-09-04 | Stop reason: HOSPADM

## 2020-09-04 RX ORDER — ONDANSETRON 4 MG/1
4 TABLET, ORALLY DISINTEGRATING ORAL EVERY 30 MIN PRN
Status: DISCONTINUED | OUTPATIENT
Start: 2020-09-04 | End: 2020-09-04 | Stop reason: HOSPADM

## 2020-09-04 RX ORDER — ONDANSETRON 2 MG/ML
4 INJECTION INTRAMUSCULAR; INTRAVENOUS EVERY 30 MIN PRN
Status: DISCONTINUED | OUTPATIENT
Start: 2020-09-04 | End: 2020-09-04 | Stop reason: HOSPADM

## 2020-09-04 RX ORDER — NALOXONE HYDROCHLORIDE 0.4 MG/ML
.1-.4 INJECTION, SOLUTION INTRAMUSCULAR; INTRAVENOUS; SUBCUTANEOUS
Status: DISCONTINUED | OUTPATIENT
Start: 2020-09-04 | End: 2020-09-04 | Stop reason: HOSPADM

## 2020-09-04 RX ORDER — FENTANYL CITRATE 50 UG/ML
INJECTION, SOLUTION INTRAMUSCULAR; INTRAVENOUS PRN
Status: DISCONTINUED | OUTPATIENT
Start: 2020-09-04 | End: 2020-09-04

## 2020-09-04 RX ORDER — LIDOCAINE 40 MG/G
CREAM TOPICAL
Status: DISCONTINUED | OUTPATIENT
Start: 2020-09-04 | End: 2020-09-04 | Stop reason: HOSPADM

## 2020-09-04 RX ORDER — SODIUM CHLORIDE, SODIUM LACTATE, POTASSIUM CHLORIDE, CALCIUM CHLORIDE 600; 310; 30; 20 MG/100ML; MG/100ML; MG/100ML; MG/100ML
INJECTION, SOLUTION INTRAVENOUS CONTINUOUS
Status: DISCONTINUED | OUTPATIENT
Start: 2020-09-04 | End: 2020-09-04 | Stop reason: HOSPADM

## 2020-09-04 RX ORDER — GABAPENTIN 300 MG/1
300 CAPSULE ORAL
Qty: 20 CAPSULE | Refills: 1 | Status: SHIPPED | OUTPATIENT
Start: 2020-09-04 | End: 2020-10-26

## 2020-09-04 RX ADMIN — MIDAZOLAM 1 MG: 1 INJECTION INTRAMUSCULAR; INTRAVENOUS at 12:14

## 2020-09-04 RX ADMIN — SODIUM CHLORIDE, POTASSIUM CHLORIDE, SODIUM LACTATE AND CALCIUM CHLORIDE: 600; 310; 30; 20 INJECTION, SOLUTION INTRAVENOUS at 11:44

## 2020-09-04 RX ADMIN — FENTANYL CITRATE 50 MCG: 50 INJECTION, SOLUTION INTRAMUSCULAR; INTRAVENOUS at 12:12

## 2020-09-04 RX ADMIN — MIDAZOLAM 1 MG: 1 INJECTION INTRAMUSCULAR; INTRAVENOUS at 12:05

## 2020-09-04 RX ADMIN — FENTANYL CITRATE 50 MCG: 50 INJECTION, SOLUTION INTRAMUSCULAR; INTRAVENOUS at 12:05

## 2020-09-04 NOTE — ANESTHESIA POSTPROCEDURE EVALUATION
Patient: Stone De Paz    Procedure(s):  RADIOFREQUENCY ABLATION CERVICAL 3,4 5 right SIDE    Diagnosis:Arthropathy of cervical facet joint [M47.812]  Diagnosis Additional Information: No value filed.    Anesthesia Type:  MAC    Note:  Anesthesia Post Evaluation    Patient location during evaluation: Phase 2 and Bedside  Patient participation: Able to fully participate in evaluation  Level of consciousness: awake  Pain management: adequate  Airway patency: patent  Cardiovascular status: acceptable and hemodynamically stable  Respiratory status: acceptable, room air and nonlabored ventilation  Hydration status: stable  PONV: none     Anesthetic complications: None    Comments: Patient was happy with the anesthesia care received and no anesthesia related complications were noted.  I will follow up with the patient again if it is needed.        Last vitals:  Vitals:    09/04/20 1134   BP: 125/73   Resp: 18   Temp: 98  F (36.7  C)         Electronically Signed By: CHEN Pickett CRNA  September 4, 2020  12:54 PM

## 2020-09-04 NOTE — ANESTHESIA PREPROCEDURE EVALUATION
Anesthesia Pre-Procedure Evaluation    Patient: Stone De Paz   MRN: [id number] : 1965          Preoperative Diagnosis: Arthropathy of cervical facet joint [M47.812]    Procedure(s):  RADIOFREQUENCY ABLATION CERVICAL 3,4 5 LEFT SIDE    Past Medical History:   Diagnosis Date     Arthritis      Chronic neck pain     percocet for years now,      Depressive disorder      Gastroparesis      Gastroparesis due to DM (H)     gastric stimulator helps, [facility] manages that     Hyperlipidemia      Hypertension      Hypothyroidism      Neuropathy, diabetic (H)      Type 1 diabetes (H) age 31     Past Surgical History:   Procedure Laterality Date     C LUMBAR SPINE FUSION,ANTER APPRCH      2 L4-5 L5-21     COLONOSCOPY  12     FUSION CERVICAL ANTERIOR TWO LEVELS       INJECT BLOCK MEDIAL BRANCH CERVICAL/THORACIC/LUMBAR Bilateral 2018    Procedure: Bilateral Cervical 3-4-5 medial branch block;  Surgeon: Jef Edwards MD;  [facility]     INJECT BLOCK MEDIAL BRANCH CERVICAL/THORACIC/LUMBAR N/A 2019    Procedure: Cervical 3,4,5 Bilateral Medial branch blocks;  Surgeon: Jef Edwards MD;  Location: [facility]     RADIO FREQUENCY ABLATION PULSED CERVICAL Right 2019    Procedure: RADIO FREQUENCY ABLATION CERVICAL THREE, FOUR, FIVE, AND THIRD OCCUPITAL NERVE RIGHT SIDE;  Surgeon: Jef Edwards MD;  [facility]     RADIO FREQUENCY ABLATION PULSED CERVICAL Left 3/15/2019    Procedure: radiofrequency ablation cervical 3,4,5;  Surgeon: Jef Edwards MD;  Location:  OR     THORACOSCOPIC DECORTICATION LUNG  2014    Procedure: THORACOSCOPIC DECORTICATION LUNG;  RIGHT VATS/RIGHT THORACOTOMY , DECORTICATION RIGHT LUNG ,WEDGE RESECTION RIGHT MIDDLE LOBE LUNG NODULE;  Surgeon: Harley Felix MD;  Location:  OR       Anesthesia Evaluation     . Pt has had prior anesthetic. Type: General and MAC    No history of anesthetic complications          ROS/MED HX    ENT/Pulmonary:     (+)tobacco use,  Past use , recent URI unresolved . Other pulmonary disease (loculated pleural effusion) .   (-) asthma and sleep apnea   Neurologic:  - neg neurologic ROS     Cardiovascular:     (+) Dyslipidemia, hypertension----. : . . . :. . Previous cardiac testing Echodate:1/8/14results:Echocardiogram   Order: [id number]   Status: Final result   Visible to patient: No (Not Released) Next appt: 02/28/2019 at 01:30 PM in Radiology. ([facility])   Component 5yr ago  XCELERA      Interpretation Summary  This was essentially a normal study.  The left ventricle is normal in structure, function and size. The pericardium   appears normal. There is no pericardial effusion. There is no pericardial   constriction.  PatientHeight: 68 in  PatientWeight: 153 lbs  SystolicPressure: 113 mmHg  DiastolicPressure: 84 mmHg  HeartRate: 95 bpm  BSA 1.8 m^2        Left Ventricle  The left ventricle is normal in structure, function and size.  There is normal left ventricular wall thickness.  Left ventricular systolic function is normal.  The visual ejection fraction is estimated at 55-60%.  Normal left ventricular wall motion.  There is no thrombus seen in the left ventricle.     Right Ventricle  The right ventricle is normal in structure, function and size.     Atria  Normal left atrial size.  Right atrial size is normal.  There is no color Doppler evidence of an atrial shunt.     Mitral Valve  The mitral valve is normal in structure and function.     Tricuspid Valve  Normal tricuspid valve.  Right ventricular systolic pressure could not be approximated due to   inadequate tricuspid regurgitation.     Aortic Valve  The aortic valve is normal in structure and function.     Pulmonic Valve  The pulmonic valve is not well seen, but is grossly normal.     Vessels  Normal size aorta.  The IVC is normal in size and reactivity with respiration, suggesting normal   central venous pressure.     Pericardium  The pericardium appears normal.  There  is no pericardial effusion.  There is no pericardial constriction.     Rhythm  The rhythm was normal sinus.     Procedure  Complete Portable Echo Adult.     MMode 2D Measurements & Calculations  IVSd: 0.90 cm  LVIDd: 4.4 cm  LVIDs: 2.9 cm  LVPWd: 0.94 cm  FS: 35 %  LV mass(C)d: 133 grams  Ao root diam: 3.3 cm  LA dimension: 3.4 cm  LA/Ao: 1.0   Doppler Measurements & Calculations  MV E point: 102 cm/sec  MV A point: 85 cm/sec  MV E/A: 1.2   MV dec time: 0.16 sec     Interpreting Physician:  Augustine Block MD electronically signed on           date: results:ECG reviewed date:4/10/19 results:SR possible septal infarct date: results:         (-) CAD, arrhythmias, irregular heartbeat/palpitations and valvular problems/murmurs   METS/Exercise Tolerance:     Hematologic:  - neg hematologic  ROS       Musculoskeletal:   (+)  other musculoskeletal- chronic neck pain      GI/Hepatic:     (+) GERD Asymptomatic on medication, Other GI/Hepatic (gastropareisis, has had nausea recently)       Renal/Genitourinary:  - ROS Renal section negative       Endo:     (+) type I DM, Last HgA1c: 10.2 date: 8/06/20 Using insulin - using insulin pump Normal glucose range: , today at 1100 B/S 120 not previously admitted for DM/DKA Diabetic complications: gastroparesis, thyroid problem hypothyroidism, .      Psychiatric:     (+) psychiatric history depression      Infectious Disease:  - neg infectious disease ROS       Malignancy:      - no malignancy   Other:    (+) No chance of pregnancy C-spine cleared: N/A, H/O Chronic Pain,H/O chronic opiod use , no other significant disability                         Physical Exam  Normal systems: cardiovascular and pulmonary    Airway   Mallampati: II  TM distance: >3 FB  Neck ROM: limited    Dental     Cardiovascular   Rhythm and rate: regular and normal      Pulmonary    breath sounds clear to auscultation            Lab Results   Component Value Date    WBC 7.4 01/06/2020    HGB 13.5 01/06/2020  "   HCT 40.0 01/06/2020     01/06/2020    CRP 6.7 01/21/2014    SED 31 (H) 01/21/2014     08/06/2020    POTASSIUM 4.6 08/06/2020    CHLORIDE 101 08/06/2020    CO2 28 08/06/2020    BUN 13 08/06/2020    CR 0.88 08/06/2020     (H) 08/06/2020    EMILEE 8.8 08/06/2020    MAG 1.6 01/06/2020    ALBUMIN 3.7 01/06/2020    PROTTOTAL 7.6 01/06/2020    ALT 20 01/06/2020    AST 12 01/06/2020    ALKPHOS 67 01/06/2020    BILITOTAL 0.4 01/06/2020    LIPASE 44 (L) 01/29/2016    INR 1.09 01/09/2014    TSH 1.27 08/06/2020    T4 1.13 08/06/2020       Preop Vitals  BP Readings from Last 3 Encounters:   08/31/20 114/66   02/10/20 127/76   01/28/20 138/82    Pulse Readings from Last 3 Encounters:   08/31/20 79   01/28/20 71   12/11/19 87      Resp Readings from Last 3 Encounters:   08/31/20 16   12/11/19 16   07/02/19 14    SpO2 Readings from Last 3 Encounters:   08/31/20 98%   12/11/19 99%   07/02/19 96%      Temp Readings from Last 1 Encounters:   08/31/20 97.6  F (36.4  C) (Temporal)    Ht Readings from Last 1 Encounters:   08/31/20 1.727 m (5' 8\")      Wt Readings from Last 1 Encounters:   08/31/20 67.1 kg (148 lb)    Estimated body mass index is 22.5 kg/m  as calculated from the following:    Height as of 8/31/20: 1.727 m (5' 8\").    Weight as of 8/31/20: 67.1 kg (148 lb).       Anesthesia Plan      History & Physical Review  History and physical reviewed and following examination; no interval change.    ASA Status:  3 .    NPO Status:  > 8 hours    Plan for MAC Reason for MAC:  Difficulty with conscious sedation (QS)           Postoperative Care  Postoperative pain management:  IV analgesics.      Consents  Anesthetic plan, risks, benefits and alternatives discussed with:  Patient.  Use of blood products discussed: No .   .                 CHEN Pickett CRNA  "

## 2020-09-04 NOTE — ANESTHESIA CARE TRANSFER NOTE
Patient: Stone De Paz    Procedure(s):  RADIOFREQUENCY ABLATION CERVICAL 3,4 5 right SIDE    Diagnosis: Arthropathy of cervical facet joint [M47.812]  Diagnosis Additional Information: No value filed.    Anesthesia Type:   MAC     Note:  Airway :Room Air  Patient transferred to:Phase II  Handoff Report: Identifed the Patient, Identified the Reponsible Provider, Reviewed the pertinent medical history, Discussed the surgical course, Reviewed Intra-OP anesthesia mangement and issues during anesthesia, Set expectations for post-procedure period and Allowed opportunity for questions and acknowledgement of understanding      Vitals: (Last set prior to Anesthesia Care Transfer)    CRNA VITALS  9/4/2020 1220 - 9/4/2020 1253      9/4/2020             Resp Rate (observed):  (!) 5                Electronically Signed By: CHEN Pickett CRNA  September 4, 2020  12:53 PM

## 2020-09-04 NOTE — DISCHARGE INSTRUCTIONS
"Home Care Instructions                Procedure: Radiofrequency ablation    Activity:    Rest today    Do not work today    Resume normal activity tomorrow    Pain:    You may experience soreness at the injection site for 1 to 4 weeks.  Most patients described the postoperative pain as a \"sunburn feeling\".  This will go away over 1 to 4 weeks.    You may use an ice pack for 20 minutes every 2 hours for the first 24 hours    You may use a heating pad after the first 24 hours    You may use Tylenol  (acetaminophen) every 4 hours or other pain medicines as directed by your physician    Safety  Sedation medicine, if given may remain active for many hours.    It is important for the next 24 hours that you do not:    Drive a car    Operate machines or power tools    Consume alcohol, including beer    Sign any important papers or legal documents    You may experience numbness radiating into your legs or arms, (depending on the procedure location)  This numbness may last several hours.  Until the numb sensation returns to normal please use caution in walking, climbing stairs, stepping out of your vehicle, etc.    Common side effects of steroids:  Not everyone will experience corticosteroid side effects. If side effects are experienced they will gradually subside in the 7-10 day period following an injection.    Most common side effects include:    Flushed face and/or chest    Feeling of warmth, particularly in face but could be overall feeling of warmth    Increased blood sugar in diabetic patients    Menstrual irregularities may occur.  If taking hormone based birth control an alternate method of birth control is recommended    Sleep disturbances and/or mood swings are possible    Leg cramps    Please contact us if you have:  Severe pain   Fever more than 101.5 degrees Fahrenheit  Signs of infection (redness, swelling or drainage)      If you have questions during normal business hours (8am-5pm Monday-Friday) contact the " [facility] at [phone]. If you need help after hours, we recommend that you go to a hospital emergency room or dial 911.

## 2020-09-04 NOTE — OP NOTE
CHIEF COMPLAINT:  Neck pain secondary to cervical spondylosis  INTERVAL HISTORY:     The history was discussed with the patient. I agree with above. Risks were discussed including risks of infection, bleeding, nerve injury, no help , or worse pain and they were willing to take these risks and proceed.  Discussed with him that the studies show success rates are 75-80% for roughly 9-12 months.  Actually received about 15 months of pain relief from the first treatment.  I explained to him that for repeat radiofrequency ablations there is about a 90% success rate. Pain relief and success is defined is 50% or greater than the index pain that he is coming in for today.  He still wanted to proceed after these risks were discussed.     PROCEDURE:  Cervical radio frequency ablation of the right third occipital nerve and right C3, C4, C5 medial branch nerves to fully denervated at the C2-3, C3-4, C4-5 facet joints.     PROCEDURE DETAILS: After written informed consent was obtained from the patient, the patient was escorted to the procedure room.  The patient was placed in the sitting position. A time out was conducted to verify the patient identity, procedure to be performed, side, site, allergies and any special requirements.  The skin over the neck was prepped and draped in normal sterile fashion using ChloraPrep. Fluoroscopy was used to identify the mid point of the articular pillar with a lateral view.   The skin was anesthetized with 0.5 mL of 1% lidocaine with bicarbonate buffer.  I then advanced a 318-gauge radiofrequency needles with 10 mm active tips and along the right side at the 0 degree and 20 degrees ipsilateral oblique angle until they are resting at the midpoint of the C2-3 facet joint line at the waist at C3 in the waist at C4 and waist at C5.  AP and lateral films show proper needle placement away from the nerve roots motor stimulation at 2 Hz up to 2 V ruled out nerve root involvement.  I then anesthetize  each nerve with 1 cc 2% lidocaine and did 3 slightly overlapping lesions at 80  C for 60 seconds lesioning all 4 nerves.  He tolerated the procedure very well once the lesions were completed the needles were removed.  Just to reiterate what I did I did lesion each nerve and from 3 slightly different positions and from 2 different angles for a total of 6 lesions over each nerve.  Again he tolerated this very well.  Once the needles were removed sterile bandages were placed over the needle insertion site and he was brought to the recovery room in stable condition.     DIAGNOSIS:  1. Neck pain secondary to cervical spondylosis with a history of 15 months of pain relief from a previous radiofrequency ablation   2.  In situ C5-6 fusion.  PLAN:  1. Performed a radiofrequency ablation at 3 facet joint levels.  This fully denervated his C2 through C5 facet joints on the right side.  He will be discharged home with precautions for infection and bleeding and told to seek medical attention should any of these symptoms develop.  He also be given a small dose of an opioid for expected postoperative discomfort as well as instructed to take his Neurontin 300 mg twice daily for 10 days.  He should follow-up with me in 2 weeks to have the left side treated.

## 2020-09-04 NOTE — TELEPHONE ENCOUNTER
percocet 5 mg prescribed by Dr Edwards, pt just wanted you aware per his contract with you. He had an ablation done today 9/4/2020. Thank You Venessa

## 2020-09-04 NOTE — TELEPHONE ENCOUNTER
Noted, will make sure Natasha is aware when she returns.    Danae Thomas MD  [facility]

## 2020-09-06 DIAGNOSIS — Z11.59 ENCOUNTER FOR SCREENING FOR OTHER VIRAL DISEASES: ICD-10-CM

## 2020-09-06 PROCEDURE — U0003 INFECTIOUS AGENT DETECTION BY NUCLEIC ACID (DNA OR RNA); SEVERE ACUTE RESPIRATORY SYNDROME CORONAVIRUS 2 (SARS-COV-2) (CORONAVIRUS DISEASE [COVID-19]), AMPLIFIED PROBE TECHNIQUE, MAKING USE OF HIGH THROUGHPUT TECHNOLOGIES AS DESCRIBED BY CMS-2020-01-R: HCPCS | Performed by: ANESTHESIOLOGY

## 2020-09-07 LAB
SARS-COV-2 RNA SPEC QL NAA+PROBE: NOT DETECTED
SPECIMEN SOURCE: NORMAL

## 2020-09-09 ENCOUNTER — ANESTHESIA EVENT (OUTPATIENT)
Dept: SURGERY | Facility: CLINIC | Age: 55
End: 2020-09-09
Payer: MEDICARE

## 2020-09-09 ASSESSMENT — ENCOUNTER SYMPTOMS: DYSRHYTHMIAS: 0

## 2020-09-09 ASSESSMENT — LIFESTYLE VARIABLES: TOBACCO_USE: 1

## 2020-09-10 ENCOUNTER — HOSPITAL ENCOUNTER (OUTPATIENT)
Facility: CLINIC | Age: 55
Discharge: HOME | End: 2020-09-10
Attending: ANESTHESIOLOGY | Admitting: ANESTHESIOLOGY
Payer: MEDICARE

## 2020-09-10 ENCOUNTER — HOSPITAL ENCOUNTER (OUTPATIENT)
Dept: GENERAL RADIOLOGY | Facility: CLINIC | Age: 55
End: 2020-09-10
Attending: ANESTHESIOLOGY | Admitting: ANESTHESIOLOGY
Payer: MEDICARE

## 2020-09-10 ENCOUNTER — ANESTHESIA (OUTPATIENT)
Dept: SURGERY | Facility: CLINIC | Age: 55
End: 2020-09-10
Payer: MEDICARE

## 2020-09-10 VITALS
HEART RATE: 66 BPM | SYSTOLIC BLOOD PRESSURE: 126 MMHG | DIASTOLIC BLOOD PRESSURE: 77 MMHG | OXYGEN SATURATION: 98 % | TEMPERATURE: 98 F | RESPIRATION RATE: 16 BRPM

## 2020-09-10 DIAGNOSIS — M47.812 ARTHROPATHY OF CERVICAL FACET JOINT: ICD-10-CM

## 2020-09-10 LAB — GLUCOSE BLDC GLUCOMTR-MCNC: 230 MG/DL (ref 70–99)

## 2020-09-10 PROCEDURE — 40000306 ZZH STATISTIC PRE PROC ASSESS II: Performed by: ANESTHESIOLOGY

## 2020-09-10 PROCEDURE — 64634 DESTROY C/TH FACET JNT ADDL: CPT | Mod: LT | Performed by: ANESTHESIOLOGY

## 2020-09-10 PROCEDURE — 25000125 ZZHC RX 250: Performed by: ANESTHESIOLOGY

## 2020-09-10 PROCEDURE — 25000128 H RX IP 250 OP 636: Performed by: NURSE ANESTHETIST, CERTIFIED REGISTERED

## 2020-09-10 PROCEDURE — 71000027 ZZH RECOVERY PHASE 2 EACH 15 MINS: Performed by: ANESTHESIOLOGY

## 2020-09-10 PROCEDURE — 37000009 ZZH ANESTHESIA TECHNICAL FEE, EACH ADDTL 15 MIN: Performed by: ANESTHESIOLOGY

## 2020-09-10 PROCEDURE — 40000277 XR SURGERY CARM FLUORO LESS THAN 5 MIN W STILLS: Mod: TC

## 2020-09-10 PROCEDURE — 64633 DESTROY CERV/THOR FACET JNT: CPT | Mod: LT | Performed by: ANESTHESIOLOGY

## 2020-09-10 PROCEDURE — 25000125 ZZHC RX 250: Performed by: NURSE ANESTHETIST, CERTIFIED REGISTERED

## 2020-09-10 PROCEDURE — 36000048 ZZH SURGERY LEVEL 1 W FLUORO 1ST 30 MIN: Performed by: ANESTHESIOLOGY

## 2020-09-10 PROCEDURE — 36000046 ZZH SURGERY LEVEL 1 EA 15 ADDTL MIN: Performed by: ANESTHESIOLOGY

## 2020-09-10 PROCEDURE — 25800030 ZZH RX IP 258 OP 636: Performed by: NURSE ANESTHETIST, CERTIFIED REGISTERED

## 2020-09-10 PROCEDURE — 82962 GLUCOSE BLOOD TEST: CPT

## 2020-09-10 PROCEDURE — 37000008 ZZH ANESTHESIA TECHNICAL FEE, 1ST 30 MIN: Performed by: ANESTHESIOLOGY

## 2020-09-10 RX ORDER — FENTANYL CITRATE 50 UG/ML
INJECTION, SOLUTION INTRAMUSCULAR; INTRAVENOUS PRN
Status: DISCONTINUED | OUTPATIENT
Start: 2020-09-10 | End: 2020-09-10

## 2020-09-10 RX ORDER — PROPOFOL 10 MG/ML
INJECTION, EMULSION INTRAVENOUS PRN
Status: DISCONTINUED | OUTPATIENT
Start: 2020-09-10 | End: 2020-09-10

## 2020-09-10 RX ORDER — LIDOCAINE HYDROCHLORIDE 20 MG/ML
INJECTION, SOLUTION INFILTRATION; PERINEURAL PRN
Status: DISCONTINUED | OUTPATIENT
Start: 2020-09-10 | End: 2020-09-10 | Stop reason: HOSPADM

## 2020-09-10 RX ORDER — SODIUM CHLORIDE, SODIUM LACTATE, POTASSIUM CHLORIDE, CALCIUM CHLORIDE 600; 310; 30; 20 MG/100ML; MG/100ML; MG/100ML; MG/100ML
INJECTION, SOLUTION INTRAVENOUS CONTINUOUS
Status: DISCONTINUED | OUTPATIENT
Start: 2020-09-10 | End: 2020-09-10 | Stop reason: HOSPADM

## 2020-09-10 RX ORDER — ONDANSETRON 4 MG/1
4 TABLET, ORALLY DISINTEGRATING ORAL EVERY 30 MIN PRN
Status: CANCELLED | OUTPATIENT
Start: 2020-09-10

## 2020-09-10 RX ORDER — MEPERIDINE HYDROCHLORIDE 50 MG/ML
12.5 INJECTION INTRAMUSCULAR; INTRAVENOUS; SUBCUTANEOUS
Status: CANCELLED | OUTPATIENT
Start: 2020-09-10

## 2020-09-10 RX ORDER — LIDOCAINE 40 MG/G
CREAM TOPICAL
Status: DISCONTINUED | OUTPATIENT
Start: 2020-09-10 | End: 2020-09-10 | Stop reason: HOSPADM

## 2020-09-10 RX ORDER — SODIUM CHLORIDE, SODIUM LACTATE, POTASSIUM CHLORIDE, CALCIUM CHLORIDE 600; 310; 30; 20 MG/100ML; MG/100ML; MG/100ML; MG/100ML
INJECTION, SOLUTION INTRAVENOUS CONTINUOUS
Status: CANCELLED | OUTPATIENT
Start: 2020-09-10

## 2020-09-10 RX ORDER — NALOXONE HYDROCHLORIDE 0.4 MG/ML
.1-.4 INJECTION, SOLUTION INTRAMUSCULAR; INTRAVENOUS; SUBCUTANEOUS
Status: CANCELLED | OUTPATIENT
Start: 2020-09-10 | End: 2020-09-11

## 2020-09-10 RX ORDER — ONDANSETRON 2 MG/ML
4 INJECTION INTRAMUSCULAR; INTRAVENOUS EVERY 30 MIN PRN
Status: CANCELLED | OUTPATIENT
Start: 2020-09-10

## 2020-09-10 RX ADMIN — FENTANYL CITRATE 50 MCG: 50 INJECTION, SOLUTION INTRAMUSCULAR; INTRAVENOUS at 12:37

## 2020-09-10 RX ADMIN — LIDOCAINE HYDROCHLORIDE 40 MG: 20 INJECTION, SOLUTION INFILTRATION; PERINEURAL at 12:38

## 2020-09-10 RX ADMIN — MIDAZOLAM 1 MG: 1 INJECTION INTRAMUSCULAR; INTRAVENOUS at 12:34

## 2020-09-10 RX ADMIN — PROPOFOL 20 MG: 10 INJECTION, EMULSION INTRAVENOUS at 12:39

## 2020-09-10 RX ADMIN — LIDOCAINE HYDROCHLORIDE 5 ML: 10 INJECTION, SOLUTION EPIDURAL; INFILTRATION; INTRACAUDAL; PERINEURAL at 11:57

## 2020-09-10 RX ADMIN — FENTANYL CITRATE 50 MCG: 50 INJECTION, SOLUTION INTRAMUSCULAR; INTRAVENOUS at 12:46

## 2020-09-10 RX ADMIN — MIDAZOLAM 1 MG: 1 INJECTION INTRAMUSCULAR; INTRAVENOUS at 12:37

## 2020-09-10 RX ADMIN — SODIUM CHLORIDE, POTASSIUM CHLORIDE, SODIUM LACTATE AND CALCIUM CHLORIDE: 600; 310; 30; 20 INJECTION, SOLUTION INTRAVENOUS at 11:57

## 2020-09-10 NOTE — ANESTHESIA POSTPROCEDURE EVALUATION
Patient: Stone De Paz    Procedure(s):  RADIOFREQUENCY ABLATION CERVICAL 3,4 5 LEFT SIDE    Diagnosis:Arthropathy of cervical facet joint [M47.812]  Diagnosis Additional Information: No value filed.    Anesthesia Type:  MAC    Note:  Anesthesia Post Evaluation    Patient participation: Able to fully participate in evaluation  Level of consciousness: awake and alert  Pain management: adequate  Airway patency: patent  Cardiovascular status: blood pressure returned to baseline  Respiratory status: spontaneous ventilation and room air  Hydration status: acceptable  PONV: none     Anesthetic complications: None    Comments: Patient was very pleased with his anesthetic today. No concerns.         Last vitals:  Vitals:    09/10/20 1319 09/10/20 1330 09/10/20 1345   BP: 135/85 125/85 126/77   Pulse: 66 66 66   Resp: 16     Temp:      SpO2: 97% 97% 98%         Electronically Signed By: CHEN Golden CRNA  September 10, 2020  3:02 PM

## 2020-09-10 NOTE — ANESTHESIA PREPROCEDURE EVALUATION
Anesthesia Pre-Procedure Evaluation    Patient: Stone De Paz   MRN: [id number] : 1965          Preoperative Diagnosis: Arthropathy of cervical facet joint [M47.812]    Procedure(s):  RADIOFREQUENCY ABLATION CERVICAL 3,4 5 RIGHT SIDE    Past Medical History:   Diagnosis Date     Arthritis      Chronic neck pain     percocet for years now,      Depressive disorder      Gastroparesis      Gastroparesis due to DM (H)     gastric stimulator helps, [facility] manages that     Hyperlipidemia      Hypertension      Hypothyroidism      Neuropathy, diabetic (H)      Type 1 diabetes (H) age 31     Past Surgical History:   Procedure Laterality Date     C LUMBAR SPINE FUSION,ANTER APPRCH      2 L4-5 L5-21     COLONOSCOPY  12     FUSION CERVICAL ANTERIOR TWO LEVELS       INJECT BLOCK MEDIAL BRANCH CERVICAL/THORACIC/LUMBAR Bilateral 2018    Procedure: Bilateral Cervical 3-4-5 medial branch block;  Surgeon: Jef Edwards MD;  Location: [facility] OR     INJECT BLOCK MEDIAL BRANCH CERVICAL/THORACIC/LUMBAR N/A 2019    Procedure: Cervical 3,4,5 Bilateral Medial branch blocks;  Surgeon: Jef Edwards MD;  Location: [facility]     RADIO FREQUENCY ABLATION PULSED CERVICAL Right 2019    Procedure: RADIO FREQUENCY ABLATION CERVICAL THREE, FOUR, FIVE, AND THIRD OCCUPITAL NERVE RIGHT SIDE;  Surgeon: Jef Edwards MD;  Location: [facility]     RADIO FREQUENCY ABLATION PULSED CERVICAL Left 3/15/2019    Procedure: radiofrequency ablation cervical 3,4,5;  Surgeon: Jef Edwards MD;  Location: [facility]     RADIO FREQUENCY ABLATION PULSED CERVICAL Right 2020    Procedure: RADIOFREQUENCY ABLATION CERVICAL 3,4 5 right SIDE;  Surgeon: Jef Edwards MD;  Location: [facility]     THORACOSCOPIC DECORTICATION LUNG  2014    Procedure: THORACOSCOPIC DECORTICATION LUNG;  RIGHT VATS/RIGHT THORACOTOMY , DECORTICATION RIGHT LUNG ,WEDGE RESECTION RIGHT MIDDLE LOBE LUNG NODULE;  Surgeon: Harley Felix MD;  Location:  OR        Anesthesia Evaluation     . Pt has had prior anesthetic. Type: General and MAC    No history of anesthetic complications          ROS/MED HX    ENT/Pulmonary:     (+)tobacco use, Past use , recent URI unresolved . Other pulmonary disease (loculated pleural effusion) .   (-) asthma and sleep apnea   Neurologic:  - neg neurologic ROS     Cardiovascular:     (+) Dyslipidemia, hypertension----. : . . . :. . Previous cardiac testing Echodate:1/8/14results:Echocardiogram   Order: [id number]   Status: Final result   Visible to patient: No (Not Released) Next appt: 02/28/2019 at 01:30 PM in Radiology. ([facility] [place] [facility])   Component 5yr ago  XCELERA      Interpretation Summary  This was essentially a normal study.  The left ventricle is normal in structure, function and size. The pericardium   appears normal. There is no pericardial effusion. There is no pericardial   constriction.  PatientHeight: 68 in  PatientWeight: 153 lbs  SystolicPressure: 113 mmHg  DiastolicPressure: 84 mmHg  HeartRate: 95 bpm  BSA 1.8 m^2        Left Ventricle  The left ventricle is normal in structure, function and size.  There is normal left ventricular wall thickness.  Left ventricular systolic function is normal.  The visual ejection fraction is estimated at 55-60%.  Normal left ventricular wall motion.  There is no thrombus seen in the left ventricle.     Right Ventricle  The right ventricle is normal in structure, function and size.     Atria  Normal left atrial size.  Right atrial size is normal.  There is no color Doppler evidence of an atrial shunt.     Mitral Valve  The mitral valve is normal in structure and function.     Tricuspid Valve  Normal tricuspid valve.  Right ventricular systolic pressure could not be approximated due to   inadequate tricuspid regurgitation.     Aortic Valve  The aortic valve is normal in structure and function.     Pulmonic Valve  The pulmonic valve is not well seen, but is grossly  normal.     Vessels  Normal size aorta.  The IVC is normal in size and reactivity with respiration, suggesting normal   central venous pressure.     Pericardium  The pericardium appears normal.  There is no pericardial effusion.  There is no pericardial constriction.     Rhythm  The rhythm was normal sinus.     Procedure  Complete Portable Echo Adult.     MMode 2D Measurements & Calculations  IVSd: 0.90 cm  LVIDd: 4.4 cm  LVIDs: 2.9 cm  LVPWd: 0.94 cm  FS: 35 %  LV mass(C)d: 133 grams  Ao root diam: 3.3 cm  LA dimension: 3.4 cm  LA/Ao: 1.0   Doppler Measurements & Calculations  MV E point: 102 cm/sec  MV A point: 85 cm/sec  MV E/A: 1.2   MV dec time: 0.16 sec     Interpreting Physician:  Augustine Block MD electronically signed on           date: results:ECG reviewed date:4/10/19 results:SR possible septal infarct date: results:         (-) CAD, arrhythmias, irregular heartbeat/palpitations and valvular problems/murmurs   METS/Exercise Tolerance:     Hematologic:  - neg hematologic  ROS       Musculoskeletal:   (+)  other musculoskeletal- chronic neck pain      GI/Hepatic:     (+) GERD Asymptomatic on medication, Other GI/Hepatic (gastropareisis, has had nausea recently)       Renal/Genitourinary:  - ROS Renal section negative   (+) Pt has no history of transplant,       Endo:     (+) type I DM, Last HgA1c: 10.2 date: 8/06/20 Using insulin - using insulin pump Normal glucose range: 8/6  not previously admitted for DM/DKA Diabetic complications: gastroparesis, thyroid problem hypothyroidism, .      Psychiatric:     (+) psychiatric history depression      Infectious Disease:  - neg infectious disease ROS       Malignancy:      - no malignancy   Other:    (+) No chance of pregnancy C-spine cleared: N/A, H/O Chronic Pain,H/O chronic opiod use , no other significant disability                         Physical Exam  Normal systems: cardiovascular, pulmonary and dental    Airway   Mallampati: II  TM distance: >3  "FB  Neck ROM: full    Dental     Cardiovascular   Rhythm and rate: regular and normal      Pulmonary    breath sounds clear to auscultation            Lab Results   Component Value Date    WBC 7.4 01/06/2020    HGB 13.5 01/06/2020    HCT 40.0 01/06/2020     01/06/2020    CRP 6.7 01/21/2014    SED 31 (H) 01/21/2014     08/06/2020    POTASSIUM 4.6 08/06/2020    CHLORIDE 101 08/06/2020    CO2 28 08/06/2020    BUN 13 08/06/2020    CR 0.88 08/06/2020     (H) 08/06/2020    EMILEE 8.8 08/06/2020    MAG 1.6 01/06/2020    ALBUMIN 3.7 01/06/2020    PROTTOTAL 7.6 01/06/2020    ALT 20 01/06/2020    AST 12 01/06/2020    ALKPHOS 67 01/06/2020    BILITOTAL 0.4 01/06/2020    LIPASE 44 (L) 01/29/2016    INR 1.09 01/09/2014    TSH 1.27 08/06/2020    T4 1.13 08/06/2020       Preop Vitals  BP Readings from Last 3 Encounters:   09/04/20 126/77   08/31/20 114/66   02/10/20 127/76    Pulse Readings from Last 3 Encounters:   09/04/20 81   08/31/20 79   01/28/20 71      Resp Readings from Last 3 Encounters:   09/04/20 16   08/31/20 16   12/11/19 16    SpO2 Readings from Last 3 Encounters:   09/04/20 97%   08/31/20 98%   12/11/19 99%      Temp Readings from Last 1 Encounters:   09/04/20 98  F (36.7  C) (Oral)    Ht Readings from Last 1 Encounters:   08/31/20 1.727 m (5' 8\")      Wt Readings from Last 1 Encounters:   08/31/20 67.1 kg (148 lb)    Estimated body mass index is 22.5 kg/m  as calculated from the following:    Height as of 8/31/20: 1.727 m (5' 8\").    Weight as of 8/31/20: 67.1 kg (148 lb).       Anesthesia Plan      History & Physical Review  History and physical reviewed and following examination; no interval change.    ASA Status:  3 .    NPO Status:  > 8 hours    Plan for MAC with Intravenous and Propofol induction. Maintenance will be TIVA.  Reason for MAC:  Deep or markedly invasive procedure (G8)  PONV prophylaxis:  Ondansetron (or other 5HT-3)         Postoperative Care  Postoperative pain management:  Oral " pain medications and IV analgesics.      Consents  Anesthetic plan, risks, benefits and alternatives discussed with:  Patient..                 CHEN Golden CRNA

## 2020-09-10 NOTE — OP NOTE
CHIEF COMPLAINT:  Neck pain secondary to cervical spondylosis  INTERVAL HISTORY:     The history was discussed with the patient. I agree with above. Risks were discussed including risks of infection, bleeding, nerve injury, no help , or worse pain and they were willing to take these risks and proceed.  Discussed with him that the studies show success rates are 75-80% for roughly 9-12 months.  Actually received about 15 months of pain relief from the first treatment.  I explained to him that for repeat radiofrequency ablations there is about a 90% success rate. Pain relief and success is defined is 50% or greater than the index pain that he is coming in for today.  He still wanted to proceed after these risks were discussed.     PROCEDURE:  Cervical radio frequency ablation of the left third occipital nerve and left C3, C4, C5 medial branch nerves to fully denervated at the C2-3, C3-4, C4-5 facet joints.     PROCEDURE DETAILS: After written informed consent was obtained from the patient, the patient was escorted to the procedure room.  The patient was placed in the sitting position. A time out was conducted to verify the patient identity, procedure to be performed, side, site, allergies and any special requirements.  The skin over the neck was prepped and draped in normal sterile fashion using ChloraPrep. Fluoroscopy was used to identify the mid point of the articular pillar with a lateral view.   The skin was anesthetized with 0.5 mL of 1% lidocaine with bicarbonate buffer.  I then advanced three 18-gauge radiofrequency needles with 10 mm active tips and along his left side at the 0 degree and 20 degrees ipsilateral oblique angle until they are resting at the midpoint of the C2-3 facet joint line at the waist at C3 in the waist at C4 and waist at C5.  AP and lateral films show proper needle placement away from the nerve roots motor stimulation at 2 Hz up to 2 V ruled out nerve root involvement.  I then anesthetize  each nerve with 1 cc 2% lidocaine and did 3 slightly overlapping lesions at 80  C for 60 seconds lesioning all 4 nerves.  He tolerated the procedure very well once the lesions were completed the needles were removed.  Just to reiterate what I did I did lesion each nerve and from 3 slightly different positions and from 2 different angles for a total of 6 lesions over each nerve.  Again he tolerated this very well.  Once the needles were removed sterile bandages were placed over the needle insertion site and he was brought to the recovery room in stable condition.     DIAGNOSIS:  1. Neck pain secondary to cervical spondylosis with a history of 15 months of pain relief from a previous radiofrequency ablation   2.  In situ C5-6 fusion.  PLAN:  1. Performed a radiofrequency ablation at 3 facet joint levels.  This fully denervated his C2 through C5 facet joints on the left side.  He will be discharged home with precautions for infection and bleeding and told to seek medical attention should any of these symptoms develop.  He also be given a small dose of an opioid for expected postoperative discomfort as well as instructed to take his Neurontin 300 mg twice daily for 10 days.  He should follow-up with me in 2 weeks to have the left side treated.

## 2020-09-10 NOTE — DISCHARGE INSTRUCTIONS
"Home Care Instructions                Procedure: Radiofrequency ablation    Activity:    Rest today    Do not work today    Resume normal activity tomorrow    Pain:    You may experience soreness at the injection site for 1 to 4 weeks.  Most patients described the postoperative pain as a \"sunburn feeling\".  This will go away over 1 to 4 weeks.    You may use an ice pack for 20 minutes every 2 hours for the first 24 hours    You may use a heating pad after the first 24 hours    You may use Tylenol  (acetaminophen) every 4 hours or other pain medicines as directed by your physician    Safety  Sedation medicine, if given may remain active for many hours.    It is important for the next 24 hours that you do not:    Drive a car    Operate machines or power tools    Consume alcohol, including beer    Sign any important papers or legal documents    You may experience numbness radiating into your legs or arms, (depending on the procedure location)  This numbness may last several hours.  Until the numb sensation returns to normal please use caution in walking, climbing stairs, stepping out of your vehicle, etc.    Common side effects of steroids:  Not everyone will experience corticosteroid side effects. If side effects are experienced they will gradually subside in the 7-10 day period following an injection.    Most common side effects include:    Flushed face and/or chest    Feeling of warmth, particularly in face but could be overall feeling of warmth    Increased blood sugar in diabetic patients    Menstrual irregularities may occur.  If taking hormone based birth control an alternate method of birth control is recommended    Sleep disturbances and/or mood swings are possible    Leg cramps    Please contact us if you have:  Severe pain   Fever more than 101.5 degrees Fahrenheit  Signs of infection (redness, swelling or drainage)      If you have questions during normal business hours (8am-5pm Monday-Friday) contact the " [facility] at [phone]. If you need help after hours, we recommend that you go to a hospital emergency room or dial 911.

## 2020-09-10 NOTE — ANESTHESIA CARE TRANSFER NOTE
Patient: Stone De Paz    Procedure(s):  RADIOFREQUENCY ABLATION CERVICAL 3,4 5 LEFT SIDE    Diagnosis: Arthropathy of cervical facet joint [M47.812]  Diagnosis Additional Information: No value filed.    Anesthesia Type:   MAC     Note:  Airway :Room Air  Patient transferred to:Phase II  Handoff Report: Identifed the Patient, Identified the Reponsible Provider, Reviewed the pertinent medical history, Discussed the surgical course, Reviewed Intra-OP anesthesia mangement and issues during anesthesia, Set expectations for post-procedure period and Allowed opportunity for questions and acknowledgement of understanding      Vitals: (Last set prior to Anesthesia Care Transfer)    CRNA VITALS  9/10/2020 1249 - 9/10/2020 1324      9/10/2020             SpO2:  100 %    Resp Rate (observed):  (!) 4                Electronically Signed By: CHEN Golden CRNA  September 10, 2020  1:24 PM

## 2020-09-18 NOTE — PROGRESS NOTES
Please have patient work closely with the endocrinologist and diabetic educator to get his blood sugar better control.  If he still has concern, please follow-up to discuss about it.  Thank you

## 2020-09-22 DIAGNOSIS — M48.02 CERVICAL STENOSIS OF SPINE: ICD-10-CM

## 2020-09-22 DIAGNOSIS — G89.4 CHRONIC PAIN SYNDROME: ICD-10-CM

## 2020-09-22 RX ORDER — OXYCODONE AND ACETAMINOPHEN 5; 325 MG/1; MG/1
1 TABLET ORAL EVERY 6 HOURS PRN
Qty: 70 TABLET | Refills: 0 | Status: SHIPPED | OUTPATIENT
Start: 2020-09-22 | End: 2020-10-21

## 2020-09-22 NOTE — TELEPHONE ENCOUNTER
Received call from patient requesting refill(s) of oxyCODONE-acetaminophen (PERCOCET) 5-325 MG tablet      Last dispensed from pharmacy on 08/21/2020    Pt last seen by prescribing provider on 08/03/2020  Next appt scheduled for 10/26/2020     checked in the past 6 months? Yes If no, print current report and give to RN    Last urine drug screen date 02/10/2020  Current opioid agreement on file (completed within the last year) Yes Date of opioid agreement: 02/10/2020    E-prescribe to [facility]    Will route to nursing [place] for review and preparation of prescription(s).       Delaney Torres, CMA on 9/22/2020 at 9:05 AM

## 2020-09-22 NOTE — TELEPHONE ENCOUNTER
Medication refill information reviewed.     Due date for oxyCODONE-acetaminophen (PERCOCET) 5-325 MG tablet is anytime. Last filled 8/21/20     Prescriptions prepped for review.     Will route to provider.

## 2020-10-01 ENCOUNTER — TRANSFERRED RECORDS (OUTPATIENT)
Dept: HEALTH INFORMATION MANAGEMENT | Facility: CLINIC | Age: 55
End: 2020-10-01

## 2020-10-09 ENCOUNTER — TELEPHONE (OUTPATIENT)
Dept: FAMILY MEDICINE | Facility: CLINIC | Age: 55
End: 2020-10-09

## 2020-10-12 DIAGNOSIS — K21.9 GASTROESOPHAGEAL REFLUX DISEASE WITHOUT ESOPHAGITIS: ICD-10-CM

## 2020-10-19 ENCOUNTER — VIRTUAL VISIT (OUTPATIENT)
Dept: ENDOCRINOLOGY | Facility: CLINIC | Age: 55
End: 2020-10-19
Payer: COMMERCIAL

## 2020-10-19 ENCOUNTER — MEDICAL CORRESPONDENCE (OUTPATIENT)
Dept: HEALTH INFORMATION MANAGEMENT | Facility: CLINIC | Age: 55
End: 2020-10-19

## 2020-10-19 DIAGNOSIS — E10.43 TYPE 1 DIABETES MELLITUS WITH DIABETIC AUTONOMIC NEUROPATHY (H): Primary | ICD-10-CM

## 2020-10-19 DIAGNOSIS — Z96.41 INSULIN PUMP STATUS: ICD-10-CM

## 2020-10-19 DIAGNOSIS — E03.9 HYPOTHYROIDISM, UNSPECIFIED TYPE: ICD-10-CM

## 2020-10-19 PROCEDURE — 99213 OFFICE O/P EST LOW 20 MIN: CPT | Mod: 95 | Performed by: INTERNAL MEDICINE

## 2020-10-19 RX ORDER — PROCHLORPERAZINE 25 MG/1
1 SUPPOSITORY RECTAL CONTINUOUS PRN
Qty: 1 EACH | Refills: 3 | Status: SHIPPED | OUTPATIENT
Start: 2020-10-19 | End: 2021-01-17

## 2020-10-19 RX ORDER — PROCHLORPERAZINE 25 MG/1
SUPPOSITORY RECTAL
Qty: 1 DEVICE | Refills: 0 | Status: SHIPPED | OUTPATIENT
Start: 2020-10-19 | End: 2024-04-02

## 2020-10-19 RX ORDER — PROCHLORPERAZINE 25 MG/1
1 SUPPOSITORY RECTAL CONTINUOUS PRN
Qty: 3 EACH | Refills: 5 | Status: SHIPPED | OUTPATIENT
Start: 2020-10-19 | End: 2021-01-17

## 2020-10-19 NOTE — PROGRESS NOTES
" Stone De Paz is a 55 year old male who is being evaluated via a billable telephone visit.      The patient has been notified of following:     \"This telephone visit will be conducted via a call between you and your physician/provider. We have found that certain health care needs can be provided without the need for a physical exam.  This service lets us provide the care you need with a short phone conversation.  If a prescription is necessary we can send it directly to your pharmacy.  If lab work is needed we can place an order for that and you can then stop by our lab to have the test done at a later time.    Telephone visits are billed at different rates depending on your insurance coverage. During this emergency period, for some insurers they may be billed the same as an in-person visit.  Please reach out to your insurance provider with any questions.    If during the course of the call the physician/provider feels a telephone visit is not appropriate, you will not be charged for this service.\"    Patient has given verbal consent for Telephone visit?  Yes    What phone number would you like to be contacted at? [phone]    How would you like to obtain your AVS? Reviewed verbally      Recent issues:  Here for f/u diabetes and thyroid evaluation  Problems with rashes at Freestyle Sukhi sites, red [place] area which lasts several weeks post use  Hasn't worn Sukhi for ~2 weeks  Still has issues with neck pain/stiffness              1995. Diagnosis of diabetes mellitus after 2nd back surgery, age 29  Initial treatment with insulin injections using NPH and Regular insulin  Switched to premixed 70/30 insulin BID dosing  1999. Started Medtronic insulin pump model 508, used base-dose Humalog insulin at meals  Had seen Dr. Iggy Briggs/[facility]     7/24/01. Initial evaluation with me at my former clinic ([facility])  Upgraded pump to Medtronic 523   Now using Medtronic 630G pump              Novolog in " "pump     Meals BID, Uses carb \"exchanges\" with his pump settings  Chronic high BG and hgbA1c trends despite dose changes  Tried square wave bolus  Has Contour Link? BG meter, variable testing pattern... Recently testing 4x/day for at least 30 days  Glucose sensor (CGMS) use:   Tried Medtronic CGMS in the past, didn't like it   Switched to DexcomG5, used for approx 6 months but issues with getting supply order   Early 2019, started Freestyle Sukhi but issues with skin site redness    Previous pump settings:       No recent Carelink data available     Previous  labs include:  Lab Results   Component Value Date    A1C Results confirmed by repeat test 08/06/2020     08/06/2020    POTASSIUM 4.6 08/06/2020    CHLORIDE 101 08/06/2020    CO2 28 08/06/2020    ANIONGAP 6 08/06/2020     (H) 08/06/2020    BUN 13 08/06/2020    CR 0.88 08/06/2020    GFRESTIMATED >90 08/06/2020    GFRESTBLACK >90 08/06/2020    EMILEE 8.8 08/06/2020    CHOL 134 08/06/2020    TRIG 41 08/06/2020    HDL 69 08/06/2020    LDL 57 08/06/2020    NHDL 65 08/06/2020    UCRR 24 08/06/2020    MICROL <5 08/06/2020    UMALCR Unable to calculate due to low value 08/06/2020     Last eye exam date?  Goes to eye clinic in [facility]  7/2018. Met with Destiny Warren RD, CDE at [facility]  DM Complications:              Neuropathy                          Peripheral neuropathy                                      Decreased sensation at feet                          Autonomic neuropathy- gastroparesis                                      Symptoms of nausea, bloating, episodes of diarrhea and emesis                                      Has Medtronic gastric stimulator                                      Sees physicians and WARREN Gilmore/[place] [facility]                   Thyroid:  1994. History of hypothyroidism  Chronic levothyroxine treatment, has used Levoxyl in the past  Supplements:  Takes vitamin D 1000 U daily  7/2018. Had levothyroxine dose " "increase   Recent labs include:  Lab Results   Component Value Date    TSH 1.27 08/06/2020    T4 1.13 08/06/2020     Current dose:  Levothyroxine 0.137 mg daily        Lives in [place], [place] with Ada, also (1) adopted dog   Sees Dr. Ilan Chew Mai/[facility] for general medicine evaluations  Also sees Olga Ramirez PAC/[place] [facility].        ROS: 10 point ROS neg other than the symptoms noted above in the HPI.     GENERAL: some fatigue, wt stable; denies fevers, chills, night sweats.   HEENT: no dysphagia, odonophagia, diplopia, neck pain  THYROID:  no apparent hyper or hypothyroid symptoms  CV: occasional chest pains; no pressure, palpitations  LUNGS: no SOB, KRAFT, cough, wheezing   ABDOMEN: some diarrhea \"collitis\" and postmeal nausea; no diarrhea, constipation  EXTREMITIES: no rashes, ulcers, edema  NEUROLOGY: no headaches, denies changes in vision, tingling, extremitiy numbness   MSK: neck pain/stiffness and back pains; no muscle weakness  SKIN: no rashes or lesions  PSYCH:  stable mood, no significant anxiety or depression  ENDOCRINE: no heat or cold intolerance          LABS:     All pertinent notes, labs, and images personally reviewed by me.      A/P:       Encounter Diagnoses   Name        Type 1 diabetes mellitus with diabetic autonomic neuropathy (H)      Insulin pump status      Hypothyroidism, unspecified type           Comments:  Reviewed complicated health history and diabetes issues.     Plan:  Reviewed the overall T1DM management and insulin pump use.  Discussed optimal BG testing to assess glucose trends.  We reviewed insulin pump settings, basal rate and bolus dosing  Use of automated pump bolus dosing for meal/snack carb & correction dosing  Reviewed the Medtronic Carelink report data today  Reviewed recent Dexcom CGMS glucose sensor trends, in detail     Recommend:  Continue Medtronic insulin pump and diabetes management plan.  No pump setting changes at this time    Use basal pattern 1 " when more exercise/activity and pattern 2 when less activity/inactive  Contact our office with update if restarting the budesinide medication for diarrhea problem  Since Cameron sensor adhesive causing localized skin reactions, I advised stopping Cameron use and consider another sensor option  Discussed CGMS sensor options              Patient makes frequent adjustments to insulin regimen on basis of therapeutic glucose testing   Change plan to the DexcomG6 system, which should be easier and more accurate than the previously used G5.  He agreed to proceed.   New DexcomG6 Rx's sent to the  Specialty Pharmacy, and will send fax the Dexcom Order Form also   Watch YouTube videos for the DexcomG6   Notify our office when the new DexcomG6 kit arrives and we will provide additional Diab Education on its use    Would benefit from additional CDE evaluation at [facility]  No labs ordered today  Continue simvastatin 40 mg each evening  Continue current levothyroxine 0.137 mg daily dose plan  Arrange annual dilated eye exam, fasting lipid panel testing.  We reviewed importance of timely clinic appointments with me Q3 months, to satisfy Medicare with his diabetes device coverage     Encouraged him to make routine f/u appointments with [place] [facility]   Addressed patient's questions today.        More than 50% of the time spent with Mr. De Paz on counseling / coordinating his care.  Total appointment time was 21 minutes.     Follow-up:  1/2021     VIKTOR Best MD, MS  Endocrinology  [facility]

## 2020-10-20 ENCOUNTER — TELEPHONE (OUTPATIENT)
Dept: ENDOCRINOLOGY | Facility: CLINIC | Age: 55
End: 2020-10-20

## 2020-10-20 DIAGNOSIS — M48.02 CERVICAL STENOSIS OF SPINE: ICD-10-CM

## 2020-10-20 DIAGNOSIS — G89.4 CHRONIC PAIN SYNDROME: ICD-10-CM

## 2020-10-20 NOTE — TELEPHONE ENCOUNTER
Reason for call:  Medication   If this is a refill request, has the caller requested the refill from the pharmacy already? No  Will the patient be using a [facility]? No  Name of the pharmacy and phone number for the current request: [facility] - [place], [place] - [address]    Name of the medication requested: oxyCODONE-acetaminophen (PERCOCET) 5-325 MG tablet    Other request: call when ready    Phone number to reach patient:  Home number on file [phone] (home)    Can we leave a detailed message on this number?  YES    Travel screening: Not Applicable         Tez YOO    [facility]

## 2020-10-20 NOTE — TELEPHONE ENCOUNTER
MEDICAL    PRIOR AUTHORIZATION REQUIRED - See Reason for Call Comments for specific products. Billing with K codes.    INSURANCE: [facility]  ID: [id number]  GROUP: [id number]          [facility] TEAM  [phone]    Route determinations back to [employer] Diabetes pool ([id number])

## 2020-10-20 NOTE — TELEPHONE ENCOUNTER
PA Initiation    Medication: Dexcom G6 , Dexcom G6 Transmitter, Dexcom G6 Sensors  Insurance Company: [facility] - Phone [phone] Fax [phone]  Pharmacy Filling the Rx: [facility] - [place], [place] - [address]  Filling Pharmacy Phone:    Filling Pharmacy Fax:    Start Date: 10/20/2020    Central Prior Authorization Team   Phone: [phone]      Attached most recent visit notes.    Please note:

## 2020-10-21 RX ORDER — OXYCODONE AND ACETAMINOPHEN 5; 325 MG/1; MG/1
1 TABLET ORAL EVERY 6 HOURS PRN
Qty: 70 TABLET | Refills: 0 | Status: SHIPPED | OUTPATIENT
Start: 2020-10-21 | End: 2020-11-20

## 2020-10-21 NOTE — TELEPHONE ENCOUNTER
Received call from patient requesting refill(s) of  oxyCODONE-acetaminophen (PERCOCET) 5-325 MG tablet    Last dispensed from pharmacy on 09/22/20    Pt last seen by prescribing provider on 08/03/20  Next appt scheduled for 10/28/20     checked in the past 6 months? Yes If no, print current report and give to RN    Last urine drug screen date 02/10/20  Current opioid agreement on file (completed within the last year) Yes Date of opioid agreement: 02/10/20    E-prescribe to pharmacy-[facility] - [place], [place] - [address]     Will route to nursing [place] for review and preparation of prescription(s).

## 2020-10-21 NOTE — TELEPHONE ENCOUNTER
Script Eprescribed to pharmacy    Will send this to MA team to notify patient.    Pending Prescriptions:                       Disp   Refills    oxyCODONE-acetaminophen (PERCOCET) 5-325 *70 tab*0            Sig: Take 1 tablet by mouth every 6 hours as needed           for severe pain . Max of 3/day.May fill 10/21/20           and start 10/22/20.      Lynne Huddleston MD  [facility]

## 2020-10-21 NOTE — TELEPHONE ENCOUNTER
Medication refill information reviewed.     Due date for  oxyCODONE-acetaminophen (PERCOCET) 5-325 MG tablet is 10/22/20     Prescriptions prepped for review.     Will route to provider.

## 2020-10-21 NOTE — TELEPHONE ENCOUNTER
Prior Authorization Approval    Authorization Effective Date: 10/20/2020  Authorization Expiration Date: 10/19/2021  Medication: Dexcom G6 , Dexcom G6 Transmitter, Dexcom G6 Sensors  Approved Dose/Quantity:   Reference #: [id number]   Insurance Company: [employer] [place] - Phone [phone] Fax [phone]  Which Pharmacy is filling the prescription (Not needed for infusion/clinic administered): [facility] - [place], [place] - [address]

## 2020-10-22 NOTE — TELEPHONE ENCOUNTER
Informed patient that their refill of oxyCODONE-acetaminophen (PERCOCET) 5-325  was E-scribed to the pharmacy. Patient was informed of fill and start date.    Shari Bain [facility]  [place]

## 2020-10-26 ENCOUNTER — OFFICE VISIT (OUTPATIENT)
Dept: FAMILY MEDICINE | Facility: CLINIC | Age: 55
End: 2020-10-26
Payer: COMMERCIAL

## 2020-10-26 ENCOUNTER — TELEPHONE (OUTPATIENT)
Dept: FAMILY MEDICINE | Facility: CLINIC | Age: 55
End: 2020-10-26

## 2020-10-26 VITALS
TEMPERATURE: 98.5 F | RESPIRATION RATE: 14 BRPM | OXYGEN SATURATION: 97 % | WEIGHT: 150 LBS | HEART RATE: 100 BPM | SYSTOLIC BLOOD PRESSURE: 130 MMHG | DIASTOLIC BLOOD PRESSURE: 74 MMHG | BODY MASS INDEX: 22.81 KG/M2

## 2020-10-26 DIAGNOSIS — E10.43 TYPE 1 DIABETES MELLITUS WITH DIABETIC AUTONOMIC NEUROPATHY (H): ICD-10-CM

## 2020-10-26 DIAGNOSIS — K21.9 GASTROESOPHAGEAL REFLUX DISEASE WITHOUT ESOPHAGITIS: ICD-10-CM

## 2020-10-26 DIAGNOSIS — Z11.59 ENCOUNTER FOR SCREENING FOR OTHER VIRAL DISEASES: ICD-10-CM

## 2020-10-26 DIAGNOSIS — K31.84 GASTROPARESIS: ICD-10-CM

## 2020-10-26 DIAGNOSIS — Z00.00 ENCOUNTER FOR MEDICARE ANNUAL WELLNESS EXAM: Primary | ICD-10-CM

## 2020-10-26 DIAGNOSIS — F32.0 MILD MAJOR DEPRESSION (H): ICD-10-CM

## 2020-10-26 DIAGNOSIS — I10 ESSENTIAL HYPERTENSION: ICD-10-CM

## 2020-10-26 DIAGNOSIS — K86.81 EXOCRINE PANCREATIC INSUFFICIENCY: ICD-10-CM

## 2020-10-26 DIAGNOSIS — Z12.5 SCREENING FOR PROSTATE CANCER: ICD-10-CM

## 2020-10-26 DIAGNOSIS — E78.5 HYPERLIPIDEMIA, UNSPECIFIED HYPERLIPIDEMIA TYPE: ICD-10-CM

## 2020-10-26 DIAGNOSIS — E03.9 HYPOTHYROIDISM, UNSPECIFIED TYPE: ICD-10-CM

## 2020-10-26 DIAGNOSIS — G89.4 CHRONIC PAIN SYNDROME: ICD-10-CM

## 2020-10-26 LAB
HBA1C MFR BLD: 9.3 % (ref 0–5.6)
PSA SERPL-ACNC: 0.85 UG/L (ref 0–4)

## 2020-10-26 PROCEDURE — G0103 PSA SCREENING: HCPCS | Performed by: FAMILY MEDICINE

## 2020-10-26 PROCEDURE — 83036 HEMOGLOBIN GLYCOSYLATED A1C: CPT | Performed by: FAMILY MEDICINE

## 2020-10-26 PROCEDURE — 36415 COLL VENOUS BLD VENIPUNCTURE: CPT | Performed by: FAMILY MEDICINE

## 2020-10-26 PROCEDURE — G0439 PPPS, SUBSEQ VISIT: HCPCS | Performed by: FAMILY MEDICINE

## 2020-10-26 PROCEDURE — 99213 OFFICE O/P EST LOW 20 MIN: CPT | Mod: 25 | Performed by: FAMILY MEDICINE

## 2020-10-26 PROCEDURE — 86706 HEP B SURFACE ANTIBODY: CPT | Performed by: FAMILY MEDICINE

## 2020-10-26 PROCEDURE — 90682 RIV4 VACC RECOMBINANT DNA IM: CPT | Performed by: FAMILY MEDICINE

## 2020-10-26 PROCEDURE — G0008 ADMIN INFLUENZA VIRUS VAC: HCPCS | Performed by: FAMILY MEDICINE

## 2020-10-26 ASSESSMENT — ENCOUNTER SYMPTOMS
HEMATURIA: 0
CHILLS: 1
HEMATOCHEZIA: 0
EYE PAIN: 0
DIZZINESS: 0
CONSTIPATION: 0
DIARRHEA: 1
COUGH: 0
NERVOUS/ANXIOUS: 1
ABDOMINAL PAIN: 1

## 2020-10-26 ASSESSMENT — ACTIVITIES OF DAILY LIVING (ADL)
CURRENT_FUNCTION: PREPARING MEALS REQUIRES ASSISTANCE
CURRENT_FUNCTION: HOUSEWORK REQUIRES ASSISTANCE
CURRENT_FUNCTION: SHOPPING REQUIRES ASSISTANCE
CURRENT_FUNCTION: MEDICATION ADMINISTRATION REQUIRES ASSISTANCE
CURRENT_FUNCTION: LAUNDRY REQUIRES ASSISTANCE

## 2020-10-26 ASSESSMENT — PAIN SCALES - GENERAL: PAINLEVEL: MODERATE PAIN (4)

## 2020-10-26 NOTE — PATIENT INSTRUCTIONS
Patient Education   Personalized Prevention Plan  You are due for the preventive services outlined below.  Your care team is available to assist you in scheduling these services.  If you have already completed any of these items, please share that information with your care team to update in your medical record.  Health Maintenance Due   Topic Date Due     Hepatitis B Vaccine (1 of 3 - Risk 3-dose series) 09/07/1985     Zoster (Shingles) Vaccine (1 of 2) 09/07/2016     Flu Vaccine (1) 09/01/2020     Eye Exam  09/13/2020     Your Health Risk Assessment indicates you feel you are not in good health    A healthy lifestyle helps keep the body fit and the mind alert. It helps protect you from disease, helps you fight disease, and helps prevent chronic disease (disease that doesn't go away) from getting worse. This is important as you get older and begin to notice twinges in muscles and joints and a decline in the strength and stamina you once took for granted. A healthy lifestyle includes good healthcare, good nutrition, weight control, recreation, and regular exercise. Avoid harmful substances and do what you can to keep safe. Another part of a healthy lifestyle is stay mentally active and socially involved.    Good healthcare     Have a wellness visit every year.     If you have new symptoms, let us know right away. Don't wait until the next checkup.     Take medicines exactly as prescribed and keep your medicines in a safe place. Tell us if your medicine causes problems.   Healthy diet and weight control     Eat 3 or 4 small, nutritious, low-fat, high-fiber meals a day. Include a variety of fruits, vegetables, and whole-grain foods.     Make sure you get enough calcium in your diet. Calcium, vitamin D, and exercise help prevent osteoporosis (bone thinning).     If you live alone, try eating with others when you can. That way you get a good meal and have company while you eat it.     Try to keep a healthy weight. If  you eat more calories than your body uses for energy, it will be stored as fat and you will gain weight.     Recreation   Recreation is not limited to sports and team events. It includes any activity that provides relaxation, interest, enjoyment, and exercise. Recreation provides an outlet for physical, mental, and social energy. It can give a sense of worth and achievement. It can help you stay healthy.    Mental Exercise and Social Involvement  Mental and emotional health is as important as physical health. Keep in touch with friends and family. Stay as active as possible. Continue to learn and challenge yourself.   Things you can do to stay mentally active are:    Learn something new, like a foreign language or musical instrument.     Play SCRABBLE or do crossword puzzles. If you cannot find people to play these games with you at home, you can play them with others on your computer through the Internet.     Join a games club--anything from card games to chess or checkers or lawn bowling.     Start a new hobby.     Go back to school.     Volunteer.     Read.   Keep up with world events.    Understanding USDA MyPlate  The USDA (U.S. Department of Agriculture) has guidelines to help you make healthy food choices. These are called MyPlate. MyPlate shows the food groups that make up healthy meals using the image of a place setting. Before you eat, think about the healthiest choices for what to put onto your plate or into your cup or bowl. To learn more about building a healthy plate, visit www.choosemyplate.gov.    The food groups    Fruits. Any fruit or 100% fruit juice counts as part of the Fruit Group. Fruits may be fresh, canned, frozen, or dried, and may be whole, cut-up, or pureed. Make half your plate fruits and vegetables.    Vegetables. Any vegetable or 100% vegetable juice counts as a member of the Vegetable Group. Vegetables may be fresh, frozen, canned, or dried. They can be served raw or cooked and may be  whole, cut-up, or mashed. Make half your plate fruits and vegetables.    Grains. All foods made from grains are part of the Grains Group. These include wheat, rice, oats, cornmeal, and barley such as bread, pasta, oatmeal, cereal, tortillas, and grits. Grains should be no more than a quarter of your plate. At least half of your grains should be whole grains.    Protein. This group includes meat, poultry, seafood, beans and peas, eggs, processed soy products (like tofu), nuts (including nut butters), and seeds. Make protein choices no more than a quarter of your plate. Meat and poultry choices should be lean or low fat.    Dairy. All fluid milk products and foods made from milk that contain calcium, like yogurt and cheese, are part of the Dairy Group. (Foods that have little calcium, such as cream, butter, and cream cheese, are not part of the group.) Most dairy choices should be low-fat or fat-free.    Oils. These are fats that are liquid at room temperature. They include canola, corn, olive, soybean, and sunflower oil. Foods that are mainly oil include mayonnaise, certain salad dressings, and soft margarines. You should have only 5 to 7 teaspoons of oils a day. You probably already get this much from the food you eat.  Date Last Reviewed: 8/1/2017 2000-2019 [employer]. [address] [place] [address]. All rights reserved. This information is not intended as a substitute for professional medical care. Always follow your healthcare professional's instructions.        Activities of Daily Living    Your Health Risk Assessment indicates you have difficulties with activities of daily living such as housework, bathing, preparing meals, taking medication, etc. Please make a follow up appointment for us to address this issue in more detail.  Your Health Risk Assessment indicates you feel you are not in good emotional health.    Recreation   Recreation is not limited to sports and team events. It  includes any activity that provides relaxation, interest, enjoyment, and exercise. Recreation provides an outlet for physical, mental, and social energy. It can give a sense of worth and achievement. It can help you stay healthy.    Mental Exercise and Social Involvement  Mental and emotional health is as important as physical health. Keep in touch with friends and family. Stay as active as possible. Continue to learn and challenge yourself.   Things you can do to stay mentally active are:    Learn something new, like a foreign language or musical instrument.     Play SCRABBLE or do crossword puzzles. If you cannot find people to play these games with you at home, you can play them with others on your computer through the Internet.     Join a games club--anything from card games to chess or checkers or lawn bowling.     Start a new hobby.     Go back to school.     Volunteer.     Read.   Keep up with world events.

## 2020-10-26 NOTE — PROGRESS NOTES
"SUBJECTIVE:   Stone De Paz is a 55 year old male who presents for Preventive Visit.      Patient has been advised of split billing requirements and indicates understanding: Yes   Are you in the first 12 months of your Medicare coverage?  No    Healthy Habits:     In general, how would you rate your overall health?  Poor    Frequency of exercise:  2-3 days/week    Duration of exercise:  15-30 minutes    Do you usually eat at least 4 servings of fruit and vegetables a day, include whole grains    & fiber and avoid regularly eating high fat or \"junk\" foods?  No    Taking medications regularly:  Yes    Medication side effects:  None and Lightheadedness    Ability to successfully perform activities of daily living:  Shopping requires assistance, preparing meals requires assistance, housework requires assistance, laundry requires assistance and medication administration requires assistance    Home Safety:  No safety concerns identified    Hearing Impairment:  No hearing concerns    In the past 6 months, have you been bothered by leaking of urine?  No    In general, how would you rate your overall mental or emotional health?  Fair      PHQ-2 Total Score: 1    Additional concerns today:  No     Fall risk  Fallen 2 or more times in the past year?: No  Any fall with injury in the past year?: No    Cognitive Screening   1) Repeat 3 items (Leader, Season, Table)    2) Clock draw: NORMAL  3) 3 item recall: Recalls 3 objects  Results: 3 items recalled: COGNITIVE IMPAIRMENT LESS LIKELY    Mini-CogTM Copyright SABINO Lundberg. Licensed by the author for use in [facility]; reprinted with permission (kallie@.[email or web address]). All rights reserved.      Do you have sleep apnea, excessive snoring or daytime drowsiness?: no    Reviewed and updated as needed this visit by clinical staff  Tobacco  Allergies  Meds      Soc Hx        Reviewed and updated as needed this visit by Provider                Social History     Tobacco Use     Smoking " status: Former Smoker     Quit date: 1/1/2010     Years since quitting: 10.8     Smokeless tobacco: Never Used   Substance Use Topics     Alcohol use: No     If you drink alcohol do you typically have >3 drinks per day or >7 drinks per week? No    Alcohol Use 10/26/2020   Prescreen: >3 drinks/day or >7 drinks/week? Not Applicable   No flowsheet data found.      Stone is here today for his general physical with general follow up.  He has a complicated medical history which includes diabetes type 1, pancreatic insufficiency, chronic pain syndrome, GERD with gastroparesis, hypothyroidism, hyperlipidemia and depression.  He is seeing the endocrinologist for his diabetic and iscurrently on the insulin pump which is doing pretty good.  His blood sugar has been running around 140-150 fasting.  Occasionally he would low blood sugar to the 70s, mainly at night and is symptomatic with it.   Been working closely with his endocrinologist and diabetic educator to better control his blood sugar.  No acute change in his vision.  Has not had an eye exam done for a while.  Has chronic numbness on hands and feet, unclear if it is diabetic neuropathy versus radiculopathy from degenerative joint disease of the spine.  No chest pain or shortness of breath.  No dyspnea, orthopnea or leg swelling.    He also has had as chronic acid reflux with gastroparesis.  He had a gastric pump but the battery has not been functional.  He is planning to follow up with his GI specialist to have it repaired.  He been using medical marijuana to control his gastroparesis and nausea/vomiting which has been working well.  He also seen the pain specialist for his chronic pain.  He takes oxycodone regularly.  Did not tolerate the gabapentin, Lyrica or Cymbalta.    Stated that his depression is doing okay.  It is tolerable with the Celexa which has helped.  He currently lives with his ex-wife who has been very supportive.  No suicidal or homicidal ideation.  No  hallucination.  No drugs or alcohol.  Not on counseling by choince    Otherwise, he is doingok. His last physical exam was a years ago.  No major medical care or procedure done since the last physical. No headache or dizziness.  No running nose, nasal congestion, coughing, fever or chill. No CP/SOB. No diarrhea/constipation and has no problem with urination.  No abdominal pain. No leg swelling . Not exercising due to donta. No alcohol, drug or tobacco smoking. No problem with sleeping.  Feels safe at home.  No weight change. No other concern today    Current providers sharing in care for this patient include:   Patient Care Team:  Ilan Raymond MD as PCP - General (Family Practice)  Ilan Raymond MD as Assigned PCP  Destiny Warren RD as Diabetes Educator (Dietitian, Registered)    The following health maintenance items are reviewed in Epic and correct as of today:  Health Maintenance   Topic Date Due     MEDICARE ANNUAL WELLNESS VISIT  09/07/1984     HEPATITIS B IMMUNIZATION (1 of 3 - Risk 3-dose series) 09/07/1985     ZOSTER IMMUNIZATION (1 of 2) 09/07/2016     INFLUENZA VACCINE (1) 09/01/2020     EYE EXAM  09/13/2020     DIABETIC FOOT EXAM  12/11/2020     A1C  02/06/2021     URINE DRUG SCREEN  02/10/2021     PHQ-9  02/28/2021     BMP  08/06/2021     LIPID  08/06/2021     MICROALBUMIN  08/06/2021     COLORECTAL CANCER SCREENING  07/18/2022     DTAP/TDAP/TD IMMUNIZATION (2 - Td) 01/23/2027     DEPRESSION ACTION PLAN  Completed     Pneumococcal Vaccine: Pediatrics (0 to 5 Years) and At-Risk Patients (6 to 64 Years)  Completed     HIV SCREENING  Addressed     IPV IMMUNIZATION  Aged Out     MENINGITIS IMMUNIZATION  Aged Out     BP Readings from Last 3 Encounters:   10/26/20 130/74   09/10/20 126/77   09/04/20 126/77    Wt Readings from Last 3 Encounters:   10/26/20 68 kg (150 lb)   08/31/20 67.1 kg (148 lb)   08/03/20 68 kg (150 lb)                  Patient Active Problem List   Diagnosis     Hypothyroidism      Gastroparesis     Chronic neck pain     Mild major depression (H)     Cervical stenosis of spine     Exocrine pancreatic insufficiency     Hyperlipidemia     Chronic pain syndrome     Type 1 diabetes mellitus with diabetic autonomic neuropathy (H)     Insulin pump status     Gastroesophageal reflux disease without esophagitis     Past Surgical History:   Procedure Laterality Date     C LUMBAR SPINE FUSION,ANTER APPRCH      2 L4-5 L5-21     COLONOSCOPY  07/18/12     FUSION CERVICAL ANTERIOR TWO LEVELS       INJECT BLOCK MEDIAL BRANCH CERVICAL/THORACIC/LUMBAR Bilateral 11/20/2018    Procedure: Bilateral Cervical 3-4-5 medial branch block;  Surgeon: Jef Edwards MD;  Location: [facility]     INJECT BLOCK MEDIAL BRANCH CERVICAL/THORACIC/LUMBAR N/A 1/11/2019    Procedure: Cervical 3,4,5 Bilateral Medial branch blocks;  Surgeon: Jef Edwards MD;  Location: [facility]     RADIO FREQUENCY ABLATION PULSED CERVICAL Right 2/28/2019    Procedure: RADIO FREQUENCY ABLATION CERVICAL THREE, FOUR, FIVE, AND THIRD OCCUPITAL NERVE RIGHT SIDE;  Surgeon: Jef Edwards MD;  Location: [facility]     RADIO FREQUENCY ABLATION PULSED CERVICAL Left 3/15/2019    Procedure: radiofrequency ablation cervical 3,4,5;  Surgeon: Jef Edwards MD;  Location: [facility]     RADIO FREQUENCY ABLATION PULSED CERVICAL Right 9/4/2020    Procedure: RADIOFREQUENCY ABLATION CERVICAL 3,4 5 right SIDE;  Surgeon: Jef Edwards MD;  Location: [facility]     RADIO FREQUENCY ABLATION PULSED CERVICAL Left 9/10/2020    Procedure: RADIOFREQUENCY ABLATION CERVICAL 3,4 5 LEFT SIDE;  Surgeon: Jef Edwards MD;  Location: [facility]     THORACOSCOPIC DECORTICATION LUNG  1/9/2014    Procedure: THORACOSCOPIC DECORTICATION LUNG;  RIGHT VATS/RIGHT THORACOTOMY , DECORTICATION RIGHT LUNG ,WEDGE RESECTION RIGHT MIDDLE LOBE LUNG NODULE;  Surgeon: Harley Felix MD;  Location:  OR       Social History     Tobacco Use     Smoking status: Former Smoker     Quit date: 1/1/2010     Years  since quitting: 10.8     Smokeless tobacco: Never Used   Substance Use Topics     Alcohol use: No     Family History   Problem Relation Age of Onset     Hypertension Mother      Diabetes Father         type 2     Hypertension Father      Cerebrovascular Disease Maternal Grandmother      Unknown/Adopted Maternal Grandfather      Unknown/Adopted Paternal Grandmother      Unknown/Adopted Paternal Grandfather      Liver Disease Brother      Heart Disease Sister      Thyroid Disease Sister      Thyroid Disease Sister      Thyroid Disease Sister          Current Outpatient Medications   Medication Sig Dispense Refill     atorvastatin (LIPITOR) 80 MG tablet TAKE ONE TABLET BY MOUTH AT BEDTIME - PLEASE STOP THE SIMVASTATIN 90 tablet 1     citalopram (CELEXA) 40 MG tablet TAKE ONE TABLET BY MOUTH ONCE DAILY 90 tablet 1     Continuous Blood Gluc  (DEXCOM G6 ) AMBER Use to read glucose levels per 's instructions 1 Device 0     Continuous Blood Gluc  (FREESTYLE REUBEN READER) AMBER 1 Device continuous prn (replace annually if needed) 1 Device 1     Continuous Blood Gluc Sensor (DEXCOM G6 SENSOR) MISC 1 Device continuous prn (Use for continuous glucose testing, change every 10-days) Dispense 3 box (of 3 sensors) per 3 months 3 each 5     Continuous Blood Gluc Sensor (FREESTYLE REUBEN 14 DAY SENSOR) MISC 1 each every 14 days 6 each 2     Continuous Blood Gluc Transmit (DEXCOM G6 TRANSMITTER) MISC 1 Device continuous prn (use for continuous glucose testing, change every 90 days) 1 each 3     CONTOUR NEXT TEST test strip Use to test blood sugar 5x times daily or as directed. 450 strip 3     insulin aspart (NOVOLOG VIAL) 100 UNITS/ML vial USE WITH INSULIN PUMP TOTAL DAILY DOSE APPROX 45 UNITS 20 mL 0     levothyroxine (SYNTHROID/LEVOTHROID) 125 MCG tablet TAKE ONE TABLET BY MOUTH ONCE DAILY AS DIRECTED - FOLLOW UP AS SCHEDULED 90 tablet 1     lisinopril (ZESTRIL) 40 MG tablet TAKE ONE TABLET BY MOUTH  ONCE DAILY 90 tablet 1     medical cannabis (Patient's own supply) See Admin Instructions (The purpose of this order is to document that the patient reports taking medical cannabis.  This is not a prescription, and is not used to certify that the patient has a qualifying medical condition.)       naproxen (NAPROSYN) 500 MG tablet TAKE ONE TABLET BY MOUTH TWICE A DAY AS NEEDED FOR MODERATE PAIN 180 tablet 0     omeprazole (PRILOSEC) 20 MG DR capsule TAKE ONE CAPSULE BY MOUTH ONCE DAILY 30 capsule 3     ondansetron (ZOFRAN-ODT) 8 MG ODT tab Take 1 tablet (8 mg) by mouth every 8 hours as needed (for nauesa) 60 tablet 0     oxyCODONE-acetaminophen (PERCOCET) 5-325 MG tablet Take 1 tablet by mouth every 6 hours as needed for severe pain . Max of 3/day.May fill 10/21/20 and start 10/22/20. 70 tablet 0     SUMAtriptan (IMITREX) 100 MG tablet Take 1 tablet (100 mg) by mouth at onset of headache 1/2 to 1 tablet by mouth at onset of headache. May repeat 1 dose after 2 hours if headache persists. 9 tablet 3     amylase-lipase-protease (CREON 24) 44410-86356 units CPEP per EC capsule Take 1 capsule by mouth every other day       amylase-lipase-protease (PERTZYE) 88181 units CPEP Take 1 capsule by mouth every other day       No Known Allergies  Recent Labs   Lab Test 10/26/20  1429 08/06/20  1248 01/06/20  1046 07/09/18  1723 07/09/18  1723 01/29/16  1611 01/29/16  1611   A1C 9.3* Results confirmed by repeat test 10.2*   < >  --    < >  --    LDL  --  57 155*  --  99  --   --    HDL  --  69 53  --  67  --   --    TRIG  --  41 76  --  72  --   --    ALT  --   --  20  --  31  --  49   CR  --  0.88 0.97   < > 0.92   < > 0.78   GFRESTIMATED  --  >90 89   < > 86   < > >90  Non  GFR Calc     GFRESTBLACK  --  >90 >90   < > >90   < > >90  African American GFR Calc     POTASSIUM  --  4.6 4.3   < > 4.1   < > 4.3   TSH  --  1.27 1.50   < > 38.88*   < >  --     < > = values in this interval not displayed.      Pneumonia  "Vaccine: UTD    Review of Systems   Constitutional: Positive for chills.   HENT: Negative for congestion and ear pain.    Eyes: Negative for pain.   Respiratory: Negative for cough.    Cardiovascular: Positive for chest pain.   Gastrointestinal: Positive for abdominal pain and diarrhea. Negative for constipation and hematochezia.   Genitourinary: Negative for hematuria.   Neurological: Negative for dizziness.   Psychiatric/Behavioral: The patient is nervous/anxious.        OBJECTIVE:   /74   Pulse 100   Temp 98.5  F (36.9  C) (Temporal)   Resp 14   Wt 68 kg (150 lb)   SpO2 97%   BMI 22.81 kg/m   Estimated body mass index is 22.81 kg/m  as calculated from the following:    Height as of 8/31/20: 1.727 m (5' 8\").    Weight as of this encounter: 68 kg (150 lb).  Physical Exam  GENERAL: healthy, alert and no distress  EYES: Eyes grossly normal to inspection, PERRL and conjunctivae and sclerae normal.  No nystagmus.  All 4 visual fields are intact  HENT: Ear canals and TM's normal.  Nares are non-congested. Oropharynx is pink and moist. No tender with palpation to the sinuses.  NECK: no adenopathy, supple, no lymphadenopathy or thyromegaly.  Tender with palpation to the cervical spine or its paraspinous muscle bilaterally - chronic  RESP: lungs clear to auscultation - no rales, rhonchi or wheezes  CV: regular rate and rhythm, no murmur.  ABDOMEN: soft, nondistended, nontender, no palpable masses or organomegaly with normal bowel sounds.  MS: no gross musculoskeletal defects noted, no edema.  Walk with no limping, normal gait.  All 4 extremities are equally in strength. Ankle, knees, hips, shoulders, elbows and wrists exams normal.  Back is straight, no lordosis or scoliosis.  No tender with palpation spine diffusely.  SKIN: no suspicious lesions or rashes  NEURO: Normal strength and tone, mentation intact and speech normal.  Cranial nerves II through XII intact, DTR +2 throughout, no focal neurological " deficit.  PSYCH: mentation appears normal, affect normal/bright.  Thoughts intact, no hallucination.  No suicidal or homicidal ideation.  LYMPH: no cervical, supraclavicular or axillary adenopathy.   (male): offered but he declined - he has no concern about it.      Diagnostic Test Results:  Labs reviewed in Epic  Results for orders placed or performed in visit on 10/26/20   PSA, screen     Status: None   Result Value Ref Range    PSA 0.85 0 - 4 ug/L   Hemoglobin A1c     Status: Abnormal   Result Value Ref Range    Hemoglobin A1C 9.3 (H) 0 - 5.6 %   Hepatitis B Surface Antibody     Status: None   Result Value Ref Range    Hepatitis B Surface Antibody 0.00 <8.00 m[IU]/mL       ASSESSMENT / PLAN:   1. Encounter for Medicare annual wellness exam  Stone has a very complex medical history and overall he is doing ok.  He will need hepatitis B vaccinations - no immunity activity on the lab result.  Recommended to get the shingles vaccination through his pharmacy.  Topics appropriate for his age discussed include safety issue and healthy lifestyle modification as well as falling and depression prevention. Recommended daily exercise for at least 30 minutes - or as tolerated.  Emphasized on healthy diet with adequate fluid intake and resting. Feels safe at home.  Follow in 1 year, earlier as needed.  Labs as ordered below.  Last colonoscopy was 5 years ago with sessile polyps through [facility].  He is planning to follow-up with them for colonoscopy and gastroparesis.  Discussed about the pros and cons of prostate cancer screening with PSA.  All of his questions were answered.    - Hepatitis B Surface Antibody    2. Type 1 diabetes mellitus with diabetic autonomic neuropathy (H)  Hemoglobin A1c is at 9.3 today.  He has type 1 diabetes and has been on the insulin pump for a while.  His insulin pump is being managed by the endocrinologist and diabetic educator.  I encouraged him to follow work closely with his  endocrinologist and the diabetic educator to get the hemoglobin A1c down to below 7.5.  In the meantime, emphasized on healthy lifestyle modification as discussed above.  Encouraged him to get the eye exam as soon as possible.  Continue to monitor his blood sugar closely.  Foot care on a daily basis were also recommended.    - Hemoglobin A1c    3. Exocrine pancreatic insufficiency  Overall it is stable.  Continue with the Creon supplement.  Follow-up with his GI specialist as needed.    4. Chronic pain syndrome  Due to DJd of the spine.  He has had it for years.  It is being managed by the pain specialist.  He is on oxycodone and medical marijuana.  For the most part, the pain is tolerable/controlled.  Again, encouraged to work closely with the pain specialist as per her recommendation.  Did not tolerate Cymbalta, Lyrica, gabapentin or TCA.    5. Gastroesophageal reflux disease without esophagitis  Chronic and stable.  Encouraged to avoid late meals as well as fatty, greasy, or spicy food.  Also encouraged to avoid excessive NSAID intake.  He denies of alcohol consumption.  Continue omeprazole as prescribed.. Encourage to call in symptoms persists or worse.      6. Gastroparesis  Nausea and vomiting are controlled with medical marijuana and Zofran.  Gastric pump was working well but has not been functional.  Recommended to follow-up with the GI specialist for this.    7. Hypothyroidism, unspecified type  Recent TSH level was normal.  No change on levothyroxine dose.    8. Hyperlipidemia, unspecified hyperlipidemia type  Recent his cholesterol level was also normal.  He tolerates the Lipitor well and will continue with it for now.  Healthy lifestyle modification as discussed above.    9. Mild major depression (H)  Stable and tolerates the Celexa well.  No suicidal or homicidal ideation.  No hallucination.  Declined counseling.  Will continue with the Celexa at the current dose.  Tried multiple medicines for this in  "the past but they were either intolerable or ineffective.    10. Screening for prostate cancer  Discussed with him about the pros and cons of prostate screening cancer with PSA.  Also educated about the potential false positive which may require unnecessary work-up.  He was informed of negative/normal PSA leve does not rule out prostate cancer completely although it is very unlikely.  He understands and is willing to take the risk.     - PSA, screen    11. Encounter for screening for other viral diseases     - Hepatitis B Surface Antibody    12.  HTN  BP is normal and stable. Continue with the current doses of lisinopril, been tolerating it well.  Emphasized on healthy/low salt diet and daily excercise.  Avoid high sugar/caffeine intake. Lab as ordered.  Follow up in 6 month, earlier as needed.     Patient has been advised of split billing requirements and indicates understanding: Yes  COUNSELING:  Reviewed preventive health counseling, as reflected in patient instructions       Regular exercise       Healthy diet/nutrition       Vision screening       Hearing screening       Dental care       Fall risk prevention       Aspirin Prophylaxsis       Alcohol Use       Hepatitis C screening       Colon cancer screening       Prostate cancer screening       Osteoporosis Prevention/Bone Health    Estimated body mass index is 22.81 kg/m  as calculated from the following:    Height as of 8/31/20: 1.727 m (5' 8\").    Weight as of this encounter: 68 kg (150 lb).        He reports that he quit smoking about 10 years ago. He has never used smokeless tobacco.      Appropriate preventive services were discussed with this patient, including applicable screening as appropriate for cardiovascular disease, diabetes, osteopenia/osteoporosis, and glaucoma.  As appropriate for age/gender, discussed screening for colorectal cancer, prostate cancer, breast cancer, and cervical cancer. Checklist reviewing preventive services available has " been given to the patient.    Reviewed patients plan of care and provided an AVS. The Basic Care Plan (routine screening as documented in Health Maintenance) for Stone meets the Care Plan requirement. This Care Plan has been established and reviewed with the Patient.    Counseling Resources:  ATP IV Guidelines  Pooled Cohorts Equation Calculator  Breast Cancer Risk Calculator  Breast Cancer: Medication to Reduce Risk  FRAX Risk Assessment  ICSI Preventive Guidelines  Dietary Guidelines for Americans, 2010  [employer]'s MyPlate  ASA Prophylaxis  Lung CA Screening    Ilan Chew Mai, MD  [facility]    Identified Health Risks:

## 2020-10-26 NOTE — TELEPHONE ENCOUNTER
----- Message from Ilan Chew Mai, MD sent at 10/26/2020  3:56 PM CDT -----  Please let patient know that his prostate enzyme level was normal.  Hemoglobin A1c was 9.3, dropped down from 10.2 about 9 months ago.  Please work with the endocrinologist closely to get his diabetes better control.  Thank you     Ilan Raymond MD.

## 2020-10-26 NOTE — PROGRESS NOTES
"    The patient was provided with suggestions to help him develop a healthy physical lifestyle.  The patient was counseled and encouraged to consider modifying their diet and eating habits. He was provided with information on recommended healthy diet options.  The patient reports that he has difficulty with activities of daily living. I have asked that the patient make a follow up appointment in as needed where this issue will be further evaluated and addressed.  The patient was provided with suggestions to help him develop a healthy emotional lifestyle.      Answers for HPI/ROS submitted by the patient on 10/26/2020   Annual Exam:  In general, how would you rate your overall physical health?: poor  Frequency of exercise:: 2-3 days/week  Do you usually eat at least 4 servings of fruit and vegetables a day, include whole grains & fiber, and avoid regularly eating high fat or \"junk\" foods? : No  Taking medications regularly:: Yes  Medication side effects:: None, Lightheadedness  Activities of Daily Living: shopping requires assistance, preparing meals requires assistance, housework requires assistance, laundry requires assistance, medication administration requires assistance  Home safety: no safety concerns identified  Hearing Impairment:: no hearing concerns  In the past 6 months, have you been bothered by leaking of urine?: No  abdominal pain: Yes  Blood in stool: No  Blood in urine: No  chest pain: Yes  chills: Yes  congestion: No  constipation: No  cough: No  diarrhea: Yes  dizziness: No  ear pain: No  eye pain: No  nervous/anxious: Yes  In general, how would you rate your overall mental or emotional health?: fair  Additional concerns today:: No  Duration of exercise:: 15-30 minutes    "

## 2020-10-27 ENCOUNTER — TELEPHONE (OUTPATIENT)
Dept: FAMILY MEDICINE | Facility: CLINIC | Age: 55
End: 2020-10-27

## 2020-10-27 LAB — HBV SURFACE AB SERPL IA-ACNC: 0 M[IU]/ML

## 2020-10-27 NOTE — TELEPHONE ENCOUNTER
----- Message from Ilan Chew Mai, MD sent at 10/27/2020 12:01 PM CDT -----  Please let patient know that the lab suggested he is not immunized to hepatitis B.  I recommend to start hepatitis B vaccination series.       Ilan Raymond MD.

## 2020-10-27 NOTE — PROGRESS NOTES
"Stone De Paz is a 55 year old male who is being evaluated via a billable telephone visit.      The patient has been notified of following:     \"This telephone visit will be conducted via a call between you and your physician/provider. We have found that certain health care needs can be provided without the need for a physical exam.  This service lets us provide the care you need with a short phone conversation.  If a prescription is necessary we can send it directly to your pharmacy.  If lab work is needed we can place an order for that and you can then stop by our lab to have the test done at a later time.    Telephone visits are billed at different rates depending on your insurance coverage. During this emergency period, for some insurers they may be billed the same as an in-person visit.  Please reach out to your insurance provider with any questions.    If during the course of the call the physician/provider feels a telephone visit is not appropriate, you will not be charged for this service.\"    Patient has given verbal consent for Telephone visit?  Yes    What phone number would you like to be contacted at? [phone]    How would you like to obtain your AVS? Patient stated they do not want to have an AVS at this time.    Duration of phone visit: 18 minutes    [facility]  [facility]  Follow up clinic visit    Date of visit: 10/27/2020    Chief complaint:   Chief Complaint   Patient presents with     Pain     Telephone visit due to Covid-19       Interval history:  Stone De Paz is a 55 year old male with a history of DM1 with autonomic neuropathy, depression, cervical stenosis and GERD who follows with Natasha Padgett PA-C for:  - chronic neck pain, cervical facet arthropathy.   - chronic low back pain  - chronic use of opioids  Last seen by Natasha on 8/3/2020.  he is seeing me while Natasha " "is on leave.       Recommendations/plan at the last visit included:     1. Physical Therapy:  NO   2. Clinical Health Psychologist:  NO  3. Diagnostic Studies:  None at this time  4. Medication Management:    1.   Percocet 5-325 2/day. Okay to use 3 occasionally if needed.   2.   Continue medical cannabis     5. Recommendations to PCP. See above  6. Procedures: referred for repeat cervical RFA    Since his last visit, Stone De Paz reports:  - chronic pain all over (s/p thoracotomy - has rib pain)  - primary concern is neck   - also LB  - s/p Left C3,4,5 cervical RFA - 9/10/2020. Had substantial pain afterwards. It is a deep soreness (like his mm are pulled apart, thinks this is procedure related). Still is having this pain. Lies down to manage. It is improving with time  - Hasn't seen Dr. Edwards about it  - Seen in the ED on 10/1/2020 for neck pain. Assessed to be likely spasm.      Pain scores:  Pain intensity on average is 4 on a scale of 0-10.     Current pain treatments:   CURRENT RELEVANT PAIN MEDICATIONS:  Naproxen 500mg-taking 1/day  Oxycodone 5mg-taking 2 in AM and will occasionally take a 3rd in the afternoon  - #70 for 30 days  Imitrex-last used about 3-4 days ago  Medical Cannabis    Patient is using the medication as prescribed:  YES  Is your medication helpful? YES   Side Effects: High-feeling from cannabis    Past pain treatments:  Previous Medications: (H--helped; HI--Helped initially; SWH-- somewhat helpful, NH--No help; W--worse; SE--side effects)   Opiates: Oxycodone H, Tylenol #3 NH, Hydrocodone NH, Dilaudid H SE \"ick\", Morphine H SE nausea, Tramadol NH  NSAIDS: Naproxen H SE stomach upset  Muscle Relaxants: Flexeril H SE incontinence  Anti-migraine mediations: Imitrex H  Anti-depressants: Amitriptyline NH SE rapid heart  Sleep aids: none  Anxiolytics: Valium [facility]  Neuropathics: Gabapentin NH SE \"goofy\", Lyrica ? Was too expensive  Topicals: Voltaren Gel NH, Lidocaine NH  Other medications not " covered above: Tylenol H, Prednisone H SE high sugars, Medical marijuana H SE cost constraints     Past Pain Treatments:  Pain Clinic:   No   PT: Yes, last went this year  Psychologist: No, but he did have to do some psychology prior to his back surgeryes  Relaxation techniques/biofeedback: No, but tries to do meditation at times   Chiropractor: No  Acupuncture: No  Pharmacotherapy:               Opioids: Yes                Non-opioids:    Yes   TENs Unit:Yes, was not helpful  Injections: Yes, right 3rd occipital nerve and right C3, C4, C5 RFA 02/28/2019, left 3rd occipital nerve, and left C3, C4, C5 RFA 03/15/2019. Had done low back injections in [place] as well (steroids mess with sugars therefore tries to avoid if possible).   Self-care:   Yes, cold works better (heat worsens pain)  Surgeries related to pain: Yes, C5-6 ACDF 2012. Disk replacement cervical spine 2006, low back fusions (2000, 2002)    Medications:  Current Outpatient Medications   Medication Sig Dispense Refill     amylase-lipase-protease (CREON 24) 46879-38856 units CPEP per EC capsule Take 1 capsule by mouth every other day       amylase-lipase-protease (PERTZYE) 28752 units CPEP Take 1 capsule by mouth every other day       atorvastatin (LIPITOR) 80 MG tablet TAKE ONE TABLET BY MOUTH AT BEDTIME - PLEASE STOP THE SIMVASTATIN 90 tablet 1     citalopram (CELEXA) 40 MG tablet TAKE ONE TABLET BY MOUTH ONCE DAILY 90 tablet 1     Continuous Blood Gluc  (DEXCOM G6 ) AMBER Use to read glucose levels per 's instructions 1 Device 0     Continuous Blood Gluc  (FREESTYLE REUBEN READER) AMBER 1 Device continuous prn (replace annually if needed) 1 Device 1     Continuous Blood Gluc Sensor (DEXCOM G6 SENSOR) MISC 1 Device continuous prn (Use for continuous glucose testing, change every 10-days) Dispense 3 box (of 3 sensors) per 3 months 3 each 5     Continuous Blood Gluc Sensor (FREESTYLE REUBEN 14 DAY SENSOR) MISC 1 each every  14 days 6 each 2     Continuous Blood Gluc Transmit (DEXCOM G6 TRANSMITTER) MISC 1 Device continuous prn (use for continuous glucose testing, change every 90 days) 1 each 3     CONTOUR NEXT TEST test strip Use to test blood sugar 5x times daily or as directed. 450 strip 3     insulin aspart (NOVOLOG VIAL) 100 UNITS/ML vial USE WITH INSULIN PUMP TOTAL DAILY DOSE APPROX 45 UNITS 20 mL 0     levothyroxine (SYNTHROID/LEVOTHROID) 125 MCG tablet TAKE ONE TABLET BY MOUTH ONCE DAILY AS DIRECTED - FOLLOW UP AS SCHEDULED 90 tablet 1     lisinopril (ZESTRIL) 40 MG tablet TAKE ONE TABLET BY MOUTH ONCE DAILY 90 tablet 1     medical cannabis (Patient's own supply) See Admin Instructions (The purpose of this order is to document that the patient reports taking medical cannabis.  This is not a prescription, and is not used to certify that the patient has a qualifying medical condition.)       naproxen (NAPROSYN) 500 MG tablet TAKE ONE TABLET BY MOUTH TWICE A DAY AS NEEDED FOR MODERATE PAIN 180 tablet 0     omeprazole (PRILOSEC) 20 MG DR capsule TAKE ONE CAPSULE BY MOUTH ONCE DAILY 30 capsule 3     ondansetron (ZOFRAN-ODT) 8 MG ODT tab Take 1 tablet (8 mg) by mouth every 8 hours as needed (for nauesa) 60 tablet 0     oxyCODONE-acetaminophen (PERCOCET) 5-325 MG tablet Take 1 tablet by mouth every 6 hours as needed for severe pain . Max of 3/day.May fill 10/21/20 and start 10/22/20. 70 tablet 0     SUMAtriptan (IMITREX) 100 MG tablet Take 1 tablet (100 mg) by mouth at onset of headache 1/2 to 1 tablet by mouth at onset of headache. May repeat 1 dose after 2 hours if headache persists. 9 tablet 3       Medical History: any changes in medical history since they were last seen? Thinks his gastric stimulator isn't working. Has plans to have this done    Review of Systems:  The 14 system ROS was reviewed from the intake questionnaire, and is positive for: nausea and vomiting, diarrhea occasionally  Any bowel or bladder problems: no  Mood:  up and down    Physical Exam:  There were no vitals taken for this visit.  Psych: affect full. Mood is good  Neuro: speech is fluent    Controlled Substance Agreement:   Encounter-Level CSA - 11/27/2013:    Controlled Substance Agreement - Scan on 10/7/2014  2:00 PM: CONTROLLED MEDICATION AGREEMENT-11/27/13     Patient-Level CSA:    Controlled Substance Agreement - Opioid - Scan on 2/10/2020  1:21 PM  Controlled Substance Agreement - Opioid - Scan on 5/10/2019  1:12 PM  Controlled Substance Agreement - Opioid - Scan on 5/2/2019  2:33 PM          UDS:   Pain Drug SCR UR W RPTD Meds   Date Value Ref Range Status   02/10/2020 FINAL  Final     Comment:     (Note)  ====================================================================  [facility] COMP DRUG ANALYSIS,UR  ====================================================================  Test                             Result       Flag       Units        Drug Present and Declared for Prescription Verification   Carboxy-THC                    2352         EXPECTED   ng/mg creat    Carboxy-THC is a metabolite of tetrahydrocannabinol  (THC).    Source of THC is most commonly illicit, but THC is also present    in a scheduled prescription medication.   Oxycodone                      1143         EXPECTED   ng/mg creat   Oxymorphone                    679          EXPECTED   ng/mg creat   Noroxycodone                   1607         EXPECTED   ng/mg creat   Noroxymorphone                 260          EXPECTED   ng/mg creat    Sources of oxycodone are scheduled prescription medications.    Oxymorphone, noroxycodone, and noroxymorphone are expected    metabolites of oxycodone. Oxymorphone is also available as a    scheduled prescription medication.  Drug Present not Declared for Prescription Verification   Citalopram                     PRESENT      UNEXPECTED               Desmethylcitalopram            PRESENT      UNEXPECTED                Desmethylcitalopram is an expected  metabolite of citalopram or    the enantiomeric form, escitalopram.   Naproxen                       PRESENT      UNEXPECTED              ====================================================================  Test                      Result    Flag   Units      Ref Range        Creatinine              42               mg/dL      >=20            ====================================================================  Declared Medications:  The flagging and interpretation on this report are based on the  following declared medications.  Unexpected results may arise from  inaccuracies in the declared medications.  **Note: The testing scope of this panel includes these medications:  Marijuana (THC)  Oxycodone  ====================================================================  For clinical consultation, please call [phone].  ====================================================================  Analysis performed by [employer] [place] [address]          THE 4 As OF OPIOID MAINTENANCE ANALGESIA    Analgesia: Is pain relief clinically significant? YES   Activity: Is patient functional and able to perform Activities of Daily Living? YES   Adverse effects: Is patient free from adverse side effects from opiates? YES   Adherence to Rx protocol: Is patient adhering to Controlled Substance Agreement and taking medications ONLY as ordered? YES    MME: 15    Is Narcan prescribed for opiate use >50 MME daily? N/A     :  [place] Prescription Drug Monitoring Program (PDMP) Link  Checked and as expected      Assessment:   Stone De Paz is a 55 year old male who is seen at the pain clinic for:       Chronic pain syndrome  Chronic neck pain  Myofascial pain  Chronic, continuous use of opioids    He had substantial axial neck pain after his cervical RFA. Improving slowly. I encouraged him to follow up with Dr. Edwards about this. He is otherwise stable and likely ready to transition back to PCP management in the  near future.     Plan:  Additional Workup:   1. - Diagnostic Studies:  no  2. - Laboratory studies:  no  3. - Urine toxicology screen today: no     Therapies:   4. - Physical Therapy: no  5. - Clinical Health Psychologist to address issues of relaxation, behavioral change, coping style, and other factors important to improvement: not at this time    Medication Management:   6. - Continue Percocet 5/325mg. Max #3 per day. #70 tabs for 30 days  7. - Continue medical marijuana    Further procedures recommended:   8. - none    Follow up: with Natasha in 3 months    Lynne Huddleston MD  [facility]

## 2020-10-27 NOTE — TELEPHONE ENCOUNTER
LM for patient to call [phone] please relay message below when patient returns call.  Rosalee Schuler, CMA

## 2020-10-28 ENCOUNTER — VIRTUAL VISIT (OUTPATIENT)
Dept: PALLIATIVE MEDICINE | Facility: CLINIC | Age: 55
End: 2020-10-28
Payer: COMMERCIAL

## 2020-10-28 DIAGNOSIS — G89.4 CHRONIC PAIN SYNDROME: Primary | ICD-10-CM

## 2020-10-28 DIAGNOSIS — M54.2 CHRONIC NECK PAIN: ICD-10-CM

## 2020-10-28 DIAGNOSIS — M79.18 MYOFASCIAL PAIN: ICD-10-CM

## 2020-10-28 DIAGNOSIS — F11.90 CHRONIC, CONTINUOUS USE OF OPIOIDS: ICD-10-CM

## 2020-10-28 DIAGNOSIS — G89.29 CHRONIC NECK PAIN: ICD-10-CM

## 2020-10-28 PROCEDURE — 99213 OFFICE O/P EST LOW 20 MIN: CPT | Mod: 95 | Performed by: STUDENT IN AN ORGANIZED HEALTH CARE EDUCATION/TRAINING PROGRAM

## 2020-10-28 ASSESSMENT — PAIN SCALES - GENERAL: PAINLEVEL: MODERATE PAIN (4)

## 2020-10-28 NOTE — PATIENT INSTRUCTIONS
1. Continue your medications as you are currently using  2. I encourage you to make an appointment with Dr. Edwards to discuss your pain exacerbation post-procedure.    Follow up with Natasha in 3 months    Contact the clinic in the mean time if you have questions, concerns or other issues that come up.     Lynne Huddleston MD  [facility]    ----------------------------------------------------------------  Clinic Number:  [phone]     Call with any questions about your care and for scheduling assistance.     Calls are returned Monday through Friday between 8 AM and 4:30 PM. We usually get back to you within 2 business days depending on the issue/request.    If we are prescribing your medications:    For opioid medication refills, call the clinic or send a Sawerly message 7 days in advance.  Please include:    Name of requested medication    Name of the pharmacy.    For non-opioid medications, call your pharmacy directly to request a refill. Please allow 3-4 days to be processed.     Per [place] State Law:    All controlled substance prescriptions must be filled within 30 days of being written.      For those controlled substances allowing refills, pickup must occur within 30 days of last fill.      We believe regular attendance is key to your success in our program!      Any time you are unable to keep your appointment we ask that you call us at least 24 hours in advance to cancel.This will allow us to offer the appointment time to another patient.     Multiple missed appointments may lead to dismissal from the clinic.

## 2020-11-09 PROBLEM — I10 ESSENTIAL HYPERTENSION: Status: ACTIVE | Noted: 2020-11-09

## 2020-11-10 DIAGNOSIS — K21.9 GASTROESOPHAGEAL REFLUX DISEASE WITHOUT ESOPHAGITIS: ICD-10-CM

## 2020-11-10 NOTE — TELEPHONE ENCOUNTER
"  Requested Prescriptions   Pending Prescriptions Disp Refills     omeprazole (PRILOSEC) 20 MG DR capsule 30 capsule 3     Sig: TAKE ONE CAPSULE BY MOUTH ONCE DAILY   10/26/2020    PPI Protocol Passed - 11/10/2020 11:18 AM        Passed - Not on Clopidogrel (unless Pantoprazole ordered)        Passed - No diagnosis of osteoporosis on record        Passed - Recent (12 mo) or future (30 days) visit within the authorizing provider's specialty     Patient has had an office visit with the authorizing provider or a provider within the authorizing providers department within the previous 12 mos or has a future within next 30 days. See \"Patient Info\" tab in inbasket, or \"Choose Columns\" in Meds & Orders section of the refill encounter.              Passed - Medication is active on med list        Passed - Patient is age 18 or older         Resending prescription to reflect current refills and patient request for 90 day supply  Юлия Carlos RN      "

## 2020-11-20 DIAGNOSIS — M48.02 CERVICAL STENOSIS OF SPINE: ICD-10-CM

## 2020-11-20 DIAGNOSIS — G89.4 CHRONIC PAIN SYNDROME: ICD-10-CM

## 2020-11-20 RX ORDER — OXYCODONE AND ACETAMINOPHEN 5; 325 MG/1; MG/1
1 TABLET ORAL EVERY 6 HOURS PRN
Qty: 70 TABLET | Refills: 0 | Status: SHIPPED | OUTPATIENT
Start: 2020-11-20 | End: 2020-12-18

## 2020-11-20 NOTE — TELEPHONE ENCOUNTER
Informed patient that their refill of oxyCODONE-acetaminophen (PERCOCET) 5-325 M was E-scribed to the pharmacy. Patient was informed of fill and start date.    Shari YOO [facility]  [place]

## 2020-11-20 NOTE — TELEPHONE ENCOUNTER
Received Brows[employer]hart message from patient requesting refill(s) for oxyCODONE-acetaminophen (PERCOCET) 5-325 MG tablet     Last dispensed from pharmacy on 10/22/2020    Pt last seen by prescribing provider on 10/28/2020  Next appt scheduled for 1/27/2021     checked in the past 6 months? YES If no, print current report and give to RN    Last urine drug screen date 2/10/2020  Current opioid agreement on file? Yes Date of opioid agreement: 2/10/2020    E-prescribe to:    [facility] [place] - [address]    Will route to nursing pool for review and preparation of prescription(s).

## 2020-11-20 NOTE — TELEPHONE ENCOUNTER
Medication refill information reviewed.     Due date for oxyCODONE-acetaminophen (PERCOCET) 5-325 MG tablet  is 11/21/20     Prescriptions prepped for review.     Will route to provider.

## 2020-11-20 NOTE — TELEPHONE ENCOUNTER
Reason for call:  Medication   If this is a refill request, has the caller requested the refill from the pharmacy already? No  Will the patient be using a [facility]? No  Name of the pharmacy and phone number for the current request: [facility] - [place], [place] - [address]    Name of the medication requested: oxyCODONE-acetaminophen (PERCOCET) 5-325 MG tablet    Other request: none    Phone number to reach patient:  Cell number on file:    Telephone Information:   Mobile [phone]       Best Time:  anytime    Can we leave a detailed message on this number?  YES    Travel screening: Not Applicable     Leesa SIMON    [facility]

## 2020-11-20 NOTE — TELEPHONE ENCOUNTER
Script Eprescribed to pharmacy    Will send this to MA team to notify patient.    Signed Prescriptions:                        Disp   Refills    oxyCODONE-acetaminophen (PERCOCET) 5-325 M*70 tab*0        Sig: Take 1 tablet by mouth every 6 hours as needed for           severe pain . Max of 3/day.May fill 11/19/20 and           start 11/21/20.  Authorizing Provider: RICHARD HYMAN MD  [facility]

## 2020-12-07 NOTE — LETTER
[facility]  [address] [place] [address]  [phone]          6/13/2018      Stone De Paz  [address] [address] [place] [id number]        Dear Stone,       Your physician requested that you make an appointment to see a Nurse Educator and/or Dietitian in the Diabetes and Nutrition Self-Management Program.  Our records indicate that we have attempted to contact you without success.  We will continue to make every effort to assist you in the successful management of diabetes and nutrition needs.  If you wish to commit to a future appointment, please call [phone].  If you are not interested in participating in our program, please inform your physician at your next visit.        Sincerely,         [facility]  For information on any other [facility] services, including physician referral, class registrations and general information, please call [facility] at [phone] or visit our website at [email or web address].  It is your responsibility to verify coverage with your insurance provider prior to your appointment.   
Prescription called to pharmacy

## 2020-12-18 DIAGNOSIS — M48.02 CERVICAL STENOSIS OF SPINE: ICD-10-CM

## 2020-12-18 DIAGNOSIS — G89.4 CHRONIC PAIN SYNDROME: ICD-10-CM

## 2020-12-18 RX ORDER — OXYCODONE AND ACETAMINOPHEN 5; 325 MG/1; MG/1
1 TABLET ORAL EVERY 6 HOURS PRN
Qty: 70 TABLET | Refills: 0 | Status: SHIPPED | OUTPATIENT
Start: 2020-12-18 | End: 2021-01-18

## 2020-12-18 NOTE — TELEPHONE ENCOUNTER
Routing to provider to review medication prepped per below    Percocet 5-325, #70, Refill:No  Sig:. Max of 3/day.May fill 12/19/20 and start 12/21/20.  Last picked up 11/20/20 with start on 11/21/20  Due 12/21/20    Per last OV note 10/28/20:  Medication Management:   6. - Continue Percocet 5/325mg. Max #3 per day. #70 tabs for 30 days    Jena ROBERSON, RN Care Coordinator  [facility]  Pain Management

## 2020-12-18 NOTE — TELEPHONE ENCOUNTER
Script Eprescribed to pharmacy    Will send this to MA team to notify patient.  Patient is overdue for follow up. Please have him schedule follow up appointment prior to next refill.     Signed Prescriptions:                        Disp   Refills    oxyCODONE-acetaminophen (PERCOCET) 5-325 M*70 tab*0        Sig: Take 1 tablet by mouth every 6 hours as needed for           severe pain . Max of 3/day.May fill 12/19/20 and           start 12/21/20.  Authorizing Provider: RICHARD HYMAN MD  [facility]

## 2020-12-18 NOTE — TELEPHONE ENCOUNTER
Received call from patient requesting refill(s) of  oxyCODONE-acetaminophen (PERCOCET) 5-325 MG tablet    Last dispensed from pharmacy on 11/20/20    Patient's last office/virtual visit by prescribing provider on 10/28/20  Next office/virtual appointment scheduled for 01/27/21     checked in the past 6 months? Yes If no, print current report and give to RN    Last urine drug screen date 02/10/20  Current opioid agreement on file (completed within the last year) Yes Date of opioid agreement: 02/10/20    [facility]prescribe to pharmacy-[facility] - [place], [place] - [address]     Will route to nursing pool for review and preparation of prescription(s).

## 2020-12-18 NOTE — TELEPHONE ENCOUNTER
Reason for call:  Medication   If this is a refill request, has the caller requested the refill from the pharmacy already? No  Will the patient be using a [facility]? No  Name of the pharmacy and phone number for the current request: [facility] - [place], [place] - [address]    Name of the medication requested: oxyCODONE-acetaminophen (PERCOCET) 5-325 MG tablet    Other request:     Phone number to reach patient:  Cell number on file:    Telephone Information:   Mobile [phone]       Best Time:      Can we leave a detailed message on this number?  YES    Travel screening: Not Applicable

## 2020-12-21 DIAGNOSIS — E10.43 TYPE 1 DIABETES MELLITUS WITH DIABETIC AUTONOMIC NEUROPATHY (H): ICD-10-CM

## 2020-12-21 NOTE — TELEPHONE ENCOUNTER
Requested Prescriptions   Pending Prescriptions Disp Refills     insulin aspart (NOVOLOG VIAL) 100 UNITS/ML vial 20 mL 0     Sig: USE WITH INSULIN PUMP TOTAL DAILY DOSE APPROX 45 UNITS       There is no refill protocol information for this order        Last Written Prescription Date:  9/29/2020  Last Fill Quantity: 20 ml,  # refills:    Last office visit: 10/26/2020 with prescribing provider:       Future Office Visit:

## 2020-12-21 NOTE — TELEPHONE ENCOUNTER
Prescription approved per [facility] Refill Protocol.    Dolores Ferro RN

## 2020-12-28 DIAGNOSIS — F41.1 GENERALIZED ANXIETY DISORDER: ICD-10-CM

## 2020-12-28 DIAGNOSIS — I10 ESSENTIAL HYPERTENSION: ICD-10-CM

## 2020-12-28 DIAGNOSIS — E78.5 HYPERLIPIDEMIA LDL GOAL <100: ICD-10-CM

## 2020-12-28 RX ORDER — CITALOPRAM HYDROBROMIDE 40 MG/1
TABLET ORAL
Qty: 90 TABLET | Refills: 1 | Status: SHIPPED | OUTPATIENT
Start: 2020-12-28 | End: 2021-06-28

## 2020-12-28 RX ORDER — ATORVASTATIN CALCIUM 80 MG/1
TABLET, FILM COATED ORAL
Qty: 90 TABLET | Refills: 1 | Status: SHIPPED | OUTPATIENT
Start: 2020-12-28 | End: 2021-06-28

## 2020-12-28 RX ORDER — LISINOPRIL 40 MG/1
TABLET ORAL
Qty: 90 TABLET | Refills: 1 | Status: SHIPPED | OUTPATIENT
Start: 2020-12-28 | End: 2021-06-28

## 2020-12-28 NOTE — TELEPHONE ENCOUNTER
Prescription approved per [facility] Refill Protocol.    Dolores Ferro RN

## 2021-01-11 DIAGNOSIS — E03.9 HYPOTHYROIDISM, UNSPECIFIED TYPE: ICD-10-CM

## 2021-01-11 RX ORDER — LEVOTHYROXINE SODIUM 125 UG/1
TABLET ORAL
Qty: 90 TABLET | Refills: 1 | Status: SHIPPED | OUTPATIENT
Start: 2021-01-11 | End: 2021-07-11

## 2021-01-11 NOTE — TELEPHONE ENCOUNTER
Prescription approved per [facility] Refill Protocol.    Dolores Ferro RN

## 2021-01-18 DIAGNOSIS — M48.02 CERVICAL STENOSIS OF SPINE: ICD-10-CM

## 2021-01-18 DIAGNOSIS — G89.4 CHRONIC PAIN SYNDROME: ICD-10-CM

## 2021-01-18 RX ORDER — OXYCODONE AND ACETAMINOPHEN 5; 325 MG/1; MG/1
1 TABLET ORAL EVERY 6 HOURS PRN
Qty: 70 TABLET | Refills: 0 | Status: SHIPPED | OUTPATIENT
Start: 2021-01-18 | End: 2021-02-18

## 2021-01-18 NOTE — TELEPHONE ENCOUNTER
Received call from patient requesting refill(s) of oxyCODONE-acetaminophen (PERCOCET) 5-325 MG tablet     Last dispensed from pharmacy on 12/18/2020    Patient's last office/virtual visit by prescribing provider on 10/28/2020  Next office/virtual appointment scheduled for 1/27/2021    Last urine drug screen date 2/10/2020  Current opioid agreement on file (completed within the last year) Yes Date of opioid agreement: 2/10/2020    E-prescribe to  [address]    pharmacy    Will route to nursing [place] for review and preparation of prescription(s).

## 2021-01-18 NOTE — TELEPHONE ENCOUNTER
Refill appropriate. Sent to requested pharmacy.    [place] Prescription Monitoring Program checked on 1/18/21.    Natasha Padgett, PAC

## 2021-01-18 NOTE — TELEPHONE ENCOUNTER
Reason for call:  Medication   If this is a refill request, has the caller requested the refill from the pharmacy already? No  Will the patient be using a [facility]? No  Name of the pharmacy and phone number for the current request: [facility] - [place], [place] - [address]    Name of the medication requested: oxyCODONE-acetaminophen (PERCOCET) 5-325 MG tablet    Other request: none    Phone number to reach patient:  Home number on file [phone] (home)    Best Time:  anytime    Can we leave a detailed message on this number?  YES    Travel screening: Not Applicable     Leesa SIMON    [facility]

## 2021-01-18 NOTE — TELEPHONE ENCOUNTER
Medication refill information reviewed.     Due date for  oxyCODONE-acetaminophen (PERCOCET) 5-325 MG tablet  is 1/20/21     Prescriptions prepped for review.     Will route to provider.

## 2021-01-18 NOTE — TELEPHONE ENCOUNTER
LM generic on vm, please relay message below.   Lisa/MA  [facility] [place]

## 2021-01-19 NOTE — TELEPHONE ENCOUNTER
LONNIE generic on un id eliseo Gonzlaez/MA  [facility] [place]

## 2021-01-27 ENCOUNTER — VIRTUAL VISIT (OUTPATIENT)
Dept: PALLIATIVE MEDICINE | Facility: CLINIC | Age: 56
End: 2021-01-27
Payer: COMMERCIAL

## 2021-01-27 DIAGNOSIS — M54.2 CHRONIC NECK PAIN: Primary | ICD-10-CM

## 2021-01-27 DIAGNOSIS — M62.838 SPASM OF MUSCLE: ICD-10-CM

## 2021-01-27 DIAGNOSIS — G89.29 CHRONIC NECK PAIN: Primary | ICD-10-CM

## 2021-01-27 DIAGNOSIS — G89.4 CHRONIC PAIN SYNDROME: ICD-10-CM

## 2021-01-27 PROCEDURE — 99214 OFFICE O/P EST MOD 30 MIN: CPT | Mod: 95 | Performed by: STUDENT IN AN ORGANIZED HEALTH CARE EDUCATION/TRAINING PROGRAM

## 2021-01-27 ASSESSMENT — PAIN SCALES - GENERAL: PAINLEVEL: MODERATE PAIN (4)

## 2021-01-27 NOTE — PATIENT INSTRUCTIONS
1. Schedule acupuncture with Dr. Maura Bone:   [email or web address]  Appointments Phone Line: [phone]    2. Continue to use topical medications, TENS, stretching and massage.     Follow up with Natasha Padgett in 3 months     Lynne Huddleston MD  [facility]  ----------------------------------------------------------------  Clinic Number:  [phone]     Call with any questions about your care and for scheduling assistance.     Calls are returned Monday through Friday between 8 AM and 4:30 PM. We usually get back to you within 2 business days depending on the issue/request.    If we are prescribing your medications:    For opioid medication refills, call the clinic or send a [employer] message 7 days in advance.  Please include:    Name of requested medication    Name of the pharmacy.    For non-opioid medications, call your pharmacy directly to request a refill. Please allow 3-4 days to be processed.     Per [place] State Law:    All controlled substance prescriptions must be filled within 30 days of being written.      For those controlled substances allowing refills, pickup must occur within 30 days of last fill.      We believe regular attendance is key to your success in our program!      Any time you are unable to keep your appointment we ask that you call us at least 24 hours in advance to cancel.This will allow us to offer the appointment time to another patient.     Multiple missed appointments may lead to dismissal from the clinic.

## 2021-01-27 NOTE — PROGRESS NOTES
[facility]  Follow up clinic visit    Date of visit: 1/27/2021     Chief complaint:   Chief Complaint   Patient presents with     Pain     Telephone visit due to COVID-19        Interval history:  Stone De Paz is a 55 year old male with a history of DM1 with autonomic neuropathy, depression, cervical stenosis and GERD who follows with Natasha Padgett PA-C for:  - chronic neck pain, cervical facet arthropathy.   - chronic low back pain  - chronic use of opioids  Last seen by me on 10/27/2020.      Interval pain history:   - chronic pain all over (s/p thoracotomy - has rib pain)  - primary concern is neck   - also LB  - s/p Left C3,4,5 cervical RFA - 9/10/2020. Had substantial pain afterwards. It is a deep soreness (like his mm are pulled apart, thinks this is procedure related). Still is having this pain. Lies down to manage. It is improving with time  - Hasn't seen Dr. Edwards about it  - Seen in the ED on 10/1/2020 for neck pain. Assessed to be likely spasm.      Recommendations/plan at the last visit included:     Additional Workup:   1. - Diagnostic Studies:  no  2. - Laboratory studies:  no  3. - Urine toxicology screen today: no   Therapies:   4. - Physical Therapy: no  5. - Clinical Health Psychologist to address issues of relaxation, behavioral change, coping style, and other factors important to improvement: not at this time  Medication Management:   6. - Continue Percocet 5/325mg. Max #3 per day. #70 tabs for 30 days  7. - Continue medical marijuana  Further procedures recommended:   8. - none    Since his last visit, Stone De Paz reports:  -  Thinks there is something else going on in his neck that wasn't addressed with the procedure. .  - Feels like a pulled mm but thinks that is is nerve related. Radiates down into neck and head and arm and chest. Starts at base of his neck. History of cervical fusion. Woke up at night with this pain.   - No change  "in his and tingling (in bilateral fingers and hands) related to neuropathy  - no new weakness.   - needs to hold his neck rigid. Looking to the left exacerbates his pain.   - wondering if acupuncture would   - no new SOB or KRAFT  - feels like a torn mm in his neck     Pain scores:  Pain intensity on average is 4 on a scale of 0-10.     Current pain treatments:   CURRENT RELEVANT PAIN MEDICATIONS:  Naproxen 500mg-taking 1/day  Oxycodone 5mg-taking 2 in AM and will occasionally take a 3rd in the afternoon  - #70 for 30 days  Imitrex-last used about 3-4 days ago  Medical Cannabis - helps with stomach pain (gastric stimulator is not helpful)    Patient is using the medication as prescribed:  YES  Is your medication helpful? YES   Side Effects: High-feeling from cannabis    Past pain treatments:  Previous Medications: (H--helped; HI--Helped initially; SWH-- somewhat helpful, NH--No help; W--worse; SE--side effects)   Opiates: Oxycodone H, Tylenol #3 NH, Hydrocodone NH, Dilaudid H SE \"ick\", Morphine H SE nausea, Tramadol NH  NSAIDS: Naproxen H SE stomach upset  Muscle Relaxants: Flexeril H SE incontinence  Anti-migraine mediations: Imitrex H  Anti-depressants: Amitriptyline NH SE rapid heart  Sleep aids: none  Anxiolytics: Valium [facility]  Neuropathics: Gabapentin NH SE \"goofy\", Lyrica ? Was too expensive  Topicals: Voltaren Gel NH, Lidocaine NH, topical gel from medical cannabis dispensary  Other medications not covered above: Tylenol H, Prednisone H SE high sugars, Medical marijuana H SE cost constraints     Past Pain Treatments:  Pain Clinic:   No   PT: Yes, last went this year  Psychologist: No, but he did have to do some psychology prior to his back surgeryes  Relaxation techniques/biofeedback: No, but tries to do meditation at times   Chiropractor: No  Acupuncture: No  Pharmacotherapy:               Opioids: Yes                Non-opioids:    Yes   TENs Unit: Yes, was not helpful  Injections: Yes, right 3rd occipital " nerve and right C3, C4, C5 RFA 02/28/2019, left 3rd occipital nerve, and left C3, C4, C5 RFA 03/15/2019. Had done low back injections in [place] as well (steroids mess with sugars therefore tries to avoid if possible).   Self-care:   Yes, cold works better (heat worsens pain)  Surgeries related to pain: Yes, C5-6 ACDF 2012. Disk replacement cervical spine 2006, low back fusions (2000, 2002)    Medications:  Current Outpatient Medications   Medication Sig Dispense Refill     amylase-lipase-protease (CREON 24) 34216-76810 units CPEP per EC capsule Take 1 capsule by mouth every other day       amylase-lipase-protease (PERTZYE) 52402 units CPEP Take 1 capsule by mouth every other day       atorvastatin (LIPITOR) 80 MG tablet TAKE ONE TABLET BY MOUTH AT BEDTIME 90 tablet 1     citalopram (CELEXA) 40 MG tablet TAKE ONE TABLET BY MOUTH ONCE DAILY 90 tablet 1     Continuous Blood Gluc  (DEXCOM G6 ) AMBER Use to read glucose levels per 's instructions 1 Device 0     Continuous Blood Gluc  (FREESTYLE REUBEN READER) AMBER 1 Device continuous prn (replace annually if needed) 1 Device 1     Continuous Blood Gluc Sensor (FREESTYLE REUBEN 14 DAY SENSOR) MISC 1 each every 14 days 6 each 2     CONTOUR NEXT TEST test strip Use to test blood sugar 5x times daily or as directed. 450 strip 3     insulin aspart (NOVOLOG VIAL) 100 UNITS/ML vial USE WITH INSULIN PUMP TOTAL DAILY DOSE APPROX 45 UNITS 20 mL 1     levothyroxine (SYNTHROID/LEVOTHROID) 125 MCG tablet TAKE ONE TABLET BY MOUTH ONCE DAILY 90 tablet 1     lisinopril (ZESTRIL) 40 MG tablet TAKE ONE TABLET BY MOUTH ONCE DAILY 90 tablet 1     medical cannabis (Patient's own supply) See Admin Instructions (The purpose of this order is to document that the patient reports taking medical cannabis.  This is not a prescription, and is not used to certify that the patient has a qualifying medical condition.)       naproxen (NAPROSYN) 500 MG tablet TAKE ONE  TABLET BY MOUTH TWICE A DAY AS NEEDED FOR MODERATE PAIN 180 tablet 0     omeprazole (PRILOSEC) 20 MG DR capsule TAKE ONE CAPSULE BY MOUTH ONCE DAILY 90 capsule 1     ondansetron (ZOFRAN-ODT) 8 MG ODT tab Take 1 tablet (8 mg) by mouth every 8 hours as needed (for nauesa) 60 tablet 0     oxyCODONE-acetaminophen (PERCOCET) 5-325 MG tablet Take 1 tablet by mouth every 6 hours as needed for severe pain . Max of 3/day.May fill 01/18/21 and start 01/20/21. 70 tablet 0     SUMAtriptan (IMITREX) 100 MG tablet Take 1 tablet (100 mg) by mouth at onset of headache 1/2 to 1 tablet by mouth at onset of headache. May repeat 1 dose after 2 hours if headache persists. 9 tablet 3       Medical History: any changes in medical history since they were last seen? Thinks his gastric stimulator isn't working. Has plans to have this done    Review of Systems:  The 14 system ROS was reviewed from the intake questionnaire, and is positive for: nausea and vomiting, diarrhea occasionally  Any bowel or bladder problems: no  Mood: up and down    Physical Exam:  There were no vitals taken for this visit.  Psych: affect full. Mood is good  Neuro: speech is fluent    Controlled Substance Agreement:   Encounter-Level CSA - 11/27/2013:    Controlled Substance Agreement - Scan on 10/7/2014  2:00 PM: CONTROLLED MEDICATION AGREEMENT-11/27/13     Patient-Level CSA:    Controlled Substance Agreement - Opioid - Scan on 2/10/2020  1:21 PM  Controlled Substance Agreement - Opioid - Scan on 5/10/2019  1:12 PM  Controlled Substance Agreement - Opioid - Scan on 5/2/2019  2:33 PM          UDS:   Pain Drug SCR UR W RPTD Meds   Date Value Ref Range Status   02/10/2020 FINAL  Final     Comment:     (Note)  ====================================================================  [facility] COMP DRUG ANALYSIS,UR  ====================================================================  Test                             Result       Flag       Units        Drug Present and  Declared for Prescription Verification   Carboxy-THC                    2352         EXPECTED   ng/mg creat    Carboxy-THC is a metabolite of tetrahydrocannabinol  (THC).    Source of THC is most commonly illicit, but THC is also present    in a scheduled prescription medication.   Oxycodone                      1143         EXPECTED   ng/mg creat   Oxymorphone                    679          EXPECTED   ng/mg creat   Noroxycodone                   1607         EXPECTED   ng/mg creat   Noroxymorphone                 260          EXPECTED   ng/mg creat    Sources of oxycodone are scheduled prescription medications.    Oxymorphone, noroxycodone, and noroxymorphone are expected    metabolites of oxycodone. Oxymorphone is also available as a    scheduled prescription medication.  Drug Present not Declared for Prescription Verification   Citalopram                     PRESENT      UNEXPECTED               Desmethylcitalopram            PRESENT      UNEXPECTED                Desmethylcitalopram is an expected metabolite of citalopram or    the enantiomeric form, escitalopram.   Naproxen                       PRESENT      UNEXPECTED              ====================================================================  Test                      Result    Flag   Units      Ref Range        Creatinine              42               mg/dL      >=20            ====================================================================  Declared Medications:  The flagging and interpretation on this report are based on the  following declared medications.  Unexpected results may arise from  inaccuracies in the declared medications.  **Note: The testing scope of this panel includes these medications:  Marijuana (THC)  Oxycodone  ====================================================================  For clinical consultation, please call [phone].  ====================================================================  Analysis performed by  [employer] [place] [address]          THE 4 As OF OPIOID MAINTENANCE ANALGESIA    Analgesia: Is pain relief clinically significant? YES   Activity: Is patient functional and able to perform Activities of Daily Living? YES   Adverse effects: Is patient free from adverse side effects from opiates? YES   Adherence to Rx protocol: Is patient adhering to Controlled Substance Agreement and taking medications ONLY as ordered? YES    MME: 15    Is Narcan prescribed for opiate use >50 MME daily? N/A     Assessment:   Stone De Paz is a 55 year old male who is seen at the pain clinic for:       Chronic neck pain  Chronic pain syndrome  Spasm of muscle    New right-sided neck pain likely myofascial.     Plan:  Additional Workup:   1. - Diagnostic Studies:  no  2. - Laboratory studies:  no  3. - Urine toxicology screen today: no   Therapies:   4. - Acupuncture referral - Dr. Maura Bone  5. - Clinical Health Psychologist to address issues of relaxation, behavioral change, coping style, and other factors important to improvement: not at this time  Medication Management:   6. - Continue Percocet 5/325mg. Max #3 per day. #70 tabs for 30 days. Just refilled on 1/18/2021  7. - Continue medical marijuana  Further procedures recommended:   8. - none    Follow up: with Natasha in 3 months    Lynne Huddleston MD  [facility]

## 2021-01-27 NOTE — PROGRESS NOTES
Stone is a 55 year old who is being evaluated via a billable telephone visit.      What phone number would you like to be contacted at? [phone]  How would you like to obtain your AVS? Mail a copy     Juan Novoa MA    Phone call duration: 24 minutes

## 2021-02-05 DIAGNOSIS — G89.4 CHRONIC PAIN SYNDROME: ICD-10-CM

## 2021-02-05 DIAGNOSIS — M48.02 CERVICAL STENOSIS OF SPINE: ICD-10-CM

## 2021-02-05 RX ORDER — NAPROXEN 500 MG/1
TABLET ORAL
Qty: 180 TABLET | Refills: 0 | Status: SHIPPED | OUTPATIENT
Start: 2021-02-05 | End: 2021-07-26

## 2021-02-05 NOTE — TELEPHONE ENCOUNTER
Routing refill request to provider for review/approval because:  Labs not current:  ALT, AST, CBC    Dolores Ferro RN

## 2021-02-18 ENCOUNTER — TELEPHONE (OUTPATIENT)
Dept: FAMILY MEDICINE | Facility: OTHER | Age: 56
End: 2021-02-18

## 2021-02-18 DIAGNOSIS — M48.02 CERVICAL STENOSIS OF SPINE: ICD-10-CM

## 2021-02-18 DIAGNOSIS — Z79.891 ENCOUNTER FOR LONG-TERM OPIATE ANALGESIC USE: Primary | ICD-10-CM

## 2021-02-18 DIAGNOSIS — G89.4 CHRONIC PAIN SYNDROME: ICD-10-CM

## 2021-02-18 RX ORDER — OXYCODONE AND ACETAMINOPHEN 5; 325 MG/1; MG/1
1 TABLET ORAL EVERY 6 HOURS PRN
Qty: 70 TABLET | Refills: 0 | Status: SHIPPED | OUTPATIENT
Start: 2021-02-18 | End: 2021-03-18

## 2021-02-18 NOTE — TELEPHONE ENCOUNTER
Received call from patient requesting refill(s) of oxyCODONE-acetaminophen (PERCOCET) 5-325 MG tablet     Last dispensed from pharmacy on 1/18/21    Patient's last office/virtual visit by prescribing provider on 1/27/21  Next office/virtual appointment scheduled for none listed    Last urine drug screen date 2/10/20  Current opioid agreement on file (completed within the last year) No Date of opioid agreement: 2/10/20    E-prescribe to [address] [place] [address] pharmacy    Will route to [facility] for review and preparation of prescription(s).

## 2021-02-18 NOTE — TELEPHONE ENCOUNTER
Medication refill information reviewed.     Due date for oxyCODONE-acetaminophen (PERCOCET) 5-325 MG tablet  is 02/19/21     Prescriptions prepped for review.     Will route to provider.

## 2021-02-18 NOTE — LETTER
[facility]  02/24/21    Patient: Stone De Paz  YOB: 1965  Medical Record Number: [id number]                                                                  Opioid / Opioid Plus Controlled Substance Agreement    I understand that my care provider has prescribed an opioid (narcotic) controlled substance to help manage my condition(s). I am taking this medicine to help me function or work. I know this is strong medicine, and that it can cause serious side effects. Opioid medicine can be sedating, addicting and may cause a dependency on the drug. They can affect my ability to drive or think, and cause depression. They need to be taken exactly as prescribed. Combining opioids with certain medicines or chemicals (such as cocaine, sedatives and tranquilizers, sleeping pills, meth) can be dangerous or even fatal. Also, if I stop opioids suddenly, I may have severe withdrawal symptoms. Last, I understand that opioids do not work for all types of pain nor for all patients. If not helpful, I may be asked to stop them.    The risks, benefits, and side effects of these medicine(s) were explained to me. I agree that:    1. I will take part in other treatments as advised by my care team. This may be psychiatry or counseling, physical therapy, behavioral therapy, group treatment or a referral to a pain clinic. I will reduce or stop my medicine when my care team tells me to do so.  2. I will take my medicines as prescribed. I will not change the dose or schedule unless my care team tells me to. There will be no refills if I  run out early.   I may be contactedwithout warning and asked to complete a urine drug test or pill count at any time.   3. I will keep all my appointments, and understand this is part of the monitoring of opioids. My care team may require an office visit for EVERY opioid/controlled substance refill. If I miss appointments or don t follow instructions, my care team may stop  my medicine.  4. I will not ask other providers to prescribe controlled substances, and I will not accept controlled substances from other people. If I need another prescribed controlled substance for a new reason, I will tell my care team within 1 business day.  5. I will use one pharmacy to fill all of my controlled substance prescriptions, and it is up to me to make sure that I do not run out of my medicines on weekends or holidays. If my care team is willing to refill my opioid prescription without a visit, I must request refills only during office hours, refills may take up to 3 days to process, and it may take up to 5 to 7 days for my medicine to be mailed and ready at my pharmacy. Prescriptions will not be mailed anywhere except my pharmacy.        [id number]  Rev 12/18         Registration to scan to EHR                             Page 1 of 2               Controlled Substance Agreement [facility]  02/24/21  Patient: Stone De Paz  YOB: 1965  Medical Record Number: [id number]                                                                  6. I am responsible for my prescriptions. If the medicine/prescription is lost or stolen, it will not be replaced. I also agree not to share controlled substance medicines with anyone.  7. I agree to not use ANY illegal or recreational drugs. This includes marijuana, cocaine, bath salts or other drugs. I agree not to use alcohol unless my care team says I may.          I agree to give urine samples whenever asked. If I don t give a urine sample, the care team may stop my medicine.    8. If I enroll in the [place] Medical Marijuana program, I will tell my care team. I will also sign an agreement to share my medical records with my care team.   9. I will bring in my list of medicines (or my medicine bottles) each time I come to the clinic.   10. I will tell my care team right away if I become pregnant or have a new medical  problem treated outside of my regular clinic.  11. I understand that this medicine can affect my thinking and judgment. It may be unsafe for me to drive, use machinery and do dangerous tasks. I will not do any of these things until I know how the medicine affects me. If my dose changes, I will wait to see how it affects me. I will contact my care team if I have concerns about medicine side effects.    I understand that if I do not follow any of the conditions above, my prescriptions or treatment may be stopped.      I agree that my provider, clinic care team, and pharmacy may work with any city, state or federal law enforcement agency that investigates the misuse, sale, or other diversion of my controlled medicine. I will allow my provider to discuss my care with or share a copy of this agreement with any other treating provider, pharmacy or emergency room where I receive care. I agree to give up (waive) any right of privacy or confidentiality with respect to these consents.     I have read this agreement and have asked questions about anything I did not understand.      ________________________________________________________________________  Patient signature - Date/Time -  Stone De Paz                                      ________________________________________________________________________  Witness signature                                                            ________________________________________________________________________  Provider signature - Natasha Padgett PA-C      [id number]  Rev 12/18         Registration to scan to EHR                         Page 2 of 2                   Controlled Substance Agreement Opioid           Page 1 of 2  Opioid Pain Medicines (also known as Narcotics)  What You Need to Know    What are opioids?   Opioids are pain medicines that must be prescribed by a doctor.  They are also known as narcotics.    Examples are:     morphine (MS Contin, Sneha)    oxycodone  (Oxycontin)    oxycodone and acetaminophen (Percocet)    hydrocodone and acetaminophen (Vicodin, Norco)     fentanyl patch (Duragesic)     hydromorphone (Dilaudid)     methadone     What do opioids do well?   Opioids are best for short-term pain after a surgery or injury. They also work well for cancer pain. Unlike other pain medicines, they do not cause liver or kidney failure or ulcers. They may help some people with long-lasting (chronic) pain.     What do opioids NOT do well?   Opioids never get rid of pain entirely, and they do not work well for most patients with chronic pain. Opioids do not reduce swelling, one of the causes of pain. They also don t work well for nerve pain.                           For informational purposes only.  Not to replace the advice of your care provider.  Copyright 201 [facility]. All right reserved. [employer] [id number] 02/18.      Page 2 of 2    Risks and side effects   Talk to your doctor before you start or decide to keep taking one of these medicines. Side effects include:    Lowering your breathing rate enough to cause death    Overdose, including death, especially if taking higher than prescribed doses    Long-term opioid use    Worse depression symptoms; less pleasure in things you usually enjoy    Feeling tired or sluggish    Slower thoughts or cloudy thinking    Being more sensitive to pain over time; pain is harder to control    Trouble sleeping or restless sleep    Changes in hormone levels (for example, less testosterone)    Changes in sex drive or ability to have sex    Constipation    Unsafe driving    Itching and sweating    Feeling dizzy    Nausea, vomiting and dry mouth    What else should I know about opioids?  When someone takes opioids for too long or too often, they become dependent. This means that if you stop or reduce the medicine too quickly, you will have withdrawal symptoms.    Dependence is not the same as addiction. Addiction is when people  keep using a substance that harms their body, their mind or their relations with others. If you have a history of drug or alcohol abuse, taking opioids can cause a relapse.    Over time, opioids don t work as well. Most people will need higher and higher doses. The higher the dose, the more serious the side effects. We don t know the long-term effects of opioids.      Prescribed opioids aren't the best way to manage chronic pain    Other ways to manage pain include:      Ibuprofen or acetaminophen.  You should always try this first.      Treat health problems that may be causing pain.      acupuncture or massage, deep breathing, meditation, visual imagery, aromatherapy.      Use heat or ice at the pain site      Physical therapy and exercise      Stop smoking      See a counselor or therapist                                                  People who have used opioids for a long time may have a lower quality of life, worse depression, higher levels of pain and more visits to doctors.    Never share your opioids with others. Be sure to store opioids in a secure place, locked if possible.Young children can easily swallow them and overdose.     You can overdose on opioids.  Signs of overdose include decrease or loss of consciousness, slowed breathing, trouble waking and blue lips.  If someone is worried about overdose, they should call 911.    If you are at risk for overdose, you may get naloxone (Narcan, a medicine that reverses the effects of opioids.  If you overdose, a friend or family member can give you Narcan while waiting for the ambulance.  They need to know the signs of overdose and how to give Narcan.    While you're taking opioids:    Don't use alcohol or street drugs. Taking them together can cause death.    Don't take any of these medicines unless your doctor says its okay.  Taking these with opioids can cause death.    Benzodiazepines (such as lorazepam         or diazepam)    Muscle relaxers (such as  cyclobenzaprine)    sleeping pills    other opioids    Safe disposal of opioids  Find your area drug take-back program, your pharmacy mail-back program, buy a special disposal bag (such as Deterra) from your pharmacy or flush them down the toilet.  Use the guidelines at:  www.fda.gov/drugs/resourcesforyou

## 2021-02-18 NOTE — TELEPHONE ENCOUNTER
Reason for call:  Medication   If this is a refill request, has the caller requested the refill from the pharmacy already? No  Will the patient be using a [facility]? No  Name of the pharmacy and phone number for the current request: [facility] - [place], [place] - [address]    Name of the medication requested: oxyCODONE-acetaminophen (PERCOCET) 5-325 MG tablet    Other request:     Phone number to reach patient:  Cell number on file:    Telephone Information:   Mobile [phone]       Best Time:      Can we leave a detailed message on this number?  YES    Travel screening: Not Applicable

## 2021-02-18 NOTE — TELEPHONE ENCOUNTER
Patient notified of approved rx on home numerical id vm..  ACase/MA  [facility] [place]

## 2021-02-18 NOTE — TELEPHONE ENCOUNTER
Refill appropriate. Sent to requested pharmacy.    [place] Prescription Monitoring Program checked on 2/18/21.    Natasha Padgett, PAC

## 2021-02-23 NOTE — TELEPHONE ENCOUNTER
Outreach X1. Left a  requesting call back. Provided call back number.    We need to update his annual requirements.    WILLIAM DacostaN, RN  Care Coordinator  [facility] [place]

## 2021-02-23 NOTE — TELEPHONE ENCOUNTER
She would like to be cerified for medical cannabis and reports it was already all discussed at her last office visit.     Pain is 7 on a 0-10 scale   [email or web address]    KAROL Dacosta, RN  Care Coordinator  [facility] [place]

## 2021-02-24 NOTE — TELEPHONE ENCOUNTER
Outreach X2. Left a  requesting call back. Provided call back number.    We need to update his annual requirements.    WILLIAM DacostaN, RN  Care Coordinator  [facility] [place]

## 2021-02-24 NOTE — TELEPHONE ENCOUNTER
UDS ordered.    Reviewed controlled substance agreement with patient and the patient stated understanding. Signed agreement on his behalf due to COVID.    WILLIAM DacostaN, RN  Care Coordinator  [facility] [place]

## 2021-02-25 DIAGNOSIS — Z79.891 ENCOUNTER FOR LONG-TERM OPIATE ANALGESIC USE: ICD-10-CM

## 2021-02-25 PROCEDURE — 80307 DRUG TEST PRSMV CHEM ANLYZR: CPT | Mod: 90 | Performed by: PHYSICIAN ASSISTANT

## 2021-02-25 PROCEDURE — 99000 SPECIMEN HANDLING OFFICE-LAB: CPT | Performed by: PHYSICIAN ASSISTANT

## 2021-03-01 ENCOUNTER — OFFICE VISIT (OUTPATIENT)
Dept: FAMILY MEDICINE | Facility: OTHER | Age: 56
End: 2021-03-01
Payer: COMMERCIAL

## 2021-03-01 VITALS
RESPIRATION RATE: 20 BRPM | WEIGHT: 157 LBS | DIASTOLIC BLOOD PRESSURE: 78 MMHG | HEART RATE: 84 BPM | BODY MASS INDEX: 23.79 KG/M2 | SYSTOLIC BLOOD PRESSURE: 124 MMHG | OXYGEN SATURATION: 99 % | TEMPERATURE: 98.3 F | HEIGHT: 68 IN

## 2021-03-01 DIAGNOSIS — E03.9 ACQUIRED HYPOTHYROIDISM: ICD-10-CM

## 2021-03-01 DIAGNOSIS — Z01.818 PREOP GENERAL PHYSICAL EXAM: Primary | ICD-10-CM

## 2021-03-01 DIAGNOSIS — K86.81 EXOCRINE PANCREATIC INSUFFICIENCY: ICD-10-CM

## 2021-03-01 DIAGNOSIS — Z11.59 NEED FOR HEPATITIS C SCREENING TEST: ICD-10-CM

## 2021-03-01 DIAGNOSIS — K21.9 GASTROESOPHAGEAL REFLUX DISEASE WITHOUT ESOPHAGITIS: ICD-10-CM

## 2021-03-01 DIAGNOSIS — Z23 NEED FOR VACCINATION: ICD-10-CM

## 2021-03-01 DIAGNOSIS — K31.84 GASTROPARESIS: ICD-10-CM

## 2021-03-01 DIAGNOSIS — E10.43 TYPE 1 DIABETES MELLITUS WITH DIABETIC AUTONOMIC NEUROPATHY (H): ICD-10-CM

## 2021-03-01 LAB
ALBUMIN SERPL-MCNC: 3.7 G/DL (ref 3.4–5)
ALP SERPL-CCNC: 64 U/L (ref 40–150)
ALT SERPL W P-5'-P-CCNC: 25 U/L (ref 0–70)
ANION GAP SERPL CALCULATED.3IONS-SCNC: 4 MMOL/L (ref 3–14)
AST SERPL W P-5'-P-CCNC: 13 U/L (ref 0–45)
BILIRUB SERPL-MCNC: 0.6 MG/DL (ref 0.2–1.3)
BUN SERPL-MCNC: 13 MG/DL (ref 7–30)
CALCIUM SERPL-MCNC: 8.9 MG/DL (ref 8.5–10.1)
CHLORIDE SERPL-SCNC: 101 MMOL/L (ref 94–109)
CO2 SERPL-SCNC: 29 MMOL/L (ref 20–32)
COMPREHEN DRUG ANALYSIS UR: NORMAL
CREAT SERPL-MCNC: 0.94 MG/DL (ref 0.66–1.25)
ERYTHROCYTE [DISTWIDTH] IN BLOOD BY AUTOMATED COUNT: 12.4 % (ref 10–15)
GFR SERPL CREATININE-BSD FRML MDRD: >90 ML/MIN/{1.73_M2}
GLUCOSE SERPL-MCNC: 268 MG/DL (ref 70–99)
HBA1C MFR BLD: 8.7 % (ref 0–5.6)
HCT VFR BLD AUTO: 37.2 % (ref 40–53)
HGB BLD-MCNC: 12.6 G/DL (ref 13.3–17.7)
MCH RBC QN AUTO: 30.3 PG (ref 26.5–33)
MCHC RBC AUTO-ENTMCNC: 33.9 G/DL (ref 31.5–36.5)
MCV RBC AUTO: 89 FL (ref 78–100)
PLATELET # BLD AUTO: 225 10E9/L (ref 150–450)
POTASSIUM SERPL-SCNC: 4.9 MMOL/L (ref 3.4–5.3)
PROT SERPL-MCNC: 7.2 G/DL (ref 6.8–8.8)
RBC # BLD AUTO: 4.16 10E12/L (ref 4.4–5.9)
SODIUM SERPL-SCNC: 134 MMOL/L (ref 133–144)
WBC # BLD AUTO: 6.7 10E9/L (ref 4–11)

## 2021-03-01 PROCEDURE — 99214 OFFICE O/P EST MOD 30 MIN: CPT | Performed by: PHYSICIAN ASSISTANT

## 2021-03-01 PROCEDURE — 36415 COLL VENOUS BLD VENIPUNCTURE: CPT | Performed by: PHYSICIAN ASSISTANT

## 2021-03-01 PROCEDURE — 85027 COMPLETE CBC AUTOMATED: CPT | Performed by: PHYSICIAN ASSISTANT

## 2021-03-01 PROCEDURE — 80053 COMPREHEN METABOLIC PANEL: CPT | Performed by: PHYSICIAN ASSISTANT

## 2021-03-01 PROCEDURE — 83036 HEMOGLOBIN GLYCOSYLATED A1C: CPT | Performed by: PHYSICIAN ASSISTANT

## 2021-03-01 ASSESSMENT — MIFFLIN-ST. JEOR: SCORE: 1526.65

## 2021-03-01 NOTE — PROGRESS NOTES
[address]  [facility] [address]  Phone: [phone]  Primary Provider: Ilan Raymond  Pre-op Performing Provider: EWA ALVARADO      PREOPERATIVE EVALUATION:  Today's date: 3/1/2021    Stone De Paz is a 55 year old male who presents for a preoperative evaluation.    Surgical Information:  Surgery/Procedure:  Gastric Stimulator Replacement   Surgery Location: [facility]    Surgeon: Dr. Summers   Surgery Date: 3/8/2021  Time of Surgery: 1400  Where patient plans to recover: At home with family  Fax number for surgical facility: [facility]     Type of Anesthesia Anticipated: General    Assessment & Plan     The proposed surgical procedure is considered LOW risk.    Preop general physical exam  Exocrine pancreatic insufficiency  Gastroesophageal reflux disease without esophagitis  Gastroparesis  Type 1 diabetes mellitus with diabetic autonomic neuropathy (H)  Acquired hypothyroidism  Need for hepatitis C screening test  Need for vaccination  Labs to recheck stability of his diabetes today.  Declines screening for hepatitis C and declines any need for vaccination today.  Advised that he follow-up with his primary care provider as needed.  Cleared for surgical intervention at this point in time.  Keep in mind that his surgery is a week or more away he could certainly become ill between now and then.  Careful observation and assessment at the time of his surgical intervention during the preoperative phases recommended.  - Hemoglobin A1c  - Comprehensive metabolic panel  - CBC with platelets       Implanted Device:  Gastric stimulator      Risks and Recommendations:  The patient has the following additional risks and recommendations for perioperative complications:   - No identified additional risk factors other than previously addressed    Medication Instructions:  Patient is to take all scheduled medications on the day of surgery  Would have him  hold off on taking his lisinopril until after surgical intervention.  The remainder of his labs should be fine for the day of but he is encouraged to contact his surgeon.  Encouraged him to reach out to his diabetic educator in regards to his insulin pump.  Encouraged him to avoid medical cannabis prior to surgery.    RECOMMENDATION:  APPROVAL GIVEN to proceed with proposed procedure, without further diagnostic evaluation.    Subjective     HPI related to upcoming procedure: Gastric stimulator near battery replacement.    Preop Questions 3/1/2021   1. Have you ever had a heart attack or stroke? No   2. Have you ever had surgery on your heart or blood vessels, such as a stent placement, a coronary artery bypass, or surgery on an artery in your head, neck, heart, or legs? No   3. Do you have chest pain with activity? No   4. Do you have a history of  heart failure? No   5. Do you currently have a cold, bronchitis or symptoms of other infection? No   6. Do you have a cough, shortness of breath, or wheezing? No   7. Do you or anyone in your family have previous history of blood clots? No   8. Do you or does anyone in your family have a serious bleeding problem such as prolonged bleeding following surgeries or cuts? No   9. Have you ever had problems with anemia or been told to take iron pills? No   10. Have you had any abnormal blood loss such as black, tarry or bloody stools? No   11. Have you ever had a blood transfusion? No   12. Are you willing to have a blood transfusion if it is medically needed before, during, or after your surgery? Yes   13. Have you or any of your relatives ever had problems with anesthesia? No   14. Do you have sleep apnea, excessive snoring or daytime drowsiness? No   15. Do you have any artifical heart valves or other implanted medical devices like a pacemaker, defibrillator, or continuous glucose monitor? YES -    15a. What type of device do you have? Gastric stimulator./ continuous glucose  monitor/ insulin pump   15b. Name of the clinic that manages your device:  Dr Best and Dr. Ramirez   16. Do you have artificial joints? No   17. Are you allergic to latex? No       Health Care Directive:  Patient does not have a Health Care Directive or Living Will: Discussed advance care planning with patient; however, patient declined at this time.    Preoperative Review of :   reviewed - controlled substances reflected in medication list.    Status of Chronic Conditions:  See problem list for active medical problems.  Problems all longstanding and stable, except as noted/documented.  See ROS for pertinent symptoms related to these conditions.      Review of Systems  CONSTITUTIONAL: NEGATIVE for fever, chills, change in weight  INTEGUMENTARY/SKIN: NEGATIVE for worrisome rashes, moles or lesions  EYES: NEGATIVE for vision changes or irritation  ENT/MOUTH: NEGATIVE for ear, mouth and throat problems  RESP: NEGATIVE for significant cough or SOB  BREAST: NEGATIVE for masses, tenderness or discharge  CV: NEGATIVE for chest pain, palpitations or peripheral edema  GI: NEGATIVE for nausea, abdominal pain, heartburn, or change in bowel habits  : NEGATIVE for frequency, dysuria, or hematuria  MUSCULOSKELETAL: NEGATIVE for significant arthralgias or myalgia  NEURO: NEGATIVE for weakness, dizziness or paresthesias  ENDOCRINE: NEGATIVE for temperature intolerance, skin/hair changes  HEME: NEGATIVE for bleeding problems  PSYCHIATRIC: NEGATIVE for changes in mood or affect    Patient Active Problem List    Diagnosis Date Noted     Essential hypertension 11/09/2020     Priority: Medium     Gastroesophageal reflux disease without esophagitis 02/26/2019     Priority: Medium     Type 1 diabetes mellitus with diabetic autonomic neuropathy (H) 08/07/2018     Priority: Medium     Insulin pump status 08/07/2018     Priority: Medium     Chronic pain syndrome 07/10/2018     Priority: Medium     Exocrine pancreatic insufficiency  11/24/2016     Priority: Medium     Mild major depression (H) 11/14/2014     Priority: Medium     Hypothyroidism      Priority: Medium     Dr Best       Gastroparesis      Priority: Medium     gastric stimulator helps, [facility] manages that       Chronic neck pain      Priority: Medium     percocet for years now,        Cervical stenosis of spine 04/16/2012     Priority: Medium     Hyperlipidemia 06/06/2008     Priority: Medium      Past Medical History:   Diagnosis Date     Arthritis      Chronic neck pain     percocet for years now,      Depressive disorder      Gastroparesis      Gastroparesis due to DM (H)     gastric stimulator helps, [facility] manages that     Hyperlipidemia      Hypertension      Hypothyroidism      Neuropathy, diabetic (H)      Type 1 diabetes (H) age 31     Past Surgical History:   Procedure Laterality Date     C LUMBAR SPINE FUSION,ANTER APPRCH      2 L4-5 L5-21     COLONOSCOPY  07/18/12     FUSION CERVICAL ANTERIOR TWO LEVELS       INJECT BLOCK MEDIAL BRANCH CERVICAL/THORACIC/LUMBAR Bilateral 11/20/2018    Procedure: Bilateral Cervical 3-4-5 medial branch block;  Surgeon: Jef Edwards MD;  Location: [facility]     INJECT BLOCK MEDIAL BRANCH CERVICAL/THORACIC/LUMBAR N/A 1/11/2019    Procedure: Cervical 3,4,5 Bilateral Medial branch blocks;  Surgeon: Jef Edwards MD;  Location: [facility]     RADIO FREQUENCY ABLATION PULSED CERVICAL Right 2/28/2019    Procedure: RADIO FREQUENCY ABLATION CERVICAL THREE, FOUR, FIVE, AND THIRD OCCUPITAL NERVE RIGHT SIDE;  Surgeon: Jef Edwards MD;  Location: [facility]     RADIO FREQUENCY ABLATION PULSED CERVICAL Left 3/15/2019    Procedure: radiofrequency ablation cervical 3,4,5;  Surgeon: Jef Edwards MD;  [facility]     RADIO FREQUENCY ABLATION PULSED CERVICAL Right 9/4/2020    Procedure: RADIOFREQUENCY ABLATION CERVICAL 3,4 5 right SIDE;  Surgeon: Jef Edwards MD;  [facility]     RADIO FREQUENCY ABLATION PULSED CERVICAL Left 9/10/2020    Procedure:  RADIOFREQUENCY ABLATION CERVICAL 3,4 5 LEFT SIDE;  Surgeon: Jef Edwards MD;  Location: [facility]     THORACOSCOPIC DECORTICATION LUNG  1/9/2014    Procedure: THORACOSCOPIC DECORTICATION LUNG;  RIGHT VATS/RIGHT THORACOTOMY , DECORTICATION RIGHT LUNG ,WEDGE RESECTION RIGHT MIDDLE LOBE LUNG NODULE;  Surgeon: Harley Felix MD;  Location:  OR     Current Outpatient Medications   Medication Sig Dispense Refill     amylase-lipase-protease (CREON 24) 84388-90381 units CPEP per EC capsule Take 1 capsule by mouth every other day       amylase-lipase-protease (PERTZYE) 79416 units CPEP Take 1 capsule by mouth every other day       atorvastatin (LIPITOR) 80 MG tablet TAKE ONE TABLET BY MOUTH AT BEDTIME 90 tablet 1     citalopram (CELEXA) 40 MG tablet TAKE ONE TABLET BY MOUTH ONCE DAILY 90 tablet 1     Continuous Blood Gluc  (DEXCOM G6 ) AMBER Use to read glucose levels per 's instructions 1 Device 0     Continuous Blood Gluc  (FREESTYLE REUBEN READER) AMBER 1 Device continuous prn (replace annually if needed) 1 Device 1     Continuous Blood Gluc Sensor (FREESTYLE REUBEN 14 DAY SENSOR) MISC 1 each every 14 days 6 each 2     CONTOUR NEXT TEST test strip Use to test blood sugar 5x times daily or as directed. 450 strip 3     insulin aspart (NOVOLOG VIAL) 100 UNITS/ML vial USE WITH INSULIN PUMP TOTAL DAILY DOSE APPROX 45 UNITS 20 mL 1     levothyroxine (SYNTHROID/LEVOTHROID) 125 MCG tablet TAKE ONE TABLET BY MOUTH ONCE DAILY 90 tablet 1     lisinopril (ZESTRIL) 40 MG tablet TAKE ONE TABLET BY MOUTH ONCE DAILY 90 tablet 1     medical cannabis (Patient's own supply) See Admin Instructions (The purpose of this order is to document that the patient reports taking medical cannabis.  This is not a prescription, and is not used to certify that the patient has a qualifying medical condition.)       naproxen (NAPROSYN) 500 MG tablet TAKE ONE TABLET BY MOUTH TWICE A DAY AS NEEDED FOR MODERATE PAIN  "180 tablet 0     omeprazole (PRILOSEC) 20 MG DR capsule TAKE ONE CAPSULE BY MOUTH ONCE DAILY 90 capsule 1     ondansetron (ZOFRAN-ODT) 8 MG ODT tab Take 1 tablet (8 mg) by mouth every 8 hours as needed (for nauesa) 60 tablet 0     oxyCODONE-acetaminophen (PERCOCET) 5-325 MG tablet Take 1 tablet by mouth every 6 hours as needed for severe pain . Max of 3/day.May fill 21 and start 21. 70 tablet 0     SUMAtriptan (IMITREX) 100 MG tablet Take 1 tablet (100 mg) by mouth at onset of headache 1/2 to 1 tablet by mouth at onset of headache. May repeat 1 dose after 2 hours if headache persists. 9 tablet 3       No Known Allergies     Social History     Tobacco Use     Smoking status: Former Smoker     Quit date: 2010     Years since quittin.1     Smokeless tobacco: Never Used   Substance Use Topics     Alcohol use: No     Family History   Problem Relation Age of Onset     Hypertension Mother      Diabetes Father         type 2     Hypertension Father      Cerebrovascular Disease Maternal Grandmother      Unknown/Adopted Maternal Grandfather      Unknown/Adopted Paternal Grandmother      Unknown/Adopted Paternal Grandfather      Liver Disease Brother      Heart Disease Sister      Thyroid Disease Sister      Thyroid Disease Sister      Thyroid Disease Sister      History   Drug Use     Types: Marijuana     Comment: medical marijuana         Objective     /78 (BP Location: Left arm, Patient Position: Chair, Cuff Size: Adult Regular)   Pulse 84   Temp 98.3  F (36.8  C) (Temporal)   Resp 20   Ht 1.727 m (5' 8\")   Wt 71.2 kg (157 lb)   SpO2 99%   BMI 23.87 kg/m      Physical Exam    GENERAL APPEARANCE: healthy, alert and no distress     EYES: EOMI,  PERRL     HENT: ear canals and TM's normal and nose and mouth without ulcers or lesions     NECK: no adenopathy, no asymmetry, masses, or scars and thyroid normal to palpation     RESP: lungs clear to auscultation - no rales, rhonchi or wheezes     CV: " regular rates and rhythm, normal S1 S2, no S3 or S4 and no murmur, click or rub     ABDOMEN: soft, nontender, without hepatosplenomegaly or masses and well-healed surgical scar to the left lower abdomen consistent with his gastric stimulator is in evidence today.     MS: extremities normal- no gross deformities noted, no evidence of inflammation in joints, FROM in all extremities.     SKIN: no suspicious lesions or rashes     NEURO: Normal strength and tone, sensory exam grossly normal, mentation intact and speech normal     PSYCH: mentation appears normal. and affect normal/bright     LYMPHATICS: No cervical adenopathy    Recent Labs   Lab Test 10/26/20  1429 08/06/20  1248 01/06/20  1046   HGB  --   --  13.5   PLT  --   --  238   NA  --  135 134   POTASSIUM  --  4.6 4.3   CR  --  0.88 0.97   A1C 9.3* Results confirmed by repeat test 10.2*        Diagnostics:  Labs pending at this time.  Results will be reviewed when available.   No EKG required, no history of coronary heart disease, significant arrhythmia, peripheral arterial disease or other structural heart disease.    Revised Cardiac Risk Index (RCRI):  The patient has the following serious cardiovascular risks for perioperative complications:   - No serious cardiac risks = 0 points     RCRI Interpretation: 1 point: Class II (low risk - 0.9% complication rate)        Signed Electronically by: Mickey Hopkins PA-C  Copy of this evaluation report is provided to requesting physician.    [facility] Guidelines    Revised Cardiac Risk Index

## 2021-03-01 NOTE — LETTER
March 2, 2021      Stone De Paz  [address] [address] [place] [id number]        Dear ,    There is slight improvement with respect to diabetes.  In my opinion you should be increasing your insulin via your pump a bit more.  I will forward this to your endocrinologist for review as well.           Resulted Orders   Hemoglobin A1c   Result Value Ref Range    Hemoglobin A1C 8.7 (H) 0 - 5.6 %      Comment:      Normal <5.7% Prediabetes 5.7-6.4%  Diabetes 6.5% or higher - adopted from ADA   consensus guidelines.     Comprehensive metabolic panel   Result Value Ref Range    Sodium 134 133 - 144 mmol/L    Potassium 4.9 3.4 - 5.3 mmol/L    Chloride 101 94 - 109 mmol/L    Carbon Dioxide 29 20 - 32 mmol/L    Anion Gap 4 3 - 14 mmol/L    Glucose 268 (H) 70 - 99 mg/dL    Urea Nitrogen 13 7 - 30 mg/dL    Creatinine 0.94 0.66 - 1.25 mg/dL    GFR Estimate >90 >60 mL/min/[1.73_m2]      Comment:      Non  GFR Calc  Starting 12/18/2018, serum creatinine based estimated GFR (eGFR) will be   calculated using the Chronic Kidney Disease Epidemiology Collaboration   (CKD-EPI) equation.      GFR Estimate If Black >90 >60 mL/min/[1.73_m2]      Comment:       GFR Calc  Starting 12/18/2018, serum creatinine based estimated GFR (eGFR) will be   calculated using the Chronic Kidney Disease Epidemiology Collaboration   (CKD-EPI) equation.      Calcium 8.9 8.5 - 10.1 mg/dL    Bilirubin Total 0.6 0.2 - 1.3 mg/dL    Albumin 3.7 3.4 - 5.0 g/dL    Protein Total 7.2 6.8 - 8.8 g/dL    Alkaline Phosphatase 64 40 - 150 U/L    ALT 25 0 - 70 U/L    AST 13 0 - 45 U/L   CBC with platelets   Result Value Ref Range    WBC 6.7 4.0 - 11.0 10e9/L    RBC Count 4.16 (L) 4.4 - 5.9 10e12/L    Hemoglobin 12.6 (L) 13.3 - 17.7 g/dL    Hematocrit 37.2 (L) 40.0 - 53.0 %    MCV 89 78 - 100 fl    MCH 30.3 26.5 - 33.0 pg    MCHC 33.9 31.5 - 36.5 g/dL    RDW 12.4 10.0 - 15.0 %    Platelet Count 225 150 - 450 10e9/L       If you have any  questions or concerns, please call the clinic at the number listed above.       Sincerely,      Mickey Max PA-C

## 2021-03-01 NOTE — PROGRESS NOTES
{2021 PROVIDER CHARTING PREFERENCE:203972}    Subjective     Stone De Paz is a 55 year old male who presents to clinic today for the following health issues accompanied by his {accompanied to visit:807340}:    HPI     {SUPERLIST (Optional):485470}  {additonal problems for provider to add (Optional):978868}    Review of Systems   {ROS COMP (Optional):536508}      Objective    There were no vitals taken for this visit.  There is no height or weight on file to calculate BMI.  Physical Exam   {Exam List (Optional):023237}    {Diagnostic Test Results (Optional):766340}

## 2021-03-09 ENCOUNTER — TELEPHONE (OUTPATIENT)
Dept: PALLIATIVE MEDICINE | Facility: CLINIC | Age: 56
End: 2021-03-09

## 2021-03-09 NOTE — TELEPHONE ENCOUNTER
Called patient to let him know that I got his message and thanked him for informing us on the prescription. Also let him know that if he needs to take them for his post-surgical pain that does not break our contract. He verbalized his understanding.     Natasha Padgett PA-C on 3/9/2021 at 9:42 AM

## 2021-03-09 NOTE — TELEPHONE ENCOUNTER
Pt calling to state he had surgery yesterday and his surgeon did prescribe him 8 Oxycodone 5 MG tablets. Pt is going to try not to use them unless needed, but wanted to inform the care team.    Leesa SIMON    [facility]

## 2021-03-18 DIAGNOSIS — M48.02 CERVICAL STENOSIS OF SPINE: ICD-10-CM

## 2021-03-18 DIAGNOSIS — G89.4 CHRONIC PAIN SYNDROME: ICD-10-CM

## 2021-03-18 NOTE — TELEPHONE ENCOUNTER
Received call from patient requesting refill(s) of oxyCODONE-acetaminophen (PERCOCET) 5-325 MG tablet     Last dispensed from pharmacy on 2/19/21    Patient's last office/virtual visit by prescribing provider on 1/27/21  Next office/virtual appointment scheduled for none listed    Last urine drug screen date 2/25/21  Current opioid agreement on file (completed within the last year) Yes Date of opioid agreement: 2/24/21    E-prescribe to [address] [place] [address] S    pharmacy    Will route to [facility] for review and preparation of prescription(s).

## 2021-03-18 NOTE — TELEPHONE ENCOUNTER
Reason for call:  Medication   If this is a refill request, has the caller requested the refill from the pharmacy already? No  Will the patient be using a [facility]? No  Name of the pharmacy and phone number for the current request: [facility] - [place], [place] - [address]    Name of the medication requested: oxyCODONE-acetaminophen (PERCOCET) 5-325 MG tablet    Other request: none    Phone number to reach patient:  Home number on file [phone] (home)    Best Time:  anytime    Can we leave a detailed message on this number?  YES    Travel screening: Not Applicable     Leesa SIMON    [facility]

## 2021-03-18 NOTE — TELEPHONE ENCOUNTER
Medication refill information reviewed.     Due date for  oxyCODONE-acetaminophen (PERCOCET) 5-325 MG tablet  is 03/21/21     Prescriptions prepped for review.     Will route to provider.

## 2021-03-19 RX ORDER — OXYCODONE AND ACETAMINOPHEN 5; 325 MG/1; MG/1
1 TABLET ORAL EVERY 6 HOURS PRN
Qty: 70 TABLET | Refills: 0 | Status: SHIPPED | OUTPATIENT
Start: 2021-03-19 | End: 2021-04-19

## 2021-03-19 NOTE — TELEPHONE ENCOUNTER
Left message on numerical id  to inform of approved RX .  Lisa/MA  [facility]

## 2021-04-05 ENCOUNTER — TRANSFERRED RECORDS (OUTPATIENT)
Dept: HEALTH INFORMATION MANAGEMENT | Facility: CLINIC | Age: 56
End: 2021-04-05

## 2021-04-05 LAB — RETINOPATHY: NEGATIVE

## 2021-04-19 DIAGNOSIS — M48.02 CERVICAL STENOSIS OF SPINE: ICD-10-CM

## 2021-04-19 DIAGNOSIS — G89.4 CHRONIC PAIN SYNDROME: ICD-10-CM

## 2021-04-19 RX ORDER — OXYCODONE AND ACETAMINOPHEN 5; 325 MG/1; MG/1
1 TABLET ORAL EVERY 6 HOURS PRN
Qty: 70 TABLET | Refills: 0 | Status: SHIPPED | OUTPATIENT
Start: 2021-04-19 | End: 2021-05-19

## 2021-04-19 NOTE — TELEPHONE ENCOUNTER
Reason for call:  Medication   If this is a refill request, has the caller requested the refill from the pharmacy already? No  Will the patient be using a [facility]? No  Name of the pharmacy and phone number for the current request: [facility] - [place], [place] - [address]    Name of the medication requested: oxyCODONE-acetaminophen (PERCOCET) 5-325 MG tablet    Other request: call when ready    Phone number to reach patient:  Home number on file [phone] (home)    Can we leave a detailed message on this number?  YES    Travel screening: Not Applicable         Tez YOO    [facility]

## 2021-04-19 NOTE — TELEPHONE ENCOUNTER
Left message on numerical id  to inform of approved RX .  Lisa/MA  [facility]

## 2021-04-19 NOTE — TELEPHONE ENCOUNTER
Refill appropriate. Sent to requested pharmacy.    [place] Prescription Monitoring Program checked on 4/19/21.    Natasha Padgett, PAC

## 2021-04-19 NOTE — TELEPHONE ENCOUNTER
Received call from patient requesting refill(s) of oxyCODONE-acetaminophen (PERCOCET) 5-325 MG tablet     Last dispensed from pharmacy on 3/22/21    Patient's last office/virtual visit by prescribing provider on 1/27/21  Next office/virtual appointment scheduled for 4/27/21    Last urine drug screen date 2/25/21  Current opioid agreement on file (completed within the last year) Yes Date of opioid agreement: 3/10/21    E-prescribe to  [address] [facility] [address]     pharmacy    Will route to nursing [place] for review and preparation of prescription(s).

## 2021-04-19 NOTE — TELEPHONE ENCOUNTER
Medication refill information reviewed.     Due date for oxyCODONE-acetaminophen (PERCOCET) 5-325 MG tablet 04/21/21     Prescriptions prepped for review.     Will route to provider.

## 2021-04-23 ENCOUNTER — TELEPHONE (OUTPATIENT)
Dept: ENDOCRINOLOGY | Facility: CLINIC | Age: 56
End: 2021-04-23

## 2021-04-23 NOTE — TELEPHONE ENCOUNTER
Reason for Call:  Form, our goal is to have forms completed with 72 hours, however, some forms may require a visit or additional information.    Type of letter, form or note:  medical    Who is the form from?: Medical necessity form  (if other please explain)    Where did the form come from: form was faxed in    What clinic location was the form placed at?: [facility]    Where the form was placed: Dr morris Box/Folder    What number is listed as a contact on the form?:   Additional comments: Fax when completed  Call taken on 4/23/2021 at 2:28 PM by Elena Ybarra       Total Pulses: 153

## 2021-04-26 ENCOUNTER — TELEPHONE (OUTPATIENT)
Dept: ENDOCRINOLOGY | Facility: CLINIC | Age: 56
End: 2021-04-26

## 2021-04-26 NOTE — PROGRESS NOTES
Stone is a 55 year old who is being evaluated via a billable telephone visit.      What phone number would you like to be contacted at? [phone]  How would you like to obtain your AVS? [facility]    CHIEF COMPLAINT:   Chronic neck pain    INTERVAL HISTORY:  Last seen on 1/27/21 virtually Dr Lynne Huddleston MD    Recommendations/plan at the last visit included:    Additional Workup:   1. - Diagnostic Studies:  no  2. - Laboratory studies:  no  3. - Urine toxicology screen today: no   Therapies:   4. - Acupuncture referral - Dr. Maura Bone  5. - Clinical Health Psychologist to address issues of relaxation, behavioral change, coping style, and other factors important to improvement: not at this time  Medication Management:   6. - Continue Percocet 5/325mg. Max #3 per day. #70 tabs for 30 days. Just refilled on 1/18/2021  7. - Continue medical marijuana  Further procedures recommended:   8. - none     Follow up: with Natasha in 3 months    Since his last visit, Stone De Paz reports:    Things have been pretty well. He states that he is trying to cut back on cannabis due to the cost. He had his stimulator replaced and is hoping that will help with the nausea. He states that he has still had to use the cannabis. He does feel that slowly things are improving.     He states that his back and neck are pretty good. He states that his neck muscles are sore and tight. He was going to see the chiropractor but that has been closed with [facility]. He states that his insurance will cover acupuncture but he has to find out where.       Pain Information:              Pain today 3/10       Annual Controlled Substance Agreement: signed: 3/10/21  UDS: 2/25/21    CURRENT RELEVANT PAIN MEDICATIONS:  Naproxen 500mg-taking 1/day  Oxycodone 5mg-taking 2 in AM and will occasionally take a 3rd in the afternoon    Imitrex-last used about 3-4 days ago  Medical Cannabis    Patient is using the  "medication as prescribed:  YES  Is your medication helpful? YES   Medication side effects? itching    Previous Medications: (H--helped; HI--Helped initially; SWH-- somewhat helpful, NH--No help; W--worse; SE--side effects)   Opiates: Oxycodone H, Tylenol #3 NH, Hydrocodone NH, Dilaudid H SE \"ick\", Morphine H SE nausea, Tramadol NH  NSAIDS: Naproxen H SE stomach upset  Muscle Relaxants: Flexeril H SE incontinence  Anti-migraine mediations: Imitrex H  Anti-depressants: Amitriptyline NH SE rapid heart  Sleep aids: none  Anxiolytics: Valium [facility]  Neuropathics: Gabapentin NH SE \"goofy\", Lyrica ? Was too expensive  Topicals: Voltaren Gel NH, Lidocaine NH  Other medications not covered above: Tylenol H, Prednisone H SE high sugars, Medical marijuana H SE cost constraints    Past Pain Treatments:  Pain Clinic:   No   PT: Yes, last went this year  Psychologist: No, but he did have to do some psychology prior to his back surgeryes  Relaxation techniques/biofeedback: No, but tries to do meditation at times   Chiropractor: No  Acupuncture: No  Pharmacotherapy:               Opioids: Yes                Non-opioids:    Yes   TENs Unit:Yes, was not helpful  Injections: Yes, right 3rd occipital nerve and right C3, C4, C5 RFA 02/28/2019, left 3rd occipital nerve, and left C3, C4, C5 RFA 03/15/2019. Had done low back injections in [place] as well (steroids mess with sugars therefore tries to avoid if possible).   Self-care:   Yes, cold works better (heat worsens pain)  Surgeries related to pain: Yes, C5-6 ACDF 2012. Disk replacement cervical spine 2006, low back fusions (2000, 2002)    [place] Board of Pharmacy Data Base Reviewed:    YES; As expected, no concern for misuse/abuse of controlled medications based on this report. 4/19/21    THE 4 As OF OPIOID MAINTENANCE ANALGESIA    Analgesia: Is pain relief clinically significant? YES   Activity: Is patient functional and able to perform Activities of Daily Living? YES   Adverse " effects: Is patient free from adverse side effects from opiates? NO   Adherence to Rx protocol: Is patient adhering to Controlled Substance Agreement and taking medications ONLY as ordered? YES       Is Narcan prescribed for opiate use >50 MME daily? N/A      Daily MME: 15-22.5    Medications:  Current Outpatient Medications   Medication Sig Dispense Refill     amylase-lipase-protease (CREON 24) 39641-38132 units CPEP per EC capsule Take 1 capsule by mouth every other day       amylase-lipase-protease (PERTZYE) 29409 units CPEP Take 1 capsule by mouth every other day       atorvastatin (LIPITOR) 80 MG tablet TAKE ONE TABLET BY MOUTH AT BEDTIME 90 tablet 1     citalopram (CELEXA) 40 MG tablet TAKE ONE TABLET BY MOUTH ONCE DAILY 90 tablet 1     Continuous Blood Gluc  (DEXCOM G6 ) AMBER Use to read glucose levels per 's instructions 1 Device 0     Continuous Blood Gluc  (FREESTYLE REUBEN READER) AMBER 1 Device continuous prn (replace annually if needed) 1 Device 1     Continuous Blood Gluc Sensor (FREESTYLE REUBEN 14 DAY SENSOR) MISC 1 each every 14 days 6 each 2     CONTOUR NEXT TEST test strip Use to test blood sugar 5x times daily or as directed. 450 strip 3     insulin aspart (NOVOLOG VIAL) 100 UNITS/ML vial USE WITH INSULIN PUMP TOTAL DAILY DOSE APPROX 45 UNITS 20 mL 4     levothyroxine (SYNTHROID/LEVOTHROID) 125 MCG tablet TAKE ONE TABLET BY MOUTH ONCE DAILY 90 tablet 1     lisinopril (ZESTRIL) 40 MG tablet TAKE ONE TABLET BY MOUTH ONCE DAILY 90 tablet 1     medical cannabis (Patient's own supply) See Admin Instructions (The purpose of this order is to document that the patient reports taking medical cannabis.  This is not a prescription, and is not used to certify that the patient has a qualifying medical condition.)       naproxen (NAPROSYN) 500 MG tablet TAKE ONE TABLET BY MOUTH TWICE A DAY AS NEEDED FOR MODERATE PAIN 180 tablet 0     omeprazole (PRILOSEC) 20 MG DR capsule TAKE  ONE CAPSULE BY MOUTH ONCE DAILY 90 capsule 1     ondansetron (ZOFRAN-ODT) 8 MG ODT tab Take 1 tablet (8 mg) by mouth every 8 hours as needed (for nauesa) 60 tablet 0     oxyCODONE-acetaminophen (PERCOCET) 5-325 MG tablet Take 1 tablet by mouth every 6 hours as needed for severe pain . Max of 3/day. May fill 04/19/21 and start 04/21/21. 70 tablet 0     SUMAtriptan (IMITREX) 100 MG tablet Take 1 tablet (100 mg) by mouth at onset of headache 1/2 to 1 tablet by mouth at onset of headache. May repeat 1 dose after 2 hours if headache persists. 9 tablet 3     DIAGNOSTIC TESTS:  Imaging Studies:   No new imaging to review    Assessment:  Stone De Paz is a 55 year old male who presents today for follow up regarding his:    1.  Cervical stenosis   2.  Myofascial pain  3. Chronic low back pain  4.  Chronic pain syndrome  5. Chronic use of opioids    Pain is overall stable. He is trying to reduce his usage of the medical cannabis due to the cost, however it is quite helpful for him. He uses Percocet as needed.     His back and neck pain are overall stable. He is noticing some increased muscle soreness and is going to explore acupuncture further.     Plan:    Diagnosis reviewed, treatment option addressed, and risk/benifits discussed.  Self-care instructions given.  I am recommending a multidisciplinary treatment plan to help this patient better manage pain.      1. Physical Therapy:  NO   2. Clinical Health Psychologist:  NO  3. Diagnostic Studies:  None at this time  4. Medication Management:    1. Percocet 5-325 2/day. Okay to use 3 occasionally if needed.   2. Continue medical cannabis    5. Recommendations to PCP. See above      Follow up with this provider: 12  Weeks for a virtual visit    The total TIME spent on this patient on the day of the appointment was 15 minutes.    Time spent preparing to see the patient (reviewing records and tests) 3 minutes  Time spend face to face (or on the phone) with the patient 9  minutes  Time spent ordering tests, medications, procedures and referrals 0 minutes  Time spent Referring and communicating with other healthcare professionals 0 minutes  Time spent documenting clinical information in Epic 3 minutes          Natasha Padgett PA-C   [facility]

## 2021-04-26 NOTE — TELEPHONE ENCOUNTER
[facility] Call Center    Phone Message    May a detailed message be left on voicemail: yes     Reason for Call: Other: Pt JESSY Caballero calling back to Mony about the appt. Please call her back.      Action Taken: Message routed to:  [facility] JOSE endocrine    Travel Screening: Not Applicable

## 2021-04-27 ENCOUNTER — VIRTUAL VISIT (OUTPATIENT)
Dept: PALLIATIVE MEDICINE | Facility: CLINIC | Age: 56
End: 2021-04-27
Payer: COMMERCIAL

## 2021-04-27 DIAGNOSIS — M79.18 MYOFASCIAL PAIN: ICD-10-CM

## 2021-04-27 DIAGNOSIS — M48.02 CERVICAL STENOSIS OF SPINE: Primary | ICD-10-CM

## 2021-04-27 DIAGNOSIS — G89.29 CHRONIC BILATERAL LOW BACK PAIN WITHOUT SCIATICA: ICD-10-CM

## 2021-04-27 DIAGNOSIS — F11.90 CHRONIC, CONTINUOUS USE OF OPIOIDS: ICD-10-CM

## 2021-04-27 DIAGNOSIS — G89.4 CHRONIC PAIN SYNDROME: ICD-10-CM

## 2021-04-27 DIAGNOSIS — M54.50 CHRONIC BILATERAL LOW BACK PAIN WITHOUT SCIATICA: ICD-10-CM

## 2021-04-27 PROCEDURE — 99213 OFFICE O/P EST LOW 20 MIN: CPT | Mod: 95 | Performed by: PHYSICIAN ASSISTANT

## 2021-04-27 NOTE — PATIENT INSTRUCTIONS
After Visit Instructions:     Thank you for coming to [facility] for your care. It is my goal to partner with you to help you reach your optimal state of health.     I am recommending multidisciplinary care at this time.  The focus of care will be to continue gradual rehabilitation and pain management with medication adjustments as needed.    Continue daily self-care, identifying contributing factors, and monitoring variations in pain level. Continue to integrate self-care into your life.        Schedule follow-up with Natasha Padgett PA-C in 12 weeks. You will need to make this appointment.     Medication recommendations:   1. Percocet 5-325 2/day. Okay to use 3 occasionally if needed.   2. Continue medical cannabis      Natasha Padgett PA-C  [facility]    Contact information: [facility]  Clinic Number:  [phone]     Call with any questions about your care and for scheduling assistance.     Calls are returned Monday through Friday between 8 AM and 4:30 PM. We usually get back to you within 2 business days depending on the issue/request.    If we are prescribing your medications:    For opioid medication refills, call the clinic or send a [employer] message 7 days in advance.  Please include:    Name of requested medication    Name of the pharmacy.    For non-opioid medications, call your pharmacy directly to request a refill. Please allow 3-4 days to be processed.     Per [place] State Law:    All controlled substance prescriptions must be filled within 30 days of being written.      For those controlled substances allowing refills, pickup must occur within 30 days of last fill.      We believe regular attendance is key to your success in our program!      Any time you are unable to keep your appointment we ask that you call us at least 24 hours in advance to cancel.This will allow us to offer the appointment time to  another patient.   Multiple missed appointments may lead to dismissal from the clinic.

## 2021-05-12 ENCOUNTER — VIRTUAL VISIT (OUTPATIENT)
Dept: ENDOCRINOLOGY | Facility: CLINIC | Age: 56
End: 2021-05-12
Payer: COMMERCIAL

## 2021-05-12 DIAGNOSIS — E10.43 TYPE 1 DIABETES MELLITUS WITH DIABETIC AUTONOMIC NEUROPATHY (H): Primary | ICD-10-CM

## 2021-05-12 DIAGNOSIS — E03.9 HYPOTHYROIDISM, UNSPECIFIED TYPE: ICD-10-CM

## 2021-05-12 DIAGNOSIS — Z96.41 INSULIN PUMP STATUS: ICD-10-CM

## 2021-05-12 PROCEDURE — 95251 CONT GLUC MNTR ANALYSIS I&R: CPT | Performed by: INTERNAL MEDICINE

## 2021-05-12 PROCEDURE — 99214 OFFICE O/P EST MOD 30 MIN: CPT | Mod: 95 | Performed by: INTERNAL MEDICINE

## 2021-05-12 NOTE — LETTER
"    5/12/2021         RE: Stone De Paz  [id number] [address] [address]        Dear Colleague,    Thank you for referring your patient, Stone De Paz, to the [facility] [place]. Please see a copy of my visit note below.    Stone is a 55 year old who is being evaluated via a billable telephone visit.      What phone number would you like to be contacted at? Cell phone  How would you like to obtain your AVS?  Reviewed verbally      Recent issues:  Diabetes and thyroid follow-up evaluation  Using Medtronic 630G pump but now OOW, also now using the DexcomG6 CGM sensor  Still has issues with neck pain/stiffness              1995. Diagnosis of diabetes mellitus after 2nd back surgery, age 29  Initial treatment with insulin injections using NPH and Regular insulin  Switched to premixed 70/30 insulin BID dosing  1999. Started Medtronic insulin pump model 508, used base-dose Humalog insulin at meals  Had seen Dr. Iggy Briggs/[facility]     7/24/01. Initial evaluation with me at my former clinic ([facility])  Upgraded pump to Medtronic 523   Now using Medtronic 630G pump              Novolog in pump     Meals BID, Uses carb \"exchanges\" with his pump settings  Chronic high BG and hgbA1c trends despite dose changes  Tried square wave bolus  Has Contour Link? BG meter, variable testing pattern... Recently testing 4x/day for at least 30 days  Glucose sensor (CGMS) use:   Tried Medtronic CGMS in the past, didn't like it   Switched to DexcomG5, used for approx 6 months but issues with getting supply order   Early 2019, started Freestyle Sukhi but issues with skin site redness    Previous pump settings:       No recent Carelink data available    ~11/2020. Upgraded to DexcomG6 CGM sensor  Recent DexcomG6 data:              Previous [facility] labs include:  Lab Results   Component Value Date    A1C 8.7 (H) 03/01/2021     03/01/2021    POTASSIUM 4.9 03/01/2021    CHLORIDE 101 03/01/2021    CO2 29 " "03/01/2021    ANIONGAP 4 03/01/2021     (H) 03/01/2021    BUN 13 03/01/2021    CR 0.94 03/01/2021    GFRESTIMATED >90 03/01/2021    GFRESTBLACK >90 03/01/2021    EMILEE 8.9 03/01/2021    CHOL 134 08/06/2020    TRIG 41 08/06/2020    HDL 69 08/06/2020    LDL 57 08/06/2020    NHDL 65 08/06/2020    UCRR 24 08/06/2020    MICROL <5 08/06/2020    UMALCR Unable to calculate due to low value 08/06/2020     Last eye exam date?  Goes to eye clinic in [facility]  7/2018. Met with Destiny Warren RD, CDE at [facility]  DM Complications:              Neuropathy                          Peripheral neuropathy                                      Decreased sensation at feet                          Autonomic neuropathy- gastroparesis                                      Symptoms of nausea, bloating, episodes of diarrhea and emesis                                      Has [employer] gastric stimulator                                      Sees physicians and Ms WARREN Rosraio/[place] [facility]                   Thyroid:  1994. History of hypothyroidism  Chronic levothyroxine treatment, has used Levoxyl in the past  Supplements:  Takes vitamin D 1000 U daily  7/2018. Had levothyroxine dose increase   Recent labs include:  Lab Results   Component Value Date    TSH 1.27 08/06/2020    T4 1.13 08/06/2020     Current dose:  Levothyroxine 0.125 mg daily        Lives in [place], [place] with Ada, also (1) adopted dog named Marko  Seesantiago Chew Mai/[facility] for general medicine evaluations  Also sees Olga VIRK/[place] [facility].        ROS: 10 point ROS neg other than the symptoms noted above in the HPI.     GENERAL: some fatigue, wt stable; denies fevers, chills, night sweats.   HEENT: no dysphagia, odonophagia, diplopia, neck pain  THYROID:  no apparent hyper or hypothyroid symptoms  CV: occasional chest pains; no pressure, palpitations  LUNGS: no SOB, KRAFT, cough, wheezing   ABDOMEN: some diarrhea \"collitis\" and " postmeal nausea; no diarrhea, constipation  EXTREMITIES: no rashes, ulcers, edema  NEUROLOGY: no headaches, denies changes in vision, tingling, extremitiy numbness   MSK: neck pain/stiffness and back pains; no muscle weakness  SKIN: no rashes or lesions  PSYCH:  stable mood, no significant anxiety or depression  ENDOCRINE: no heat or cold intolerance     LABS:     All pertinent notes, labs, and images personally reviewed by me.      A/P:       Encounter Diagnoses   Name        Type 1 diabetes mellitus with diabetic autonomic neuropathy (H)      Insulin pump status      Hypothyroidism, unspecified type           Comments:  Reviewed complicated health history, diabetes and thyroid issues.  Recent postprandial hyperglycemia noted  Reviewed and interpreted tests that I previously ordered.   Ordered appropriate tests for the endocrinology disease management.    Management options discussed and implemented after shared medical decision making with the patient.  The T1DM problem is chronic-stable       Plan:  Reviewed the overall T1DM management and insulin pump use.  Discussed optimal BG testing to assess glucose trends.  We reviewed insulin pump settings, basal rate and bolus dosing  Use of automated pump bolus dosing for meal/snack carb & correction dosing  Reviewed the Medtronic Carelink report data today  Reviewed recent Dexcom CGMS glucose sensor trends, in detail     Recommend:  Continue Medtronic insulin pump use at this time, though discussed pump upgrade vs change options.  No pump setting changes at this time   Bolus ICR 1.3U/Ex to 1.5U/Ex    Use basal pattern 1 when more exercise/activity and pattern 2 when less activity/inactive  After reviewing the Medtronic vs Tandem pump options, we decided to change to the Tandem TSlim pump   Reviewed his Medicare insurance and pump, sensor coverage   He will view the Tandem pump info on internet, and I gave him our  (CE) phone number   Plan training with new pump  locally with [employer] CDE ()    Contact our office with update if restarting the budesinide medication for diarrhea problem  Continue use of the DexcomG6 CGM sensor               Patient makes frequent adjustments to insulin regimen on basis of therapeutic glucose testing   Reviewed interaction of the DexcomG6 and the Tandem TSlim pump  Would benefit from additional CDE evaluation at [facility]  No labs ordered today  Continue simvastatin 40 mg each evening  Continue current levothyroxine 0.125 mg daily dose plan  Arrange annual dilated eye exam, fasting lipid panel testing.  We reviewed importance of timely clinic appointments with me Q3 months, to satisfy Medicare with his diabetes device coverage     Encouraged him to make routine f/u appointments with [place] [facility]   Addressed patient's questions today.     Future labs ordered today: No orders of the defined types were placed in this encounter.    Radiology/Consults ordered today: None    Total time spent in with the patient evaluation:  25 min  Additional time spent reviewing pertinent lab tests and chart notes, and documentation:  10 min    Follow-up:  7/2021? (after new pump start)    VIKTOR Best MD, MS  Endocrinology  [facility]                Again, thank you for allowing me to participate in the care of your patient.        Sincerely,        Frankie Best MD

## 2021-05-12 NOTE — PROGRESS NOTES
"Stone is a 55 year old who is being evaluated via a billable telephone visit.      What phone number would you like to be contacted at? Cell phone  How would you like to obtain your AVS?  Reviewed verbally      Recent issues:  Diabetes and thyroid follow-up evaluation  Using Medtronic 630G pump but now OOW, also now using the DexcomG6 CGM sensor  Still has issues with neck pain/stiffness              1995. Diagnosis of diabetes mellitus after 2nd back surgery, age 29  Initial treatment with insulin injections using NPH and Regular insulin  Switched to premixed 70/30 insulin BID dosing  1999. Started Medtronic insulin pump model 508, used base-dose Humalog insulin at meals  Had seen Dr. Iggy Briggs/[facility]     7/24/01. Initial evaluation with me at my former clinic ([facility])  Upgraded pump to Medtronic 523   Now using Medtronic 630G pump              Novolog in pump     Meals BID, Uses carb \"exchanges\" with his pump settings  Chronic high BG and hgbA1c trends despite dose changes  Tried square wave bolus  Has Contour Link? BG meter, variable testing pattern... Recently testing 4x/day for at least 30 days  Glucose sensor (CGMS) use:   Tried Medtronic CGMS in the past, didn't like it   Switched to DexcomG5, used for approx 6 months but issues with getting supply order   Early 2019, started Freestyle Sukhi but issues with skin site redness    Previous pump settings:       No recent Carelink data available    ~11/2020. Upgraded to DexcomG6 CGM sensor  Recent DexcomG6 data:              Previous [facility] labs include:  Lab Results   Component Value Date    A1C 8.7 (H) 03/01/2021     03/01/2021    POTASSIUM 4.9 03/01/2021    CHLORIDE 101 03/01/2021    CO2 29 03/01/2021    ANIONGAP 4 03/01/2021     (H) 03/01/2021    BUN 13 03/01/2021    CR 0.94 03/01/2021    GFRESTIMATED >90 03/01/2021    GFRESTBLACK >90 03/01/2021    EMILEE 8.9 03/01/2021    CHOL 134 08/06/2020    TRIG 41 08/06/2020    HDL 69 08/06/2020 " "   LDL 57 08/06/2020    NHDL 65 08/06/2020    UCRR 24 08/06/2020    MICROL <5 08/06/2020    UMALCR Unable to calculate due to low value 08/06/2020     Last eye exam date?  Goes to eye clinic in [facility]  7/2018. Met with Destiny Warren RD, FRANCAE at [facility]  DM Complications:              Neuropathy                          Peripheral neuropathy                                      Decreased sensation at feet                          Autonomic neuropathy- gastroparesis                                      Symptoms of nausea, bloating, episodes of diarrhea and emesis                                      Has [employer] gastric stimulator                                      Sees physicians and Ms Olga Ramirez, PA/[place] [facility]                   Thyroid:  1994. History of hypothyroidism  Chronic levothyroxine treatment, has used Levoxyl in the past  Supplements:  Takes vitamin D 1000 U daily  7/2018. Had levothyroxine dose increase   Recent labs include:  Lab Results   Component Value Date    TSH 1.27 08/06/2020    T4 1.13 08/06/2020     Current dose:  Levothyroxine 0.125 mg daily        Lives in [place], [place] with Ada, also (1) adopted dog named Marko Chew Mai/[facility] for general medicine evaluations  Also sees Olga Ramirez [facility]/[place] [facility].        ROS: 10 point ROS neg other than the symptoms noted above in the HPI.     GENERAL: some fatigue, wt stable; denies fevers, chills, night sweats.   HEENT: no dysphagia, odonophagia, diplopia, neck pain  THYROID:  no apparent hyper or hypothyroid symptoms  CV: occasional chest pains; no pressure, palpitations  LUNGS: no SOB, KRAFT, cough, wheezing   ABDOMEN: some diarrhea \"collitis\" and postmeal nausea; no diarrhea, constipation  EXTREMITIES: no rashes, ulcers, edema  NEUROLOGY: no headaches, denies changes in vision, tingling, extremitiy numbness   MSK: neck pain/stiffness and back pains; no muscle weakness  SKIN: no rashes or " lesions  PSYCH:  stable mood, no significant anxiety or depression  ENDOCRINE: no heat or cold intolerance     LABS:     All pertinent notes, labs, and images personally reviewed by me.      A/P:       Encounter Diagnoses   Name        Type 1 diabetes mellitus with diabetic autonomic neuropathy (H)      Insulin pump status      Hypothyroidism, unspecified type           Comments:  Reviewed complicated health history, diabetes and thyroid issues.  Recent postprandial hyperglycemia noted  Reviewed and interpreted tests that I previously ordered.   Ordered appropriate tests for the endocrinology disease management.    Management options discussed and implemented after shared medical decision making with the patient.  The T1DM problem is chronic-stable       Plan:  Reviewed the overall T1DM management and insulin pump use.  Discussed optimal BG testing to assess glucose trends.  We reviewed insulin pump settings, basal rate and bolus dosing  Use of automated pump bolus dosing for meal/snack carb & correction dosing  Reviewed the Medtronic Carelink report data today  Reviewed recent DexcomG6 CGMS glucose sensor trends, in detail     Recommend:  Continue Medtronic insulin pump use at this time, though discussed pump upgrade vs change options.  No pump setting changes at this time   Bolus ICR 1.3U/Ex to 1.5U/Ex    Use basal pattern 1 when more exercise/activity and pattern 2 when less activity/inactive  After reviewing the Medtronic vs Tandem pump options, we decided to change to the Tandem TSlim pump   Reviewed his Medicare insurance and pump, sensor coverage   He will view the Tandem pump info on internet, and I gave him our  (CE) phone number   Plan training with new pump locally with Tandem CDE ()    Contact our office with update if restarting the budesinide medication for diarrhea problem  Continue use of the DexcomG6 CGM sensor               Patient makes frequent adjustments to insulin regimen on basis  of therapeutic glucose testing   Reviewed interaction of the DexcomG6 and the Tandem TSlim pump  Would benefit from additional CDE evaluation at [facility]  No labs ordered today  Continue simvastatin 40 mg each evening  Continue current levothyroxine 0.125 mg daily dose plan  Arrange annual dilated eye exam, fasting lipid panel testing.  We reviewed importance of timely clinic appointments with me Q3 months, to satisfy Medicare with his diabetes device coverage     Encouraged him to make routine f/u appointments with [place] [facility] clinic   Addressed patient's questions today.     Future labs ordered today:   Orders Placed This Encounter   Procedures     GLUCOSE MONITOR, 72 HOUR, PHYS INTERP     Radiology/Consults ordered today: None    Total time spent in with the patient evaluation:  25 min  Additional time spent reviewing pertinent lab tests and chart notes, and documentation:  10 min    Follow-up:  7/2021? (after new pump start)    VIKTOR Best MD, MS  Endocrinology  [facility]

## 2021-05-13 DIAGNOSIS — K21.9 GASTROESOPHAGEAL REFLUX DISEASE WITHOUT ESOPHAGITIS: ICD-10-CM

## 2021-05-19 ENCOUNTER — TELEPHONE (OUTPATIENT)
Dept: ENDOCRINOLOGY | Facility: CLINIC | Age: 56
End: 2021-05-19

## 2021-05-19 DIAGNOSIS — G89.4 CHRONIC PAIN SYNDROME: ICD-10-CM

## 2021-05-19 DIAGNOSIS — M48.02 CERVICAL STENOSIS OF SPINE: ICD-10-CM

## 2021-05-19 RX ORDER — OXYCODONE AND ACETAMINOPHEN 5; 325 MG/1; MG/1
1 TABLET ORAL EVERY 6 HOURS PRN
Qty: 70 TABLET | Refills: 0 | Status: SHIPPED | OUTPATIENT
Start: 2021-05-19 | End: 2021-06-18

## 2021-05-19 NOTE — TELEPHONE ENCOUNTER
Left message on numerical id vm to inform of approved Rx .  Lisa/MA  [facility]

## 2021-05-19 NOTE — TELEPHONE ENCOUNTER
[facility] Call Center    Phone Message    May a detailed message be left on voicemail: yes     Reason for Call: Form or Letter   Type or form/letter needing completion: Certificate of medical necessity and 2 previous chart notes  Provider: Dr. Best   Date form needed: ASAP- initially faxed on 4/23/21  Once completed: Fax form to: Fax number provided on cover sheet    Please contact Belinda with any questions at [phone] ext [phone]    Action Taken: Message routed to:  [facility] endo    Travel Screening: Not Applicable

## 2021-05-19 NOTE — TELEPHONE ENCOUNTER
Reason for call:  Medication   If this is a refill request, has the caller requested the refill from the pharmacy already? No  Will the patient be using a [facility]? No  Name of the pharmacy and phone number for the current request: [facility] - [place], [place] - [address]    Name of the medication requested: oxyCODONE-acetaminophen (PERCOCET) 5-325 MG tablet    Other request: none    Phone number to reach patient:  Home number on file [phone] (home)    Best Time:  anytime    Can we leave a detailed message on this number?  YES    Travel screening: Not Applicable     Leesa SIMON    [facility]

## 2021-05-19 NOTE — TELEPHONE ENCOUNTER
Medication refill information reviewed.     Due date for oxyCODONE-acetaminophen (PERCOCET) 5-325 MG tablet is 05/21/21.     Prescriptions prepped for review.     Will route to provider.

## 2021-05-19 NOTE — TELEPHONE ENCOUNTER
Received call from patient requesting refill(s) of oxyCODONE-acetaminophen (PERCOCET) 5-325 MG tablet     Last dispensed from pharmacy on 4/20/21    Patient's last office/virtual visit by prescribing provider on 4/27/21  Next office/virtual appointment scheduled for none    Last urine drug screen date 2/25/21  Current opioid agreement on file (completed within the last year) Yes Date of opioid agreement: 3/10/21    E-prescribe to [facility] [id number] - [place], [place] - [address] [facility] [address] pharmacy    Will route to [facility] for review and preparation of prescription(s).

## 2021-05-20 ENCOUNTER — MEDICAL CORRESPONDENCE (OUTPATIENT)
Dept: HEALTH INFORMATION MANAGEMENT | Facility: CLINIC | Age: 56
End: 2021-05-20

## 2021-05-20 NOTE — TELEPHONE ENCOUNTER
Message and Medtronic pump order form reviewed.  This patient is not planning to upgrade his pump and therefore, the Medtronic form and notes not needed.  We did not contact Medtronic for this form.      I have written a comment on the form indicating that the patient is not planning to upgrade his pump and this (blank) form will be faxed back to Medtronic ASAP.    VIKTOR Best MD, MS  Endocrinology  [facility]

## 2021-05-26 NOTE — PROGRESS NOTES
"Stone is a 55 year old who is being evaluated via a billable telephone visit.    Is Pt currently in [place]? Yes      What phone number would you like to be contacted at? [phone]  How would you like to obtain your AVS? Mail copy      [facility]    CHIEF COMPLAINT:   Chronic neck pain    INTERVAL HISTORY:  Last seen on 4/27/21 virtually    Recommendations/plan at the last visit included:    Additional Workup:   1. Physical Therapy:  NO   2. Clinical Health Psychologist:  NO  3. Diagnostic Studies:  None at this time  4. Medication Management:    1.   Percocet 5-325 2/day. Okay to use 3 occasionally if needed.   2.   Continue medical cannabis     5. Recommendations to PCP. See above        Follow up with this provider: 12  Weeks for a virtual visit    Since his last visit, Stone De Paz reports:    Things are overall stable. He continues to have good pain control with the occasional Oxycodone and Medical cannabis.       Pain Information:              Pain today 4/10       Annual Controlled Substance Agreement: signed: 3/10/21  UDS: 2/25/21    CURRENT RELEVANT PAIN MEDICATIONS:  Naproxen 500mg-taking 1/day  Oxycodone 5mg-taking 2 in AM and will occasionally take a 3rd in the afternoon    Imitrex-last used about 3-4 days ago  Medical Cannabis    Patient is using the medication as prescribed:  YES  Is your medication helpful? YES   Medication side effects? itching    Previous Medications: (H--helped; HI--Helped initially; SWH-- somewhat helpful, NH--No help; W--worse; SE--side effects)   Opiates: Oxycodone H, Tylenol #3 NH, Hydrocodone NH, Dilaudid H SE \"ick\", Morphine H SE nausea, Tramadol NH  NSAIDS: Naproxen H SE stomach upset  Muscle Relaxants: Flexeril H SE incontinence  Anti-migraine mediations: Imitrex H  Anti-depressants: Amitriptyline NH SE rapid heart  Sleep aids: none  Anxiolytics: Valium [facility]  Neuropathics: Gabapentin NH SE \"goofy\", Lyrica ? Was too expensive  Topicals: Voltaren " Gel NH, Lidocaine NH  Other medications not covered above: Tylenol H, Prednisone H SE high sugars, Medical marijuana H SE cost constraints    Past Pain Treatments:  Pain Clinic:   No   PT: Yes, last went this year  Psychologist: No, but he did have to do some psychology prior to his back surgeryes  Relaxation techniques/biofeedback: No, but tries to do meditation at times   Chiropractor: No  Acupuncture: No  Pharmacotherapy:               Opioids: Yes                Non-opioids:    Yes   TENs Unit:Yes, was not helpful  Injections: Yes, right 3rd occipital nerve and right C3, C4, C5 RFA 02/28/2019, left 3rd occipital nerve, and left C3, C4, C5 RFA 03/15/2019. Had done low back injections in [place] as well (steroids mess with sugars therefore tries to avoid if possible).   Self-care:   Yes, cold works better (heat worsens pain)  Surgeries related to pain: Yes, C5-6 ACDF 2012. Disk replacement cervical spine 2006, low back fusions (2000, 2002)    [place] Board of Pharmacy Data Base Reviewed:    YES; As expected, no concern for misuse/abuse of controlled medications based on this report. 4/19/21    THE 4 As OF OPIOID MAINTENANCE ANALGESIA    Analgesia: Is pain relief clinically significant? YES   Activity: Is patient functional and able to perform Activities of Daily Living? YES   Adverse effects: Is patient free from adverse side effects from opiates? NO   Adherence to Rx protocol: Is patient adhering to Controlled Substance Agreement and taking medications ONLY as ordered? YES       Is Narcan prescribed for opiate use >50 MME daily? N/A      Daily MME: 15-22.5    Medications:  Current Outpatient Medications   Medication Sig Dispense Refill     amylase-lipase-protease (CREON 24) 80179-95465 units CPEP per EC capsule Take 1 capsule by mouth every other day       amylase-lipase-protease (PERTZYE) 28789 units CPEP Take 1 capsule by mouth every other day       atorvastatin (LIPITOR) 80 MG tablet TAKE ONE TABLET BY  MOUTH AT BEDTIME 90 tablet 1     citalopram (CELEXA) 40 MG tablet TAKE ONE TABLET BY MOUTH ONCE DAILY 90 tablet 1     Continuous Blood Gluc  (DEXCOM G6 ) AMBER Use to read glucose levels per 's instructions 1 Device 0     Continuous Blood Gluc  (FREESTYLE REUBEN READER) AMBER 1 Device continuous prn (replace annually if needed) 1 Device 1     Continuous Blood Gluc Sensor (DEXCOM G6 SENSOR) MISC USE FOR CONTINUOUS GLUCOSE TESTING, CHANGE EVERY 10 DAYS 3 each 5     CONTOUR NEXT TEST test strip Use to test blood sugar 5x times daily or as directed. 450 strip 3     insulin aspart (NOVOLOG VIAL) 100 UNITS/ML vial USE WITH INSULIN PUMP TOTAL DAILY DOSE APPROX 45 UNITS 20 mL 4     levothyroxine (SYNTHROID/LEVOTHROID) 125 MCG tablet TAKE ONE TABLET BY MOUTH ONCE DAILY 90 tablet 1     lisinopril (ZESTRIL) 40 MG tablet TAKE ONE TABLET BY MOUTH ONCE DAILY 90 tablet 1     medical cannabis (Patient's own supply) See Admin Instructions (The purpose of this order is to document that the patient reports taking medical cannabis.  This is not a prescription, and is not used to certify that the patient has a qualifying medical condition.)       naproxen (NAPROSYN) 500 MG tablet TAKE ONE TABLET BY MOUTH TWICE A DAY AS NEEDED FOR MODERATE PAIN 180 tablet 0     omeprazole (PRILOSEC) 20 MG DR capsule TAKE ONE CAPSULE BY MOUTH ONCE DAILY 90 capsule 1     ondansetron (ZOFRAN-ODT) 8 MG ODT tab Take 1 tablet (8 mg) by mouth every 8 hours as needed (for nauesa) 60 tablet 0     oxyCODONE-acetaminophen (PERCOCET) 5-325 MG tablet Take 1 tablet by mouth every 6 hours as needed for severe pain . Max of 3/day. May fill 05/19/21 and start 05/21/21. 70 tablet 0     SUMAtriptan (IMITREX) 100 MG tablet Take 1 tablet (100 mg) by mouth at onset of headache 1/2 to 1 tablet by mouth at onset of headache. May repeat 1 dose after 2 hours if headache persists. 9 tablet 3     DIAGNOSTIC TESTS:  Imaging Studies:   No new imaging  to review    Assessment:  Stone De Paz is a 55 year old male who presents today for follow up regarding his:    1.  Cervical stenosis   2.  Myofascial pain  3. Chronic low back pain  4.  Chronic pain syndrome  5. Chronic use of opioids    Pain is well controlled on his current regiment. Will continue current plan. He was recertified today for medical cannabis.    Plan:    Diagnosis reviewed, treatment option addressed, and risk/benifits discussed.  Self-care instructions given.  I am recommending a multidisciplinary treatment plan to help this patient better manage pain.      1. Physical Therapy:  NO   2. Clinical Health Psychologist:  NO  3. Diagnostic Studies:  None at this time  4. Medication Management:    1. Percocet 5-325 2/day. Okay to use 3 occasionally if needed. Refill was not needed today  2. Continue medical cannabis-recertified today    5. Recommendations to PCP. See above      Follow up with this provider: 12 Weeks for a virtual visit    The total TIME spent on this patient on the day of the appointment was 9 minutes.    Time spent preparing to see the patient (reviewing records and tests) 1 minutes  Time spend face to face (or on the phone) with the patient 5 minutes  Time spent ordering tests, medications, procedures and referrals 0 minutes  Time spent Referring and communicating with other healthcare professionals 0 minutes  Time spent documenting clinical information in Epic 3 minutes          Natasha Padgett PA-C   [facility]

## 2021-05-27 ENCOUNTER — VIRTUAL VISIT (OUTPATIENT)
Dept: PALLIATIVE MEDICINE | Facility: CLINIC | Age: 56
End: 2021-05-27
Payer: COMMERCIAL

## 2021-05-27 DIAGNOSIS — F11.90 CHRONIC, CONTINUOUS USE OF OPIOIDS: ICD-10-CM

## 2021-05-27 DIAGNOSIS — G89.29 CHRONIC BILATERAL LOW BACK PAIN WITHOUT SCIATICA: ICD-10-CM

## 2021-05-27 DIAGNOSIS — G89.4 CHRONIC PAIN SYNDROME: Primary | ICD-10-CM

## 2021-05-27 DIAGNOSIS — M54.50 CHRONIC BILATERAL LOW BACK PAIN WITHOUT SCIATICA: ICD-10-CM

## 2021-05-27 DIAGNOSIS — M79.18 MYOFASCIAL PAIN: ICD-10-CM

## 2021-05-27 DIAGNOSIS — M48.02 CERVICAL STENOSIS OF SPINE: ICD-10-CM

## 2021-05-27 PROCEDURE — 99213 OFFICE O/P EST LOW 20 MIN: CPT | Mod: 95 | Performed by: PHYSICIAN ASSISTANT

## 2021-05-27 NOTE — PATIENT INSTRUCTIONS
After Visit Instructions:     Thank you for coming to [facility] for your care. It is my goal to partner with you to help you reach your optimal state of health.     I am recommending multidisciplinary care at this time.  The focus of care will be to continue gradual rehabilitation and pain management with medication adjustments as needed.    Continue daily self-care, identifying contributing factors, and monitoring variations in pain level. Continue to integrate self-care into your life.          Schedule follow-up with Natasha Padgett PA-C in 12 weeks. You will need to make this appointment.     Medication recommendations:     You were re-certified today for medical cannabis. Check your email regarding this.    Continue Oxycodone at current dosing      Natasha Padgett PA-C  [facility]    Contact information: [facility]  Clinic Number:  [phone]     Call with any questions about your care and for scheduling assistance.     Calls are returned Monday through Friday between 8 AM and 4:30 PM. We usually get back to you within 2 business days depending on the issue/request.    If we are prescribing your medications:    For opioid medication refills, call the clinic or send a OpenPlacement message 7 days in advance.  Please include:    Name of requested medication    Name of the pharmacy.    For non-opioid medications, call your pharmacy directly to request a refill. Please allow 3-4 days to be processed.     Per [place] State Law:    All controlled substance prescriptions must be filled within 30 days of being written.      For those controlled substances allowing refills, pickup must occur within 30 days of last fill.      We believe regular attendance is key to your success in our program!      Any time you are unable to keep your appointment we ask that you call us at least 24 hours in advance to cancel.This will allow us to  offer the appointment time to another patient.   Multiple missed appointments may lead to dismissal from the clinic.

## 2021-06-04 ENCOUNTER — TELEPHONE (OUTPATIENT)
Dept: ENDOCRINOLOGY | Facility: CLINIC | Age: 56
End: 2021-06-04

## 2021-06-04 NOTE — TELEPHONE ENCOUNTER
Received form(s) from [facility] for Prescription Order - Last OV note printed.  Placed form(s) in/on TL's incoming basket.  Forms need to be filled out and signed and faxed to number listed on form..    Copy needs to be sent for scanning after completion: Yes    Janneth Begum MA

## 2021-06-16 ENCOUNTER — TELEPHONE (OUTPATIENT)
Dept: ENDOCRINOLOGY | Facility: CLINIC | Age: 56
End: 2021-06-16

## 2021-06-17 DIAGNOSIS — E03.9 HYPOTHYROIDISM, UNSPECIFIED TYPE: ICD-10-CM

## 2021-06-17 DIAGNOSIS — E10.43 TYPE 1 DIABETES MELLITUS WITH DIABETIC AUTONOMIC NEUROPATHY (H): Primary | ICD-10-CM

## 2021-06-18 DIAGNOSIS — M48.02 CERVICAL STENOSIS OF SPINE: ICD-10-CM

## 2021-06-18 DIAGNOSIS — G89.4 CHRONIC PAIN SYNDROME: ICD-10-CM

## 2021-06-18 RX ORDER — OXYCODONE AND ACETAMINOPHEN 5; 325 MG/1; MG/1
1 TABLET ORAL EVERY 6 HOURS PRN
Qty: 70 TABLET | Refills: 0 | Status: SHIPPED | OUTPATIENT
Start: 2021-06-18 | End: 2021-07-19

## 2021-06-18 NOTE — TELEPHONE ENCOUNTER
Medication refill information reviewed.     Due date for oxyCODONE-acetaminophen (PERCOCET) 5-325 MG tablet is 06/20/21     Prescriptions prepped for review.     Will route to provider.

## 2021-06-18 NOTE — TELEPHONE ENCOUNTER
Left message on numerical id vm  to inform of approved Rx .  Lisa/MA  [facility]

## 2021-06-18 NOTE — TELEPHONE ENCOUNTER
Received call from patient requesting refill(s) of oxyCODONE-acetaminophen (PERCOCET) 5-325 MG tablet    Last dispensed from pharmacy on 05/21/21    Patient's last office/virtual visit by prescribing provider on 05/27/21  Next office/virtual appointment scheduled for : none    Last urine drug screen date 02/25/21  Current opioid agreement on file (completed within the last year) Yes Date of opioid agreement: 02/24/21    E-prescribe to pharmacy-[facility] - [place], [place] - [address]     Will route to nursing [place] for review and preparation of prescription(s).

## 2021-06-18 NOTE — TELEPHONE ENCOUNTER
[facility] Call Center    Phone Message    May a detailed message be left on voicemail: yes     Reason for Call: Form or Letter   Type or form/letter needing completion: Lab results form  Provider: Estephania Orta form needed: whenever possible  Once completed: Fax form to: number provided on form    Robbin Rodgers was calling for an update on form.  Writer noted orders were placed but needed completion.      Action Taken: Message routed to:  Other: endo    Travel Screening: Not Applicable

## 2021-06-18 NOTE — TELEPHONE ENCOUNTER
"Message noted.  This patient needs a fasting \"lab appointment\" at his nearest [facility] clinic (as noted in my separate form note to MA).  These tests are necessary to complete the new (Tandem) insulin pump Rx.     I have placed these lab orders in his chart.  Please relay message to him, thanks.    VIKTOR Best MD, MS  Endocrinology  [facility]        "

## 2021-06-18 NOTE — TELEPHONE ENCOUNTER
LEFT MESSAGE - For pt to return call.    PT needs a LAB only appt - Needs current blood work .    Please see below if pt has questions.    Janneth Begum MA

## 2021-06-18 NOTE — TELEPHONE ENCOUNTER
Reason for call:  Medication   If this is a refill request, has the caller requested the refill from the pharmacy already? No  Will the patient be using a [facility]? No  Name of the pharmacy and phone number for the current request: [facility] - [place], [place] - [address]    Name of the medication requested: oxyCODONE-acetaminophen (PERCOCET) 5-325 MG tablet    Other request:     Phone number to reach patient:  Cell number on file:    Telephone Information:   Mobile [phone]       Best Time:      Can we leave a detailed message on this number?  YES    Travel screening: Not Applicable

## 2021-06-24 DIAGNOSIS — E03.9 HYPOTHYROIDISM, UNSPECIFIED TYPE: ICD-10-CM

## 2021-06-24 DIAGNOSIS — E10.43 TYPE 1 DIABETES MELLITUS WITH DIABETIC AUTONOMIC NEUROPATHY (H): ICD-10-CM

## 2021-06-24 LAB
CHOLEST SERPL-MCNC: 164 MG/DL
GLUCOSE SERPL-MCNC: 270 MG/DL (ref 70–99)
HBA1C MFR BLD: 9.2 % (ref 0–5.6)
HDLC SERPL-MCNC: 65 MG/DL
LDLC SERPL CALC-MCNC: 83 MG/DL
NONHDLC SERPL-MCNC: 99 MG/DL
T4 FREE SERPL-MCNC: 1.56 NG/DL (ref 0.76–1.46)
TRIGL SERPL-MCNC: 82 MG/DL
TSH SERPL DL<=0.005 MIU/L-ACNC: 0.16 MU/L (ref 0.4–4)

## 2021-06-24 PROCEDURE — 36415 COLL VENOUS BLD VENIPUNCTURE: CPT | Performed by: INTERNAL MEDICINE

## 2021-06-24 PROCEDURE — 80061 LIPID PANEL: CPT | Performed by: INTERNAL MEDICINE

## 2021-06-24 PROCEDURE — 84681 ASSAY OF C-PEPTIDE: CPT | Performed by: INTERNAL MEDICINE

## 2021-06-24 PROCEDURE — 84439 ASSAY OF FREE THYROXINE: CPT | Performed by: INTERNAL MEDICINE

## 2021-06-24 PROCEDURE — 83036 HEMOGLOBIN GLYCOSYLATED A1C: CPT | Performed by: INTERNAL MEDICINE

## 2021-06-24 PROCEDURE — 99000 SPECIMEN HANDLING OFFICE-LAB: CPT | Performed by: INTERNAL MEDICINE

## 2021-06-24 PROCEDURE — 84443 ASSAY THYROID STIM HORMONE: CPT | Performed by: INTERNAL MEDICINE

## 2021-06-24 PROCEDURE — 86341 ISLET CELL ANTIBODY: CPT | Mod: 90 | Performed by: INTERNAL MEDICINE

## 2021-06-24 PROCEDURE — 82947 ASSAY GLUCOSE BLOOD QUANT: CPT | Performed by: INTERNAL MEDICINE

## 2021-06-25 LAB — C PEPTIDE SERPL-MCNC: <0.1 NG/ML (ref 0.9–6.9)

## 2021-06-26 DIAGNOSIS — I10 ESSENTIAL HYPERTENSION: ICD-10-CM

## 2021-06-26 DIAGNOSIS — E78.5 HYPERLIPIDEMIA LDL GOAL <100: ICD-10-CM

## 2021-06-26 DIAGNOSIS — F41.1 GENERALIZED ANXIETY DISORDER: ICD-10-CM

## 2021-06-28 LAB — PANC ISLET CELL AB TITR SER: NORMAL {TITER}

## 2021-06-28 RX ORDER — LISINOPRIL 40 MG/1
TABLET ORAL
Qty: 90 TABLET | Refills: 2 | Status: SHIPPED | OUTPATIENT
Start: 2021-06-28 | End: 2022-03-09

## 2021-06-28 RX ORDER — ATORVASTATIN CALCIUM 80 MG/1
TABLET, FILM COATED ORAL
Qty: 90 TABLET | Refills: 2 | Status: SHIPPED | OUTPATIENT
Start: 2021-06-28 | End: 2022-03-09

## 2021-06-28 RX ORDER — CITALOPRAM HYDROBROMIDE 40 MG/1
TABLET ORAL
Qty: 90 TABLET | Refills: 2 | Status: SHIPPED | OUTPATIENT
Start: 2021-06-28 | End: 2022-03-09

## 2021-06-28 NOTE — TELEPHONE ENCOUNTER
"Requested Prescriptions   Pending Prescriptions Disp Refills     atorvastatin (LIPITOR) 80 MG tablet [Pharmacy Med Name: ATORVASTATIN CALCIUM 80MG TABS] 90 tablet 1     Sig: TAKE ONE TABLET BY MOUTH AT BEDTIME   3/1/2021    Statins Protocol Passed - 6/26/2021 12:03 PM        Passed - LDL on file in past 12 months     Recent Labs   Lab Test 06/24/21  1246   LDL 83             Passed - No abnormal creatine kinase in past 12 months     No lab results found.             Passed - Recent (12 mo) or future (30 days) visit within the authorizing provider's specialty     Patient has had an office visit with the authorizing provider or a provider within the authorizing providers department within the previous 12 mos or has a future within next 30 days. See \"Patient Info\" tab in inbasket, or \"Choose Columns\" in Meds & Orders section of the refill encounter.              Passed - Medication is active on med list        Passed - Patient is age 18 or older           citalopram (CELEXA) 40 MG tablet [Pharmacy Med Name: CITALOPRAM HYDROBROMIDE 40MG TABS] 90 tablet 1     Sig: TAKE ONE TABLET BY MOUTH ONCE DAILY       SSRIs Protocol Passed - 6/26/2021 12:03 PM        Passed - Recent (12 mo) or future (30 days) visit within the authorizing provider's specialty     Patient has had an office visit with the authorizing provider or a provider within the authorizing providers department within the previous 12 mos or has a future within next 30 days. See \"Patient Info\" tab in inbasket, or \"Choose Columns\" in Meds & Orders section of the refill encounter.              Passed - Medication is active on med list        Passed - Patient is age 18 or older           lisinopril (ZESTRIL) 40 MG tablet [Pharmacy Med Name: LISINOPRIL 40MG TABS] 90 tablet 1     Sig: TAKE ONE TABLET BY MOUTH ONCE DAILY       ACE Inhibitors (Including Combos) Protocol Passed - 6/26/2021 12:03 PM        Passed - Blood pressure under 140/90 in past 12 months     BP " "Readings from Last 3 Encounters:   03/01/21 124/78   10/26/20 130/74   09/10/20 126/77           Passed - Recent (12 mo) or future (30 days) visit within the authorizing provider's specialty     Patient has had an office visit with the authorizing provider or a provider within the authorizing providers department within the previous 12 mos or has a future within next 30 days. See \"Patient Info\" tab in inbasket, or \"Choose Columns\" in Meds & Orders section of the refill encounter.            Passed - Medication is active on med list        Passed - Patient is age 18 or older        Passed - Normal serum creatinine on file in past 12 months     Recent Labs   Lab Test 03/01/21  1359   CR 0.94     Ok to refill medication if creatinine is low          Passed - Normal serum potassium on file in past 12 months     Recent Labs   Lab Test 03/01/21  1359   POTASSIUM 4.9            Prescriptions approved per [facility] Refill Protocol.  Юлия Carlos RN      "

## 2021-06-30 ENCOUNTER — TELEPHONE (OUTPATIENT)
Dept: ENDOCRINOLOGY | Facility: CLINIC | Age: 56
End: 2021-06-30

## 2021-06-30 NOTE — TELEPHONE ENCOUNTER
[facility] Call Center    Phone Message    May a detailed message be left on voicemail: yes     Reason for Call: Other: .  Vamshi Ortez is wanting to get a call back to know if the Lab result for the Fasting Glucose Request was received. Please advise       Action Taken: Message routed to:  [facility] Endo    Travel Screening: Not Applicable

## 2021-07-01 ENCOUNTER — MEDICAL CORRESPONDENCE (OUTPATIENT)
Dept: HEALTH INFORMATION MANAGEMENT | Facility: CLINIC | Age: 56
End: 2021-07-01

## 2021-07-11 DIAGNOSIS — E03.9 HYPOTHYROIDISM, UNSPECIFIED TYPE: ICD-10-CM

## 2021-07-11 RX ORDER — LEVOTHYROXINE SODIUM 112 UG/1
TABLET ORAL
Qty: 90 TABLET | Refills: 3 | Status: SHIPPED | OUTPATIENT
Start: 2021-07-11 | End: 2022-07-18

## 2021-07-19 DIAGNOSIS — M48.02 CERVICAL STENOSIS OF SPINE: ICD-10-CM

## 2021-07-19 DIAGNOSIS — G89.4 CHRONIC PAIN SYNDROME: ICD-10-CM

## 2021-07-19 RX ORDER — OXYCODONE AND ACETAMINOPHEN 5; 325 MG/1; MG/1
1 TABLET ORAL EVERY 6 HOURS PRN
Qty: 70 TABLET | Refills: 0 | Status: SHIPPED | OUTPATIENT
Start: 2021-07-19 | End: 2021-08-18

## 2021-07-19 NOTE — TELEPHONE ENCOUNTER
Reason for call:  Medication   If this is a refill request, has the caller requested the refill from the pharmacy already? No  Will the patient be using a [facility]? No  Name of the pharmacy and phone number for the current request: J[facility] - [place], [place] - [address]    Name of the medication requested: oxyCODONE-acetaminophen (PERCOCET) 5-325 MG tablet    Other request:     Phone number to reach patient:  Cell number on file:    Telephone Information:   Mobile [phone]       Best Time:      Can we leave a detailed message on this number?  YES    Travel screening: Not Applicable

## 2021-07-19 NOTE — TELEPHONE ENCOUNTER
Received call from patient requesting refill(s) of oxyCODONE-acetaminophen (PERCOCET) 5-325 MG tablet     Last dispensed from pharmacy on 6/18/21    Patient's last office/virtual visit by prescribing provider on 5/27/21  Next office/virtual appointment scheduled for none    Last urine drug screen date 2/25/21  Current opioid agreement on file (completed within the last year) Yes Date of opioid agreement: 3/10/21    E-prescribe to [address]  pharmacy    Will route to [facility] for review and preparation of prescription(s).

## 2021-07-19 NOTE — TELEPHONE ENCOUNTER
Medication refill information reviewed.     Due date for oxyCODONE-acetaminophen (PERCOCET) 5-325 MG tablet is 07/20/21     Prescriptions prepped for review.     Will route to provider.

## 2021-07-19 NOTE — TELEPHONE ENCOUNTER
Refill appropriate. Sent to requested pharmacy.    [place] Prescription Monitoring Program checked on 7/19/21.    Natasha Padgett, PAC

## 2021-07-22 ENCOUNTER — OFFICE VISIT (OUTPATIENT)
Dept: ENDOCRINOLOGY | Facility: CLINIC | Age: 56
End: 2021-07-22
Payer: COMMERCIAL

## 2021-07-22 DIAGNOSIS — Z96.41 INSULIN PUMP STATUS: ICD-10-CM

## 2021-07-22 DIAGNOSIS — E10.43 TYPE 1 DIABETES MELLITUS WITH DIABETIC AUTONOMIC NEUROPATHY (H): Primary | ICD-10-CM

## 2021-07-22 DIAGNOSIS — E03.9 HYPOTHYROIDISM, UNSPECIFIED TYPE: ICD-10-CM

## 2021-07-22 PROCEDURE — 95251 CONT GLUC MNTR ANALYSIS I&R: CPT | Performed by: INTERNAL MEDICINE

## 2021-07-22 PROCEDURE — 99214 OFFICE O/P EST MOD 30 MIN: CPT | Mod: 95 | Performed by: INTERNAL MEDICINE

## 2021-07-22 NOTE — PROGRESS NOTES
"Patient is being evaluated via a billable video visit.      How would you like to obtain your AVS? Reviewed verbally  If the video visit is dropped, the invitation should be resent by cell phone  Will anyone else be joining your video visit? no      Video Start Time:  10:20 am    Video-Visit Details    Type of service:  Video Visit    Video End Time: 10:35 am    Originating Location (pt. Location): home    Distant Location (provider location):  [facility] [place]    Platform used for Video Visit:  [employer]        Recent issues:  Diabetes and thyroid follow-up evaluation  Patient seen today as a workin video visit appointment at 10:20 am  Using Medtronic 630G pump but OOW, also using the DexcomG6 CGM sensor  Still has issues with neck pain/stiffness              1995. Diagnosis of diabetes mellitus after 2nd back surgery, age 29  Initial treatment with insulin injections using NPH and Regular insulin  Switched to premixed 70/30 insulin BID dosing  1999. Started Medtronic insulin pump model 508, used base-dose Humalog insulin at meals  Had seen Dr. Iggy Briggs/[facility]     7/24/01. Initial evaluation with me at my former clinic ([facility])  Upgraded pump to Medtronic 523   Now using Medtronic 630G pump              Novolog in pump     Meals BID, Uses carb \"exchanges\" with his pump settings  Chronic high BG and hgbA1c trends despite dose changes  Tried square wave bolus  Has Contour Link? BG meter, variable testing pattern   Recently testing 4x/day for at least 60 days  Glucose sensor (CGMS) use:   Tried Medtronic CGMS in the past, didn't like it   Switched to DexcomG5, used for approx 6 months but issues with getting supply order   Early 2019, started Freestyle Sukhi but issues with skin site redness    Previous pump settings:       No recent Carelink data available    ~11/2020. Upgraded to DexcomG6 CGM sensor   Using the DexcomG6 consistently for at least past 60 days    Recent " DexcomG6 data:                Previous  labs include:  Lab Results   Component Value Date    A1C 9.2 (H) 06/24/2021     03/01/2021    POTASSIUM 4.9 03/01/2021    CHLORIDE 101 03/01/2021    CO2 29 03/01/2021    ANIONGAP 4 03/01/2021     (H) 06/24/2021    BUN 13 03/01/2021    CR 0.94 03/01/2021    GFRESTIMATED >90 03/01/2021    GFRESTBLACK >90 03/01/2021    EMILEE 8.9 03/01/2021    CPEPT <0.1 (L) 06/24/2021    CHOL 164 06/24/2021    TRIG 82 06/24/2021    HDL 65 06/24/2021    LDL 83 06/24/2021    NHDL 99 06/24/2021    UCRR 24 08/06/2020    MICROL <5 08/06/2020    UMALCR Unable to calculate due to low value 08/06/2020     Last eye exam date?  Goes to eye clinic in [facility]  7/2018. Met with Destiny Warren RD, CDE at [facility]  DM Complications:              Neuropathy                          Peripheral neuropathy                                      Decreased sensation at feet                          Autonomic neuropathy- gastroparesis                                      Symptoms of nausea, bloating, episodes of diarrhea and emesis                                      Has [employer] gastric stimulator                                      Sees physicians and [facility] Gilmore/[place] [facility]                   Thyroid:  1994. History of hypothyroidism  Chronic levothyroxine treatment, has used Levoxyl in the past  Supplements:  Takes vitamin D 1000 U daily  7/2018. Had levothyroxine dose increase   Recent labs include:  Lab Results   Component Value Date    TSH 0.16 (L) 06/24/2021    T4 1.56 (H) 06/24/2021     Current dose:  Levothyroxine 0.112 mg daily        Lives in [place], [place] with Ada, also (1) adopted dog named Marko  Sees Dr. Ilan Chew Mai/[facility] for general medicine evaluations  Also sees Olga VIRK/[place] [facility].        ROS: 10 point ROS neg other than the symptoms noted above in the HPI.     GENERAL: some fatigue, wt stable; denies fevers, chills, night sweats.  "  HEENT: no dysphagia, odonophagia, diplopia, neck pain  THYROID:  no apparent hyper or hypothyroid symptoms  CV: occasional chest pains; no pressure, palpitations  LUNGS: no SOB, KRAFT, cough, wheezing   ABDOMEN: some diarrhea \"collitis\" and postmeal nausea; no diarrhea, constipation  EXTREMITIES: no rashes, ulcers, edema  NEUROLOGY: no headaches, denies changes in vision, tingling, extremitiy numbness   MSK: neck pain/stiffness and back pains; no muscle weakness  SKIN: no rashes or lesions  PSYCH:  stable mood, no significant anxiety or depression  ENDOCRINE: no heat or cold intolerance    Physical Exam (visual exam)  VS:  no vital signs taken for video visit  CONSTITUTIONAL: healthy, alert and NAD, well dressed, answering questions appropriately  ENT: no nose swelling or nasal discharge, mouth redness or gum changes.  EYES: eyes grossly normal to inspection, conjunctivae and sclerae normal, no exophthalmos or proptosis  THYROID:  no apparent nodules or goiter  LUNGS: no audible wheeze, cough or visible cyanosis, no visible retractions or increased work of breathing  ABDOMEN: abdomen not evaluated  EXTREMITIES: no hand tremors, limited exam  NEUROLOGY: CN grossly intact, mentation intact and speech normal   SKIN:  no apparent skin lesions, rash, or edema with visualized skin appearance  PSYCH: mentation appears normal, affect normal/bright, judgement and insight intact,   normal speech and appearance well groomed       LABS:     All pertinent notes, labs, and images personally reviewed by me.        A/P:  Encounter Diagnoses   Name Primary?     Type 1 diabetes mellitus with diabetic autonomic neuropathy (H) Yes     Insulin pump status      Hypothyroidism, unspecified type        Comments:  Reviewed complicated health history, diabetes and thyroid issues.  Recent postprandial hyperglycemia noted  Reviewed and interpreted tests that I previously ordered.   Ordered appropriate tests for the endocrinology disease " "management.    Management options discussed and implemented after shared medical decision making with the patient.  The T1DM problem is chronic-stable       Plan:  Reviewed the overall T1DM management and insulin pump use.  Discussed optimal BG testing to assess glucose trends.  We reviewed insulin pump settings, basal rate and bolus dosing  Use of automated pump bolus dosing for meal/snack carb & correction dosing  Reviewed the [employer] Carelink report data today  Reviewed recent DexcomG6 CGMS glucose sensor trends, in detail     Recommend:  Continue Medtronic insulin pump use at this time, though discussed pump upgrade vs change options.  Pump setting changes:   Basals (Pattern 1): [place] 1.0 to 0.95, 2a 0.75 to 0.7 unit(s)/hr   Use genuine suppertime carb estimate for meal bolus and avoid decreasing carb estimate \"fudging\"    Use basal pattern 1 when more exercise/activity and pattern 2 when less activity/inactive  We have initiated the Tandem pump application process and he has discussed plan with rep (CE).   We will fax copy of this video visit note to Tandem rep and they will forward to Tandem vs [place]   If problems acquiring the Tandem pump, will change plan to the newer Medtronic insulin pump    Contact our office with update if restarting the budesinide medication for diarrhea problem  Continue use of the DexcomG6 CGM sensor               Patient makes frequent adjustments to insulin regimen on basis of therapeutic glucose testing   Reviewed interaction of the DexcomG6 and the Tandem TSlim pump  Would benefit from additional CDE evaluation at [facility]  No labs ordered today  Continue simvastatin 40 mg each evening  Continue current levothyroxine 0.112 mg daily dose plan  Arrange annual dilated eye exam, fasting lipid panel testing.  We reviewed importance of timely clinic appointments with me Q3 months, to satisfy Medicare with his diabetes device coverage     Encouraged him to make routine f/u " appointments with [place] [facility]   Addressed patient's questions today.     There are no Patient Instructions on file for this visit.    Future labs ordered today: No orders of the defined types were placed in this encounter.    Radiology/Consults ordered today: None    Total time spent in with the patient evaluation:  15 min  Additional time spent reviewing pertinent lab tests and chart notes, and documentation:  5 min    Follow-up:  2-4 weeks after Tandem pump start, also quarterly    VIKTOR Best MD, MS  Endocrinology  [facility]

## 2021-07-22 NOTE — LETTER
"    7/22/2021         RE: Stone De Paz  [address] [address] [place] [id number]        Dear Colleague,    Thank you for referring your patient, Stone De Paz, to the [facility]. Please see a copy of my visit note below.    Patient is being evaluated via a billable video visit.      How would you like to obtain your AVS? Reviewed verbally  If the video visit is dropped, the invitation should be resent by cell phone  Will anyone else be joining your video visit? no      Video Start Time:  10:20 am    Video-Visit Details    Type of service:  Video Visit    Video End Time: 10:35 am    Originating Location (pt. Location): home    Distant Location (provider location):  [facility]/home    Platform used for Video Visit:  [employer]        Recent issues:  Diabetes and thyroid follow-up evaluation  Patient seen today as a workin video visit appointment at 10:20 am  Using Medtronic 630G pump but OOW, also using the DexcomG6 CGM sensor  Still has issues with neck pain/stiffness              1995. Diagnosis of diabetes mellitus after 2nd back surgery, age 29  Initial treatment with insulin injections using NPH and Regular insulin  Switched to premixed 70/30 insulin BID dosing  1999. Started Medtronic insulin pump model 508, used base-dose Humalog insulin at meals  Had seen Dr. Iggy Briggs/[facility]     7/24/01. Initial evaluation with me at my former clinic ([facility])  Upgraded pump to Medtronic 523   Now using Medtronic 630G pump              Novolog in pump     Meals BID, Uses carb \"exchanges\" with his pump settings  Chronic high BG and hgbA1c trends despite dose changes  Tried square wave bolus  Has Contour Link? BG meter, variable testing pattern... Recently testing 4x/day for at least 30 days  Glucose sensor (CGMS) use:   Tried Medtronic CGMS in the past, didn't like it   Switched to DexcomG5, used for approx 6 months but issues with getting supply order   Early 2019, " started Freestyle Sukhi but issues with skin site redness    Previous pump settings:       No recent Carelink data available    ~11/2020. Upgraded to DexcomG6 CGM sensor  Recent DexcomG6 data:                Previous  labs include:  Lab Results   Component Value Date    A1C 9.2 (H) 06/24/2021     03/01/2021    POTASSIUM 4.9 03/01/2021    CHLORIDE 101 03/01/2021    CO2 29 03/01/2021    ANIONGAP 4 03/01/2021     (H) 06/24/2021    BUN 13 03/01/2021    CR 0.94 03/01/2021    GFRESTIMATED >90 03/01/2021    GFRESTBLACK >90 03/01/2021    EMILEE 8.9 03/01/2021    CPEPT <0.1 (L) 06/24/2021    CHOL 164 06/24/2021    TRIG 82 06/24/2021    HDL 65 06/24/2021    LDL 83 06/24/2021    NHDL 99 06/24/2021    UCRR 24 08/06/2020    MICROL <5 08/06/2020    UMALCR Unable to calculate due to low value 08/06/2020     Last eye exam date?  Goes to eye clinic in [facility]  7/2018. Met with Destiny Warren RD, CDE at [facility]  DM Complications:              Neuropathy                          Peripheral neuropathy                                      Decreased sensation at feet                          Autonomic neuropathy- gastroparesis                                      Symptoms of nausea, bloating, episodes of diarrhea and emesis                                      Has Medtronic gastric stimulator                                      Sees physicians and [facility]/[place] [facility]                   Thyroid:  1994. History of hypothyroidism  Chronic levothyroxine treatment, has used Levoxyl in the past  Supplements:  Takes vitamin D 1000 U daily  7/2018. Had levothyroxine dose increase   Recent labs include:  Lab Results   Component Value Date    TSH 0.16 (L) 06/24/2021    T4 1.56 (H) 06/24/2021     Current dose:  Levothyroxine 0.112 mg daily        Lives in [place], [place] with Ada, also (1) adopted dog named Marko Chew Mai/[facility] for general medicine evaluations  Also sees Olga  "[facility]/[place] [facility].        ROS: 10 point ROS neg other than the symptoms noted above in the HPI.     GENERAL: some fatigue, wt stable; denies fevers, chills, night sweats.   HEENT: no dysphagia, odonophagia, diplopia, neck pain  THYROID:  no apparent hyper or hypothyroid symptoms  CV: occasional chest pains; no pressure, palpitations  LUNGS: no SOB, KRAFT, cough, wheezing   ABDOMEN: some diarrhea \"collitis\" and postmeal nausea; no diarrhea, constipation  EXTREMITIES: no rashes, ulcers, edema  NEUROLOGY: no headaches, denies changes in vision, tingling, extremitiy numbness   MSK: neck pain/stiffness and back pains; no muscle weakness  SKIN: no rashes or lesions  PSYCH:  stable mood, no significant anxiety or depression  ENDOCRINE: no heat or cold intolerance    Physical Exam (visual exam)  VS:  no vital signs taken for video visit  CONSTITUTIONAL: healthy, alert and NAD, well dressed, answering questions appropriately  ENT: no nose swelling or nasal discharge, mouth redness or gum changes.  EYES: eyes grossly normal to inspection, conjunctivae and sclerae normal, no exophthalmos or proptosis  THYROID:  no apparent nodules or goiter  LUNGS: no audible wheeze, cough or visible cyanosis, no visible retractions or increased work of breathing  ABDOMEN: abdomen not evaluated  EXTREMITIES: no hand tremors, limited exam  NEUROLOGY: CN grossly intact, mentation intact and speech normal   SKIN:  no apparent skin lesions, rash, or edema with visualized skin appearance  PSYCH: mentation appears normal, affect normal/bright, judgement and insight intact,   normal speech and appearance well groomed       LABS:     All pertinent notes, labs, and images personally reviewed by me.        A/P:  Encounter Diagnoses   Name Primary?     Type 1 diabetes mellitus with diabetic autonomic neuropathy (H) Yes     Insulin pump status      Hypothyroidism, unspecified type        Comments:  Reviewed complicated health history, diabetes and " "thyroid issues.  Recent postprandial hyperglycemia noted  Reviewed and interpreted tests that I previously ordered.   Ordered appropriate tests for the endocrinology disease management.    Management options discussed and implemented after shared medical decision making with the patient.  The T1DM problem is chronic-stable       Plan:  Reviewed the overall T1DM management and insulin pump use.  Discussed optimal BG testing to assess glucose trends.  We reviewed insulin pump settings, basal rate and bolus dosing  Use of automated pump bolus dosing for meal/snack carb & correction dosing  Reviewed the [employer] Carelink report data today  Reviewed recent DexcomG6 CGMS glucose sensor trends, in detail     Recommend:  Continue Medtronic insulin pump use at this time, though discussed pump upgrade vs change options.  Pump setting changes:   Basals (Pattern 1): [place] 1.0 to 0.95, 2a 0.75 to 0.7 unit(s)/hr   Use genuine suppertime carb estimate for meal bolus and avoid decreasing carb estimate \"fudging\"    Use basal pattern 1 when more exercise/activity and pattern 2 when less activity/inactive  We have initiated the Tandem pump application process and he has discussed plan with rep (CE).   We will fax copy of this video visit note to Tandem rep and they will forward to Tandem vs [employer]   Plan future Tandem training with new pump locally with Tandem CDE ()    Contact our office with update if restarting the budesinide medication for diarrhea problem  Continue use of the DexcomG6 CGM sensor               Patient makes frequent adjustments to insulin regimen on basis of therapeutic glucose testing   Reviewed interaction of the DexcomG6 and the Tandem TSlim pump  Would benefit from additional CDE evaluation at [facility]  No labs ordered today  Continue simvastatin 40 mg each evening  Continue current levothyroxine 0.112 mg daily dose plan  Arrange annual dilated eye exam, fasting lipid panel testing.  We reviewed " importance of timely clinic appointments with me Q3 months, to satisfy Medicare with his diabetes device coverage     Encouraged him to make routine f/u appointments with [place] [facility]   Addressed patient's questions today.     There are no Patient Instructions on file for this visit.    Future labs ordered today: No orders of the defined types were placed in this encounter.    Radiology/Consults ordered today: None    Total time spent in with the patient evaluation:  15 min  Additional time spent reviewing pertinent lab tests and chart notes, and documentation:  5 min    Follow-up:  2-4 weeks after Tandem pump start, also quarterly    VIKTOR Best MD, MS  Endocrinology  [facility]                        Again, thank you for allowing me to participate in the care of your patient.        Sincerely,        Frankie Best MD

## 2021-07-23 ENCOUNTER — TELEPHONE (OUTPATIENT)
Dept: ENDOCRINOLOGY | Facility: CLINIC | Age: 56
End: 2021-07-23

## 2021-07-23 ENCOUNTER — MEDICAL CORRESPONDENCE (OUTPATIENT)
Dept: HEALTH INFORMATION MANAGEMENT | Facility: CLINIC | Age: 56
End: 2021-07-23

## 2021-07-23 DIAGNOSIS — M48.02 CERVICAL STENOSIS OF SPINE: ICD-10-CM

## 2021-07-23 DIAGNOSIS — G89.4 CHRONIC PAIN SYNDROME: ICD-10-CM

## 2021-07-23 NOTE — TELEPHONE ENCOUNTER
Received form(s) from [facility] - Prescription order  Placed form(s) in/on TL's incoming basket.  Forms need to be filled out and signed and faxed to number listed on form.     Copy needs to be sent for scanning after completion: Yes     Janneth Begum MA

## 2021-07-24 NOTE — TELEPHONE ENCOUNTER
Message reviewed.  I have received and reviewed this [place] form, completed it today.  It will be faxed back ASAP.    VIKTOR Best MD, MS  Endocrinology  [facility]

## 2021-07-26 RX ORDER — NAPROXEN 500 MG/1
TABLET ORAL
Qty: 180 TABLET | Refills: 0 | Status: SHIPPED | OUTPATIENT
Start: 2021-07-26 | End: 2022-01-24

## 2021-07-26 NOTE — TELEPHONE ENCOUNTER
"Routing refill request to provider for review/approval because:  Labs out of range:  CBC  T'd up 1 refill request for provider review.    Requested Prescriptions   Pending Prescriptions Disp Refills     naproxen (NAPROSYN) 500 MG tablet [Pharmacy Med Name: NAPROXEN 500MG TABS] 180 tablet 0     Sig: TAKE ONE TABLET BY MOUTH TWICE A DAY AS NEEDED FOR MODERATE PAIN   Last Written Prescription Date:  2/5/2021  Last Fill Quantity: 180,  # refills: 0   Last office visit: 3/1/2021    Future Office Visit:        NSAID Medications Failed - 7/23/2021  4:37 PM        Failed - Normal CBC on file in past 12 months     Recent Labs   Lab Test 03/01/21  1359   WBC 6.7   RBC 4.16*   HGB 12.6*   HCT 37.2*              Passed - Blood pressure under 140/90 in past 12 months     BP Readings from Last 3 Encounters:   03/01/21 124/78   10/26/20 130/74   09/10/20 126/77           Passed - Normal ALT on file in past 12 months     Recent Labs   Lab Test 03/01/21  1359   ALT 25           Passed - Normal AST on file in past 12 months     Recent Labs   Lab Test 03/01/21  1359   AST 13           Passed - Recent (12 mo) or future (30 days) visit within the authorizing provider's specialty     Patient has had an office visit with the authorizing provider or a provider within the authorizing providers department within the previous 12 mos or has a future within next 30 days. See \"Patient Info\" tab in inbasket, or \"Choose Columns\" in Meds & Orders section of the refill encounter.            Passed - Patient is age 6-64 years        Passed - Medication is active on med list        Passed - Normal serum creatinine on file in past 12 months     Recent Labs   Lab Test 03/01/21  1359   CR 0.94       Ok to refill medication if creatinine is low           Юлия Carlos RN      "

## 2021-07-28 ENCOUNTER — APPOINTMENT (OUTPATIENT)
Dept: GENERAL RADIOLOGY | Facility: CLINIC | Age: 56
End: 2021-07-28
Attending: EMERGENCY MEDICINE
Payer: MEDICARE

## 2021-07-28 ENCOUNTER — HOSPITAL ENCOUNTER (EMERGENCY)
Facility: CLINIC | Age: 56
Discharge: HOME | End: 2021-07-28
Attending: EMERGENCY MEDICINE | Admitting: EMERGENCY MEDICINE
Payer: MEDICARE

## 2021-07-28 VITALS
WEIGHT: 148 LBS | DIASTOLIC BLOOD PRESSURE: 80 MMHG | TEMPERATURE: 97.8 F | BODY MASS INDEX: 22.43 KG/M2 | OXYGEN SATURATION: 99 % | RESPIRATION RATE: 16 BRPM | HEART RATE: 66 BPM | HEIGHT: 68 IN | SYSTOLIC BLOOD PRESSURE: 133 MMHG

## 2021-07-28 DIAGNOSIS — E10.43 TYPE 1 DIABETES MELLITUS WITH DIABETIC AUTONOMIC NEUROPATHY (H): ICD-10-CM

## 2021-07-28 DIAGNOSIS — K31.84 GASTROPARESIS: ICD-10-CM

## 2021-07-28 DIAGNOSIS — R11.2 NAUSEA AND VOMITING, INTRACTABILITY OF VOMITING NOT SPECIFIED, UNSPECIFIED VOMITING TYPE: ICD-10-CM

## 2021-07-28 DIAGNOSIS — R79.89 ELEVATED BLOOD KETONE BODY LEVEL: ICD-10-CM

## 2021-07-28 LAB
ALBUMIN SERPL-MCNC: 3.9 G/DL (ref 3.4–5)
ALP SERPL-CCNC: 59 U/L (ref 40–150)
ALT SERPL W P-5'-P-CCNC: 27 U/L (ref 0–70)
ANION GAP SERPL CALCULATED.3IONS-SCNC: 6 MMOL/L (ref 3–14)
AST SERPL W P-5'-P-CCNC: 20 U/L (ref 0–45)
BASE EXCESS BLDV CALC-SCNC: 5.4 MMOL/L (ref -7.7–1.9)
BASOPHILS # BLD AUTO: 0 10E3/UL (ref 0–0.2)
BASOPHILS NFR BLD AUTO: 0 %
BILIRUB DIRECT SERPL-MCNC: 0.1 MG/DL (ref 0–0.2)
BILIRUB SERPL-MCNC: 0.6 MG/DL (ref 0.2–1.3)
BUN SERPL-MCNC: 17 MG/DL (ref 7–30)
CALCIUM SERPL-MCNC: 9.2 MG/DL (ref 8.5–10.1)
CHLORIDE BLD-SCNC: 104 MMOL/L (ref 94–109)
CO2 SERPL-SCNC: 27 MMOL/L (ref 20–32)
CREAT SERPL-MCNC: 0.92 MG/DL (ref 0.66–1.25)
EOSINOPHIL # BLD AUTO: 0.2 10E3/UL (ref 0–0.7)
EOSINOPHIL NFR BLD AUTO: 2 %
ERYTHROCYTE [DISTWIDTH] IN BLOOD BY AUTOMATED COUNT: 12.7 % (ref 10–15)
GFR SERPL CREATININE-BSD FRML MDRD: >90 ML/MIN/1.73M2
GLUCOSE BLD-MCNC: 219 MG/DL (ref 70–99)
HCO3 BLDV-SCNC: 29 MMOL/L (ref 21–28)
HCT VFR BLD AUTO: 40.1 % (ref 40–53)
HGB BLD-MCNC: 13.7 G/DL (ref 13.3–17.7)
IMM GRANULOCYTES # BLD: 0 10E3/UL
IMM GRANULOCYTES NFR BLD: 0 %
KETONES BLD-SCNC: 0.4 MMOL/L (ref 0–0.6)
KETONES BLD-SCNC: 0.8 MMOL/L (ref 0–0.6)
LACTATE SERPL-SCNC: 1.7 MMOL/L (ref 0.7–2)
LIPASE SERPL-CCNC: 49 U/L (ref 73–393)
LYMPHOCYTES # BLD AUTO: 1.1 10E3/UL (ref 0.8–5.3)
LYMPHOCYTES NFR BLD AUTO: 12 %
MAGNESIUM SERPL-MCNC: 1.7 MG/DL (ref 1.6–2.3)
MCH RBC QN AUTO: 30.5 PG (ref 26.5–33)
MCHC RBC AUTO-ENTMCNC: 34.2 G/DL (ref 31.5–36.5)
MCV RBC AUTO: 89 FL (ref 78–100)
MONOCYTES # BLD AUTO: 0.4 10E3/UL (ref 0–1.3)
MONOCYTES NFR BLD AUTO: 4 %
NEUTROPHILS # BLD AUTO: 7.7 10E3/UL (ref 1.6–8.3)
NEUTROPHILS NFR BLD AUTO: 82 %
NRBC # BLD AUTO: 0 10E3/UL
NRBC BLD AUTO-RTO: 0 /100
O2/TOTAL GAS SETTING VFR VENT: 21 %
PCO2 BLDV: 39 MM HG (ref 40–50)
PH BLDV: 7.48 [PH] (ref 7.32–7.43)
PHOSPHATE SERPL-MCNC: 1.8 MG/DL (ref 2.5–4.5)
PHOSPHATE SERPL-MCNC: 1.8 MG/DL (ref 2.5–4.5)
PLATELET # BLD AUTO: 225 10E3/UL (ref 150–450)
PO2 BLDV: 27 MM HG (ref 25–47)
POTASSIUM BLD-SCNC: 5.1 MMOL/L (ref 3.4–5.3)
PROT SERPL-MCNC: 7.4 G/DL (ref 6.8–8.8)
RBC # BLD AUTO: 4.49 10E6/UL (ref 4.4–5.9)
SODIUM SERPL-SCNC: 137 MMOL/L (ref 133–144)
TROPONIN I SERPL-MCNC: <0.015 UG/L (ref 0–0.04)
WBC # BLD AUTO: 9.5 10E3/UL (ref 4–11)

## 2021-07-28 PROCEDURE — 84100 ASSAY OF PHOSPHORUS: CPT | Mod: 91 | Performed by: EMERGENCY MEDICINE

## 2021-07-28 PROCEDURE — 82248 BILIRUBIN DIRECT: CPT | Performed by: EMERGENCY MEDICINE

## 2021-07-28 PROCEDURE — 80048 BASIC METABOLIC PNL TOTAL CA: CPT | Performed by: EMERGENCY MEDICINE

## 2021-07-28 PROCEDURE — 82010 KETONE BODYS QUAN: CPT | Mod: 91 | Performed by: EMERGENCY MEDICINE

## 2021-07-28 PROCEDURE — 84484 ASSAY OF TROPONIN QUANT: CPT | Performed by: EMERGENCY MEDICINE

## 2021-07-28 PROCEDURE — 96375 TX/PRO/DX INJ NEW DRUG ADDON: CPT

## 2021-07-28 PROCEDURE — 93005 ELECTROCARDIOGRAM TRACING: CPT

## 2021-07-28 PROCEDURE — 96361 HYDRATE IV INFUSION ADD-ON: CPT

## 2021-07-28 PROCEDURE — 99285 EMERGENCY DEPT VISIT HI MDM: CPT | Mod: 25 | Performed by: EMERGENCY MEDICINE

## 2021-07-28 PROCEDURE — 96368 THER/DIAG CONCURRENT INF: CPT

## 2021-07-28 PROCEDURE — 250N000009 HC RX 250: Performed by: EMERGENCY MEDICINE

## 2021-07-28 PROCEDURE — 85025 COMPLETE CBC W/AUTO DIFF WBC: CPT | Performed by: EMERGENCY MEDICINE

## 2021-07-28 PROCEDURE — 36415 COLL VENOUS BLD VENIPUNCTURE: CPT | Performed by: EMERGENCY MEDICINE

## 2021-07-28 PROCEDURE — 83690 ASSAY OF LIPASE: CPT | Performed by: EMERGENCY MEDICINE

## 2021-07-28 PROCEDURE — 99285 EMERGENCY DEPT VISIT HI MDM: CPT | Mod: 25

## 2021-07-28 PROCEDURE — 36415 COLL VENOUS BLD VENIPUNCTURE: CPT | Mod: 91 | Performed by: EMERGENCY MEDICINE

## 2021-07-28 PROCEDURE — 258N000001 HC RX 258: Performed by: EMERGENCY MEDICINE

## 2021-07-28 PROCEDURE — 83735 ASSAY OF MAGNESIUM: CPT | Performed by: EMERGENCY MEDICINE

## 2021-07-28 PROCEDURE — 83605 ASSAY OF LACTIC ACID: CPT | Performed by: EMERGENCY MEDICINE

## 2021-07-28 PROCEDURE — 258N000003 HC RX IP 258 OP 636: Performed by: EMERGENCY MEDICINE

## 2021-07-28 PROCEDURE — 71045 X-RAY EXAM CHEST 1 VIEW: CPT

## 2021-07-28 PROCEDURE — 96365 THER/PROPH/DIAG IV INF INIT: CPT

## 2021-07-28 PROCEDURE — 250N000011 HC RX IP 250 OP 636: Performed by: EMERGENCY MEDICINE

## 2021-07-28 PROCEDURE — 82803 BLOOD GASES ANY COMBINATION: CPT | Performed by: EMERGENCY MEDICINE

## 2021-07-28 PROCEDURE — 93010 ELECTROCARDIOGRAM REPORT: CPT | Performed by: EMERGENCY MEDICINE

## 2021-07-28 RX ORDER — LORAZEPAM 2 MG/ML
1 INJECTION INTRAMUSCULAR ONCE
Status: COMPLETED | OUTPATIENT
Start: 2021-07-28 | End: 2021-07-28

## 2021-07-28 RX ORDER — LORATADINE 10 MG/1
10 TABLET ORAL DAILY
COMMUNITY
End: 2021-10-01

## 2021-07-28 RX ORDER — DEXTROSE MONOHYDRATE 25 G/50ML
25-50 INJECTION, SOLUTION INTRAVENOUS
Status: DISCONTINUED | OUTPATIENT
Start: 2021-07-28 | End: 2021-07-28 | Stop reason: HOSPADM

## 2021-07-28 RX ORDER — HYDROMORPHONE HYDROCHLORIDE 1 MG/ML
0.5 INJECTION, SOLUTION INTRAMUSCULAR; INTRAVENOUS; SUBCUTANEOUS
Status: COMPLETED | OUTPATIENT
Start: 2021-07-28 | End: 2021-07-28

## 2021-07-28 RX ORDER — ONDANSETRON 2 MG/ML
4 INJECTION INTRAMUSCULAR; INTRAVENOUS EVERY 30 MIN PRN
Status: DISCONTINUED | OUTPATIENT
Start: 2021-07-28 | End: 2021-07-28 | Stop reason: HOSPADM

## 2021-07-28 RX ORDER — SODIUM CHLORIDE 450 MG/100ML
1000 INJECTION, SOLUTION INTRAVENOUS CONTINUOUS
Status: DISCONTINUED | OUTPATIENT
Start: 2021-07-28 | End: 2021-07-28 | Stop reason: HOSPADM

## 2021-07-28 RX ADMIN — SODIUM CHLORIDE 1000 ML: 9 INJECTION, SOLUTION INTRAVENOUS at 11:37

## 2021-07-28 RX ADMIN — LORAZEPAM 1 MG: 2 INJECTION INTRAMUSCULAR; INTRAVENOUS at 11:48

## 2021-07-28 RX ADMIN — ONDANSETRON 4 MG: 2 INJECTION INTRAMUSCULAR; INTRAVENOUS at 10:51

## 2021-07-28 RX ADMIN — SODIUM PHOSPHATE, MONOBASIC, MONOHYDRATE AND SODIUM PHOSPHATE, DIBASIC, ANHYDROUS 15 MMOL: 276; 142 INJECTION, SOLUTION INTRAVENOUS at 12:58

## 2021-07-28 RX ADMIN — SODIUM CHLORIDE 1000 ML: 9 INJECTION, SOLUTION INTRAVENOUS at 10:44

## 2021-07-28 RX ADMIN — DEXTROSE AND SODIUM CHLORIDE 1000 ML: 5; 450 INJECTION, SOLUTION INTRAVENOUS at 13:04

## 2021-07-28 RX ADMIN — HYDROMORPHONE HYDROCHLORIDE 0.5 MG: 1 INJECTION, SOLUTION INTRAMUSCULAR; INTRAVENOUS; SUBCUTANEOUS at 10:51

## 2021-07-28 ASSESSMENT — MIFFLIN-ST. JEOR: SCORE: 1485.82

## 2021-07-28 NOTE — DISCHARGE INSTRUCTIONS
Since your ketones have normalized with the additional IV hydration, you can monitor your symptoms at home    You should follow-up with your primary provider within the next few days    You have any new or worsening symptoms, do not hesitate to return immediately to the emergency room for evaluation

## 2021-07-28 NOTE — ED PROVIDER NOTES
History     Chief Complaint   Patient presents with     Nausea & Vomiting     HPI  Stone De Paz is a 55 year old male who presents to the emergency room for nausea and vomiting.  Symptoms started this morning and have been getting persistently worse.  Has a history of type 1 diabetes with autonomic neuropathy, gastroparesis, gastric stimulator implanted.  Has not been able to tolerate even sips of water.  Does have some abdominal pain due to the retching.  Has not been running a fever.  Yesterday he was feeling well.  He did check his insulin pump and states that it is working correctly.  Is having soft and loose stools without any blood.  He says this has happened before and he just needs fluids and is usually okay.    Allergies:  No Known Allergies    Problem List:    Patient Active Problem List    Diagnosis Date Noted     Essential hypertension 11/09/2020     Priority: Medium     Gastroesophageal reflux disease without esophagitis 02/26/2019     Priority: Medium     Type 1 diabetes mellitus with diabetic autonomic neuropathy (H) 08/07/2018     Priority: Medium     Insulin pump status 08/07/2018     Priority: Medium     Chronic pain syndrome 07/10/2018     Priority: Medium     Exocrine pancreatic insufficiency 11/24/2016     Priority: Medium     Mild major depression (H) 11/14/2014     Priority: Medium     Hypothyroidism      Priority: Medium     Dr Best       Gastroparesis      Priority: Medium     gastric stimulator helps, [facility] manages that       Chronic neck pain      Priority: Medium     percocet for years now,        Cervical stenosis of spine 04/16/2012     Priority: Medium     Hyperlipidemia 06/06/2008     Priority: Medium        Past Medical History:    Past Medical History:   Diagnosis Date     Arthritis      Chronic neck pain      Depressive disorder      Gastroparesis      Gastroparesis due to DM (H)      Hyperlipidemia      Hypertension      Hypothyroidism      Neuropathy, diabetic (H)      Type  1 diabetes (H) age 31       Past Surgical History:    Past Surgical History:   Procedure Laterality Date     C LUMBAR SPINE FUSION,ANTER APPRCH      2 L4-5 L5-21     COLONOSCOPY  07/18/12     FUSION CERVICAL ANTERIOR TWO LEVELS       INJECT BLOCK MEDIAL BRANCH CERVICAL/THORACIC/LUMBAR Bilateral 11/20/2018    Procedure: Bilateral Cervical 3-4-5 medial branch block;  Surgeon: Jef Edwards MD;  Location: [facility]     INJECT BLOCK MEDIAL BRANCH CERVICAL/THORACIC/LUMBAR N/A 1/11/2019    Procedure: Cervical 3,4,5 Bilateral Medial branch blocks;  Surgeon: Jef Edwards MD;  Location: [facility]     RADIO FREQUENCY ABLATION PULSED CERVICAL Right 2/28/2019    Procedure: RADIO FREQUENCY ABLATION CERVICAL THREE, FOUR, FIVE, AND THIRD OCCUPITAL NERVE RIGHT SIDE;  Surgeon: Jef Edwards MD;  Location: [facility]     RADIO FREQUENCY ABLATION PULSED CERVICAL Left 3/15/2019    Procedure: radiofrequency ablation cervical 3,4,5;  Surgeon: Jef Edwards MD;  Location: [facility]     RADIO FREQUENCY ABLATION PULSED CERVICAL Right 9/4/2020    Procedure: RADIOFREQUENCY ABLATION CERVICAL 3,4 5 right SIDE;  Surgeon: Jef Edwards MD;  [facility]     RADIO FREQUENCY ABLATION PULSED CERVICAL Left 9/10/2020    Procedure: RADIOFREQUENCY ABLATION CERVICAL 3,4 5 LEFT SIDE;  Surgeon: Jef Edwards MD;  Location: [facility]     THORACOSCOPIC DECORTICATION LUNG  1/9/2014    Procedure: THORACOSCOPIC DECORTICATION LUNG;  RIGHT VATS/RIGHT THORACOTOMY , DECORTICATION RIGHT LUNG ,WEDGE RESECTION RIGHT MIDDLE LOBE LUNG NODULE;  Surgeon: Harley Felix MD;  Location: [facility]       Family History:    Family History   Problem Relation Age of Onset     Hypertension Mother      Diabetes Father         type 2     Hypertension Father      Cerebrovascular Disease Maternal Grandmother      Unknown/Adopted Maternal Grandfather      Unknown/Adopted Paternal Grandmother      Unknown/Adopted Paternal Grandfather      Liver Disease Brother      Heart Disease Sister  "     Thyroid Disease Sister      Thyroid Disease Sister      Thyroid Disease Sister        Social History:  Marital Status:   [4]  Social History     Tobacco Use     Smoking status: Former Smoker     Quit date: 2010     Years since quittin.5     Smokeless tobacco: Never Used   Substance Use Topics     Alcohol use: No     Drug use: Yes     Types: Marijuana     Comment: medical marijuana        Medications:    amylase-lipase-protease (CREON 24) 65970-24939 units CPEP per EC capsule  amylase-lipase-protease (PERTZYE) 82443 units CPEP  atorvastatin (LIPITOR) 80 MG tablet  citalopram (CELEXA) 40 MG tablet  Continuous Blood Gluc  (DEXCOM G6 ) AMBER  Continuous Blood Gluc  (FREESTYLE REUBEN READER) AMBER  Continuous Blood Gluc Sensor (DEXCOM G6 SENSOR) MISC  insulin aspart (NOVOLOG VIAL) 100 UNITS/ML vial  levothyroxine (SYNTHROID/LEVOTHROID) 112 MCG tablet  lisinopril (ZESTRIL) 40 MG tablet  loratadine (CLARITIN) 10 MG tablet  medical cannabis (Patient's own supply)  naproxen (NAPROSYN) 500 MG tablet  omeprazole (PRILOSEC) 20 MG DR capsule  ondansetron (ZOFRAN-ODT) 8 MG ODT tab  oxyCODONE-acetaminophen (PERCOCET) 5-325 MG tablet  SUMAtriptan (IMITREX) 100 MG tablet  CONTOUR NEXT TEST test strip          Review of Systems   All other systems reviewed and are negative.      Physical Exam   BP: (!) 156/86  Pulse: 71  Temp: 97.8  F (36.6  C)  Resp: 20  Height: 172.7 cm (5' 8\")  Weight: 67.1 kg (148 lb)  SpO2: 100 %      Physical Exam  Vitals and nursing note reviewed.   Constitutional:       Appearance: He is ill-appearing. He is not diaphoretic.   HENT:      Head: Atraumatic.      Mouth/Throat:      Mouth: Mucous membranes are moist.   Eyes:      General: No scleral icterus.     Pupils: Pupils are equal, round, and reactive to light.   Cardiovascular:      Rate and Rhythm: Normal rate and regular rhythm.      Heart sounds: Normal heart sounds.   Pulmonary:      Effort: Pulmonary effort " is normal. No respiratory distress.      Breath sounds: Normal breath sounds.   Abdominal:      General: Bowel sounds are normal.      Palpations: Abdomen is soft.      Tenderness: There is generalized abdominal tenderness.   Musculoskeletal:         General: No tenderness.   Skin:     General: Skin is warm.      Findings: No rash.   Neurological:      General: No focal deficit present.      Mental Status: He is alert.         ED Course        Procedures              EKG Interpretation:      Interpreted by Cookie Gambino DO  Time reviewed: 1125  Symptoms at time of EKG: Nausea, vomiting   Rhythm: normal sinus   Rate: Normal and 87 bpm  Axis: Normal  Ectopy: none        Clinical Impression: Difficult to interpret EKG due to frequent discharge of gastric stimulator.  Noisy baseline in leads I, 2, 3, V1, V2, V3.  Visible beats and leads V4, V5, V6 and portions to appear without any ST elevations, ectopy, or evidence of ischemia.                Critical Care time:  none               Results for orders placed or performed during the hospital encounter of 07/28/21 (from the past 24 hour(s))   CBC with platelets differential    Narrative    The following orders were created for panel order CBC with platelets differential.  Procedure                               Abnormality         Status                     ---------                               -----------         ------                     CBC with platelets and d...[[id number]]                      Final result                 Please view results for these tests on the individual orders.   Basic metabolic panel   Result Value Ref Range    Sodium 137 133 - 144 mmol/L    Potassium 5.1 3.4 - 5.3 mmol/L    Chloride 104 94 - 109 mmol/L    Carbon Dioxide (CO2) 27 20 - 32 mmol/L    Anion Gap 6 3 - 14 mmol/L    Urea Nitrogen 17 7 - 30 mg/dL    Creatinine 0.92 0.66 - 1.25 mg/dL    Calcium 9.2 8.5 - 10.1 mg/dL    Glucose 219 (H) 70 - 99 mg/dL    GFR Estimate >90 >60  mL/min/1.73m2   Phosphorus   Result Value Ref Range    Phosphorus 1.8 (L) 2.5 - 4.5 mg/dL   Magnesium   Result Value Ref Range    Magnesium 1.7 1.6 - 2.3 mg/dL   Hepatic panel   Result Value Ref Range    Bilirubin Total 0.6 0.2 - 1.3 mg/dL    Bilirubin Direct 0.1 0.0 - 0.2 mg/dL    Protein Total 7.4 6.8 - 8.8 g/dL    Albumin 3.9 3.4 - 5.0 g/dL    Alkaline Phosphatase 59 40 - 150 U/L    AST 20 0 - 45 U/L    ALT 27 0 - 70 U/L   Lipase   Result Value Ref Range    Lipase 49 (L) 73 - 393 U/L   Lactic acid whole blood   Result Value Ref Range    Lactic Acid 1.7 0.7 - 2.0 mmol/L   Blood gas venous   Result Value Ref Range    pH Venous 7.48 (H) 7.32 - 7.43    pCO2 Venous 39 (L) 40 - 50 mm Hg    pO2 Venous 27 25 - 47 mm Hg    Bicarbonate Venous 29 (H) 21 - 28 mmol/L    Base Excess/Deficit (+/-) 5.4 (H) -7.7 - 1.9 mmol/L    FIO2 21    Troponin I   Result Value Ref Range    Troponin I <0.015 0.000 - 0.045 ug/L   Ketone Beta-Hydroxybutyrate Quantitative   Result Value Ref Range    Ketone (Beta-Hydroxybutyrate) Quantitative 0.8 (H) 0.0 - 0.6 mmol/L   CBC with platelets and differential   Result Value Ref Range    WBC Count 9.5 4.0 - 11.0 10e3/uL    RBC Count 4.49 4.40 - 5.90 10e6/uL    Hemoglobin 13.7 13.3 - 17.7 g/dL    Hematocrit 40.1 40.0 - 53.0 %    MCV 89 78 - 100 fL    MCH 30.5 26.5 - 33.0 pg    MCHC 34.2 31.5 - 36.5 g/dL    RDW 12.7 10.0 - 15.0 %    Platelet Count 225 150 - 450 10e3/uL    % Neutrophils 82 %    % Lymphocytes 12 %    % Monocytes 4 %    % Eosinophils 2 %    % Basophils 0 %    % Immature Granulocytes 0 %    NRBCs per 100 WBC 0 <1 /100    Absolute Neutrophils 7.7 1.6 - 8.3 10e3/uL    Absolute Lymphocytes 1.1 0.8 - 5.3 10e3/uL    Absolute Monocytes 0.4 0.0 - 1.3 10e3/uL    Absolute Eosinophils 0.2 0.0 - 0.7 10e3/uL    Absolute Basophils 0.0 0.0 - 0.2 10e3/uL    Absolute Immature Granulocytes 0.0 <=0.0 10e3/uL    Absolute NRBCs 0.0 10e3/uL   XR Chest Port 1 View    Narrative    CHEST PORTABLE ONE VIEW    7/28/2021 11:27 AM     HISTORY: Chest pain, abdominal pain, diabetic ketoacidosis.    COMPARISON: Chest x-rays dated 1/29/2016.    FINDINGS:  Lungs are persistently hyperaerated but are otherwise  grossly clear. Left lateral costophrenic angle is not completely  included and cannot be completely evaluated. Minimal blunting of the  right lateral costophrenic angle is likely due to hyperaeration. Heart  size, mediastinum and  pulmonary vascularity are within normal limits.  No pneumothorax or acute osseous fracture seen. Chronic healed mid  left clavicular diaphyseal fracture and distal clavicular resection  versus osteolysis are again noted. Probable anastomotic staple lines  are projected over the mid right lung indicating prior right lung  partial resection. Chronic posterior right eighth rib fracture or  osteotomy is again noted.      Impression    IMPRESSION:   1. Mild hyperaeration is again noted.  2. No other evidence of acute cardiopulmonary disease is seen.  3. Postop findings of the right lung as described above.  4. Chronic posterior right eighth rib fracture or osteotomy is stable  in appearance.    LOY RICE MD         SYSTEM ID:  [id number]   Phosphorus   Result Value Ref Range    Phosphorus 1.8 (L) 2.5 - 4.5 mg/dL   Ketone Beta-Hydroxybutyrate Quantitative   Result Value Ref Range    Ketone (Beta-Hydroxybutyrate) Quantitative 0.4 0.0 - 0.6 mmol/L       Medications   dextrose 5% and 0.45% NaCl infusion (1,000 mLs Intravenous New Bag 7/28/21 1304)   dextrose 50 % injection 25-50 mL (has no administration in time range)   0.9% sodium chloride BOLUS (0 mLs Intravenous Stopped 7/28/21 1137)     Followed by   0.9% sodium chloride BOLUS (0 mLs Intravenous Stopped 7/28/21 1255)     Followed by   0.45% sodium chloride infusion (has no administration in time range)   ondansetron (ZOFRAN) injection 4 mg (4 mg Intravenous Given 7/28/21 1051)   sodium phosphate (NaPHOS) 15 mMol in 250 mL D5W PREMADE infusion (15  mmol Intravenous New Bag 7/28/21 1258)   HYDROmorphone (PF) (DILAUDID) injection 0.5 mg (0.5 mg Intravenous Given 7/28/21 1051)   LORazepam (ATIVAN) injection 1 mg (1 mg Intravenous Given 7/28/21 1148)       Assessments & Plan (with Medical Decision Making)  Stone is a 55-year-old male with past medical history of type 1 diabetes, gastroparesis status post implanted gastric stimulator who presents to the emergency room for nausea and vomiting.  See history and focused physical exam as above  Ill-appearing 55-year-old male in no acute distress.  Is mildly hypertensive with a blood pressure of 156/86 but remainder vital signs are stable.  Will insert peripheral IV to give IV fluids, will check lab work, will also give Zofran to help with nausea.  He is agreeable to this plan  Not have improvement of nausea with the Zofran so given a dose of Ativan.  That helped with his symptoms and he feels much better.    1:46 PM repeat ketones now within normal limits.  Patient states that he feels much better.  Vital signs are stable.  He would like to go home.  Has a quantity of Zofran at home that he will use for any persistent nausea so that he can stay hydrated.  Discussed signs and symptoms that would prompt return evaluation in the ER, and I would have a low threshold for him to return.  He understands and is agreeable to this plan.  Will be discharged at this time in no acute distress.     I have reviewed the nursing notes.    I have reviewed the findings, diagnosis, plan and need for follow up with the patient.       New Prescriptions    No medications on file       Final diagnoses:   Nausea and vomiting, intractability of vomiting not specified, unspecified vomiting type   Type 1 diabetes mellitus with diabetic autonomic neuropathy (H)   Gastroparesis   Elevated blood ketone body level       7/28/2021   [facility] EMERGENCY DEPT     Cookie Gambino DO  07/28/21 1083

## 2021-07-28 NOTE — ED TRIAGE NOTES
Patient brought to the ED by his significant other with concerns for nausea and vomiting since this morning. He reports being an insulin dependent diabetic and has a gastric stimulator. Maggy Myers RN

## 2021-07-28 NOTE — ED NOTES
Patient appears in distress, he is unable to sit still. He reports not trying to use his medical cannabis today. Maggy Myers RN

## 2021-07-29 ENCOUNTER — TELEPHONE (OUTPATIENT)
Dept: ENDOCRINOLOGY | Facility: CLINIC | Age: 56
End: 2021-07-29

## 2021-07-29 ENCOUNTER — TELEPHONE (OUTPATIENT)
Dept: PALLIATIVE MEDICINE | Facility: CLINIC | Age: 56
End: 2021-07-29

## 2021-07-29 ENCOUNTER — MEDICAL CORRESPONDENCE (OUTPATIENT)
Dept: HEALTH INFORMATION MANAGEMENT | Facility: CLINIC | Age: 56
End: 2021-07-29

## 2021-07-29 NOTE — TELEPHONE ENCOUNTER
[facility] Call Center    Phone Message    May a detailed message be left on voicemail: yes     Reason for Call: Form or Letter   Type or form/letter needing completion: Certificate of medical necessity  Provider: Estephania  Date form needed: ASAP  Once completed: Fax form to: [phone]    Brittani stated CMN received on 7/27/21 was dated prior to appt on 7/22/21.  Brittani stated CMN must be dated on or after 7/22/21.  Please resubmit ASAP.  Brittani is faxing a new CMN to [phone]      Action Taken: Message routed to:  Other: endo    Travel Screening: Not Applicable

## 2021-07-30 ENCOUNTER — TELEPHONE (OUTPATIENT)
Dept: ENDOCRINOLOGY | Facility: CLINIC | Age: 56
End: 2021-07-30

## 2021-07-30 NOTE — TELEPHONE ENCOUNTER
Received form(s) from [employer] - Statement Of Medical Necessity And RX Order.  Placed form(s) in/on TL's incoming basket.  Forms need to be filled out and signed and faxed to number listed on form.     Copy needs to be sent for scanning after completion: Yes     Janneth Begum MA

## 2021-08-03 ENCOUNTER — MEDICAL CORRESPONDENCE (OUTPATIENT)
Dept: HEALTH INFORMATION MANAGEMENT | Facility: CLINIC | Age: 56
End: 2021-08-03

## 2021-08-06 ENCOUNTER — TELEPHONE (OUTPATIENT)
Dept: ENDOCRINOLOGY | Facility: CLINIC | Age: 56
End: 2021-08-06

## 2021-08-06 NOTE — TELEPHONE ENCOUNTER
[facility] Call [facility]    Phone Message    May a detailed message be left on voicemail: yes     Reason for Call: Other: The Pt calling to say that he wants to cancel the order from [facility] due that he has not gotten his pump for over 3 months pump.  He has ordered supplies from [employer] and will stick with them for now. Please call to discuss further for the patient.      Action Taken: Message routed to:  [facility] Endo    Travel Screening: Not Applicable

## 2021-08-10 ENCOUNTER — TELEPHONE (OUTPATIENT)
Dept: ENDOCRINOLOGY | Facility: CLINIC | Age: 56
End: 2021-08-10

## 2021-08-10 NOTE — TELEPHONE ENCOUNTER
[facility] Call [facility]    Phone Message    May a detailed message be left on voicemail: yes     Reason for Call: Form or Letter   Type or form/letter needing completion: [employer] forms for supplies  Provider: Estephania  Date form needed: whenever possible  Once completed: Fax form to: [employer]    Per Ada, Pt has had an appt in May and a televisit in the last month.  Ada stated that  Whoever [employer] spoke with misinformed [employer] and stated he has not had appt in last 90 days.  Please review and clarify.  Pt needs chart notes/ 4x/day listed on supplies and forms sent.     Action Taken: Message routed to:  Other: endo    Travel Screening: Not Applicable

## 2021-08-11 ENCOUNTER — TELEPHONE (OUTPATIENT)
Dept: ENDOCRINOLOGY | Facility: CLINIC | Age: 56
End: 2021-08-11

## 2021-08-11 NOTE — TELEPHONE ENCOUNTER
Messages noted.  I do not know the status of the [employer] pump order/shipment.  I called patient today (home and cell numbers), no answer and detailed VM message left for him to call me back tomorrow at our clinic.    VIKTOR Best MD, MS  Endocrinology  [facility]

## 2021-08-11 NOTE — TELEPHONE ENCOUNTER
[facility] Call [facility]    Phone Message    May a detailed message be left on voicemail: yes     Reason for Call: Other: Pt is calling in asking to speak with the care team of Dr. Best or the  himself about the [employer] pump and [employer] pt said.  Pt is asking for a call back today to discuss his questions      Action Taken: Message routed to:  [facility]    Travel Screening: Not Applicable

## 2021-08-12 NOTE — TELEPHONE ENCOUNTER
"I spoke with patient yesterday and discussed the issues related to the [employer] pump order, his frustrations with many communications with [employer] regarding the pump order.  He has decided to not pursue the [employer] pump and has informed the local [employer] rep (CE).  The patient wishes to upgrade his old [employer] pump to the new [employer] (770G) pump and plans to continue use of the DexcomG6 sensor.  I thanked him for this conversation and update to clarify his preferences.    I then called the [employer] rep (CE) and discussed this.  He told me he has \"cancelled\" the pump order.  I also messaged the local [employer] rep ([place]) and asked her to send us the 770G pump CMN form... she agreed.    VIKTOR Best MD, MS  [facility]        "

## 2021-08-13 NOTE — TELEPHONE ENCOUNTER
Received form(s) from [employer]- Physician written order - Medicare Pump Supplies  Placed form(s) in/on TL's incoming basket.  Forms need to be filled out and signed and faxed to number listed on form.     Copy needs to be sent for scanning after completion: Yes     Janneth Begum MA

## 2021-08-13 NOTE — TELEPHONE ENCOUNTER
Per Rafi with [employer] calling in stating that patient is running low on supplies and needing the forms to be filled and signed and returned ASAP. Please advise.

## 2021-08-15 NOTE — TELEPHONE ENCOUNTER
Message reviewed.  I have received and reviewed this [employer] pump supply form, completed it today.  It will be faxed back ASA.    VIKTOR Best MD, MS  Endocrinology  [facility]

## 2021-08-18 DIAGNOSIS — G89.4 CHRONIC PAIN SYNDROME: ICD-10-CM

## 2021-08-18 DIAGNOSIS — M48.02 CERVICAL STENOSIS OF SPINE: ICD-10-CM

## 2021-08-18 NOTE — TELEPHONE ENCOUNTER
[facility] Call [facility]    Phone Message    May a detailed message be left on voicemail: yes     Reason for Call: Other: [employer] is calling again to get the status of the Physican Written Order from Dr. Best      Please either call them or fax them the request    Action Taken: Message routed to:  [facility] JOSE [facility]    Travel Screening: Not Applicable

## 2021-08-19 RX ORDER — OXYCODONE AND ACETAMINOPHEN 5; 325 MG/1; MG/1
1 TABLET ORAL EVERY 6 HOURS PRN
Qty: 70 TABLET | Refills: 0 | Status: SHIPPED | OUTPATIENT
Start: 2021-08-19 | End: 2021-08-30

## 2021-08-24 ENCOUNTER — MEDICAL CORRESPONDENCE (OUTPATIENT)
Dept: HEALTH INFORMATION MANAGEMENT | Facility: CLINIC | Age: 56
End: 2021-08-24

## 2021-08-24 ENCOUNTER — TELEPHONE (OUTPATIENT)
Dept: ENDOCRINOLOGY | Facility: CLINIC | Age: 56
End: 2021-08-24

## 2021-08-24 NOTE — TELEPHONE ENCOUNTER
Received form(s) from DMV/ Insulin-Treated DM Report  Placed form(s) in/on TL's incoming basket.  Forms need to be filled out and signed and faxed to number listed on form.     Copy needs to be sent for scanning after completion: Yes     Janneth Begum MA

## 2021-08-25 NOTE — TELEPHONE ENCOUNTER
Message reviewed.  I have received and reviewed this MNDMV form, completed it today.  It will be faxed back ASA.    VIKTOR Best MD, MS  Endocrinology  [facility]

## 2021-08-25 NOTE — TELEPHONE ENCOUNTER
Certification of medical necessity form, physician written order-  medicare pump and progress notes were faxed to [employer].     Ariane Elizabeth MA

## 2021-08-27 NOTE — PROGRESS NOTES
"[facility]    CHIEF COMPLAINT:   Chronic neck pain    INTERVAL HISTORY:  Last seen on 5/27/21 virtually    Recommendations/plan at the last visit included:    1. Physical Therapy:  NO   2. Clinical Health Psychologist:  NO  3. Diagnostic Studies:  None at this time  4. Medication Management:    1.   Percocet 5-325 2/day. Okay to use 3 occasionally if needed. Refill was not needed today  2.   Continue medical cannabis-recertified today     5. Recommendations to PCP. See above        Follow up with this provider: 12 Weeks for a virtual visit     Since his last visit, Stone De Paz reports:    Things haven't been bad. He has continued issues with his nausea/vomiting and gastroparesis. He had an ER visit due to dehydration which leads to ketoacidosis. He states that he has tried tapering to less medications but his functioning fails. He states that his goal is to function as if he was going to work.     Pain Information:              Pain today 3/10       Annual Controlled Substance Agreement: signed: 3/10/21  UDS: 2/25/21    CURRENT RELEVANT PAIN MEDICATIONS:  Naproxen 500mg-taking 1/day  Oxycodone 5mg-taking 2 in AM and will occasionally take a 3rd in the afternoon    Imitrex-last used about 3-4 days ago  Medical Cannabis    Patient is using the medication as prescribed:  YES  Is your medication helpful? YES   Medication side effects? itching    Previous Medications: (H--helped; HI--Helped initially; SWH-- somewhat helpful, NH--No help; W--worse; SE--side effects)   Opiates: Oxycodone H, Tylenol #3 NH, Hydrocodone NH, Dilaudid H SE \"ick\", Morphine H SE nausea, Tramadol NH  NSAIDS: Naproxen H SE stomach upset  Muscle Relaxants: Flexeril H SE incontinence  Anti-migraine mediations: Imitrex H  Anti-depressants: Amitriptyline NH SE rapid heart  Sleep aids: none  Anxiolytics: Valium [facility]  Neuropathics: Gabapentin NH SE \"goofy\", Lyrica ? Was too expensive  Topicals: Voltaren Gel NH, Lidocaine " NH  Other medications not covered above: Tylenol H, Prednisone H SE high sugars, Medical marijuana H SE cost constraints    Past Pain Treatments:  Pain Clinic:   No   PT: Yes, last went this year  Psychologist: No, but he did have to do some psychology prior to his back surgeryes  Relaxation techniques/biofeedback: No, but tries to do meditation at times   Chiropractor: No  Acupuncture: No  Pharmacotherapy:               Opioids: Yes                Non-opioids:    Yes   TENs Unit:Yes, was not helpful  Injections: Yes, right 3rd occipital nerve and right C3, C4, C5 RFA 02/28/2019, left 3rd occipital nerve, and left C3, C4, C5 RFA 03/15/2019. Had done low back injections in [place] as well (steroids mess with sugars therefore tries to avoid if possible).   Self-care:   Yes, cold works better (heat worsens pain)  Surgeries related to pain: Yes, C5-6 ACDF 2012. Disk replacement cervical spine 2006, low back fusions (2000, 2002)    [place] Board of Pharmacy Data Base Reviewed:    YES; As expected, no concern for misuse/abuse of controlled medications based on this report. 4/19/21    THE 4 As OF OPIOID MAINTENANCE ANALGESIA    Analgesia: Is pain relief clinically significant? YES   Activity: Is patient functional and able to perform Activities of Daily Living? YES   Adverse effects: Is patient free from adverse side effects from opiates? NO   Adherence to Rx protocol: Is patient adhering to Controlled Substance Agreement and taking medications ONLY as ordered? YES       Is Narcan prescribed for opiate use >50 MME daily? N/A      Daily MME: 15-22.5    Medications:  Current Outpatient Medications   Medication Sig Dispense Refill     amylase-lipase-protease (CREON 24) 47646-57965 units CPEP per EC capsule Take 1 capsule by mouth every other day       amylase-lipase-protease (PERTZYE) 32570 units CPEP Take 1 capsule by mouth every other day       atorvastatin (LIPITOR) 80 MG tablet TAKE ONE TABLET BY MOUTH AT BEDTIME  (Patient taking differently: Take 80 mg by mouth daily ) 90 tablet 2     citalopram (CELEXA) 40 MG tablet TAKE ONE TABLET BY MOUTH ONCE DAILY (Patient taking differently: Take 40 mg by mouth every morning ) 90 tablet 2     Continuous Blood Gluc  (DEXCOM G6 ) AMBER Use to read glucose levels per 's instructions 1 Device 0     Continuous Blood Gluc  (FREESTYLE REUBEN READER) AMBER 1 Device continuous prn (replace annually if needed) 1 Device 1     Continuous Blood Gluc Sensor (DEXCOM G6 SENSOR) MISC USE FOR CONTINUOUS GLUCOSE TESTING, CHANGE EVERY 10 DAYS 3 each 5     CONTOUR NEXT TEST test strip Use to test blood sugar 5x times daily or as directed. 450 strip 3     insulin aspart (NOVOLOG VIAL) 100 UNITS/ML vial USE WITH INSULIN PUMP TOTAL DAILY DOSE APPROX 45 UNITS (Patient taking differently: Inject 45 Units Subcutaneous daily USE WITH INSULIN PUMP TOTAL DAILY DOSE APPROX 45 UNITS) 20 mL 4     levothyroxine (SYNTHROID/LEVOTHROID) 112 MCG tablet TAKE ONE TABLET BY MOUTH ONCE DAILY (Patient taking differently: Take 112 mcg by mouth daily ) 90 tablet 3     lisinopril (ZESTRIL) 40 MG tablet TAKE ONE TABLET BY MOUTH ONCE DAILY (Patient taking differently: Take 40 mg by mouth daily ) 90 tablet 2     loratadine (CLARITIN) 10 MG tablet Take 10 mg by mouth daily       medical cannabis (Patient's own supply) See Admin Instructions Topical creams and vaporizer cartridges  (The purpose of this order is to document that the patient reports taking medical cannabis.  This is not a prescription, and is not used to certify that the patient has a qualifying medical condition.)       naproxen (NAPROSYN) 500 MG tablet TAKE ONE TABLET BY MOUTH TWICE A DAY AS NEEDED FOR MODERATE PAIN (Patient taking differently: Take 500 mg by mouth 2 times daily (with meals) As needed) 180 tablet 0     omeprazole (PRILOSEC) 20 MG DR capsule TAKE ONE CAPSULE BY MOUTH ONCE DAILY (Patient taking differently: Take 20 mg by  mouth daily ) 90 capsule 1     ondansetron (ZOFRAN-ODT) 8 MG ODT tab Take 1 tablet (8 mg) by mouth every 8 hours as needed (for nauesa) 60 tablet 0     oxyCODONE-acetaminophen (PERCOCET) 5-325 MG tablet Take 1 tablet by mouth every 6 hours as needed for severe pain . Max of 3/day. May fill 08/18/21 and start 08/19/21. 70 tablet 0     SUMAtriptan (IMITREX) 100 MG tablet Take 1 tablet (100 mg) by mouth at onset of headache 1/2 to 1 tablet by mouth at onset of headache. May repeat 1 dose after 2 hours if headache persists. 9 tablet 3     DIAGNOSTIC TESTS:  Imaging Studies:   No new imaging to review    Assessment:  Stone De Paz is a 55 year old male who presents today for follow up regarding his:    1.  Cervical stenosis   2.  Myofascial pain  3. Chronic low back pain  4.  Chronic pain syndrome  5. Chronic use of opioids    Patient's current regimen allows him to maintain his functionality. Will continue current regimen. His pain has been quite stable, will discuss with his PCP about discharging him back to primary care. If discharged, I would see him yearly for his medical cannabis certification.     Plan:    Diagnosis reviewed, treatment option addressed, and risk/benifits discussed.  Self-care instructions given.  I am recommending a multidisciplinary treatment plan to help this patient better manage pain.      1. Physical Therapy:  NO   2. Clinical Health Psychologist:  NO  3. Diagnostic Studies:  None at this time  4. Medication Management:    1. Percocet 5-325 2/day. Okay to use 3 occasionally if needed.   2. Continue medical cannabis    5. Recommendations to PCP. See above      Follow up with this provider:12 Weeks for a video visit    The total TIME spent on this patient on the day of the appointment was 14 minutes.    Time spent preparing to see the patient (reviewing records and tests) 1 minutes  Time spend face to face (or on the phone) with the patient 9 minutes  Time spent ordering tests, medications,  procedures and referrals 0 minutes  Time spent Referring and communicating with other healthcare professionals 0 minutes  Time spent documenting clinical information in Epic 4 minutes          Natasha Padgett PA-C   [facility]

## 2021-08-30 ENCOUNTER — OFFICE VISIT (OUTPATIENT)
Dept: PALLIATIVE MEDICINE | Facility: CLINIC | Age: 56
End: 2021-08-30
Payer: COMMERCIAL

## 2021-08-30 VITALS
SYSTOLIC BLOOD PRESSURE: 120 MMHG | WEIGHT: 159 LBS | TEMPERATURE: 98.4 F | DIASTOLIC BLOOD PRESSURE: 62 MMHG | BODY MASS INDEX: 24.1 KG/M2 | HEIGHT: 68 IN

## 2021-08-30 DIAGNOSIS — M54.50 CHRONIC BILATERAL LOW BACK PAIN WITHOUT SCIATICA: ICD-10-CM

## 2021-08-30 DIAGNOSIS — F11.90 CHRONIC, CONTINUOUS USE OF OPIOIDS: ICD-10-CM

## 2021-08-30 DIAGNOSIS — M79.18 MYOFASCIAL PAIN: ICD-10-CM

## 2021-08-30 DIAGNOSIS — G89.29 CHRONIC BILATERAL LOW BACK PAIN WITHOUT SCIATICA: ICD-10-CM

## 2021-08-30 DIAGNOSIS — M48.02 CERVICAL STENOSIS OF SPINE: Primary | ICD-10-CM

## 2021-08-30 DIAGNOSIS — G89.4 CHRONIC PAIN SYNDROME: ICD-10-CM

## 2021-08-30 PROCEDURE — 99213 OFFICE O/P EST LOW 20 MIN: CPT | Performed by: PHYSICIAN ASSISTANT

## 2021-08-30 RX ORDER — OXYCODONE AND ACETAMINOPHEN 5; 325 MG/1; MG/1
1 TABLET ORAL EVERY 6 HOURS PRN
Qty: 70 TABLET | Refills: 0 | Status: SHIPPED | OUTPATIENT
Start: 2021-08-30 | End: 2021-10-15

## 2021-08-30 ASSESSMENT — MIFFLIN-ST. JEOR: SCORE: 1535.72

## 2021-08-30 ASSESSMENT — PAIN SCALES - GENERAL: PAINLEVEL: MILD PAIN (3)

## 2021-08-30 NOTE — Clinical Note
Hi Stone Masters Mai has been very stable on his current regimen for quite some time. He uses Oxycodone 5mg, 1 tab 2-3 times a day. 70 tabs lasts him 30 days or more. I also have him certified for medical cannabis. At this time, I would discharge him back to primary care, however I would still see him yearly for his medical cannabis certification. Was wondering if you would be okay with this discharge back to manage his Percocet?    Thank you,  Natasha Padgett, PAC

## 2021-08-30 NOTE — PATIENT INSTRUCTIONS
After Visit Instructions:     Thank you for coming to [facility] for your care. It is my goal to partner with you to help you reach your optimal state of health.     I am recommending multidisciplinary care at this time.  The focus of care will be to continue gradual rehabilitation and pain management with medication adjustments as needed.    Continue daily self-care, identifying contributing factors, and monitoring variations in pain level. Continue to integrate self-care into your life.          Schedule follow-up with Natasha Padgett PA-C in 12 weeks. You will need to make this appointment.     Medication recommendations:     Continue Percocet 5-325: take 1 tablet 2-3 times daily as needed.     Continue medical cannabis      Natasha Padgett PA-C  [facility]    Contact information: [facility]  Clinic Number:  [phone]     Call with any questions about your care and for scheduling assistance.     Calls are returned Monday through Friday between 8 AM and 4:30 PM. We usually get back to you within 2 business days depending on the issue/request.    If we are prescribing your medications:    For opioid medication refills, call the clinic or send a iXpert message 7 days in advance.  Please include:    Name of requested medication    Name of the pharmacy.    For non-opioid medications, call your pharmacy directly to request a refill. Please allow 3-4 days to be processed.     Per [place] State Law:    All controlled substance prescriptions must be filled within 30 days of being written.      For those controlled substances allowing refills, pickup must occur within 30 days of last fill.      We believe regular attendance is key to your success in our program!      Any time you are unable to keep your appointment we ask that you call us at least 24 hours in advance to cancel.This will allow us to offer the  appointment time to another patient.   Multiple missed appointments may lead to dismissal from the clinic.

## 2021-09-13 ENCOUNTER — TELEPHONE (OUTPATIENT)
Dept: PALLIATIVE MEDICINE | Facility: CLINIC | Age: 56
End: 2021-09-13

## 2021-09-13 NOTE — TELEPHONE ENCOUNTER
Please let patient know that Dr. Raymond was okay with the transfer of care. He should schedule a follow up with Dr. Raymond prior to his refill.     I would need to see the patient annually for his medical cannabis certification. His next certification would be May of 2022.    Natasha Padgett PA-C on 9/13/2021 at 12:06 PM

## 2021-09-13 NOTE — TELEPHONE ENCOUNTER
Left message on numerical id vm to inform of message below .  Lisa/MA  [facility]

## 2021-10-01 ENCOUNTER — VIRTUAL VISIT (OUTPATIENT)
Dept: FAMILY MEDICINE | Facility: CLINIC | Age: 56
End: 2021-10-01
Payer: COMMERCIAL

## 2021-10-01 DIAGNOSIS — Z11.59 ENCOUNTER FOR HEPATITIS C SCREENING TEST FOR LOW RISK PATIENT: ICD-10-CM

## 2021-10-01 DIAGNOSIS — M48.02 CERVICAL STENOSIS OF SPINE: ICD-10-CM

## 2021-10-01 DIAGNOSIS — G89.4 CHRONIC PAIN SYNDROME: Primary | ICD-10-CM

## 2021-10-01 DIAGNOSIS — E10.43 TYPE 1 DIABETES MELLITUS WITH DIABETIC AUTONOMIC NEUROPATHY (H): ICD-10-CM

## 2021-10-01 DIAGNOSIS — Z12.11 COLON CANCER SCREENING: ICD-10-CM

## 2021-10-01 DIAGNOSIS — E03.9 ACQUIRED HYPOTHYROIDISM: ICD-10-CM

## 2021-10-01 PROCEDURE — 99215 OFFICE O/P EST HI 40 MIN: CPT | Mod: 95 | Performed by: FAMILY MEDICINE

## 2021-10-01 RX ORDER — OXYCODONE AND ACETAMINOPHEN 5; 325 MG/1; MG/1
1 TABLET ORAL EVERY 6 HOURS PRN
Qty: 70 TABLET | Refills: 0 | Status: CANCELLED | OUTPATIENT
Start: 2021-10-01

## 2021-10-01 ASSESSMENT — PAIN SCALES - GENERAL: PAINLEVEL: MODERATE PAIN (5)

## 2021-10-01 ASSESSMENT — PATIENT HEALTH QUESTIONNAIRE - PHQ9: SUM OF ALL RESPONSES TO PHQ QUESTIONS 1-9: 3

## 2021-10-01 NOTE — PROGRESS NOTES
Stone is a 56 year old who is being evaluated via a billable telephone visit.      What phone number would you like to be contacted at? [phone]  How would you like to obtain your AVS? Mail a copy    Assessment & Plan       ICD-10-CM    1. Chronic pain syndrome  G89.4    2. Cervical stenosis of spine  M48.02    3. Type 1 diabetes mellitus with diabetic autonomic neuropathy (H)  E10.43 Hemoglobin A1c     Albumin Random Urine Quantitative with Creat Ratio   4. Diabetes mellitus with neurological manifestations, uncontrolled (H)  E11.49     E11.65    5. Acquired hypothyroidism  E03.9 TSH with free T4 reflex   6. Colon cancer screening  Z12.11 Adult Gastro Ref - Procedure Only   7. Encounter for hepatitis C screening test for low risk patient  Z11.59 Hepatitis C antibody     I reviewed the medical record from the pain specialist, Natasha Pdagett in details.  His pain overall has been tolerable with the Percocet, naproxen and medical marijuana.  No misuse concern identified.  The pain specialist recommended to have his pain to be managed by the primary care provider since his pain has been stable with the current regimen.  I informed him that I will take over his pain med and therefore its refill request needs to be sent to me.  Encouraged him to continue working with the pain specialist for medical marijuana.  Informed that he will need be seen every 4 months.  Also informed her that he should not get any pain meds from any other providers except the ones from this office.  He may go to ER for acute pain management but should not get narcotic prescription from them either.  Will continue with the naproxen.  Normal activity as tolerated.    For his diabetes, he was encouraged to work with the endocrinologist closely.  Will check a hemoglobin A1c and a microalbumin level.  Encouraged to get his eye exam on a yearly basis.    Also recheck the TSH level today.  Will consider adjusting the levothyroxine dose  appropriately if indicated.    In term of colon cancer screening, his last colonoscopy was in 2013 which shows sessile polyps; no pathology report to review.  Based on the nature of the polyps, most likely that he will a colonoscopy in 5 years.  He was referred to for a colonoscopy for colon cancer screening.    He was recommended to get shingles and influenza vaccination.  He is up-to-date for Covid vaccination.  He would be a candidate for a booster shot due to his immunocompromise conditions: Diabetes, chronic pain, and pancreatic insufficiency.        45 minutes spent on the date of the encounter doing chart review, patient visit and documentation          No follow-ups on file.    Ilan Chew Mai, MD  [facility]    Nabeel Ritter is a 56 year old who presents for the following health issues     HPI     Recheck Pain Meds     Stone has a very complex medical history who was seen today for couple concerns.  First is to follow-up on pain management.  He has chronic pain secondary cervical stenosis and has been seeing the pain specialist for over a year.  His pain has been controlled with medical marijuana and Percocet with 70 tablets a month.  The pain specialist refered him back to primary care provider for pain med management.  She will however continue prescribing medical marijuana for which he will need see her once a year.  Patient stated the pain overall is tolerable and he happy with the current regimen with no side effect.  Been taking medication as prescribed.  Also been taking the naproxen as needed. The same kind of pain that he has had.  Denied of drug or alcohol.    He also has type 1 diabetes, not controlled.  Been seeing an endocrinologist for it - on insulin pump.  His blood sugar has been fluctuating.  His last hemoglobin A1c o 3 months ago was 9.2.   No headache or dizziness.  No acute change in his vision - last eye exam was about 6 months ago and it was normal.  No chest  pain or shortness of breath.  His diabetic neuropathy overall stable and has no concern about it.    Also has hypothyroidism, recent TSH level showed elevated free T4 level and low TSH level.  He states that the medication dose was adjusted by the endocrinologist couple months ago.  No excessive hair loss, heart palpitation, or abnormal weight change.    His last colonoscopy was in 2013 and I reviewed the medical record which indicate that he had 2 sessile polyps.  No pathology report available to review.  He cannot recall if he was recommended to be rescreened in 5 or 10 years.  Denied melena or hematochezia.  No family history of colon cancer.  He is okay with having the colonoscopy.    Review of Systems   Constitutional, HEENT, cardiovascular, pulmonary, gi and gu systems are negative, except as otherwise noted.      Objective           Vitals:  No vitals were obtained today due to virtual visit.    Physical Exam   healthy, alert and no distress  PSYCH: Alert and oriented times 3; coherent speech, normal   rate and volume, able to articulate logical thoughts, able   to abstract reason, no tangential thoughts, no hallucinations   or delusions  His affect is normal  RESP: No cough, no audible wheezing, able to talk in full sentences  Remainder of exam unable to be completed due to telephone visits    No results found for any visits on 10/01/21.    Reviewed the 7/28/21 lab results.  Discussed the results with him in details          Phone call duration: 22 minutes  Started time: 3:38 PM  Ended time: 4:00 PM

## 2021-10-15 DIAGNOSIS — M48.02 CERVICAL STENOSIS OF SPINE: ICD-10-CM

## 2021-10-15 DIAGNOSIS — G89.4 CHRONIC PAIN SYNDROME: ICD-10-CM

## 2021-10-15 RX ORDER — OXYCODONE AND ACETAMINOPHEN 5; 325 MG/1; MG/1
1 TABLET ORAL EVERY 6 HOURS PRN
Qty: 70 TABLET | Refills: 0 | Status: CANCELLED | OUTPATIENT
Start: 2021-10-15

## 2021-10-15 RX ORDER — OXYCODONE AND ACETAMINOPHEN 5; 325 MG/1; MG/1
1 TABLET ORAL EVERY 6 HOURS PRN
Qty: 70 TABLET | Refills: 0 | Status: SHIPPED | OUTPATIENT
Start: 2021-10-15 | End: 2021-11-16

## 2021-10-15 NOTE — TELEPHONE ENCOUNTER
Will route to primary care. Fax request for refill was sent to pain management and patient was discharged back to primary care.    Natasha Padgett PA-C on 10/15/2021 at 10:13 AM

## 2021-10-15 NOTE — TELEPHONE ENCOUNTER
Received call from patient requesting refill(s) of oxyCODONE-acetaminophen (PERCOCET) 5-325 MG tablet     Last dispensed from pharmacy on 9/17/21    Patient's last office/virtual visit by prescribing provider on 8/30/21  Next office/virtual appointment scheduled for 11/23/21    Last urine drug screen date 2/25/21  Current opioid agreement on file (completed within the last year) Yes Date of opioid agreement: 3/10/21    E-prescribe to [address] pharmacy    Will route to nursing [place] for review and preparation of prescription(s).

## 2021-11-10 DIAGNOSIS — K21.9 GASTROESOPHAGEAL REFLUX DISEASE WITHOUT ESOPHAGITIS: ICD-10-CM

## 2021-11-11 DIAGNOSIS — E10.43 TYPE 1 DIABETES MELLITUS WITH DIABETIC AUTONOMIC NEUROPATHY (H): ICD-10-CM

## 2021-11-11 RX ORDER — PROCHLORPERAZINE 25 MG/1
SUPPOSITORY RECTAL
Qty: 3 EACH | Refills: 5 | Status: SHIPPED | OUTPATIENT
Start: 2021-11-11 | End: 2022-05-02

## 2021-11-11 RX ORDER — PROCHLORPERAZINE 25 MG/1
SUPPOSITORY RECTAL
Qty: 1 EACH | Refills: 3 | Status: SHIPPED | OUTPATIENT
Start: 2021-11-11 | End: 2022-11-02

## 2021-11-12 NOTE — TELEPHONE ENCOUNTER
Prescription approved per [facility] Refill Protocol.  Brittani Levi RN on 11/12/2021 at 7:25 AM

## 2021-11-16 DIAGNOSIS — G89.4 CHRONIC PAIN SYNDROME: ICD-10-CM

## 2021-11-16 DIAGNOSIS — M48.02 CERVICAL STENOSIS OF SPINE: ICD-10-CM

## 2021-11-16 RX ORDER — OXYCODONE AND ACETAMINOPHEN 5; 325 MG/1; MG/1
TABLET ORAL
Qty: 70 TABLET | Refills: 0 | Status: SHIPPED | OUTPATIENT
Start: 2021-11-16 | End: 2021-12-14

## 2021-11-16 NOTE — TELEPHONE ENCOUNTER
Percocet      Last Written Prescription Date:  10/15/2021  Last Fill Quantity: 70,   # refills: 0  Last Office Visit: 10/1/2021  Future Office visit:       Routing refill request to provider for review/approval because:  Drug not on the [facility], [facility] or [facility] refill protocol or controlled substance

## 2021-11-29 ENCOUNTER — VIRTUAL VISIT (OUTPATIENT)
Dept: ENDOCRINOLOGY | Facility: CLINIC | Age: 56
End: 2021-11-29
Payer: COMMERCIAL

## 2021-11-29 DIAGNOSIS — Z96.41 INSULIN PUMP STATUS: ICD-10-CM

## 2021-11-29 DIAGNOSIS — E03.9 HYPOTHYROIDISM, UNSPECIFIED TYPE: ICD-10-CM

## 2021-11-29 DIAGNOSIS — E10.43 TYPE 1 DIABETES MELLITUS WITH DIABETIC AUTONOMIC NEUROPATHY (H): Primary | ICD-10-CM

## 2021-11-29 PROCEDURE — 99214 OFFICE O/P EST MOD 30 MIN: CPT | Mod: 95 | Performed by: INTERNAL MEDICINE

## 2021-11-29 PROCEDURE — 95251 CONT GLUC MNTR ANALYSIS I&R: CPT | Performed by: INTERNAL MEDICINE

## 2021-11-29 NOTE — LETTER
"    11/29/2021         RE: Stone De Paz  [address] [address] [address]        Dear Colleague,    Thank you for referring your patient, Stone De Paz, to the [facility]. Please see a copy of my visit note below.    Patient is being evaluated via a billable video visit.      How would you like to obtain your AVS? Reviewed verbally  If the video visit is dropped, the invitation should be resent by cell phone  Will anyone else be joining your video visit? no      Video Start Time:  3:10 pm    Video-Visit Details    Type of service:  Video Visit    Video End Time:  3:35 pm    Originating Location (pt. Location): home    Distant Location (provider location):  [facility]/home    Platform used for Video Visit:  JayashreeVictrio          Recent issues:  Diabetes and thyroid follow-up evaluation  Upgraded to the Medtronic 770G pump in 9/2021, still using the DexcomG6 CGM sensor  Not using the [employer] catie on his phone, not using cellular data phone feature currently  He has lowered his basal rate settings- now lower 0.675 unit(s)/hr x 24 hrs, also softer ICR setting  Still has issues with neck pain/stiffness           1995. Diagnosis of diabetes mellitus after 2nd back surgery, age 29  Initial treatment with insulin injections using NPH and Regular insulin  Switched to premixed 70/30 insulin BID dosing  1999. Started Medtronic insulin pump model 508, used base-dose Humalog insulin at meals  Had seen Dr. Iggy Briggs/[facility]     7/24/01. Initial evaluation with me at my former clinic ([facility])  Upgraded pump to Medtronic 523   Now using Medtronic 630G pump              Novolog in pump     Meals BID, had carb \"exchanges\" with his pump settings  Chronic high BG and hgbA1c trends despite dose changes  Tried square wave bolus  Has Contour Link? BG meter, variable testing pattern   Recently testing 4x/day for at least 60 days  Glucose sensor (CGMS) use:   Tried " Medtronic CGMS in the past, didn't like it   Switched to DexcomG5, used for approx 6 months but issues with getting supply order   Early 2019, started Freestyle Sukhi but issues with skin site redness    Previous pump settings:       No recent Carelink data available    ~11/2020. Upgraded to DexcomG6 CGM sensor   Using the DexcomG6 consistently for at least past 60 days    Recent DexcomG6 data:                       Previous  labs include:  Lab Results   Component Value Date    A1C 9.2 (H) 06/24/2021     07/28/2021    POTASSIUM 5.1 07/28/2021    CHLORIDE 104 07/28/2021    CO2 27 07/28/2021    ANIONGAP 6 07/28/2021     (H) 07/28/2021    BUN 17 07/28/2021    CR 0.92 07/28/2021    GFRESTIMATED >90 07/28/2021    GFRESTBLACK >90 03/01/2021    EMILEE 9.2 07/28/2021    CPEPT <0.1 (L) 06/24/2021    CHOL 164 06/24/2021    TRIG 82 06/24/2021    HDL 65 06/24/2021    LDL 83 06/24/2021    NHDL 99 06/24/2021    UCRR 24 08/06/2020    MICROL <5 08/06/2020    UMALCR Unable to calculate due to low value 08/06/2020     Last eye exam date?  Goes to eye clinic in [facility]  7/2018. Met with Destiny Warren RD, FRANCAE at [facility]  DM Complications:              Neuropathy                          Peripheral neuropathy                                      Decreased sensation at feet                          Autonomic neuropathy- gastroparesis                                      Symptoms of nausea, bloating, episodes of diarrhea and emesis                                      Has Medtronic gastric stimulator                                      Sees physicians and WARREN Gilmore/[place] [facility]                   Thyroid:  1994. History of hypothyroidism  Chronic levothyroxine treatment, has used Levoxyl in the past  Supplements:  Takes vitamin D 1000 U daily  7/2018. Had levothyroxine dose increase   Recent labs include:  Lab Results   Component Value Date    TSH 0.16 (L) 06/24/2021    T4 1.56 (H) 06/24/2021     Current  "dose:  Levothyroxine 0.112 mg daily        Lives in [place], [place] with Ada, also (1) adopted dog named Marko and cat \"verito\"  Sees Dr. Ilan Chew Mai/[facility] for general medicine evaluations  Also sees Olga Ramirez PAC/[place] [facility].       PMH/PSH:  Past Medical History:   Diagnosis Date     Arthritis      Chronic neck pain     percocet for years now,      Depressive disorder      Gastroparesis      Gastroparesis due to DM (H)     gastric stimulator [facility], [place] [facility] manages that     Hyperlipidemia      Hypertension      Hypothyroidism      Neuropathy, diabetic (H)      Type 1 diabetes (H) age 31     Past Surgical History:   Procedure Laterality Date     C LUMBAR SPINE FUSION,ANTER APPRCH      2 L4-5 L5-21     COLONOSCOPY  07/18/12     FUSION CERVICAL ANTERIOR TWO LEVELS       INJECT BLOCK MEDIAL BRANCH CERVICAL/THORACIC/LUMBAR Bilateral 11/20/2018    Procedure: Bilateral Cervical 3-4-5 medial branch block;  Surgeon: Jef Edwards MD;  Location: [facility] OR     INJECT BLOCK MEDIAL BRANCH CERVICAL/THORACIC/LUMBAR N/A 1/11/2019    Procedure: Cervical 3,4,5 Bilateral Medial branch blocks;  Surgeon: Jef Edwards MD;  Location: [facility] OR     RADIO FREQUENCY ABLATION PULSED CERVICAL Right 2/28/2019    Procedure: RADIO FREQUENCY ABLATION CERVICAL THREE, FOUR, FIVE, AND THIRD OCCUPITAL NERVE RIGHT SIDE;  Surgeon: Jef Edwards MD;  Location: [facility] OR     RADIO FREQUENCY ABLATION PULSED CERVICAL Left 3/15/2019    Procedure: radiofrequency ablation cervical 3,4,5;  Surgeon: Jef Edwards MD;  Location: [facility]     RADIO FREQUENCY ABLATION PULSED CERVICAL Right 9/4/2020    Procedure: RADIOFREQUENCY ABLATION CERVICAL 3,4 5 right SIDE;  Surgeon: Jef Edwards MD;  Location: [facility]     RADIO FREQUENCY ABLATION PULSED CERVICAL Left 9/10/2020    Procedure: RADIOFREQUENCY ABLATION CERVICAL 3,4 5 LEFT SIDE;  Surgeon: Jef Edwards MD;  Location: [facility] OR     THORACOSCOPIC DECORTICATION LUNG  1/9/2014    Procedure: THORACOSCOPIC " DECORTICATION LUNG;  RIGHT VATS/RIGHT THORACOTOMY , DECORTICATION RIGHT LUNG ,WEDGE RESECTION RIGHT MIDDLE LOBE LUNG NODULE;  Surgeon: Harley Felix MD;  Location: [facility] OR       Family Hx:  Family History   Problem Relation Age of Onset     Hypertension Mother      Diabetes Father         type 2     Hypertension Father      Cerebrovascular Disease Maternal Grandmother      Unknown/Adopted Maternal Grandfather      Unknown/Adopted Paternal Grandmother      Unknown/Adopted Paternal Grandfather      Liver Disease Brother      Heart Disease Sister      Thyroid Disease Sister      Thyroid Disease Sister      Thyroid Disease Sister          Social Hx:  Social History     Socioeconomic History     Marital status:      Spouse name: Not on file     Number of children: Not on file     Years of education: Not on file     Highest education level: Not on file   Occupational History     Not on file   Tobacco Use     Smoking status: Former Smoker     Quit date: 2010     Years since quittin.9     Smokeless tobacco: Never Used   Substance and Sexual Activity     Alcohol use: No     Drug use: Yes     Types: Marijuana     Comment: medical marijuana     Sexual activity: Not Currently     Comment: Prescription Marijuana   Other Topics Concern     Parent/sibling w/ CABG, MI or angioplasty before 65F 55M? Not Asked   Social History Narrative     Not on file     Social Determinants of Health     Financial Resource Strain: Not on file   Food Insecurity: Not on file   Transportation Needs: Not on file   Physical Activity: Not on file   Stress: Not on file   Social Connections: Not on file   Intimate Partner Violence: Not on file   Housing Stability: Not on file          MEDICATIONS:  has a current medication list which includes the following prescription(s): amylase-lipase-protease, amylase-lipase-protease, atorvastatin, citalopram, dexcom g6 , dexcom g6 sensor, dexcom g6 transmitter, insulin aspart,  "levothyroxine, lisinopril, medical cannabis, naproxen, omeprazole, ondansetron, oxycodone-acetaminophen, sumatriptan, freestyle chapito reader, and contour next test.    ROS: 10 point ROS neg other than the symptoms noted above in the HPI.     GENERAL: some fatigue, wt stable; denies fevers, chills, night sweats.   HEENT: no dysphagia, odonophagia, diplopia, neck pain  THYROID:  no apparent hyper or hypothyroid symptoms  CV: occasional chest pains; no pressure, palpitations  LUNGS: no SOB, KRAFT, cough, wheezing   ABDOMEN: some diarrhea \"collitis\" and postmeal nausea; no diarrhea, constipation  EXTREMITIES: no rashes, ulcers, edema  NEUROLOGY: no headaches, denies changes in vision, tingling, extremitiy numbness   MSK: neck pain/stiffness and back pains; no muscle weakness  SKIN: no rashes or lesions  PSYCH:  stable mood, no significant anxiety or depression  ENDOCRINE: no heat or cold intolerance    Physical Exam (visual exam)  VS:  no vital signs taken for video visit  CONSTITUTIONAL: healthy, alert and NAD, well dressed, answering questions appropriately  ENT: no nose swelling or nasal discharge, mouth redness or gum changes.  EYES: eyes grossly normal to inspection, conjunctivae and sclerae normal, no exophthalmos or proptosis  THYROID:  no apparent nodules or goiter  LUNGS: no audible wheeze, cough or visible cyanosis, no visible retractions or increased work of breathing  ABDOMEN: abdomen not evaluated  EXTREMITIES: no hand tremors, limited exam  NEUROLOGY: CN grossly intact, mentation intact and speech normal   SKIN:  no apparent skin lesions, rash, or edema with visualized skin appearance  PSYCH: mentation appears normal, affect normal/bright, judgement and insight intact,   normal speech and appearance well groomed       LABS:     All pertinent notes, labs, and images personally reviewed by me.        A/P:  Encounter Diagnoses   Name Primary?     Type 1 diabetes mellitus with diabetic autonomic neuropathy (H) " "Yes     Insulin pump status      Hypothyroidism, unspecified type        Comments:  Reviewed complicated health history, diabetes and thyroid issues.  Recent postprandial hyperglycemia noted  Reviewed and interpreted tests that I previously ordered.   Ordered appropriate tests for the endocrinology disease management.    Management options discussed and implemented after shared medical decision making with the patient.  The T1DM problem is chronic-stable       Plan:  Reviewed the overall T1DM management and insulin pump use.  Discussed optimal BG testing to assess glucose trends.  We reviewed insulin pump settings, basal rate and bolus dosing  Use of automated pump bolus dosing for meal/snack carb & correction dosing  Reviewed the [employer] Carelink report data today  Reviewed recent DexcomG6 CGM glucose sensor trends, in detail     Recommend:  Continue Medtronic insulin pump use at this time, though discussed pump upgrade vs change options.  Pump setting changes:   Basals:  Change 2a as 3a and 0.675 to 0.625 unit(s)/hr   Bolus ISF:   to 90, 8p 95 to 90   Use genuine suppertime carb estimate for meal bolus and avoid decreasing carb estimate \"fudging\"     Would be helpful to link his 770G pump to [employer], see if pump will upload when he is on Wi-fi   Will ask our local [employer] rep ([employer]) to contact him about this  Contact our office with update if restarting the budesinide medication for diarrhea problem  Continue use of the DexcomG6 CGM sensor               Patient makes frequent adjustments to insulin regimen on basis of therapeutic glucose testing   Reviewed interaction of the DexcomG6 and the [employer]andem TSlim pump  Would benefit from additional [facility] evaluation at [facility]  Plan lab tests soon:   Discussed testing at [facility]   Lab orders placed  Continue simvastatin 40 mg each evening  Continue current levothyroxine 0.112 mg daily dose but consider dose reduction if low TSH " level  Arrange annual dilated eye exam, fasting lipid panel testing.  We reviewed importance of timely clinic appointments with me Q3 months, to satisfy Medicare with his diabetes device coverage     Encouraged him to make routine f/u appointments with [place] [facility]   Addressed patient's questions today.     There are no Patient Instructions on file for this visit.    Future labs ordered today:   Orders Placed This Encounter   Procedures     GLUCOSE MONITOR, 72 HOUR, PHYS INTERP     TSH with free T4 reflex     Hemoglobin A1c     Radiology/Consults ordered today: None    Total time spent in with the patient evaluation:  25 min  Additional time spent reviewing pertinent lab tests and chart notes, and documentation:  10 min    Follow-up:  3/2022    VIKTOR Best MD, MS  Endocrinology  [facility]    CC:  SENAIT Raymond                            Again, thank you for allowing me to participate in the care of your patient.        Sincerely,        Frankie Best MD

## 2021-11-29 NOTE — PROGRESS NOTES
"Patient is being evaluated via a billable video visit.      How would you like to obtain your AVS? Reviewed verbally  If the video visit is dropped, the invitation should be resent by cell phone  Will anyone else be joining your video visit? no      Video Start Time:  3:10 pm    Video-Visit Details    Type of service:  Video Visit    Video End Time:  3:35 pm    Originating Location (pt. Location): home    Distant Location (provider location):  [facility] [place]    Platform used for Video Visit:  Terri          Recent issues:  Diabetes and thyroid follow-up evaluation  Upgraded to the Medtronic 770G pump in 9/2021, still using the DexcomG6 CGM sensor  Not using the DeliveryCheetah catie on his phone, not using cellular data phone feature currently  He has lowered his basal rate settings- now lower 0.675 unit(s)/hr x 24 hrs, also softer ICR setting  Still has issues with neck pain/stiffness           1995. Diagnosis of diabetes mellitus after 2nd back surgery, age 29  Initial treatment with insulin injections using NPH and Regular insulin  Switched to premixed 70/30 insulin BID dosing  1999. Started Medtronic insulin pump model 508, used base-dose Humalog insulin at meals  Had seen Dr. Iggy Briggs/[facility]     7/24/01. Initial evaluation with me at my former clinic ([facility])  Upgraded pump to Medtronic 523   Now using Medtronic 630G pump              Novolog in pump     Meals BID, had carb \"exchanges\" with his pump settings  Chronic high BG and hgbA1c trends despite dose changes  Tried square wave bolus  Has Contour Link? BG meter, variable testing pattern   Recently testing 4x/day for at least 60 days  Glucose sensor (CGMS) use:   Tried Medtronic CGMS in the past, didn't like it   Switched to DexcomG5, used for approx 6 months but issues with getting supply order   Early 2019, started Freestyle Sukhi but issues with skin site redness    Previous pump settings:       No recent " "Carelink data available    ~11/2020. Upgraded to DexcomG6 CGM sensor   Using the DexcomG6 consistently for at least past 60 days    Recent DexcomG6 data:                       Previous  labs include:  Lab Results   Component Value Date    A1C 9.2 (H) 06/24/2021     07/28/2021    POTASSIUM 5.1 07/28/2021    CHLORIDE 104 07/28/2021    CO2 27 07/28/2021    ANIONGAP 6 07/28/2021     (H) 07/28/2021    BUN 17 07/28/2021    CR 0.92 07/28/2021    GFRESTIMATED >90 07/28/2021    GFRESTBLACK >90 03/01/2021    EMILEE 9.2 07/28/2021    CPEPT <0.1 (L) 06/24/2021    CHOL 164 06/24/2021    TRIG 82 06/24/2021    HDL 65 06/24/2021    LDL 83 06/24/2021    NHDL 99 06/24/2021    UCRR 24 08/06/2020    MICROL <5 08/06/2020    UMALCR Unable to calculate due to low value 08/06/2020     Last eye exam date?  Goes to eye clinic [facility]  7/2018. Met with Destiny Warren RD, FRANCAE at [facility]  DM Complications:              Neuropathy                          Peripheral neuropathy                                      Decreased sensation at feet                          Autonomic neuropathy- gastroparesis                                      Symptoms of nausea, bloating, episodes of diarrhea and emesis                                      Has Medtronic gastric stimulator                                      Sees physicians and Ms WARREN Rosario/[facility]                   Thyroid:  1994. History of hypothyroidism  Chronic levothyroxine treatment, has used Levoxyl in the past  Supplements:  Takes vitamin D 1000 U daily  7/2018. Had levothyroxine dose increase   Recent labs include:  Lab Results   Component Value Date    TSH 0.16 (L) 06/24/2021    T4 1.56 (H) 06/24/2021     Current dose:  Levothyroxine 0.112 mg daily        Lives in [place], [place] with Ada, also (1) adopted dog named Marko and cat \"verito\"  Sees Dr. Ilan Chew Mai/[facility] for general medicine evaluations  Also sees Olga VIRK/[facility] " clinic.       PMH/PSH:  Past Medical History:   Diagnosis Date     Arthritis      Chronic neck pain     percocet for years now,      Depressive disorder      Gastroparesis      Gastroparesis due to DM (H)     gastric stimulator helps, [facility] manages that     Hyperlipidemia      Hypertension      Hypothyroidism      Neuropathy, diabetic (H)      Type 1 diabetes (H) age 31     Past Surgical History:   Procedure Laterality Date     C LUMBAR SPINE FUSION,ANTER APPRCH      2 L4-5 L5-21     COLONOSCOPY  07/18/12     FUSION CERVICAL ANTERIOR TWO LEVELS       INJECT BLOCK MEDIAL BRANCH CERVICAL/THORACIC/LUMBAR Bilateral 11/20/2018    Procedure: Bilateral Cervical 3-4-5 medial branch block;  Surgeon: Jef Edwards MD;  Location: [facility]     INJECT BLOCK MEDIAL BRANCH CERVICAL/THORACIC/LUMBAR N/A 1/11/2019    Procedure: Cervical 3,4,5 Bilateral Medial branch blocks;  Surgeon: Jef Edwards MD;  Location: [facility]     RADIO FREQUENCY ABLATION PULSED CERVICAL Right 2/28/2019    Procedure: RADIO FREQUENCY ABLATION CERVICAL THREE, FOUR, FIVE, AND THIRD OCCUPITAL NERVE RIGHT SIDE;  Surgeon: Jef Edwards MD;  Location: [facility]     RADIO FREQUENCY ABLATION PULSED CERVICAL Left 3/15/2019    Procedure: radiofrequency ablation cervical 3,4,5;  Surgeon: Jef Edwards MD;  Location: [facility]     RADIO FREQUENCY ABLATION PULSED CERVICAL Right 9/4/2020    Procedure: RADIOFREQUENCY ABLATION CERVICAL 3,4 5 right SIDE;  Surgeon: Jef Edwards MD;  Location[facility]     RADIO FREQUENCY ABLATION PULSED CERVICAL Left 9/10/2020    Procedure: RADIOFREQUENCY ABLATION CERVICAL 3,4 5 LEFT SIDE;  Surgeon: Jef Edwards MD;  Location: [facility]     THORACOSCOPIC DECORTICATION LUNG  1/9/2014    Procedure: THORACOSCOPIC DECORTICATION LUNG;  RIGHT VATS/RIGHT THORACOTOMY , DECORTICATION RIGHT LUNG ,WEDGE RESECTION RIGHT MIDDLE LOBE LUNG NODULE;  Surgeon: Harley Felix MD;  Location: [facility] OR       Family Hx:  Family History   Problem Relation Age of  Onset     Hypertension Mother      Diabetes Father         type 2     Hypertension Father      Cerebrovascular Disease Maternal Grandmother      Unknown/Adopted Maternal Grandfather      Unknown/Adopted Paternal Grandmother      Unknown/Adopted Paternal Grandfather      Liver Disease Brother      Heart Disease Sister      Thyroid Disease Sister      Thyroid Disease Sister      Thyroid Disease Sister          Social Hx:  Social History     Socioeconomic History     Marital status:      Spouse name: Not on file     Number of children: Not on file     Years of education: Not on file     Highest education level: Not on file   Occupational History     Not on file   Tobacco Use     Smoking status: Former Smoker     Quit date: 2010     Years since quittin.9     Smokeless tobacco: Never Used   Substance and Sexual Activity     Alcohol use: No     Drug use: Yes     Types: Marijuana     Comment: medical marijuana     Sexual activity: Not Currently     Comment: Prescription Marijuana   Other Topics Concern     Parent/sibling w/ CABG, MI or angioplasty before 65F 55M? Not Asked   Social History Narrative     Not on file     Social Determinants of Health     Financial Resource Strain: Not on file   Food Insecurity: Not on file   Transportation Needs: Not on file   Physical Activity: Not on file   Stress: Not on file   Social Connections: Not on file   Intimate Partner Violence: Not on file   Housing Stability: Not on file          MEDICATIONS:  has a current medication list which includes the following prescription(s): amylase-lipase-protease, amylase-lipase-protease, atorvastatin, citalopram, dexcom g6 , dexcom g6 sensor, dexcom g6 transmitter, insulin aspart, levothyroxine, lisinopril, medical cannabis, naproxen, omeprazole, ondansetron, oxycodone-acetaminophen, sumatriptan, freestyle chapito reader, and contour next test.    ROS: 10 point ROS neg other than the symptoms noted above in the  "HPI.     GENERAL: some fatigue, wt stable; denies fevers, chills, night sweats.   HEENT: no dysphagia, odonophagia, diplopia, neck pain  THYROID:  no apparent hyper or hypothyroid symptoms  CV: occasional chest pains; no pressure, palpitations  LUNGS: no SOB, KRAFT, cough, wheezing   ABDOMEN: some diarrhea \"collitis\" and postmeal nausea; no diarrhea, constipation  EXTREMITIES: no rashes, ulcers, edema  NEUROLOGY: no headaches, denies changes in vision, tingling, extremitiy numbness   MSK: neck pain/stiffness and back pains; no muscle weakness  SKIN: no rashes or lesions  PSYCH:  stable mood, no significant anxiety or depression  ENDOCRINE: no heat or cold intolerance    Physical Exam (visual exam)  VS:  no vital signs taken for video visit  CONSTITUTIONAL: healthy, alert and NAD, well dressed, answering questions appropriately  ENT: no nose swelling or nasal discharge, mouth redness or gum changes.  EYES: eyes grossly normal to inspection, conjunctivae and sclerae normal, no exophthalmos or proptosis  THYROID:  no apparent nodules or goiter  LUNGS: no audible wheeze, cough or visible cyanosis, no visible retractions or increased work of breathing  ABDOMEN: abdomen not evaluated  EXTREMITIES: no hand tremors, limited exam  NEUROLOGY: CN grossly intact, mentation intact and speech normal   SKIN:  no apparent skin lesions, rash, or edema with visualized skin appearance  PSYCH: mentation appears normal, affect normal/bright, judgement and insight intact,   normal speech and appearance well groomed       LABS:     All pertinent notes, labs, and images personally reviewed by me.        A/P:  Encounter Diagnoses   Name Primary?     Type 1 diabetes mellitus with diabetic autonomic neuropathy (H) Yes     Insulin pump status      Hypothyroidism, unspecified type        Comments:  Reviewed complicated health history, diabetes and thyroid issues.  Recent postprandial hyperglycemia noted  Reviewed and interpreted tests that I " "previously ordered.   Ordered appropriate tests for the endocrinology disease management.    Management options discussed and implemented after shared medical decision making with the patient.  The T1DM problem is chronic-stable       Plan:  Reviewed the overall T1DM management and insulin pump use.  Discussed optimal BG testing to assess glucose trends.  We reviewed insulin pump settings, basal rate and bolus dosing  Use of automated pump bolus dosing for meal/snack carb & correction dosing  Reviewed the Medtronic Carelink report data today  Reviewed recent DexcomG6 CGM glucose sensor trends, in detail     Recommend:  Continue Medtronic insulin pump use at this time, though discussed pump upgrade vs change options.  Pump setting changes:   Basals:  Change 2a as 3a and 0.675 to 0.625 unit(s)/hr   Bolus ISF:   to 90, 8p 95 to 90   Use genuine suppertime carb estimate for meal bolus and avoid decreasing carb estimate \"fudging\"     Would be helpful to link his 770G pump to [employer], see if pump will upload when he is on Wi-fi   Will ask our local [employer] rep ([place]) to contact him about this  Contact our office with update if restarting the budesinide medication for diarrhea problem  Continue use of the DexcomG6 CGM sensor               Patient makes frequent adjustments to insulin regimen on basis of therapeutic glucose testing   Reviewed interaction of the DexcomG6 and the Tandem TSlim pump  Would benefit from additional CDE evaluation at [facility]  Plan lab tests soon:   Discussed testing at [facility]   Lab orders placed  Continue simvastatin 40 mg each evening  Continue current levothyroxine 0.112 mg daily dose but consider dose reduction if low TSH level  Arrange annual dilated eye exam, fasting lipid panel testing.  We reviewed importance of timely clinic appointments with me Q3 months, to satisfy Medicare with his diabetes device coverage     Encouraged him to make routine f/u " appointments with [facility]   Addressed patient's questions today.     There are no Patient Instructions on file for this visit.    Future labs ordered today:   Orders Placed This Encounter   Procedures     GLUCOSE MONITOR, 72 HOUR, PHYS INTERP     TSH with free T4 reflex     Hemoglobin A1c     Radiology/Consults ordered today: None    Total time spent in with the patient evaluation:  25 min  Additional time spent reviewing pertinent lab tests and chart notes, and documentation:  10 min    Follow-up:  3/2022    VIKTOR Best MD, MS  Endocrinology  [facility]    CC:  SENAIT Raymond

## 2021-12-14 DIAGNOSIS — G89.4 CHRONIC PAIN SYNDROME: ICD-10-CM

## 2021-12-14 DIAGNOSIS — M48.02 CERVICAL STENOSIS OF SPINE: ICD-10-CM

## 2021-12-14 RX ORDER — OXYCODONE AND ACETAMINOPHEN 5; 325 MG/1; MG/1
TABLET ORAL
Qty: 70 TABLET | Refills: 0 | Status: SHIPPED | OUTPATIENT
Start: 2021-12-14 | End: 2021-12-16

## 2021-12-14 NOTE — TELEPHONE ENCOUNTER
Routing refill request to provider for review/approval because:  Drug not on the [facility] refill protocol   Rose Cerrato, RN, BSN

## 2021-12-15 RX ORDER — OXYCODONE AND ACETAMINOPHEN 5; 325 MG/1; MG/1
TABLET ORAL
Qty: 70 TABLET | Refills: 0 | Status: CANCELLED | OUTPATIENT
Start: 2021-12-15

## 2021-12-15 RX ORDER — BUDESONIDE 3 MG/1
CAPSULE, COATED PELLETS ORAL
COMMUNITY
Start: 2021-06-08 | End: 2022-05-16

## 2021-12-15 NOTE — TELEPHONE ENCOUNTER
Patient's insurance changed and Rx is too expensive at [facility], found it cheaper at [facility] in [place]. Please resend to selected pharmacy. Rx was not picked up at [facility].  -Leesa Arroyo

## 2021-12-16 RX ORDER — OXYCODONE AND ACETAMINOPHEN 5; 325 MG/1; MG/1
1 TABLET ORAL EVERY 6 HOURS PRN
Qty: 70 TABLET | Refills: 0 | Status: SHIPPED | OUTPATIENT
Start: 2021-12-16 | End: 2022-01-17

## 2021-12-16 NOTE — TELEPHONE ENCOUNTER
Unfortunately this was sent to [facility].  RN called [facility] and had this script canceled.      Routing to a covering provider to sign the same script PCP sent.  PCP is not in clinic for the rest of the day and patient is out.    They are very frustrated.  Cecilia Lorenzo, WILLIAMN, RN

## 2021-12-16 NOTE — TELEPHONE ENCOUNTER
Patient did not  the Oxycodone at [facility] as it was to expensive there and would like a prescription sent to [facility] in [place], [place].

## 2021-12-20 DIAGNOSIS — E10.43 TYPE 1 DIABETES MELLITUS WITH DIABETIC AUTONOMIC NEUROPATHY (H): ICD-10-CM

## 2021-12-20 NOTE — TELEPHONE ENCOUNTER
Routing to covering provider as PCP is currently out of office.     Routing refill request to provider for review/approval because:  Labs out of range:  A1C  Labs not current:  A1C    Dolores Ferro RN

## 2021-12-20 NOTE — TELEPHONE ENCOUNTER
Patient wife calling and states the [facility] has requested 3 times for insulin (novolog) for pump. Patient needs this done today, please

## 2021-12-20 NOTE — TELEPHONE ENCOUNTER
See note below, pharmacy does not have insulin, family requesting new script to different pharmacy.     Routing refill request to provider for review/approval because:  Labs out of range:  A1C  Labs not current:  A1C    Dolores Ferro RN

## 2021-12-20 NOTE — TELEPHONE ENCOUNTER
Patient's wife called and stated that the [facility] in [place] does not have the insulin and so they need the prescription sent to [facility] in [place].

## 2022-01-17 DIAGNOSIS — G89.4 CHRONIC PAIN SYNDROME: ICD-10-CM

## 2022-01-17 DIAGNOSIS — M48.02 CERVICAL STENOSIS OF SPINE: ICD-10-CM

## 2022-01-17 RX ORDER — OXYCODONE AND ACETAMINOPHEN 5; 325 MG/1; MG/1
1 TABLET ORAL EVERY 6 HOURS PRN
Qty: 70 TABLET | Refills: 0 | Status: SHIPPED | OUTPATIENT
Start: 2022-01-17 | End: 2022-02-17

## 2022-02-14 ENCOUNTER — TELEPHONE (OUTPATIENT)
Dept: FAMILY MEDICINE | Facility: CLINIC | Age: 57
End: 2022-02-14
Payer: COMMERCIAL

## 2022-02-14 DIAGNOSIS — Z53.9 ERRONEOUS ENCOUNTER--DISREGARD: Primary | ICD-10-CM

## 2022-02-16 DIAGNOSIS — G89.4 CHRONIC PAIN SYNDROME: ICD-10-CM

## 2022-02-16 DIAGNOSIS — M48.02 CERVICAL STENOSIS OF SPINE: ICD-10-CM

## 2022-02-16 NOTE — TELEPHONE ENCOUNTER
Reason for Call:  Medication or medication refill:    Do you use a [facility]?  Name of the pharmacy and phone number for the [facility] [place]    Name of the medication requested: Percocet    Other request:     Can we leave a detailed message on this number? YES    Phone number patient can be reached at: Home number on file [phone] (home)    Best Time: Any    Call taken on 2/16/2022 at 3:05 PM by Lula Galo

## 2022-02-17 RX ORDER — OXYCODONE AND ACETAMINOPHEN 5; 325 MG/1; MG/1
1 TABLET ORAL EVERY 6 HOURS PRN
Qty: 70 TABLET | Refills: 0 | Status: SHIPPED | OUTPATIENT
Start: 2022-02-17 | End: 2022-03-15

## 2022-02-17 NOTE — TELEPHONE ENCOUNTER
Spouse called in. Checking status. Advised still pending . Very upset and frustrated that no one is addressing the refill quick enough. Did advise it can take up to 72hrs. Pt states she was working with pharmacy since Monday. Asked for patient relations. Gave number. Attempted to transfer and they hung up as I was transferring over.

## 2022-02-18 NOTE — TELEPHONE ENCOUNTER
Please let him know that he should have enough pain med through 2/17.  Not sure why he is upset.  Also will not able to refill if he request early.   I received the request yesterday.  It could be a delay from his pharmacy as well.  He also need a follow up before med can be refill anyway - he needs to be seen every 3 month, last visit was in oct.  .  If he is not happy with me, he is welcome to look for another provider.  If he wants to stay with me, he will need to follow up before med runs out.  I am refill him one month supply.  Also remind him to be seen every 3 months otherwise will not refill as well.

## 2022-02-21 NOTE — TELEPHONE ENCOUNTER
Looks like he is appointment will need to be rescheduled that day.  May have him come in earlier on the virtual slots

## 2022-02-21 NOTE — TELEPHONE ENCOUNTER
Patient called back and he stated he does have an appointment scheduled for 3/02. That was the first available he could get. He did try to get one for February but there was not one available.

## 2022-02-28 ENCOUNTER — VIRTUAL VISIT (OUTPATIENT)
Dept: ENDOCRINOLOGY | Facility: CLINIC | Age: 57
End: 2022-02-28
Payer: COMMERCIAL

## 2022-02-28 DIAGNOSIS — E10.43 TYPE 1 DIABETES MELLITUS WITH DIABETIC AUTONOMIC NEUROPATHY (H): Primary | ICD-10-CM

## 2022-02-28 DIAGNOSIS — E03.9 HYPOTHYROIDISM, UNSPECIFIED TYPE: ICD-10-CM

## 2022-02-28 DIAGNOSIS — Z96.41 INSULIN PUMP STATUS: ICD-10-CM

## 2022-02-28 DIAGNOSIS — K31.84 GASTROPARESIS: ICD-10-CM

## 2022-02-28 PROCEDURE — 99214 OFFICE O/P EST MOD 30 MIN: CPT | Mod: 95 | Performed by: INTERNAL MEDICINE

## 2022-02-28 PROCEDURE — 95251 CONT GLUC MNTR ANALYSIS I&R: CPT | Performed by: INTERNAL MEDICINE

## 2022-02-28 NOTE — LETTER
"    2/28/2022         RE: Stone De Paz  [id number] [facility] [address]        Dear Colleague,    Thank you for referring your patient, Stone De Paz, to the [facility]. Please see a copy of my visit note below.    Patient is being evaluated via a billable video visit.      How would you like to obtain your AVS? Reviewed verbally  If the video visit is dropped, the invitation should be resent by cell phone  Will anyone else be joining your video visit? no      Video Start Time: 1:05 pm     Video-Visit Details    Type of service:  Video Visit    Video End Time:  1:25 pm    Originating Location (pt. Location): home    Distant Location (provider location):  [facility]/home    Platform used for Video Visit:  ZIPDIGS        Recent issues:  Diabetes and thyroid follow-up evaluation  Using the Medtronic 770G pump since 9/2021, along with the DexcomG6 CGM sensor  Having more nausea, wonders about his gastric stimulator function  Using medical [facility] via pain clinic in [facility], which is helpful  Still has issues with neck pain/stiffness           1995. Diagnosis of diabetes mellitus after 2nd back surgery, age 29  Initial treatment with insulin injections using NPH and Regular insulin  Switched to premixed 70/30 insulin BID dosing  1999. Started Medtronic insulin pump model 508, used base-dose Humalog insulin at meals  Had seen Dr. Iggy Briggs/[facility]     7/24/01. Initial evaluation with me at my former clinic ([facility])  Upgraded pump to Medtronic 523   Now using Medtronic 630G pump              Novolog in pump     Meals BID, had carb \"exchanges\" with his pump settings  Chronic high BG and hgbA1c trends despite dose changes  Tried square wave bolus  Has Contour Link? BG meter, variable testing pattern   Recently testing 4x/day for at least 60 days  Glucose sensor (CGMS) use:   Tried Medtronic CGMS in the past, didn't like it   Switched to " DexcomG5, used for approx 6 months but issues with getting supply order   Early 2019, started Freestyle Sukhi but issues with skin site redness    Meal schedule:  Snacks during the day, supper meal 5-7pm    Current pump settings:       Recent Carelink data:              ~11/2020. Upgraded to DexcomG6 CGM sensor   Using the DexcomG6 consistently for at least past 60 days    Recent DexcomG6 data:             Previous  labs include:  Lab Results   Component Value Date    A1C 9.2 (H) 06/24/2021     07/28/2021    POTASSIUM 5.1 07/28/2021    CHLORIDE 104 07/28/2021    CO2 27 07/28/2021    ANIONGAP 6 07/28/2021     (H) 07/28/2021    BUN 17 07/28/2021    CR 0.92 07/28/2021    GFRESTIMATED >90 07/28/2021    GFRESTBLACK >90 03/01/2021    EMILEE 9.2 07/28/2021    CPEPT <0.1 (L) 06/24/2021    CHOL 164 06/24/2021    TRIG 82 06/24/2021    HDL 65 06/24/2021    LDL 83 06/24/2021    NHDL 99 06/24/2021    UCRR 24 08/06/2020    MICROL <5 08/06/2020    UMALCR Unable to calculate due to low value 08/06/2020     Last eye exam date?  Goes to eye clinic in [facility]  7/2018. Met with Destiny Warren RD, CHERYL at [facility]  DM Complications:              Neuropathy                          Peripheral neuropathy                                      Decreased sensation at feet                          Autonomic neuropathy- gastroparesis                                      Symptoms of nausea, bloating, episodes of diarrhea and emesis                                      Has BlackBridge gastric stimulator                                      Sees physicians and WARREN Gilmore/[place] [facility]                   Thyroid:  1994. History of hypothyroidism  Chronic levothyroxine treatment, has used Levoxyl in the past  Supplements:  Takes vitamin D 1000 U daily  7/2018. Had levothyroxine dose increase   Recent labs include:  Lab Results   Component Value Date    TSH 0.16 (L) 06/24/2021    T4 1.56 (H) 06/24/2021     Current dose:   "Levothyroxine 0.112 mg daily        Lives in [place], [place] with Ada, also (1) adopted dog named Marko and cat \"verito\"  Sees Dr. Ilan Chew Mai/[facility] for general medicine evaluations  Also sees Olga Ramirez PAC/[place] [facility].       PMH/PSH:  Past Medical History:   Diagnosis Date     Arthritis      Chronic neck pain     percocet for years now,      Depressive disorder      Gastroparesis      Gastroparesis due to DM (H)     gastric stimulator [facility], [place] [facility] manages that     Hyperlipidemia      Hypertension      Hypothyroidism      Neuropathy, diabetic (H)      Type 1 diabetes (H) age 31     Past Surgical History:   Procedure Laterality Date     COLONOSCOPY  07/18/12     FUSION CERVICAL ANTERIOR TWO LEVELS       INJECT BLOCK MEDIAL BRANCH CERVICAL/THORACIC/LUMBAR Bilateral 11/20/2018    Procedure: Bilateral Cervical 3-4-5 medial branch block;  Surgeon: Jef Edwards MD;  Location: [facility] OR     INJECT BLOCK MEDIAL BRANCH CERVICAL/THORACIC/LUMBAR N/A 1/11/2019    Procedure: Cervical 3,4,5 Bilateral Medial branch blocks;  Surgeon: Jef Edwards MD;  Location: [facility] OR     RADIO FREQUENCY ABLATION PULSED CERVICAL Right 2/28/2019    Procedure: RADIO FREQUENCY ABLATION CERVICAL THREE, FOUR, FIVE, AND THIRD OCCUPITAL NERVE RIGHT SIDE;  Surgeon: Jef Edwards MD;  Location: [facility] OR     RADIO FREQUENCY ABLATION PULSED CERVICAL Left 3/15/2019    Procedure: radiofrequency ablation cervical 3,4,5;  Surgeon: Jef Edwards MD;  Location: [facility]     RADIO FREQUENCY ABLATION PULSED CERVICAL Right 9/4/2020    Procedure: RADIOFREQUENCY ABLATION CERVICAL 3,4 5 right SIDE;  Surgeon: Jef Edwards MD;  Location: [facility]     RADIO FREQUENCY ABLATION PULSED CERVICAL Left 9/10/2020    Procedure: RADIOFREQUENCY ABLATION CERVICAL 3,4 5 LEFT SIDE;  Surgeon: Jef Edwards MD;  Location: [facility] OR     THORACOSCOPIC DECORTICATION LUNG  1/9/2014    Procedure: THORACOSCOPIC DECORTICATION LUNG;  RIGHT VATS/RIGHT THORACOTOMY , DECORTICATION " RIGHT LUNG ,WEDGE RESECTION RIGHT MIDDLE LOBE LUNG NODULE;  Surgeon: Harley Felix MD;  Location: [facility] OR     ZZC LUMBAR SPINE FUSION,ANTER APPRCH      2 L4-5 L5-21       Family Hx:  Family History   Problem Relation Age of Onset     Hypertension Mother      Diabetes Father         type 2     Hypertension Father      Cerebrovascular Disease Maternal Grandmother      Unknown/Adopted Maternal Grandfather      Unknown/Adopted Paternal Grandmother      Unknown/Adopted Paternal Grandfather      Liver Disease Brother      Heart Disease Sister      Thyroid Disease Sister      Thyroid Disease Sister      Thyroid Disease Sister          Social Hx:  Social History     Socioeconomic History     Marital status:      Spouse name: Not on file     Number of children: Not on file     Years of education: Not on file     Highest education level: Not on file   Occupational History     Not on file   Tobacco Use     Smoking status: Former Smoker     Quit date: 2010     Years since quittin.1     Smokeless tobacco: Never Used   Substance and Sexual Activity     Alcohol use: No     Drug use: Yes     Types: Marijuana     Comment: medical marijuana     Sexual activity: Not Currently     Comment: Prescription Marijuana   Other Topics Concern     Parent/sibling w/ CABG, MI or angioplasty before 65F 55M? Not Asked   Social History Narrative     Not on file     Social Determinants of Health     Financial Resource Strain: Not on file   Food Insecurity: Not on file   Transportation Needs: Not on file   Physical Activity: Not on file   Stress: Not on file   Social Connections: Not on file   Intimate Partner Violence: Not on file   Housing Stability: Not on file          MEDICATIONS:  has a current medication list which includes the following prescription(s): amylase-lipase-protease, amylase-lipase-protease, atorvastatin, citalopram, dexcom g6 , dexcom g6 sensor, insulin aspart, levothyroxine, lisinopril, medical  "cannabis, naproxen, omeprazole, ondansetron, oxycodone-acetaminophen, sumatriptan, budesonide, freestyle chapito reader, dexcom g6 transmitter, contour next test, and naloxone.    ROS: 10 point ROS neg other than the symptoms noted above in the HPI.     GENERAL: some fatigue, wt stable; denies fevers, chills, night sweats.   HEENT: no dysphagia, odonophagia, diplopia, neck pain  THYROID:  no apparent hyper or hypothyroid symptoms  CV: occasional chest pains; no pressure, palpitations  LUNGS: no SOB, KRAFT, cough, wheezing   ABDOMEN: some diarrhea \"collitis\" and postmeal nausea; no diarrhea, constipation  EXTREMITIES: no rashes, ulcers, edema  NEUROLOGY: no headaches, denies changes in vision, tingling, extremitiy numbness   MSK: neck pain/stiffness and back pains; no muscle weakness  SKIN: no rashes or lesions  PSYCH:  stable mood, no significant anxiety or depression  ENDOCRINE: no heat or cold intolerance    Physical Exam (visual exam)  VS:  no vital signs taken for video visit  CONSTITUTIONAL: healthy, alert and NAD, well dressed, answering questions appropriately  ENT: no nose swelling or nasal discharge, mouth redness or gum changes.  EYES: eyes grossly normal to inspection, conjunctivae and sclerae normal, no exophthalmos or proptosis  THYROID:  no apparent nodules or goiter  LUNGS: no audible wheeze, cough or visible cyanosis, no visible retractions or increased work of breathing  ABDOMEN: abdomen not evaluated  EXTREMITIES: no hand tremors, limited exam  NEUROLOGY: CN grossly intact, mentation intact and speech normal   SKIN:  no apparent skin lesions, rash, or edema with visualized skin appearance  PSYCH: mentation appears normal, affect normal/bright, judgement and insight intact,   normal speech and appearance well groomed       LABS:     All pertinent notes, labs, and images personally reviewed by me.        A/P:  Encounter Diagnoses   Name Primary?     Type 1 diabetes mellitus with diabetic autonomic " "neuropathy (H) Yes     Gastroparesis      Insulin pump status      Hypothyroidism, unspecified type        Comments:  Reviewed complicated health history, diabetes and thyroid issues.  Recent evening postprandial hyperglycemia noted  Reviewed and interpreted tests that I previously ordered.   Ordered appropriate tests for the endocrinology disease management.    Management options discussed and implemented after shared medical decision making with the patient.  T1DM problem is chronic-stable    Plan:  Reviewed the overall T1DM management and insulin pump use.  Discussed optimal BG testing to assess glucose trends.  We reviewed insulin pump settings, basal rate and bolus dosing  Use of automated pump bolus dosing for meal/snack carb & correction dosing  Reviewed the [employer] Of [employer] Month[employer] report data today  Reviewed recent DexcomG6 CGM glucose sensor trends, in detail     Recommend:  Continue treatment plan using the Medtronic insulin pump and DexcomG6 CGM sensor  Use SG value for the pump bolus wizard dosing.  Pump setting changes:   Bolus ICR:   [place]-5p 15, 5p-[place] 13    ISF:  [place]-8p and 8p to [place] 90 to 75    Use genuine suppertime carb estimate for meal bolus and avoid decreasing carb estimate \"fudging\"      We have discussed use of Medtronic 770G pump auto-uploaded to [employer], when he is on Wi-fi  Reviewed his worsening nausea symptom, importance of arranging f/u GI consultation   Needs assessment of gastric stimulator function, battery life   Also plan an appointment with the designated [facility] pain clinic provider, at [facility] if necessary  Contact our office with update if restarting the budesinide medication for diarrhea problem  Continue use of the DexcomG6 CGM sensor               Patient makes frequent adjustments to insulin regimen on basis of therapeutic glucose testing   Reviewed interaction of the DexcomG6 and the Tandem TSlim pump  Would benefit from additional CDE evaluation at [facility]  Plan " lab tests soon:   Discussed testing at [facility]   Lab orders placed  Continue simvastatin 40 mg each evening  Continue current levothyroxine 0.112 mg daily dose but consider dose reduction if low TSH level  Arrange annual dilated eye exam, fasting lipid panel testing.  We reviewed importance of timely clinic appointments with me Q3 months, to satisfy Medicare with his diabetes device coverage  Will communicate feedback with his testing by phonecall and/or USMail    Addressed patient's questions today.     There are no Patient Instructions on file for this visit.    Future labs ordered today:   No orders of the defined types were placed in this encounter.    Radiology/Consults ordered today: None    Total time spent in with the patient evaluation:  20 min  Additional time spent reviewing pertinent lab tests and chart notes, and documentation:  15 min    Follow-up:  4/5/22 at 3pm Chelle Best MD, MS  Endocrinology  [facility]    CC:  SENAIT Raymond                              Again, thank you for allowing me to participate in the care of your patient.        Sincerely,        Frankie Best MD

## 2022-02-28 NOTE — PROGRESS NOTES
"Patient is being evaluated via a billable video visit.      How would you like to obtain your AVS? Reviewed verbally  If the video visit is dropped, the invitation should be resent by cell phone  Will anyone else be joining your video visit? no      Video Start Time: 1:05 pm     Video-Visit Details    Type of service:  Video Visit    Video End Time:  1:25 pm    Originating Location (pt. Location): home    Distant Location (provider location):  [facility] [place]/home    [facility]        Recent issues:  Diabetes and thyroid follow-up evaluation  Using the Medtronic 770G pump since 9/2021, along with the DexcomG6 CGM sensor  Having more nausea, wonders about his gastric stimulator function  Using medical [facility] via pain clinic in [facility], which is helpful  Still has issues with neck pain/stiffness           1995. Diagnosis of diabetes mellitus after 2nd back surgery, age 29  Initial treatment with insulin injections using NPH and Regular insulin  Switched to premixed 70/30 insulin BID dosing  1999. Started Medtronic insulin pump model 508, used base-dose Humalog insulin at meals  Had seen Dr. Iggy Briggs/[facility]     7/24/01. Initial evaluation with me at my former clinic ([facility])  Upgraded pump to Medtronic 523   Now using Medtronic 630G pump              Novolog in pump     Meals BID, had carb \"exchanges\" with his pump settings  Chronic high BG and hgbA1c trends despite dose changes  Tried square wave bolus  Has Contour Link? BG meter, variable testing pattern   Recently testing 4x/day for at least 60 days  Glucose sensor (CGMS) use:   Tried Medtronic CGMS in the past, didn't like it   Switched to DexcomG5, used for approx 6 months but issues with getting supply order   Early 2019, started Freestyle Sukhi but issues with skin site redness    Meal schedule:  Snacks during the day, supper meal 5-7pm    Current pump settings:       Recent Carelink " "data:              ~11/2020. Upgraded to DexcomG6 CGM sensor   Using the DexcomG6 consistently for at least past 60 days    Recent DexcomG6 data:             Previous  labs include:  Lab Results   Component Value Date    A1C 9.2 (H) 06/24/2021     07/28/2021    POTASSIUM 5.1 07/28/2021    CHLORIDE 104 07/28/2021    CO2 27 07/28/2021    ANIONGAP 6 07/28/2021     (H) 07/28/2021    BUN 17 07/28/2021    CR 0.92 07/28/2021    GFRESTIMATED >90 07/28/2021    GFRESTBLACK >90 03/01/2021    EMILEE 9.2 07/28/2021    CPEPT <0.1 (L) 06/24/2021    CHOL 164 06/24/2021    TRIG 82 06/24/2021    HDL 65 06/24/2021    LDL 83 06/24/2021    NHDL 99 06/24/2021    UCRR 24 08/06/2020    MICROL <5 08/06/2020    UMALCR Unable to calculate due to low value 08/06/2020     Last eye exam date?  Goes to eye clinic in [facility]  7/2018. Met with Destiny Warren RD, FRANCAE at [facility]  DM Complications:              Neuropathy                          Peripheral neuropathy                                      Decreased sensation at feet                          Autonomic neuropathy- gastroparesis                                      Symptoms of nausea, bloating, episodes of diarrhea and emesis                                      Has Medtronic gastric stimulator                                      Sees physicians and Ms WARREN Rosario/[place] [facility]                   Thyroid:  1994. History of hypothyroidism  Chronic levothyroxine treatment, has used Levoxyl in the past  Supplements:  Takes vitamin D 1000 U daily  7/2018. Had levothyroxine dose increase   Recent labs include:  Lab Results   Component Value Date    TSH 0.16 (L) 06/24/2021    T4 1.56 (H) 06/24/2021     Current dose:  Levothyroxine 0.112 mg daily        Lives in [place], [place] with Ada, also (1) adopted dog named Marko and cat \"verito\"  Sees Dr. Ilan Chew Mai/[facility] for general medicine evaluations  Also sees Olga Ramirez PAC/[place] [facility] " clinic.       PMH/PSH:  Past Medical History:   Diagnosis Date     Arthritis      Chronic neck pain     percocet for years now,      Depressive disorder      Gastroparesis      Gastroparesis due to DM (H)     gastric stimulator helps, [facility] manages that     Hyperlipidemia      Hypertension      Hypothyroidism      Neuropathy, diabetic (H)      Type 1 diabetes (H) age 31     Past Surgical History:   Procedure Laterality Date     COLONOSCOPY  07/18/12     FUSION CERVICAL ANTERIOR TWO LEVELS       INJECT BLOCK MEDIAL BRANCH CERVICAL/THORACIC/LUMBAR Bilateral 11/20/2018    Procedure: Bilateral Cervical 3-4-5 medial branch block;  Surgeon: Jef Edwards MD;  Location: [facility] OR     INJECT BLOCK MEDIAL BRANCH CERVICAL/THORACIC/LUMBAR N/A 1/11/2019    Procedure: Cervical 3,4,5 Bilateral Medial branch blocks;  Surgeon: Jef Edwards MD;  Location: [facility] OR     RADIO FREQUENCY ABLATION PULSED CERVICAL Right 2/28/2019    Procedure: RADIO FREQUENCY ABLATION CERVICAL THREE, FOUR, FIVE, AND THIRD OCCUPITAL NERVE RIGHT SIDE;  Surgeon: Jef Edwards MD;  Location: [facility] OR     RADIO FREQUENCY ABLATION PULSED CERVICAL Left 3/15/2019    Procedure: radiofrequency ablation cervical 3,4,5;  Surgeon: Jef Edwards MD;  Location: [facility] OR     RADIO FREQUENCY ABLATION PULSED CERVICAL Right 9/4/2020    Procedure: RADIOFREQUENCY ABLATION CERVICAL 3,4 5 right SIDE;  Surgeon: Jef Edwards MD;  Location: [facility]     RADIO FREQUENCY ABLATION PULSED CERVICAL Left 9/10/2020    Procedure: RADIOFREQUENCY ABLATION CERVICAL 3,4 5 LEFT SIDE;  Surgeon: Jef Edwards MD;  Location: [facility] OR     THORACOSCOPIC DECORTICATION LUNG  1/9/2014    Procedure: THORACOSCOPIC DECORTICATION LUNG;  RIGHT VATS/RIGHT THORACOTOMY , DECORTICATION RIGHT LUNG ,WEDGE RESECTION RIGHT MIDDLE LOBE LUNG NODULE;  Surgeon: Harley Feilx MD;  Location:  OR     ZZC LUMBAR SPINE FUSION,ANTER APPRCH      2 L4-5 L5-21       Family Hx:  Family History   Problem Relation Age of  Onset     Hypertension Mother      Diabetes Father         type 2     Hypertension Father      Cerebrovascular Disease Maternal Grandmother      Unknown/Adopted Maternal Grandfather      Unknown/Adopted Paternal Grandmother      Unknown/Adopted Paternal Grandfather      Liver Disease Brother      Heart Disease Sister      Thyroid Disease Sister      Thyroid Disease Sister      Thyroid Disease Sister          Social Hx:  Social History     Socioeconomic History     Marital status:      Spouse name: Not on file     Number of children: Not on file     Years of education: Not on file     Highest education level: Not on file   Occupational History     Not on file   Tobacco Use     Smoking status: Former Smoker     Quit date: 2010     Years since quittin.1     Smokeless tobacco: Never Used   Substance and Sexual Activity     Alcohol use: No     Drug use: Yes     Types: Marijuana     Comment: medical marijuana     Sexual activity: Not Currently     Comment: Prescription Marijuana   Other Topics Concern     Parent/sibling w/ CABG, MI or angioplasty before 65F 55M? Not Asked   Social History Narrative     Not on file     Social Determinants of Health     Financial Resource Strain: Not on file   Food Insecurity: Not on file   Transportation Needs: Not on file   Physical Activity: Not on file   Stress: Not on file   Social Connections: Not on file   Intimate Partner Violence: Not on file   Housing Stability: Not on file          MEDICATIONS:  has a current medication list which includes the following prescription(s): amylase-lipase-protease, amylase-lipase-protease, atorvastatin, citalopram, dexcom g6 , dexcom g6 sensor, insulin aspart, levothyroxine, lisinopril, medical cannabis, naproxen, omeprazole, ondansetron, oxycodone-acetaminophen, sumatriptan, budesonide, freestyle chapito reader, dexcom g6 transmitter, contour next test, and naloxone.    ROS: 10 point ROS neg other than the symptoms noted above  "in the HPI.     GENERAL: some fatigue, wt stable; denies fevers, chills, night sweats.   HEENT: no dysphagia, odonophagia, diplopia, neck pain  THYROID:  no apparent hyper or hypothyroid symptoms  CV: occasional chest pains; no pressure, palpitations  LUNGS: no SOB, KRAFT, cough, wheezing   ABDOMEN: some diarrhea \"collitis\" and postmeal nausea; no diarrhea, constipation  EXTREMITIES: no rashes, ulcers, edema  NEUROLOGY: no headaches, denies changes in vision, tingling, extremitiy numbness   MSK: neck pain/stiffness and back pains; no muscle weakness  SKIN: no rashes or lesions  PSYCH:  stable mood, no significant anxiety or depression  ENDOCRINE: no heat or cold intolerance    Physical Exam (visual exam)  VS:  no vital signs taken for video visit  CONSTITUTIONAL: healthy, alert and NAD, well dressed, answering questions appropriately  ENT: no nose swelling or nasal discharge, mouth redness or gum changes.  EYES: eyes grossly normal to inspection, conjunctivae and sclerae normal, no exophthalmos or proptosis  THYROID:  no apparent nodules or goiter  LUNGS: no audible wheeze, cough or visible cyanosis, no visible retractions or increased work of breathing  ABDOMEN: abdomen not evaluated  EXTREMITIES: no hand tremors, limited exam  NEUROLOGY: CN grossly intact, mentation intact and speech normal   SKIN:  no apparent skin lesions, rash, or edema with visualized skin appearance  PSYCH: mentation appears normal, affect normal/bright, judgement and insight intact,   normal speech and appearance well groomed       LABS:     All pertinent notes, labs, and images personally reviewed by me.        A/P:  Encounter Diagnoses   Name Primary?     Type 1 diabetes mellitus with diabetic autonomic neuropathy (H) Yes     Gastroparesis      Insulin pump status      Hypothyroidism, unspecified type        Comments:  Reviewed complicated health history, diabetes and thyroid issues.  Recent evening postprandial hyperglycemia noted  Reviewed " "and interpreted tests that I previously ordered.   Ordered appropriate tests for the endocrinology disease management.    Management options discussed and implemented after shared medical decision making with the patient.  T1DM problem is chronic-stable    Plan:  Reviewed the overall T1DM management and insulin pump use.  Discussed optimal BG testing to assess glucose trends.  We reviewed insulin pump settings, basal rate and bolus dosing  Use of automated pump bolus dosing for meal/snack carb & correction dosing  Reviewed the Quotations Booktronic Carelink report data today  Reviewed recent DexcomG6 CGM glucose sensor trends, in detail     Recommend:  Continue treatment plan using the Medtronic insulin pump and DexcomG6 CGM sensor  Use SG value for the pump bolus wizard dosing.  Pump setting changes:   Bolus ICR:   [place]-5p 15, 5p-[place] 13    ISF:  [place]-8p and 8p to [place] 90 to 75    Use genuine suppertime carb estimate for meal bolus and avoid decreasing carb estimate \"fudging\"      We have discussed use of Medtronic 770G pump auto-uploaded to [employer], when he is on Wi-fi  Reviewed his worsening nausea symptom, importance of arranging f/u GI consultation   Needs assessment of gastric stimulator function, battery life   Also plan an appointment with the designated [facility] pain clinic provider, at [facility] if necessary  Contact our office with update if restarting the budesinide medication for diarrhea problem  Continue use of the DexcomG6 CGM sensor               Patient makes frequent adjustments to insulin regimen on basis of therapeutic glucose testing   Reviewed interaction of the DexcomG6 and the Tandem TSlim pump  Would benefit from additional C[facility] evaluation at [facility]  Plan lab tests soon:   Discussed testing at [facility]   Lab orders placed  Continue simvastatin 40 mg each evening  Continue current levothyroxine 0.112 mg daily dose but consider dose reduction if low TSH level  Arrange annual dilated " eye exam, fasting lipid panel testing.  We reviewed importance of timely clinic appointments with me Q3 months, to satisfy Medicare with his diabetes device coverage  Will communicate feedback with his testing by phonecall and/or USMail    Addressed patient's questions today.     There are no Patient Instructions on file for this visit.    Future labs ordered today:   Orders Placed This Encounter   Procedures     GLUCOSE MONITOR, 72 HOUR, PHYS INTERP     Radiology/Consults ordered today: None    Total time spent in with the patient evaluation:  20 min  Additional time spent reviewing pertinent lab tests and chart notes, and documentation:  15 min    Follow-up:  4/5/22 at 3pm Chelle Best MD, MS  Endocrinology  [facility]    CC:  SENAIT Raymond

## 2022-03-02 ENCOUNTER — OFFICE VISIT (OUTPATIENT)
Dept: FAMILY MEDICINE | Facility: CLINIC | Age: 57
End: 2022-03-02
Payer: COMMERCIAL

## 2022-03-02 VITALS
BODY MASS INDEX: 25.54 KG/M2 | SYSTOLIC BLOOD PRESSURE: 128 MMHG | RESPIRATION RATE: 20 BRPM | TEMPERATURE: 98.9 F | OXYGEN SATURATION: 98 % | HEART RATE: 88 BPM | WEIGHT: 168 LBS | DIASTOLIC BLOOD PRESSURE: 82 MMHG

## 2022-03-02 DIAGNOSIS — Z12.11 COLON CANCER SCREENING: ICD-10-CM

## 2022-03-02 DIAGNOSIS — Z11.59 ENCOUNTER FOR HEPATITIS C SCREENING TEST FOR LOW RISK PATIENT: ICD-10-CM

## 2022-03-02 DIAGNOSIS — M48.02 CERVICAL STENOSIS OF SPINE: ICD-10-CM

## 2022-03-02 DIAGNOSIS — E03.9 HYPOTHYROIDISM, UNSPECIFIED TYPE: ICD-10-CM

## 2022-03-02 DIAGNOSIS — I10 ESSENTIAL HYPERTENSION: ICD-10-CM

## 2022-03-02 DIAGNOSIS — G89.4 CHRONIC PAIN SYNDROME: Primary | ICD-10-CM

## 2022-03-02 DIAGNOSIS — E10.43 TYPE 1 DIABETES MELLITUS WITH DIABETIC AUTONOMIC NEUROPATHY (H): ICD-10-CM

## 2022-03-02 LAB
CREAT UR-MCNC: 22 MG/DL
HBA1C MFR BLD: 8.8 % (ref 0–5.6)
MICROALBUMIN UR-MCNC: 6 MG/L
MICROALBUMIN/CREAT UR: 27.27 MG/G CR (ref 0–17)
TSH SERPL DL<=0.005 MIU/L-ACNC: 0.9 MU/L (ref 0.4–4)

## 2022-03-02 PROCEDURE — 83036 HEMOGLOBIN GLYCOSYLATED A1C: CPT | Performed by: FAMILY MEDICINE

## 2022-03-02 PROCEDURE — 82043 UR ALBUMIN QUANTITATIVE: CPT | Performed by: FAMILY MEDICINE

## 2022-03-02 PROCEDURE — 86803 HEPATITIS C AB TEST: CPT | Performed by: FAMILY MEDICINE

## 2022-03-02 PROCEDURE — 96127 BRIEF EMOTIONAL/BEHAV ASSMT: CPT | Performed by: FAMILY MEDICINE

## 2022-03-02 PROCEDURE — 99214 OFFICE O/P EST MOD 30 MIN: CPT | Performed by: FAMILY MEDICINE

## 2022-03-02 PROCEDURE — 84443 ASSAY THYROID STIM HORMONE: CPT | Performed by: FAMILY MEDICINE

## 2022-03-02 PROCEDURE — 36415 COLL VENOUS BLD VENIPUNCTURE: CPT | Performed by: FAMILY MEDICINE

## 2022-03-02 RX ORDER — ONDANSETRON 8 MG/1
8 TABLET, ORALLY DISINTEGRATING ORAL EVERY 8 HOURS PRN
Qty: 60 TABLET | Refills: 0 | Status: SHIPPED | OUTPATIENT
Start: 2022-03-02 | End: 2024-01-17

## 2022-03-02 ASSESSMENT — PAIN SCALES - GENERAL: PAINLEVEL: MODERATE PAIN (4)

## 2022-03-02 ASSESSMENT — PATIENT HEALTH QUESTIONNAIRE - PHQ9
10. IF YOU CHECKED OFF ANY PROBLEMS, HOW DIFFICULT HAVE THESE PROBLEMS MADE IT FOR YOU TO DO YOUR WORK, TAKE CARE OF THINGS AT HOME, OR GET ALONG WITH OTHER PEOPLE: SOMEWHAT DIFFICULT
SUM OF ALL RESPONSES TO PHQ QUESTIONS 1-9: 11
SUM OF ALL RESPONSES TO PHQ QUESTIONS 1-9: 11

## 2022-03-02 NOTE — PROGRESS NOTES
Assessment & Plan     (G89.4) Chronic pain syndrome  (primary encounter diagnosis)  Comment: Due to cervical spine stenosis.  Overall it is stable and the pain is tolerable with the Percocet, naproxen and medical marijuana.  No misuse identified.  Reminded the pain contract, will need to follow-up every 3-4 months.  Informed him that none of the local providers is providing medical marijuana due to his/her personal choice, nothing to against medical marijuana.  He will need to find a medical marijuana provider once his current prescription is .  Will continue with the Percocet as 70 tablets/month.  Naproxen as needed, encouraged to use it minimally if possible due to the GI side effect.  He has acid reflux with gastroparesis.    (M48.02) Cervical stenosis of spine  Comment: Please see #1 above for further details.      (E10.43) Type 1 diabetes mellitus with diabetic autonomic neuropathy (H)  Comment: On insulin pump and is managed by the endocrinologist.  Encouraged to follow-up with his endocrinologist closely.  Hemoglobin A1c is 8.8 today.  Microalbumin level slightly elevated today.  He is on the max dose of the lisinopril.  Up-to-date for his eye exam.  Management as per endocrinology      (E03.9) Hypothyroidism, unspecified type  Comment: TSH level was checked and it was normal today.  Continue with levothyroxine as 112 mcg daily      (I10) Essential hypertension  Comment: BP is normal and stable.  Also have diabetes.  Continue with the current doses of lisinopril, been tolerating it well.  Emphasized on healthy/low salt diet and daily excercise.  Avoid high sugar/caffeine intake. Lab as ordered.  Follow up in 6 month, earlier as needed.      (Z12.11) Colon cancer screening  Comment: Discussed with him about options for colon cancer screening which include colonoscopy, Cologuard or FIT test.  Pros and cons of each option discussed.  He preferred Cologuard and therefore it was ordered.  He has a history  of polyps and therefore I strongly recommend a colonoscopy instead but he declined.  He understands that positive Cologuard will require further evaluation with colonoscopy.  He also informed that normal Cologuard is to be done every 3 year.     Plan: COLOGUARD(EXACT SCIENCES)            (Z11.59) Encounter for hepatitis C screening test for low risk patient  Comment:       Return in about 3 months (around 6/2/2022) for Physical Exam, folow up pain.    Ilan Chew Mai, MD  [facility]    Nabeel Ritter is a 56 year old who presents for the following health issues    HPI     Answers for HPI/ROS submitted by the patient on 3/2/2022  If you checked off any problems, how difficult have these problems made it for you to do your work, take care of things at home, or get along with other people?: Somewhat difficult  PHQ9 TOTAL SCORE: 11  Frequency of checking blood sugars:: continous glucose monitor  What time of day are you checking your blood sugars : before meals, after meals, before and after meals, at bedtime  Have you had any blood sugars above 200?: Yes  Have you had any blood sugars below 70?: Yes  Hypoglycemia symptoms:: shakiness, dizziness, weakness, lethargy, blurred vision, confusion  Diabetic concerns:: none  Paraesthesia present:: numbness in feet, burning in feet, excessive thirst, blurry vision  Headache Symptoms are: no change  How often are you getting headaches or migraines? : Two or three days a week  Are you able to do normal daily activities when you have a migraine?: No  Migraine Rescue/Relief Medications:: Naproxyn (Aleve), sumatriptan (Imitrex)  Effectiveness of rescue/relief medications:: The relief is inconsistent  Migraine Preventative Medications:: no medications to prevent migraines  ER or UC Visits:: 0 times  Since last visit, patient describes the following symptoms:: Constipation, Dry skin, Fatigue, Loose stools  Your back pain is: chronic  Where is your back pain  located? : right lower back, left lower back, right upper back, left upper back, right side of neck, left side of neck, right shoulder, left shoulder, right buttock, left buttock  How would you describe your back pain? : sharp, shooting, stabbing  Where does your back pain spread? : right buttocks, left buttocks, right thigh, left thigh, right foot, left foot, right shoulder, left shoulder, right side of neck, left side of neck  Since you noticed your back pain, how has it changed? : always present, but gets better and worse  Does your back pain interfere with your job?: Yes  How many servings of fruits and vegetables do you eat daily?: 0-1  On average, how many sweetened beverages do you drink each day (Examples: soda, juice, sweet tea, etc.  Do NOT count diet or artificially sweetened beverages)?: 0  How many minutes a day do you exercise enough to make your heart beat faster?: 9 or less  How many days a week do you exercise enough to make your heart beat faster?: 3 or less  How many days per week do you miss taking your medication?: 0    Stone is here today with his significant other for follow-up on chronic pain.  He has chronic pain due to cervical stenosis that failed physical therapy, injection and surgery.  He was seeing a pain specialist for over a year and his pain was tolerable with medical marijuana, 70 tablets of Percocet a month and naproxen.  His pain specialist has left the practice therefore I took over his pain meds.  He was somewhat upset today that his pain specialist is not available to continue to prescribe medical marijuana and he can't afford to go to the private doctor for it.  He is very upset with the fact that no doctor around here is willing to provide medical marijuana.  His current prescription is still good for another 6 months.  He is concerned and not sure what to do when it is .  Been taking the Percocet as prescribed with no side effect.  Denied of drugs or alcohol.      He  also has diabetes type 1 which is being managed by the endocrinologist. On the insulin pump which is going well.  Blood sugar has been on the higher side.  His endocrinologist is been working to better control it.  No headache or dizziness.  No acute change his vision and is up-to-date for his eye exam.  No chest pain or shortness of breath.     Also has hypothyroidism, taking the levothyroxine at 112 mcg daily.  No concern about it.    Review of Systems   Constitutional, HEENT, cardiovascular, pulmonary, gi and gu systems are negative, except as otherwise noted.      Objective    /82   Pulse 88   Temp 98.9  F (37.2  C) (Temporal)   Resp 20   Wt 76.2 kg (168 lb)   SpO2 98%   BMI 25.54 kg/m    Body mass index is 25.54 kg/m .     Physical Exam   GENERAL: alert and no distress, speaking in full sentences.  NECK: Supple but tender with palpation to the cervical spine and its paraspinous muscle.  RESP: lungs clear to auscultation - no rales, rhonchi or wheezes  CV: regular rate and rhythm, no murmur  NEURO: Normal strength and tone, mentation intact and speech normal.  No focal neurological deficit.       Results for orders placed or performed in visit on 03/02/22   Hemoglobin A1c     Status: Abnormal   Result Value Ref Range    Hemoglobin A1C 8.8 (H) 0.0 - 5.6 %   TSH with free T4 reflex     Status: Normal   Result Value Ref Range    TSH 0.90 0.40 - 4.00 mU/L   Albumin Random Urine Quantitative with Creat Ratio     Status: Abnormal   Result Value Ref Range    Creatinine Urine mg/dL 22 mg/dL    Albumin Urine mg/L 6 mg/L    Albumin Urine mg/g Cr 27.27 (H) 0.00 - 17.00 mg/g Cr   Hepatitis C antibody     Status: Normal   Result Value Ref Range    Hepatitis C Antibody Nonreactive Nonreactive    Narrative    Assay performance characteristics have not been established for newborns, infants, and children.

## 2022-03-03 LAB — HCV AB SERPL QL IA: NONREACTIVE

## 2022-03-03 ASSESSMENT — PATIENT HEALTH QUESTIONNAIRE - PHQ9: SUM OF ALL RESPONSES TO PHQ QUESTIONS 1-9: 11

## 2022-03-05 ENCOUNTER — HEALTH MAINTENANCE LETTER (OUTPATIENT)
Age: 57
End: 2022-03-05

## 2022-03-18 LAB — COLOGUARD-ABSTRACT: NEGATIVE

## 2022-04-05 ENCOUNTER — VIRTUAL VISIT (OUTPATIENT)
Dept: ENDOCRINOLOGY | Facility: CLINIC | Age: 57
End: 2022-04-05
Payer: COMMERCIAL

## 2022-04-05 VITALS — HEIGHT: 68 IN | BODY MASS INDEX: 25.01 KG/M2 | WEIGHT: 165 LBS

## 2022-04-05 DIAGNOSIS — K31.84 GASTROPARESIS: ICD-10-CM

## 2022-04-05 DIAGNOSIS — E10.43 TYPE 1 DIABETES MELLITUS WITH DIABETIC AUTONOMIC NEUROPATHY (H): Primary | ICD-10-CM

## 2022-04-05 DIAGNOSIS — Z96.41 INSULIN PUMP STATUS: ICD-10-CM

## 2022-04-05 DIAGNOSIS — E03.9 HYPOTHYROIDISM, UNSPECIFIED TYPE: ICD-10-CM

## 2022-04-05 PROCEDURE — 95251 CONT GLUC MNTR ANALYSIS I&R: CPT | Performed by: INTERNAL MEDICINE

## 2022-04-05 PROCEDURE — 99214 OFFICE O/P EST MOD 30 MIN: CPT | Mod: 95 | Performed by: INTERNAL MEDICINE

## 2022-04-05 ASSESSMENT — PAIN SCALES - GENERAL: PAINLEVEL: NO PAIN (0)

## 2022-04-05 NOTE — LETTER
"    4/5/2022         RE: Stone De Paz  [id number] [facility] [id number]        Dear Colleague,    Thank you for referring your patient, Stone De Paz, to the [facility]. Please see a copy of my visit note below.    Patient is being evaluated via a billable video visit.      How would you like to obtain your AVS? Reviewed verbally  If the video visit is dropped, the invitation should be resent by cell phone  Will anyone else be joining your video visit? no      Video Start Time: 3:10 pm    Video-Visit Details    Type of service:  Video Visit    Video End Time:  3:33 pm    Originating Location (pt. Location): home    Distant Location (provider location):  [facility]/home    Platform used for Video Visit:  [employer]          Recent issues:  Diabetes and thyroid follow-up evaluation  Using the Medtronic 770G pump since 9/2021, along with the DexcomG6 CGM sensor  He made pump setting changes with lower ISF, also glucose target  Previously having more nausea, wonders about his gastric stimulator function  Using medical [facility] via pain clinic in [facility], which is helpful  Still has issues with neck pain/stiffness            1995. Diagnosis of diabetes mellitus after 2nd back surgery, age 29  Initial treatment with insulin injections using NPH and Regular insulin  Switched to premixed 70/30 insulin BID dosing  1999. Started Medtronic insulin pump model 508, used base-dose Humalog insulin at meals  Had seen Dr. Iggy Briggs/[facility]     7/24/01. Initial evaluation with me at my former clinic ([facility])  Upgraded pump to Medtronic 523   Now using Medtronic 630G pump              Novolog in pump     Meals BID, had carb \"exchanges\" with his pump settings  Chronic high BG and hgbA1c trends despite dose changes  Tried square wave bolus  Has Contour Link? BG meter, variable testing pattern   Recently testing 4x/day for at least 60 days  Glucose sensor " (CGMS) use:   Tried Medtronic CGMS in the past, didn't like it   Switched to DexcomG5, used for approx 6 months but issues with getting supply order   Early 2019, started Freestyle Sukhi but issues with skin site redness    Meal schedule:  Snacks during the day, supper meal 5-7pm    Current pump settings:       Recent Care[employer] data:              ~11/2020. Upgraded to DexcomG6 CGM sensor   Using the DexcomG6 consistently for at least past 60 days    Recent DexcomG6 data:                 Previous  labs include:  Lab Results   Component Value Date    A1C 8.8 (H) 03/02/2022     07/28/2021    POTASSIUM 5.1 07/28/2021    CHLORIDE 104 07/28/2021    CO2 27 07/28/2021    ANIONGAP 6 07/28/2021     (H) 07/28/2021    BUN 17 07/28/2021    CR 0.92 07/28/2021    GFRESTIMATED >90 07/28/2021    GFRESTBLACK >90 03/01/2021    EMILEE 9.2 07/28/2021    CPEPT <0.1 (L) 06/24/2021    CHOL 164 06/24/2021    TRIG 82 06/24/2021    HDL 65 06/24/2021    LDL 83 06/24/2021    NHDL 99 06/24/2021    UCRR 22 03/02/2022    MICROL 6 03/02/2022    UMALCR 27.27 (H) 03/02/2022     Last eye exam date?  Goes to eye clinic in [facility]  7/2018. Met with Destiny Warren RD, CDE at [facility]  DM Complications:              Neuropathy                          Peripheral neuropathy                                      Decreased sensation at feet                          Autonomic neuropathy- gastroparesis                                      Symptoms of nausea, bloating, episodes of diarrhea and emesis                                      Has Medtronic gastric stimulator                                      Sees physicians and WARREN Gilmore/[facility]                   Thyroid:  1994. History of hypothyroidism  Chronic levothyroxine treatment, has used Levoxyl in the past  Supplements:  Takes vitamin D 1000 U daily  7/2018. Had levothyroxine dose increase   Recent labs include:  Lab Results   Component Value Date    TSH 0.90  "03/02/2022    T4 1.56 (H) 06/24/2021     Current dose:  Levothyroxine 0.112 mg daily        Lives in [place], [place] with Ada, also (1) adopted dog named Marko and cat \"verito\"  Sees Dr. Ilan Chew Mai/[facility] for general medicine evaluations  Also sees Olga Ramirez PAC/[place] [facility].       PMH/PSH:  Past Medical History:   Diagnosis Date     Arthritis      Chronic neck pain     percocet for years now,      Depressive disorder      Gastroparesis      Gastroparesis due to DM (H)     gastric stimulator helps, [facility] manages that     Hyperlipidemia      Hypertension      Hypothyroidism      Neuropathy, diabetic (H)      Type 1 diabetes (H) age 31     Past Surgical History:   Procedure Laterality Date     COLONOSCOPY  07/18/12     FUSION CERVICAL ANTERIOR TWO LEVELS       INJECT BLOCK MEDIAL BRANCH CERVICAL/THORACIC/LUMBAR Bilateral 11/20/2018    Procedure: Bilateral Cervical 3-4-5 medial branch block;  Surgeon: Jef Edwards MD;  Location: [facility] OR     INJECT BLOCK MEDIAL BRANCH CERVICAL/THORACIC/LUMBAR N/A 1/11/2019    Procedure: Cervical 3,4,5 Bilateral Medial branch blocks;  Surgeon: Jef Edwards MD;  Location: [facility] OR     RADIO FREQUENCY ABLATION PULSED CERVICAL Right 2/28/2019    Procedure: RADIO FREQUENCY ABLATION CERVICAL THREE, FOUR, FIVE, AND THIRD OCCUPITAL NERVE RIGHT SIDE;  Surgeon: Jef Edwards MD;  Location: [facility]     RADIO FREQUENCY ABLATION PULSED CERVICAL Left 3/15/2019    Procedure: radiofrequency ablation cervical 3,4,5;  Surgeon: Jef Edwards MD;  Location: [facility]     RADIO FREQUENCY ABLATION PULSED CERVICAL Right 9/4/2020    Procedure: RADIOFREQUENCY ABLATION CERVICAL 3,4 5 right SIDE;  Surgeon: Jef Edwards MD;  Location: [facility]     RADIO FREQUENCY ABLATION PULSED CERVICAL Left 9/10/2020    Procedure: RADIOFREQUENCY ABLATION CERVICAL 3,4 5 LEFT SIDE;  Surgeon: Jef Edwards MD;  Location: [facility]     THORACOSCOPIC DECORTICATION LUNG  1/9/2014    Procedure: THORACOSCOPIC " DECORTICATION LUNG;  RIGHT VATS/RIGHT THORACOTOMY , DECORTICATION RIGHT LUNG ,WEDGE RESECTION RIGHT MIDDLE LOBE LUNG NODULE;  Surgeon: Harley Felix MD;  Location: [facility] OR     ZZC LUMBAR SPINE FUSION,ANTER APPRCH      2 L4-5 L5-21       Family Hx:  Family History   Problem Relation Age of Onset     Hypertension Mother      Diabetes Father         type 2     Hypertension Father      Cerebrovascular Disease Maternal Grandmother      Unknown/Adopted Maternal Grandfather      Unknown/Adopted Paternal Grandmother      Unknown/Adopted Paternal Grandfather      Liver Disease Brother      Heart Disease Sister      Thyroid Disease Sister      Thyroid Disease Sister      Thyroid Disease Sister          Social Hx:  Social History     Socioeconomic History     Marital status:      Spouse name: Not on file     Number of children: Not on file     Years of education: Not on file     Highest education level: Not on file   Occupational History     Not on file   Tobacco Use     Smoking status: Former Smoker     Quit date: 2010     Years since quittin.2     Smokeless tobacco: Never Used   Substance and Sexual Activity     Alcohol use: No     Drug use: Yes     Types: Marijuana     Comment: medical marijuana     Sexual activity: Not Currently     Comment: Prescription Marijuana   Other Topics Concern     Parent/sibling w/ CABG, MI or angioplasty before 65F 55M? Not Asked   Social History Narrative     Not on file     Social Determinants of Health     Financial Resource Strain: Not on file   Food Insecurity: Not on file   Transportation Needs: Not on file   Physical Activity: Not on file   Stress: Not on file   Social Connections: Not on file   Intimate Partner Violence: Not on file   Housing Stability: Not on file          MEDICATIONS:  has a current medication list which includes the following prescription(s): amylase-lipase-protease, amylase-lipase-protease, atorvastatin, citalopram, dexcom g6 ,  "dexcom g6 sensor, dexcom g6 transmitter, contour next test, insulin aspart, levothyroxine, lisinopril, medical cannabis, naproxen, omeprazole, ondansetron, oxycodone-acetaminophen, sumatriptan, budesonide, freestyle chapito reader, and naloxone.    ROS: 10 point ROS neg other than the symptoms noted above in the HPI.     GENERAL: some fatigue, wt stable; denies fevers, chills, night sweats.   HEENT: no dysphagia, odonophagia, diplopia, neck pain  THYROID:  no apparent hyper or hypothyroid symptoms  CV: occasional chest pains; no pressure, palpitations  LUNGS: no SOB, KRAFT, cough, wheezing   ABDOMEN: some diarrhea \"collitis\" and postmeal nausea; no diarrhea, constipation  EXTREMITIES: no rashes, ulcers, edema  NEUROLOGY: no headaches, denies changes in vision, tingling, extremitiy numbness   MSK: neck pain/stiffness and back pains; no muscle weakness  SKIN: no rashes or lesions  PSYCH:  stable mood, no significant anxiety or depression  ENDOCRINE: no heat or cold intolerance    Physical Exam (visual exam)  VS:  no vital signs taken for video visit  CONSTITUTIONAL: healthy, alert and NAD, well dressed, answering questions appropriately  ENT: no nose swelling or nasal discharge, mouth redness or gum changes.  EYES: eyes grossly normal to inspection, conjunctivae and sclerae normal, no exophthalmos or proptosis  THYROID:  no apparent nodules or goiter  LUNGS: no audible wheeze, cough or visible cyanosis, no visible retractions or increased work of breathing  ABDOMEN: abdomen not evaluated  EXTREMITIES: no hand tremors, limited exam  NEUROLOGY: CN grossly intact, mentation intact and speech normal   SKIN:  no apparent skin lesions, rash, or edema with visualized skin appearance  PSYCH: mentation appears normal, affect normal/bright, judgement and insight intact,   normal speech and appearance well groomed       LABS:     All pertinent notes, labs, and images personally reviewed by me.        A/P:  Encounter Diagnoses   Name " "Primary?     Type 1 diabetes mellitus with diabetic autonomic neuropathy (H) Yes     Gastroparesis      Insulin pump status      Hypothyroidism, unspecified type        Comments:  Reviewed complicated health history, diabetes and thyroid issues.  Recent SG trends improved overall  Reviewed and interpreted tests that I previously ordered.   Ordered appropriate tests for the endocrinology disease management.    Management options discussed and implemented after shared medical decision making with the patient.  T1DM problem is chronic-stable    Plan:  Reviewed the overall T1DM management and insulin pump use.  Discussed optimal BG testing to assess glucose trends.  We reviewed insulin pump settings, basal rate and bolus dosing  Use of automated pump bolus dosing for meal/snack carb & correction dosing  Reviewed the [employer] report data today  Reviewed recent DexcomG6 CGM glucose sensor trends, in detail     Recommend:  Continue treatment plan using the Medtronic insulin pump and DexcomG6 CGM sensor  Use SG value for the pump bolus wizard dosing.  Pump setting changes:   Basals:  9a 0.675 to 0.625 unit(s)/hr    Use genuine suppertime carb estimate for meal bolus and avoid decreasing carb estimate \"fudging\"  Use lower Temp Basal rate or suspend pump basal rate for aerobic exercise  We have discussed use of Medtronic 770G pump auto-uploaded to [employer], when he is on Wi-fi  Reviewed his worsening nausea symptom, importance of arranging f/u GI consultation   Needs assessment of gastric stimulator function, battery life   Also plan an appointment with the designated [facility] pain clinic provider, at [facility] if necessary  Contact our office with update if restarting the budesinide medication for diarrhea problem  Continue use of the DexcomG6 CGM sensor               Patient makes frequent adjustments to insulin regimen on basis of therapeutic glucose testing   Reviewed interaction of the DexcomG6 and the Tandem " TSlim pump  Would benefit from additional CDE evaluation at [facility]  Plan lab tests in 7/2022   Discussed testing at [facility]   Lab orders placed  Continue simvastatin 40 mg each evening  Continue current levothyroxine 0.112 mg daily dose but consider dose reduction if low TSH level  Arrange annual dilated eye exam, fasting lipid panel testing.  We reviewed importance of timely clinic appointments with me Q3 months, to satisfy Medicare with his diabetes device coverage  Will communicate feedback with his testing by phonecall and/or USMail    Addressed patient's questions today.     There are no Patient Instructions on file for this visit.    Future labs ordered today:   Orders Placed This Encounter   Procedures     GLUCOSE MONITOR, 72 HOUR, PHYS INTERP     Hemoglobin A1c     Basic metabolic panel     TSH     T4 free     Lipid panel reflex to direct LDL Fasting     Radiology/Consults ordered today: None    Total time spent in with the patient evaluation:  23 min  Additional time spent reviewing pertinent lab tests and chart notes, and documentation:  7 min    Follow-up:  7/18/22 at 2:30 pm, Chelle Best MD, MS  Endocrinology  [facility]    CC:  SENAIT Raymond                              Again, thank you for allowing me to participate in the care of your patient.        Sincerely,        Frankie Best MD

## 2022-04-05 NOTE — PROGRESS NOTES
"Patient is being evaluated via a billable video visit.      How would you like to obtain your AVS? Reviewed verbally  If the video visit is dropped, the invitation should be resent by cell phone  Will anyone else be joining your video visit? no      Video Start Time: 3:10 pm    Video-Visit Details    Type of service:  Video Visit    Video End Time:  3:33 pm    Originating Location (pt. Location): home    Distant Location (provider location):  [facility] [place]    Platform [facility] for [facility] Visit:  [facility]          Recent issues:  Diabetes and thyroid follow-up evaluation  Using the Medtronic 770G pump since 9/2021, along with the DexcomG6 CGM sensor  He made pump setting changes with lower ISF, also glucose target  Previously having more nausea, wonders about his gastric stimulator function  Using medical [facility] via pain clinic in [facility], which is helpful  Still has issues with neck pain/stiffness            1995. Diagnosis of diabetes mellitus after 2nd back surgery, age 29  Initial treatment with insulin injections using NPH and Regular insulin  Switched to premixed 70/30 insulin BID dosing  1999. Started Medtronic insulin pump model 508, used base-dose Humalog insulin at meals  Had seen Dr. Iggy Briggs/[facility]     7/24/01. Initial evaluation with me at my former clinic ([facility])  Upgraded pump to Medtronic 523   Now using Medtronic 630G pump              Novolog in pump     Meals BID, had carb \"exchanges\" with his pump settings  Chronic high BG and hgbA1c trends despite dose changes  Tried square wave bolus  Has Contour Link? BG meter, variable testing pattern   Recently testing 4x/day for at least 60 days  Glucose sensor (CGMS) use:   Tried Medtronic CGMS in the past, didn't like it   Switched to DexcomG5, used for approx 6 months but issues with getting supply order   Early 2019, started Freestyle Sukhi but issues with skin site redness    Meal schedule:  Snacks " "during the day, supper meal 5-7pm    Current pump settings:       Recent Carelink data:              ~11/2020. Upgraded to DexcomG6 CGM sensor   Using the DexcomG6 consistently for at least past 60 days    Recent DexcomG6 data:                 Previous  labs include:  Lab Results   Component Value Date    A1C 8.8 (H) 03/02/2022     07/28/2021    POTASSIUM 5.1 07/28/2021    CHLORIDE 104 07/28/2021    CO2 27 07/28/2021    ANIONGAP 6 07/28/2021     (H) 07/28/2021    BUN 17 07/28/2021    CR 0.92 07/28/2021    GFRESTIMATED >90 07/28/2021    GFRESTBLACK >90 03/01/2021    EMILEE 9.2 07/28/2021    CPEPT <0.1 (L) 06/24/2021    CHOL 164 06/24/2021    TRIG 82 06/24/2021    HDL 65 06/24/2021    LDL 83 06/24/2021    NHDL 99 06/24/2021    UCRR 22 03/02/2022    MICROL 6 03/02/2022    UMALCR 27.27 (H) 03/02/2022     Last eye exam date?  Goes to eye clinic in [facility]  7/2018. Met with Destiny Warren RD, CDE at [facility]  DM Complications:              Neuropathy                          Peripheral neuropathy                                      Decreased sensation at feet                          Autonomic neuropathy- gastroparesis                                      Symptoms of nausea, bloating, episodes of diarrhea and emesis                                      Has Medtronic gastric stimulator                                      Sees physicians and Ms WARREN Rosario/[facility]                   Thyroid:  1994. History of hypothyroidism  Chronic levothyroxine treatment, has used Levoxyl in the past  Supplements:  Takes vitamin D 1000 U daily  7/2018. Had levothyroxine dose increase   Recent labs include:  Lab Results   Component Value Date    TSH 0.90 03/02/2022    T4 1.56 (H) 06/24/2021     Current dose:  Levothyroxine 0.112 mg daily        Lives in [place], [place] with Ada, also (1) adopted dog named Marko and cat \"verito\"  Sees Dr. Ilan Chew Mai/[facility] for general medicine " evaluations  Also sees Olga Ramirez PAC/[place] [facility].       PMH/PSH:  Past Medical History:   Diagnosis Date     Arthritis      Chronic neck pain     percocet for years now,      Depressive disorder      Gastroparesis      Gastroparesis due to DM (H)     gastric stimulator helps, [facility] manages that     Hyperlipidemia      Hypertension      Hypothyroidism      Neuropathy, diabetic (H)      Type 1 diabetes (H) age 31     Past Surgical History:   Procedure Laterality Date     COLONOSCOPY  07/18/12     FUSION CERVICAL ANTERIOR TWO LEVELS       INJECT BLOCK MEDIAL BRANCH CERVICAL/THORACIC/LUMBAR Bilateral 11/20/2018    Procedure: Bilateral Cervical 3-4-5 medial branch block;  Surgeon: Jef Edwards MD;  Location: [facility] OR     INJECT BLOCK MEDIAL BRANCH CERVICAL/THORACIC/LUMBAR N/A 1/11/2019    Procedure: Cervical 3,4,5 Bilateral Medial branch blocks;  Surgeon: Jef Edwards MD;  Location: [facility] OR     RADIO FREQUENCY ABLATION PULSED CERVICAL Right 2/28/2019    Procedure: RADIO FREQUENCY ABLATION CERVICAL THREE, FOUR, FIVE, AND THIRD OCCUPITAL NERVE RIGHT SIDE;  Surgeon: Jef Edwards MD;  Location: [facility]     RADIO FREQUENCY ABLATION PULSED CERVICAL Left 3/15/2019    Procedure: radiofrequency ablation cervical 3,4,5;  Surgeon: Jef Edwards MD;  Location: [facility]     RADIO FREQUENCY ABLATION PULSED CERVICAL Right 9/4/2020    Procedure: RADIOFREQUENCY ABLATION CERVICAL 3,4 5 right SIDE;  Surgeon: Jef Edwards MD;  Location: [facility]     RADIO FREQUENCY ABLATION PULSED CERVICAL Left 9/10/2020    Procedure: RADIOFREQUENCY ABLATION CERVICAL 3,4 5 LEFT SIDE;  Surgeon: Jef Edwards MD;  Location: [facility]     THORACOSCOPIC DECORTICATION LUNG  1/9/2014    Procedure: THORACOSCOPIC DECORTICATION LUNG;  RIGHT VATS/RIGHT THORACOTOMY , DECORTICATION RIGHT LUNG ,WEDGE RESECTION RIGHT MIDDLE LOBE LUNG NODULE;  Surgeon: Harley Felix MD;  Location: [facility] OR     ZZC LUMBAR SPINE FUSION,ANTER APPRCH      2 L4-5 L5-21        Family Hx:  Family History   Problem Relation Age of Onset     Hypertension Mother      Diabetes Father         type 2     Hypertension Father      Cerebrovascular Disease Maternal Grandmother      Unknown/Adopted Maternal Grandfather      Unknown/Adopted Paternal Grandmother      Unknown/Adopted Paternal Grandfather      Liver Disease Brother      Heart Disease Sister      Thyroid Disease Sister      Thyroid Disease Sister      Thyroid Disease Sister          Social Hx:  Social History     Socioeconomic History     Marital status:      Spouse name: Not on file     Number of children: Not on file     Years of education: Not on file     Highest education level: Not on file   Occupational History     Not on file   Tobacco Use     Smoking status: Former Smoker     Quit date: 2010     Years since quittin.2     Smokeless tobacco: Never Used   Substance and Sexual Activity     Alcohol use: No     Drug use: Yes     Types: Marijuana     Comment: medical marijuana     Sexual activity: Not Currently     Comment: Prescription Marijuana   Other Topics Concern     Parent/sibling w/ CABG, MI or angioplasty before 65F 55M? Not Asked   Social History Narrative     Not on file     Social Determinants of Health     Financial Resource Strain: Not on file   Food Insecurity: Not on file   Transportation Needs: Not on file   Physical Activity: Not on file   Stress: Not on file   Social Connections: Not on file   Intimate Partner Violence: Not on file   Housing Stability: Not on file          MEDICATIONS:  has a current medication list which includes the following prescription(s): amylase-lipase-protease, amylase-lipase-protease, atorvastatin, citalopram, dexcom g6 , dexcom g6 sensor, dexcom g6 transmitter, contour next test, insulin aspart, levothyroxine, lisinopril, medical cannabis, naproxen, omeprazole, ondansetron, oxycodone-acetaminophen, sumatriptan, budesonide, freestyle chapito reader, and  "naloxone.    ROS: 10 point ROS neg other than the symptoms noted above in the HPI.     GENERAL: some fatigue, wt stable; denies fevers, chills, night sweats.   HEENT: no dysphagia, odonophagia, diplopia, neck pain  THYROID:  no apparent hyper or hypothyroid symptoms  CV: occasional chest pains; no pressure, palpitations  LUNGS: no SOB, KRAFT, cough, wheezing   ABDOMEN: some diarrhea \"collitis\" and postmeal nausea; no diarrhea, constipation  EXTREMITIES: no rashes, ulcers, edema  NEUROLOGY: no headaches, denies changes in vision, tingling, extremitiy numbness   MSK: neck pain/stiffness and back pains; no muscle weakness  SKIN: no rashes or lesions  PSYCH:  stable mood, no significant anxiety or depression  ENDOCRINE: no heat or cold intolerance    Physical Exam (visual exam)  VS:  no vital signs taken for video visit  CONSTITUTIONAL: healthy, alert and NAD, well dressed, answering questions appropriately  ENT: no nose swelling or nasal discharge, mouth redness or gum changes.  EYES: eyes grossly normal to inspection, conjunctivae and sclerae normal, no exophthalmos or proptosis  THYROID:  no apparent nodules or goiter  LUNGS: no audible wheeze, cough or visible cyanosis, no visible retractions or increased work of breathing  ABDOMEN: abdomen not evaluated  EXTREMITIES: no hand tremors, limited exam  NEUROLOGY: CN grossly intact, mentation intact and speech normal   SKIN:  no apparent skin lesions, rash, or edema with visualized skin appearance  PSYCH: mentation appears normal, affect normal/bright, judgement and insight intact,   normal speech and appearance well groomed       LABS:     All pertinent notes, labs, and images personally reviewed by me.        A/P:  Encounter Diagnoses   Name Primary?     Type 1 diabetes mellitus with diabetic autonomic neuropathy (H) Yes     Gastroparesis      Insulin pump status      Hypothyroidism, unspecified type        Comments:  Reviewed complicated health history, diabetes and " "thyroid issues.  Recent SG trends improved overall  Reviewed and interpreted tests that I previously ordered.   Ordered appropriate tests for the endocrinology disease management.    Management options discussed and implemented after shared medical decision making with the patient.  T1DM problem is chronic-stable    Plan:  Reviewed the overall T1DM management and insulin pump use.  Discussed optimal BG testing to assess glucose trends.  We reviewed insulin pump settings, basal rate and bolus dosing  Use of automated pump bolus dosing for meal/snack carb & correction dosing  Reviewed the [facility] report data today  Reviewed recent DexcomG6 CGM glucose sensor trends, in detail     Recommend:  Continue treatment plan using the Medtronic insulin pump and DexcomG6 CGM sensor  Use SG value for the pump bolus wizard dosing.  Pump setting changes:   Basals:  9a 0.675 to 0.625 unit(s)/hr    Use genuine suppertime carb estimate for meal bolus and avoid decreasing carb estimate \"fudging\"  Use lower Temp Basal rate or suspend pump basal rate for aerobic exercise  We have discussed use of Medtronic 770G pump auto-uploaded to [employer], when he is on Wi-fi  Reviewed his worsening nausea symptom, importance of arranging f/u GI consultation   Needs assessment of gastric stimulator function, battery life   Also plan an appointment with the designated [facility] pain clinic provider, at [facility] if necessary  Contact our office with update if restarting the budesinide medication for diarrhea problem  Continue use of the DexcomG6 CGM sensor               Patient makes frequent adjustments to insulin regimen on basis of therapeutic glucose testing   Reviewed interaction of the DexcomG6 and the Tandem TSlim pump  Would benefit from additional CDE evaluation at [facility]  Plan lab tests in 7/2022   Discussed testing at [facility]   Lab orders placed  Continue simvastatin 40 mg each evening  Continue current " levothyroxine 0.112 mg daily dose but consider dose reduction if low TSH level  Arrange annual dilated eye exam, fasting lipid panel testing.  We reviewed importance of timely clinic appointments with me Q3 months, to satisfy Medicare with his diabetes device coverage  Will communicate feedback with his testing by phonecall and/or USMail    Addressed patient's questions today.     There are no Patient Instructions on file for this visit.    Future labs ordered today:   Orders Placed This Encounter   Procedures     GLUCOSE MONITOR, 72 HOUR, PHYS INTERP     Hemoglobin A1c     Basic metabolic panel     TSH     T4 free     Lipid panel reflex to direct LDL Fasting     Radiology/Consults ordered today: None    Total time spent in with the patient evaluation:  23 min  Additional time spent reviewing pertinent lab tests and chart notes, and documentation:  7 min    Follow-up:  7/18/22 at 2:30 pm, Chelle Best MD, MS  Endocrinology  [facility]    CC:  SENAIT Raymond

## 2022-04-05 NOTE — NURSING NOTE
"Chief Complaint   Patient presents with     Diabetes       Vitals:    04/05/22 1451   Weight: 74.8 kg (165 lb)   Height: 1.727 m (5' 8\")       Body mass index is 25.09 kg/m .    Anabela Nath MA    "

## 2022-05-02 DIAGNOSIS — E10.43 TYPE 1 DIABETES MELLITUS WITH DIABETIC AUTONOMIC NEUROPATHY (H): ICD-10-CM

## 2022-05-02 RX ORDER — PROCHLORPERAZINE 25 MG/1
SUPPOSITORY RECTAL
Qty: 3 EACH | Refills: 1 | Status: SHIPPED | OUTPATIENT
Start: 2022-05-02 | End: 2022-07-05

## 2022-05-09 ENCOUNTER — MYC MEDICAL ADVICE (OUTPATIENT)
Dept: FAMILY MEDICINE | Facility: CLINIC | Age: 57
End: 2022-05-09
Payer: COMMERCIAL

## 2022-05-10 NOTE — TELEPHONE ENCOUNTER
Patient would like to be seen sooner than June 6th for his physical and to have disability forms filled out. Can you fit him in, or should he wait until June 6th?

## 2022-05-12 DIAGNOSIS — K21.9 GASTROESOPHAGEAL REFLUX DISEASE WITHOUT ESOPHAGITIS: ICD-10-CM

## 2022-05-13 ASSESSMENT — ACTIVITIES OF DAILY LIVING (ADL)
CURRENT_FUNCTION: PREPARING MEALS REQUIRES ASSISTANCE
CURRENT_FUNCTION: HOUSEWORK REQUIRES ASSISTANCE
CURRENT_FUNCTION: TRANSPORTATION REQUIRES ASSISTANCE
CURRENT_FUNCTION: MONEY MANAGEMENT REQUIRES ASSISTANCE
CURRENT_FUNCTION: LAUNDRY REQUIRES ASSISTANCE
CURRENT_FUNCTION: MEDICATION ADMINISTRATION REQUIRES ASSISTANCE
CURRENT_FUNCTION: SHOPPING REQUIRES ASSISTANCE

## 2022-05-13 ASSESSMENT — ENCOUNTER SYMPTOMS
WEAKNESS: 0
FREQUENCY: 1
DIARRHEA: 1
NAUSEA: 1
HEADACHES: 1
NERVOUS/ANXIOUS: 0
JOINT SWELLING: 0
ABDOMINAL PAIN: 1
MYALGIAS: 1
SHORTNESS OF BREATH: 0
CONSTIPATION: 0
EYE PAIN: 0
ARTHRALGIAS: 1
SORE THROAT: 0
DIZZINESS: 1
HEMATURIA: 0
PARESTHESIAS: 0
CHILLS: 0
COUGH: 0
DYSURIA: 0
HEMATOCHEZIA: 0
PALPITATIONS: 0
HEARTBURN: 1
FEVER: 0

## 2022-05-16 ENCOUNTER — OFFICE VISIT (OUTPATIENT)
Dept: FAMILY MEDICINE | Facility: CLINIC | Age: 57
End: 2022-05-16
Payer: COMMERCIAL

## 2022-05-16 VITALS
TEMPERATURE: 97.5 F | WEIGHT: 158 LBS | RESPIRATION RATE: 12 BRPM | SYSTOLIC BLOOD PRESSURE: 118 MMHG | HEIGHT: 68 IN | DIASTOLIC BLOOD PRESSURE: 72 MMHG | BODY MASS INDEX: 23.95 KG/M2 | HEART RATE: 85 BPM | OXYGEN SATURATION: 97 %

## 2022-05-16 DIAGNOSIS — G89.4 CHRONIC PAIN SYNDROME: ICD-10-CM

## 2022-05-16 DIAGNOSIS — Z00.00 ENCOUNTER FOR MEDICARE ANNUAL WELLNESS EXAM: Primary | ICD-10-CM

## 2022-05-16 DIAGNOSIS — E78.5 HYPERLIPIDEMIA, UNSPECIFIED HYPERLIPIDEMIA TYPE: ICD-10-CM

## 2022-05-16 DIAGNOSIS — E10.43 TYPE 1 DIABETES MELLITUS WITH DIABETIC AUTONOMIC NEUROPATHY (H): ICD-10-CM

## 2022-05-16 DIAGNOSIS — M48.02 CERVICAL STENOSIS OF SPINE: ICD-10-CM

## 2022-05-16 DIAGNOSIS — K86.81 EXOCRINE PANCREATIC INSUFFICIENCY: ICD-10-CM

## 2022-05-16 DIAGNOSIS — I10 ESSENTIAL HYPERTENSION: ICD-10-CM

## 2022-05-16 DIAGNOSIS — Z12.5 SCREENING FOR PROSTATE CANCER: ICD-10-CM

## 2022-05-16 DIAGNOSIS — F33.41 RECURRENT MAJOR DEPRESSIVE DISORDER, IN PARTIAL REMISSION (H): ICD-10-CM

## 2022-05-16 DIAGNOSIS — Z23 HIGH PRIORITY FOR 2019-NCOV VACCINE: ICD-10-CM

## 2022-05-16 DIAGNOSIS — K31.84 GASTROPARESIS: ICD-10-CM

## 2022-05-16 DIAGNOSIS — K21.9 GASTROESOPHAGEAL REFLUX DISEASE WITHOUT ESOPHAGITIS: ICD-10-CM

## 2022-05-16 LAB — CREAT UR-MCNC: 120 MG/DL

## 2022-05-16 PROCEDURE — 99207 PR FOOT EXAM NO CHARGE: CPT | Mod: 25 | Performed by: FAMILY MEDICINE

## 2022-05-16 PROCEDURE — 91305 COVID-19,PF,PFIZER (12+ YRS): CPT | Performed by: FAMILY MEDICINE

## 2022-05-16 PROCEDURE — 99214 OFFICE O/P EST MOD 30 MIN: CPT | Mod: 25 | Performed by: FAMILY MEDICINE

## 2022-05-16 PROCEDURE — 80307 DRUG TEST PRSMV CHEM ANLYZR: CPT | Performed by: FAMILY MEDICINE

## 2022-05-16 PROCEDURE — 0054A COVID-19,PF,PFIZER (12+ YRS): CPT | Performed by: FAMILY MEDICINE

## 2022-05-16 PROCEDURE — 99396 PREV VISIT EST AGE 40-64: CPT | Mod: 25 | Performed by: FAMILY MEDICINE

## 2022-05-16 RX ORDER — OXYCODONE AND ACETAMINOPHEN 5; 325 MG/1; MG/1
1 TABLET ORAL EVERY 6 HOURS PRN
Qty: 70 TABLET | Refills: 0 | Status: SHIPPED | OUTPATIENT
Start: 2022-05-16 | End: 2022-06-14

## 2022-05-16 ASSESSMENT — PAIN SCALES - GENERAL: PAINLEVEL: NO PAIN (0)

## 2022-05-16 ASSESSMENT — ENCOUNTER SYMPTOMS
SHORTNESS OF BREATH: 0
COUGH: 0
HEARTBURN: 1
ARTHRALGIAS: 1
WEAKNESS: 0
MYALGIAS: 1
ABDOMINAL PAIN: 1
PARESTHESIAS: 0
JOINT SWELLING: 0
HEADACHES: 1
EYE PAIN: 0
NERVOUS/ANXIOUS: 0
HEMATOCHEZIA: 0
SORE THROAT: 0
FEVER: 0
PALPITATIONS: 0
CONSTIPATION: 0
DIARRHEA: 1
DYSURIA: 0
HEMATURIA: 0
CHILLS: 0
FREQUENCY: 1
NAUSEA: 1
DIZZINESS: 1

## 2022-05-16 ASSESSMENT — ACTIVITIES OF DAILY LIVING (ADL)
CURRENT_FUNCTION: MONEY MANAGEMENT REQUIRES ASSISTANCE
CURRENT_FUNCTION: LAUNDRY REQUIRES ASSISTANCE
CURRENT_FUNCTION: SHOPPING REQUIRES ASSISTANCE
CURRENT_FUNCTION: PREPARING MEALS REQUIRES ASSISTANCE
CURRENT_FUNCTION: MEDICATION ADMINISTRATION REQUIRES ASSISTANCE
CURRENT_FUNCTION: HOUSEWORK REQUIRES ASSISTANCE
CURRENT_FUNCTION: TRANSPORTATION REQUIRES ASSISTANCE

## 2022-05-16 NOTE — NURSING NOTE
Prior to immunization administration, verified patients identity using patient s name and date of birth. Please see Immunization Activity for additional information.     Screening Questionnaire for Adult Immunization    Are you sick today?   No   Do you have allergies to medications, food, a vaccine component or latex?   No   Have you ever had a serious reaction after receiving a vaccination?   No   Do you have a long-term health problem with heart, lung, kidney, or metabolic disease (e.g., diabetes), asthma, a blood disorder, no spleen, complement component deficiency, a cochlear implant, or a spinal fluid leak?  Are you on long-term aspirin therapy?   No   Do you have cancer, leukemia, HIV/AIDS, or any other immune system problem?   No   Do you have a parent, brother, or sister with an immune system problem?   No   In the past 3 months, have you taken medications that affect  your immune system, such as prednisone, other steroids, or anticancer drugs; drugs for the treatment of rheumatoid arthritis, Crohn s disease, or psoriasis; or have you had radiation treatments?   No   Have you had a seizure, or a brain or other nervous system problem?   No   During the past year, have you received a transfusion of blood or blood    products, or been given immune (gamma) globulin or antiviral drug?   No   For women: Are you pregnant or is there a chance you could become       pregnant during the next month?   No   Have you received any vaccinations in the past 4 weeks?   No     Immunization questionnaire answers were all negative.        Per orders of Dr. vargas, injection of pfizer given by Qing Burns. Patient instructed to remain in clinic for 15 minutes afterwards, and to report any adverse reaction to me immediately.       Screening performed by Qing Burns on 5/16/2022 at 11:24 AM.

## 2022-05-16 NOTE — PROGRESS NOTES
"SUBJECTIVE:   CC: Stone De Paz is an 56 year old male who presents for preventative health visit.     {Split Bill scripting  The purpose of this visit is to discuss your medical history and prevent health problems before you are sick. You may be responsible for a co-pay, coinsurance, or deductible if your visit today includes services such as checking on a sore throat, having an x-ray or lab test, or treating and evaluating a new or existing condition :098071}  Patient has been advised of split billing requirements and indicates understanding: Yes  Healthy Habits:     In general, how would you rate your overall health?  Fair    Frequency of exercise:  2-3 days/week    Duration of exercise:  15-30 minutes    Do you usually eat at least 4 servings of fruit and vegetables a day, include whole grains    & fiber and avoid regularly eating high fat or \"junk\" foods?  No    Taking medications regularly:  Yes    Medication side effects:  None    Ability to successfully perform activities of daily living:  Transportation requires assistance, shopping requires assistance, preparing meals requires assistance, housework requires assistance, laundry requires assistance, medication administration requires assistance and money management requires assistance    Home Safety:  No safety concerns identified    Hearing Impairment:  No hearing concerns    In the past 6 months, have you been bothered by leaking of urine?  No    In general, how would you rate your overall mental or emotional health?  Good      PHQ-2 Total Score: 1    Additional concerns today:  No    {Add if <65 person on Medicare  - Required Questions (Optional):356826}  {Outside tests to abstract? :273868}    {additional problems to add (Optional):621303}    Today's PHQ-2 Score:   PHQ-2 ( 1999 Pfizer) 5/13/2022   Q1: Little interest or pleasure in doing things 1   Q2: Feeling down, depressed or hopeless 0   PHQ-2 Score 1   PHQ-2 Total Score (12-17 Years)- Positive if 3 " "or more points; Administer PHQ-A if positive -   Q1: Little interest or pleasure in doing things Several days   Q2: Feeling down, depressed or hopeless Not at all   PHQ-2 Score 1       Abuse: Current or Past(Physical, Sexual or Emotional)- { :028364}  Do you feel safe in your environment? { :599565}    Have you ever done Advance Care Planning? (For example, a Health Directive, POLST, or a discussion with a medical provider or your loved ones about your wishes): { :951637}    Social History     Tobacco Use     Smoking status: Former Smoker     Quit date: 2010     Years since quittin.3     Smokeless tobacco: Never Used   Substance Use Topics     Alcohol use: No     {Rooming Staff- Complete this question if Prescreen response is not shown below for today's visit. If you drink alcohol do you typically have >3 drinks per day or >7 drinks per week? (Optional):854165}    Alcohol Use 2022   Prescreen: >3 drinks/day or >7 drinks/week? Not Applicable   Prescreen: >3 drinks/day or >7 drinks/week? -   {add AUDIT responses (Optional) (A score of 7 for adult men is an indication of hazardous drinking; a score of 8 or more is an indication of an alcohol use disorder.  A score of 7 or more for adult women is an indication of hazardous drinking or an alchohol use disorder):673675}    Last PSA:   PSA   Date Value Ref Range Status   10/26/2020 0.85 0 - 4 ug/L Final     Comment:     Assay Method:  Chemiluminescence using Siemens Vista analyzer       Reviewed orders with patient. Reviewed health maintenance and updated orders accordingly - { :340369::\"Yes\"}  {Chronicprobdata (optional):088157}    Reviewed and updated as needed this visit by clinical staff                    Reviewed and updated as needed this visit by Provider                   {HISTORY OPTIONS (Optional):196010}    Review of Systems   Constitutional: Negative for chills and fever.   HENT: Negative for congestion, ear pain, hearing loss and sore throat.  " "  Eyes: Negative for pain and visual disturbance.   Respiratory: Negative for cough and shortness of breath.    Cardiovascular: Negative for chest pain, palpitations and peripheral edema.   Gastrointestinal: Positive for abdominal pain, diarrhea, heartburn and nausea. Negative for constipation and hematochezia.   Genitourinary: Positive for frequency, impotence and urgency. Negative for dysuria, genital sores, hematuria and penile discharge.   Musculoskeletal: Positive for arthralgias and myalgias. Negative for joint swelling.   Skin: Negative for rash.   Neurological: Positive for dizziness and headaches. Negative for weakness and paresthesias.   Psychiatric/Behavioral: Negative for mood changes. The patient is not nervous/anxious.      {MALE ROS (Optional):311948::\"CONSTITUTIONAL: NEGATIVE for fever, chills, change in weight\",\"INTEGUMENTARY/SKIN: NEGATIVE for worrisome rashes, moles or lesions\",\"EYES: NEGATIVE for vision changes or irritation\",\"ENT: NEGATIVE for ear, mouth and throat problems\",\"RESP: NEGATIVE for significant cough or SOB\",\"CV: NEGATIVE for chest pain, palpitations or peripheral edema\",\"GI: NEGATIVE for nausea, abdominal pain, heartburn, or change in bowel habits\",\" male: negative for dysuria, hematuria, decreased urinary stream, erectile dysfunction, urethral discharge\",\"MUSCULOSKELETAL: NEGATIVE for significant arthralgias or myalgia\",\"NEURO: NEGATIVE for weakness, dizziness or paresthesias\",\"PSYCHIATRIC: NEGATIVE for changes in mood or affect\"}    OBJECTIVE:   There were no vitals taken for this visit.    Physical Exam  {Exam Choices (Optional):543941}    {Diagnostic Test Results (Optional):556981::\"Diagnostic Test Results:\",\"Labs reviewed in Epic\"}    ASSESSMENT/PLAN:   {Diag Picklist:122051}    {Patient advised of split billing (Optional):209156}    COUNSELING:   {MALE COUNSELING MESSAGES:119188::\"Reviewed preventive health counseling, as reflected in patient instructions\"}    Estimated body " "mass index is 25.09 kg/m  as calculated from the following:    Height as of 4/5/22: 1.727 m (5' 8\").    Weight as of 4/5/22: 74.8 kg (165 lb).     {Weight Management Plan (ACO) Complete if BMI is abnormal-  Ages 18-64  BMI >24.9.  Age 65+ with BMI <23 or >30 (Optional):909787}    He reports that he quit smoking about 12 years ago. He has never used smokeless tobacco.      Counseling Resources:  ATP IV Guidelines  Pooled Cohorts Equation Calculator  FRAX Risk Assessment  ICSI Preventive Guidelines  Dietary Guidelines for Americans, 2010  USDA's MyPlate  ASA Prophylaxis  Lung CA Screening    Ilan Chew Mai, MD  [facility]  " <<-----Click here for Discharge Medication Review

## 2022-05-16 NOTE — PATIENT INSTRUCTIONS
Preventive Health Recommendations  Male Ages 50 - 64    Yearly exam:             See your health care provider every year in order to  o   Review health changes.   o   Discuss preventive care.    o   Review your medicines if your doctor has prescribed any.     Have a cholesterol test every 5 years, or more frequently if you are at risk for high cholesterol/heart disease.     Have a diabetes test (fasting glucose) every three years. If you are at risk for diabetes, you should have this test more often.     Have a colonoscopy at age 50, or have a yearly FIT test (stool test). These exams will check for colon cancer.      Talk with your health care provider about whether or not a prostate cancer screening test (PSA) is right for you.    You should be tested each year for STDs (sexually transmitted diseases), if you re at risk.     Shots: Get a flu shot each year. Get a tetanus shot every 10 years.     Nutrition:    Eat at least 5 servings of fruits and vegetables daily.     Eat whole-grain bread, whole-wheat pasta and brown rice instead of white grains and rice.     Get adequate Calcium and Vitamin D.     Lifestyle    Exercise for at least 150 minutes a week (30 minutes a day, 5 days a week). This will help you control your weight and prevent disease.     Limit alcohol to one drink per day.     No smoking.     Wear sunscreen to prevent skin cancer.     See your dentist every six months for an exam and cleaning.     See your eye doctor every 1 to 2 years.    Patient Education   Personalized Prevention Plan  You are due for the preventive services outlined below.  Your care team is available to assist you in scheduling these services.  If you have already completed any of these items, please share that information with your care team to update in your medical record.  Health Maintenance Due   Topic Date Due     Hepatitis B Vaccine (1 of 3 - Risk 3-dose series) 09/07/1985     Zoster (Shingles) Vaccine (1 of 2) Never  done     LUNG CANCER SCREENING  09/07/2016     Eye Exam  04/05/2022     Cholesterol Lab  06/24/2022     Your Health Risk Assessment indicates you feel you are not in good health    A healthy lifestyle helps keep the body fit and the mind alert. It helps protect you from disease, helps you fight disease, and helps prevent chronic disease (disease that doesn't go away) from getting worse. This is important as you get older and begin to notice twinges in muscles and joints and a decline in the strength and stamina you once took for granted. A healthy lifestyle includes good healthcare, good nutrition, weight control, recreation, and regular exercise. Avoid harmful substances and do what you can to keep safe. Another part of a healthy lifestyle is stay mentally active and socially involved.    Good healthcare     Have a wellness visit every year.     If you have new symptoms, let us know right away. Don't wait until the next checkup.     Take medicines exactly as prescribed and keep your medicines in a safe place. Tell us if your medicine causes problems.   Healthy diet and weight control     Eat 3 or 4 small, nutritious, low-fat, high-fiber meals a day. Include a variety of fruits, vegetables, and whole-grain foods.     Make sure you get enough calcium in your diet. Calcium, vitamin D, and exercise help prevent osteoporosis (bone thinning).     If you live alone, try eating with others when you can. That way you get a good meal and have company while you eat it.     Try to keep a healthy weight. If you eat more calories than your body uses for energy, it will be stored as fat and you will gain weight.     Recreation   Recreation is not limited to sports and team events. It includes any activity that provides relaxation, interest, enjoyment, and exercise. Recreation provides an outlet for physical, mental, and social energy. It can give a sense of worth and achievement. It can help you stay healthy.    Mental Exercise  and Social Involvement  Mental and emotional health is as important as physical health. Keep in touch with friends and family. Stay as active as possible. Continue to learn and challenge yourself.   Things you can do to stay mentally active are:    Learn something new, like a foreign language or musical instrument.     Play SCRABBLE or do crossword puzzles. If you cannot find people to play these games with you at home, you can play them with others on your computer through the Internet.     Join a games club--anything from card games to chess or checkers or lawn bowling.     Start a new hobby.     Go back to school.     Volunteer.     Read.   Keep up with world events.    Understanding USDA MyPlate  The USDA has guidelines to help you make healthy food choices. These are called MyPlate. MyPlate shows the food groups that make up healthy meals using the image of a place setting. Before you eat, think about the healthiest choices for what to put on your plate or in your cup or bowl. To learn more about building a healthy plate, visit www.choosemyplate.gov.    The food groups    Fruits. Any fruit or 100% fruit juice counts as part of the Fruit Group. Fruits may be fresh, canned, frozen, or dried, and may be whole, cut-up, or pureed. Make 1/2 of your plate fruits and vegetables.    Vegetables. Any vegetable or 100% vegetable juice counts as a member of the Vegetable Group. Vegetables may be fresh, frozen, canned, or dried. They can be served raw or cooked and may be whole, cut-up, or mashed. Make 1/2 of your plate fruits and vegetables.    Grains. All foods made from grains are part of the Grains Group. These include wheat, rice, oats, cornmeal, and barley. Grains are often used to make foods such as bread, pasta, oatmeal, cereal, tortillas, and grits. Grains should be no more than 1/4 of your plate. At least half of your grains should be whole grains.    Protein. This group includes meat, poultry, seafood, beans and  peas, eggs, processed soy products (such as tofu), nuts (including nut butters), and seeds. Make protein choices no more than 1/4 of your plate. Meat and poultry choices should be lean or low fat.    Dairy. The Dairy Group includes all fluid milk products and foods made from milk that contain calcium, such as yogurt and cheese. (Foods that have little calcium, such as cream, butter, and cream cheese, are not part of this group.) Most dairy choices should be low-fat or fat-free.    Oils. Oils aren't a food group, but they do contain essential nutrients. However it's important to watch your intake of oils. These are fats that are liquid at room temperature. They include canola, corn, olive, soybean, vegetable, and sunflower oil. Foods that are mainly oil include mayonnaise, certain salad dressings, and soft margarines. You likely already get your daily oil allowance from the foods you eat.  Things to limit  Eating healthy also means limiting these things in your diet:       Salt (sodium). Many processed foods have a lot of sodium. To keep sodium intake down, eat fresh vegetables, meats, poultry, and seafood when possible. Purchase low-sodium, reduced-sodium, or no-salt-added food products at the store. And don't add salt to your meals at home. Instead, season them with herbs and spices such as dill, oregano, cumin, and paprika. Or try adding flavor with lemon or lime zest and juice.    Saturated fat. Saturated fats are most often found in animal products such as beef, pork, and chicken. They are often solid at room temperature, such as butter. To reduce your saturated fat intake, choose leaner cuts of meat and poultry. And try healthier cooking methods such as grilling, broiling, roasting, or baking. For a simple lower-fat swap, use plain nonfat yogurt instead of mayonnaise when making potato salad or macaroni salad.    Added sugars. These are sugars added to foods. They are in foods such as ice cream, candy, soda,  fruit drinks, sports drinks, energy drinks, cookies, pastries, jams, and syrups. Cut down on added sugars by sharing sweet treats with a family member or friend. You can also choose fruit for dessert, and drink water or other unsweetened beverages.     [employer] last reviewed this educational content on 6/1/2020 2000-2021 [employer]. All rights reserved. This information is not intended as a substitute for professional medical care. Always follow your healthcare professional's instructions.        Activities of Daily Living    Your Health Risk Assessment indicates you have difficulties with activities of daily living such as housework, bathing, preparing meals, taking medication, etc. Please make a follow up appointment for us to address this issue in more detail.

## 2022-05-16 NOTE — LETTER
Opioid / Opioid Plus Controlled Substance Agreement    This is an agreement between you and your provider about the safe and appropriate use of controlled substance/opioids prescribed by your care team. Controlled substances are medicines that can cause physical and mental dependence (abuse).    There are strict laws about having and using these medicines. We here at [facility] are committing to working with you in your efforts to get better. To support you in this work, we ll help you schedule regular office appointments for medicine refills. If we must cancel or change your appointment for any reason, we ll make sure you have enough medicine to last until your next appointment.     As a Provider, I will:    Listen carefully to your concerns and treat you with respect.     Recommend a treatment plan that I believe is in your best interest. This plan may involve therapies other than opioid pain medication.     Talk with you often about the possible benefits, and the risk of harm of any medicine that we prescribe for you.     Provide a plan on how to taper (discontinue or go off) using this medicine if the decision is made to stop its use.    As a Patient, I understand that opioid(s):     Are a controlled substance prescribed by my care team to help me function or work and manage my condition(s).     Are strong medicines and can cause serious side effects such as:    Drowsiness, which can seriously affect my driving ability    A lower breathing rate, enough to cause death    Harm to my thinking ability     Depression     Abuse of and addiction to this medicine    Need to be taken exactly as prescribed. Combining opioids with certain medicines or chemicals (such as illegal drugs, sedatives, sleeping pills, and benzodiazepines) can be dangerous or even fatal. If I stop opioids suddenly, I may have severe withdrawal symptoms.    Do not work for all types of pain nor for all patients. If they re not helpful, I may  be asked to stop them.        The risks, benefits and side effects of these medicine(s) were explained to me. I agree that:  1. I will take part in other treatments as advised by my care team. This may be psychiatry or counseling, physical therapy, behavioral therapy, group treatment or a referral to a specialist.     2. I will keep all my appointments. I understand that this is part of the monitoring of opioids. My care team may require an office visit for EVERY opioid/controlled substance refill. If I miss appointments or don t follow instructions, my care team may stop my medicine.    3. I will take my medicines as prescribed. I will not change the dose or schedule unless my care team tells me to. There will be no refills if I run out early.     4. I may be asked to come to the clinic and complete a urine drug test or complete a pill count at any time. If I don t give a urine sample or participate in a pill count, the care team may stop my medicine.    5. I will only receive prescriptions from this clinic for chronic pain. If I am treated by another provider for acute pain issues, I will tell them that I am taking opioid pain medication for chronic pain and that I have a treatment agreement with this provider. I will inform my [facility] care team within one business day if I am given a prescription for any pain medication by another healthcare provider. My [facility] care team can contact other providers and pharmacists about my use of any medicines.    6. It is up to me to make sure that I don t run out of my medicines on weekends or holidays. If my care team is willing to refill my opioid prescription without a visit, I must request refills only during office hours. Refills may take up to 3 business days to process. I will use one pharmacy to fill all my opioid and other controlled substance prescriptions. I will notify the clinic about any changes to my insurance or medication  availability.    7. I am responsible for my prescriptions. If the medicine/prescription is lost, stolen or destroyed, it will not be replaced. I also agree not to share controlled substance medicines with anyone.    8. I am aware I should not use any illegal or recreational drugs. I agree not to drink alcohol unless my care team says I can.       9. If I enroll in the [place] [facility] Cann[facility] program, I will tell my care team prior to my next refill.     10. I will tell my care team right away if I become pregnant, have a new medical problem treated outside of my regular clinic, or have a change in my medications.    11. I understand that this medicine can affect my thinking, judgment and reaction time. Alcohol and drugs affect the brain and body, which can affect the safety of my driving. Being under the influence of alcohol or drugs can affect my decision-making, behaviors, personal safety, and the safety of others. Driving while impaired (DWI) can occur if a person is driving, operating, or in physical control of a car, motorcycle, boat, snowmobile, ATV, motorbike, off-road vehicle, or any other motor vehicle ([place] Statute 169A.20). I understand the risk if I choose to drive or operate any vehicle or machinery.    I understand that if I do not follow any of the conditions above, my prescriptions or treatment may be stopped or changed.          Opioids  What You Need to Know    What are opioids?   Opioids are pain medicines that must be prescribed by a doctor. They are also known as narcotics.     Examples are:   1. morphine (MS Contin, Sneha)  2. oxycodone (Oxycontin)  3. oxycodone and acetaminophen (Percocet)  4. hydrocodone and acetaminophen (Vicodin, Norco)   5. fentanyl patch (Duragesic)   6. hydromorphone (Dilaudid)   7. methadone  8. codeine (Tylenol #3)     What do opioids do well?   Opioids are best for severe short-term pain such as after a surgery or injury. They may work well for cancer pain. They may  help some people with long-lasting (chronic) pain.     What do opioids NOT do well?   Opioids never get rid of pain entirely, and they don t work well for most patients with chronic pain. Opioids don t reduce swelling, one of the causes of pain.                                    Other ways to manage chronic pain and improve function include:       Treat the health problem that may be causing pain    Anti-inflammation medicines, which reduce swelling and tenderness, such as ibuprofen (Advil, Motrin) or naproxen (Aleve)    Acetaminophen (Tylenol)    Antidepressants and anti-seizure medicines, especially for nerve pain    Topical treatments such as patches or creams    Injections or nerve blocks    Chiropractic or osteopathic treatment    Acupuncture, massage, deep breathing, meditation, visual imagery, aromatherapy    Use heat or ice at the pain site    Physical therapy     Exercise    Stop smoking    Take part in therapy       Risks and side effects     Talk to your doctor before you start or decide to keep taking opioids. Possible side effects include:      Lowering your breathing rate enough to cause death    Overdose, including death, especially if taking higher than prescribed doses    Worse depression symptoms; less pleasure in things you usually enjoy    Feeling tired or sluggish    Slower thoughts or cloudy thinking    Being more sensitive to pain over time; pain is harder to control    Trouble sleeping or restless sleep    Changes in hormone levels (for example, less testosterone)    Changes in sex drive or ability to have sex    Constipation    Unsafe driving    Itching and sweating    Dizziness    Nausea, throwing up and dry mouth    What else should I know about opioids?    Opioids may lead to dependence, tolerance, or addiction.      Dependence means that if you stop or reduce the medicine too quickly, you will have withdrawal symptoms. These include loose poop (diarrhea), jitters, flu-like symptoms,  nervousness and tremors. Dependence is not the same as addiction.                       Tolerance means needing higher doses over time to get the same effect. This may increase the chance of serious side effects.      Addiction is when people improperly use a substance that harms their body, their mind or their relations with others. Use of opiates can cause a relapse of addiction if you have a history of drug or alcohol abuse.      People who have used opioids for a long time may have a lower quality of life, worse depression, higher levels of pain and more visits to doctors.    You can overdose on opioids. Take these steps to lower your risk of overdose:    1. Recognize the signs:  Signs of overdose include decrease or loss of consciousness (blackout), slowed breathing, trouble waking up and blue lips. If someone is worried about overdose, they should call 911.    2. Talk to your doctor about Narcan (naloxone).   If you are at risk for overdose, you may be given a prescription for Narcan. This medicine very quickly reverses the effects of opioids.   If you overdose, a friend or family member can give you Narcan while waiting for the ambulance. They need to know the signs of overdose and how to give Narcan.     3. Don't use alcohol or street drugs.   Taking them with opioids can cause death.    4. Do not take any of these medicines unless your doctor says it s OK. Taking these with opioids can cause death:    Benzodiazepines, such as lorazepam (Ativan), alprazolam (Xanax) or diazepam (Valium)    Muscle relaxers, such as cyclobenzaprine (Flexeril)    Sleeping pills like zolpidem (Ambien)     Other opioids      How to keep you and other people safe while taking opioids:    1. Never share your opioids with others.  Opioid medicines are regulated by the Drug Enforcement Agency ([place]). Selling or sharing medications is a criminal act.    2. Be sure to store opioids in a secure place, locked up if possible. Young children  can easily swallow them and overdose.    3. When you are traveling with your medicines, keep them in the original bottles. If you use a pill box, be sure you also carry a copy of your medicine list from your clinic or pharmacy.    4. Safe disposal of opioids    Most pharmacies have places to get rid of medicine, called disposal kiosks. Medicine disposal options are also available in every [facility]. Search your county and  medication disposal  to find more options. You can find more details at:  [email or web address]     I agree that my provider, clinic care team, and pharmacy may work with any city, state or federal law enforcement agency that investigates the misuse, sale, or other diversion of my controlled medicine. I will allow my provider to discuss my care with, or share a copy of, this agreement with any other treating provider, pharmacy or emergency room where I receive care.    I have read this agreement and have asked questions about anything I did not understand.    _______________________________________________________  Patient Signature - Stone De Paz _____________________                   Date     _______________________________________________________  Provider Signature - Ilan Chew Mai, MD   _____________________                   Date     _______________________________________________________  Witness Signature (required if provider not present while patient signing)   _____________________                   Date

## 2022-05-16 NOTE — PROGRESS NOTES
"SUBJECTIVE:   Stone De Paz is a 56 year old male who presents for Preventive Visit.      Patient has been advised of split billing requirements and indicates understanding: Yes  Are you in the first 12 months of your Medicare coverage?  No    Healthy Habits:     In general, how would you rate your overall health?  Fair    Frequency of exercise:  2-3 days/week    Duration of exercise:  15-30 minutes    Do you usually eat at least 4 servings of fruit and vegetables a day, include whole grains    & fiber and avoid regularly eating high fat or \"junk\" foods?  No    Taking medications regularly:  Yes    Medication side effects:  None    Ability to successfully perform activities of daily living:  Transportation requires assistance, shopping requires assistance, preparing meals requires assistance, housework requires assistance, laundry requires assistance, medication administration requires assistance and money management requires assistance    Home Safety:  No safety concerns identified    Hearing Impairment:  No hearing concerns    In the past 6 months, have you been bothered by leaking of urine?  No    In general, how would you rate your overall mental or emotional health?  Good      PHQ-2 Total Score: 1    Additional concerns today:  No    Do you feel safe in your environment? Yes    Have you ever done Advance Care Planning? (For example, a Health Directive, POLST, or a discussion with a medical provider or your loved ones about your wishes): No, advance care planning information given to patient to review.  Patient declined advance care planning discussion at this time.       Fall risk  No risk for falling identified    click delete button to remove this line now  Cognitive Screening   1) Repeat 3 items (Leader, Season, Table)    2) Clock draw: NORMAL  3) 3 item recall: Recalls 3 objects  Results: 3 items recalled: COGNITIVE IMPAIRMENT LESS LIKELY    Mini-CogTM Copyright S Toño. Licensed by the author for use in " "[facility]; reprinted with permission (kallie@[facility]). All rights reserved.      Do you have sleep apnea, excessive snoring or daytime drowsiness?: no    Reviewed and updated as needed this visit by clinical staff   Tobacco  Allergies  Meds  Problems  Med Hx  Surg Hx  Fam Hx  Soc   Hx          Reviewed and updated as needed this visit by Provider   Tobacco  Allergies   Problems  Med Hx  Surg Hx  Fam Hx  Soc Hx              Social History     Tobacco Use     Smoking status: Former Smoker     Quit date: 2010     Years since quittin.4     Smokeless tobacco: Never Used   Substance Use Topics     Alcohol use: No     If you drink alcohol do you typically have >3 drinks per day or >7 drinks per week? No      Stone is here today for physical and general follow up.  He has a complicated medical history which includes diabetes type 1, pancreatic insufficiency, chronic pain syndrome, GERD with gastroparesis, hypothyroidism, hyperlipidemia and depression.  He is seeing the endocrinologist for his diabetic and iscurrently on the insulin pump which is doing pretty good.  His blood sugar has been running int he range 120-150s fasting; no low blood sugar.  He also takes Lisinopril for HTN and high dose Lipitor for his high cholesterol.  Not checking his blood pressure at home.  No acute change in his vision.  Has not had an eye exam done for a while.  Has chronic numbness on hands and feet, unclear if it is diabetic neuropathy versus radiculopathy from degenerative joint disease of the spine.  No chest pain or shortness of breath.  No dyspnea, orthopnea or leg swelling.  No headache or dizziness.     He also has had as chronic acid reflux with gastroparesis.  He had a gastric pump but the battery has not been functional.  Still has the nausea.  Medical marijuana has been a \"life saver\" for his gastroparesis,  controlling the nausea/vomiting.  Also takes oxycodone regularly for pain - pain has been " tolerable.    He has chronic neck and back pain due to spinal stenosis with radiculopathy.  Did not tolerate the gabapentin, Lyrica or Cymbalta.    Stated that his depression is doing okay with the Celexa.  He currently lives with his ex-wife who has been very supportive.  No suicidal or homicidal ideation.  No hallucination.  No drugs or alcohol.  Not on counseling by choice     Otherwise, he is doing ok and overall stable. No major medical care or procedure done since the last physical couple years ago. No runny nose, nasal congestion, coughing, fever or chill.  No CP/SOB. No diarrhea/constipation or problem with urination.  No abdominal pain. No leg swelling . Not exercising due to pain. No alcohol, drug or tobacco use. No problem with sleeping.  Feels safe.  No weight change. No other concern today      Current providers sharing in care for this patient include:   Patient Care Team:  Ilan Raymond MD as PCP - General (Family Practice)  Destiny Warren RD as Diabetes Educator (Dietitian, Registered)  Frankie Best MD as Assigned Endocrinology Provider  Ilan Raymond MD as Assigned PCP  Natasha Padgett PA-C as Assigned Rheumatology Provider    The following health maintenance items are reviewed in Epic and correct as of today:  Health Maintenance Due   Topic Date Due     HEPATITIS B IMMUNIZATION (1 of 3 - Risk 3-dose series) 09/07/1985     ZOSTER IMMUNIZATION (1 of 2) Never done     LUNG CANCER SCREENING  09/07/2016     EYE EXAM  04/05/2022     LIPID  06/24/2022     BP Readings from Last 3 Encounters:   05/16/22 118/72   03/02/22 128/82   08/30/21 120/62    Wt Readings from Last 3 Encounters:   05/16/22 71.7 kg (158 lb)   04/05/22 74.8 kg (165 lb)   03/02/22 76.2 kg (168 lb)                  Patient Active Problem List   Diagnosis     Hypothyroidism     Gastroparesis     Mild major depression (H)     Cervical stenosis of spine     Exocrine pancreatic insufficiency     Hyperlipidemia     Chronic pain  syndrome     Type 1 diabetes mellitus with diabetic autonomic neuropathy (H)     Insulin pump status     Gastroesophageal reflux disease without esophagitis     Essential hypertension     Diarrhea     Past Surgical History:   Procedure Laterality Date     COLONOSCOPY  12     FUSION CERVICAL ANTERIOR TWO LEVELS       INJECT BLOCK MEDIAL BRANCH CERVICAL/THORACIC/LUMBAR Bilateral 2018    Procedure: Bilateral Cervical 3-4-5 medial branch block;  Surgeon: Jef Edwards MD;  Location: [facility]     INJECT BLOCK MEDIAL BRANCH CERVICAL/THORACIC/LUMBAR N/A 2019    Procedure: Cervical 3,4,5 Bilateral Medial branch blocks;  Surgeon: Jef Edwards MD;  Location: [facility]     RADIO FREQUENCY ABLATION PULSED CERVICAL Right 2019    Procedure: RADIO FREQUENCY ABLATION CERVICAL THREE, FOUR, FIVE, AND THIRD OCCUPITAL NERVE RIGHT SIDE;  Surgeon: Jef Edwards MD;  Location: [facility]     RADIO FREQUENCY ABLATION PULSED CERVICAL Left 3/15/2019    Procedure: radiofrequency ablation cervical 3,4,5;  Surgeon: Jef Edwards MD;  Location: [facility]     RADIO FREQUENCY ABLATION PULSED CERVICAL Right 2020    Procedure: RADIOFREQUENCY ABLATION CERVICAL 3,4 5 right SIDE;  Surgeon: Jef Edwards MD;  Location: [facility]     RADIO FREQUENCY ABLATION PULSED CERVICAL Left 9/10/2020    Procedure: RADIOFREQUENCY ABLATION CERVICAL 3,4 5 LEFT SIDE;  Surgeon: Jef Edwards MD;  Location: [facility]     THORACOSCOPIC DECORTICATION LUNG  2014    Procedure: THORACOSCOPIC DECORTICATION LUNG;  RIGHT VATS/RIGHT THORACOTOMY , DECORTICATION RIGHT LUNG ,WEDGE RESECTION RIGHT MIDDLE LOBE LUNG NODULE;  Surgeon: Harley Felix MD;  Location: [facility]     ZZC LUMBAR SPINE FUSION,ANTER APPRCH      2 L4-5 L5-21       Social History     Tobacco Use     Smoking status: Former Smoker     Quit date: 2010     Years since quittin.4     Smokeless tobacco: Never Used   Substance Use Topics     Alcohol use: No     Family History   Problem  Relation Age of Onset     Hypertension Mother      Diabetes Father         type 2     Hypertension Father      Cerebrovascular Disease Maternal Grandmother      Unknown/Adopted Maternal Grandfather      Unknown/Adopted Paternal Grandmother      Unknown/Adopted Paternal Grandfather      Liver Disease Brother      Heart Disease Sister      Thyroid Disease Sister      Thyroid Disease Sister      Thyroid Disease Sister          Current Outpatient Medications   Medication Sig Dispense Refill     amylase-lipase-protease (CREON 24) 90331-35392 units CPEP per EC capsule Take 1 capsule by mouth every other day       amylase-lipase-protease (PERTZYE) 71884 units CPEP Take 1 capsule by mouth every other day       citalopram (CELEXA) 40 MG tablet TAKE 1 TABLET BY MOUTH ONCE DAILY 90 tablet 3     Continuous Blood Gluc  (DEXCOM G6 ) AMBER Use to read glucose levels per 's instructions 1 Device 0     Continuous Blood Gluc Sensor (DEXCOM G6 SENSOR) MISC USE FOR CONTINUOUS GLUCOSE TESTING, CHANGE EVERY 10 DAYS 3 each 1     Continuous Blood Gluc Transmit (DEXCOM G6 TRANSMITTER) MISC USE FOR CONTINUOUS GLUCOSE TESTING, CHANGE EVERY 90 DAYS) 1 each 3     CONTOUR NEXT TEST test strip Use to test blood sugar 5x times daily or as directed. 450 strip 3     insulin aspart (NOVOLOG VIAL) 100 UNITS/ML vial USE WITH INSULIN PUMP TOTAL DAILY DOSE APPROX 45 UNITS 20 mL 4     levothyroxine (SYNTHROID/LEVOTHROID) 112 MCG tablet TAKE ONE TABLET BY MOUTH ONCE DAILY (Patient taking differently: Take 112 mcg by mouth daily) 90 tablet 3     lisinopril (ZESTRIL) 40 MG tablet TAKE ONE TABLET BY MOUTH ONCE DAILY 90 tablet 0     medical cannabis (Patient's own supply) See Admin Instructions Topical creams and vaporizer cartridges  (The purpose of this order is to document that the patient reports taking medical cannabis.  This is not a prescription, and is not used to certify that the patient has a qualifying medical condition.)        naproxen (NAPROSYN) 500 MG tablet TAKE ONE TABLET BY MOUTH TWICE A DAY AS NEEDED FOR MODERATE PAIN 180 tablet 0     omeprazole (PRILOSEC) 20 MG DR capsule TAKE 1 CAPSULE BY MOUTH DAILY 90 capsule 1     ondansetron (ZOFRAN-ODT) 8 MG ODT tab Take 1 tablet (8 mg) by mouth every 8 hours as needed (for nauesa) 60 tablet 0     oxyCODONE-acetaminophen (PERCOCET) 5-325 MG tablet Take 1 tablet by mouth every 6 hours as needed for severe pain (*CAUTION OPIOD.  RISK OF OVERDOSE AND ADDICTION.**MAX OF 3 TABLETS PER DAY**) To last for month 70 tablet 0     SUMAtriptan (IMITREX) 100 MG tablet Take 1 tablet (100 mg) by mouth at onset of headache 1/2 to 1 tablet by mouth at onset of headache. May repeat 1 dose after 2 hours if headache persists. 9 tablet 3     atorvastatin (LIPITOR) 80 MG tablet TAKE 1 TABLET BY MOUTH ONCE DAILY AT BEDTIME (Patient not taking: Reported on 5/16/2022) 90 tablet 0     Continuous Blood Gluc  (FREESTYLE REUBEN READER) AMBER 1 Device continuous prn (replace annually if needed) (Patient not taking: No sig reported) 1 Device 1     naloxone (NARCAN) 4 MG/0.1ML nasal spray Spray 1 spray (4 mg) into one nostril alternating nostrils once as needed for opioid reversal every 2-3 minutes until assistance arrives (Patient not taking: Reported on 5/16/2022) 0.2 mL 0     No Known Allergies  Recent Labs   Lab Test 03/02/22  1202 07/28/21  1043 06/24/21  1246 03/01/21  1359 10/26/20  1429 08/06/20  1248 01/06/20  1046   A1C 8.8*  --  9.2* 8.7*   < > Results confirmed by repeat test 10.2*   LDL  --   --  83  --   --  57 155*   HDL  --   --  65  --   --  69 53   TRIG  --   --  82  --   --  41 76   ALT  --  27  --  25  --   --  20   CR  --  0.92  --  0.94  --  0.88 0.97   GFRESTIMATED  --  >90  --  >90  --  >90 89   GFRESTBLACK  --   --   --  >90  --  >90 >90   POTASSIUM  --  5.1  --  4.9  --  4.6 4.3   TSH 0.90  --  0.16*  --   --  1.27 1.50    < > = values in this interval not displayed.      Pneumonia  "Vaccine: UTD        Review of Systems   Constitutional: Negative for chills and fever.   HENT: Negative for congestion, ear pain, hearing loss and sore throat.    Eyes: Negative for pain and visual disturbance.   Respiratory: Negative for cough and shortness of breath.    Cardiovascular: Negative for chest pain, palpitations and peripheral edema.   Gastrointestinal: Positive for abdominal pain, diarrhea, heartburn and nausea. Negative for constipation and hematochezia.   Genitourinary: Positive for frequency, impotence and urgency. Negative for dysuria, genital sores, hematuria and penile discharge.   Musculoskeletal: Positive for arthralgias and myalgias. Negative for joint swelling.   Skin: Negative for rash.   Neurological: Positive for dizziness and headaches. Negative for weakness and paresthesias.   Psychiatric/Behavioral: Negative for mood changes. The patient is not nervous/anxious.      Constitutional, HEENT, cardiovascular, pulmonary, GI, , musculoskeletal, neuro, skin, endocrine and psych systems are negative, except as otherwise noted.    OBJECTIVE:   /72 (BP Location: Right arm, Patient Position: Sitting, Cuff Size: Adult Regular)   Pulse 85   Temp 97.5  F (36.4  C) (Temporal)   Resp 12   Ht 1.727 m (5' 8\")   Wt 71.7 kg (158 lb)   SpO2 97%   BMI 24.02 kg/m   Estimated body mass index is 24.02 kg/m  as calculated from the following:    Height as of this encounter: 1.727 m (5' 8\").    Weight as of this encounter: 71.7 kg (158 lb).  Physical Exam   GENERAL: alert and no distress, speaking in full sentences.  EYES: Eyes grossly normal to inspection, PERRL and conjunctivae and sclerae normal.  No nystagmus.  All 4 visual fields are intact  HENT: Ear canals and TM's normal.  Nares are non-congested. Oropharynx is pink and moist. No tender with palpation to the sinuses.  NECK: no adenopathy, supple, no lymphadenopathy or thyromegaly.  No tender with palpation to the cervical spine or its " paraspinous muscle.  No carotid bruits or JV distention  RESP: lungs clear to auscultation - no rales, rhonchi or wheezes  CV: regular rate and rhythm, no murmur.  ABDOMEN: soft, nondistended, no palpable masses or organomegaly with normal bowel sounds.  Tender without guarding or rebound with palpation to the upper diffusely.  MS: no gross musculoskeletal defects noted, no edema.  Walk with no limping, normal gait.  All 4 extremities are equally in strength. Ankle, knees, hips, shoulders, elbows and wrists exams normal.  Normal fine motor skills on fingers.  Back is straight, no lordosis or scoliosis.  Tender with palpation to the spines.  SKIN: no suspicious lesions or rashes  NEURO: Normal strength and tone, mentation intact and speech normal.  Cranial nerves II through XII intact, DTR +2 throughout, no focal neurological deficit.  PSYCH: mentation appears normal, affect normal/bright.  Thoughts intact, no hallucination.  No suicidal or homicidal ideation.   (male): Offered but declined.  He has no concern about it.  Foot: Microfilament exam.  Skin was intact, no open wound or calluses.  Toenails looked normal.    Diagnostic Test Results:  Labs reviewed in Epic  Results for orders placed or performed in visit on 05/16/22   Urine Drug Confirmation Panel     Status: Abnormal   Result Value Ref Range    Oxycodone ng/mL 709 (H) <50 ng/mL    Oxycodone 591 Absent ng/mg [creat]    Oxymorphone ng/mL 440 (H) <50 ng/mL    Oxymorphone 367 Absent ng/mg [creat]    Narrative    This test was developed and its performance characteristics determined by the [facility],  [facility]. It has not been cleared or approved by the FDA. The laboratory is regulated under CLIA as qualified to perform high-complexity testing. This test is used for clinical purposes. It should not be regarded as investigational or for research.   Urine Creatinine for Drug Screen Panel     Status: None   Result  Value Ref Range    Creatinine Urine for Drug Screen 120 mg/dL   [id number] - Urine Drug Confirmation Panel (Comprehensive)     Status: Abnormal    Narrative    The following orders were created for panel order [id number] - Urine Drug Confirmation Panel (Comprehensive).  Procedure                               Abnormality         Status                     ---------                               -----------         ------                     Urine Drug Confirmation ...[[id number]]  Abnormal            Final result               Urine Creatinine for Arthur...[[id number]]                      Final result                 Please view results for these tests on the individual orders.       ASSESSMENT / PLAN:   (Z00.00) Encounter for Medicare annual wellness exam  (primary encounter diagnosis)  Comment: Stone has a very complex medical history which are overall stable and he is doing well today.  Discussed about safety issue, healthy diet, exercising, good sleeping hygiene, advanced directive care, falling prevention, and depression prevention. Recommended daily exercise for at least 30 minutes or as tolerated.  Emphasized on healthy diet with adequate fluid intake and resting. Feels safe.  Follow in 1 year, earlier as needed.  Labs as ordered below.      Immunization: He was recommended to get the shingle vaccination through his pharmacist.  It is also unclear about his hepatitis B immunization status and therefore will check the hepatitis B surface antibody.  He received the COVID booster vaccination today    Colon cancer screening: He had a colonoscopy in 2012 with 2 polyps.  Unclear its pathology result (not available to review).  He had a Cologuard done couple months ago and was negative/normal.  He did not want colonoscopy.  He was then cleared for 3 years based on the Cologuard study.    Prostate cancer screening: Discussed with him about the pros and cons of prostate screening cancer with PSA.  Also educated about the  potential false positive which may require unnecessary work-up.  He was informed of negative/normal PSA leve does not rule out prostate cancer completely although it is very unlikely.  He understands and is willing to take the risk.      Lung cancer screening: Smoking a pack a day for 25 years, quit 10 years ago.  Discussed about low-dose CT scan to screen for lung cancer.  Pros and cons discussed and his is interested if it is covered by his insurance.  He is on Medicare, I recommended him to check with insurance for his coverage.      (I10) Essential hypertension  Comment: Also has diabetes.  BP is normal and stable. Continue with the current doses of lisinopril, been tolerating it well.  Emphasized on healthy/low salt diet and staying active as tolerated.  Avoid high sugar/caffeine intake. Lab as ordered.  Follow up in 6 month, earlier as needed.    Plan: Comprehensive metabolic panel (BMP + Alb, Alk         Phos, ALT, AST, Total. Bili, TP)            (E78.5) Hyperlipidemia, unspecified hyperlipidemia type  Comment: Comorbidity include diabetes and high blood pressure.  Been tolerating the Lipitor well.  The goal for his LDL to be less than 130.  No history of liver disease or elevated liver enzyme.  No excessive alcohol intake.  Discussed about healthy lifestyle modification as above.  Recheck the cholesterol level and liver enzymes today.      Plan: Comprehensive metabolic panel (BMP + Alb, Alk         Phos, ALT, AST, Total. Bili, TP)            (E10.43) Type 1 diabetes mellitus with diabetic autonomic neuropathy (H)  Comment: Last hemoglobin A1c 3 months ago was 8.8.  He has been doing well with insulin pump.  Foot exam today was normal.  Also has neuropathy, gastroparesis, pancreatic insufficiency.  I encouraged him to work closely with the endocrinologist.  Insulin pump settings per endocrinology.  He feels comfortable with managing the insulin pump.  He was also recommended to get his eye exam on at least  annually.    Plan: FOOT EXAM            (G89.4) Chronic pain syndrome  Comment: Overall stable and tolerable.  Drug screen obtained today.  Pain contract renewed today.  He is aware of the pain contract.  Continue with medical marijuana and oxycodone, limited to 70 tablets a month.  Also continue with naproxen as needed.    Plan: [id number] - Urine Drug Confirmation Panel         (Comprehensive), oxyCODONE-acetaminophen         (PERCOCET) 5-325 MG tablet            (M48.02) Cervical stenosis of spine  Comment: Please see chronic pain syndrome above for further details.    Plan: [id number] - Urine Drug Confirmation Panel         (Comprehensive), oxyCODONE-acetaminophen         (PERCOCET) 5-325 MG tablet            (K31.84) Gastroparesis  Comment: Has a gastric pump that is nonfunctional.  Medical marijuana and Zofran have helped significantly with the nausea and vomiting.  Tolerating oral intake well.  Planning to see the GI specialist in the near future to look into the gastric pump again, it worked okay in the past.      (K21.9) Gastroesophageal reflux disease without esophagitis  Comment: Please see gastroparesis above for further details.  Continue with omeprazole daily.  Encouraged to avoid any known trigger.  Also encouraged to avoid greasy/spicy foods as well as late meals.  Does not drink alcohol and denies excessive NSAID intake.  Follow-up as needed.      (K86.81) Exocrine pancreatic insufficiency  Comment: Chronic and stable.  Continue with Creon.      (F33.41) Recurrent major depressive disorder, in partial remission (H)  Comment: Overall stable.  Has a complex medical history with chronic pain syndrome.  No suicidal or homicidal ideation.  No hallucination.  Sleeping okay.  Gabapentin or Lyrica.  Did not like oral SSRI.  Celexa has been working well.  Will continue with the Celexa for now.  Discussed adjunctive treatment with perhaps Abilify or Wellbutrin but he declined.  Symptoms that need to be seen or call  "in discussed.      (Z12.5) Screening for prostate cancer  Comment: Discussed with him about the pros and cons of prostate screening cancer with PSA.  Also educated about the potential false positive which may require unnecessary work-up.  He was informed of negative/normal PSA leve does not rule out prostate cancer completely although it is very unlikely.  He understands and is willing to take the risk.     Plan: PSA, screen            (Z23) High priority for 2019-nCoV vaccine  Comment: Potential side effect discussed and recommended OTC meds as needed for symptomatic treatment.      Patient has been advised of split billing requirements and indicates understanding: Yes    COUNSELING:  Reviewed preventive health counseling, as reflected in patient instructions       Regular exercise       Healthy diet/nutrition       Vision screening       Hearing screening       Dental care       Fall risk prevention       Immunizations    Vaccinated for: COVID booster and Declined: Zoster due to Other to be obtained through the pharmacy               Aspirin prophylaxis        Alcohol Use        Consider lung cancer screening for ages 55-80 years (77 for Medicare) and 20 pack-year smoking history        Colon cancer screening       Prostate cancer screening    Estimated body mass index is 24.02 kg/m  as calculated from the following:    Height as of this encounter: 1.727 m (5' 8\").    Weight as of this encounter: 71.7 kg (158 lb).        He reports that he quit smoking about 12 years ago. He has never used smokeless tobacco.      Appropriate preventive services were discussed with this patient, including applicable screening as appropriate for cardiovascular disease, diabetes, osteopenia/osteoporosis, and glaucoma.  As appropriate for age/gender, discussed screening for colorectal cancer, prostate cancer, breast cancer, and cervical cancer. Checklist reviewing preventive services available has been given to the " patient.    Reviewed patients plan of care and provided an AVS. The Basic Care Plan (routine screening as documented in Health Maintenance) for Stone meets the Care Plan requirement. This Care Plan has been established and reviewed with the Patient and partner.    Counseling Resources:  ATP IV Guidelines  Pooled Cohorts Equation Calculator  Breast Cancer Risk Calculator  Breast Cancer: Medication to Reduce Risk  FRAX Risk Assessment  ICSI Preventive Guidelines  Dietary Guidelines for Americans, 2010  [employer] MyPlate  ASA Prophylaxis  Lung CA Screening    Ilan Chew Mai, MD  [facility]    Identified Health Risks:    The patient was provided with suggestions to help him develop a healthy physical lifestyle.  The patient was counseled and encouraged to consider modifying their diet and eating habits. He was provided with information on recommended healthy diet options.  The patient reports that he has difficulty with activities of daily living. I have asked that the patient make a follow up appointment as needed where this issue will be further evaluated and addressed.

## 2022-05-19 LAB
OXYCODONE UR CFM-MCNC: 709 NG/ML
OXYCODONE/CREAT UR: 591 NG/MG {CREAT}
OXYMORPHONE UR CFM-MCNC: 440 NG/ML
OXYMORPHONE/CREAT UR: 367 NG/MG {CREAT}

## 2022-05-23 ENCOUNTER — MYC MEDICAL ADVICE (OUTPATIENT)
Dept: FAMILY MEDICINE | Facility: CLINIC | Age: 57
End: 2022-05-23
Payer: COMMERCIAL

## 2022-05-25 NOTE — TELEPHONE ENCOUNTER
Pt calling regarding the paperwork that was phsyically given to pcp at last appt. He really needs this paperwork and his paycheck depends on it. Please call [phone] with update.

## 2022-05-26 ENCOUNTER — DOCUMENTATION ONLY (OUTPATIENT)
Dept: ENDOCRINOLOGY | Facility: CLINIC | Age: 57
End: 2022-05-26
Payer: COMMERCIAL

## 2022-05-26 NOTE — PROGRESS NOTES
[employer]   Received form(s) for - Physician written order (Medicare pump supplies)  Placed form(s) in/on TL's incoming basket.  Forms need to be filled out and signed and faxed to number listed on form.    AWAITING COMPLETION      Copy needs to be sent for scanning after completion: Yes     Janneth Begum MA

## 2022-05-27 NOTE — PROGRESS NOTES
FAXED  Form(s) have been completed faxed.  Faxed or mailed to: number listed on form.  Form(s) in accordion file for 1 month then to scan.    Janneth Begum MA

## 2022-06-02 ENCOUNTER — MYC MEDICAL ADVICE (OUTPATIENT)
Dept: RHEUMATOLOGY | Facility: CLINIC | Age: 57
End: 2022-06-02
Payer: COMMERCIAL

## 2022-06-06 DIAGNOSIS — E78.5 HYPERLIPIDEMIA LDL GOAL <100: ICD-10-CM

## 2022-06-06 DIAGNOSIS — I10 ESSENTIAL HYPERTENSION: ICD-10-CM

## 2022-06-07 RX ORDER — LISINOPRIL 40 MG/1
TABLET ORAL
Qty: 90 TABLET | Refills: 0 | Status: SHIPPED | OUTPATIENT
Start: 2022-06-07 | End: 2022-09-14

## 2022-06-07 RX ORDER — ATORVASTATIN CALCIUM 80 MG/1
TABLET, FILM COATED ORAL
Qty: 90 TABLET | Refills: 0 | Status: SHIPPED | OUTPATIENT
Start: 2022-06-07 | End: 2022-09-13

## 2022-06-07 NOTE — TELEPHONE ENCOUNTER
Prescription approved per [facility] Refill Protocol.    Kay Varner RN

## 2022-06-08 ENCOUNTER — TELEPHONE (OUTPATIENT)
Dept: PALLIATIVE MEDICINE | Facility: CLINIC | Age: 57
End: 2022-06-08
Payer: COMMERCIAL

## 2022-06-08 NOTE — TELEPHONE ENCOUNTER
Unfortunately no urgent openings, patient has soonest appointment available 7/14/22    Patient agreed to cancellation list     Asked if urgent question/request ahead of appointment (reason for appointment)  Stone Requesting Medical marijuana re-certification (PCP will not do)   -Email: [email or web address]   -Address: [id number] [place] [address]   -Phone number: [phone]    Let him know likely Dr. Morro Rausch will likely want to see perform assessment first, [place] transfer patient    Suma Vega RN, BSN, CMSRN  RN Care Coordinator  [facility]

## 2022-06-08 NOTE — TELEPHONE ENCOUNTER
Pt requesting a message be sent to Dr Rausch about getting in sooner for an appointment. Pt was offered 1st avail.      Tez Landrum    [facility] [place]

## 2022-06-09 NOTE — TELEPHONE ENCOUNTER
Per patient [employer] message:  From: Stone De Paz      Created: 6/9/2022 6:14 AM      Good morning Dr. Rausch. Thank you for getting in touch with me. I m sorry to bother you with this.   My registry expiration date is 8-1-2022  My appointment date with you is on 7-  My Patient ID is [id number]  My pain scale number is 4 right now, with most of the pain in my low back, neck. I m also dealing with a dead gastric stimulator causing lots of stomach pain nausea, diarrhea and cramping. The Cannabis is vital to helping with that.   Thank you for your time.   Stone De Paz         Routed to provider.     Chasity RN-BSN  [facility]

## 2022-06-09 NOTE — TELEPHONE ENCOUNTER
Message sent directly to pt requesting his ID # and pain score. Pt still will need to f/u for cont recert yearly

## 2022-06-14 ENCOUNTER — MYC REFILL (OUTPATIENT)
Dept: FAMILY MEDICINE | Facility: CLINIC | Age: 57
End: 2022-06-14
Payer: COMMERCIAL

## 2022-06-14 DIAGNOSIS — M48.02 CERVICAL STENOSIS OF SPINE: ICD-10-CM

## 2022-06-14 DIAGNOSIS — G89.4 CHRONIC PAIN SYNDROME: ICD-10-CM

## 2022-06-14 RX ORDER — OXYCODONE AND ACETAMINOPHEN 5; 325 MG/1; MG/1
1 TABLET ORAL EVERY 6 HOURS PRN
Qty: 70 TABLET | Refills: 0 | Status: SHIPPED | OUTPATIENT
Start: 2022-06-14 | End: 2022-07-14

## 2022-06-14 NOTE — TELEPHONE ENCOUNTER
Requested Prescriptions   Pending Prescriptions Disp Refills     oxyCODONE-acetaminophen (PERCOCET) 5-325 MG tablet 70 tablet 0     Sig: Take 1 tablet by mouth every 6 hours as needed for severe pain (*CAUTION OPIOD.  RISK OF OVERDOSE AND ADDICTION.**MAX OF 3 TABLETS PER DAY**) To last for month     Last Written Prescription Date:  05/16/2022  Last Fill Quantity: 70,   # refills: 0  Last Office Visit: 05/16/2022  Future Office visit:    Next 5 appointments (look out 90 days)    Jul 14, 2022  1:00 PM  Return Visit with Morro Rausch MD  [facility] [place] ([facility] [place] ) [address]  [place] [id number] [phone]           Routing refill request to provider for review/approval because:  Drug not on the [facility], [facility] or [facility] refill protocol or controlled substance

## 2022-06-20 ENCOUNTER — MYC MEDICAL ADVICE (OUTPATIENT)
Dept: PALLIATIVE MEDICINE | Facility: CLINIC | Age: 57
End: 2022-06-20
Payer: COMMERCIAL

## 2022-06-25 DIAGNOSIS — G89.4 CHRONIC PAIN SYNDROME: ICD-10-CM

## 2022-06-25 DIAGNOSIS — M48.02 CERVICAL STENOSIS OF SPINE: ICD-10-CM

## 2022-06-27 ENCOUNTER — LAB (OUTPATIENT)
Dept: LAB | Facility: CLINIC | Age: 57
End: 2022-06-27
Payer: COMMERCIAL

## 2022-06-27 DIAGNOSIS — E78.5 HYPERLIPIDEMIA, UNSPECIFIED HYPERLIPIDEMIA TYPE: ICD-10-CM

## 2022-06-27 DIAGNOSIS — Z12.5 SCREENING FOR PROSTATE CANCER: ICD-10-CM

## 2022-06-27 DIAGNOSIS — I10 ESSENTIAL HYPERTENSION: ICD-10-CM

## 2022-06-27 DIAGNOSIS — E10.43 TYPE 1 DIABETES MELLITUS WITH DIABETIC AUTONOMIC NEUROPATHY (H): ICD-10-CM

## 2022-06-27 DIAGNOSIS — E03.9 HYPOTHYROIDISM, UNSPECIFIED TYPE: ICD-10-CM

## 2022-06-27 LAB
ALBUMIN SERPL-MCNC: 3.8 G/DL (ref 3.4–5)
ALP SERPL-CCNC: 64 U/L (ref 40–150)
ALT SERPL W P-5'-P-CCNC: 67 U/L (ref 0–70)
ANION GAP SERPL CALCULATED.3IONS-SCNC: 5 MMOL/L (ref 3–14)
AST SERPL W P-5'-P-CCNC: 33 U/L (ref 0–45)
BILIRUB SERPL-MCNC: 0.7 MG/DL (ref 0.2–1.3)
BUN SERPL-MCNC: 14 MG/DL (ref 7–30)
CALCIUM SERPL-MCNC: 8.3 MG/DL (ref 8.5–10.1)
CHLORIDE BLD-SCNC: 101 MMOL/L (ref 94–109)
CHOLEST SERPL-MCNC: 120 MG/DL
CO2 SERPL-SCNC: 28 MMOL/L (ref 20–32)
CREAT SERPL-MCNC: 1.02 MG/DL (ref 0.66–1.25)
FASTING STATUS PATIENT QL REPORTED: YES
GFR SERPL CREATININE-BSD FRML MDRD: 87 ML/MIN/1.73M2
GLUCOSE BLD-MCNC: 160 MG/DL (ref 70–99)
HBA1C MFR BLD: 8.9 % (ref 0–5.6)
HBV SURFACE AB SERPL IA-ACNC: 0.35 M[IU]/ML
HDLC SERPL-MCNC: 62 MG/DL
LDLC SERPL CALC-MCNC: 47 MG/DL
NONHDLC SERPL-MCNC: 58 MG/DL
POTASSIUM BLD-SCNC: 4.6 MMOL/L (ref 3.4–5.3)
PROT SERPL-MCNC: 7.2 G/DL (ref 6.8–8.8)
PSA SERPL-MCNC: 0.65 UG/L (ref 0–4)
SODIUM SERPL-SCNC: 134 MMOL/L (ref 133–144)
T4 FREE SERPL-MCNC: 1.26 NG/DL (ref 0.76–1.46)
TRIGL SERPL-MCNC: 57 MG/DL
TSH SERPL DL<=0.005 MIU/L-ACNC: 3.43 MU/L (ref 0.4–4)

## 2022-06-27 PROCEDURE — G0103 PSA SCREENING: HCPCS

## 2022-06-27 PROCEDURE — 86706 HEP B SURFACE ANTIBODY: CPT

## 2022-06-27 PROCEDURE — 84439 ASSAY OF FREE THYROXINE: CPT

## 2022-06-27 PROCEDURE — 80053 COMPREHEN METABOLIC PANEL: CPT

## 2022-06-27 PROCEDURE — 84443 ASSAY THYROID STIM HORMONE: CPT

## 2022-06-27 PROCEDURE — 83036 HEMOGLOBIN GLYCOSYLATED A1C: CPT

## 2022-06-27 PROCEDURE — 36415 COLL VENOUS BLD VENIPUNCTURE: CPT

## 2022-06-27 PROCEDURE — 80061 LIPID PANEL: CPT

## 2022-06-28 NOTE — TELEPHONE ENCOUNTER
Routing refill request to provider for review/approval because:  Drug interaction warning      Kay Varner,RN

## 2022-06-30 RX ORDER — NAPROXEN 500 MG/1
TABLET ORAL
Qty: 180 TABLET | Refills: 0 | Status: SHIPPED | OUTPATIENT
Start: 2022-06-30 | End: 2022-12-16

## 2022-07-05 DIAGNOSIS — E10.43 TYPE 1 DIABETES MELLITUS WITH DIABETIC AUTONOMIC NEUROPATHY (H): ICD-10-CM

## 2022-07-05 RX ORDER — PROCHLORPERAZINE 25 MG/1
SUPPOSITORY RECTAL
Qty: 3 EACH | Refills: 5 | Status: SHIPPED | OUTPATIENT
Start: 2022-07-05 | End: 2023-01-03

## 2022-07-14 ENCOUNTER — OFFICE VISIT (OUTPATIENT)
Dept: PALLIATIVE MEDICINE | Facility: CLINIC | Age: 57
End: 2022-07-14
Payer: COMMERCIAL

## 2022-07-14 ENCOUNTER — MYC REFILL (OUTPATIENT)
Dept: FAMILY MEDICINE | Facility: CLINIC | Age: 57
End: 2022-07-14

## 2022-07-14 VITALS — HEART RATE: 74 BPM | DIASTOLIC BLOOD PRESSURE: 76 MMHG | SYSTOLIC BLOOD PRESSURE: 157 MMHG

## 2022-07-14 DIAGNOSIS — M47.812 CERVICAL SPONDYLOSIS WITHOUT MYELOPATHY: ICD-10-CM

## 2022-07-14 DIAGNOSIS — G89.4 CHRONIC PAIN SYNDROME: ICD-10-CM

## 2022-07-14 DIAGNOSIS — M54.16 LUMBAR RADICULOPATHY: ICD-10-CM

## 2022-07-14 DIAGNOSIS — M96.1 FAILED BACK SYNDROME: Primary | ICD-10-CM

## 2022-07-14 DIAGNOSIS — M48.02 CERVICAL STENOSIS OF SPINE: ICD-10-CM

## 2022-07-14 DIAGNOSIS — B02.29 POST HERPETIC NEURALGIA: ICD-10-CM

## 2022-07-14 DIAGNOSIS — G58.8 INTERCOSTAL NEURALGIA: ICD-10-CM

## 2022-07-14 PROCEDURE — 99215 OFFICE O/P EST HI 40 MIN: CPT | Performed by: PAIN MEDICINE

## 2022-07-14 RX ORDER — LIDOCAINE/PRILOCAINE 2.5 %-2.5%
CREAM (GRAM) TOPICAL PRN
Qty: 30 G | Refills: 3 | Status: SHIPPED | OUTPATIENT
Start: 2022-07-14 | End: 2023-12-13

## 2022-07-14 RX ORDER — OXYCODONE AND ACETAMINOPHEN 5; 325 MG/1; MG/1
1 TABLET ORAL EVERY 6 HOURS PRN
Qty: 70 TABLET | Refills: 0 | Status: SHIPPED | OUTPATIENT
Start: 2022-07-14 | End: 2022-08-15

## 2022-07-14 ASSESSMENT — PAIN SCALES - GENERAL: PAINLEVEL: MILD PAIN (2)

## 2022-07-14 NOTE — PATIENT INSTRUCTIONS
- Further procedures recommended:    - consider intercostal nerve block   - Medication Management:    - reasonable to continue medical cannabis   - reasonable continue as needed percocet   - ordered emla cream    - consider capsaicin cream  - Urine toxicology screen today: no   - Follow up: 1 year can be done virtually     ----------------------------------------------------------------  Clinic Number:  [phone]   Call with any questions about your care and for scheduling assistance.   Calls are returned Monday through Friday between 8 AM and 4:30 PM. We usually get back to you within 2 business days depending on the issue/request.    If we are prescribing your medications:  For opioid medication refills, call the clinic or send a [employer] message 7 days in advance.  Please include:  Name of requested medication  Name of the pharmacy.  For non-opioid medications, call your pharmacy directly to request a refill. Please allow 3-4 days to be processed.   Per [place] State Law:  All controlled substance prescriptions must be filled within 30 days of being written.    For those controlled substances allowing refills, pickup must occur within 30 days of last fill.      We believe regular attendance is key to your success in our program!    Any time you are unable to keep your appointment we ask that you call us at least 24 hours in advance to cancel.This will allow us to offer the appointment time to another patient.   Multiple missed appointments may lead to dismissal from the clinic.

## 2022-07-14 NOTE — NURSING NOTE
Patient presents to the clinic today for a follow up with Morro Rausch MD  regarding Pain Management.       PEG Score 7/14/2022   PEG Total Score 6.33        Alyssa Leach MA  [facility] [place]

## 2022-07-14 NOTE — PROGRESS NOTES
"[facility]    CHIEF COMPLAINT:   Chronic neck pain    INTERVAL HISTORY:  7/4/12    Recommendations/plan at the last visit included:    1. Physical Therapy:  NO   2. Clinical Health Psychologist:  NO  3. Diagnostic Studies:  None at this time  4. Medication Management:    1. Percocet 5-325 2/day. Okay to use 3 occasionally if needed.   2. Continue medical cannabis    5. Recommendations to PCP. See above      Interval: Transfer from [place]    The pt reports he has been doing well  The pt reports hx of LBP(failed back syndrome)  The pt reports hx of neck pain (prior fusion)  The pt reports also having severe gastrop  The pt reports benefit with stim, but battery consistently runs out  The pt reports amazing benefit with med cannabis  The pt reports since stim went dead he got more gi symp, and worsening BG control  He reports having an apt upcoming    Currently main issue is having GI concerns      The lbp is bilat  The pain is intermittent  The pain radiates to his plantar surface  The pain varies in severity and nature  The pain can sharp   The pain can be stabbing  Feels burning  \"hot nail\"  Neg numb  + ting  The pain is worse with flexion   Better with rest and meds  Denies any recent  Denies overt weakness  Feels sleep is affected by BG not pain     The pt neck pain  The pain is bilat mainly on the right  Deep axial   Worse ext and rot  The pt reports some benefit with rfa, but overall was not a pleasant experience  And does not want to to repeat    The pt also reports right rib pain is the setting of prior thoractomy   Pain Information:              Pain today 3/10       Annual Controlled Substance Agreement: signed: 3/10/21  UDS: 2/25/21    CURRENT RELEVANT PAIN MEDICATIONS:  Naproxen 500mg-taking 1/day  Oxycodone 5mg-taking 2 in AM and will occasionally take a 3rd in the afternoon    Imitrex-last used about 3-4 days ago  Medical Cannabis    Patient is using the medication as " "prescribed:  YES  Is your medication helpful? YES   Medication side effects? itching    Previous Medications: (H--helped; HI--Helped initially; SWH-- somewhat helpful, NH--No help; W--worse; SE--side effects)   Opiates: Oxycodone H, Tylenol #3 NH, Hydrocodone NH, Dilaudid H SE \"ick\", Morphine H SE nausea, Tramadol NH  NSAIDS: Naproxen H SE stomach upset  Muscle Relaxants: Flexeril H SE incontinence  Anti-migraine mediations: Imitrex H  Anti-depressants: Amitriptyline NH SE rapid heart  Sleep aids: none  Anxiolytics: Valium [facility]  Neuropathics: Gabapentin NH SE \"goofy\", Lyrica ? Was too expensive  Topicals: Voltaren Gel NH, Lidocaine NH  Other medications not covered above: Tylenol H, Prednisone H SE high sugars, Medical marijuana H SE cost constraints    Past Pain Treatments:  Pain Clinic:   No   PT: Yes, last went this year  Psychologist: No, but he did have to do some psychology prior to his back surgeryes  Relaxation techniques/biofeedback: No, but tries to do meditation at times   Chiropractor: No  Acupuncture: No  Pharmacotherapy:               Opioids: Yes                Non-opioids:    Yes   TENs Unit:Yes, was not helpful  Injections: Yes, right 3rd occipital nerve and right C3, C4, C5 RFA 02/28/2019, left 3rd occipital nerve, and left C3, C4, C5 RFA 03/15/2019. Had done low back injections in [place] as well (steroids mess with sugars therefore tries to avoid if possible).   Self-care:   Yes, cold works better (heat worsens pain)  Surgeries related to pain: Yes, C5-6 ACDF 2012. Disk replacement cervical spine 2006, low back fusions (2000, 2002)    [place] Board of Pharmacy Data Base Reviewed:    YES; As expected, no concern for misuse/abuse of controlled medications based on this report. 4/19/21    THE 4 As OF OPIOID MAINTENANCE ANALGESIA    Analgesia: Is pain relief clinically significant? YES   Activity: Is patient functional and able to perform Activities of Daily Living? YES   Adverse effects: Is " patient free from adverse side effects from opiates? NO   Adherence to Rx protocol: Is patient adhering to Controlled Substance Agreement and taking medications ONLY as ordered? YES       Is Narcan prescribed for opiate use >50 MME daily? N/A      Daily MME: 15-22.5  Exam:  Constitutional: healthy, alert and no distress  Head: Normocephalc  Neck: Neck supple sig dec rom   Cardiovascular: negative,  Respiratory: negative,   + SLR on the right   Musculoskeletal: extremities normal-   Skin: no suspicious lesions or rashes  Neurologic: Gait normal  +2 ach  + biceps  5/5 LE UE .  Psychiatric: mentation appears normal and affect normal/bright      Medications:  Current Outpatient Medications   Medication Sig Dispense Refill     amylase-lipase-protease (CREON 24) 62262-75301 units CPEP per EC capsule Take 1 capsule by mouth every other day       amylase-lipase-protease (PERTZYE) 69468 units CPEP Take 1 capsule by mouth every other day       atorvastatin (LIPITOR) 80 MG tablet TAKE 1 TABLET BY MOUTH EVERYDAY AT BEDTIME 90 tablet 0     citalopram (CELEXA) 40 MG tablet TAKE 1 TABLET BY MOUTH ONCE DAILY 90 tablet 3     Continuous Blood Gluc  (DEXCOM G6 ) AMBER Use to read glucose levels per 's instructions 1 Device 0     Continuous Blood Gluc Sensor (DEXCOM G6 SENSOR) MISC CHANGE EVERY 10 DAYS 3 each 5     Continuous Blood Gluc Transmit (DEXCOM G6 TRANSMITTER) MISC USE FOR CONTINUOUS GLUCOSE TESTING, CHANGE EVERY 90 DAYS) 1 each 3     CONTOUR NEXT TEST test strip Use to test blood sugar 5x times daily or as directed. 450 strip 3     insulin aspart (NOVOLOG VIAL) 100 UNITS/ML vial USE WITH INSULIN PUMP TOTAL DAILY DOSE APPROX 45 UNITS 20 mL 4     levothyroxine (SYNTHROID/LEVOTHROID) 112 MCG tablet TAKE ONE TABLET BY MOUTH ONCE DAILY (Patient taking differently: Take 112 mcg by mouth daily) 90 tablet 3     lisinopril (ZESTRIL) 40 MG tablet TAKE 1 TABLET BY MOUTH EVERY DAY 90 tablet 0     medical  cannabis (Patient's own supply) See Admin Instructions Topical creams and vaporizer cartridges  (The purpose of this order is to document that the patient reports taking medical cannabis.  This is not a prescription, and is not used to certify that the patient has a qualifying medical condition.)       naproxen (NAPROSYN) 500 MG tablet TAKE ONE TABLET BY MOUTH TWICE A DAY AS NEEDED FOR MODERATE PAIN 180 tablet 0     omeprazole (PRILOSEC) 20 MG DR capsule TAKE 1 CAPSULE BY MOUTH DAILY 90 capsule 1     ondansetron (ZOFRAN-ODT) 8 MG ODT tab Take 1 tablet (8 mg) by mouth every 8 hours as needed (for nauesa) 60 tablet 0     oxyCODONE-acetaminophen (PERCOCET) 5-325 MG tablet Take 1 tablet by mouth every 6 hours as needed for severe pain (*CAUTION OPIOD.  RISK OF OVERDOSE AND ADDICTION.**MAX OF 3 TABLETS PER DAY**) To last for month 70 tablet 0     SUMAtriptan (IMITREX) 100 MG tablet Take 1 tablet (100 mg) by mouth at onset of headache 1/2 to 1 tablet by mouth at onset of headache. May repeat 1 dose after 2 hours if headache persists. 9 tablet 3     naloxone (NARCAN) 4 MG/0.1ML nasal spray Spray 1 spray (4 mg) into one nostril alternating nostrils once as needed for opioid reversal every 2-3 minutes until assistance arrives (Patient not taking: Reported on 5/16/2022) 0.2 mL 0     DIAGNOSTIC TESTS:  Imaging Studies:   No new imaging to review    Assessment:  Stone De Paz is a 55 year old male who presents today for follow up regarding his:    Patient's current regimen allows him to maintain his functionality. Will continue current regimen. His pain has been quite stable, will discuss with his PCP about discharging him back to primary care. If discharged, I would see him yearly for his medical cannabis certification.     Plan:    Diagnosis reviewed, treatment option addressed, and risk/benifits discussed.  Self-care instructions given.  I am recommending a multidisciplinary treatment plan to help this patient better manage  pain.      - Further procedures recommended:    - consider intercostal nerve block   - Medication Management:    - reasonable to continue medical cannabis   - reasonable continue as needed percocet   - ordered emla cream    - consider capsaicin cream  - Urine toxicology screen today: no   - Follow up: 1 year can be done virtually       The total TIME spent on this patient on the day of the appointment was 35 minutes.   Time spent preparing to see the patient (reviewing records and tests)  Time spend face to face with the patient  Time spent ordering tests, medications, procedures and referrals  Time spent Referring and communicating with other healthcare professionals  Documenting clinical information in Epic          Natasha Padgett PA-C   [facility] [place]

## 2022-07-14 NOTE — TELEPHONE ENCOUNTER
oxyCODONE-acetaminophen (PERCOCET        Last Written Prescription Date:  6/14/22  Last Fill Quantity: 70,   # refills: 0  Last Office Visit: 5/16/22  Future Office visit:    Next 5 appointments (look out 90 days)    Jul 14, 2022  1:00 PM  Return Visit with Morro Rausch MD  [facility] [place] ([facility] [place] ) [address]  [place] [address]  [phone]           Routing refill request to provider for review/approval because:  Drug not on the [facility], [facility] or [facility] refill protocol or controlled substance

## 2022-07-15 ENCOUNTER — MEDICAL CORRESPONDENCE (OUTPATIENT)
Dept: HEALTH INFORMATION MANAGEMENT | Facility: CLINIC | Age: 57
End: 2022-07-15

## 2022-07-16 DIAGNOSIS — E03.9 HYPOTHYROIDISM, UNSPECIFIED TYPE: ICD-10-CM

## 2022-07-18 ENCOUNTER — VIRTUAL VISIT (OUTPATIENT)
Dept: ENDOCRINOLOGY | Facility: CLINIC | Age: 57
End: 2022-07-18
Payer: COMMERCIAL

## 2022-07-18 DIAGNOSIS — E03.9 HYPOTHYROIDISM, UNSPECIFIED TYPE: ICD-10-CM

## 2022-07-18 DIAGNOSIS — E10.40 TYPE 1 DIABETES MELLITUS WITH DIABETIC NEUROPATHY (H): ICD-10-CM

## 2022-07-18 DIAGNOSIS — E10.43 TYPE 1 DIABETES MELLITUS WITH DIABETIC AUTONOMIC NEUROPATHY (H): Primary | ICD-10-CM

## 2022-07-18 DIAGNOSIS — K31.84 GASTROPARESIS: ICD-10-CM

## 2022-07-18 DIAGNOSIS — Z96.41 INSULIN PUMP STATUS: ICD-10-CM

## 2022-07-18 PROCEDURE — 99214 OFFICE O/P EST MOD 30 MIN: CPT | Mod: 95 | Performed by: INTERNAL MEDICINE

## 2022-07-18 PROCEDURE — 95251 CONT GLUC MNTR ANALYSIS I&R: CPT | Performed by: INTERNAL MEDICINE

## 2022-07-18 RX ORDER — LEVOTHYROXINE SODIUM 112 UG/1
112 TABLET ORAL DAILY
Qty: 90 TABLET | Refills: 1 | Status: SHIPPED | OUTPATIENT
Start: 2022-07-18 | End: 2023-01-16

## 2022-07-18 NOTE — PROGRESS NOTES
"Patient is being evaluated via a billable video visit.      How would you like to obtain your AVS? Reviewed verbally  If the video visit is dropped, the invitation should be resent by cell phone  Will anyone else be joining your video visit? no      Video Start Time:  2:40 pm     Video-Visit Details    Type of service:  Video Visit    Video End Time: 3:05 pm    Originating Location (pt. Location): home    Distant Location (provider location):  [facility] [place]/home    Platform used for Video Visit:  JayashreeFlitto        Recent issues:  Diabetes and thyroid follow-up evaluation  Using the Medtronic 770G pump since 9/2021, along with the DexcomG6 CGM sensor... no recent pump data available today  Still has nausea, wonders about his gastric stimulator function  Using medical [facility] via pain clinic in [facility], which is helpful for pain and nausea  Still has issues with neck pain/stiffness            1995. Diagnosis of diabetes mellitus after 2nd back surgery, age 29  Initial treatment with insulin injections using NPH and Regular insulin  Switched to premixed 70/30 insulin BID dosing  1999. Started Medtronic insulin pump model 508, used base-dose Humalog insulin at meals  Had seen Dr. Iggy Briggs/[facility]     7/24/01. Initial evaluation with me at my former clinic ([facility])  Upgraded pump to Medtronic 523   Now using Medtronic 630G pump              Novolog in pump     Meals BID, had carb \"exchanges\" with his pump settings  Chronic high BG and hgbA1c trends despite dose changes  Tried square wave bolus  Has Contour Link? BG meter, variable testing pattern   Recently testing 4x/day for at least 60 days  Glucose sensor (CGMS) use:   Tried Medtronic CGMS in the past, didn't like it   Switched to DexcomG5, used for approx 6 months but issues with getting supply order   Early 2019, started Freestyle Sukhi but issues with skin site redness    Meal schedule:  Snacks during the day, supper " meal 5-7pm    Previous pump settings:       Recent [employer] data:    ~11/2020. Upgraded to DexcomG6 CGM sensor   Using the DexcomG6 consistently for at least past 60 days    Recent DexcomG6 data:           Previous  labs include:  Lab Results   Component Value Date    A1C 8.9 (H) 06/27/2022     06/27/2022    POTASSIUM 4.6 06/27/2022    CHLORIDE 101 06/27/2022    CO2 28 06/27/2022    ANIONGAP 5 06/27/2022     (H) 06/27/2022    BUN 14 06/27/2022    CR 1.02 06/27/2022    GFRESTIMATED 87 06/27/2022    GFRESTBLACK >90 03/01/2021    EMILEE 8.3 (L) 06/27/2022    CPEPT <0.1 (L) 06/24/2021    CHOL 120 06/27/2022    TRIG 57 06/27/2022    HDL 62 06/27/2022    LDL 47 06/27/2022    NHDL 58 06/27/2022    UCRR 120 05/16/2022    MICROL 6 03/02/2022    UMALCR 27.27 (H) 03/02/2022     Last eye exam date?  Goes to eye clinic in [facility]  7/2018. Met with Destiny Warren RD, FRANCAE at [facility]  DM Complications:              Neuropathy                          Peripheral neuropathy                                      Decreased sensation at feet                          Autonomic neuropathy- gastroparesis                                      Symptoms of nausea, bloating, episodes of diarrhea and emesis                                      Has Medtronic gastric stimulator                                      Sees physicians and Ms WARREN Rosario/[facility]                   Thyroid:  1994. History of hypothyroidism  Chronic levothyroxine treatment, has used Levoxyl in the past  Supplements:  Takes vitamin D 1000 U daily  7/2018. Had levothyroxine dose increase   Recent labs include:  Lab Results   Component Value Date    TSH 3.43 06/27/2022    T4 1.26 06/27/2022     Current dose:  Levothyroxine 0.112 mg daily        Lives in [place], [place] with Ada, also (1) adopted dog named Marko Chew Mai/[facility] for general medicine evaluations  Also sees Olga VIRK/[facility]  clinic.       PMH/PSH:  Past Medical History:   Diagnosis Date     Arthritis      Chronic neck pain     percocet for years now,      Depressive disorder      Gastroparesis      Gastroparesis due to DM (H)     gastric stimulator helps, [facility] manages that     Hyperlipidemia      Hypertension      Hypothyroidism      Neuropathy, diabetic (H)      Type 1 diabetes (H) age 31     Past Surgical History:   Procedure Laterality Date     COLONOSCOPY  07/18/12     FUSION CERVICAL ANTERIOR TWO LEVELS       INJECT BLOCK MEDIAL BRANCH CERVICAL/THORACIC/LUMBAR Bilateral 11/20/2018    Procedure: Bilateral Cervical 3-4-5 medial branch block;  Surgeon: Jef Edwards MD;  Location: [facility] OR     INJECT BLOCK MEDIAL BRANCH CERVICAL/THORACIC/LUMBAR N/A 1/11/2019    Procedure: Cervical 3,4,5 Bilateral Medial branch blocks;  Surgeon: Jef Edwards MD;  Location: [facility] OR     RADIO FREQUENCY ABLATION PULSED CERVICAL Right 2/28/2019    Procedure: RADIO FREQUENCY ABLATION CERVICAL THREE, FOUR, FIVE, AND THIRD OCCUPITAL NERVE RIGHT SIDE;  Surgeon: Jef Edwards MD;  Location: [facility]     RADIO FREQUENCY ABLATION PULSED CERVICAL Left 3/15/2019    Procedure: radiofrequency ablation cervical 3,4,5;  Surgeon: Jef Edwards MD;  Location: [facility]     RADIO FREQUENCY ABLATION PULSED CERVICAL Right 9/4/2020    Procedure: RADIOFREQUENCY ABLATION CERVICAL 3,4 5 right SIDE;  Surgeon: Jef Edwards MD;  Location: [facility]     RADIO FREQUENCY ABLATION PULSED CERVICAL Left 9/10/2020    Procedure: RADIOFREQUENCY ABLATION CERVICAL 3,4 5 LEFT SIDE;  Surgeon: Jef Edwards MD;  Location: [facility]     THORACOSCOPIC DECORTICATION LUNG  1/9/2014    Procedure: THORACOSCOPIC DECORTICATION LUNG;  RIGHT VATS/RIGHT THORACOTOMY , DECORTICATION RIGHT LUNG ,WEDGE RESECTION RIGHT MIDDLE LOBE LUNG NODULE;  Surgeon: Harley Felix MD;  Location:  OR     ZZC LUMBAR SPINE FUSION,ANTER APPRCH      2 L4-5 L5-21       Family Hx:  Family History   Problem Relation Age of  Onset     Hypertension Mother      Diabetes Father         type 2     Hypertension Father      Cerebrovascular Disease Maternal Grandmother      Unknown/Adopted Maternal Grandfather      Unknown/Adopted Paternal Grandmother      Unknown/Adopted Paternal Grandfather      Liver Disease Brother      Heart Disease Sister      Thyroid Disease Sister      Thyroid Disease Sister      Thyroid Disease Sister          Social Hx:  Social History     Socioeconomic History     Marital status:      Spouse name: Not on file     Number of children: Not on file     Years of education: Not on file     Highest education level: Not on file   Occupational History     Not on file   Tobacco Use     Smoking status: Former Smoker     Quit date: 2010     Years since quittin.5     Smokeless tobacco: Never Used   Substance and Sexual Activity     Alcohol use: No     Drug use: Yes     Types: Marijuana     Comment: medical marijuana     Sexual activity: Not Currently     Comment: Prescription Marijuana   Other Topics Concern     Parent/sibling w/ CABG, MI or angioplasty before 65F 55M? Not Asked   Social History Narrative     Not on file     Social Determinants of Health     Financial Resource Strain: Not on file   Food Insecurity: Not on file   Transportation Needs: Not on file   Physical Activity: Not on file   Stress: Not on file   Social Connections: Not on file   Intimate Partner Violence: Not on file   Housing Stability: Not on file          MEDICATIONS:  has a current medication list which includes the following prescription(s): amylase-lipase-protease, amylase-lipase-protease, atorvastatin, citalopram, dexcom g6 , dexcom g6 sensor, dexcom g6 transmitter, insulin aspart, levothyroxine, lidocaine-prilocaine, lisinopril, medical cannabis, omeprazole, ondansetron, oxycodone-acetaminophen, sumatriptan, contour next test, naloxone, and naproxen.    ROS: 10 point ROS neg other than the symptoms noted above in the  "HPI.     GENERAL: some fatigue, wt stable; denies fevers, chills, night sweats.   HEENT: no dysphagia, odonophagia, diplopia, neck pain  THYROID:  no apparent hyper or hypothyroid symptoms  CV: occasional chest pains; no pressure, palpitations  LUNGS: no SOB, KRAFT, cough, wheezing   ABDOMEN: some diarrhea \"collitis\" and postmeal nausea; no diarrhea, constipation  EXTREMITIES: no rashes, ulcers, edema  NEUROLOGY: no headaches, denies changes in vision, tingling, extremitiy numbness   MSK: neck pain/stiffness and back pains; no muscle weakness  SKIN: no rashes or lesions  PSYCH:  stable mood, no significant anxiety or depression  ENDOCRINE: no heat or cold intolerance    Physical Exam (visual exam)  VS:  no vital signs taken for video visit  CONSTITUTIONAL: healthy, alert and NAD, well dressed, answering questions appropriately  ENT: no nose swelling or nasal discharge, mouth redness or gum changes.  EYES: eyes grossly normal to inspection, conjunctivae and sclerae normal, no exophthalmos or proptosis  THYROID:  no apparent nodules or goiter  LUNGS: no audible wheeze, cough or visible cyanosis, no visible retractions or increased work of breathing  ABDOMEN: abdomen not evaluated  EXTREMITIES: no hand tremors, limited exam  NEUROLOGY: CN grossly intact, mentation intact and speech normal   SKIN:  no apparent skin lesions, rash, or edema with visualized skin appearance  PSYCH: mentation appears normal, affect normal/bright, judgement and insight intact,   normal speech and appearance well groomed       LABS:     All pertinent notes, labs, and images personally reviewed by me.        A/P:  Encounter Diagnoses   Name Primary?     Type 1 diabetes mellitus with diabetic autonomic neuropathy (H) Yes     Gastroparesis      Type 1 diabetes mellitus with diabetic peripheral neuropathy (H)      Insulin pump status      Hypothyroidism, unspecified type        Comments:  Reviewed complicated health history, diabetes and thyroid " "issues.  Recent SG trends show good daytime but high late evening and nighttime SG trends  Difficult to assess pump bolus and basal rate info without recent pump data however  Reviewed and interpreted tests that I previously ordered.   Ordered appropriate tests for the endocrinology disease management.    Management options discussed and implemented after shared medical decision making with the patient.  Diabetes problem is chronic with exacerbation progression, hyperglycemia    Plan:  Reviewed the overall T1DM management and insulin pump use.  Discussed optimal BG testing to assess glucose trends.  We reviewed insulin pump settings, basal rate and bolus dosing  Use of automated pump bolus dosing for meal/snack carb & correction dosing  Reviewed the [employer] report data today  Reviewed recent DexcomG6 CGM glucose sensor trends, in detail     Recommend:  Continue treatment plan using the Medtronic insulin pump and DexcomG6 CGM sensor  Use SG value for the pump bolus wizard dosing.  No pump setting changes at this time    Use genuine suppertime carb estimate for meal bolus and avoid decreasing carb estimate \"fudging\"  Use lower Temp Basal rate or suspend pump basal rate for aerobic exercise  We have discussed use of Medtronic 770G pump auto-uploaded to [employer]   Prepare for diabetes appointments by synchronizing pump data when he is on Wi-fi before appointments  Reviewed his worsening nausea symptom, importance of arranging f/u GI consultation   Needs assessment of gastric stimulator function, battery life   Also plan an appointment with the designated [facility] pain clinic provider, at [facility] if necessary  Continue use of the DexcomG6 CGM sensor               Patient makes frequent adjustments to insulin regimen on basis of therapeutic glucose testing   Reviewed interaction of the DexcomG6 and the Tandem TSlim pump  Would benefit from additional CDE evaluation at [facility]  No lab testing " needed at this time  Plan a diabetes education appt soon   Discussed the video visit option with the CDE at [facility]   Focus on the evening hyperglycemia, pre vs post meal bolusing, probable delayed GI absorption   Diab Ed Referral placed  Continue simvastatin 40 mg each evening  Continue current levothyroxine 0.112 mg daily dose, pharmacy Rx updated today  Arrange annual dilated eye exam, fasting lipid panel testing.  We reviewed importance of timely clinic appointments with me Q3 months, to satisfy Medicare with his diabetes device coverage  Will communicate feedback with his testing by phonecall and/or USMail    Addressed patient's questions today.     There are no Patient Instructions on file for this visit.    Future labs ordered today:   Orders Placed This Encounter   Procedures     GLUCOSE MONITOR, 72 HOUR, PHYS INTERP     AMB Adult Diabetes Educator Referral     Radiology/Consults ordered today: AMBULATORY ADULT DIABETES EDUCATOR REFERRAL    Total time spent in with the patient evaluation:  25 min  Additional time spent reviewing pertinent lab tests and chart notes, and documentation:  10 min    Follow-up:  Late 10/2022 or early 11/2022    VIKTOR Best MD, MS  Endocrinology  [facility]    CC:  SENAIT Raymond

## 2022-07-18 NOTE — LETTER
"    7/18/2022         RE: Stone De Paz  [address] [facility] [address]        Dear Colleague,    Thank you for referring your patient, Stone De Paz, to the [facility]. Please see a copy of my visit note below.    Patient is being evaluated via a billable video visit.      How would you like to obtain your AVS? Reviewed verbally  If the video visit is dropped, the invitation should be resent by cell phone  Will anyone else be joining your video visit? no      Video Start Time:  2:40 pm     Video-Visit Details    Type of service:  Video Visit    Video End Time: 3:05 pm    Originating Location (pt. Location): home    Distant Location (provider location):  [facility]/home    Platform used for Video Visit:  [employer]        Recent issues:  Diabetes and thyroid follow-up evaluation  Using the Medtronic 770G pump since 9/2021, along with the DexcomG6 CGM sensor... no recent pump data available today  Still has nausea, wonders about his gastric stimulator function  Using medical [facility] via pain clinic in [facility], which is helpful for pain and nausea  Still has issues with neck pain/stiffness            1995. Diagnosis of diabetes mellitus after 2nd back surgery, age 29  Initial treatment with insulin injections using NPH and Regular insulin  Switched to premixed 70/30 insulin BID dosing  1999. Started Medtronic insulin pump model 508, used base-dose Humalog insulin at meals  Had seen Dr. Iggy Briggs/[facility]     7/24/01. Initial evaluation with me at my former clinic ([facility])  Upgraded pump to Medtronic 523   Now using Medtronic 630G pump              Novolog in pump     Meals BID, had carb \"exchanges\" with his pump settings  Chronic high BG and hgbA1c trends despite dose changes  Tried square wave bolus  Has Contour Link? BG meter, variable testing pattern   Recently testing 4x/day for at least 60 days  Glucose sensor (CGMS) use:   Tried " Medtronic CGMS in the past, didn't like it   Switched to DexcomG5, used for approx 6 months but issues with getting supply order   Early 2019, started Freestyle Sukhi but issues with skin site redness    Meal schedule:  Snacks during the day, supper meal 5-7pm    Previous pump settings:       Recent Carelink data:    ~11/2020. Upgraded to DexcomG6 CGM sensor   Using the DexcomG6 consistently for at least past 60 days    Recent DexcomG6 data:           Previous  labs include:  Lab Results   Component Value Date    A1C 8.9 (H) 06/27/2022     06/27/2022    POTASSIUM 4.6 06/27/2022    CHLORIDE 101 06/27/2022    CO2 28 06/27/2022    ANIONGAP 5 06/27/2022     (H) 06/27/2022    BUN 14 06/27/2022    CR 1.02 06/27/2022    GFRESTIMATED 87 06/27/2022    GFRESTBLACK >90 03/01/2021    EMILEE 8.3 (L) 06/27/2022    CPEPT <0.1 (L) 06/24/2021    CHOL 120 06/27/2022    TRIG 57 06/27/2022    HDL 62 06/27/2022    LDL 47 06/27/2022    NHDL 58 06/27/2022    UCRR 120 05/16/2022    MICROL 6 03/02/2022    UMALCR 27.27 (H) 03/02/2022     Last eye exam date?  Goes to eye clinic in [facility]  7/2018. Met with Destiny Warren RD, CDE at [facility]  DM Complications:              Neuropathy                          Peripheral neuropathy                                      Decreased sensation at feet                          Autonomic neuropathy- gastroparesis                                      Symptoms of nausea, bloating, episodes of diarrhea and emesis                                      Has Medtronic gastric stimulator                                      Sees physicians and WARREN Gilmore/[place] [facility]                   Thyroid:  1994. History of hypothyroidism  Chronic levothyroxine treatment, has used Levoxyl in the past  Supplements:  Takes vitamin D 1000 U daily  7/2018. Had levothyroxine dose increase   Recent labs include:  Lab Results   Component Value Date    TSH 3.43 06/27/2022    T4 1.26 06/27/2022      Current dose:  Levothyroxine 0.112 mg daily        Lives in [place], [place] with Ada, also (1) adopted dog named Marko  Seesantiago Chew Mai/[facility] for general medicine evaluations  Also sees Olga Ramirez PAC/[place] [facility].       PMH/PSH:  Past Medical History:   Diagnosis Date     Arthritis      Chronic neck pain     percocet for years now,      Depressive disorder      Gastroparesis      Gastroparesis due to DM (H)     gastric stimulator [facility], [place] [facility] manages that     Hyperlipidemia      Hypertension      Hypothyroidism      Neuropathy, diabetic (H)      Type 1 diabetes (H) age 31     Past Surgical History:   Procedure Laterality Date     COLONOSCOPY  07/18/12     FUSION CERVICAL ANTERIOR TWO LEVELS       INJECT BLOCK MEDIAL BRANCH CERVICAL/THORACIC/LUMBAR Bilateral 11/20/2018    Procedure: Bilateral Cervical 3-4-5 medial branch block;  Surgeon: Jef Edwards MD;  Location: [facility] OR     INJECT BLOCK MEDIAL BRANCH CERVICAL/THORACIC/LUMBAR N/A 1/11/2019    Procedure: Cervical 3,4,5 Bilateral Medial branch blocks;  Surgeon: Jef Edwards MD;  Location: [facility] OR     RADIO FREQUENCY ABLATION PULSED CERVICAL Right 2/28/2019    Procedure: RADIO FREQUENCY ABLATION CERVICAL THREE, FOUR, FIVE, AND THIRD OCCUPITAL NERVE RIGHT SIDE;  Surgeon: Jef Edwards MD;  Location: [facility] OR     RADIO FREQUENCY ABLATION PULSED CERVICAL Left 3/15/2019    Procedure: radiofrequency ablation cervical 3,4,5;  Surgeon: Jef Edwards MD;  Location: [facility] OR     RADIO FREQUENCY ABLATION PULSED CERVICAL Right 9/4/2020    Procedure: RADIOFREQUENCY ABLATION CERVICAL 3,4 5 right SIDE;  Surgeon: Jef Edwards MD;  Location: [facility]     RADIO FREQUENCY ABLATION PULSED CERVICAL Left 9/10/2020    Procedure: RADIOFREQUENCY ABLATION CERVICAL 3,4 5 LEFT SIDE;  Surgeon: Jef Edwards MD;  Location: [facility] OR     THORACOSCOPIC DECORTICATION LUNG  1/9/2014    Procedure: THORACOSCOPIC DECORTICATION LUNG;  RIGHT VATS/RIGHT THORACOTOMY ,  DECORTICATION RIGHT LUNG ,WEDGE RESECTION RIGHT MIDDLE LOBE LUNG NODULE;  Surgeon: Harley Felix MD;  Location: [facility] OR     ZZC LUMBAR SPINE FUSION,ANTER APPRCH      2 L4-5 L5-21       Family Hx:  Family History   Problem Relation Age of Onset     Hypertension Mother      Diabetes Father         type 2     Hypertension Father      Cerebrovascular Disease Maternal Grandmother      Unknown/Adopted Maternal Grandfather      Unknown/Adopted Paternal Grandmother      Unknown/Adopted Paternal Grandfather      Liver Disease Brother      Heart Disease Sister      Thyroid Disease Sister      Thyroid Disease Sister      Thyroid Disease Sister          Social Hx:  Social History     Socioeconomic History     Marital status:      Spouse name: Not on file     Number of children: Not on file     Years of education: Not on file     Highest education level: Not on file   Occupational History     Not on file   Tobacco Use     Smoking status: Former Smoker     Quit date: 2010     Years since quittin.5     Smokeless tobacco: Never Used   Substance and Sexual Activity     Alcohol use: No     Drug use: Yes     Types: Marijuana     Comment: medical marijuana     Sexual activity: Not Currently     Comment: Prescription Marijuana   Other Topics Concern     Parent/sibling w/ CABG, MI or angioplasty before 65F 55M? Not Asked   Social History Narrative     Not on file     Social Determinants of Health     Financial Resource Strain: Not on file   Food Insecurity: Not on file   Transportation Needs: Not on file   Physical Activity: Not on file   Stress: Not on file   Social Connections: Not on file   Intimate Partner Violence: Not on file   Housing Stability: Not on file          MEDICATIONS:  has a current medication list which includes the following prescription(s): amylase-lipase-protease, amylase-lipase-protease, atorvastatin, citalopram, dexcom g6 , dexcom g6 sensor, dexcom g6 transmitter, insulin aspart,  "levothyroxine, lidocaine-prilocaine, lisinopril, medical cannabis, omeprazole, ondansetron, oxycodone-acetaminophen, sumatriptan, contour next test, naloxone, and naproxen.    ROS: 10 point ROS neg other than the symptoms noted above in the HPI.     GENERAL: some fatigue, wt stable; denies fevers, chills, night sweats.   HEENT: no dysphagia, odonophagia, diplopia, neck pain  THYROID:  no apparent hyper or hypothyroid symptoms  CV: occasional chest pains; no pressure, palpitations  LUNGS: no SOB, KRAFT, cough, wheezing   ABDOMEN: some diarrhea \"collitis\" and postmeal nausea; no diarrhea, constipation  EXTREMITIES: no rashes, ulcers, edema  NEUROLOGY: no headaches, denies changes in vision, tingling, extremitiy numbness   MSK: neck pain/stiffness and back pains; no muscle weakness  SKIN: no rashes or lesions  PSYCH:  stable mood, no significant anxiety or depression  ENDOCRINE: no heat or cold intolerance    Physical Exam (visual exam)  VS:  no vital signs taken for video visit  CONSTITUTIONAL: healthy, alert and NAD, well dressed, answering questions appropriately  ENT: no nose swelling or nasal discharge, mouth redness or gum changes.  EYES: eyes grossly normal to inspection, conjunctivae and sclerae normal, no exophthalmos or proptosis  THYROID:  no apparent nodules or goiter  LUNGS: no audible wheeze, cough or visible cyanosis, no visible retractions or increased work of breathing  ABDOMEN: abdomen not evaluated  EXTREMITIES: no hand tremors, limited exam  NEUROLOGY: CN grossly intact, mentation intact and speech normal   SKIN:  no apparent skin lesions, rash, or edema with visualized skin appearance  PSYCH: mentation appears normal, affect normal/bright, judgement and insight intact,   normal speech and appearance well groomed       LABS:     All pertinent notes, labs, and images personally reviewed by me.        A/P:  Encounter Diagnoses   Name Primary?     Type 1 diabetes mellitus with diabetic autonomic " "neuropathy (H) Yes     Gastroparesis      Type 1 diabetes mellitus with diabetic peripheral neuropathy (H)      Insulin pump status      Hypothyroidism, unspecified type        Comments:  Reviewed complicated health history, diabetes and thyroid issues.  Recent SG trends show good daytime but high late evening and nighttime SG trends  Difficult to assess pump bolus and basal rate info without recent pump data however  Reviewed and interpreted tests that I previously ordered.   Ordered appropriate tests for the endocrinology disease management.    Management options discussed and implemented after shared medical decision making with the patient.  Diabetes problem is chronic with exacerbation progression, hyperglycemia    Plan:  Reviewed the overall T1DM management and insulin pump use.  Discussed optimal BG testing to assess glucose trends.  We reviewed insulin pump settings, basal rate and bolus dosing  Use of automated pump bolus dosing for meal/snack carb & correction dosing  Reviewed the ZUCHEMlink report data today  Reviewed recent DexcomG6 CGM glucose sensor trends, in detail     Recommend:  Continue treatment plan using the Medtronic insulin pump and DexcomG6 CGM sensor  Use SG value for the pump bolus wizard dosing.  No pump setting changes at this time    Use genuine suppertime carb estimate for meal bolus and avoid decreasing carb estimate \"fudging\"  Use lower Temp Basal rate or suspend pump basal rate for aerobic exercise  We have discussed use of Medtronic 770G pump auto-uploaded to Carelink   Prepare for diabetes appointments by synchronizing pump data when he is on Wi-fi before appointments  Reviewed his worsening nausea symptom, importance of arranging f/u GI consultation   Needs assessment of gastric stimulator function, battery life   Also plan an appointment with the designated [facility] pain clinic provider, at [facility] if necessary  Continue use of the DexcomG6 CGM sensor               " Patient makes frequent adjustments to insulin regimen on basis of therapeutic glucose testing   Reviewed interaction of the DexcomG6 and the Tandem TSlim pump  Would benefit from additional CDE evaluation at [facility]  No lab testing needed at this time  Plan a diabetes education appt soon   Discussed the video visit option with the CDE at [facility]   Focus on the evening hyperglycemia, pre vs post meal bolusing, probable delayed GI absorption   Diab Ed Referral placed  Continue simvastatin 40 mg each evening  Continue current levothyroxine 0.112 mg daily dose, pharmacy Rx updated today  Arrange annual dilated eye exam, fasting lipid panel testing.  We reviewed importance of timely clinic appointments with me Q3 months, to satisfy Medicare with his diabetes device coverage  Will communicate feedback with his testing by phonecall and/or USMail    Addressed patient's questions today.     There are no Patient Instructions on file for this visit.    Future labs ordered today:   Orders Placed This Encounter   Procedures     GLUCOSE MONITOR, 72 HOUR, PHYS INTERP     AMB Adult Diabetes Educator Referral     Radiology/Consults ordered today: AMBULATORY ADULT DIABETES EDUCATOR REFERRAL    Total time spent in with the patient evaluation:  25 min  Additional time spent reviewing pertinent lab tests and chart notes, and documentation:  10 min    Follow-up:  Late 10/2022 or early 11/2022    VIKTOR Best MD, MS  Endocrinology  [facility]    CC:  SENAIT Raymond                                Again, thank you for allowing me to participate in the care of your patient.        Sincerely,        Frankie Best MD

## 2022-07-19 ENCOUNTER — DOCUMENTATION ONLY (OUTPATIENT)
Dept: ENDOCRINOLOGY | Facility: CLINIC | Age: 57
End: 2022-07-19

## 2022-07-19 ENCOUNTER — TELEPHONE (OUTPATIENT)
Dept: ENDOCRINOLOGY | Facility: CLINIC | Age: 57
End: 2022-07-19

## 2022-07-19 RX ORDER — LEVOTHYROXINE SODIUM 112 UG/1
TABLET ORAL
Qty: 90 TABLET | Refills: 3 | OUTPATIENT
Start: 2022-07-19

## 2022-07-19 NOTE — PROGRESS NOTES
Medtronic- Pump supplies  Form(s) have been completed faxed.  Faxed or mailed to: number listed on form.  Form(s) in accordion file for 1 month then to scan.    Janneth Begum MA

## 2022-07-19 NOTE — TELEPHONE ENCOUNTER
"Refilled yesterday.      Requested Prescriptions   Pending Prescriptions Disp Refills     levothyroxine (SYNTHROID/LEVOTHROID) 112 MCG tablet [Pharmacy Med Name: LEVOTHYROXINE SODIUM 112MCG TABS] 90 tablet 3     Sig: TAKE ONE TABLET BY MOUTH ONCE DAILY       Thyroid Protocol Passed - 7/16/2022  6:51 AM        Passed - Patient is 12 years or older        Passed - Recent (12 mo) or future (30 days) visit within the authorizing provider's specialty     Patient has had an office visit with the authorizing provider or a provider within the authorizing providers department within the previous 12 mos or has a future within next 30 days. See \"Patient Info\" tab in inbasket, or \"Choose Columns\" in Meds & Orders section of the refill encounter.              Passed - Medication is active on med list        Passed - Normal TSH on file in past 12 months     Recent Labs   Lab Test 06/27/22  1343   TSH 3.43                 Sola Veliz RN    "

## 2022-07-20 ENCOUNTER — TELEPHONE (OUTPATIENT)
Dept: ENDOCRINOLOGY | Facility: CLINIC | Age: 57
End: 2022-07-20

## 2022-07-20 DIAGNOSIS — E10.43 TYPE 1 DIABETES MELLITUS WITH DIABETIC AUTONOMIC NEUROPATHY (H): Primary | ICD-10-CM

## 2022-07-20 NOTE — TELEPHONE ENCOUNTER
Diabetes Education Scheduling Outreach #1:    Call to patient to schedule. Left message with phone number to call to schedule.    Also sent Vyopta message for second attempt. Requested patient to call to schedule.    Brigida Saldana OnCall  Diabetes and Nutrition Scheduling

## 2022-07-25 RX ORDER — INSULIN ASPART 100 [IU]/ML
INJECTION, SOLUTION INTRAVENOUS; SUBCUTANEOUS
Qty: 20 ML | Refills: 3 | Status: SHIPPED | OUTPATIENT
Start: 2022-07-25 | End: 2023-02-09

## 2022-07-25 NOTE — TELEPHONE ENCOUNTER
Novolog vial  Prescription approved per [facility] Refill Protocol.    Юлия Carlos RN

## 2022-08-15 ENCOUNTER — MYC REFILL (OUTPATIENT)
Dept: FAMILY MEDICINE | Facility: CLINIC | Age: 57
End: 2022-08-15

## 2022-08-15 ENCOUNTER — MYC MEDICAL ADVICE (OUTPATIENT)
Dept: FAMILY MEDICINE | Facility: CLINIC | Age: 57
End: 2022-08-15

## 2022-08-15 DIAGNOSIS — G89.4 CHRONIC PAIN SYNDROME: ICD-10-CM

## 2022-08-15 DIAGNOSIS — M48.02 CERVICAL STENOSIS OF SPINE: ICD-10-CM

## 2022-08-15 RX ORDER — OXYCODONE AND ACETAMINOPHEN 5; 325 MG/1; MG/1
1 TABLET ORAL EVERY 6 HOURS PRN
Qty: 70 TABLET | Refills: 0 | Status: CANCELLED | OUTPATIENT
Start: 2022-08-15

## 2022-08-15 RX ORDER — OXYCODONE AND ACETAMINOPHEN 5; 325 MG/1; MG/1
1 TABLET ORAL EVERY 6 HOURS PRN
Qty: 70 TABLET | Refills: 0 | Status: SHIPPED | OUTPATIENT
Start: 2022-08-15 | End: 2022-09-16

## 2022-08-15 NOTE — TELEPHONE ENCOUNTER
Requested Prescriptions   Pending Prescriptions Disp Refills     oxyCODONE-acetaminophen (PERCOCET) 5-325 MG tablet 70 tablet 0     Sig: Take 1 tablet by mouth every 6 hours as needed for severe pain (*CAUTION OPIOD.  RISK OF OVERDOSE AND ADDICTION.**MAX OF 3 TABLETS PER DAY**) To last for month       There is no refill protocol information for this order            Last Written Prescription Date:  07/14/2022  Last Fill Quantity: 70,   # refills: 0  Last Office Visit: 05/16/2022  Future Office visit:       Routing refill request to provider for review/approval because:  Drug not on the [facility], [facility] or [facility] refill protocol or controlled substance

## 2022-09-12 ENCOUNTER — TRANSFERRED RECORDS (OUTPATIENT)
Dept: HEALTH INFORMATION MANAGEMENT | Facility: CLINIC | Age: 57
End: 2022-09-12

## 2022-09-27 ENCOUNTER — OFFICE VISIT (OUTPATIENT)
Dept: FAMILY MEDICINE | Facility: CLINIC | Age: 57
End: 2022-09-27
Payer: COMMERCIAL

## 2022-09-27 VITALS
WEIGHT: 161 LBS | HEART RATE: 82 BPM | BODY MASS INDEX: 24.48 KG/M2 | SYSTOLIC BLOOD PRESSURE: 122 MMHG | DIASTOLIC BLOOD PRESSURE: 68 MMHG | OXYGEN SATURATION: 98 % | TEMPERATURE: 97.1 F

## 2022-09-27 DIAGNOSIS — G89.4 CHRONIC PAIN SYNDROME: Primary | ICD-10-CM

## 2022-09-27 DIAGNOSIS — M48.02 CERVICAL STENOSIS OF SPINE: ICD-10-CM

## 2022-09-27 DIAGNOSIS — Z23 HIGH PRIORITY FOR 2019-NCOV VACCINE: ICD-10-CM

## 2022-09-27 DIAGNOSIS — Z87.891 PERSONAL HISTORY OF TOBACCO USE: ICD-10-CM

## 2022-09-27 PROCEDURE — 0124A COVID-19,PF,PFIZER BOOSTER BIVALENT: CPT | Performed by: FAMILY MEDICINE

## 2022-09-27 PROCEDURE — 90682 RIV4 VACC RECOMBINANT DNA IM: CPT | Performed by: FAMILY MEDICINE

## 2022-09-27 PROCEDURE — G0296 VISIT TO DETERM LDCT ELIG: HCPCS | Performed by: FAMILY MEDICINE

## 2022-09-27 PROCEDURE — 99214 OFFICE O/P EST MOD 30 MIN: CPT | Mod: 25 | Performed by: FAMILY MEDICINE

## 2022-09-27 PROCEDURE — G0008 ADMIN INFLUENZA VIRUS VAC: HCPCS | Mod: 59 | Performed by: FAMILY MEDICINE

## 2022-09-27 PROCEDURE — 91312 COVID-19,PF,PFIZER BOOSTER BIVALENT: CPT | Performed by: FAMILY MEDICINE

## 2022-09-27 ASSESSMENT — PATIENT HEALTH QUESTIONNAIRE - PHQ9
SUM OF ALL RESPONSES TO PHQ QUESTIONS 1-9: 9
SUM OF ALL RESPONSES TO PHQ QUESTIONS 1-9: 9
10. IF YOU CHECKED OFF ANY PROBLEMS, HOW DIFFICULT HAVE THESE PROBLEMS MADE IT FOR YOU TO DO YOUR WORK, TAKE CARE OF THINGS AT HOME, OR GET ALONG WITH OTHER PEOPLE: VERY DIFFICULT

## 2022-09-27 ASSESSMENT — PAIN SCALES - GENERAL: PAINLEVEL: MODERATE PAIN (5)

## 2022-09-27 NOTE — PROGRESS NOTES
Assessment & Plan       ICD-10-CM    1. Chronic pain syndrome  G89.4    2. Cervical stenosis of spine  M48.02    3. Personal history of tobacco use  Z87.891 Prof fee: Shared Decision Making for Lung Cancer Screening     CT Chest Lung Cancer Scrn Low Dose wo   4. High priority for 2019-nCoV vaccine  Z23 COVID-19,PF,PFIZER BOOSTER BIVALENT 12+Yrs     Due to cervical spine stenosis.  Been having it for years and overall it is stable and the pain is tolerable with the Percocet, naproxen and medical marijuana.  Been on these medications for years.  No misuse identified.  Reminded the pain contract, will need to follow-up every 4-6months. Been getting the medical marijuana prescription from DR. Biswas, the pain specialist.   Will continue with the Percocet as 70 tablets/month.  Naproxen as needed, encouraged to use it minimally if possible due to the GI side effect.  He has acid reflux with gastroparesis.          Return in about 6 months (around 3/27/2023) for folow up chronic pain.    Ilan Chew Mai, MD  [facility]    Nabeel Ritter is a 57 year old accompanied by his spouse, presenting for the following health issues:  Follow Up (Pain management/) and Imm/Inj (COVID-19 VACCINE)      History of Present Illness       Back Pain:  He presents for follow up of back pain. Patient's back pain is a chronic problem.  Location of back pain:  Right lower back, left lower back, right middle of back, left middle of back, right upper back, left upper back, right side of neck, left side of neck, right shoulder, left shoulder, right buttock and left buttock  Description of back pain: other  Back pain spreads: right buttocks, left buttocks, right thigh, left thigh, right knee, left knee, right foot, left foot, right shoulder, left shoulder, right side of neck and left side of neck    Since patient first noticed back pain, pain is: always present, but gets better and worse  Does back pain interfere with his  "job:  Not applicable      Headaches:   Since the patient's last clinic visit, headaches are: no change  The patient is getting headaches:  0-1- weekly  He is not able to do normal daily activities when he has a migraine.  The patient is taking the following rescue/relief medications:  Naproxyn (Aleve) and sumatriptan (Imitrex)   Patient states \"The relief is inconsistent\" from the rescue/relief medications.   The patient is taking the following medications to prevent migraines:  No medications to prevent migraines  In the past 4 weeks, the patient has gone to an Urgent Care or Emergency Room 0 times times due to headaches.    He eats 0-1 servings of fruits and vegetables daily.He consumes 0 sweetened beverage(s) daily.He exercises with enough effort to increase his heart rate 9 or less minutes per day.  He exercises with enough effort to increase his heart rate 3 or less days per week.   He is taking medications regularly.    Today's PHQ-9         PHQ-9 Total Score: 9    PHQ-9 Q9 Thoughts of better off dead/self-harm past 2 weeks :   Not at all    How difficult have these problems made it for you to do your work, take care of things at home, or get along with other people: Very difficult       Stone is here today with his significant other for follow-up on chronic pain.  He has chronic pain due to cervical stenosis that failed physical therapy, injection and surgery.  Was seeing a pain specialist who approve for his med regimen, including medical marijuana, 70 tablets of Percocet a month and naproxen. Been taking the Percocet as prescribed with no side effect.  Denied of drugs or alcohol.      Review of Systems   Constitutional, HEENT, cardiovascular, pulmonary, gi and gu systems are negative, except as otherwise noted.      Objective    /68 (Cuff Size: Adult Regular)   Pulse 82   Temp 97.1  F (36.2  C) (Temporal)   Wt 73 kg (161 lb)   SpO2 98%   BMI 24.48 kg/m    Body mass index is 24.48 kg/m .  Physical " Exam   GENERAL: alert and no distress.  NECK: Supple but tender with palpation to the cervical spine and its paraspinous muscle.  RESP: lungs clear to auscultation - no rales, rhonchi or wheezes  CV: regular rate and rhythm, no murmur  NEURO: Normal strength and tone, mentation intact and speech normal.  No focal neurological deficit.      No results found for any visits on 09/27/22.

## 2022-10-02 NOTE — PATIENT INSTRUCTIONS
Lung Cancer Screening   Frequently Asked Questions  If you are at high-risk for lung cancer, getting screened with low-dose computed tomography (LDCT) every year can help save your life. This handout offers answers to some of the most common questions about lung cancer screening. If you have other questions, please call [phone] ([phone]).     What is it?  Lung cancer screening uses special X-ray technology to create an image of your lung tissue. The exam is quick and easy and takes less than 10 seconds. We don t give you any medicine or use any needles. You can eat before and after the exam. You don t need to change your clothes as long as the clothing on your chest doesn t contain metal. But, you do need to be able to hold your breath for at least 6 seconds during the exam.    What is the goal of lung cancer screening?  The goal of lung cancer screening is to save lives. Many times, lung cancer is not found until a person starts having physical symptoms. Lung cancer screening can help detect lung cancer in the earliest stages when it may be easier to treat.    Who should be screened for lung cancer?  We suggest lung cancer screening for anyone who is at high-risk for lung cancer. You are in the high-risk group if you:      are between the ages of 55 and 79, and    have smoked at least 1 pack of cigarettes a day for 20 or more years, and    still smoke or have quit within the past 15 years.    However, if you have a new cough or shortness of breath, you should talk to your doctor before being screened.    Why does it matter if I have symptoms?  Certain symptoms can be a sign that you have a condition in your lungs that should be checked and treated by your doctor. These symptoms include fever, chest pain, a new or changing cough, shortness of breath that you have never felt before, coughing up blood or unexplained weight loss. Having any of these symptoms can greatly affect the results of lung  cancer screening.       Should all smokers get an LDCT lung cancer screening exam?  It depends. Lung cancer screening is for a very specific group of men and women who have a history of heavy smoking over a long period of time (see  Who should be screened for lung cancer  above).  I am in the high-risk group, but have been diagnosed with cancer in the past. Is LDCT lung cancer screening right for me?  In some cases, you should not have LDCT lung screening, such as when your doctor is already following your cancer with CT scan studies. Your doctor will help you decide if LDCT lung screening is right for you.  Do I need to have a screening exam every year?  Yes. If you are in the high-risk group described earlier, you should get an LDCT lung cancer screening exam every year until you are 79, or are no longer willing or able to undergo screening and possible procedures to diagnose and treat lung cancer.  How effective is LDCT at preventing death from lung cancer?  Studies have shown that LDCT lung cancer screening can lower the risk of death from lung cancer by 20 percent in people who are at high-risk.  What are the risks?  There are some risks and limitations of LDCT lung cancer screening. We want to make sure you understand the risks and benefits, so please let us know if you have any questions. Your doctor may want to talk with you more about these risks.    Radiation exposure: As with any exam that uses radiation, there is a very small increased risk of cancer. The amount of radiation in LDCT is small--about the same amount a person would get from a mammogram. Your doctor orders the exam when he or she feels the potential benefits outweigh the risks.    False negatives: No test is perfect, including LDCT. It is possible that you may have a medical condition, including lung cancer, that is not found during your exam. This is called a false negative result.    False positives and more testing: LDCT very often finds  something in the lung that could be cancer, but in fact is not. This is called a false positive result. False positive tests often cause anxiety. To make sure these findings are not cancer, you may need to have more tests. These tests will be done only if you give us permission. Sometimes patients need a treatment that can have side effects, such as a biopsy. For more information on false positives, see  What can I expect from the results?     Findings not related to lung cancer: Your LDCT exam also takes pictures of areas of your body next to your lungs. In a very small number of cases, the CT scan will show an abnormal finding in one of these areas, such as your kidneys, adrenal glands, liver or thyroid. This finding may not be serious, but you may need more tests. Your doctor can help you decide what other tests you may need, if any.  What can I expect from the results?  About 1 out of 4 LDCT exams will find something that may need more tests. Most of the time, these findings are lung nodules. Lung nodules are very small collections of tissue in the lung. These nodules are very common, and the vast majority--more than 97 percent--are not cancer (benign). Most are normal lymph nodes or small areas of scarring from past infections.  But, if a small lung nodule is found to be cancer, the cancer can be cured more than 90 percent of the time. To know if the nodule is cancer, we may need to get more images before your next yearly screening exam. If the nodule has suspicious features (for example, it is large, has an odd shape or grows over time), we will refer you to a specialist for further testing.  Will my doctor also get the results?  Yes. Your doctor will get a copy of your results.  Is it okay to keep smoking now that there s a cancer screening exam?  No. Tobacco is one of the strongest cancer-causing agents. It causes not only lung cancer, but other cancers and cardiovascular (heart) diseases as well. The damage  caused by smoking builds over time. This means that the longer you smoke, the higher your risk of disease. While it is never too late to quit, the sooner you quit, the better.  Where can I find help to quit smoking?  The best way to prevent lung cancer is to stop smoking. If you have already quit smoking, congratulations and keep it up! For help on quitting smoking, please call [employer] at 3-300-QUITNOW (1-200.106.6357) or the American [employer] Society at 1-898.162.5146 to find local resources near you.  One-on-one health coaching:  If you d prefer to work individually with a health care provider on tobacco cessation, we offer:      Medication Therapy Management:  Our specially trained pharmacists work closely with you and your doctor to help you quit smoking.  Call [phone] or [phone] (toll free).

## 2022-10-02 NOTE — PROGRESS NOTES
Lung Cancer Screening Shared Decision Making Visit     Stone De Paz, a 57 year old male, is eligible for lung cancer screening    History   Smoking Status     Former Smoker     Quit date: 1/1/2010   Smokeless Tobacco     Never Used       I have discussed with patient the risks and benefits of screening for lung cancer with low-dose CT.     The risks include:    radiation exposure: one low dose chest CT has as much ionizing radiation as about 15 chest x-rays, or 6 months of background radiation living in [place]      false positives: most findings/nodules are NOT cancer, but some might still require additional diagnostic evaluation, including biopsy    over-diagnosis: some slow growing cancers that might never have been clinically significant will be detected and treated unnecessarily     The benefit of early detection of lung cancer is contingent upon adherence to annual screening or more frequent follow up if indicated.     Furthermore, to benefit from screening, Stone must be willing and able to undergo diagnostic procedures, if indicated. Although no specific guide is available for determining severity of comorbidities, it is reasonable to withhold screening in patients who have greater mortality risk from other diseases.     We did discuss that the best way to prevent lung cancer is to not smoke.    Some patients may value a numeric estimation of lung cancer risk when evaluating if lung cancer screening is right for them, here is one calculator:          ShouldIScreen  Answers for HPI/ROS submitted by the patient on 9/27/2022  If you checked off any problems, how difficult have these problems made it for you to do your work, take care of things at home, or get along with other people?: Very difficult  PHQ9 TOTAL SCORE: 9  Headache Symptoms are: no change  How often are you getting headaches or migraines? : 0-1- weekly  Are you able to do normal daily activities when you have a migraine?: No  Migraine  Rescue/Relief Medications:: Naproxyn (Aleve), sumatriptan (Imitrex)  Effectiveness of rescue/relief medications:: The relief is inconsistent  Migraine Preventative Medications:: no medications to prevent migraines  ER or UC Visits:: 0 times  Your back pain is: chronic  Where is your back pain located? : right lower back, left lower back, right middle of back, left middle of back, right upper back, left upper back, right side of neck, left side of neck, right shoulder, left shoulder, right buttock, left buttock  How would you describe your back pain? : other  Where does your back pain spread? : right buttocks, left buttocks, right thigh, left thigh, right knee, left knee, right foot, left foot, right shoulder, left shoulder, right side of neck, left side of neck  Since you noticed your back pain, how has it changed? : always present, but gets better and worse  Does your back pain interfere with your job?: Not applicable  How many servings of fruits and vegetables do you eat daily?: 0-1  On average, how many sweetened beverages do you drink each day (Examples: soda, juice, sweet tea, etc.  Do NOT count diet or artificially sweetened beverages)?: 0  How many minutes a day do you exercise enough to make your heart beat faster?: 9 or less  How many days a week do you exercise enough to make your heart beat faster?: 3 or less  How many days per week do you miss taking your medication?: 0

## 2022-10-03 ENCOUNTER — TRANSFERRED RECORDS (OUTPATIENT)
Dept: HEALTH INFORMATION MANAGEMENT | Facility: CLINIC | Age: 57
End: 2022-10-03

## 2022-10-03 LAB — RETINOPATHY: POSITIVE

## 2022-10-15 ENCOUNTER — MYC MEDICAL ADVICE (OUTPATIENT)
Dept: FAMILY MEDICINE | Facility: CLINIC | Age: 57
End: 2022-10-15

## 2022-10-15 ENCOUNTER — TELEPHONE (OUTPATIENT)
Dept: FAMILY MEDICINE | Facility: CLINIC | Age: 57
End: 2022-10-15

## 2022-10-19 ENCOUNTER — HOSPITAL ENCOUNTER (OUTPATIENT)
Dept: CT IMAGING | Facility: CLINIC | Age: 57
Discharge: HOME | End: 2022-10-19
Attending: FAMILY MEDICINE | Admitting: FAMILY MEDICINE
Payer: MEDICARE

## 2022-10-19 DIAGNOSIS — Z87.891 PERSONAL HISTORY OF TOBACCO USE: ICD-10-CM

## 2022-10-19 PROCEDURE — 71271 CT THORAX LUNG CANCER SCR C-: CPT

## 2022-10-27 ENCOUNTER — VIRTUAL VISIT (OUTPATIENT)
Dept: ENDOCRINOLOGY | Facility: CLINIC | Age: 57
End: 2022-10-27
Payer: COMMERCIAL

## 2022-10-27 DIAGNOSIS — E10.40 TYPE 1 DIABETES MELLITUS WITH DIABETIC NEUROPATHY (H): ICD-10-CM

## 2022-10-27 DIAGNOSIS — E10.43 TYPE 1 DIABETES MELLITUS WITH DIABETIC AUTONOMIC NEUROPATHY (H): Primary | ICD-10-CM

## 2022-10-27 DIAGNOSIS — Z96.41 INSULIN PUMP STATUS: ICD-10-CM

## 2022-10-27 DIAGNOSIS — K31.84 GASTROPARESIS: ICD-10-CM

## 2022-10-27 DIAGNOSIS — E03.9 HYPOTHYROIDISM, UNSPECIFIED TYPE: ICD-10-CM

## 2022-10-27 PROCEDURE — 95251 CONT GLUC MNTR ANALYSIS I&R: CPT | Performed by: INTERNAL MEDICINE

## 2022-10-27 PROCEDURE — 99214 OFFICE O/P EST MOD 30 MIN: CPT | Mod: 95 | Performed by: INTERNAL MEDICINE

## 2022-10-27 NOTE — PROGRESS NOTES
"Patient is being evaluated via a billable video visit.      How would you like to obtain your AVS? Verbally Reviewed  If the video visit is dropped, the invitation should be resent by: Cellphone  Will anyone else be joining your video visit? No        Video-Visit Details    Video Start Time: 1:31 pm    Type of service:  Video Visit    Video End Time: 1:55 pm    Originating Location (pt. Location):  Home    PROVIDER LOCATION On-site vs Off-site    Distant Location (provider location):  Home    [facility]        Recent issues:  Diabetes and thyroid follow-up evaluation  Using the Medtronic 770G pump since 9/2021, along with the DexcomG6 CGM sensor  Has plan for surgery appt to replace the gastric stimulator, surgery ~11/2022  Using medical [facility] via pain clinic in [facility], which is helpful for pain and nausea  Still has issues with neck pain/stiffness            1995. Diagnosis of diabetes mellitus after 2nd back surgery, age 29  Initial treatment with insulin injections using NPH and Regular insulin  Switched to premixed 70/30 insulin BID dosing  1999. Started Medtronic insulin pump model 508, used base-dose Humalog insulin at meals  Had seen Dr. Iggy Briggs/[facility]     7/24/01. Initial evaluation with me at my former clinic ([facility])  Upgraded pump to Medtronic 523   Now using Medtronic 630G pump              Novolog in pump     Meals BID, had carb \"exchanges\" with his pump settings  Chronic high BG and hgbA1c trends despite dose changes  Tried square wave bolus  Has Contour Link? BG meter, variable testing pattern   Recently testing 4x/day for at least 60 days  Glucose sensor (CGMS) use:   Tried Medtronic CGMS in the past, didn't like it   Switched to DexcomG5, used for approx 6 months but issues with getting supply order   Early 2019, started Freestyle Sukhi but issues with skin site redness    Meal schedule:  Snacks during the day, supper meal 5-7pm  Current pump " settings:       Recent Carelink data:      ~1    Recent DexcomG6 data:           Previous  labs include:  Lab Results   Component Value Date    A1C 8.9 (H) 06/27/2022     06/27/2022    POTASSIUM 4.6 06/27/2022    CHLORIDE 101 06/27/2022    CO2 28 06/27/2022    ANIONGAP 5 06/27/2022     (H) 06/27/2022    BUN 14 06/27/2022    CR 1.02 06/27/2022    GFRESTIMATED 87 06/27/2022    GFRESTBLACK >90 03/01/2021    EMILEE 8.3 (L) 06/27/2022    CPEPT <0.1 (L) 06/24/2021    CHOL 120 06/27/2022    TRIG 57 06/27/2022    HDL 62 06/27/2022    LDL 47 06/27/2022    NHDL 58 06/27/2022    UCRR 120 05/16/2022    MICROL 6 03/02/2022    UMALCR 27.27 (H) 03/02/2022     Last eye exam date?  Goes to eye clinic in [facility]  7/2018. Met with Destiny Warren RD, CDE at [facility]  DM Complications:              Neuropathy                          Peripheral neuropathy                                      Decreased sensation at feet                          Autonomic neuropathy- gastroparesis                                      Symptoms of nausea, bloating, episodes of diarrhea and emesis                                      Has Seebright gastric stimulator                                      Sees physicians and Ms Olga Ramirez, PA/[facility]                   Thyroid:  1994. History of hypothyroidism  Chronic levothyroxine treatment, has used Levoxyl in the past  Supplements:  Takes vitamin D 1000 U daily  7/2018. Had levothyroxine dose increase   Recent labs include:  Lab Results   Component Value Date    TSH 3.43 06/27/2022    T4 1.26 06/27/2022     Current dose:  Levothyroxine 0.112 mg daily        Lives in [place], [place] with Ada, also (1) adopted dog named Marko  Seesantiago Chew Mai/[facility] for general medicine evaluations  Also sees Olga VIRK/[place] [facility].       PMH/PSH:  Past Medical History:   Diagnosis Date     Arthritis      Chronic neck pain     percocet for years now,      Depressive  disorder      Gastroparesis      Gastroparesis due to DM (H)     gastric stimulator helps, [facility] manages that     Hyperlipidemia      Hypertension      Hypothyroidism      Neuropathy, diabetic (H)      Type 1 diabetes (H) age 31     Past Surgical History:   Procedure Laterality Date     COLONOSCOPY  07/18/12     FUSION CERVICAL ANTERIOR TWO LEVELS       INJECT BLOCK MEDIAL BRANCH CERVICAL/THORACIC/LUMBAR Bilateral 11/20/2018    Procedure: Bilateral Cervical 3-4-5 medial branch block;  Surgeon: Jef Edwards MD;  Location: [facility] OR     INJECT BLOCK MEDIAL BRANCH CERVICAL/THORACIC/LUMBAR N/A 1/11/2019    Procedure: Cervical 3,4,5 Bilateral Medial branch blocks;  Surgeon: Jef Edwards MD;  Location: [facility] OR     RADIO FREQUENCY ABLATION PULSED CERVICAL Right 2/28/2019    Procedure: RADIO FREQUENCY ABLATION CERVICAL THREE, FOUR, FIVE, AND THIRD OCCUPITAL NERVE RIGHT SIDE;  Surgeon: Jef Edwards MD;  Location: [facility] OR     RADIO FREQUENCY ABLATION PULSED CERVICAL Left 3/15/2019    Procedure: radiofrequency ablation cervical 3,4,5;  Surgeon: Jef Edwards MD;  Location: [facility]     RADIO FREQUENCY ABLATION PULSED CERVICAL Right 9/4/2020    Procedure: RADIOFREQUENCY ABLATION CERVICAL 3,4 5 right SIDE;  Surgeon: Jef Edwards MD;  Location: [facility]     RADIO FREQUENCY ABLATION PULSED CERVICAL Left 9/10/2020    Procedure: RADIOFREQUENCY ABLATION CERVICAL 3,4 5 LEFT SIDE;  Surgeon: Jef Edwards MD;  Location: [facility]     THORACOSCOPIC DECORTICATION LUNG  1/9/2014    Procedure: THORACOSCOPIC DECORTICATION LUNG;  RIGHT VATS/RIGHT THORACOTOMY , DECORTICATION RIGHT LUNG ,WEDGE RESECTION RIGHT MIDDLE LOBE LUNG NODULE;  Surgeon: Harley Felix MD;  Location:  OR     ZZC LUMBAR SPINE FUSION,ANTER APPRCH      2 L4-5 L5-21       Family Hx:  Family History   Problem Relation Age of Onset     Hypertension Mother      Diabetes Father         type 2     Hypertension Father      Cerebrovascular Disease Maternal Grandmother       Unknown/Adopted Maternal Grandfather      Unknown/Adopted Paternal Grandmother      Unknown/Adopted Paternal Grandfather      Liver Disease Brother      Heart Disease Sister      Thyroid Disease Sister      Thyroid Disease Sister      Thyroid Disease Sister          Social Hx:  Social History     Socioeconomic History     Marital status:      Spouse name: Not on file     Number of children: Not on file     Years of education: Not on file     Highest education level: Not on file   Occupational History     Not on file   Tobacco Use     Smoking status: Former     Types: Cigarettes     Quit date: 2010     Years since quittin.8     Smokeless tobacco: Never   Substance and Sexual Activity     Alcohol use: No     Drug use: Yes     Types: Marijuana     Comment: medical marijuana     Sexual activity: Not Currently     Comment: Prescription Marijuana   Other Topics Concern     Parent/sibling w/ CABG, MI or angioplasty before 65F 55M? Not Asked   Social History Narrative     Not on file     Social Determinants of Health     Financial Resource Strain: Not on file   Food Insecurity: Not on file   Transportation Needs: Not on file   Physical Activity: Not on file   Stress: Not on file   Social Connections: Not on file   Intimate Partner Violence: Not on file   Housing Stability: Not on file          MEDICATIONS:  has a current medication list which includes the following prescription(s): amylase-lipase-protease, amylase-lipase-protease, atorvastatin, citalopram, dexcom g6 , dexcom g6 sensor, dexcom g6 transmitter, insulin aspart, levothyroxine, lidocaine-prilocaine, medical cannabis, naproxen, novolog vial, omeprazole, ondansetron, oxycodone-acetaminophen, sumatriptan, contour next test, and lisinopril.    ROS: 10 point ROS neg other than the symptoms noted above in the HPI.     GENERAL: some fatigue, wt stable; denies fevers, chills, night sweats.   HEENT: no dysphagia, odonophagia, diplopia, neck  pain  THYROID:  no apparent hyper or hypothyroid symptoms  CV: no recent chest pain, pressure, palpitations  LUNGS: no SOB, KRAFT, cough, wheezing   ABDOMEN: indigestion, postmeal nausea; no diarrhea, constipation  EXTREMITIES: no rashes, ulcers, edema  NEUROLOGY: no headaches, denies changes in vision, tingling, extremitiy numbness   MSK: neck pain/stiffness and back pains; no muscle weakness  SKIN: no rashes or lesions  PSYCH:  stable mood, no significant anxiety or depression  ENDOCRINE: no heat or cold intolerance    Physical Exam (visual exam)  VS:  no vital signs taken for video visit  CONSTITUTIONAL: healthy, alert and NAD, well dressed, answering questions appropriately  ENT: no nose swelling or nasal discharge, mouth redness or gum changes.  EYES: eyes grossly normal to inspection, conjunctivae and sclerae normal, no exophthalmos or proptosis  THYROID:  no apparent nodules or goiter  LUNGS: no audible wheeze, cough or visible cyanosis, no visible retractions or increased work of breathing  ABDOMEN: abdomen not evaluated  EXTREMITIES: no hand tremors, limited exam  NEUROLOGY: CN grossly intact, mentation intact and speech normal   SKIN:  no apparent skin lesions, rash, or edema with visualized skin appearance  PSYCH: mentation appears normal, affect normal/bright, judgement and insight intact,   normal speech and appearance well groomed       LABS:     All pertinent notes, labs, and images personally reviewed by me.        A/P:  Encounter Diagnoses   Name Primary?     Type 1 diabetes mellitus with diabetic autonomic neuropathy (H) Yes     Gastroparesis      Type 1 diabetes mellitus with diabetic peripheral neuropathy (H)      Insulin pump status      Hypothyroidism, unspecified type        Comments:  Reviewed complicated health history, diabetes and thyroid issues.  Recent SG trends show good glycemic control but occasional low's with hypoglycemia  Reviewed and interpreted tests that I previously ordered.  "  Ordered appropriate tests for the endocrinology disease management.    Management options discussed and implemented after shared medical decision making with the patient.  Diabetes problem is chronic with exacerbation progression, hyperglycemia    Plan:  Reviewed the overall T1DM management and insulin pump use.  Discussed optimal BG testing to assess glucose trends.  We reviewed insulin pump settings, basal rate and bolus dosing  Use of automated pump bolus dosing for meal/snack carb & correction dosing  Reviewed the [employer] report data today  Reviewed recent DexcomG6 CGM glucose sensor trends, in detail     Recommend:  Continue treatment plan using the Medtronic insulin pump and DexcomG6 CGM sensor  Use SG value for the pump bolus wizard dosing.  Pump setting changes:   Basals:  3a and 9a 0.625 to 0.575 unit(s)/hr   Bolus: ISF [place] and 8p 75 to 65    Use genuine suppertime carb estimate for meal bolus and avoid decreasing carb estimate \"fudging\"  Use lower Temp Basal rate or suspend pump basal rate for aerobic exercise  We have discussed use of Medtronic 770G pump auto-uploaded to [employer]   Prepare for diabetes appointments by synchronizing pump data when he is on Wi-fi before appointments  Keep scheduled plan for gastric pacer surgery and replacement  Continue use of the DexcomG6 CGM sensor               Patient makes frequent adjustments to insulin regimen on basis of therapeutic glucose testing   Reviewed interaction of the DexcomG6 and the Tandem TSlim pump  Would benefit from additional CDE evaluation at [facility]  Arrange lab testing at nearby [facility] clinic in next few weeks   Lab orders placed  Consider followup diabetes education appt   Discussed the video visit option with the CDE at [facility]   Focus on the evening hyperglycemia, pre vs post meal bolusing, probable delayed GI absorption  Continue simvastatin 40 mg each evening  Continue current levothyroxine 0.112 mg daily " dose, pharmacy Rx updated today  Arrange annual dilated eye exam, fasting lipid panel testing.  We reviewed importance of timely clinic appointments with me Q3 months, to satisfy Medicare with his diabetes device coverage  Will communicate feedback with his testing by phonecall and/or USMail    Addressed patient's questions today.     There are no Patient Instructions on file for this visit.    Future labs ordered today:   Orders Placed This Encounter   Procedures     GLUCOSE MONITOR, 72 HOUR, PHYS INTERP     TSH     T4 free     Basic metabolic panel     Hemoglobin A1c     Radiology/Consults ordered today: None    Total time spent on day of encounter:  36 min    Follow-up:  1/25/23 at 2:30 pm, Chelle Best MD, MS  Endocrinology  [facility]    CC:  SENAIT Raymond

## 2022-10-27 NOTE — Clinical Note
Much better glucose control... see the Dexcom images in my chart note.  Contact me if questions.  TL

## 2022-10-27 NOTE — LETTER
"    10/27/2022         RE: Stone De Paz  [address] [address]        Dear Colleague,    Thank you for referring your patient, Stone De Paz, to the [facility] [place]. Please see a copy of my visit note below.    Patient is being evaluated via a billable video visit.      How would you like to obtain your AVS? Verbally Reviewed  If the video visit is dropped, the invitation should be resent by: Cellphone  Will anyone else be joining your video visit? No        Video-Visit Details    Video Start Time: 1:31 pm    Type of service:  Video Visit    Video End Time: 1:55 pm    Originating Location (pt. Location):  Home    PROVIDER LOCATION On-site vs Off-site    Distant Location (provider location):  Home    Platform used for Video Visit: Terri        Recent issues:  Diabetes and thyroid follow-up evaluation  Using the Medtronic 770G pump since 9/2021, along with the DexcomG6 CGM sensor  Has plan for surgery appt to replace the gastric stimulator, surgery ~11/2022  Using medical [facility] via pain clinic in [facility], which is helpful for pain and nausea  Still has issues with neck pain/stiffness            1995. Diagnosis of diabetes mellitus after 2nd back surgery, age 29  Initial treatment with insulin injections using NPH and Regular insulin  Switched to premixed 70/30 insulin BID dosing  1999. Started Medtronic insulin pump model 508, used base-dose Humalog insulin at meals  Had seen Dr. Iggy Briggs/[facility]     7/24/01. Initial evaluation with me at my former clinic ([facility])  Upgraded pump to Medtronic 523   Now using Medtronic 630G pump              Novolog in pump     Meals BID, had carb \"exchanges\" with his pump settings  Chronic high BG and hgbA1c trends despite dose changes  Tried square wave bolus  Has Contour Link? BG meter, variable testing pattern   Recently testing 4x/day for at least 60 days  Glucose sensor (CGMS) use:   Tried Medtronic CGMS in the past, " didn't like it   Switched to DexcomG5, used for approx 6 months but issues with getting supply order   Early 2019, started Freestyle Sukhi but issues with skin site redness    Meal schedule:  Snacks during the day, supper meal 5-7pm  Current pump settings:       Recent Carelink data:      ~1    Recent DexcomG6 data:           Previous  labs include:  Lab Results   Component Value Date    A1C 8.9 (H) 06/27/2022     06/27/2022    POTASSIUM 4.6 06/27/2022    CHLORIDE 101 06/27/2022    CO2 28 06/27/2022    ANIONGAP 5 06/27/2022     (H) 06/27/2022    BUN 14 06/27/2022    CR 1.02 06/27/2022    GFRESTIMATED 87 06/27/2022    GFRESTBLACK >90 03/01/2021    EMILEE 8.3 (L) 06/27/2022    CPEPT <0.1 (L) 06/24/2021    CHOL 120 06/27/2022    TRIG 57 06/27/2022    HDL 62 06/27/2022    LDL 47 06/27/2022    NHDL 58 06/27/2022    UCRR 120 05/16/2022    MICROL 6 03/02/2022    UMALCR 27.27 (H) 03/02/2022     Last eye exam date?  Goes to eye clinic in [facility]  7/2018. Met with Destiny Warren RD, CDE at [facility]  DM Complications:              Neuropathy                          Peripheral neuropathy                                      Decreased sensation at feet                          Autonomic neuropathy- gastroparesis                                      Symptoms of nausea, bloating, episodes of diarrhea and emesis                                      Has [employer] gastric stimulator                                      Sees physicians and WARREN Gilmore/[facility]                   Thyroid:  1994. History of hypothyroidism  Chronic levothyroxine treatment, has used Levoxyl in the past  Supplements:  Takes vitamin D 1000 U daily  7/2018. Had levothyroxine dose increase   Recent labs include:  Lab Results   Component Value Date    TSH 3.43 06/27/2022    T4 1.26 06/27/2022     Current dose:  Levothyroxine 0.112 mg daily        Lives in [place], [place] with Ada, also (1) adopted dog named Marko Mcclure Dr.  Ilan Chew Mai/[facility] for general medicine evaluations  Also sees Olga Ramirez PAC/[place] [facility].       PMH/PSH:  Past Medical History:   Diagnosis Date     Arthritis      Chronic neck pain     percocet for years now,      Depressive disorder      Gastroparesis      Gastroparesis due to DM (H)     gastric stimulator helps, [facility] manages that     Hyperlipidemia      Hypertension      Hypothyroidism      Neuropathy, diabetic (H)      Type 1 diabetes (H) age 31     Past Surgical History:   Procedure Laterality Date     COLONOSCOPY  07/18/12     FUSION CERVICAL ANTERIOR TWO LEVELS       INJECT BLOCK MEDIAL BRANCH CERVICAL/THORACIC/LUMBAR Bilateral 11/20/2018    Procedure: Bilateral Cervical 3-4-5 medial branch block;  Surgeon: Jef Edwards MD;  Location: [facility] OR     INJECT BLOCK MEDIAL BRANCH CERVICAL/THORACIC/LUMBAR N/A 1/11/2019    Procedure: Cervical 3,4,5 Bilateral Medial branch blocks;  Surgeon: Jef Edwards MD;  Location: [facility] OR     RADIO FREQUENCY ABLATION PULSED CERVICAL Right 2/28/2019    Procedure: RADIO FREQUENCY ABLATION CERVICAL THREE, FOUR, FIVE, AND THIRD OCCUPITAL NERVE RIGHT SIDE;  Surgeon: Jef Edwards MD;  Location: [facility] OR     RADIO FREQUENCY ABLATION PULSED CERVICAL Left 3/15/2019    Procedure: radiofrequency ablation cervical 3,4,5;  Surgeon: Jef Edwards MD;  Location: [facility] OR     RADIO FREQUENCY ABLATION PULSED CERVICAL Right 9/4/2020    Procedure: RADIOFREQUENCY ABLATION CERVICAL 3,4 5 right SIDE;  Surgeon: Jef Edwards MD;  Location[facility]     RADIO FREQUENCY ABLATION PULSED CERVICAL Left 9/10/2020    Procedure: RADIOFREQUENCY ABLATION CERVICAL 3,4 5 LEFT SIDE;  Surgeon: Jef Edwards MD;  Location: [facility] OR     THORACOSCOPIC DECORTICATION LUNG  1/9/2014    Procedure: THORACOSCOPIC DECORTICATION LUNG;  RIGHT VATS/RIGHT THORACOTOMY , DECORTICATION RIGHT LUNG ,WEDGE RESECTION RIGHT MIDDLE LOBE LUNG NODULE;  Surgeon: Harley Felix MD;  Location:  OR     [facility] LUMBAR  SPINE FUSION,ANTER APPRCH      2 L4-5 L5-21       Family Hx:  Family History   Problem Relation Age of Onset     Hypertension Mother      Diabetes Father         type 2     Hypertension Father      Cerebrovascular Disease Maternal Grandmother      Unknown/Adopted Maternal Grandfather      Unknown/Adopted Paternal Grandmother      Unknown/Adopted Paternal Grandfather      Liver Disease Brother      Heart Disease Sister      Thyroid Disease Sister      Thyroid Disease Sister      Thyroid Disease Sister          Social Hx:  Social History     Socioeconomic History     Marital status:      Spouse name: Not on file     Number of children: Not on file     Years of education: Not on file     Highest education level: Not on file   Occupational History     Not on file   Tobacco Use     Smoking status: Former     Types: Cigarettes     Quit date: 2010     Years since quittin.8     Smokeless tobacco: Never   Substance and Sexual Activity     Alcohol use: No     Drug use: Yes     Types: Marijuana     Comment: medical marijuana     Sexual activity: Not Currently     Comment: Prescription Marijuana   Other Topics Concern     Parent/sibling w/ CABG, MI or angioplasty before 65F 55M? Not Asked   Social History Narrative     Not on file     Social Determinants of Health     Financial Resource Strain: Not on file   Food Insecurity: Not on file   Transportation Needs: Not on file   Physical Activity: Not on file   Stress: Not on file   Social Connections: Not on file   Intimate Partner Violence: Not on file   Housing Stability: Not on file          MEDICATIONS:  has a current medication list which includes the following prescription(s): amylase-lipase-protease, amylase-lipase-protease, atorvastatin, citalopram, dexcom g6 , dexcom g6 sensor, dexcom g6 transmitter, insulin aspart, levothyroxine, lidocaine-prilocaine, medical cannabis, naproxen, novolog vial, omeprazole, ondansetron, oxycodone-acetaminophen,  sumatriptan, contour next test, and lisinopril.    ROS: 10 point ROS neg other than the symptoms noted above in the HPI.     GENERAL: some fatigue, wt stable; denies fevers, chills, night sweats.   HEENT: no dysphagia, odonophagia, diplopia, neck pain  THYROID:  no apparent hyper or hypothyroid symptoms  CV: no recent chest pain, pressure, palpitations  LUNGS: no SOB, KRAFT, cough, wheezing   ABDOMEN: indigestion, postmeal nausea; no diarrhea, constipation  EXTREMITIES: no rashes, ulcers, edema  NEUROLOGY: no headaches, denies changes in vision, tingling, extremitiy numbness   MSK: neck pain/stiffness and back pains; no muscle weakness  SKIN: no rashes or lesions  PSYCH:  stable mood, no significant anxiety or depression  ENDOCRINE: no heat or cold intolerance    Physical Exam (visual exam)  VS:  no vital signs taken for video visit  CONSTITUTIONAL: healthy, alert and NAD, well dressed, answering questions appropriately  ENT: no nose swelling or nasal discharge, mouth redness or gum changes.  EYES: eyes grossly normal to inspection, conjunctivae and sclerae normal, no exophthalmos or proptosis  THYROID:  no apparent nodules or goiter  LUNGS: no audible wheeze, cough or visible cyanosis, no visible retractions or increased work of breathing  ABDOMEN: abdomen not evaluated  EXTREMITIES: no hand tremors, limited exam  NEUROLOGY: CN grossly intact, mentation intact and speech normal   SKIN:  no apparent skin lesions, rash, or edema with visualized skin appearance  PSYCH: mentation appears normal, affect normal/bright, judgement and insight intact,   normal speech and appearance well groomed       LABS:     All pertinent notes, labs, and images personally reviewed by me.        A/P:  Encounter Diagnoses   Name Primary?     Type 1 diabetes mellitus with diabetic autonomic neuropathy (H) Yes     Gastroparesis      Type 1 diabetes mellitus with diabetic peripheral neuropathy (H)      Insulin pump status      Hypothyroidism,  "unspecified type        Comments:  Reviewed complicated health history, diabetes and thyroid issues.  Recent SG trends show good glycemic control but occasional low's with hypoglycemia  Reviewed and interpreted tests that I previously ordered.   Ordered appropriate tests for the endocrinology disease management.    Management options discussed and implemented after shared medical decision making with the patient.  Diabetes problem is chronic with exacerbation progression, hyperglycemia    Plan:  Reviewed the overall T1DM management and insulin pump use.  Discussed optimal BG testing to assess glucose trends.  We reviewed insulin pump settings, basal rate and bolus dosing  Use of automated pump bolus dosing for meal/snack carb & correction dosing  Reviewed the [place] report data today  Reviewed recent DexcomG6 CGM glucose sensor trends, in detail     Recommend:  Continue treatment plan using the Medtronic insulin pump and DexcomG6 CGM sensor  Use SG value for the pump bolus wizard dosing.  Pump setting changes:   Basals:  3a and 9a 0.625 to 0.575 unit(s)/hr   Bolus: ISF [place] and 8p 75 to 65    Use genuine suppertime carb estimate for meal bolus and avoid decreasing carb estimate \"fudging\"  Use lower Temp Basal rate or suspend pump basal rate for aerobic exercise  We have discussed use of Medtronic 770G pump auto-uploaded to [employer]   Prepare for diabetes appointments by synchronizing pump data when he is on Wi-fi before appointments  Keep scheduled plan for gastric pacer surgery and replacement  Continue use of the DexcomG6 CGM sensor               Patient makes frequent adjustments to insulin regimen on basis of therapeutic glucose testing   Reviewed interaction of the DexcomG6 and the Tandem TSlim pump  Would benefit from additional CDE evaluation at [facility]  Arrange lab testing at nearby [facility] clinic in next few weeks   Lab orders placed  Consider followup diabetes education appt   Discussed " the video visit option with the CDE at [facility]   Focus on the evening hyperglycemia, pre vs post meal bolusing, probable delayed GI absorption  Continue simvastatin 40 mg each evening  Continue current levothyroxine 0.112 mg daily dose, pharmacy Rx updated today  Arrange annual dilated eye exam, fasting lipid panel testing.  We reviewed importance of timely clinic appointments with me Q3 months, to satisfy Medicare with his diabetes device coverage  Will communicate feedback with his testing by phonecall and/or USMail    Addressed patient's questions today.     There are no Patient Instructions on file for this visit.    Future labs ordered today:   Orders Placed This Encounter   Procedures     GLUCOSE MONITOR, 72 HOUR, PHYS INTERP     TSH     T4 free     Basic metabolic panel     Hemoglobin A1c     Radiology/Consults ordered today: None    Total time spent on day of encounter:  36 min    Follow-up:  1/25/23 at 2:30 pm, Chelle Best MD, MS  Endocrinology  [facility]    CC:  SENAIT Raymond                                Again, thank you for allowing me to participate in the care of your patient.        Sincerely,        Frankie Best MD

## 2022-11-02 DIAGNOSIS — E10.43 TYPE 1 DIABETES MELLITUS WITH DIABETIC AUTONOMIC NEUROPATHY (H): ICD-10-CM

## 2022-11-02 RX ORDER — PROCHLORPERAZINE 25 MG/1
SUPPOSITORY RECTAL
Qty: 1 EACH | Refills: 3 | Status: SHIPPED | OUTPATIENT
Start: 2022-11-02 | End: 2023-11-14

## 2022-11-02 NOTE — TELEPHONE ENCOUNTER
"Last Written Prescription Date:  11/11/21  Last Fill Quantity: 1,  # refills: 3   Last office visit: 10/27/22 with Dr. Best  Future Office Visit:          Requested Prescriptions   Pending Prescriptions Disp Refills     Continuous Blood Gluc Transmit (DEXCOM G6 TRANSMITTER) MISC [Pharmacy Med Name: DEXCOM G6 TRANSMITTER  MISC] 1 each 3     Sig: USE FOR CONTINUOUS GLUCOSE TESTING, CHANGE EVERY 90 DAYS)       Diabetic Supplies Protocol Passed - 11/2/2022 10:34 AM        Passed - Medication is active on med list        Passed - Patient is 18 years of age or older        Passed - Recent (6 mo) or future (30 days) visit within the authorizing provider's specialty     Patient had office visit in the last 6 months or has a visit in the next 30 days with authorizing provider.  See \"Patient Info\" tab in inbasket, or \"Choose Columns\" in Meds & Orders section of the refill encounter.               Refilled per protocol  Sola Veliz RN    "

## 2022-11-12 DIAGNOSIS — K21.9 GASTROESOPHAGEAL REFLUX DISEASE WITHOUT ESOPHAGITIS: ICD-10-CM

## 2022-11-15 NOTE — TELEPHONE ENCOUNTER
Is This Lesion Biopsy Proven?: No
Prilosec  Prescription approved per [facility] Refill Protocol.    
How Severe Are They?: mild
Is This A New Presentation, Or A Follow-Up?: Actinic Keratosis

## 2022-12-05 ENCOUNTER — TELEPHONE (OUTPATIENT)
Dept: FAMILY MEDICINE | Facility: CLINIC | Age: 57
End: 2022-12-05

## 2022-12-05 NOTE — TELEPHONE ENCOUNTER
Reason for Call:  Other appointment    Detailed comments: Stone is having surgery on 12/12/22 for gastric stimulator replacement and needs to have a pre op prior. He wants to see if Dr. Raymond could see him for that or if he can't would someone else at the clinic be able to? Please call and discuss    Phone Number Patient can be reached at: Home number on file [phone] (home)    Best Time: ASAP    Can we leave a detailed message on this number? YES    Call taken on 12/5/2022 at 12:04 PM by Maribell Yanez

## 2022-12-05 NOTE — TELEPHONE ENCOUNTER
Please help to fine if any provider can see him for preop this week.  My schedule this week is way overbook, will not be able to accommodate.  His surgery is on Monday.  Thank you

## 2022-12-06 NOTE — TELEPHONE ENCOUNTER
Is anyone able to see this patient for a Pre Op his Surgery is Monday 12/12/2022.    Nadira FRANCO     [facility]

## 2022-12-07 ENCOUNTER — MYC MEDICAL ADVICE (OUTPATIENT)
Dept: FAMILY MEDICINE | Facility: CLINIC | Age: 57
End: 2022-12-07

## 2022-12-07 NOTE — TELEPHONE ENCOUNTER
[facility]. Scheduled appointment with Dr. Castaneda this morning. Told patient to let us know if this does not work for him and we would cancel it.    Imani Hubbard, CMA

## 2022-12-08 NOTE — TELEPHONE ENCOUNTER
Please review patient's message. He is upset for not being able to get in.    WILLIAM MelaraN, RN

## 2022-12-09 ENCOUNTER — MYC MEDICAL ADVICE (OUTPATIENT)
Dept: ENDOCRINOLOGY | Facility: CLINIC | Age: 57
End: 2022-12-09

## 2022-12-12 DIAGNOSIS — I10 ESSENTIAL HYPERTENSION: ICD-10-CM

## 2022-12-12 NOTE — TELEPHONE ENCOUNTER
Unable to get ahold of patient his surgery is tomorrow and I dont see he had a preop    Maria Eugenia Johnson MA 12/12/2022

## 2022-12-14 RX ORDER — LISINOPRIL 40 MG/1
TABLET ORAL
Qty: 90 TABLET | Refills: 1 | Status: SHIPPED | OUTPATIENT
Start: 2022-12-14 | End: 2023-07-25

## 2022-12-14 NOTE — TELEPHONE ENCOUNTER
Prescription approved per [facility] Refill Protocol.    Sydni Mortensen, BSN, RN

## 2022-12-14 NOTE — TELEPHONE ENCOUNTER
Still unable to reach patient and surgery was yesterday will close encounter  Maria Eugenia Johnson MA 12/14/2022  .

## 2022-12-15 ENCOUNTER — MYC REFILL (OUTPATIENT)
Dept: FAMILY MEDICINE | Facility: CLINIC | Age: 57
End: 2022-12-15

## 2022-12-15 DIAGNOSIS — G89.4 CHRONIC PAIN SYNDROME: ICD-10-CM

## 2022-12-15 DIAGNOSIS — M48.02 CERVICAL STENOSIS OF SPINE: ICD-10-CM

## 2022-12-15 RX ORDER — OXYCODONE AND ACETAMINOPHEN 5; 325 MG/1; MG/1
1 TABLET ORAL EVERY 6 HOURS PRN
Qty: 70 TABLET | Refills: 0 | Status: SHIPPED | OUTPATIENT
Start: 2022-12-15 | End: 2023-01-16

## 2022-12-16 NOTE — TELEPHONE ENCOUNTER
Please remind patient that all narcotics or pain meds should come from this office only.  I saw that he received a small amount of [place] from the surgeon recently.  In the future, if he gets any pain med prescription from a different provider, they needs to be approved by this office as part of the pain contact.  Technically he broke the pain contact.  I understand that he had a surgery recently so I am okay with it.

## 2022-12-16 NOTE — TELEPHONE ENCOUNTER
Requested Prescriptions   Pending Prescriptions Disp Refills     oxyCODONE-acetaminophen (PERCOCET) 5-325 MG tablet 70 tablet 0     Sig: Take 1 tablet by mouth every 6 hours as needed for severe pain (7-10) (*CAUTION OPIOD.  RISK OF OVERDOSE AND ADDICTION.**MAX OF 3 TABLETS PER DAY**) To last for month       There is no refill protocol information for this order           Routing refill request to provider for review/approval because:  Drug not on the [facility], [facility] or [facility] refill protocol or controlled substance

## 2022-12-20 ENCOUNTER — MYC MEDICAL ADVICE (OUTPATIENT)
Dept: FAMILY MEDICINE | Facility: CLINIC | Age: 57
End: 2022-12-20

## 2022-12-29 ENCOUNTER — MYC MEDICAL ADVICE (OUTPATIENT)
Dept: PALLIATIVE MEDICINE | Facility: CLINIC | Age: 57
End: 2022-12-29

## 2023-01-02 NOTE — TELEPHONE ENCOUNTER
Will discuss at apt. Will need to eval prior to making any decisions going forward. Will also need to discuss with pcp

## 2023-01-02 NOTE — TELEPHONE ENCOUNTER
From: Stone De Paz      Created: 12/29/2022 12:56 PM        *-*-*This message has not been handled.*-*-*    Hi there Dr. Rausch. Good day to you. Stone De Paz here, I ve  seen you recently for medical cannabis certification and I have a request to ask of you. Can you please take over control of my pain medication management from my primary Dr. Raymond in [place]? I have an appointment with you in February for this reason, however it would be better for me if you could do it before then. I m willing to do whatever you need as far as coming in to sign a contract/agreement prior to the appointment we have.   I am having a hard time communicating with Dr. Raymond all too often and now in the process of looking for a new primary DrScarlet because of it.  I think it s in my best interest going forward to have my pain medication controlled through your practice if you are willing to help me with it.  I m sorry to bother you before our appointment like this.   Sincerely,   Stone De Paz  [phone]  Birthday 1965        Will discuss with Dr. Rausch.      Chalo Alejandre, RN  Patient Care Supervisor   [facility]

## 2023-01-03 DIAGNOSIS — E10.43 TYPE 1 DIABETES MELLITUS WITH DIABETIC AUTONOMIC NEUROPATHY (H): ICD-10-CM

## 2023-01-03 RX ORDER — PROCHLORPERAZINE 25 MG/1
SUPPOSITORY RECTAL
Qty: 3 EACH | Refills: 5 | Status: SHIPPED | OUTPATIENT
Start: 2023-01-03 | End: 2023-07-14

## 2023-01-03 NOTE — TELEPHONE ENCOUNTER
Last Written Prescription Date:  7/5/22  Last Fill Quantity: 3,  # refills: 5   Last office visit: 10/27/22 with Dr. Best  Future Office Visit: 1/25/23  Next 5 appointments (look out 90 days)    Mar 27, 2023  1:00 PM  (Arrive by 12:40 PM)  Provider Visit with Ilan Chew Mai, MD  [facility] ([facility] ) [address] [place] [id number] [phone]               Requested Prescriptions   Pending Prescriptions Disp Refills     Continuous Blood Gluc Sensor (DEXCOM G6 SENSOR) MISC [Pharmacy Med Name: DEXCOM G6 SENSOR  MISC]  5     Sig: CHANGE EVERY 10 DAYS       There is no refill protocol information for this order        Refills sent  Sola Veliz RN

## 2023-01-03 NOTE — TELEPHONE ENCOUNTER
Per patient myChart message:  From: Stone De Paz      Created: 1/3/2023 11:24 AM        Thank you Chasity, I totally understand, that should be no problem for me. I m just not happy requesting meds from my PCP any longer. I ve lost confidence he s looking out for my best interest. I figured a pain specialist is a better fit going forward for pain management as I look for a new PCP. I m sorry for taking up your time. I ll wait patiently and look forward to seeing the Dr.   thanks again   Stone de paz.

## 2023-01-03 NOTE — TELEPHONE ENCOUNTER
Per patient myChart message:  From: Stone De Paz      Created: 1/2/2023 3:23 PM      Thank you for your reply. I completely understand and I ll keep that appointment I intended on that. Please ask Dr Rausch to understand I m in the process of filling out a formal complaint against Dr Raymond with the clinic manager and medical director for Dr Quintanilla misunderstanding of the agreement we signed which Dr. Raymond is completely aware of, so please be objective. I don t want to take up too much time however I will say  that  I m in total disagreement with what Dr. Raymond has  accused me of no matter how small of an infraction it would be Nor would I agree to his understanding of the terms in the future. He interprets the contract differently than Natasha Padgett did. He is placing unreasonable expectations on my surgeon and myself to only get pain medication from him following surgery.   With that said of course I would expect Dr Rausch to  review it with Dr. Raymond..  Please thank him for his time in doing so. I ll be happy to discuss it with him at our future appointment in more detail.   Happy new year s       And   From: Stone De Paz      Created: 1/2/2023 3:46 PM      PS   I want to inform Dr Rausch that I did notify Dr. Raymond the day after I had surgery that I was prescribed 8 5-mg. Oxycodone by my surgeon immediately after surgery as I have done per protocol after multiple surgeries in the past with Natasha   I am, have been and always will be honest and forthcoming with any of my providers.   Thanks again for your time.   Stone         _________________________________    Mychart sent to pt:  From: Chasity Castaneda RN      Created: 1/3/2023 10:41 AM      Marlena Bloom this requires an appointment before any medication can be prescribed.  You can try calling on Monday mornings to check for cancellations. [phone]         IRINA Gaspar-BSN  [facility]

## 2023-01-16 DIAGNOSIS — E03.9 HYPOTHYROIDISM, UNSPECIFIED TYPE: ICD-10-CM

## 2023-01-16 RX ORDER — LEVOTHYROXINE SODIUM 112 UG/1
TABLET ORAL
Qty: 90 TABLET | Refills: 1 | Status: SHIPPED | OUTPATIENT
Start: 2023-01-16 | End: 2023-04-27 | Stop reason: DRUGHIGH

## 2023-01-16 NOTE — TELEPHONE ENCOUNTER
"Last Written Prescription Date:  7/18/22  Last Fill Quantity: 90,  # refills: 1   Last office visit: 10/27/22 with Dr. Best  Future Office Visit: 1/25/23  Next 5 appointments (look out 90 days)    Mar 27, 2023  1:00 PM  (Arrive by 12:40 PM)  Provider Visit with [facility]w [facility] [address] [place] [id number] [phone]               Requested Prescriptions   Pending Prescriptions Disp Refills     levothyroxine (SYNTHROID/LEVOTHROID) 112 MCG tablet [Pharmacy Med Name: LEVOTHYROXINE SODIUM 112MCG TABS] 90 tablet 1     Sig: TAKE ONE TABLET BY MOUTH ONCE DAILY       Thyroid Protocol Passed - 1/16/2023  6:28 AM        Passed - Patient is 12 years or older        Passed - Recent (12 mo) or future (30 days) visit within the authorizing provider's specialty     Patient has had an office visit with the authorizing provider or a provider within the authorizing providers department within the previous 12 mos or has a future within next 30 days. See \"Patient Info\" tab in inbasket, or \"Choose Columns\" in Meds & Orders section of the refill encounter.              Passed - Medication is active on med list        Passed - Normal TSH on file in past 12 months     Recent Labs   Lab Test 06/27/22  1343   TSH 3.43                 Refills sent  Sola Veliz RN    "

## 2023-01-25 ENCOUNTER — VIRTUAL VISIT (OUTPATIENT)
Dept: ENDOCRINOLOGY | Facility: CLINIC | Age: 58
End: 2023-01-25
Payer: COMMERCIAL

## 2023-01-25 DIAGNOSIS — E03.9 HYPOTHYROIDISM, UNSPECIFIED TYPE: ICD-10-CM

## 2023-01-25 DIAGNOSIS — Z96.41 INSULIN PUMP STATUS: ICD-10-CM

## 2023-01-25 DIAGNOSIS — E10.43 TYPE 1 DIABETES MELLITUS WITH DIABETIC AUTONOMIC NEUROPATHY (H): Primary | ICD-10-CM

## 2023-01-25 DIAGNOSIS — K31.84 GASTROPARESIS: ICD-10-CM

## 2023-01-25 PROCEDURE — 99215 OFFICE O/P EST HI 40 MIN: CPT | Mod: 95 | Performed by: INTERNAL MEDICINE

## 2023-01-25 PROCEDURE — 95251 CONT GLUC MNTR ANALYSIS I&R: CPT | Performed by: INTERNAL MEDICINE

## 2023-01-25 NOTE — PROGRESS NOTES
"Patient is being evaluated via a billable video visit.      How would you like to obtain your AVS? Verbally Reviewed  If the video visit is dropped, the invitation should be resent by: Cellphone  Will anyone else be joining your video visit? No        Video-Visit Details    Video Start Time: 2:40 pm    Type of service:  Video Visit    Video End Time:  3:00 pm    Originating Location (pt. Location):  Home    PROVIDER LOCATION On-site vs Off-site    Distant Location (provider location):  Home    [facility] for [facility] Visit[facility]        Recent issues:  Diabetes and thyroid follow-up evaluation  Using the Medtronic 770G pump since 9/2021, along with the DexcomG6 CGM sensor  Taking the medical [facility] via pain clinic in [facility], which is helpful for pain and nausea  Had replacement (#10) of Medtronic gastric stimulator mid 12/2022, but reports it takes time for device to become effective...    Initial emesis then \"collitis\" phase   Higher glucose trends noted  Still has issues with neck pain/stiffness            1995. Diagnosis of diabetes mellitus after 2nd back surgery, age 29  Initial treatment with insulin injections using NPH and Regular insulin  Switched to premixed 70/30 insulin BID dosing  1999. Started Medtronic insulin pump model 508, used base-dose Humalog insulin at meals  Had seen Dr. Iggy Briggs/[facility]     7/24/01. Initial evaluation with me at my former clinic ([facility])  Upgraded pump to Medtronic 523   Now using Medtronic 630G pump              Novolog in pump     Meals BID, had carb \"exchanges\" with his pump settings  Chronic high BG and hgbA1c trends despite dose changes  Tried square wave bolus  Has Contour Link? BG meter, variable testing pattern   Recently testing 4x/day for at least 60 days  Glucose sensor (CGMS) use:   Tried Medtronic CGMS in the past, didn't like it   Switched to DexcomG5, used for approx 6 months but issues with getting supply order   Early 2019, started " Freestyle Sukhi but issues with skin site redness    Meal schedule:  Snacks during the day, supper meal 5-7pm  Previous pump settings:       Recent Carelink data:  Info not available today    Recent DexcomG6 data:              Previous [facility] labs include:      Recent  labs include:  Lab Results   Component Value Date    A1C 8.9 (H) 06/27/2022     06/27/2022    POTASSIUM 4.6 06/27/2022    CHLORIDE 101 06/27/2022    CO2 28 06/27/2022    ANIONGAP 5 06/27/2022     (H) 06/27/2022    BUN 14 06/27/2022    CR 1.02 06/27/2022    GFRESTIMATED 87 06/27/2022    GFRESTBLACK >90 03/01/2021    EMILEE 8.3 (L) 06/27/2022    CPEPT <0.1 (L) 06/24/2021    CHOL 120 06/27/2022    TRIG 57 06/27/2022    HDL 62 06/27/2022    LDL 47 06/27/2022    NHDL 58 06/27/2022    UCRR 120 05/16/2022    MICROL 6 03/02/2022    UMALCR 27.27 (H) 03/02/2022     Last eye exam date?  Goes to eye clinic in [facility]  7/2018. Met with Destiny Warren RD, CDE at [facility]  DM Complications:              Neuropathy                          Peripheral neuropathy                                      Decreased sensation at feet                          Autonomic neuropathy- gastroparesis                                      Symptoms of nausea, bloating, episodes of diarrhea and emesis                                      Has Medtronic gastric stimulator                                      Sees physicians and Ms WARREN Rosario/[facility]                   Thyroid:  1994. History of hypothyroidism  Chronic levothyroxine treatment, has used Levoxyl in the past  Supplements:  Takes vitamin D 1000 U daily  7/2018. Had levothyroxine dose increase   Recent labs include:  Lab Results   Component Value Date    TSH 3.43 06/27/2022    T4 1.26 06/27/2022     Current dose:  Levothyroxine 0.112 mg daily        Lives in [place], [place] with Ada, also (1) adopted dog named Marko  Has seen Dr. Ilan Chew Mai/[facility] for general medicine  evaluations  Also sees Ogla Ramirez PAC/[place] [facility].       PMH/PSH:  Past Medical History:   Diagnosis Date     Arthritis      Chronic neck pain     percocet for years now,      Depressive disorder      Gastroparesis due to DM (H)     gastric stimulator helps, [facility] manages that     Hyperlipidemia      Hypertension      Hypothyroidism      Neuropathy, diabetic (H)      Type 1 diabetes (H) age 31     Past Surgical History:   Procedure Laterality Date     COLONOSCOPY  07/18/12     FUSION CERVICAL ANTERIOR TWO LEVELS       INJECT BLOCK MEDIAL BRANCH CERVICAL/THORACIC/LUMBAR Bilateral 11/20/2018    Procedure: Bilateral Cervical 3-4-5 medial branch block;  Surgeon: Jef Edwards MD;  Location: [facility] OR     INJECT BLOCK MEDIAL BRANCH CERVICAL/THORACIC/LUMBAR N/A 1/11/2019    Procedure: Cervical 3,4,5 Bilateral Medial branch blocks;  Surgeon: Jef Edwards MD;  Location: [facility] OR     RADIO FREQUENCY ABLATION PULSED CERVICAL Right 2/28/2019    Procedure: RADIO FREQUENCY ABLATION CERVICAL THREE, FOUR, FIVE, AND THIRD OCCUPITAL NERVE RIGHT SIDE;  Surgeon: Jef Edwards MD;  Location: [facility]     RADIO FREQUENCY ABLATION PULSED CERVICAL Left 3/15/2019    Procedure: radiofrequency ablation cervical 3,4,5;  Surgeon: Jef Edwards MD;  Location: [facility]     RADIO FREQUENCY ABLATION PULSED CERVICAL Right 9/4/2020    Procedure: RADIOFREQUENCY ABLATION CERVICAL 3,4 5 right SIDE;  Surgeon: Jef Edwards MD;  Location: [facility]     RADIO FREQUENCY ABLATION PULSED CERVICAL Left 9/10/2020    Procedure: RADIOFREQUENCY ABLATION CERVICAL 3,4 5 LEFT SIDE;  Surgeon: Jef Edwards MD;  Location: [facility]     THORACOSCOPIC DECORTICATION LUNG  1/9/2014    Procedure: THORACOSCOPIC DECORTICATION LUNG;  RIGHT VATS/RIGHT THORACOTOMY , DECORTICATION RIGHT LUNG ,WEDGE RESECTION RIGHT MIDDLE LOBE LUNG NODULE;  Surgeon: Harley Felix MD;  Location: [facility] OR     ZZC LUMBAR SPINE FUSION,ANTER APPRCH      2 L4-5 L5-21       Family Hx:  Family  History   Problem Relation Age of Onset     Hypertension Mother      Diabetes Father         type 2     Hypertension Father      Cerebrovascular Disease Maternal Grandmother      Unknown/Adopted Maternal Grandfather      Unknown/Adopted Paternal Grandmother      Unknown/Adopted Paternal Grandfather      Liver Disease Brother      Heart Disease Sister      Thyroid Disease Sister      Thyroid Disease Sister      Thyroid Disease Sister          Social Hx:  Social History     Socioeconomic History     Marital status:      Spouse name: Not on file     Number of children: Not on file     Years of education: Not on file     Highest education level: Not on file   Occupational History     Not on file   Tobacco Use     Smoking status: Former     Types: Cigarettes     Quit date: 2010     Years since quittin.0     Smokeless tobacco: Never   Substance and Sexual Activity     Alcohol use: No     Drug use: Yes     Types: Marijuana     Comment: medical marijuana     Sexual activity: Not Currently     Comment: Prescription Marijuana   Other Topics Concern     Parent/sibling w/ CABG, MI or angioplasty before 65F 55M? Not Asked   Social History Narrative     Not on file     Social Determinants of Health     Financial Resource Strain: Not on file   Food Insecurity: Not on file   Transportation Needs: Not on file   Physical Activity: Not on file   Stress: Not on file   Social Connections: Not on file   Intimate Partner Violence: Not on file   Housing Stability: Not on file          MEDICATIONS:  has a current medication list which includes the following prescription(s): amylase-lipase-protease, amylase-lipase-protease, atorvastatin, citalopram, dexcom g6 sensor, dexcom g6 transmitter, insulin aspart, levothyroxine, lidocaine-prilocaine, lisinopril, medical cannabis, naproxen, novolog vial, omeprazole, ondansetron, oxycodone-acetaminophen, sumatriptan, dexcom g6 , and contour next test.    ROS: 10 point ROS neg  other than the symptoms noted above in the HPI.     GENERAL: some fatigue, wt stable; denies fevers, chills, night sweats.   HEENT: no dysphagia, odonophagia, diplopia, neck pain  THYROID:  no apparent hyper or hypothyroid symptoms  CV: no recent chest pain, pressure, palpitations  LUNGS: no SOB, KRAFT, cough, wheezing   ABDOMEN: indigestion, postmeal nausea, frequent BM's; no constipation  EXTREMITIES: no rashes, ulcers, edema  NEUROLOGY: no headaches, denies changes in vision, tingling, extremitiy numbness   MSK: neck pain/stiffness and back pains; no muscle weakness  SKIN: no rashes or lesions  PSYCH:  stable mood, no significant anxiety or depression  ENDOCRINE: no heat or cold intolerance    Physical Exam (visual exam)  VS:  no vital signs taken for video visit  CONSTITUTIONAL: healthy, alert and NAD, well dressed, answering questions appropriately  ENT: no nose swelling or nasal discharge, mouth redness or gum changes.  EYES: eyes grossly normal to inspection, conjunctivae and sclerae normal, no exophthalmos or proptosis  THYROID:  no apparent nodules or goiter  LUNGS: no audible wheeze, cough or visible cyanosis, no visible retractions or increased work of breathing  ABDOMEN: abdomen not evaluated  EXTREMITIES: no hand tremors, limited exam  NEUROLOGY: CN grossly intact, mentation intact and speech normal   SKIN:  no apparent skin lesions, rash, or edema with visualized skin appearance  PSYCH: mentation appears normal, affect normal/bright, judgement and insight intact,   normal speech and appearance well groomed       LABS:     All pertinent notes, labs, and images personally reviewed by me.     A/P:  Encounter Diagnoses   Name Primary?     Type 1 diabetes mellitus with diabetic autonomic neuropathy (H) Yes     Gastroparesis      Insulin pump status      Hypothyroidism, unspecified type        Comments:  Reviewed complicated health history, diabetes and thyroid issues.  Recent SG trends show good glycemic  "control but occasional low's with hypoglycemia  Reviewed and interpreted tests that I previously ordered.   Ordered appropriate tests for the endocrinology disease management.    Management options discussed and implemented after shared medical decision making with the patient.  Diabetes problem is chronic with exacerbation progression, hyperglycemia    Plan:  Reviewed the overall T1DM management and insulin pump use.  Discussed optimal BG testing to assess glucose trends.  We reviewed insulin pump settings, basal rate and bolus dosing  Use of automated pump bolus dosing for meal/snack carb & correction dosing  Reviewed the CRMnextlink report data today  Reviewed recent DexcomG6 CGM glucose sensor trends, in detail     Recommend:  Continue treatment plan using the Medtronic insulin pump and DexcomG6 CGM sensor  Use SG value for the pump bolus wizard dosing.  Pump setting changes:   Bolus: ISF [place] and 8p 65 to 55    Use genuine suppertime carb estimate for meal bolus and avoid decreasing carb estimate \"fudging\"  Use lower Temp Basal rate or suspend pump basal rate for aerobic exercise  We have discussed use of Medtronic 770G pump auto-uploaded to [employer]   Prepare for diabetes appointments by synchronizing pump data when he is on Wi-fi before appointments   Encouraged patient to call the [employer] CDE (TONI) for assistance with his Carelink connection issues  Continue use of the DexcomG6 CGM sensor               Patient makes frequent adjustments to insulin regimen on basis of therapeutic glucose testing  Would benefit from additional CDE evaluation at [facility]  Arrange lab testing at nearby [facility] clinic in next few weeks   Lab orders placed  Consider followup diabetes education appt   Discussed the video visit option with the CDE at [facility]   Focus on the evening hyperglycemia, pre vs post meal bolusing, probable delayed GI absorption  Continue simvastatin 40 mg each evening  Continue " current levothyroxine 0.112 mg daily dose  Arrange annual dilated eye exam, fasting lipid panel testing.  We reviewed importance of timely clinic appointments with me Q3 months, to satisfy Medicare with his diabetes device coverage  Will communicate feedback with his testing by phonecall and/or USMail    Addressed patient's questions today.     There are no Patient Instructions on file for this visit.    Future labs ordered today:   Orders Placed This Encounter   Procedures     GLUCOSE MONITOR, 72 HOUR, PHYS INTERP     Hemoglobin A1c     Basic metabolic panel     Albumin Random Urine Quantitative with Creat Ratio     TSH     T4 free     Radiology/Consults ordered today: None    Total time spent on day of encounter:  42 min    Follow-up:  4/27/23 at 3pm, Chelle Best MD, MS  Endocrinology  [facility]

## 2023-01-25 NOTE — LETTER
"    1/25/2023         RE: Stone De Paz  [id number] [facility] [address]        Dear Colleague,    Thank you for referring your patient, Stone De Paz, to the [facility] [place]. Please see a copy of my visit note below.    Patient is being evaluated via a billable video visit.      How would you like to obtain your AVS? Verbally Reviewed  If the video visit is dropped, the invitation should be resent by: Cellphone  Will anyone else be joining your video visit? No        Video-Visit Details    Video Start Time: 2:40 pm    Type of service:  Video Visit    Video End Time:  3:00 pm    Originating Location (pt. Location):  Home    PROVIDER LOCATION On-site vs Off-site    Distant Location (provider location):  Home    Platform used for Video Visit: Terri        Recent issues:  Diabetes and thyroid follow-up evaluation  Using the Medtronic 770G pump since 9/2021, along with the DexcomG6 CGM sensor  Taking the medical [facility] via pain clinic in [facility], which is helpful for pain and nausea  Had replacement (#10) of Medtronic gastric stimulator mid 12/2022, but reports it takes time for device to become effective...    Initial emesis then \"collitis\" phase   Higher glucose trends noted  Still has issues with neck pain/stiffness            1995. Diagnosis of diabetes mellitus after 2nd back surgery, age 29  Initial treatment with insulin injections using NPH and Regular insulin  Switched to premixed 70/30 insulin BID dosing  1999. Started Medtronic insulin pump model 508, used base-dose Humalog insulin at meals  Had seen Dr. Iggy Briggs/[facility]     7/24/01. Initial evaluation with me at my former clinic ([facility])  Upgraded pump to Medtronic 523   Now using Medtronic 630G pump              Novolog in pump     Meals BID, had carb \"exchanges\" with his pump settings  Chronic high BG and hgbA1c trends despite dose changes  Tried square wave bolus  Has Contour Link? BG meter, variable " testing pattern   Recently testing 4x/day for at least 60 days  Glucose sensor (CGMS) use:   Tried Medtronic CGMS in the past, didn't like it   Switched to DexcomG5, used for approx 6 months but issues with getting supply order   Early 2019, started Freestyle Sukhi but issues with skin site redness    Meal schedule:  Snacks during the day, supper meal 5-7pm  Previous pump settings:       Recent Carelink data:  Info not available today    Recent DexcomG6 data:              Previous [facility] labs include:      Recent  labs include:  Lab Results   Component Value Date    A1C 8.9 (H) 06/27/2022     06/27/2022    POTASSIUM 4.6 06/27/2022    CHLORIDE 101 06/27/2022    CO2 28 06/27/2022    ANIONGAP 5 06/27/2022     (H) 06/27/2022    BUN 14 06/27/2022    CR 1.02 06/27/2022    GFRESTIMATED 87 06/27/2022    GFRESTBLACK >90 03/01/2021    EMILEE 8.3 (L) 06/27/2022    CPEPT <0.1 (L) 06/24/2021    CHOL 120 06/27/2022    TRIG 57 06/27/2022    HDL 62 06/27/2022    LDL 47 06/27/2022    NHDL 58 06/27/2022    UCRR 120 05/16/2022    MICROL 6 03/02/2022    UMALCR 27.27 (H) 03/02/2022     Last eye exam date?  Goes to eye clinic in [facility]  7/2018. Met with Destiny Warren RD, CDE at [facility]  DM Complications:              Neuropathy                          Peripheral neuropathy                                      Decreased sensation at feet                          Autonomic neuropathy- gastroparesis                                      Symptoms of nausea, bloating, episodes of diarrhea and emesis                                      Has Medtronic gastric stimulator                                      Sees physicians and WARREN Gilmore/[place] [facility]                   Thyroid:  1994. History of hypothyroidism  Chronic levothyroxine treatment, has used Levoxyl in the past  Supplements:  Takes vitamin D 1000 U daily  7/2018. Had levothyroxine dose increase   Recent labs include:  Lab Results   Component Value  Date    TSH 3.43 06/27/2022    T4 1.26 06/27/2022     Current dose:  Levothyroxine 0.112 mg daily        Lives in [place], [place] with Ada, also (1) adopted dog named Marko  Has seen Dr. Ilan Chew Mai/[facility] for general medicine evaluations  Also sees Olga Ramirez PAC/[place] [facility].       PMH/PSH:  Past Medical History:   Diagnosis Date     Arthritis      Chronic neck pain     percocet for years now,      Depressive disorder      Gastroparesis due to DM (H)     gastric stimulator [facility], [place] [facility] manages that     Hyperlipidemia      Hypertension      Hypothyroidism      Neuropathy, diabetic (H)      Type 1 diabetes (H) age 31     Past Surgical History:   Procedure Laterality Date     COLONOSCOPY  07/18/12     FUSION CERVICAL ANTERIOR TWO LEVELS       INJECT BLOCK MEDIAL BRANCH CERVICAL/THORACIC/LUMBAR Bilateral 11/20/2018    Procedure: Bilateral Cervical 3-4-5 medial branch block;  Surgeon: Jef Edwards MD;  Location: [facility] OR     INJECT BLOCK MEDIAL BRANCH CERVICAL/THORACIC/LUMBAR N/A 1/11/2019    Procedure: Cervical 3,4,5 Bilateral Medial branch blocks;  Surgeon: Jef Edwards MD;  Location: [facility] OR     RADIO FREQUENCY ABLATION PULSED CERVICAL Right 2/28/2019    Procedure: RADIO FREQUENCY ABLATION CERVICAL THREE, FOUR, FIVE, AND THIRD OCCUPITAL NERVE RIGHT SIDE;  Surgeon: Jef Edwards MD;  Location: [facility]     RADIO FREQUENCY ABLATION PULSED CERVICAL Left 3/15/2019    Procedure: radiofrequency ablation cervical 3,4,5;  Surgeon: Jef Edwards MD;  Location: [facility]     RADIO FREQUENCY ABLATION PULSED CERVICAL Right 9/4/2020    Procedure: RADIOFREQUENCY ABLATION CERVICAL 3,4 5 right SIDE;  Surgeon: Jef Edwards MD;  Location: [facility]     RADIO FREQUENCY ABLATION PULSED CERVICAL Left 9/10/2020    Procedure: RADIOFREQUENCY ABLATION CERVICAL 3,4 5 LEFT SIDE;  Surgeon: Jef Edwards MD;  Location: [facility]     THORACOSCOPIC DECORTICATION LUNG  1/9/2014    Procedure: THORACOSCOPIC DECORTICATION LUNG;  RIGHT  VATS/RIGHT THORACOTOMY , DECORTICATION RIGHT LUNG ,WEDGE RESECTION RIGHT MIDDLE LOBE LUNG NODULE;  Surgeon: Harley Felix MD;  Location: [facility] OR     ZZC LUMBAR SPINE FUSION,ANTER APPRCH      2 L4-5 L5-21       Family Hx:  Family History   Problem Relation Age of Onset     Hypertension Mother      Diabetes Father         type 2     Hypertension Father      Cerebrovascular Disease Maternal Grandmother      Unknown/Adopted Maternal Grandfather      Unknown/Adopted Paternal Grandmother      Unknown/Adopted Paternal Grandfather      Liver Disease Brother      Heart Disease Sister      Thyroid Disease Sister      Thyroid Disease Sister      Thyroid Disease Sister          Social Hx:  Social History     Socioeconomic History     Marital status:      Spouse name: Not on file     Number of children: Not on file     Years of education: Not on file     Highest education level: Not on file   Occupational History     Not on file   Tobacco Use     Smoking status: Former     Types: Cigarettes     Quit date: 2010     Years since quittin.0     Smokeless tobacco: Never   Substance and Sexual Activity     Alcohol use: No     Drug use: Yes     Types: Marijuana     Comment: medical marijuana     Sexual activity: Not Currently     Comment: Prescription Marijuana   Other Topics Concern     Parent/sibling w/ CABG, MI or angioplasty before 65F 55M? Not Asked   Social History Narrative     Not on file     Social Determinants of Health     Financial Resource Strain: Not on file   Food Insecurity: Not on file   Transportation Needs: Not on file   Physical Activity: Not on file   Stress: Not on file   Social Connections: Not on file   Intimate Partner Violence: Not on file   Housing Stability: Not on file          MEDICATIONS:  has a current medication list which includes the following prescription(s): amylase-lipase-protease, amylase-lipase-protease, atorvastatin, citalopram, dexcom g6 sensor, dexcom g6 transmitter,  insulin aspart, levothyroxine, lidocaine-prilocaine, lisinopril, medical cannabis, naproxen, novolog vial, omeprazole, ondansetron, oxycodone-acetaminophen, sumatriptan, dexcom g6 , and contour next test.    ROS: 10 point ROS neg other than the symptoms noted above in the HPI.     GENERAL: some fatigue, wt stable; denies fevers, chills, night sweats.   HEENT: no dysphagia, odonophagia, diplopia, neck pain  THYROID:  no apparent hyper or hypothyroid symptoms  CV: no recent chest pain, pressure, palpitations  LUNGS: no SOB, KRAFT, cough, wheezing   ABDOMEN: indigestion, postmeal nausea, frequent BM's; no constipation  EXTREMITIES: no rashes, ulcers, edema  NEUROLOGY: no headaches, denies changes in vision, tingling, extremitiy numbness   MSK: neck pain/stiffness and back pains; no muscle weakness  SKIN: no rashes or lesions  PSYCH:  stable mood, no significant anxiety or depression  ENDOCRINE: no heat or cold intolerance    Physical Exam (visual exam)  VS:  no vital signs taken for video visit  CONSTITUTIONAL: healthy, alert and NAD, well dressed, answering questions appropriately  ENT: no nose swelling or nasal discharge, mouth redness or gum changes.  EYES: eyes grossly normal to inspection, conjunctivae and sclerae normal, no exophthalmos or proptosis  THYROID:  no apparent nodules or goiter  LUNGS: no audible wheeze, cough or visible cyanosis, no visible retractions or increased work of breathing  ABDOMEN: abdomen not evaluated  EXTREMITIES: no hand tremors, limited exam  NEUROLOGY: CN grossly intact, mentation intact and speech normal   SKIN:  no apparent skin lesions, rash, or edema with visualized skin appearance  PSYCH: mentation appears normal, affect normal/bright, judgement and insight intact,   normal speech and appearance well groomed       LABS:     All pertinent notes, labs, and images personally reviewed by me.     A/P:  Encounter Diagnoses   Name Primary?     Type 1 diabetes mellitus with  "diabetic autonomic neuropathy (H) Yes     Gastroparesis      Insulin pump status      Hypothyroidism, unspecified type        Comments:  Reviewed complicated health history, diabetes and thyroid issues.  Recent SG trends show good glycemic control but occasional low's with hypoglycemia  Reviewed and interpreted tests that I previously ordered.   Ordered appropriate tests for the endocrinology disease management.    Management options discussed and implemented after shared medical decision making with the patient.  Diabetes problem is chronic with exacerbation progression, hyperglycemia    Plan:  Reviewed the overall T1DM management and insulin pump use.  Discussed optimal BG testing to assess glucose trends.  We reviewed insulin pump settings, basal rate and bolus dosing  Use of automated pump bolus dosing for meal/snack carb & correction dosing  Reviewed the [employer] report data today  Reviewed recent DexcomG6 CGM glucose sensor trends, in detail     Recommend:  Continue treatment plan using the Medtronic insulin pump and DexcomG6 CGM sensor  Use SG value for the pump bolus wizard dosing.  Pump setting changes:   Bolus: ISF [place] and 8p 65 to 55    Use genuine suppertime carb estimate for meal bolus and avoid decreasing carb estimate \"fudging\"  Use lower Temp Basal rate or suspend pump basal rate for aerobic exercise  We have discussed use of Medtronic 770G pump auto-uploaded to [employer]   Prepare for diabetes appointments by synchronizing pump data when he is on Wi-fi before appointments   Encouraged patient to call the [facility] CDE (TONI) for assistance with his Carelink connection issues  Continue use of the DexcomG6 CGM sensor               Patient makes frequent adjustments to insulin regimen on basis of therapeutic glucose testing  Would benefit from additional CDE evaluation at [facility]  Arrange lab testing at nearby [facility] clinic in next few weeks   Lab orders placed  Consider followup " diabetes education appt   Discussed the video visit option with the CDE at [facility]   Focus on the evening hyperglycemia, pre vs post meal bolusing, probable delayed GI absorption  Continue simvastatin 40 mg each evening  Continue current levothyroxine 0.112 mg daily dose  Arrange annual dilated eye exam, fasting lipid panel testing.  We reviewed importance of timely clinic appointments with me Q3 months, to satisfy Medicare with his diabetes device coverage  Will communicate feedback with his testing by phonecall and/or USMail    Addressed patient's questions today.     There are no Patient Instructions on file for this visit.    Future labs ordered today:   Orders Placed This Encounter   Procedures     GLUCOSE MONITOR, 72 HOUR, PHYS INTERP     Hemoglobin A1c     Basic metabolic panel     Albumin Random Urine Quantitative with Creat Ratio     TSH     T4 free     Radiology/Consults ordered today: None    Total time spent on day of encounter:  42 min    Follow-up:  4/27/23 at 3pm, Chelle Best MD, MS  Endocrinology  [facility]                                    Again, thank you for allowing me to participate in the care of your patient.        Sincerely,        Frankie Best MD

## 2023-02-01 ENCOUNTER — MEDICAL CORRESPONDENCE (OUTPATIENT)
Dept: HEALTH INFORMATION MANAGEMENT | Facility: CLINIC | Age: 58
End: 2023-02-01

## 2023-02-02 ENCOUNTER — DOCUMENTATION ONLY (OUTPATIENT)
Dept: ENDOCRINOLOGY | Facility: CLINIC | Age: 58
End: 2023-02-02
Payer: MEDICARE

## 2023-02-02 NOTE — PROGRESS NOTES
Medtronic/Prescription/DM supplies   Form(s) have been completed faxed.  Faxed or mailed to: number listed on form.  Form(s) in accordion file for 1 month then to scan.    Janneth Begum MA

## 2023-02-03 ENCOUNTER — MEDICAL CORRESPONDENCE (OUTPATIENT)
Dept: HEALTH INFORMATION MANAGEMENT | Facility: CLINIC | Age: 58
End: 2023-02-03

## 2023-02-03 NOTE — PROGRESS NOTES
Medtronic/Prescription/DM supplies  Form(s) have been completed faxed.  Faxed or mailed to: number listed on form. [phone]  Form(s) in accordion file for 1 month then to scan.    Vinicio Gonzalez MA

## 2023-02-09 DIAGNOSIS — E10.43 TYPE 1 DIABETES MELLITUS WITH DIABETIC AUTONOMIC NEUROPATHY (H): ICD-10-CM

## 2023-02-09 RX ORDER — INSULIN ASPART 100 [IU]/ML
INJECTION, SOLUTION INTRAVENOUS; SUBCUTANEOUS
Qty: 20 ML | Refills: 0 | Status: SHIPPED | OUTPATIENT
Start: 2023-02-09 | End: 2023-03-31

## 2023-02-09 NOTE — TELEPHONE ENCOUNTER
Prescription approved per [facility] Refill Protocol.  Brittani Levi RN on 2/9/2023 at 4:19 PM

## 2023-02-16 ENCOUNTER — MYC REFILL (OUTPATIENT)
Dept: FAMILY MEDICINE | Facility: CLINIC | Age: 58
End: 2023-02-16
Payer: MEDICARE

## 2023-02-16 DIAGNOSIS — G89.4 CHRONIC PAIN SYNDROME: ICD-10-CM

## 2023-02-16 DIAGNOSIS — M48.02 CERVICAL STENOSIS OF SPINE: ICD-10-CM

## 2023-02-16 NOTE — TELEPHONE ENCOUNTER
Requested Prescriptions   Pending Prescriptions Disp Refills    oxyCODONE-acetaminophen (PERCOCET) 5-325 MG tablet 70 tablet 0     Sig: Take 1 tablet by mouth every 6 hours as needed for severe pain (7-10) (*CAUTION OPIOD.  RISK OF OVERDOSE AND ADDICTION.**MAX OF 3 TABLETS PER DAY**) To last for month       There is no refill protocol information for this order

## 2023-02-18 RX ORDER — OXYCODONE AND ACETAMINOPHEN 5; 325 MG/1; MG/1
1 TABLET ORAL EVERY 6 HOURS PRN
Qty: 70 TABLET | Refills: 0 | Status: SHIPPED | OUTPATIENT
Start: 2023-02-18 | End: 2023-03-15

## 2023-02-20 ASSESSMENT — ANXIETY QUESTIONNAIRES
2. NOT BEING ABLE TO STOP OR CONTROL WORRYING: NOT AT ALL
5. BEING SO RESTLESS THAT IT IS HARD TO SIT STILL: NOT AT ALL
GAD7 TOTAL SCORE: 1
GAD7 TOTAL SCORE: 1
3. WORRYING TOO MUCH ABOUT DIFFERENT THINGS: NOT AT ALL
1. FEELING NERVOUS, ANXIOUS, OR ON EDGE: NOT AT ALL
6. BECOMING EASILY ANNOYED OR IRRITABLE: NOT AT ALL
7. FEELING AFRAID AS IF SOMETHING AWFUL MIGHT HAPPEN: NOT AT ALL
8. IF YOU CHECKED OFF ANY PROBLEMS, HOW DIFFICULT HAVE THESE MADE IT FOR YOU TO DO YOUR WORK, TAKE CARE OF THINGS AT HOME, OR GET ALONG WITH OTHER PEOPLE?: NOT DIFFICULT AT ALL
IF YOU CHECKED OFF ANY PROBLEMS ON THIS QUESTIONNAIRE, HOW DIFFICULT HAVE THESE PROBLEMS MADE IT FOR YOU TO DO YOUR WORK, TAKE CARE OF THINGS AT HOME, OR GET ALONG WITH OTHER PEOPLE: NOT DIFFICULT AT ALL
7. FEELING AFRAID AS IF SOMETHING AWFUL MIGHT HAPPEN: NOT AT ALL
4. TROUBLE RELAXING: SEVERAL DAYS

## 2023-02-20 ASSESSMENT — PAIN SCALES - PAIN ENJOYMENT GENERAL ACTIVITY SCALE (PEG)
INTERFERED_ENJOYMENT_LIFE: 9
PEG_TOTALSCORE: 7.33
AVG_PAIN_PASTWEEK: 5
INTERFERED_GENERAL_ACTIVITY: 8
INTERFERED_ENJOYMENT_LIFE: 9
AVG_PAIN_PASTWEEK: 5
PEG_TOTALSCORE: 7.33
INTERFERED_GENERAL_ACTIVITY: 8

## 2023-02-27 ENCOUNTER — OFFICE VISIT (OUTPATIENT)
Dept: PALLIATIVE MEDICINE | Facility: CLINIC | Age: 58
End: 2023-02-27
Payer: COMMERCIAL

## 2023-02-27 ENCOUNTER — LAB (OUTPATIENT)
Dept: LAB | Facility: CLINIC | Age: 58
End: 2023-02-27
Payer: COMMERCIAL

## 2023-02-27 VITALS — SYSTOLIC BLOOD PRESSURE: 149 MMHG | HEART RATE: 69 BPM | DIASTOLIC BLOOD PRESSURE: 79 MMHG

## 2023-02-27 DIAGNOSIS — Z79.891 ENCOUNTER FOR LONG-TERM OPIATE ANALGESIC USE: ICD-10-CM

## 2023-02-27 DIAGNOSIS — M54.16 LUMBAR RADICULOPATHY: ICD-10-CM

## 2023-02-27 DIAGNOSIS — M96.1 FAILED BACK SYNDROME: Primary | ICD-10-CM

## 2023-02-27 DIAGNOSIS — G58.8 INTERCOSTAL NEURALGIA: ICD-10-CM

## 2023-02-27 DIAGNOSIS — K31.84 GASTROPARESIS: ICD-10-CM

## 2023-02-27 DIAGNOSIS — Z96.41 INSULIN PUMP STATUS: ICD-10-CM

## 2023-02-27 DIAGNOSIS — E03.9 HYPOTHYROIDISM, UNSPECIFIED TYPE: ICD-10-CM

## 2023-02-27 DIAGNOSIS — M54.12 CERVICAL RADICULOPATHY: ICD-10-CM

## 2023-02-27 DIAGNOSIS — M79.18 MYOFASCIAL MUSCLE PAIN: ICD-10-CM

## 2023-02-27 DIAGNOSIS — E10.43 TYPE 1 DIABETES MELLITUS WITH DIABETIC AUTONOMIC NEUROPATHY (H): ICD-10-CM

## 2023-02-27 LAB — HBA1C MFR BLD: 8 % (ref 0–5.6)

## 2023-02-27 PROCEDURE — 80307 DRUG TEST PRSMV CHEM ANLYZR: CPT

## 2023-02-27 PROCEDURE — 84439 ASSAY OF FREE THYROXINE: CPT

## 2023-02-27 PROCEDURE — 83036 HEMOGLOBIN GLYCOSYLATED A1C: CPT

## 2023-02-27 PROCEDURE — 80048 BASIC METABOLIC PNL TOTAL CA: CPT

## 2023-02-27 PROCEDURE — 36415 COLL VENOUS BLD VENIPUNCTURE: CPT

## 2023-02-27 PROCEDURE — 99214 OFFICE O/P EST MOD 30 MIN: CPT | Performed by: PAIN MEDICINE

## 2023-02-27 PROCEDURE — 84443 ASSAY THYROID STIM HORMONE: CPT

## 2023-02-27 PROCEDURE — G0481 DRUG TEST DEF 8-14 CLASSES: HCPCS

## 2023-02-27 PROCEDURE — 82570 ASSAY OF URINE CREATININE: CPT

## 2023-02-27 PROCEDURE — 82043 UR ALBUMIN QUANTITATIVE: CPT

## 2023-02-27 ASSESSMENT — PAIN SCALES - GENERAL: PAINLEVEL: SEVERE PAIN (6)

## 2023-02-27 NOTE — PROGRESS NOTES
[facility]    CHIEF COMPLAINT:   Chronic neck pain    INTERVAL HISTORY:  2/27/23    Recommendations/plan at the last visit included:    1. Physical Therapy:  NO   2. Clinical Health Psychologist:  NO  3. Diagnostic Studies:  None at this time  4. Medication Management:    1. Percocet 5-325 2/day. Okay to use 3 occasionally if needed.   2. Continue medical cannabis    5. Recommendations to PCP. See above       Interval: Transfer from [place]  THE 4 A's OF OPIOID MAINTENANCE ANALGESIA    Analgesia: y    Activity: y    Adverse effects: n    Adherence to Rx protocol: y    [place] Board of Pharmacy Data Base Reviewed:    YES;     Pt was gastric stim rev   Reports received a short rx from surg, which has created some confusion   Pt reports takes meds as rxd  The pt reports he has been doing well  The pt reports hx of LBP(failed back syndrome)  The pt reports hx of neck pain (prior fusion)  The pt reports also having severe gastroparesis   The pt reports amazing benefit with med cannabis    The lbp is bilat  The pain is intermittent  Feels burning  Better with rest and meds  Denies any recent  Denies overt weakness    The pt neck pain  The pain is bilat mainly on the right  Deep axial   Worse ext and rot  The pt reports some benefit with rfa, but overall was not a pleasant experience  And does not want to to repeat    The pt also reports right rib pain is the setting of prior thoractomy   Pain Information:              Pain today 3/10       Annual Controlled Substance Agreement: signed: 3/10/21  UDS: 2/25/21    CURRENT RELEVANT PAIN MEDICATIONS:  Naproxen 500mg-taking 1/day  Oxycodone 5mg-taking 2 in AM and will occasionally take a 3rd in the afternoon    Imitrex-last used about 3-4 days ago  Medical Cannabis    Patient is using the medication as prescribed:  YES  Is your medication helpful? YES   Medication side effects? itching    Previous Medications: (H--helped; HI--Helped initially; SWH--  "somewhat helpful, NH--No help; W--worse; SE--side effects)   Opiates: Oxycodone H, Tylenol #3 NH, Hydrocodone NH, Dilaudid H SE \"ick\", Morphine H SE nausea, Tramadol NH  NSAIDS: Naproxen H SE stomach upset  Muscle Relaxants: Flexeril H SE incontinence  Anti-migraine mediations: Imitrex H  Anti-depressants: Amitriptyline NH SE rapid heart  Sleep aids: none  Anxiolytics: Valium [facility]  Neuropathics: Gabapentin NH SE \"goofy\", Lyrica ? Was too expensive  Topicals: Voltaren Gel NH, Lidocaine NH  Other medications not covered above: Tylenol H, Prednisone H SE high sugars, Medical marijuana H SE cost constraints    Past Pain Treatments:  Pain Clinic:   No   PT: Yes, last went this year  Psychologist: No, but he did have to do some psychology prior to his back surgeryes  Relaxation techniques/biofeedback: No, but tries to do meditation at times   Chiropractor: No  Acupuncture: No  Pharmacotherapy:               Opioids: Yes                Non-opioids:    Yes   TENs Unit:Yes, was not helpful  Injections: Yes, right 3rd occipital nerve and right C3, C4, C5 RFA 02/28/2019, left 3rd occipital nerve, and left C3, C4, C5 RFA 03/15/2019. Had done low back injections in [place] as well (steroids mess with sugars therefore tries to avoid if possible).   Self-care:   Yes, cold works better (heat worsens pain)  Surgeries related to pain: Yes, C5-6 ACDF 2012. Disk replacement cervical spine 2006, low back fusions (2000, 2002)        Daily MME: 15-22.5  Exam:  Constitutional: healthy, alert and no distress  Head: Normocephalc  Ne spurling  Sig dec in rom   Neg SLR on the right   Musculoskeletal: extremities normal-   Skin: no suspicious lesions or rashes  Neurologic: Gait normal  +2 ach  + biceps  5/5 LE UE except left hand    Psychiatric: mentation appears normal and affect normal/bright      Medications:  Current Outpatient Medications   Medication Sig Dispense Refill     amylase-lipase-protease (CREON 24) 12068-16837 units CPEP " per EC capsule Take 1 capsule by mouth every other day       amylase-lipase-protease (PERTZYE) 04569 units CPEP Take 1 capsule by mouth every other day       atorvastatin (LIPITOR) 80 MG tablet TAKE 1 TABLET BY MOUTH EVERYDAY AT BEDTIME 90 tablet 2     citalopram (CELEXA) 40 MG tablet TAKE 1 TABLET BY MOUTH ONCE DAILY 90 tablet 3     Continuous Blood Gluc  (DEXCOM G6 ) AMBER Use to read glucose levels per 's instructions 1 Device 0     Continuous Blood Gluc Sensor (DEXCOM G6 SENSOR) MISC CHANGE EVERY 10 DAYS 3 each 5     Continuous Blood Gluc Transmit (DEXCOM G6 TRANSMITTER) MISC USE FOR CONTINUOUS GLUCOSE TESTING, CHANGE EVERY 90 DAYS) 1 each 3     CONTOUR NEXT TEST test strip Use to test blood sugar 5x times daily or as directed. 450 strip 3     insulin aspart (NOVOLOG VIAL) 100 UNITS/ML vial USE WITH INSULIN PUMP TOTAL DAILY DOSE APPROX 45 UNITS 20 mL 4     levothyroxine (SYNTHROID/LEVOTHROID) 112 MCG tablet TAKE ONE TABLET BY MOUTH ONCE DAILY 90 tablet 1     lidocaine-prilocaine (EMLA) 2.5-2.5 % external cream Apply topically as needed for moderate pain 30 g 3     lisinopril (ZESTRIL) 40 MG tablet TAKE 1 TABLET BY MOUTH EVERY DAY 90 tablet 1     medical cannabis (Patient's own supply) See Admin Instructions Topical creams and vaporizer cartridges  (The purpose of this order is to document that the patient reports taking medical cannabis.  This is not a prescription, and is not used to certify that the patient has a qualifying medical condition.)       naproxen (NAPROSYN) 500 MG tablet TAKE ONE TABLET BY MOUTH TWICE A DAY AS NEEDED FOR MODERATE PAIN 180 tablet 0     NOVOLOG VIAL 100 UNIT/ML soln USE UP TO 45 UNITS DAILY WITH INSULIN PUMP 20 mL 0     omeprazole (PRILOSEC) 20 MG DR capsule TAKE ONE CAPSULE BY MOUTH ONCE DAILY 90 capsule 2     ondansetron (ZOFRAN-ODT) 8 MG ODT tab Take 1 tablet (8 mg) by mouth every 8 hours as needed (for nauesa) 60 tablet 0     oxyCODONE-acetaminophen  (PERCOCET) 5-325 MG tablet Take 1 tablet by mouth every 6 hours as needed for severe pain (7-10) (*CAUTION OPIOD.  RISK OF OVERDOSE AND ADDICTION.**MAX OF 3 TABLETS PER DAY**) To last for month 70 tablet 0     SUMAtriptan (IMITREX) 100 MG tablet Take 1 tablet (100 mg) by mouth at onset of headache 1/2 to 1 tablet by mouth at onset of headache. May repeat 1 dose after 2 hours if headache persists. 9 tablet 3     DIAGNOSTIC TESTS:  Imaging Studies:   No new imaging to review    Assessment:  Stone De Paz is a 55 year old male who presents today for follow up regarding his:    Stone was seen today for pain.    Diagnoses and all orders for this visit:    Failed back syndrome    Lumbar radiculopathy    Intercostal neuralgia    Cervical radiculopathy    Myofascial muscle pain        Plan:    Diagnosis reviewed, treatment option addressed, and risk/benifits discussed.  Self-care instructions given.  I am recommending a multidisciplinary treatment plan to help this patient better manage pain.      - Further procedures recommended:    - not at this time   - Medication Management:    - reasonable to continue medical cannabis   - reasonable to continue as needed percocet   - consider capsaicin cream  - Urine toxicology screen today: ordered Utox today   - Follow up:    - will discuss with PCP prior to considering taking over prescribing       The total TIME spent on this patient on the day of the appointment was 25 minutes.   Time spent preparing to see the patient (reviewing records and tests)  Time spend face to face with the patient  Time spent ordering tests, medications, procedures and referrals  Time spent Referring and communicating with other healthcare professionals  Documenting clinical information in Epic        Morro Rausch MD  [facility]

## 2023-02-28 LAB
ANION GAP SERPL CALCULATED.3IONS-SCNC: 2 MMOL/L (ref 3–14)
BUN SERPL-MCNC: 12 MG/DL (ref 7–30)
CALCIUM SERPL-MCNC: 9.5 MG/DL (ref 8.5–10.1)
CHLORIDE BLD-SCNC: 99 MMOL/L (ref 94–109)
CO2 SERPL-SCNC: 31 MMOL/L (ref 20–32)
CREAT SERPL-MCNC: 1.07 MG/DL (ref 0.66–1.25)
CREAT UR-MCNC: 62 MG/DL
CREAT UR-MCNC: 63 MG/DL
GFR SERPL CREATININE-BSD FRML MDRD: 81 ML/MIN/1.73M2
GLUCOSE BLD-MCNC: 211 MG/DL (ref 70–99)
Lab: NORMAL
MICROALBUMIN UR-MCNC: 6 MG/L
MICROALBUMIN/CREAT UR: 9.68 MG/G CR (ref 0–17)
PERFORMING LABORATORY: NORMAL
POTASSIUM BLD-SCNC: 4.6 MMOL/L (ref 3.4–5.3)
SODIUM SERPL-SCNC: 132 MMOL/L (ref 133–144)
SPECIMEN STATUS: NORMAL
T4 FREE SERPL-MCNC: 1.25 NG/DL (ref 0.76–1.46)
TEST NAME: NORMAL
TSH SERPL DL<=0.005 MIU/L-ACNC: 10.11 MU/L (ref 0.4–4)

## 2023-03-01 ENCOUNTER — OFFICE VISIT (OUTPATIENT)
Dept: FAMILY MEDICINE | Facility: CLINIC | Age: 58
End: 2023-03-01
Payer: COMMERCIAL

## 2023-03-01 VITALS
OXYGEN SATURATION: 96 % | RESPIRATION RATE: 16 BRPM | BODY MASS INDEX: 22.73 KG/M2 | HEART RATE: 84 BPM | HEIGHT: 68 IN | TEMPERATURE: 98.1 F | WEIGHT: 150 LBS | SYSTOLIC BLOOD PRESSURE: 158 MMHG | DIASTOLIC BLOOD PRESSURE: 86 MMHG

## 2023-03-01 DIAGNOSIS — E03.9 ACQUIRED HYPOTHYROIDISM: ICD-10-CM

## 2023-03-01 DIAGNOSIS — K86.81 EXOCRINE PANCREATIC INSUFFICIENCY: ICD-10-CM

## 2023-03-01 DIAGNOSIS — F33.41 RECURRENT MAJOR DEPRESSIVE DISORDER, IN PARTIAL REMISSION (H): ICD-10-CM

## 2023-03-01 DIAGNOSIS — G89.4 CHRONIC PAIN SYNDROME: ICD-10-CM

## 2023-03-01 DIAGNOSIS — Z96.41 INSULIN PUMP STATUS: ICD-10-CM

## 2023-03-01 DIAGNOSIS — M48.02 CERVICAL STENOSIS OF SPINE: ICD-10-CM

## 2023-03-01 DIAGNOSIS — F11.90 CHRONIC, CONTINUOUS USE OF OPIOIDS: ICD-10-CM

## 2023-03-01 DIAGNOSIS — Z76.89 ENCOUNTER TO ESTABLISH CARE: Primary | ICD-10-CM

## 2023-03-01 DIAGNOSIS — I10 ESSENTIAL HYPERTENSION: ICD-10-CM

## 2023-03-01 DIAGNOSIS — K31.84 GASTROPARESIS: ICD-10-CM

## 2023-03-01 DIAGNOSIS — E10.43 TYPE 1 DIABETES MELLITUS WITH DIABETIC AUTONOMIC NEUROPATHY (H): ICD-10-CM

## 2023-03-01 DIAGNOSIS — E78.5 HYPERLIPIDEMIA, UNSPECIFIED HYPERLIPIDEMIA TYPE: ICD-10-CM

## 2023-03-01 PROCEDURE — 99213 OFFICE O/P EST LOW 20 MIN: CPT | Performed by: STUDENT IN AN ORGANIZED HEALTH CARE EDUCATION/TRAINING PROGRAM

## 2023-03-01 ASSESSMENT — PATIENT HEALTH QUESTIONNAIRE - PHQ9
10. IF YOU CHECKED OFF ANY PROBLEMS, HOW DIFFICULT HAVE THESE PROBLEMS MADE IT FOR YOU TO DO YOUR WORK, TAKE CARE OF THINGS AT HOME, OR GET ALONG WITH OTHER PEOPLE: SOMEWHAT DIFFICULT
SUM OF ALL RESPONSES TO PHQ QUESTIONS 1-9: 7
SUM OF ALL RESPONSES TO PHQ QUESTIONS 1-9: 7

## 2023-03-01 ASSESSMENT — PAIN SCALES - GENERAL: PAINLEVEL: MILD PAIN (2)

## 2023-03-01 NOTE — PROGRESS NOTES
Assessment & Plan     Encounter to establish care  Hx reviewed and updated.     Type 1 diabetes mellitus with diabetic autonomic neuropathy (H)  Insulin pump status  Follows with endocrinology.     Cervical stenosis of spine  Chronic continuous opioid use  Patient stable for many years with 70 Percocet monthly.  Using 2/day on average with the third as needed that he tries to avoid. He has tried multiple other avenues with limited benefit and not wanting further surgery.  Does use naproxen and medical cannabis with help in pain as well as his gastroparesis.  Patient was seen by Dr. Rausch at pain clinic who is in agreement with current regimen.  Patient using medication as prescribed.  Risks of chronic opioid use as well as other potential interactions including respiratory suppression and death discussed.  He understands he needs regular urine drug screen as well as pain contract on file. Plan to continue with current regimen. Follow up in 6 months.     Gastroparesis  Exocrine pancreatic insufficiency  Recent stimulator placed.  Following with [facility].  Patient on Creon and omeprazole.    Acquired hypothyroidism  Follows with endocrinology. Recent TSH elevated.     Hypertension  Blood pressure elevated in clinic today.  Recommend monitoring home blood pressures message me if these remain elevated.  Continues on lisinopril    Hyperlipidemia    Mushtaq Haq MD  [facility]    Nabeel Ritter is a 57 year old, presenting for the following health issues:  Establish Care      History of Present Illness       Reason for visit:  To establish care with a new provider.    He eats 0-1 servings of fruits and vegetables daily.He consumes 0 sweetened beverage(s) daily.He exercises with enough effort to increase his heart rate 20 to 29 minutes per day.  He exercises with enough effort to increase his heart rate 4 days per week.   He is taking medications regularly.    Today's PHQ-9        "  PHQ-9 Total Score: 7    PHQ-9 Q9 Thoughts of better off dead/self-harm past 2 weeks :   Not at all    How difficult have these problems made it for you to do your work, take care of things at home, or get along with other people: Somewhat difficult     Follows with [facility] and has a stimulator in place. Recently had this replaced and has had that replaced on and off over the years. Hx of gastroparesis and exocrine pancreatic insufficiency. Back pain with hx of back surgery x4. Follows with pain management in Dr. Rausch. Has a lot of gas and diarrhea. Usually transitions to harder stools after stimulator change but has still been pretty irregular per patient. Follows with endocrinology for his Type 1 DM with Dr. Best who manages his thyroid as well. Has an insulin pump and CGM. Recent worsening but had been better prior.     Review of Systems   Constitutional, HEENT, cardiovascular, pulmonary, gi and gu systems are negative, except as otherwise noted.      Objective    BP (!) 158/86   Pulse 84   Temp 98.1  F (36.7  C) (Temporal)   Resp 16   Ht 1.727 m (5' 8\")   Wt 68 kg (150 lb)   SpO2 96%   BMI 22.81 kg/m    Body mass index is 22.81 kg/m .  Physical Exam   GENERAL: healthy, alert and no distress  EYES: Eyes grossly normal to inspection, PERRL and conjunctivae and sclerae normal  RESP: breathing comfortably   MS: no gross musculoskeletal defects noted, no edema  SKIN: no suspicious lesions or rashes  NEURO: mentation intact and speech normal  PSYCH: mentation appears normal, affect normal/bright          "

## 2023-03-04 LAB — LABCORP INTERFACED MISCELLANEOUS TEST RESULT: NORMAL

## 2023-03-10 ENCOUNTER — MYC MEDICAL ADVICE (OUTPATIENT)
Dept: FAMILY MEDICINE | Facility: CLINIC | Age: 58
End: 2023-03-10
Payer: MEDICARE

## 2023-03-10 DIAGNOSIS — K31.84 GASTROPARESIS: Primary | ICD-10-CM

## 2023-03-15 ENCOUNTER — MYC REFILL (OUTPATIENT)
Dept: FAMILY MEDICINE | Facility: CLINIC | Age: 58
End: 2023-03-15
Payer: MEDICARE

## 2023-03-15 DIAGNOSIS — G89.4 CHRONIC PAIN SYNDROME: ICD-10-CM

## 2023-03-15 DIAGNOSIS — M48.02 CERVICAL STENOSIS OF SPINE: ICD-10-CM

## 2023-03-15 RX ORDER — OXYCODONE AND ACETAMINOPHEN 5; 325 MG/1; MG/1
1 TABLET ORAL EVERY 6 HOURS PRN
Qty: 70 TABLET | Refills: 0 | Status: SHIPPED | OUTPATIENT
Start: 2023-03-15 | End: 2023-04-21

## 2023-03-20 NOTE — TELEPHONE ENCOUNTER
"Spoke with Stone this AM to explain the following.     I spoke with Dr. Raymond on Friday 3/17/2023 regarding Stone's concern with the verbiage \"technically broke pain contract\" , which is found within this My Chart encounter.  Writer was able to locate a message patient sent on 12/15/2022 at 12:42pm to care team, creating awareness about medication received from surgeon after surgery on 12/13/2023.  After review of timeline and discussion, Dr. Raymond agrees there was a misunderstanding and patient did not break pain contract.      Patient satisfied and requests no further follow-up.     Tanya Hobson RN        "

## 2023-03-28 DIAGNOSIS — F41.1 GENERALIZED ANXIETY DISORDER: ICD-10-CM

## 2023-03-29 NOTE — TELEPHONE ENCOUNTER
Pending Prescriptions:                       Disp   Refills    citalopram (CELEXA) 40 MG tablet [Pharmacy*90 tab*1        Sig: TAKE ONE TABLET BY MOUTH ONCE DAILY      Routing refill request to provider for review/approval because:  Drug interaction warning    Brittani Levi RN on 3/29/2023 at 3:30 PM

## 2023-03-30 DIAGNOSIS — E10.43 TYPE 1 DIABETES MELLITUS WITH DIABETIC AUTONOMIC NEUROPATHY (H): ICD-10-CM

## 2023-03-30 RX ORDER — CITALOPRAM HYDROBROMIDE 40 MG/1
TABLET ORAL
Qty: 90 TABLET | Refills: 1 | Status: SHIPPED | OUTPATIENT
Start: 2023-03-30 | End: 2023-11-07

## 2023-03-31 RX ORDER — INSULIN ASPART 100 [IU]/ML
INJECTION, SOLUTION INTRAVENOUS; SUBCUTANEOUS
Qty: 20 ML | Refills: 0 | Status: SHIPPED | OUTPATIENT
Start: 2023-03-31 | End: 2023-07-20

## 2023-04-14 ENCOUNTER — MYC REFILL (OUTPATIENT)
Dept: FAMILY MEDICINE | Facility: CLINIC | Age: 58
End: 2023-04-14

## 2023-04-14 DIAGNOSIS — G89.4 CHRONIC PAIN SYNDROME: ICD-10-CM

## 2023-04-14 DIAGNOSIS — M48.02 CERVICAL STENOSIS OF SPINE: ICD-10-CM

## 2023-04-14 RX ORDER — OXYCODONE AND ACETAMINOPHEN 5; 325 MG/1; MG/1
1 TABLET ORAL EVERY 6 HOURS PRN
Qty: 70 TABLET | Refills: 0 | Status: CANCELLED | OUTPATIENT
Start: 2023-04-14

## 2023-04-14 NOTE — TELEPHONE ENCOUNTER
Requested Prescriptions   Pending Prescriptions Disp Refills     oxyCODONE-acetaminophen (PERCOCET) 5-325 MG tablet 70 tablet 0     Sig: Take 1 tablet by mouth every 6 hours as needed for severe pain (*CAUTION OPIOD.  RISK OF OVERDOSE AND ADDICTION.**MAX OF 3 TABLETS PER DAY**) To last for month       Routing refill request to provider for review/approval because:  Drug not on the [facility], [facility] or [facility] refill protocol or controlled substance

## 2023-04-18 ENCOUNTER — MYC MEDICAL ADVICE (OUTPATIENT)
Dept: FAMILY MEDICINE | Facility: CLINIC | Age: 58
End: 2023-04-18
Payer: MEDICARE

## 2023-04-18 DIAGNOSIS — G89.4 CHRONIC PAIN SYNDROME: ICD-10-CM

## 2023-04-18 DIAGNOSIS — M48.02 CERVICAL STENOSIS OF SPINE: ICD-10-CM

## 2023-04-19 NOTE — TELEPHONE ENCOUNTER
Patient is checking on the status of his refill request.  Brittani Levi RN on 4/19/2023 at 8:02 AM

## 2023-04-21 RX ORDER — OXYCODONE AND ACETAMINOPHEN 5; 325 MG/1; MG/1
1 TABLET ORAL EVERY 6 HOURS PRN
Qty: 70 TABLET | Refills: 0 | Status: SHIPPED | OUTPATIENT
Start: 2023-04-21 | End: 2023-05-19

## 2023-04-21 RX ORDER — OXYCODONE AND ACETAMINOPHEN 5; 325 MG/1; MG/1
1 TABLET ORAL EVERY 6 HOURS PRN
Qty: 70 TABLET | Refills: 0 | Status: CANCELLED | OUTPATIENT
Start: 2023-04-21

## 2023-04-27 ENCOUNTER — VIRTUAL VISIT (OUTPATIENT)
Dept: ENDOCRINOLOGY | Facility: CLINIC | Age: 58
End: 2023-04-27
Payer: COMMERCIAL

## 2023-04-27 DIAGNOSIS — Z96.41 INSULIN PUMP STATUS: ICD-10-CM

## 2023-04-27 DIAGNOSIS — E10.43 TYPE 1 DIABETES MELLITUS WITH DIABETIC AUTONOMIC NEUROPATHY (H): Primary | ICD-10-CM

## 2023-04-27 DIAGNOSIS — E03.9 HYPOTHYROIDISM, UNSPECIFIED TYPE: ICD-10-CM

## 2023-04-27 DIAGNOSIS — E10.40 TYPE 1 DIABETES MELLITUS WITH DIABETIC NEUROPATHY (H): ICD-10-CM

## 2023-04-27 DIAGNOSIS — K31.84 GASTROPARESIS: ICD-10-CM

## 2023-04-27 PROCEDURE — 95251 CONT GLUC MNTR ANALYSIS I&R: CPT | Performed by: INTERNAL MEDICINE

## 2023-04-27 PROCEDURE — 99215 OFFICE O/P EST HI 40 MIN: CPT | Mod: VID | Performed by: INTERNAL MEDICINE

## 2023-04-27 RX ORDER — LEVOTHYROXINE SODIUM 125 UG/1
125 TABLET ORAL DAILY
Qty: 90 TABLET | Refills: 3 | Status: SHIPPED | OUTPATIENT
Start: 2023-04-27 | End: 2024-04-02

## 2023-04-27 NOTE — PROGRESS NOTES
"Virtual Visit Details    Type of service:  Video Visit   Video Start Time: 3:07 pm  Video End Time: 3:27 pm    Originating Location (pt. Location): Home    Distant Location (provider location):  Home   Platform used for Video Visit: Terri        Recent issues:  Diabetes and thyroid follow-up evaluation  Using the Medtronic 770G pump since 9/2021, along with the DexcomG6 CGM sensor  Taking the medical Can[facility] via pain clinic in [facility], which is helpful for pain and nausea  Had replacement (#10) of Medtronic gastric stimulator mid 12/2022  Still has issues with neck pain/stiffness            1995. Diagnosis of diabetes mellitus after 2nd back surgery, age 29  Initial treatment with insulin injections using NPH and Regular insulin  Switched to premixed 70/30 insulin BID dosing  1999. Started Medtronic insulin pump model 508, used base-dose Humalog insulin at meals  Had seen Dr. Iggy Briggs/[facility]     7/24/01. Initial evaluation with me at my former clinic ([facility])  Upgraded pump to Medtronic 523   Now using Medtronic 630G pump              Novolog in pump     Meals BID, had carb \"exchanges\" with his pump settings  Chronic high BG and hgbA1c trends despite dose changes  Tried square wave bolus  Has Contour Link? BG meter, variable testing pattern   Recently testing 4x/day for at least 60 days  Glucose sensor (CGMS) use:   Tried Medtronic CGMS in the past, didn't like it   Switched to DexcomG5, used for approx 6 months but issues with getting supply order   Early 2019, started Freestyle Sukhi but issues with skin site redness    Meal schedule:  Snacks during the day, supper meal 5-7pm  Current pump settings:       Recent Carelink data:              Recent DexcomG6 data:  14-day CGM average 143 mg/dl                Previous [facility] labs include:      Recent [facility] labs include:  Lab Results   Component Value Date    A1C 8.0 (H) 02/27/2023     (L) 02/27/2023    POTASSIUM 4.6 02/27/2023    CHLORIDE " 99 02/27/2023    CO2 31 02/27/2023    ANIONGAP 2 (L) 02/27/2023     (H) 02/27/2023    BUN 12 02/27/2023    CR 1.07 02/27/2023    GFRESTIMATED 81 02/27/2023    GFRESTBLACK >90 03/01/2021    EMILEE 9.5 02/27/2023    CPEPT <0.1 (L) 06/24/2021    CHOL 120 06/27/2022    TRIG 57 06/27/2022    HDL 62 06/27/2022    LDL 47 06/27/2022    NHDL 58 06/27/2022    UCRR 62 02/27/2023    UCRR 63 02/27/2023    MICROL 6 02/27/2023    UMALCR 9.68 02/27/2023     Last eye exam date?  Goes to eye clinic in [facility]  7/2018. Met with Destiny Warren RD, CDE at [facility]  DM Complications:              Neuropathy                          Peripheral neuropathy                                      Decreased sensation at feet                          Autonomic neuropathy- gastroparesis                                      Symptoms of nausea, bloating, episodes of diarrhea and emesis                                      Has Jobyal gastric stimulator                                      Sees physicians and Ms WARREN Rosario/[facility]                   Thyroid:  1994. History of hypothyroidism  Chronic levothyroxine treatment, has used Levoxyl in the past  Supplements:  Takes vitamin D 1000 U daily  7/2018. Had levothyroxine dose increase   Recent labs include:  Lab Results   Component Value Date    TSH 10.11 (H) 02/27/2023    T4 1.25 02/27/2023     Current dose:  Levothyroxine 0.112 mg daily        Lives in [place], [place] with Ada, also (1) adopted dog named Marko  Has seen Dr. Ilan Chew Mai/[facility] for general medicine evaluations  Also sees Olga VIRK/[facility].       PMH/PSH:  Past Medical History:   Diagnosis Date     Arthritis      Chronic neck pain     percocet for years now,      Depressive disorder      Gastroparesis due to DM (H)     gastric stimulator helps, [facility] manages that     Hyperlipidemia      Hypertension      Hypothyroidism      Neuropathy, diabetic (H)      Type 1 diabetes (H) age  31     Past Surgical History:   Procedure Laterality Date     COLONOSCOPY  07/18/12     FUSION CERVICAL ANTERIOR TWO LEVELS       INJECT BLOCK MEDIAL BRANCH CERVICAL/THORACIC/LUMBAR Bilateral 11/20/2018    Procedure: Bilateral Cervical 3-4-5 medial branch block;  Surgeon: Jef Edwards MD;  Location: [facility] OR     INJECT BLOCK MEDIAL BRANCH CERVICAL/THORACIC/LUMBAR N/A 1/11/2019    Procedure: Cervical 3,4,5 Bilateral Medial branch blocks;  Surgeon: Jef Edwards MD;  Location: [facility]     RADIO FREQUENCY ABLATION PULSED CERVICAL Right 2/28/2019    Procedure: RADIO FREQUENCY ABLATION CERVICAL THREE, FOUR, FIVE, AND THIRD OCCUPITAL NERVE RIGHT SIDE;  Surgeon: Jef Edwards MD;  Location: [facility]     RADIO FREQUENCY ABLATION PULSED CERVICAL Left 3/15/2019    Procedure: radiofrequency ablation cervical 3,4,5;  Surgeon: Jef Edwards MD;  Location: [facility]     RADIO FREQUENCY ABLATION PULSED CERVICAL Right 9/4/2020    Procedure: RADIOFREQUENCY ABLATION CERVICAL 3,4 5 right SIDE;  Surgeon: Jef Edwards MD;  Location[facility]     RADIO FREQUENCY ABLATION PULSED CERVICAL Left 9/10/2020    Procedure: RADIOFREQUENCY ABLATION CERVICAL 3,4 5 LEFT SIDE;  Surgeon: Jef Edwards MD;  Location: [facility]     THORACOSCOPIC DECORTICATION LUNG  1/9/2014    Procedure: THORACOSCOPIC DECORTICATION LUNG;  RIGHT VATS/RIGHT THORACOTOMY , DECORTICATION RIGHT LUNG ,WEDGE RESECTION RIGHT MIDDLE LOBE LUNG NODULE;  Surgeon: Harley Felix MD;  Location: [facility] OR     ZZC LUMBAR SPINE FUSION,ANTER APPRCH      2 L4-5 L5-21       Family Hx:  Family History   Problem Relation Age of Onset     Hypertension Mother      Diabetes Father         type 2     Hypertension Father      Cerebrovascular Disease Maternal Grandmother      Unknown/Adopted Maternal Grandfather      Unknown/Adopted Paternal Grandmother      Unknown/Adopted Paternal Grandfather      Liver Disease Brother      Heart Disease Sister      Thyroid Disease Sister      Thyroid Disease  Sister      Thyroid Disease Sister          Social Hx:  Social History     Socioeconomic History     Marital status:      Spouse name: Not on file     Number of children: Not on file     Years of education: Not on file     Highest education level: Not on file   Occupational History     Not on file   Tobacco Use     Smoking status: Former     Types: Cigarettes     Quit date: 2010     Years since quittin.3     Smokeless tobacco: Never   Vaping Use     Vaping status: Not on file   Substance and Sexual Activity     Alcohol use: No     Drug use: Yes     Types: Marijuana     Comment: medical marijuana     Sexual activity: Not Currently     Comment: Prescription Marijuana   Other Topics Concern     Parent/sibling w/ CABG, MI or angioplasty before 65F 55M? Not Asked   Social History Narrative     Not on file     Social Determinants of Health     Financial Resource Strain: Not on file   Food Insecurity: Not on file   Transportation Needs: Not on file   Physical Activity: Not on file   Stress: Not on file   Social Connections: Not on file   Intimate Partner Violence: Not on file   Housing Stability: Not on file          MEDICATIONS:  has a current medication list which includes the following prescription(s): lipase-protease-amylase, atorvastatin, citalopram, dexcom g6 sensor, dexcom g6 transmitter, insulin aspart, levothyroxine, lidocaine-prilocaine, lisinopril, medical cannabis, naproxen, novolog vial, omeprazole, ondansetron, oxycodone-acetaminophen, sumatriptan, lipase-protease-amylase, dexcom g6 , and contour next test.    ROS: 10 point ROS neg other than the symptoms noted above in the HPI.     GENERAL: some fatigue, wt stable; denies fevers, chills, night sweats.   HEENT: no dysphagia, odonophagia, diplopia, neck pain  THYROID:  no apparent hyper or hypothyroid symptoms  CV: no recent chest pain, pressure, palpitations  LUNGS: no SOB, KRAFT, cough, wheezing   ABDOMEN: indigestion, postmeal nausea,  frequent BM's; no constipation  EXTREMITIES: no rashes, ulcers, edema  NEUROLOGY: no headaches, denies changes in vision, tingling, extremitiy numbness   MSK: neck pain/stiffness and back pains; no muscle weakness  SKIN: no rashes or lesions  PSYCH:  stable mood, no significant anxiety or depression  ENDOCRINE: no heat or cold intolerance    Physical Exam (visual exam)  VS:  no vital signs taken for video visit  CONSTITUTIONAL: healthy, alert and NAD, well dressed, answering questions appropriately  ENT: no nose swelling or nasal discharge, mouth redness or gum changes.  EYES: eyes grossly normal to inspection, conjunctivae and sclerae normal, no exophthalmos or proptosis  THYROID:  no apparent nodules or goiter  LUNGS: no audible wheeze, cough or visible cyanosis, no visible retractions or increased work of breathing  ABDOMEN: abdomen not evaluated  EXTREMITIES: no hand tremors, limited exam  NEUROLOGY: CN grossly intact, mentation intact and speech normal   SKIN:  no apparent skin lesions, rash, or edema with visualized skin appearance  PSYCH: mentation appears normal, affect normal/bright, judgement and insight intact,   normal speech and appearance well groomed       LABS:     All pertinent notes, labs, and images personally reviewed by me.     A/P:  Encounter Diagnoses   Name Primary?     Type 1 diabetes mellitus with diabetic autonomic neuropathy (H) Yes     Gastroparesis      Type 1 diabetes mellitus with diabetic peripheral neuropathy (H)      Insulin pump status      Hypothyroidism, unspecified type        Comments:  Reviewed complicated health history, diabetes and thyroid issues.  Recent SG trends show good glycemic control but occasional low's with hypoglycemia  Reviewed and interpreted tests that I previously ordered.   Ordered appropriate tests for the endocrinology disease management.    Management options discussed and implemented after shared medical decision making with the patient.  Diabetes  problem is chronic with exacerbation progression, hyperglycemia    Plan:  Reviewed the overall T1DM management and insulin pump use.  Discussed optimal BG testing to assess glucose trends.  We reviewed insulin pump settings, basal rate and bolus dosing  Use of automated pump bolus dosing for meal/snack carb & correction dosing  Reviewed the Medtronic Carelink report data today  Reviewed recent DexcomG6 CGM glucose sensor trends, in detail     Recommend:  Continue treatment plan using the Medtronic insulin pump and DexcomG6 CGM sensor  Use SG value for the pump bolus wizard dosing.  Pump setting changes:   Bolus: ISF [place] and all intervals 55 to 60   Basals: 3a 0.625 to 0.575 unit(s)/hr    Use lower Temp Basal rate or suspend pump basal rate for aerobic exercise  Prepare for diabetes appointments by synchronizing pump data when he is on Wi-fi before appointments  Continue use of the DexcomG6 CGM sensor               Patient makes frequent adjustments to insulin regimen on basis of therapeutic glucose testing  Would benefit from additional CDE evaluation at [facility]  Arrange nonfasting labs in mid 7/2023   Lab orders placed  Consider followup diabetes education appt with the CDE at [facility]  Continue simvastatin 40 mg each evening  Increase levothyroxine as 0.125 mg daily, updated Rx sent to pharmacy  Reviewed importance of contacting GI team for advice on the chronic diarrhea, gastric pacemaker fxn  Arrange annual dilated eye exam, fasting lipid panel testing.  We reviewed importance of timely clinic appointments with me Q3 months, to satisfy Medicare with his diabetes device coverage  Will communicate feedback with his testing by phonecall and/or USMail    Addressed patient's questions today.    There are no Patient Instructions on file for this visit.    Future labs ordered today:   Orders Placed This Encounter   Procedures     GLUCOSE MONITOR, 72 HOUR, PHYS INTERP     TSH     T4 free      Hemoglobin A1c     Basic metabolic panel     Radiology/Consults ordered today: None    Total time spent on day of encounter:  42 min    Follow-up:  7/31/23 at 1pm, Chelle Best MD, MS  Endocrinology  [facility]    CC:  Care Team

## 2023-04-27 NOTE — LETTER
"    4/27/2023         RE: Stone De Paz  [id number] [facility] [address]        Dear Colleague,    Thank you for referring your patient, Stone De Paz, to the [facility] [place]. Please see a copy of my visit note below.    Virtual Visit Details    Type of service:  Video Visit   Video Start Time: 3:07 pm  Video End Time: 3:27 pm    Originating Location (pt. Location): Home    Distant Location (provider location):  Home   Platform used for Video Visit: Terri        Recent issues:  Diabetes and thyroid follow-up evaluation  Using the Medtronic 770G pump since 9/2021, along with the DexcomG6 CGM sensor  Taking the medical [facility] via pain clinic in [facility], which is helpful for pain and nausea  Had replacement (#10) of Medtronic gastric stimulator mid 12/2022  Still has issues with neck pain/stiffness            1995. Diagnosis of diabetes mellitus after 2nd back surgery, age 29  Initial treatment with insulin injections using NPH and Regular insulin  Switched to premixed 70/30 insulin BID dosing  1999. Started Medtronic insulin pump model 508, used base-dose Humalog insulin at meals  Had seen Dr. Iggy Briggs/[facility]     7/24/01. Initial evaluation with me at my former clinic ([facility])  Upgraded pump to Medtronic 523   Now using Medtronic 630G pump              Novolog in pump     Meals BID, had carb \"exchanges\" with his pump settings  Chronic high BG and hgbA1c trends despite dose changes  Tried square wave bolus  Has Contour Link? BG meter, variable testing pattern   Recently testing 4x/day for at least 60 days  Glucose sensor (CGMS) use:   Tried Medtronic CGMS in the past, didn't like it   Switched to DexcomG5, used for approx 6 months but issues with getting supply order   Early 2019, started Freestyle Sukhi but issues with skin site redness    Meal schedule:  Snacks during the day, supper meal 5-7pm  Current pump settings:       Recent Carelink data:              Recent " DexcomG6 data:  14-day CGM average 143 mg/dl                Previous [facility] labs include:      Recent  labs include:  Lab Results   Component Value Date    A1C 8.0 (H) 02/27/2023     (L) 02/27/2023    POTASSIUM 4.6 02/27/2023    CHLORIDE 99 02/27/2023    CO2 31 02/27/2023    ANIONGAP 2 (L) 02/27/2023     (H) 02/27/2023    BUN 12 02/27/2023    CR 1.07 02/27/2023    GFRESTIMATED 81 02/27/2023    GFRESTBLACK >90 03/01/2021    EMILEE 9.5 02/27/2023    CPEPT <0.1 (L) 06/24/2021    CHOL 120 06/27/2022    TRIG 57 06/27/2022    HDL 62 06/27/2022    LDL 47 06/27/2022    NHDL 58 06/27/2022    UCRR 62 02/27/2023    UCRR 63 02/27/2023    MICROL 6 02/27/2023    UMALCR 9.68 02/27/2023     Last eye exam date?  Goes to eye clinic in [facility]  7/2018. Met with Destiny Warren RD, FRANCAE at [facility]  DM Complications:              Neuropathy                          Peripheral neuropathy                                      Decreased sensation at feet                          Autonomic neuropathy- gastroparesis                                      Symptoms of nausea, bloating, episodes of diarrhea and emesis                                      Has [employer] gastric stimulator                                      Sees physicians and Ms Olga Ramirez, PA/[facility]                   Thyroid:  1994. History of hypothyroidism  Chronic levothyroxine treatment, has used Levoxyl in the past  Supplements:  Takes vitamin D 1000 U daily  7/2018. Had levothyroxine dose increase   Recent labs include:  Lab Results   Component Value Date    TSH 10.11 (H) 02/27/2023    T4 1.25 02/27/2023     Current dose:  Levothyroxine 0.112 mg daily        Lives in [place], [place] with Ada, also (1) adopted dog named Marko  Has seen Dr. Ilan Chew Mai/[facility] for general medicine evaluations  Also sees Olga Ramirez PAC/[place] [facility].       PMH/PSH:  Past Medical History:   Diagnosis Date     Arthritis      Chronic neck pain      percocet for years now,      Depressive disorder      Gastroparesis due to DM (H)     gastric stimulator helps, [facility] manages that     Hyperlipidemia      Hypertension      Hypothyroidism      Neuropathy, diabetic (H)      Type 1 diabetes (H) age 31     Past Surgical History:   Procedure Laterality Date     COLONOSCOPY  07/18/12     FUSION CERVICAL ANTERIOR TWO LEVELS       INJECT BLOCK MEDIAL BRANCH CERVICAL/THORACIC/LUMBAR Bilateral 11/20/2018    Procedure: Bilateral Cervical 3-4-5 medial branch block;  Surgeon: Jef Edwards MD;  Location: [facility] OR     INJECT BLOCK MEDIAL BRANCH CERVICAL/THORACIC/LUMBAR N/A 1/11/2019    Procedure: Cervical 3,4,5 Bilateral Medial branch blocks;  Surgeon: Jef Edwards MD;  Location: [facility] OR     RADIO FREQUENCY ABLATION PULSED CERVICAL Right 2/28/2019    Procedure: RADIO FREQUENCY ABLATION CERVICAL THREE, FOUR, FIVE, AND THIRD OCCUPITAL NERVE RIGHT SIDE;  Surgeon: Jef Edwards MD;  Location: [facility] OR     RADIO FREQUENCY ABLATION PULSED CERVICAL Left 3/15/2019    Procedure: radiofrequency ablation cervical 3,4,5;  Surgeon: Jef Edwards MD;  Location: [facility]     RADIO FREQUENCY ABLATION PULSED CERVICAL Right 9/4/2020    Procedure: RADIOFREQUENCY ABLATION CERVICAL 3,4 5 right SIDE;  Surgeon: Jef Edwards MD;  Location: [facility]     RADIO FREQUENCY ABLATION PULSED CERVICAL Left 9/10/2020    Procedure: RADIOFREQUENCY ABLATION CERVICAL 3,4 5 LEFT SIDE;  Surgeon: Jef Edwards MD;  Location: [facility] OR     THORACOSCOPIC DECORTICATION LUNG  1/9/2014    Procedure: THORACOSCOPIC DECORTICATION LUNG;  RIGHT VATS/RIGHT THORACOTOMY , DECORTICATION RIGHT LUNG ,WEDGE RESECTION RIGHT MIDDLE LOBE LUNG NODULE;  Surgeon: Harley Felix MD;  Location: [facility] OR     ZZC LUMBAR SPINE FUSION,ANTER APPRCH      2 L4-5 L5-21       Family Hx:  Family History   Problem Relation Age of Onset     Hypertension Mother      Diabetes Father         type 2     Hypertension Father      Cerebrovascular Disease  Maternal Grandmother      Unknown/Adopted Maternal Grandfather      Unknown/Adopted Paternal Grandmother      Unknown/Adopted Paternal Grandfather      Liver Disease Brother      Heart Disease Sister      Thyroid Disease Sister      Thyroid Disease Sister      Thyroid Disease Sister          Social Hx:  Social History     Socioeconomic History     Marital status:      Spouse name: Not on file     Number of children: Not on file     Years of education: Not on file     Highest education level: Not on file   Occupational History     Not on file   Tobacco Use     Smoking status: Former     Types: Cigarettes     Quit date: 2010     Years since quittin.3     Smokeless tobacco: Never   Vaping Use     Vaping status: Not on file   Substance and Sexual Activity     Alcohol use: No     Drug use: Yes     Types: Marijuana     Comment: medical marijuana     Sexual activity: Not Currently     Comment: Prescription Marijuana   Other Topics Concern     Parent/sibling w/ CABG, MI or angioplasty before 65F 55M? Not Asked   Social History Narrative     Not on file     Social Determinants of Health     Financial Resource Strain: Not on file   Food Insecurity: Not on file   Transportation Needs: Not on file   Physical Activity: Not on file   Stress: Not on file   Social Connections: Not on file   Intimate Partner Violence: Not on file   Housing Stability: Not on file          MEDICATIONS:  has a current medication list which includes the following prescription(s): lipase-protease-amylase, atorvastatin, citalopram, dexcom g6 sensor, dexcom g6 transmitter, insulin aspart, levothyroxine, lidocaine-prilocaine, lisinopril, medical cannabis, naproxen, novolog vial, omeprazole, ondansetron, oxycodone-acetaminophen, sumatriptan, lipase-protease-amylase, dexcom g6 , and contour next test.    ROS: 10 point ROS neg other than the symptoms noted above in the HPI.     GENERAL: some fatigue, wt stable; denies fevers, chills,  night sweats.   HEENT: no dysphagia, odonophagia, diplopia, neck pain  THYROID:  no apparent hyper or hypothyroid symptoms  CV: no recent chest pain, pressure, palpitations  LUNGS: no SOB, KRAFT, cough, wheezing   ABDOMEN: indigestion, postmeal nausea, frequent BM's; no constipation  EXTREMITIES: no rashes, ulcers, edema  NEUROLOGY: no headaches, denies changes in vision, tingling, extremitiy numbness   MSK: neck pain/stiffness and back pains; no muscle weakness  SKIN: no rashes or lesions  PSYCH:  stable mood, no significant anxiety or depression  ENDOCRINE: no heat or cold intolerance    Physical Exam (visual exam)  VS:  no vital signs taken for video visit  CONSTITUTIONAL: healthy, alert and NAD, well dressed, answering questions appropriately  ENT: no nose swelling or nasal discharge, mouth redness or gum changes.  EYES: eyes grossly normal to inspection, conjunctivae and sclerae normal, no exophthalmos or proptosis  THYROID:  no apparent nodules or goiter  LUNGS: no audible wheeze, cough or visible cyanosis, no visible retractions or increased work of breathing  ABDOMEN: abdomen not evaluated  EXTREMITIES: no hand tremors, limited exam  NEUROLOGY: CN grossly intact, mentation intact and speech normal   SKIN:  no apparent skin lesions, rash, or edema with visualized skin appearance  PSYCH: mentation appears normal, affect normal/bright, judgement and insight intact,   normal speech and appearance well groomed       LABS:     All pertinent notes, labs, and images personally reviewed by me.     A/P:  Encounter Diagnoses   Name Primary?     Type 1 diabetes mellitus with diabetic autonomic neuropathy (H) Yes     Gastroparesis      Type 1 diabetes mellitus with diabetic peripheral neuropathy (H)      Insulin pump status      Hypothyroidism, unspecified type        Comments:  Reviewed complicated health history, diabetes and thyroid issues.  Recent SG trends show good glycemic control but occasional low's with  hypoglycemia  Reviewed and interpreted tests that I previously ordered.   Ordered appropriate tests for the endocrinology disease management.    Management options discussed and implemented after shared medical decision making with the patient.  Diabetes problem is chronic with exacerbation progression, hyperglycemia    Plan:  Reviewed the overall T1DM management and insulin pump use.  Discussed optimal BG testing to assess glucose trends.  We reviewed insulin pump settings, basal rate and bolus dosing  Use of automated pump bolus dosing for meal/snack carb & correction dosing  Reviewed the [employer] Carelink report data today  Reviewed recent DexcomG6 CGM glucose sensor trends, in detail     Recommend:  Continue treatment plan using the Medtronic insulin pump and DexcomG6 CGM sensor  Use SG value for the pump bolus wizard dosing.  Pump setting changes:   Bolus: ISF [place] and all intervals 55 to 60   Basals: 3a 0.625 to 0.575 unit(s)/hr    Use lower Temp Basal rate or suspend pump basal rate for aerobic exercise  Prepare for diabetes appointments by synchronizing pump data when he is on Wi-fi before appointments  Continue use of the DexcomG6 CGM sensor               Patient makes frequent adjustments to insulin regimen on basis of therapeutic glucose testing  Would benefit from additional CDE evaluation at [facility]  Arrange nonfasting labs in mid 7/2023   Lab orders placed  Consider followup diabetes education appt with the CDE at [facility]  Continue simvastatin 40 mg each evening  Increase levothyroxine as 0.125 mg daily, updated Rx sent to pharmacy  Reviewed importance of contacting GI team for advice on the chronic diarrhea, gastric pacemaker fxn  Arrange annual dilated eye exam, fasting lipid panel testing.  We reviewed importance of timely clinic appointments with me Q3 months, to satisfy Medicare with his diabetes device coverage  Will communicate feedback with his testing by phonecall and/or  Marc    Addressed patient's questions today.    There are no Patient Instructions on file for this visit.    Future labs ordered today:   Orders Placed This Encounter   Procedures     GLUCOSE MONITOR, 72 HOUR, PHYS INTERP     TSH     T4 free     Hemoglobin A1c     Basic metabolic panel     Radiology/Consults ordered today: None    Total time spent on day of encounter:  42 min    Follow-up:  7/31/23 at 1pm, Chelle Best MD, MS  Endocrinology  [facility]    CC:  Care Team                                      Again, thank you for allowing me to participate in the care of your patient.        Sincerely,        Frankie Best MD

## 2023-05-15 ENCOUNTER — MYC REFILL (OUTPATIENT)
Dept: FAMILY MEDICINE | Facility: CLINIC | Age: 58
End: 2023-05-15
Payer: MEDICARE

## 2023-05-15 DIAGNOSIS — G89.4 CHRONIC PAIN SYNDROME: ICD-10-CM

## 2023-05-15 DIAGNOSIS — M48.02 CERVICAL STENOSIS OF SPINE: ICD-10-CM

## 2023-05-15 RX ORDER — OXYCODONE AND ACETAMINOPHEN 5; 325 MG/1; MG/1
1 TABLET ORAL EVERY 6 HOURS PRN
Qty: 70 TABLET | Refills: 0 | Status: CANCELLED | OUTPATIENT
Start: 2023-05-15

## 2023-05-16 NOTE — TELEPHONE ENCOUNTER
Routing refill request to provider for review/approval because:    Requested Prescriptions   Pending Prescriptions Disp Refills    oxyCODONE-acetaminophen (PERCOCET) 5-325 MG tablet 70 tablet 0     Sig: Take 1 tablet by mouth every 6 hours as needed for severe pain (*CAUTION OPIOD.  RISK OF OVERDOSE AND ADDICTION.**MAX OF 3 TABLETS PER DAY**) To last for month       There is no refill protocol information for this order

## 2023-05-17 ENCOUNTER — TRANSFERRED RECORDS (OUTPATIENT)
Dept: HEALTH INFORMATION MANAGEMENT | Facility: CLINIC | Age: 58
End: 2023-05-17
Payer: MEDICARE

## 2023-05-18 ENCOUNTER — MYC MEDICAL ADVICE (OUTPATIENT)
Dept: FAMILY MEDICINE | Facility: CLINIC | Age: 58
End: 2023-05-18
Payer: MEDICARE

## 2023-05-18 DIAGNOSIS — M48.02 CERVICAL STENOSIS OF SPINE: ICD-10-CM

## 2023-05-18 DIAGNOSIS — G89.4 CHRONIC PAIN SYNDROME: ICD-10-CM

## 2023-05-18 NOTE — TELEPHONE ENCOUNTER
Pt updating message to let  know that he was seen at [facility] on 05/17/2023.  They will be sending over a provider update to .

## 2023-05-19 RX ORDER — OXYCODONE AND ACETAMINOPHEN 5; 325 MG/1; MG/1
1 TABLET ORAL EVERY 6 HOURS PRN
Qty: 70 TABLET | Refills: 0 | Status: SHIPPED | OUTPATIENT
Start: 2023-05-19 | End: 2023-06-15

## 2023-05-19 NOTE — TELEPHONE ENCOUNTER
Refill was sent to Pharmacy.  Previous Rx was picked up/dispensed 04- and was to have lasted 30 days.    Patient needs follow up visit in 3 months.     If patient is using more than what is prescribed daily he will need to make an appointment to follow up sooner than 3 months to discuss.    Tiff Kingston XRO/

## 2023-05-19 NOTE — TELEPHONE ENCOUNTER
Spouse calling on status stating it has been 5 days since this med was requested. Patient is out of his medication now. They are hoping this is addressed today. Rafaela Cueva LPN

## 2023-05-19 NOTE — TELEPHONE ENCOUNTER
Patient wanting to know how to proceed with the filling of this medication, he is frustrated with having to wait for days to get his medication refilled    Patient is wondering if he needs to make appointments and have written prescriptions sent?  Do you want patient to make virtual appointments for medication checks?      Tiff Kingston XRO/

## 2023-06-06 ENCOUNTER — TELEPHONE (OUTPATIENT)
Dept: PALLIATIVE MEDICINE | Facility: CLINIC | Age: 58
End: 2023-06-06

## 2023-06-06 NOTE — TELEPHONE ENCOUNTER
Health Call Center    Phone Message    May a detailed message be left on voicemail: yes     Reason for Call: Pt said that his medical cannabis renewal is due by 8/1/23.  He wants to know if he needs an appointment for this?  And if so, he wants to know if it can be done virtually.  Please call Pt back to discuss.  Thanks.

## 2023-06-07 NOTE — TELEPHONE ENCOUNTER
LOV with Dr. Rausch 2/27/2023      E-mail previously used and med marijuana ID    [email or web address]    [email or web address]    Current telephone number    [phone]    Pain rating on 1-10 in the previous 24 hours.     6    Routing to provider for review.    Leesa Briseno RN  [facility]  [phone]

## 2023-06-08 NOTE — TELEPHONE ENCOUNTER
Spoke with Malou Kingston patient was already informed this. Okay to close encounter.   Stefanie Salas MA

## 2023-06-15 ENCOUNTER — MYC REFILL (OUTPATIENT)
Dept: FAMILY MEDICINE | Facility: CLINIC | Age: 58
End: 2023-06-15
Payer: MEDICARE

## 2023-06-15 DIAGNOSIS — M48.02 CERVICAL STENOSIS OF SPINE: ICD-10-CM

## 2023-06-15 DIAGNOSIS — G89.4 CHRONIC PAIN SYNDROME: ICD-10-CM

## 2023-06-16 ENCOUNTER — TRANSFERRED RECORDS (OUTPATIENT)
Dept: HEALTH INFORMATION MANAGEMENT | Facility: CLINIC | Age: 58
End: 2023-06-16
Payer: MEDICARE

## 2023-06-16 DIAGNOSIS — M48.02 CERVICAL STENOSIS OF SPINE: ICD-10-CM

## 2023-06-16 DIAGNOSIS — G89.4 CHRONIC PAIN SYNDROME: ICD-10-CM

## 2023-06-16 RX ORDER — OXYCODONE AND ACETAMINOPHEN 5; 325 MG/1; MG/1
1 TABLET ORAL EVERY 6 HOURS PRN
Qty: 70 TABLET | Refills: 0 | Status: CANCELLED | OUTPATIENT
Start: 2023-06-16

## 2023-06-16 RX ORDER — OXYCODONE AND ACETAMINOPHEN 5; 325 MG/1; MG/1
1 TABLET ORAL EVERY 6 HOURS PRN
Qty: 70 TABLET | Refills: 0 | Status: SHIPPED | OUTPATIENT
Start: 2023-06-16 | End: 2023-06-17

## 2023-06-16 NOTE — TELEPHONE ENCOUNTER
Patient calling to state he just received a call from his [facility] pharmacy and they do not have any Percocet in stock. Patient is needing new prescrioption sent to our [place] pharmacy. Writer did call our pharmacy to make sure they have this in stock, and they do. Rafaela Cueva LPN

## 2023-06-16 NOTE — TELEPHONE ENCOUNTER
oxyCODONE-acetaminophen (PERCOCET) 5-325 MG tablet [Mushtaq Haq]       Patient Comment: Please refill as soon as possible. I only have just enough to get through the weekend.     Preferred pharmacy: [facility] [id number] [place] [facility], [place] - [address] [address] [place] [address]         Routing refill request to provider for review/approval because:  Drug not on the [facility], P or [facility] refill protocol or controlled substance

## 2023-06-17 ENCOUNTER — TELEPHONE (OUTPATIENT)
Dept: NURSING | Facility: CLINIC | Age: 58
End: 2023-06-17
Payer: MEDICARE

## 2023-06-17 ENCOUNTER — NURSE TRIAGE (OUTPATIENT)
Dept: NURSING | Facility: CLINIC | Age: 58
End: 2023-06-17
Payer: MEDICARE

## 2023-06-17 RX ORDER — OXYCODONE AND ACETAMINOPHEN 5; 325 MG/1; MG/1
1 TABLET ORAL EVERY 6 HOURS PRN
Qty: 70 TABLET | Refills: 0 | Status: SHIPPED | OUTPATIENT
Start: 2023-06-17 | End: 2023-07-17

## 2023-06-17 NOTE — TELEPHONE ENCOUNTER
Patient returning call. Reviewed notes below with patient,  on call provider sent prescription for Percocet to [facility].    Caller verbalized understanding. Denies further questions.    Leesa Price RN  [facility]

## 2023-06-17 NOTE — TELEPHONE ENCOUNTER
Chart reviewed, patient follows his refills very closely.  6/15 stated that he had enough to get through the weekend, but is concerned that pharmacy is out of stock and PCP didn't answer his request to change pharmacy late Friday afternoon.  checked and he would be due for refill on 6/18 and PCP not back in office until 6/20 and PCP had already approved refill, just wasn't aware that it was out of stock at the pharmacy.  Therefore, I did approve the refill request.

## 2023-06-17 NOTE — TELEPHONE ENCOUNTER
Nurse Triage SBAR    Is this a 2nd Level Triage? YES, LICENSED PRACTITIONER REVIEW IS REQUIRED    Situation: Pt calling with medication request.    Background: Says his PCP sent in a prescription for his Percocet yesterday, but the pharmacy it was sent to was out of stock. All the other [facility] pharmacies in the area were out of stock as well.    Assessment: Patient had placed call to his clinic at 3:51 to have it transferred to [facility] in [place]. A message was sent from the LPN to PCP, but no response from PCP was obtained.    Protocol Recommended Disposition:   No disposition on file.    Recommendation: Protocol recommends call PCP now. Will page on call physician for advisement.     Paged to provider    Does the patient meet one of the following criteria for ADS visit consideration? 16+ years old, with an [facility] PCP     TIP  Providers, please consider if this condition is appropriate for management at one of our Acute and Diagnostic Services sites.     If patient is a good candidate, please use dotphrase <dot>triageresponse and select Refer to ADS to document.  Provider Recommendation Follow Up:     Spoke with on call physician, Dr. Gotti, who says she will transfer the prescription to the [facility] pharmacy in [place].    Unable to reach patient/caregiver. Left message to return call to [phone]. Upon return call please notify caller of provider's recommendations.        Leesa Aviles RN, BSN  [facility]   Triage Nurse Advisor    Reason for Disposition    [1] Prescription not at pharmacy AND [2] was prescribed by PCP recently  (Exception: triager has access to EMR and prescription is recorded there. Go to Home Care and confirm for pharmacy.)    Additional Information    Negative: New-onset or worsening symptoms, see that guideline (e.g., diarrhea, runny nose, sore throat)    Negative: Medicine question not related to refill or renewal    Negative: Caller (e.g., patient or pharmacist)  requesting information about a new medicine    Negative: Caller requesting information unrelated to medicine    Negative: [1] Prescription refill request for ESSENTIAL medicine (i.e., likelihood of harm to patient if not taken) AND [2] triager unable to refill per department policy    Protocols used: MEDICATION REFILL AND RENEWAL CALL-A-AH

## 2023-07-08 ENCOUNTER — HEALTH MAINTENANCE LETTER (OUTPATIENT)
Age: 58
End: 2023-07-08

## 2023-07-14 DIAGNOSIS — E10.43 TYPE 1 DIABETES MELLITUS WITH DIABETIC AUTONOMIC NEUROPATHY (H): ICD-10-CM

## 2023-07-14 RX ORDER — PROCHLORPERAZINE 25 MG/1
SUPPOSITORY RECTAL
Qty: 3 EACH | Refills: 5 | Status: SHIPPED | OUTPATIENT
Start: 2023-07-14 | End: 2024-08-02

## 2023-07-14 NOTE — TELEPHONE ENCOUNTER
Last Written Prescription Date:  1/3/23  Last Fill Quantity: 3,  # refills: 5   Last office visit: 4/27/2023 with prescribing provider:  Dr. Best   Future Office Visit:  7/31/23        Requested Prescriptions   Pending Prescriptions Disp Refills     Continuous Blood Gluc Sensor (DEXCOM G6 SENSOR) MISC [Pharmacy Med Name: DEXCOM G6 SENSOR  MISC]  5     Sig: CHANGE EVERY 10 DAYS       There is no refill protocol information for this order        Refills sent  Sola Veliz RN

## 2023-07-17 ENCOUNTER — MYC REFILL (OUTPATIENT)
Dept: FAMILY MEDICINE | Facility: CLINIC | Age: 58
End: 2023-07-17
Payer: MEDICARE

## 2023-07-17 DIAGNOSIS — G89.4 CHRONIC PAIN SYNDROME: ICD-10-CM

## 2023-07-17 DIAGNOSIS — M48.02 CERVICAL STENOSIS OF SPINE: ICD-10-CM

## 2023-07-17 NOTE — TELEPHONE ENCOUNTER
Pending Prescriptions:                       Disp   Refills    oxyCODONE-acetaminophen (PERCOCET) 5-325 M*70 tab*0        Sig: Take 1 tablet by mouth every 6 hours as needed for           severe pain (*CAUTION OPIOD.  RISK OF OVERDOSE           AND ADDICTION.**MAX OF 3 TABLETS PER DAY**) To           last for month        Routing refill request to provider for review/approval because:  Drug not on the [facility] refill protocol

## 2023-07-18 DIAGNOSIS — M48.02 CERVICAL STENOSIS OF SPINE: ICD-10-CM

## 2023-07-18 DIAGNOSIS — I10 ESSENTIAL HYPERTENSION: ICD-10-CM

## 2023-07-18 DIAGNOSIS — G89.4 CHRONIC PAIN SYNDROME: ICD-10-CM

## 2023-07-19 DIAGNOSIS — E10.43 TYPE 1 DIABETES MELLITUS WITH DIABETIC AUTONOMIC NEUROPATHY (H): ICD-10-CM

## 2023-07-19 RX ORDER — OXYCODONE AND ACETAMINOPHEN 5; 325 MG/1; MG/1
1 TABLET ORAL EVERY 6 HOURS PRN
Qty: 70 TABLET | Refills: 0 | Status: SHIPPED | OUTPATIENT
Start: 2023-07-19 | End: 2023-08-18

## 2023-07-20 RX ORDER — INSULIN ASPART 100 [IU]/ML
INJECTION, SOLUTION INTRAVENOUS; SUBCUTANEOUS
Qty: 20 ML | Refills: 5 | Status: SHIPPED | OUTPATIENT
Start: 2023-07-20 | End: 2024-09-09

## 2023-07-20 NOTE — TELEPHONE ENCOUNTER
Prescription approved per [facility] Refill Protocol.\  Brittani Levi RN on 7/20/2023 at 3:34 PM

## 2023-07-20 NOTE — TELEPHONE ENCOUNTER
"Requested Prescriptions   Pending Prescriptions Disp Refills    lisinopril (ZESTRIL) 40 MG tablet [Pharmacy Med Name: LISINOPRIL 40MG TABS] 90 tablet 1     Sig: TAKE ONE TABLET BY MOUTH ONCE DAILY       ACE Inhibitors (Including Combos) Protocol Failed - 7/18/2023  9:05 AM        Failed - Blood pressure under 140/90 in past 12 months     BP Readings from Last 3 Encounters:   03/01/23 (!) 158/86   02/27/23 (!) 149/79   09/27/22 122/68                 Passed - Recent (12 mo) or future (30 days) visit within the authorizing provider's specialty     Patient has had an office visit with the authorizing provider or a provider within the authorizing providers department within the previous 12 mos or has a future within next 30 days. See \"Patient Info\" tab in inbasket, or \"Choose Columns\" in Meds & Orders section of the refill encounter.              Passed - Medication is active on med list        Passed - Patient is age 18 or older        Passed - Normal serum creatinine on file in past 12 months     Recent Labs   Lab Test 02/27/23  1546   CR 1.07       Ok to refill medication if creatinine is low          Passed - Normal serum potassium on file in past 12 months     Recent Labs   Lab Test 02/27/23  1546   POTASSIUM 4.6               naproxen (NAPROSYN) 500 MG tablet [Pharmacy Med Name: NAPROXEN 500MG TABS] 180 tablet 0     Sig: TAKE ONE TABLET BY MOUTH TWO TIMES A DAY AS NEEDED FOR MODERATE PAIN       NSAID Medications Failed - 7/18/2023  9:05 AM        Failed - Blood pressure under 140/90 in past 12 months     BP Readings from Last 3 Encounters:   03/01/23 (!) 158/86   02/27/23 (!) 149/79   09/27/22 122/68                 Failed - Normal ALT on file in past 12 months     Recent Labs   Lab Test 06/27/22  1343   ALT 67             Failed - Normal AST on file in past 12 months     Recent Labs   Lab Test 06/27/22  1343   AST 33             Failed - Normal CBC on file in past 12 months     Recent Labs   Lab Test " "07/28/21  1043   WBC 9.5   RBC 4.49   HGB 13.7   HCT 40.1                    Passed - Recent (12 mo) or future (30 days) visit within the authorizing provider's specialty     Patient has had an office visit with the authorizing provider or a provider within the authorizing providers department within the previous 12 mos or has a future within next 30 days. See \"Patient Info\" tab in inbasket, or \"Choose Columns\" in Meds & Orders section of the refill encounter.              Passed - Patient is age 6-64 years        Passed - Medication is active on med list        Passed - Normal serum creatinine on file in past 12 months     Recent Labs   Lab Test 02/27/23  1546   CR 1.07       Ok to refill medication if creatinine is low               "

## 2023-07-25 ENCOUNTER — MYC REFILL (OUTPATIENT)
Dept: FAMILY MEDICINE | Facility: CLINIC | Age: 58
End: 2023-07-25

## 2023-07-25 ENCOUNTER — LAB (OUTPATIENT)
Dept: LAB | Facility: CLINIC | Age: 58
End: 2023-07-25
Payer: COMMERCIAL

## 2023-07-25 DIAGNOSIS — M48.02 CERVICAL STENOSIS OF SPINE: ICD-10-CM

## 2023-07-25 DIAGNOSIS — E03.9 HYPOTHYROIDISM, UNSPECIFIED TYPE: ICD-10-CM

## 2023-07-25 DIAGNOSIS — E10.40 TYPE 1 DIABETES MELLITUS WITH DIABETIC NEUROPATHY (H): ICD-10-CM

## 2023-07-25 DIAGNOSIS — E10.43 TYPE 1 DIABETES MELLITUS WITH DIABETIC AUTONOMIC NEUROPATHY (H): Primary | ICD-10-CM

## 2023-07-25 DIAGNOSIS — G89.4 CHRONIC PAIN SYNDROME: ICD-10-CM

## 2023-07-25 DIAGNOSIS — K31.84 GASTROPARESIS: ICD-10-CM

## 2023-07-25 DIAGNOSIS — Z96.41 INSULIN PUMP STATUS: ICD-10-CM

## 2023-07-25 LAB
ANION GAP SERPL CALCULATED.3IONS-SCNC: 12 MMOL/L (ref 7–15)
BUN SERPL-MCNC: 17.8 MG/DL (ref 6–20)
CALCIUM SERPL-MCNC: 9.6 MG/DL (ref 8.6–10)
CHLORIDE SERPL-SCNC: 99 MMOL/L (ref 98–107)
CHOLEST SERPL-MCNC: 142 MG/DL
CREAT SERPL-MCNC: 0.96 MG/DL (ref 0.67–1.17)
DEPRECATED HCO3 PLAS-SCNC: 25 MMOL/L (ref 22–29)
GFR SERPL CREATININE-BSD FRML MDRD: >90 ML/MIN/1.73M2
GLUCOSE SERPL-MCNC: 227 MG/DL (ref 70–99)
HBA1C MFR BLD: 8.9 %
HDLC SERPL-MCNC: 65 MG/DL
LDLC SERPL CALC-MCNC: 68 MG/DL
NONHDLC SERPL-MCNC: 77 MG/DL
POTASSIUM SERPL-SCNC: 4.3 MMOL/L (ref 3.4–5.3)
SODIUM SERPL-SCNC: 136 MMOL/L (ref 136–145)
T4 FREE SERPL-MCNC: 1.57 NG/DL (ref 0.9–1.7)
TRIGL SERPL-MCNC: 47 MG/DL
TSH SERPL DL<=0.005 MIU/L-ACNC: 1.66 UIU/ML (ref 0.3–4.2)

## 2023-07-25 PROCEDURE — 84443 ASSAY THYROID STIM HORMONE: CPT

## 2023-07-25 PROCEDURE — 80061 LIPID PANEL: CPT

## 2023-07-25 PROCEDURE — 83036 HEMOGLOBIN GLYCOSYLATED A1C: CPT

## 2023-07-25 PROCEDURE — 84439 ASSAY OF FREE THYROXINE: CPT

## 2023-07-25 PROCEDURE — 80048 BASIC METABOLIC PNL TOTAL CA: CPT

## 2023-07-25 PROCEDURE — 36415 COLL VENOUS BLD VENIPUNCTURE: CPT

## 2023-07-25 RX ORDER — LISINOPRIL 40 MG/1
TABLET ORAL
Qty: 90 TABLET | Refills: 1 | Status: SHIPPED | OUTPATIENT
Start: 2023-07-25 | End: 2024-02-04

## 2023-07-25 RX ORDER — NAPROXEN 500 MG/1
500 TABLET ORAL 2 TIMES DAILY PRN
Qty: 180 TABLET | Refills: 0 | Status: CANCELLED | OUTPATIENT
Start: 2023-07-25

## 2023-07-25 RX ORDER — NAPROXEN 500 MG/1
TABLET ORAL
Qty: 180 TABLET | Refills: 0 | Status: SHIPPED | OUTPATIENT
Start: 2023-07-25 | End: 2024-01-19

## 2023-07-31 ENCOUNTER — VIRTUAL VISIT (OUTPATIENT)
Dept: ENDOCRINOLOGY | Facility: CLINIC | Age: 58
End: 2023-07-31
Payer: COMMERCIAL

## 2023-07-31 DIAGNOSIS — E10.43 TYPE 1 DIABETES MELLITUS WITH DIABETIC AUTONOMIC NEUROPATHY (H): Primary | ICD-10-CM

## 2023-07-31 DIAGNOSIS — K31.84 GASTROPARESIS: ICD-10-CM

## 2023-07-31 DIAGNOSIS — Z96.41 INSULIN PUMP STATUS: ICD-10-CM

## 2023-07-31 DIAGNOSIS — E10.40 TYPE 1 DIABETES MELLITUS WITH DIABETIC NEUROPATHY (H): ICD-10-CM

## 2023-07-31 DIAGNOSIS — E03.9 HYPOTHYROIDISM, UNSPECIFIED TYPE: ICD-10-CM

## 2023-07-31 PROCEDURE — 95251 CONT GLUC MNTR ANALYSIS I&R: CPT | Performed by: INTERNAL MEDICINE

## 2023-07-31 PROCEDURE — 99215 OFFICE O/P EST HI 40 MIN: CPT | Mod: VID | Performed by: INTERNAL MEDICINE

## 2023-07-31 NOTE — PROGRESS NOTES
"Virtual Visit Details    Type of service:  Video Visit   Video Start Time: 1:02 PM  Video End Time:1:33 PM    Originating Location (pt. Location): Home  Distant Location (provider location):  Off-site  Platform used for Video Visit: Terri        Recent issues:  Diabetes and thyroid follow-up evaluation  Using the Medtronic 770G pump since 9/2021, along with the DexcomG6 CGM sensor  Taking the medical [facility] via pain clinic in [facility], which is helpful for pain and nausea  Still has issues with neck pain/stiffness   Has malabsorption and chronic diarrhea, reports having diagnosis of exocrine pancreas insufficiency (EPI)   Takes Zenpep vs Creon (40,000 mg/dose 1 capsule TID vs QID, ~$800/mo),    significant improvement and resolution of diarrhea, improved energy when taking it   Perceives higher glucose levels when less malabsorption            1995. Diagnosis of diabetes mellitus after 2nd back surgery, age 29  Initial treatment with insulin injections using NPH and Regular insulin  Switched to premixed 70/30 insulin BID dosing  1999. Started Medtronic insulin pump model 508, used base-dose Humalog insulin at meals  Had seen Dr. Iggy Briggs/[facility]     7/24/01. Initial evaluation with me at my former clinic ([facility])  Upgraded pump to Medtronic 523   Used the Medtronic 630G pump  Meals BID, had carb \"exchanges\" with his pump settings    Upgraded to Medtronic 770G   Novolog in pump    Chronic high BG and hgbA1c trends despite dose changes  Tried square wave bolus  Has Contour Link? BG meter, variable testing pattern   Recently testing 4x/day for at least 60 days  Glucose sensor (CGM) use:   Tried Medtronic CGMS in the past, didn't like it   Switched to DexcomG5, used for approx 6 months but issues with getting supply order   Early 2019, started Freestyle Sukhi but issues with skin site redness    Meal schedule:  Snacks during the day, supper meal 5-7pm  Current pump settings:       Recent " Carelink data:          Recent DexcomG6 data:      Previous [facility] labs include:      Recent [facility] labs include:  Lab Results   Component Value Date    A1C 8.9 (H) 07/25/2023     07/25/2023    POTASSIUM 4.3 07/25/2023    CHLORIDE 99 07/25/2023    CO2 25 07/25/2023    ANIONGAP 12 07/25/2023     (H) 07/25/2023    BUN 17.8 07/25/2023    CR 0.96 07/25/2023    GFRESTIMATED >90 07/25/2023    GFRESTBLACK >90 03/01/2021    EMILEE 9.6 07/25/2023    CPEPT <0.1 (L) 06/24/2021    CHOL 142 07/25/2023    TRIG 47 07/25/2023    HDL 65 07/25/2023    LDL 68 07/25/2023    NHDL 77 07/25/2023    UCRR 62 02/27/2023    UCRR 63 02/27/2023    MICROL 6 02/27/2023    UMALCR 9.68 02/27/2023     Last eye exam date?  Goes to eye clinic in [facility]  7/2018. Met with Destiny Warren RD, FRANCAE at [facility]  DM Complications:              Neuropathy                          Peripheral neuropathy                                      Decreased sensation at feet                          Autonomic neuropathy- gastroparesis                                      Symptoms of nausea, bloating, episodes of diarrhea and emesis                                      Has [employer] gastric stimulator                                      Sees physicians and WARREN Gilmore/[facility]                   Thyroid:  1994. History of hypothyroidism  Chronic levothyroxine treatment, has used Levoxyl in the past  Supplements:  Takes vitamin D 1000 U daily  7/2018. Had levothyroxine dose increase   Recent labs include:  Lab Results   Component Value Date    TSH 1.66 07/25/2023    T4 1.57 07/25/2023     Current dose:  Levothyroxine 0.125 mg daily        Lives in [place], [place] with Ada, also (1) adopted dog named Marko  Has seen Dr. Msuhtaq Haq/[facility] for general medicine evaluations  Also sees Olga VIRK and Dr. Ramirez/[place] [facility].       PMH/PSH:  Past Medical History:   Diagnosis Date     Arthritis      Chronic neck pain      percocet for years now,      Depressive disorder      Exocrine pancreatic insufficiency      Gastroparesis due to DM (H)     gastric stimulator helps, [facility] manages that     Hyperlipidemia      Hypertension      Hypothyroidism      Neuropathy, diabetic (H)      Type 1 diabetes (H) age 31    neuropathy- peripheral and autonomic     Past Surgical History:   Procedure Laterality Date     COLONOSCOPY  07/18/12     FUSION CERVICAL ANTERIOR TWO LEVELS       INJECT BLOCK MEDIAL BRANCH CERVICAL/THORACIC/LUMBAR Bilateral 11/20/2018    Procedure: Bilateral Cervical 3-4-5 medial branch block;  Surgeon: Jef Edwards MD;  Location: [facility] OR     INJECT BLOCK MEDIAL BRANCH CERVICAL/THORACIC/LUMBAR N/A 1/11/2019    Procedure: Cervical 3,4,5 Bilateral Medial branch blocks;  Surgeon: Jef Edwards MD;  Location: [facility] OR     RADIO FREQUENCY ABLATION PULSED CERVICAL Right 2/28/2019    Procedure: RADIO FREQUENCY ABLATION CERVICAL THREE, FOUR, FIVE, AND THIRD OCCUPITAL NERVE RIGHT SIDE;  Surgeon: Jef Edwards MD;  Location: [facility] OR     RADIO FREQUENCY ABLATION PULSED CERVICAL Left 3/15/2019    Procedure: radiofrequency ablation cervical 3,4,5;  Surgeon: Jef Edwards MD;  Location: [facility] OR     RADIO FREQUENCY ABLATION PULSED CERVICAL Right 9/4/2020    Procedure: RADIOFREQUENCY ABLATION CERVICAL 3,4 5 right SIDE;  Surgeon: Jef Edwards MD;  Location: [facility]     RADIO FREQUENCY ABLATION PULSED CERVICAL Left 9/10/2020    Procedure: RADIOFREQUENCY ABLATION CERVICAL 3,4 5 LEFT SIDE;  Surgeon: Jef Edwards MD;  Location: [facility]     THORACOSCOPIC DECORTICATION LUNG  1/9/2014    Procedure: THORACOSCOPIC DECORTICATION LUNG;  RIGHT VATS/RIGHT THORACOTOMY , DECORTICATION RIGHT LUNG ,WEDGE RESECTION RIGHT MIDDLE LOBE LUNG NODULE;  Surgeon: Harley Felix MD;  Location:  OR     C LUMBAR SPINE FUSION,ANTER APPRCH      2 L4-5 L5-21       Family Hx:  Family History   Problem Relation Age of Onset     Hypertension Mother       Diabetes Father         type 2     Hypertension Father      Cerebrovascular Disease Maternal Grandmother      Unknown/Adopted Maternal Grandfather      Unknown/Adopted Paternal Grandmother      Unknown/Adopted Paternal Grandfather      Liver Disease Brother      Heart Disease Sister      Thyroid Disease Sister      Thyroid Disease Sister      Thyroid Disease Sister          Social Hx:  Social History     Socioeconomic History     Marital status:      Spouse name: Not on file     Number of children: Not on file     Years of education: Not on file     Highest education level: Not on file   Occupational History     Not on file   Tobacco Use     Smoking status: Former     Types: Cigarettes     Quit date: 2010     Years since quittin.5     Smokeless tobacco: Never   Substance and Sexual Activity     Alcohol use: No     Drug use: Yes     Types: Marijuana     Comment: medical marijuana     Sexual activity: Not Currently     Comment: Prescription Marijuana   Other Topics Concern     Parent/sibling w/ CABG, MI or angioplasty before 65F 55M? Not Asked   Social History Narrative     Not on file     Social Determinants of Health     Financial Resource Strain: Not on file   Food Insecurity: Not on file   Transportation Needs: Not on file   Physical Activity: Not on file   Stress: Not on file   Social Connections: Not on file   Intimate Partner Violence: Not on file   Housing Stability: Not on file          MEDICATIONS:  has a current medication list which includes the following prescription(s): lipase-protease-amylase, atorvastatin, citalopram, dexcom g6 , dexcom g6 sensor, dexcom g6 transmitter, insulin aspart, levothyroxine, lisinopril, medical cannabis, naproxen, novolog vial, omeprazole, ondansetron, oxycodone-acetaminophen, sumatriptan, lipase-protease-amylase, contour next test, lidocaine-prilocaine, and naloxone.    ROS: 10 point ROS neg other than the symptoms noted above in the HPI.     GENERAL:  some fatigue, wt stable; denies fevers, chills, night sweats.   HEENT: no dysphagia, odonophagia, diplopia, neck pain  THYROID:  no apparent hyper or hypothyroid symptoms  CV: no recent chest pain, pressure, palpitations  LUNGS: no SOB, KRAFT, cough, wheezing   ABDOMEN: indigestion, postmeal nausea, frequent BM's; no constipation  EXTREMITIES: no rashes, ulcers, edema  NEUROLOGY: no headaches, denies changes in vision, tingling, extremitiy numbness   MSK: neck pain/stiffness and back pains; no muscle weakness  SKIN: no rashes or lesions  PSYCH:  stable mood, no significant anxiety or depression  ENDOCRINE: no heat or cold intolerance    Physical Exam (visual exam)  VS:  no vital signs taken for video visit  CONSTITUTIONAL: healthy, alert and NAD, well dressed, answering questions appropriately  ENT: no nose swelling or nasal discharge, mouth redness or gum changes.  EYES: eyes grossly normal to inspection, conjunctivae and sclerae normal, no exophthalmos or proptosis  THYROID:  no apparent nodules or goiter  LUNGS: no audible wheeze, cough or visible cyanosis, no visible retractions or increased work of breathing  ABDOMEN: abdomen not evaluated  EXTREMITIES: no hand tremors, limited exam  NEUROLOGY: CN grossly intact, mentation intact and speech normal   SKIN:  no apparent skin lesions, rash, or edema with visualized skin appearance  PSYCH: mentation appears normal, affect normal/bright, judgement and insight intact,   normal speech and appearance well groomed       LABS:     All pertinent notes, labs, and images personally reviewed by me.     A/P:  Encounter Diagnoses   Name Primary?     Type 1 diabetes mellitus with diabetic autonomic neuropathy (H) Yes     Gastroparesis      Type 1 diabetes mellitus with diabetic neuropathy (H)      Insulin pump status      Hypothyroidism, unspecified type        Comments:  Reviewed complicated health history, diabetes and thyroid issues.  Recent SG trends show postmeal  hyperglycemia, also periodic hypoglycemia trends  Reviewed and interpreted tests that I previously ordered.   Ordered appropriate tests for the endocrinology disease management.    Management options discussed and implemented after shared medical decision making with the patient.  Diabetes problem is chronic with exacerbation progression, hyperglycemia    Plan:  Reviewed the overall T1DM management and insulin pump use.  Discussed optimal BG testing to assess glucose trends.  We reviewed insulin pump settings, basal rate and bolus dosing  Use of automated pump bolus dosing for meal/snack carb & correction dosing  Reviewed the Medtronic Carelink report data today  Reviewed recent DexcomG6 CGM glucose sensor trends, in detail     Recommend:  Continue treatment plan using the Medtronic insulin pump and DexcomG6 CGM sensor  Use SG value for the pump bolus wizard dosing.  No pump setting changes at this time.    Use lower Temp Basal rate or suspend pump basal rate for aerobic exercise, reviewed this strategy again today  Prepare for diabetes appointments by synchronizing pump data when he is on Wi-fi before appointments  Encouraged him to contact [employer] for a price-estimate for upgrade to the 780G/Guardian4 system  Continue use of the DexcomG6 CGM sensor               Patient makes frequent adjustments to insulin regimen on basis of therapeutic glucose testing  Would benefit from additional CDE evaluation at [facility]  Arrange nonfasting labs in late 10/2023   Lab orders placed  Consider followup diabetes education appt with the CDE at [facility]  Continue simvastatin 40 mg each evening  Continue current levothyroxine 0.125 mg daily  Discussed differences between endocrine vs exocrine pancreas failure   Contact GI team for advice on the chronic diarrhea, EPI, gastric pacemaker function  Arrange annual dilated eye exam, fasting lipid panel testing.  We reviewed importance of timely clinic  appointments with me Q3 months, to satisfy Medicare with his diabetes device coverage  Will communicate feedback with his testing by phonecall and/or USMail    Addressed patient's questions today.    There are no Patient Instructions on file for this visit.    Future labs ordered today:   Orders Placed This Encounter   Procedures     GLUCOSE MONITOR, 72 HOUR, PHYS INTERP     Hemoglobin A1c     Basic metabolic panel     Radiology/Consults ordered today: None    Total time spent on day of encounter:  40 min    Follow-up:  11/6/23 at 3p, VVJayashree Best MD, MS  Endocrinology  [facility]    CC:  Care Team

## 2023-07-31 NOTE — LETTER
"    7/31/2023         RE: Stone De Paz  [id number] [facility] [address]        Dear Colleague,    Thank you for referring your patient, Stone De Paz, to the [facility] [place]. Please see a copy of my visit note below.    Virtual Visit Details    Type of service:  Video Visit   Video Start Time: 1:02 PM  Video End Time:1:33 PM    Originating Location (pt. Location): Home  Distant Location (provider location):  Off-site  Platform used for Video Visit: Terri        Recent issues:  Diabetes and thyroid follow-up evaluation  Using the Medtronic 770G pump since 9/2021, along with the DexcomG6 CGM sensor  Taking the medical Canibis via pain clinic in [facility], which is helpful for pain and nausea  Still has issues with neck pain/stiffness   Has malabsorption and chronic diarrhea, reports having diagnosis of exocrine pancreas insufficiency (EPI)   Takes Zenpep vs Creon (40,000 mg/dose 1 capsule TID vs QID, ~$800/mo),    significant improvement and resolution of diarrhea, improved energy when taking it   Perceives higher glucose levels when less malabsorption            1995. Diagnosis of diabetes mellitus after 2nd back surgery, age 29  Initial treatment with insulin injections using NPH and Regular insulin  Switched to premixed 70/30 insulin BID dosing  1999. Started Medtronic insulin pump model 508, used base-dose Humalog insulin at meals  Had seen Dr. Iggy Briggs/[facility]     7/24/01. Initial evaluation with me at my former clinic ([facility])  Upgraded pump to Medtronic 523   Used the Medtronic 630G pump  Meals BID, had carb \"exchanges\" with his pump settings    Upgraded to Medtronic 770G   Novolog in pump    Chronic high BG and hgbA1c trends despite dose changes  Tried square wave bolus  Has Contour Link? BG meter, variable testing pattern   Recently testing 4x/day for at least 60 days  Glucose sensor (CGM) use:   Tried Medtronic CGMS in the past, didn't like it   Switched to " DexcomG5, used for approx 6 months but issues with getting supply order   Early 2019, started Freestyle Sukhi but issues with skin site redness    Meal schedule:  Snacks during the day, supper meal 5-7pm  Current pump settings:       Recent Carelink data:          Recent DexcomG6 data:      Previous [facility] labs include:      Recent [facility] labs include:  Lab Results   Component Value Date    A1C 8.9 (H) 07/25/2023     07/25/2023    POTASSIUM 4.3 07/25/2023    CHLORIDE 99 07/25/2023    CO2 25 07/25/2023    ANIONGAP 12 07/25/2023     (H) 07/25/2023    BUN 17.8 07/25/2023    CR 0.96 07/25/2023    GFRESTIMATED >90 07/25/2023    GFRESTBLACK >90 03/01/2021    EMILEE 9.6 07/25/2023    CPEPT <0.1 (L) 06/24/2021    CHOL 142 07/25/2023    TRIG 47 07/25/2023    HDL 65 07/25/2023    LDL 68 07/25/2023    NHDL 77 07/25/2023    UCRR 62 02/27/2023    UCRR 63 02/27/2023    MICROL 6 02/27/2023    UMALCR 9.68 02/27/2023     Last eye exam date?  Goes to eye clinic in [facility]  7/2018. Met with Destiny Warren RD, CDE at [facility]  DM Complications:              Neuropathy                          Peripheral neuropathy                                      Decreased sensation at feet                          Autonomic neuropathy- gastroparesis                                      Symptoms of nausea, bloating, episodes of diarrhea and emesis                                      Has HAUL gastric stimulator                                      Sees physicians and WARREN Gilmore/[facility]                   Thyroid:  1994. History of hypothyroidism  Chronic levothyroxine treatment, has used Levoxyl in the past  Supplements:  Takes vitamin D 1000 U daily  7/2018. Had levothyroxine dose increase   Recent labs include:  Lab Results   Component Value Date    TSH 1.66 07/25/2023    T4 1.57 07/25/2023     Current dose:  Levothyroxine 0.125 mg daily        Lives in [place], [place] with Ada, also (1) adopted dog named  Marko  Has seen Dr. Mushtaq Haq/[facility] for general medicine evaluations  Also sees Olga Ramirez PAC and Dr. Ramirez/[place] [facility].       PMH/PSH:  Past Medical History:   Diagnosis Date     Arthritis      Chronic neck pain     percocet for years now,      Depressive disorder      Exocrine pancreatic insufficiency      Gastroparesis due to DM (H)     gastric stimulator helps, [facility] manages that     Hyperlipidemia      Hypertension      Hypothyroidism      Neuropathy, diabetic (H)      Type 1 diabetes (H) age 31    neuropathy- peripheral and autonomic     Past Surgical History:   Procedure Laterality Date     COLONOSCOPY  07/18/12     FUSION CERVICAL ANTERIOR TWO LEVELS       INJECT BLOCK MEDIAL BRANCH CERVICAL/THORACIC/LUMBAR Bilateral 11/20/2018    Procedure: Bilateral Cervical 3-4-5 medial branch block;  Surgeon: Jef Edwards MD;  Location: [facility] OR     INJECT BLOCK MEDIAL BRANCH CERVICAL/THORACIC/LUMBAR N/A 1/11/2019    Procedure: Cervical 3,4,5 Bilateral Medial branch blocks;  Surgeon: Jef Edwards MD;  Location: [facility] OR     RADIO FREQUENCY ABLATION PULSED CERVICAL Right 2/28/2019    Procedure: RADIO FREQUENCY ABLATION CERVICAL THREE, FOUR, FIVE, AND THIRD OCCUPITAL NERVE RIGHT SIDE;  Surgeon: Jef Edwards MD;  Location: [facility] OR     RADIO FREQUENCY ABLATION PULSED CERVICAL Left 3/15/2019    Procedure: radiofrequency ablation cervical 3,4,5;  Surgeon: Jef Edwards MD;  Location: [facility] OR     RADIO FREQUENCY ABLATION PULSED CERVICAL Right 9/4/2020    Procedure: RADIOFREQUENCY ABLATION CERVICAL 3,4 5 right SIDE;  Surgeon: Jef Edwards MD;  Location: [facility]     RADIO FREQUENCY ABLATION PULSED CERVICAL Left 9/10/2020    Procedure: RADIOFREQUENCY ABLATION CERVICAL 3,4 5 LEFT SIDE;  Surgeon: Jef Edwards MD;  Location: [facility]     THORACOSCOPIC DECORTICATION LUNG  1/9/2014    Procedure: THORACOSCOPIC DECORTICATION LUNG;  RIGHT VATS/RIGHT THORACOTOMY , DECORTICATION RIGHT LUNG ,WEDGE  RESECTION RIGHT MIDDLE LOBE LUNG NODULE;  Surgeon: Harley Felix MD;  Location: [facility] OR     ZZC LUMBAR SPINE FUSION,ANTER APPRCH      2 L4-5 L5-21       Family Hx:  Family History   Problem Relation Age of Onset     Hypertension Mother      Diabetes Father         type 2     Hypertension Father      Cerebrovascular Disease Maternal Grandmother      Unknown/Adopted Maternal Grandfather      Unknown/Adopted Paternal Grandmother      Unknown/Adopted Paternal Grandfather      Liver Disease Brother      Heart Disease Sister      Thyroid Disease Sister      Thyroid Disease Sister      Thyroid Disease Sister          Social Hx:  Social History     Socioeconomic History     Marital status:      Spouse name: Not on file     Number of children: Not on file     Years of education: Not on file     Highest education level: Not on file   Occupational History     Not on file   Tobacco Use     Smoking status: Former     Types: Cigarettes     Quit date: 2010     Years since quittin.5     Smokeless tobacco: Never   Substance and Sexual Activity     Alcohol use: No     Drug use: Yes     Types: Marijuana     Comment: medical marijuana     Sexual activity: Not Currently     Comment: Prescription Marijuana   Other Topics Concern     Parent/sibling w/ CABG, MI or angioplasty before 65F 55M? Not Asked   Social History Narrative     Not on file     Social Determinants of Health     Financial Resource Strain: Not on file   Food Insecurity: Not on file   Transportation Needs: Not on file   Physical Activity: Not on file   Stress: Not on file   Social Connections: Not on file   Intimate Partner Violence: Not on file   Housing Stability: Not on file          MEDICATIONS:  has a current medication list which includes the following prescription(s): lipase-protease-amylase, atorvastatin, citalopram, dexcom g6 , dexcom g6 sensor, dexcom g6 transmitter, insulin aspart, levothyroxine, lisinopril, medical cannabis,  naproxen, novolog vial, omeprazole, ondansetron, oxycodone-acetaminophen, sumatriptan, lipase-protease-amylase, contour next test, lidocaine-prilocaine, and naloxone.    ROS: 10 point ROS neg other than the symptoms noted above in the HPI.     GENERAL: some fatigue, wt stable; denies fevers, chills, night sweats.   HEENT: no dysphagia, odonophagia, diplopia, neck pain  THYROID:  no apparent hyper or hypothyroid symptoms  CV: no recent chest pain, pressure, palpitations  LUNGS: no SOB, KRAFT, cough, wheezing   ABDOMEN: indigestion, postmeal nausea, frequent BM's; no constipation  EXTREMITIES: no rashes, ulcers, edema  NEUROLOGY: no headaches, denies changes in vision, tingling, extremitiy numbness   MSK: neck pain/stiffness and back pains; no muscle weakness  SKIN: no rashes or lesions  PSYCH:  stable mood, no significant anxiety or depression  ENDOCRINE: no heat or cold intolerance    Physical Exam (visual exam)  VS:  no vital signs taken for video visit  CONSTITUTIONAL: healthy, alert and NAD, well dressed, answering questions appropriately  ENT: no nose swelling or nasal discharge, mouth redness or gum changes.  EYES: eyes grossly normal to inspection, conjunctivae and sclerae normal, no exophthalmos or proptosis  THYROID:  no apparent nodules or goiter  LUNGS: no audible wheeze, cough or visible cyanosis, no visible retractions or increased work of breathing  ABDOMEN: abdomen not evaluated  EXTREMITIES: no hand tremors, limited exam  NEUROLOGY: CN grossly intact, mentation intact and speech normal   SKIN:  no apparent skin lesions, rash, or edema with visualized skin appearance  PSYCH: mentation appears normal, affect normal/bright, judgement and insight intact,   normal speech and appearance well groomed       LABS:     All pertinent notes, labs, and images personally reviewed by me.     A/P:  Encounter Diagnoses   Name Primary?     Type 1 diabetes mellitus with diabetic autonomic neuropathy (H) Yes      Gastroparesis      Type 1 diabetes mellitus with diabetic neuropathy (H)      Insulin pump status      Hypothyroidism, unspecified type        Comments:  Reviewed complicated health history, diabetes and thyroid issues.  Recent SG trends show postmeal hyperglycemia, also periodic hypoglycemia trends  Reviewed and interpreted tests that I previously ordered.   Ordered appropriate tests for the endocrinology disease management.    Management options discussed and implemented after shared medical decision making with the patient.  Diabetes problem is chronic with exacerbation progression, hyperglycemia    Plan:  Reviewed the overall T1DM management and insulin pump use.  Discussed optimal BG testing to assess glucose trends.  We reviewed insulin pump settings, basal rate and bolus dosing  Use of automated pump bolus dosing for meal/snack carb & correction dosing  Reviewed the [employer] Carelink report data today  Reviewed recent DexcomG6 CGM glucose sensor trends, in detail     Recommend:  Continue treatment plan using the Medtronic insulin pump and DexcomG6 CGM sensor  Use SG value for the pump bolus wizard dosing.  No pump setting changes at this time.    Use lower Temp Basal rate or suspend pump basal rate for aerobic exercise, reviewed this strategy again today  Prepare for diabetes appointments by synchronizing pump data when he is on Wi-fi before appointments  Encouraged him to contact [employer] for a price-estimate for upgrade to the 780G/Guardian4 system  Continue use of the DexcomG6 CGM sensor               Patient makes frequent adjustments to insulin regimen on basis of therapeutic glucose testing  Would benefit from additional CDE evaluation at [facility]  Arrange nonfasting labs in late 10/2023   Lab orders placed  Consider followup diabetes education appt with the CDE at [facility]  Continue simvastatin 40 mg each evening  Continue current levothyroxine 0.125 mg daily  Discussed  differences between endocrine vs exocrine pancreas failure   Contact GI team for advice on the chronic diarrhea, EPI, gastric pacemaker function  Arrange annual dilated eye exam, fasting lipid panel testing.  We reviewed importance of timely clinic appointments with me Q3 months, to satisfy Medicare with his diabetes device coverage  Will communicate feedback with his testing by phonecall and/or USMail    Addressed patient's questions today.    There are no Patient Instructions on file for this visit.    Future labs ordered today:   Orders Placed This Encounter   Procedures     GLUCOSE MONITOR, 72 HOUR, PHYS INTERP     Hemoglobin A1c     Basic metabolic panel     Radiology/Consults ordered today: None    Total time spent on day of encounter:  40 min    Follow-up:  11/6/23 at 3p, VVAm    VIKTOR Best MD, MS  Endocrinology  [facility]    CC:  Care Team                                      Again, thank you for allowing me to participate in the care of your patient.        Sincerely,        Frankie Best MD

## 2023-08-17 ENCOUNTER — MYC REFILL (OUTPATIENT)
Dept: FAMILY MEDICINE | Facility: CLINIC | Age: 58
End: 2023-08-17
Payer: MEDICARE

## 2023-08-17 DIAGNOSIS — M48.02 CERVICAL STENOSIS OF SPINE: ICD-10-CM

## 2023-08-17 DIAGNOSIS — G89.4 CHRONIC PAIN SYNDROME: ICD-10-CM

## 2023-08-17 DIAGNOSIS — E78.5 HYPERLIPIDEMIA LDL GOAL <100: ICD-10-CM

## 2023-08-17 RX ORDER — OXYCODONE AND ACETAMINOPHEN 5; 325 MG/1; MG/1
1 TABLET ORAL EVERY 6 HOURS PRN
Qty: 70 TABLET | Refills: 0 | Status: CANCELLED | OUTPATIENT
Start: 2023-08-17

## 2023-08-17 RX ORDER — ATORVASTATIN CALCIUM 80 MG/1
80 TABLET, FILM COATED ORAL DAILY
Qty: 90 TABLET | Refills: 0 | Status: SHIPPED | OUTPATIENT
Start: 2023-08-17 | End: 2023-11-16

## 2023-08-17 NOTE — TELEPHONE ENCOUNTER
Requested Prescriptions   Pending Prescriptions Disp Refills    oxyCODONE-acetaminophen (PERCOCET) 5-325 MG tablet 70 tablet 0     Sig: Take 1 tablet by mouth every 6 hours as needed for severe pain (*CAUTION OPIOD.  RISK OF OVERDOSE AND ADDICTION.**MAX OF 3 TABLETS PER DAY**) To last for month       There is no refill protocol information for this order        Future Office visit:     10/03/2023 with Dr. Haq    Routing refill request to provider for review/approval because:  Drug not on the [facility] or [facility] refill protocol or controlled substance

## 2023-08-18 RX ORDER — OXYCODONE AND ACETAMINOPHEN 5; 325 MG/1; MG/1
1 TABLET ORAL EVERY 6 HOURS PRN
Qty: 70 TABLET | Refills: 0 | Status: SHIPPED | OUTPATIENT
Start: 2023-08-18 | End: 2023-09-18

## 2023-08-21 RX ORDER — ATORVASTATIN CALCIUM 80 MG/1
80 TABLET, FILM COATED ORAL DAILY
Qty: 90 TABLET | Refills: 0 | OUTPATIENT
Start: 2023-08-21

## 2023-08-21 NOTE — TELEPHONE ENCOUNTER
Patient wants this transferred to [facility].  He has not been there before so they need a new prescription.

## 2023-09-07 ENCOUNTER — TRANSFERRED RECORDS (OUTPATIENT)
Dept: HEALTH INFORMATION MANAGEMENT | Facility: CLINIC | Age: 58
End: 2023-09-07
Payer: MEDICARE

## 2023-09-18 ENCOUNTER — MYC REFILL (OUTPATIENT)
Dept: FAMILY MEDICINE | Facility: CLINIC | Age: 58
End: 2023-09-18
Payer: MEDICARE

## 2023-09-18 DIAGNOSIS — G89.4 CHRONIC PAIN SYNDROME: ICD-10-CM

## 2023-09-18 DIAGNOSIS — M48.02 CERVICAL STENOSIS OF SPINE: ICD-10-CM

## 2023-09-20 RX ORDER — OXYCODONE AND ACETAMINOPHEN 5; 325 MG/1; MG/1
1 TABLET ORAL EVERY 6 HOURS PRN
Qty: 70 TABLET | Refills: 0 | Status: SHIPPED | OUTPATIENT
Start: 2023-09-20 | End: 2023-10-04

## 2023-10-04 ENCOUNTER — OFFICE VISIT (OUTPATIENT)
Dept: FAMILY MEDICINE | Facility: CLINIC | Age: 58
End: 2023-10-04
Payer: COMMERCIAL

## 2023-10-04 VITALS
TEMPERATURE: 98 F | WEIGHT: 150.1 LBS | SYSTOLIC BLOOD PRESSURE: 138 MMHG | HEART RATE: 68 BPM | BODY MASS INDEX: 22.75 KG/M2 | RESPIRATION RATE: 18 BRPM | HEIGHT: 68 IN | DIASTOLIC BLOOD PRESSURE: 78 MMHG | OXYGEN SATURATION: 97 %

## 2023-10-04 DIAGNOSIS — M48.02 CERVICAL STENOSIS OF SPINE: ICD-10-CM

## 2023-10-04 DIAGNOSIS — K86.81 EXOCRINE PANCREATIC INSUFFICIENCY: ICD-10-CM

## 2023-10-04 DIAGNOSIS — E10.43 TYPE 1 DIABETES MELLITUS WITH DIABETIC AUTONOMIC NEUROPATHY (H): ICD-10-CM

## 2023-10-04 DIAGNOSIS — K31.84 GASTROPARESIS: ICD-10-CM

## 2023-10-04 DIAGNOSIS — E03.9 ACQUIRED HYPOTHYROIDISM: ICD-10-CM

## 2023-10-04 DIAGNOSIS — R91.8 PULMONARY NODULES: ICD-10-CM

## 2023-10-04 DIAGNOSIS — Z96.41 INSULIN PUMP STATUS: ICD-10-CM

## 2023-10-04 DIAGNOSIS — K21.9 GASTROESOPHAGEAL REFLUX DISEASE WITHOUT ESOPHAGITIS: ICD-10-CM

## 2023-10-04 DIAGNOSIS — G89.4 CHRONIC PAIN SYNDROME: ICD-10-CM

## 2023-10-04 DIAGNOSIS — M54.41 CHRONIC BILATERAL LOW BACK PAIN WITH BILATERAL SCIATICA: Primary | ICD-10-CM

## 2023-10-04 DIAGNOSIS — E78.5 HYPERLIPIDEMIA, UNSPECIFIED HYPERLIPIDEMIA TYPE: ICD-10-CM

## 2023-10-04 DIAGNOSIS — Z98.1 HX OF FUSION OF CERVICAL SPINE: ICD-10-CM

## 2023-10-04 DIAGNOSIS — M54.42 CHRONIC BILATERAL LOW BACK PAIN WITH BILATERAL SCIATICA: Primary | ICD-10-CM

## 2023-10-04 DIAGNOSIS — F11.90 CHRONIC, CONTINUOUS USE OF OPIOIDS: ICD-10-CM

## 2023-10-04 DIAGNOSIS — Z98.1 HISTORY OF LUMBAR FUSION: ICD-10-CM

## 2023-10-04 DIAGNOSIS — G89.29 CHRONIC BILATERAL LOW BACK PAIN WITH BILATERAL SCIATICA: Primary | ICD-10-CM

## 2023-10-04 LAB
AMPHETAMINES UR QL SCN: ABNORMAL
BARBITURATES UR QL SCN: ABNORMAL
BENZODIAZ UR QL SCN: ABNORMAL
BZE UR QL SCN: ABNORMAL
CANNABINOIDS UR QL SCN: ABNORMAL
FENTANYL UR QL: ABNORMAL
OPIATES UR QL SCN: ABNORMAL
PCP QUAL URINE (ROCHE): ABNORMAL

## 2023-10-04 PROCEDURE — 80307 DRUG TEST PRSMV CHEM ANLYZR: CPT | Performed by: STUDENT IN AN ORGANIZED HEALTH CARE EDUCATION/TRAINING PROGRAM

## 2023-10-04 PROCEDURE — 99214 OFFICE O/P EST MOD 30 MIN: CPT | Performed by: STUDENT IN AN ORGANIZED HEALTH CARE EDUCATION/TRAINING PROGRAM

## 2023-10-04 PROCEDURE — 99207 PR FOOT EXAM NO CHARGE: CPT | Performed by: STUDENT IN AN ORGANIZED HEALTH CARE EDUCATION/TRAINING PROGRAM

## 2023-10-04 RX ORDER — OXYCODONE AND ACETAMINOPHEN 5; 325 MG/1; MG/1
1 TABLET ORAL EVERY 6 HOURS PRN
Qty: 70 TABLET | Refills: 0 | Status: SHIPPED | OUTPATIENT
Start: 2023-10-19 | End: 2023-11-20

## 2023-10-04 ASSESSMENT — PAIN SCALES - GENERAL: PAINLEVEL: MODERATE PAIN (5)

## 2023-10-04 ASSESSMENT — PATIENT HEALTH QUESTIONNAIRE - PHQ9
10. IF YOU CHECKED OFF ANY PROBLEMS, HOW DIFFICULT HAVE THESE PROBLEMS MADE IT FOR YOU TO DO YOUR WORK, TAKE CARE OF THINGS AT HOME, OR GET ALONG WITH OTHER PEOPLE: SOMEWHAT DIFFICULT
SUM OF ALL RESPONSES TO PHQ QUESTIONS 1-9: 5
SUM OF ALL RESPONSES TO PHQ QUESTIONS 1-9: 5

## 2023-10-04 NOTE — PROGRESS NOTES
Assessment & Plan     Chronic bilateral low back pain with bilateral sciatica  Cervical stenosis of spine  Chronic, continuous use of opioids  History of lumbar fusion  Patient continuous to follow with pain clinic and on cannabis. Has been stable on 70 tablets percocet monthly and will continue now. He is taking as prescribed and tolerating without side effects. Risk of chronic opiates again discussed in detail today including respiratory suppression, death and addiction. Understands possible interactions and need to contact clinic if other medications prescribed. Controlled substance agreement up to date and urine drug screen today. Other none medication modalities to help control pain discussed. Follow up in 6 months.   - oxyCODONE-acetaminophen (PERCOCET) 5-325 MG tablet  Dispense: 70 tablet; Refill: 0  - Urine Drugs of Abuse Screen  - Urine Drugs of Abuse Screen    Hx of fusion of cervical spine    Type 1 diabetes mellitus with diabetic autonomic neuropathy (H)  Following with endocrinology and pump in place. On statin and ace.   - FOOT EXAM    Hyperlipidemia, unspecified hyperlipidemia type  On statin     Insulin pump status    Acquired hypothyroidism  Last TSH stable    Exocrine pancreatic insufficiency  Gastroesophageal reflux disease without esophagitis  Gastroparesis  Continue to follow with GI. Replacement battery upcoming. Continues on creon, cannabis and omeprazole.     Pulmonary nodules  - CT Chest w Contrast        Mushtaq Haq MD  [facility]    Nabeel Ritter is a 58 year old, presenting for the following health issues:  Recheck Medication (percocet)        10/4/2023     3:29 PM   Additional Questions   Roomed by Kelsie PEREZ   Accompanied by spencer Caballero       History of Present Illness       Back Pain:  He presents for follow up of back pain. Patient's back pain is a chronic problem.  Location of back pain:  Right lower back, left lower back, right side of neck,  "left side of neck, right shoulder, left shoulder, right buttock and left buttock  Description of back pain: burning, sharp and stabbing  Back pain spreads: right foot, left foot, right shoulder, left shoulder, right side of neck and left side of neck    Since patient first noticed back pain, pain is: always present, but gets better and worse  Does back pain interfere with his job:  Not applicable       He eats 2-3 servings of fruits and vegetables daily.He consumes 0 sweetened beverage(s) daily.He exercises with enough effort to increase his heart rate 10 to 19 minutes per day.  He exercises with enough effort to increase his heart rate 4 days per week.   He is taking medications regularly.     Following up on chronic conditions. Following with pain clinic and continuous on chronic opioids. Does see [facility].       Review of Systems   Constitutional, HEENT, cardiovascular, pulmonary, gi and gu systems are negative, except as otherwise noted.      Objective    /78 (BP Location: Left arm, Patient Position: Chair)   Pulse 68   Temp 98  F (36.7  C) (Temporal)   Resp 18   Ht 1.727 m (5' 8\")   Wt 68.1 kg (150 lb 1.6 oz)   SpO2 97%   BMI 22.82 kg/m    Body mass index is 22.82 kg/m .  Physical Exam   GENERAL: healthy, alert and no distress  EYES: Eyes grossly normal to inspection, PERRL and conjunctivae and sclerae normal  HENT: nose and mouth without ulcers or lesions  NECK: no adenopathy, no asymmetry, masses, or scars and thyroid normal to palpation  RESP: lungs clear to auscultation - no rales, rhonchi or wheezes  CV: regular rate and rhythm, normal S1 S2, no peripheral edema and peripheral pulses strong  ABDOMEN: soft, nontender, no hepatosplenomegaly, no masses and bowel sounds normal  MS: no gross musculoskeletal defects noted, no edema  SKIN: no suspicious lesions or rashes  NEURO:  mentation intact and speech normal  PSYCH: mentation appears normal, affect normal/bright                "

## 2023-11-02 ENCOUNTER — LAB (OUTPATIENT)
Dept: LAB | Facility: CLINIC | Age: 58
End: 2023-11-02
Payer: COMMERCIAL

## 2023-11-02 DIAGNOSIS — E10.40 TYPE 1 DIABETES MELLITUS WITH DIABETIC NEUROPATHY (H): ICD-10-CM

## 2023-11-02 DIAGNOSIS — F41.1 GENERALIZED ANXIETY DISORDER: ICD-10-CM

## 2023-11-02 DIAGNOSIS — E10.43 TYPE 1 DIABETES MELLITUS WITH DIABETIC AUTONOMIC NEUROPATHY (H): ICD-10-CM

## 2023-11-02 DIAGNOSIS — Z96.41 INSULIN PUMP STATUS: ICD-10-CM

## 2023-11-02 LAB
ANION GAP SERPL CALCULATED.3IONS-SCNC: 12 MMOL/L (ref 7–15)
BUN SERPL-MCNC: 17.9 MG/DL (ref 6–20)
CALCIUM SERPL-MCNC: 9.1 MG/DL (ref 8.6–10)
CHLORIDE SERPL-SCNC: 99 MMOL/L (ref 98–107)
CREAT SERPL-MCNC: 1.01 MG/DL (ref 0.67–1.17)
DEPRECATED HCO3 PLAS-SCNC: 24 MMOL/L (ref 22–29)
EGFRCR SERPLBLD CKD-EPI 2021: 87 ML/MIN/1.73M2
GLUCOSE SERPL-MCNC: 221 MG/DL (ref 70–99)
HBA1C MFR BLD: 8.9 %
POTASSIUM SERPL-SCNC: 4.4 MMOL/L (ref 3.4–5.3)
SODIUM SERPL-SCNC: 135 MMOL/L (ref 135–145)

## 2023-11-02 PROCEDURE — 80048 BASIC METABOLIC PNL TOTAL CA: CPT

## 2023-11-02 PROCEDURE — 83036 HEMOGLOBIN GLYCOSYLATED A1C: CPT

## 2023-11-02 PROCEDURE — 36415 COLL VENOUS BLD VENIPUNCTURE: CPT

## 2023-11-06 ENCOUNTER — VIRTUAL VISIT (OUTPATIENT)
Dept: ENDOCRINOLOGY | Facility: CLINIC | Age: 58
End: 2023-11-06
Payer: COMMERCIAL

## 2023-11-06 DIAGNOSIS — K31.84 GASTROPARESIS: ICD-10-CM

## 2023-11-06 DIAGNOSIS — E03.9 HYPOTHYROIDISM, UNSPECIFIED TYPE: ICD-10-CM

## 2023-11-06 DIAGNOSIS — Z96.41 INSULIN PUMP STATUS: ICD-10-CM

## 2023-11-06 DIAGNOSIS — E10.43 TYPE 1 DIABETES MELLITUS WITH DIABETIC AUTONOMIC NEUROPATHY (H): Primary | ICD-10-CM

## 2023-11-06 DIAGNOSIS — E10.40 TYPE 1 DIABETES MELLITUS WITH DIABETIC NEUROPATHY (H): ICD-10-CM

## 2023-11-06 PROCEDURE — 95251 CONT GLUC MNTR ANALYSIS I&R: CPT | Performed by: INTERNAL MEDICINE

## 2023-11-06 PROCEDURE — 99214 OFFICE O/P EST MOD 30 MIN: CPT | Mod: VID | Performed by: INTERNAL MEDICINE

## 2023-11-06 NOTE — PROGRESS NOTES
"Virtual Visit Details    Type of service:  Video Visit   Video Start Time: 2:58 PM  Video End Time:3:23 PM    Originating Location (pt. Location): Home  Distant Location (provider location):  Off-site  Platform used for Video Visit: Terri        Recent issues:  Diabetes and thyroid follow-up evaluation  Using the Medtronic 770G pump since 9/2021, along with the DexcomG6 CGM sensor  Taking the medical [facility] via pain clinic in [facility], which is helpful for pain and nausea  Still has issues with neck pain/stiffness   Has malabsorption and chronic diarrhea, reports having diagnosis of exocrine pancreas insufficiency (EPI)   Takes Zenpep vs Creon (35,000 mg/dose, 1-capsule TID vs QID, ~$800/mo), with improvement of diarrhea, improved energy when taking it   Perceives higher glucose levels when less malabsorption    GI eval with Dr. Jennifer Thompson/[facility], reports his gastric pacer not working in 9/2023. . low battery, then loss of function and increase diarrhea, abd pain 10/2023    Plans for gastric pacer replacement in 12/2023 with Dr. Kyra Summers at [facility]  He's started process of pump upgrade to Medtronic 780G with Guardian4 CGM           1995. Diagnosis of diabetes mellitus after 2nd back surgery, age 29  Initial treatment with insulin injections using NPH and Regular insulin  Switched to premixed 70/30 insulin BID dosing  1999. Started Medtronic insulin pump model 508, used base-dose Humalog insulin at meals  Had seen Dr. Iggy Briggs/[facility]     7/24/01. Initial evaluation with me at my former clinic ([facility])  Upgraded pump to Medtronic 523   Used the Medtronic 630G pump  Meals BID, had carb \"exchanges\" with his pump settings    Upgraded to Medtronic 770G   Novolog in pump    Chronic high BG and hgbA1c trends despite dose changes  Tried square wave bolus  Has Contour Link? BG meter, variable testing pattern   Recently testing 4x/day for at least 60 days  Glucose sensor (CGM) use:   Tried Medtronic " CGMS in the past, didn't like it   Switched to DexcomG5, used for approx 6 months but issues with getting supply order   Early 2019, started Freestyle Sukhi but issues with skin site redness    Meal schedule:  Snacks during the day, supper meal 5-7pm  Current pump settings:       Recent Carelink data:            Recent DexcomG6 data:          Previous [facility] labs include:    Previous [facility] hgbA1c trends include:  Lab Results   Component Value Date    A1C 8.9 (H) 11/02/2023    A1C 8.9 (H) 07/25/2023    A1C 8.0 (H) 02/27/2023    A1C 8.9 (H) 06/27/2022    A1C 8.8 (H) 03/02/2022      Recent [facility] labs include:  Lab Results   Component Value Date    A1C 8.9 (H) 11/02/2023     11/02/2023    POTASSIUM 4.4 11/02/2023    CHLORIDE 99 11/02/2023    CO2 24 11/02/2023    ANIONGAP 12 11/02/2023     (H) 11/02/2023    BUN 17.9 11/02/2023    CR 1.01 11/02/2023    GFRESTIMATED 87 11/02/2023    GFRESTBLACK >90 03/01/2021    EMILEE 9.1 11/02/2023    CPEPT <0.1 (L) 06/24/2021    CHOL 142 07/25/2023    TRIG 47 07/25/2023    HDL 65 07/25/2023    LDL 68 07/25/2023    NHDL 77 07/25/2023    UCRR 62 02/27/2023    UCRR 63 02/27/2023    MICROL 6 02/27/2023    UMALCR 9.68 02/27/2023     Last eye exam date?  Goes to eye clinic in [facility]  7/2018. Met with Destiny Warren RD, FRANCAE at [facility]  DM Complications:              Neuropathy                          Peripheral neuropathy                                      Decreased sensation at feet                          Autonomic neuropathy- gastroparesis                                      Symptoms of nausea, bloating, episodes of diarrhea and emesis                                      Has [employer] gastric stimulator                                      Sees physicians and Ms Olga Ramirez, PA/[facility]                   Thyroid:  1994. History of hypothyroidism  Chronic levothyroxine treatment, has used Levoxyl in the past  Supplements:  Takes vitamin D 1000 U daily  7/2018. Had  levothyroxine dose increase   Recent labs include:  Lab Results   Component Value Date    TSH 1.66 07/25/2023    T4 1.57 07/25/2023     Current dose:  Levothyroxine 0.125 mg daily        Lives in [place], [place] with jessica Caballero (1) adopted dog named Marko  Has seen Dr. Mushtaq Haq/[facility] for general medicine evaluations  Also sees Olga Ramirez [facility] and Dr. Jennifer Thompson/[facility] clinic.       PMH/PSH:  Past Medical History:   Diagnosis Date    Arthritis     Chronic neck pain     percocet for years now,     Depressive disorder     Exocrine pancreatic insufficiency     Gastroparesis due to DM (H)     gastric stimulator helps, [facility] manages that    Hyperlipidemia     Hypertension     Hypothyroidism     Neuropathy, diabetic (H)     Type 1 diabetes (H) age 31    neuropathy- peripheral and autonomic     Past Surgical History:   Procedure Laterality Date    COLONOSCOPY  07/18/12    FUSION CERVICAL ANTERIOR TWO LEVELS      INJECT BLOCK MEDIAL BRANCH CERVICAL/THORACIC/LUMBAR Bilateral 11/20/2018    Procedure: Bilateral Cervical 3-4-5 medial branch block;  Surgeon: Jef Edwards MD;  Location: [facility] OR    INJECT BLOCK MEDIAL BRANCH CERVICAL/THORACIC/LUMBAR N/A 1/11/2019    Procedure: Cervical 3,4,5 Bilateral Medial branch blocks;  Surgeon: Jef Edwards MD;  Location: [facility] OR    RADIO FREQUENCY ABLATION PULSED CERVICAL Right 2/28/2019    Procedure: RADIO FREQUENCY ABLATION CERVICAL THREE, FOUR, FIVE, AND THIRD OCCUPITAL NERVE RIGHT SIDE;  Surgeon: Jef Edwards MD;  Location: [facility] OR    RADIO FREQUENCY ABLATION PULSED CERVICAL Left 3/15/2019    Procedure: radiofrequency ablation cervical 3,4,5;  Surgeon: Jef Edwards MD;  Location: [facility]    RADIO FREQUENCY ABLATION PULSED CERVICAL Right 9/4/2020    Procedure: RADIOFREQUENCY ABLATION CERVICAL 3,4 5 right SIDE;  Surgeon: Jef Edwards MD;  Location: [facility]    RADIO FREQUENCY ABLATION PULSED CERVICAL Left 9/10/2020    Procedure: RADIOFREQUENCY ABLATION  CERVICAL 3,4 5 LEFT SIDE;  Surgeon: Jef Edawrds MD;  Location: [facility] OR    THORACOSCOPIC DECORTICATION LUNG  2014    Procedure: THORACOSCOPIC DECORTICATION LUNG;  RIGHT VATS/RIGHT THORACOTOMY , DECORTICATION RIGHT LUNG ,WEDGE RESECTION RIGHT MIDDLE LOBE LUNG NODULE;  Surgeon: Harley Felix MD;  Location: [facility] OR    ZZC LUMBAR SPINE FUSION,ANTER APPRCH      2 L4-5 L5-21       Family Hx:  Family History   Problem Relation Age of Onset    Hypertension Mother     Diabetes Father         type 2    Hypertension Father     Cerebrovascular Disease Maternal Grandmother     Unknown/Adopted Maternal Grandfather     Unknown/Adopted Paternal Grandmother     Unknown/Adopted Paternal Grandfather     Liver Disease Brother     Heart Disease Sister     Thyroid Disease Sister     Thyroid Disease Sister     Thyroid Disease Sister          Social Hx:  Social History     Socioeconomic History    Marital status:      Spouse name: Not on file    Number of children: Not on file    Years of education: Not on file    Highest education level: Not on file   Occupational History    Not on file   Tobacco Use    Smoking status: Former     Types: Cigarettes     Quit date: 2010     Years since quittin.8    Smokeless tobacco: Never   Vaping Use    Vaping Use: Never used   Substance and Sexual Activity    Alcohol use: No    Drug use: Yes     Types: Marijuana     Comment: medical marijuana    Sexual activity: Not Currently     Comment: Prescription Marijuana   Other Topics Concern    Parent/sibling w/ CABG, MI or angioplasty before 65F 55M? Not Asked   Social History Narrative    Not on file     Social Determinants of Health     Financial Resource Strain: Low Risk  (2023)    Financial Resource Strain     Within the past 12 months, have you or your family members you live with been unable to get utilities (heat, electricity) when it was really needed?: No   Food Insecurity: Low Risk  (2023)    Food Insecurity      Within the past 12 months, did you worry that your food would run out before you got money to buy more?: No     Within the past 12 months, did the food you bought just not last and you didn t have money to get more?: No   Transportation Needs: Low Risk  (9/29/2023)    Transportation Needs     Within the past 12 months, has lack of transportation kept you from medical appointments, getting your medicines, non-medical meetings or appointments, work, or from getting things that you need?: No   Physical Activity: Not on file   Stress: Not on file   Social Connections: Not on file   Interpersonal Safety: Not on file   Housing Stability: Low Risk  (9/29/2023)    Housing Stability     Do you have housing? : Yes     Are you worried about losing your housing?: No          MEDICATIONS:  has a current medication list which includes the following prescription(s): lipase-protease-amylase, atorvastatin, citalopram, insulin aspart, levothyroxine, lisinopril, naproxen, omeprazole, ondansetron, oxycodone-acetaminophen, sumatriptan, lipase-protease-amylase, dexcom g6 , dexcom g6 sensor, dexcom g6 transmitter, contour next test, lidocaine-prilocaine, medical cannabis, naloxone, and novolog vial.    ROS: 10 point ROS neg other than the symptoms noted above in the HPI.     GENERAL: some fatigue, wt stable; denies fevers, chills, night sweats.   HEENT: no dysphagia, odonophagia, diplopia, neck pain  THYROID:  no apparent hyper or hypothyroid symptoms  CV: no recent chest pain, pressure, palpitations  LUNGS: no SOB, KRAFT, cough, wheezing   ABDOMEN: indigestion and some diarrhea, postmeal nausea, frequent BM's; no constipation  EXTREMITIES: no rashes, ulcers, edema  NEUROLOGY: no headaches, denies changes in vision, tingling, extremitiy numbness   MSK: neck pain/stiffness and back pains; no muscle weakness  SKIN: no rashes or lesions  PSYCH:  stable mood, no significant anxiety or depression  ENDOCRINE: no heat or cold  intolerance    Physical Exam (visual exam)  VS:  no vital signs taken for video visit  CONSTITUTIONAL: healthy, alert and NAD, well dressed, answering questions appropriately  ENT: no nose swelling or nasal discharge, mouth redness or gum changes.  EYES: eyes grossly normal to inspection, conjunctivae and sclerae normal, no exophthalmos or proptosis  THYROID:  no apparent nodules or goiter  LUNGS: no audible wheeze, cough or visible cyanosis, no visible retractions or increased work of breathing  ABDOMEN: abdomen not evaluated  EXTREMITIES: no hand tremors, limited exam  NEUROLOGY: CN grossly intact, mentation intact and speech normal   SKIN:  no apparent skin lesions, rash, or edema with visualized skin appearance  PSYCH: mentation appears normal, affect normal/bright, judgement and insight intact,   normal speech and appearance well groomed       LABS:     All pertinent notes, labs, and images personally reviewed by me.       A/P:  Encounter Diagnoses   Name Primary?    Type 1 diabetes mellitus with diabetic autonomic neuropathy (H) Yes    Gastroparesis     Type 1 diabetes mellitus with diabetic neuropathy (H)     Insulin pump status     Hypothyroidism, unspecified type          Comments:  Reviewed complicated health history, diabetes and thyroid issues.  Glycemic control complicated by irreg meal intake, delayed absorption from gastroparesis, and non-functioning gastric pacer  Recent SG trends show postmeal hyperglycemia, also less frequent hypoglycemia trends  Reviewed and interpreted tests that I previously ordered.   Ordered appropriate tests for the endocrinology disease management.    Management options discussed and implemented after shared medical decision making with the patient.  Diabetes problem is chronic with exacerbation progression, hyperglycemia    Plan:  Reviewed the overall T1DM management and insulin pump use.  Discussed optimal BG testing to assess glucose trends.  We reviewed insulin pump  settings, basal rate and bolus dosing  Use of automated pump bolus dosing for meal/snack carb & correction dosing  Reviewed the [employer] Carelink report data today  Reviewed recent DexcomG6 CGM glucose sensor trends, in detail     Recommend:  Continue treatment plan using the Medtronic insulin pump and DexcomG6 CGM sensor  Use SG value for the pump bolus wizard dosing.  No pump setting changes at this time.    Continue use of the DexcomG6 CGM sensor, until able to switch to Medtronic system using G4 CGM              Patient makes frequent adjustments to insulin regimen on basis of therapeutic glucose testing  Agree with plan for pump upgrade to Medtronic 780G with Guardian4 CGM, process discussed   Encouraged him to contact [employer] rep ([place]) for guidance on process  Prepare for diabetes appointments by synchronizing pump data when he is on Wi-fi before appointments  Would benefit from [facility]  No labs ordered at this time  Consider followup diabetes education appt with the CDE at [facility]  Continue simvastatin 40 mg each evening  Continue current levothyroxine 0.125 mg daily  We have discussed differences between endocrine vs exocrine pancreas failure   Contact GI team for advice on the chronic diarrhea, EPI, gastric pacemaker function  Arrange annual dilated eye exam, fasting lipid panel testing.  We reviewed importance of timely clinic appointments with me Q3 months, to satisfy Medicare with his diabetes device coverage  He will message me when ready to schedule next appt, with use of new pump & sensor system  Will communicate feedback with his testing by phonecall and/or USMail    Addressed patient's questions today.    There are no Patient Instructions on file for this visit.    Future labs ordered today:   Orders Placed This Encounter   Procedures    GLUCOSE MONITOR, 72 HOUR, PHYS INTERP     Radiology/Consults ordered today: None    Total time spent  on day of encounter:  36 min    Follow-up:  1/2024, PHILLIP Best MD, MS  Endocrinology  [facility]    CC:  Care Team

## 2023-11-06 NOTE — Clinical Note
"    11/6/2023         RE: Stone De Paz  [id number] [facility] [address]        Dear Colleague,    Thank you for referring your patient, Stone De Paz, to the [facility] [place]. Please see a copy of my visit note below.    Virtual Visit Details    Type of service:  Video Visit   Video Start Time: 2:58 PM  Video End Time:3:23 PM    Originating Location (pt. Location): Home  Distant Location (provider location):  Off-site  Platform used for Video Visit: Terri        Recent issues:  Diabetes and thyroid follow-up evaluation  Using the Medtronic 770G pump since 9/2021, along with the DexcomG6 CGM sensor  Taking the medical [facility] via pain clinic in [facility], which is helpful for pain and nausea  Still has issues with neck pain/stiffness   Has malabsorption and chronic diarrhea, reports having diagnosis of exocrine pancreas insufficiency (EPI)   Takes Zenpep vs Creon (35,000 mg/dose, 1-capsule TID vs QID, ~$800/mo), with improvement of diarrhea, improved energy when taking it   Perceives higher glucose levels when less malabsorption    GI eval with Dr. Jennifer Thompson/[facility], reports his gastric pacer not working in 9/2023. . low battery, then loss of function and increase diarrhea, abd pain 10/2023    Plans for gastric pacer replacement in 12/2023 with Dr. Kyra Summers at [facility]  He's started process of pump upgrade to Medtronic 780G with Guardian4 CGM           1995. Diagnosis of diabetes mellitus after 2nd back surgery, age 29  Initial treatment with insulin injections using NPH and Regular insulin  Switched to premixed 70/30 insulin BID dosing  1999. Started Medtronic insulin pump model 508, used base-dose Humalog insulin at meals  Had seen Dr. Iggy Briggs/[facility]     7/24/01. Initial evaluation with me at my former clinic ([facility])  Upgraded pump to Medtronic 523   Used the Medtronic 630G pump  Meals BID, had carb \"exchanges\" with his pump settings    Upgraded to Medtronic " 770G   Novolog in pump    Chronic high BG and hgbA1c trends despite dose changes  Tried square wave bolus  Has Contour Link? BG meter, variable testing pattern   Recently testing 4x/day for at least 60 days  Glucose sensor (CGM) use:   Tried Medtronic CGMS in the past, didn't like it   Switched to DexcomG5, used for approx 6 months but issues with getting supply order   Early 2019, started Freestyle Sukhi but issues with skin site redness    Meal schedule:  Snacks during the day, supper meal 5-7pm  Current pump settings:       Recent Carelink data:            Recent DexcomG6 data:          Previous [facility] labs include:    Previous [facility] hgbA1c trends include:  Lab Results   Component Value Date    A1C 8.9 (H) 11/02/2023    A1C 8.9 (H) 07/25/2023    A1C 8.0 (H) 02/27/2023    A1C 8.9 (H) 06/27/2022    A1C 8.8 (H) 03/02/2022      Recent [facility] labs include:  Lab Results   Component Value Date    A1C 8.9 (H) 11/02/2023     11/02/2023    POTASSIUM 4.4 11/02/2023    CHLORIDE 99 11/02/2023    CO2 24 11/02/2023    ANIONGAP 12 11/02/2023     (H) 11/02/2023    BUN 17.9 11/02/2023    CR 1.01 11/02/2023    GFRESTIMATED 87 11/02/2023    GFRESTBLACK >90 03/01/2021    EMILEE 9.1 11/02/2023    CPEPT <0.1 (L) 06/24/2021    CHOL 142 07/25/2023    TRIG 47 07/25/2023    HDL 65 07/25/2023    LDL 68 07/25/2023    NHDL 77 07/25/2023    UCRR 62 02/27/2023    UCRR 63 02/27/2023    MICROL 6 02/27/2023    UMALCR 9.68 02/27/2023     Last eye exam date?  Goes to eye clinic in [facility]  7/2018. Met with Destiny Warren RD, CDE at [facility]  DM Complications:              Neuropathy                          Peripheral neuropathy                                      Decreased sensation at feet                          Autonomic neuropathy- gastroparesis                                      Symptoms of nausea, bloating, episodes of diarrhea and emesis                                      Has Medtronic gastric stimulator                                       Sees physicians and Ms WARREN Rosario/[facility]                   Thyroid:  1994. History of hypothyroidism  Chronic levothyroxine treatment, has used Levoxyl in the past  Supplements:  Takes vitamin D 1000 U daily  7/2018. Had levothyroxine dose increase   Recent labs include:  Lab Results   Component Value Date    TSH 1.66 07/25/2023    T4 1.57 07/25/2023     Current dose:  Levothyroxine 0.125 mg daily        Lives in [place], [place] with Ada, also (1) adopted dog named Marko  Has seen Dr. Mushtaq Haq/[facility] for general medicine evaluations  Also sees Olga Ramirez PAC and Dr. Jennifer Thompson/[facility] clinic.       PMH/PSH:  Past Medical History:   Diagnosis Date    Arthritis     Chronic neck pain     percocet for years now,     Depressive disorder     Exocrine pancreatic insufficiency     Gastroparesis due to DM (H)     gastric stimulator helps, [facility] manages that    Hyperlipidemia     Hypertension     Hypothyroidism     Neuropathy, diabetic (H)     Type 1 diabetes (H) age 31    neuropathy- peripheral and autonomic     Past Surgical History:   Procedure Laterality Date    COLONOSCOPY  07/18/12    FUSION CERVICAL ANTERIOR TWO LEVELS      INJECT BLOCK MEDIAL BRANCH CERVICAL/THORACIC/LUMBAR Bilateral 11/20/2018    Procedure: Bilateral Cervical 3-4-5 medial branch block;  Surgeon: Jef Edwards MD;  Location: [facility] OR    INJECT BLOCK MEDIAL BRANCH CERVICAL/THORACIC/LUMBAR N/A 1/11/2019    Procedure: Cervical 3,4,5 Bilateral Medial branch blocks;  Surgeon: Jef Edwards MD;  Location: [facility] OR    RADIO FREQUENCY ABLATION PULSED CERVICAL Right 2/28/2019    Procedure: RADIO FREQUENCY ABLATION CERVICAL THREE, FOUR, FIVE, AND THIRD OCCUPITAL NERVE RIGHT SIDE;  Surgeon: Jef Edwards MD;  Location: [facility] OR    RADIO FREQUENCY ABLATION PULSED CERVICAL Left 3/15/2019    Procedure: radiofrequency ablation cervical 3,4,5;  Surgeon: Jef Edwards MD;  Location: [facility]    RADIO FREQUENCY  ABLATION PULSED CERVICAL Right 2020    Procedure: RADIOFREQUENCY ABLATION CERVICAL 3,4 5 right SIDE;  Surgeon: Jef Edwards MD;  Location: [facility]    RADIO FREQUENCY ABLATION PULSED CERVICAL Left 9/10/2020    Procedure: RADIOFREQUENCY ABLATION CERVICAL 3,4 5 LEFT SIDE;  Surgeon: Jef Edwards MD;  Location: [facility] OR    THORACOSCOPIC DECORTICATION LUNG  2014    Procedure: THORACOSCOPIC DECORTICATION LUNG;  RIGHT VATS/RIGHT THORACOTOMY , DECORTICATION RIGHT LUNG ,WEDGE RESECTION RIGHT MIDDLE LOBE LUNG NODULE;  Surgeon: Harley Felix MD;  Location: [facility] OR    ZZC LUMBAR SPINE FUSION,ANTER APPRCH      2 L4-5 L5-21       Family Hx:  Family History   Problem Relation Age of Onset    Hypertension Mother     Diabetes Father         type 2    Hypertension Father     Cerebrovascular Disease Maternal Grandmother     Unknown/Adopted Maternal Grandfather     Unknown/Adopted Paternal Grandmother     Unknown/Adopted Paternal Grandfather     Liver Disease Brother     Heart Disease Sister     Thyroid Disease Sister     Thyroid Disease Sister     Thyroid Disease Sister          Social Hx:  Social History     Socioeconomic History    Marital status:      Spouse name: Not on file    Number of children: Not on file    Years of education: Not on file    Highest education level: Not on file   Occupational History    Not on file   Tobacco Use    Smoking status: Former     Types: Cigarettes     Quit date: 2010     Years since quittin.8    Smokeless tobacco: Never   Vaping Use    Vaping Use: Never used   Substance and Sexual Activity    Alcohol use: No    Drug use: Yes     Types: Marijuana     Comment: medical marijuana    Sexual activity: Not Currently     Comment: Prescription Marijuana   Other Topics Concern    Parent/sibling w/ CABG, MI or angioplasty before 65F 55M? Not Asked   Social History Narrative    Not on file     Social Determinants of Health     Financial Resource Strain: Low Risk  (2023)     Financial Resource Strain     Within the past 12 months, have you or your family members you live with been unable to get utilities (heat, electricity) when it was really needed?: No   Food Insecurity: Low Risk  (9/29/2023)    Food Insecurity     Within the past 12 months, did you worry that your food would run out before you got money to buy more?: No     Within the past 12 months, did the food you bought just not last and you didn t have money to get more?: No   Transportation Needs: Low Risk  (9/29/2023)    Transportation Needs     Within the past 12 months, has lack of transportation kept you from medical appointments, getting your medicines, non-medical meetings or appointments, work, or from getting things that you need?: No   Physical Activity: Not on file   Stress: Not on file   Social Connections: Not on file   Interpersonal Safety: Not on file   Housing Stability: Low Risk  (9/29/2023)    Housing Stability     Do you have housing? : Yes     Are you worried about losing your housing?: No          MEDICATIONS:  has a current medication list which includes the following prescription(s): lipase-protease-amylase, atorvastatin, citalopram, insulin aspart, levothyroxine, lisinopril, naproxen, omeprazole, ondansetron, oxycodone-acetaminophen, sumatriptan, lipase-protease-amylase, dexcom g6 , dexcom g6 sensor, dexcom g6 transmitter, contour next test, lidocaine-prilocaine, medical cannabis, naloxone, and novolog vial.    ROS: 10 point ROS neg other than the symptoms noted above in the HPI.     GENERAL: some fatigue, wt stable; denies fevers, chills, night sweats.   HEENT: no dysphagia, odonophagia, diplopia, neck pain  THYROID:  no apparent hyper or hypothyroid symptoms  CV: no recent chest pain, pressure, palpitations  LUNGS: no SOB, KRAFT, cough, wheezing   ABDOMEN: indigestion and some diarrhea, postmeal nausea, frequent BM's; no constipation  EXTREMITIES: no rashes, ulcers, edema  NEUROLOGY: no  headaches, denies changes in vision, tingling, extremitiy numbness   MSK: neck pain/stiffness and back pains; no muscle weakness  SKIN: no rashes or lesions  PSYCH:  stable mood, no significant anxiety or depression  ENDOCRINE: no heat or cold intolerance    Physical Exam (visual exam)  VS:  no vital signs taken for video visit  CONSTITUTIONAL: healthy, alert and NAD, well dressed, answering questions appropriately  ENT: no nose swelling or nasal discharge, mouth redness or gum changes.  EYES: eyes grossly normal to inspection, conjunctivae and sclerae normal, no exophthalmos or proptosis  THYROID:  no apparent nodules or goiter  LUNGS: no audible wheeze, cough or visible cyanosis, no visible retractions or increased work of breathing  ABDOMEN: abdomen not evaluated  EXTREMITIES: no hand tremors, limited exam  NEUROLOGY: CN grossly intact, mentation intact and speech normal   SKIN:  no apparent skin lesions, rash, or edema with visualized skin appearance  PSYCH: mentation appears normal, affect normal/bright, judgement and insight intact,   normal speech and appearance well groomed       LABS:     All pertinent notes, labs, and images personally reviewed by me.       A/P:  Encounter Diagnoses   Name Primary?    Type 1 diabetes mellitus with diabetic autonomic neuropathy (H) Yes    Gastroparesis     Type 1 diabetes mellitus with diabetic neuropathy (H)     Insulin pump status     Hypothyroidism, unspecified type          Comments:  Reviewed complicated health history, diabetes and thyroid issues.  Glycemic control complicated by irreg meal intake, delayed absorption from gastroparesis, and non-functioning gastric pacer  Recent SG trends show postmeal hyperglycemia, also less frequent hypoglycemia trends  Reviewed and interpreted tests that I previously ordered.   Ordered appropriate tests for the endocrinology disease management.    Management options discussed and implemented after shared medical decision making  with the patient.  Diabetes problem is chronic with exacerbation progression, hyperglycemia    Plan:  Reviewed the overall T1DM management and insulin pump use.  Discussed optimal BG testing to assess glucose trends.  We reviewed insulin pump settings, basal rate and bolus dosing  Use of automated pump bolus dosing for meal/snack carb & correction dosing  Reviewed the [email or web address] Carelink report data today  Reviewed recent DexcomG6 CGM glucose sensor trends, in detail     Recommend:  Continue treatment plan using the Medtronic insulin pump and DexcomG6 CGM sensor  Use SG value for the pump bolus wizard dosing.  No pump setting changes at this time.    Continue use of the DexcomG6 CGM sensor, until able to switch to Medtronic system using G4 CGM              Patient makes frequent adjustments to insulin regimen on basis of therapeutic glucose testing  Agree with plan for pump upgrade to Medtronic 780G with Guardian4 CGM, process discussed   Encouraged him to contact [employer] rep ([employer]) for guidance on process  Prepare for diabetes appointments by synchronizing pump data when he is on Wi-fi before appointments  Would benefit from additional CDE evaluation at [facility]  No labs ordered at this time  [facility]  Continue simvastatin 40 mg each evening  Continue current levothyroxine 0.125 mg daily  We have discussed differences between endocrine vs exocrine pancreas failure   Contact GI team for advice on the chronic diarrhea, EPI, gastric pacemaker function  Arrange annual dilated eye exam, fasting lipid panel testing.  We reviewed importance of timely clinic appointments with me Q3 months, to satisfy Medicare with his diabetes device coverage  He will message me when ready to schedule next appt, with use of new pump & sensor system  Will communicate feedback with his testing by phonecall and/or USMail    Addressed patient's questions  today.    There are no Patient Instructions on file for this visit.    Future labs ordered today:   Orders Placed This Encounter   Procedures    GLUCOSE MONITOR, 72 HOUR, PHYS INTERP     Radiology/Consults ordered today: None    Total time spent on day of encounter:  36 min    Follow-up:  1/2024, PHILLIP Best MD, MS  Endocrinology  [facility]    CC:  Care Team                                      Again, thank you for allowing me to participate in the care of your patient.        Sincerely,        Frankie Best MD

## 2023-11-07 RX ORDER — CITALOPRAM HYDROBROMIDE 40 MG/1
40 TABLET ORAL DAILY
Qty: 90 TABLET | Refills: 0 | Status: SHIPPED | OUTPATIENT
Start: 2023-11-07 | End: 2024-02-02

## 2023-11-13 ENCOUNTER — TRANSFERRED RECORDS (OUTPATIENT)
Dept: HEALTH INFORMATION MANAGEMENT | Facility: CLINIC | Age: 58
End: 2023-11-13
Payer: COMMERCIAL

## 2023-11-13 DIAGNOSIS — E10.43 TYPE 1 DIABETES MELLITUS WITH DIABETIC AUTONOMIC NEUROPATHY (H): ICD-10-CM

## 2023-11-13 LAB — RETINOPATHY: NEGATIVE

## 2023-11-14 RX ORDER — PROCHLORPERAZINE 25 MG/1
SUPPOSITORY RECTAL
Qty: 1 EACH | Refills: 3 | Status: SHIPPED | OUTPATIENT
Start: 2023-11-14 | End: 2024-08-02

## 2023-11-14 NOTE — TELEPHONE ENCOUNTER
"Last Written Prescription Date:  11/2/22  Last Fill Quantity: 1,  # refills: 3   Last office visit: 11/6/2023 with prescribing provider:  Dr. Best   Future Office Visit: 1/16/24  Next 5 appointments (look out 90 days)      Nov 28, 2023  1:00 PM  (Arrive by 12:40 PM)  Pre-Operative Physical with Mushtaq Haq MD  [facility] ([facility] ) [address] [place] [id number] [phone]                 Requested Prescriptions   Pending Prescriptions Disp Refills    Continuous Blood Gluc Transmit (DEXCOM G6 TRANSMITTER) MISC [Pharmacy Med Name: DEXCOM G6 TRANSMITTER  MISC] 1 each 3     Sig: CHANGE EVERY 90 DAYS       Diabetic Supplies Protocol Passed - 11/13/2023  4:02 PM        Passed - Medication is active on med list        Passed - Patient is 18 years of age or older        Passed - Recent (6 mo) or future (30 days) visit within the authorizing provider's specialty     Patient had office visit in the last 6 months or has a visit in the next 30 days with authorizing provider.  See \"Patient Info\" tab in inbasket, or \"Choose Columns\" in Meds & Orders section of the refill encounter.               Refills sent  Sola Veliz RN    "

## 2023-11-16 DIAGNOSIS — K21.9 GASTROESOPHAGEAL REFLUX DISEASE WITHOUT ESOPHAGITIS: ICD-10-CM

## 2023-11-16 DIAGNOSIS — E78.5 HYPERLIPIDEMIA LDL GOAL <100: ICD-10-CM

## 2023-11-16 RX ORDER — ATORVASTATIN CALCIUM 80 MG/1
80 TABLET, FILM COATED ORAL DAILY
Qty: 90 TABLET | Refills: 0 | Status: SHIPPED | OUTPATIENT
Start: 2023-11-16 | End: 2024-02-20

## 2023-11-17 ENCOUNTER — TRANSFERRED RECORDS (OUTPATIENT)
Dept: HEALTH INFORMATION MANAGEMENT | Facility: CLINIC | Age: 58
End: 2023-11-17
Payer: COMMERCIAL

## 2023-11-20 ENCOUNTER — MYC REFILL (OUTPATIENT)
Dept: FAMILY MEDICINE | Facility: CLINIC | Age: 58
End: 2023-11-20
Payer: COMMERCIAL

## 2023-11-20 DIAGNOSIS — M48.02 CERVICAL STENOSIS OF SPINE: ICD-10-CM

## 2023-11-20 DIAGNOSIS — G89.4 CHRONIC PAIN SYNDROME: ICD-10-CM

## 2023-11-20 RX ORDER — OXYCODONE AND ACETAMINOPHEN 5; 325 MG/1; MG/1
1 TABLET ORAL EVERY 6 HOURS PRN
Qty: 70 TABLET | Refills: 0 | Status: SHIPPED | OUTPATIENT
Start: 2023-11-20 | End: 2023-12-18

## 2023-11-24 ENCOUNTER — MEDICAL CORRESPONDENCE (OUTPATIENT)
Dept: HEALTH INFORMATION MANAGEMENT | Facility: CLINIC | Age: 58
End: 2023-11-24
Payer: COMMERCIAL

## 2023-11-28 ENCOUNTER — HOSPITAL ENCOUNTER (OUTPATIENT)
Dept: CT IMAGING | Facility: CLINIC | Age: 58
Discharge: HOME | End: 2023-11-28
Attending: STUDENT IN AN ORGANIZED HEALTH CARE EDUCATION/TRAINING PROGRAM | Admitting: STUDENT IN AN ORGANIZED HEALTH CARE EDUCATION/TRAINING PROGRAM
Payer: MEDICARE

## 2023-11-28 ENCOUNTER — OFFICE VISIT (OUTPATIENT)
Dept: FAMILY MEDICINE | Facility: CLINIC | Age: 58
End: 2023-11-28
Payer: COMMERCIAL

## 2023-11-28 VITALS
BODY MASS INDEX: 21.93 KG/M2 | RESPIRATION RATE: 20 BRPM | DIASTOLIC BLOOD PRESSURE: 70 MMHG | SYSTOLIC BLOOD PRESSURE: 136 MMHG | HEIGHT: 68 IN | WEIGHT: 144.7 LBS | TEMPERATURE: 97.9 F | HEART RATE: 80 BPM | OXYGEN SATURATION: 94 %

## 2023-11-28 DIAGNOSIS — Z98.1 HX OF FUSION OF CERVICAL SPINE: ICD-10-CM

## 2023-11-28 DIAGNOSIS — K86.81 EXOCRINE PANCREATIC INSUFFICIENCY: ICD-10-CM

## 2023-11-28 DIAGNOSIS — Z96.41 INSULIN PUMP STATUS: ICD-10-CM

## 2023-11-28 DIAGNOSIS — Z98.1 HISTORY OF LUMBAR FUSION: ICD-10-CM

## 2023-11-28 DIAGNOSIS — R91.8 PULMONARY NODULES: ICD-10-CM

## 2023-11-28 DIAGNOSIS — K21.9 GASTROESOPHAGEAL REFLUX DISEASE WITHOUT ESOPHAGITIS: ICD-10-CM

## 2023-11-28 DIAGNOSIS — E10.43 TYPE 1 DIABETES MELLITUS WITH DIABETIC AUTONOMIC NEUROPATHY (H): ICD-10-CM

## 2023-11-28 DIAGNOSIS — Z01.818 PREOP GENERAL PHYSICAL EXAM: Primary | ICD-10-CM

## 2023-11-28 DIAGNOSIS — E03.9 ACQUIRED HYPOTHYROIDISM: ICD-10-CM

## 2023-11-28 DIAGNOSIS — G89.4 CHRONIC PAIN SYNDROME: ICD-10-CM

## 2023-11-28 DIAGNOSIS — F11.90 CHRONIC, CONTINUOUS USE OF OPIOIDS: ICD-10-CM

## 2023-11-28 DIAGNOSIS — I10 ESSENTIAL HYPERTENSION: ICD-10-CM

## 2023-11-28 DIAGNOSIS — K31.84 GASTROPARESIS: ICD-10-CM

## 2023-11-28 PROCEDURE — 250N000009 HC RX 250: Performed by: STUDENT IN AN ORGANIZED HEALTH CARE EDUCATION/TRAINING PROGRAM

## 2023-11-28 PROCEDURE — G1010 CDSM STANSON: HCPCS

## 2023-11-28 PROCEDURE — 250N000011 HC RX IP 250 OP 636: Performed by: STUDENT IN AN ORGANIZED HEALTH CARE EDUCATION/TRAINING PROGRAM

## 2023-11-28 PROCEDURE — 99214 OFFICE O/P EST MOD 30 MIN: CPT | Performed by: STUDENT IN AN ORGANIZED HEALTH CARE EDUCATION/TRAINING PROGRAM

## 2023-11-28 PROCEDURE — 93000 ELECTROCARDIOGRAM COMPLETE: CPT | Performed by: STUDENT IN AN ORGANIZED HEALTH CARE EDUCATION/TRAINING PROGRAM

## 2023-11-28 RX ORDER — IOPAMIDOL 755 MG/ML
500 INJECTION, SOLUTION INTRAVASCULAR ONCE
Status: COMPLETED | OUTPATIENT
Start: 2023-11-28 | End: 2023-11-28

## 2023-11-28 RX ADMIN — SODIUM CHLORIDE 60 ML: 9 INJECTION, SOLUTION INTRAVENOUS at 14:05

## 2023-11-28 RX ADMIN — IOPAMIDOL 70 ML: 755 INJECTION, SOLUTION INTRAVENOUS at 14:06

## 2023-11-28 ASSESSMENT — PAIN SCALES - GENERAL: PAINLEVEL: MODERATE PAIN (4)

## 2023-11-28 ASSESSMENT — PATIENT HEALTH QUESTIONNAIRE - PHQ9
10. IF YOU CHECKED OFF ANY PROBLEMS, HOW DIFFICULT HAVE THESE PROBLEMS MADE IT FOR YOU TO DO YOUR WORK, TAKE CARE OF THINGS AT HOME, OR GET ALONG WITH OTHER PEOPLE: NOT DIFFICULT AT ALL
SUM OF ALL RESPONSES TO PHQ QUESTIONS 1-9: 4
SUM OF ALL RESPONSES TO PHQ QUESTIONS 1-9: 4

## 2023-11-28 NOTE — PROGRESS NOTES
[address] [address]  Phone: [phone]  Fax: [phone]  Primary Provider: Mushtaq Haq  Pre-op Performing Provider: MUSHTAQ HAQ      PREOPERATIVE EVALUATION:  Today's date: 11/28/2023    Stone is a 58 year old, presenting for the following:  Pre-Op Exam        11/28/2023    12:45 PM   Additional Questions   Roomed by Kelsie ely       Surgical Information:  Surgery/Procedure: Gastric stimulator replacement  Surgery Location: [facility]  Surgeon: Dr. Elise  Surgery Date: 12/4/2023  Time of Surgery: unsure  Where patient plans to recover: At home with family    Assessment & Plan     The proposed surgical procedure is considered INTERMEDIATE risk.    Preop general physical exam  - EKG 12-lead complete w/read - Clinics    Gastroparesis    Type 1 diabetes mellitus with diabetic autonomic neuropathy (H)  Patient with poorly controlled type 1 diabetes.  Endocrinology is in process of getting new pump now.  Patient will continue current basal rate and decrease for surgery which he has done in the past and worked with endocrinology.    Insulin pump status    Exocrine pancreatic insufficiency    Gastroesophageal reflux disease without esophagitis    Acquired hypothyroidism  Previous TSH stable    Essential hypertension  Stable with lisinopril.    Chronic pain syndrome  Chronic, continuous use of opioids  Hx of fusion of cervical spine  History of lumbar fusion  Worsening pain with nausea and vomiting with his gastroparesis and stimulator failure.  Plan for replacement now.  Has Zofran available.  Has had decreased oral intake given continued symptoms.  Continues on medical marijuana and Percocet limited to 70 tablets monthly.  Previous pain contract and urine drug getting reviewed.  Opiates were negative which was discussed with patient.  We will repeat in the future.  Risk of continued opiates including respiratory suppression  overdose and death as well as addiction discussed.         Implanted Device:   - Type of device: Stimulator Patient advised to bring device information on day of surgery.       - No identified additional risk factors other than previously addressed    Antiplatelet or Anticoagulation Medication Instructions:   - Patient is on no antiplatelet or anticoagulation medications.    Additional Medication Instructions:  Patient is to take all scheduled medications on the day of surgery EXCEPT for modifications listed below:  Hold lisinopril and oxycodone morning of procedure   - Insulin pump: Continue basal rate of insulin up until the time of surgery.    RECOMMENDATION:  APPROVAL GIVEN to proceed with proposed procedure, without further diagnostic evaluation.      Subjective       HPI related to upcoming procedure: Gastric stimulator replacement.         11/21/2023     9:13 AM   Preop Questions   1. Have you ever had a heart attack or stroke? No   2. Have you ever had surgery on your heart or blood vessels, such as a stent placement, a coronary artery bypass, or surgery on an artery in your head, neck, heart, or legs? No   3. Do you have chest pain with activity? No   4. Do you have a history of  heart failure? No   5. Do you currently have a cold, bronchitis or symptoms of other infection? No   6. Do you have a cough, shortness of breath, or wheezing? No   7. Do you or anyone in your family have previous history of blood clots? No   8. Do you or does anyone in your family have a serious bleeding problem such as prolonged bleeding following surgeries or cuts? No   9. Have you ever had problems with anemia or been told to take iron pills? No   10. Have you had any abnormal blood loss such as black, tarry or bloody stools? No   11. Have you ever had a blood transfusion? UNKNOWN - unsure   12. Are you willing to have a blood transfusion if it is medically needed before, during, or after your surgery? Yes   13. Have you or any  of your relatives ever had problems with anesthesia? No   14. Do you have sleep apnea, excessive snoring or daytime drowsiness? No   15. Do you have any artifical heart valves or other implanted medical devices like a pacemaker, defibrillator, or continuous glucose monitor? YES - Gastric stimulator   15a. What type of device do you have? Gastric stimulator   15b. Name of the clinic that manages your device:  [facility]   16. Do you have artificial joints? No   17. Are you allergic to latex? No       Health Care Directive:  Patient does not have a Health Care Directive or Living Will: Discussed advance care planning with patient; information given to patient to review.    Preoperative Review of :   reviewed - controlled substances reflected in medication list.      Status of Chronic Conditions:  DIABETES - Patient has a longstanding history of DiabetesType Type I . Patient is being treated with insulin pump and denies significant side effects. Control has been poor. Complicating factors include but are not limited to: neuropathy and other gastroparesis. Pump with .625 basal rate but fluctuating (15.2 units per day)    HYPOTHYROIDISM - Patient has a longstanding history of chronic Hypothyroidism. Patient has been doing well, noting no tremor, insomnia, hair loss or changes in skin texture. Continues to take medications as directed, without adverse reactions or side effects. Last TSH   Lab Results   Component Value Date    TSH 1.66 07/25/2023   .      Review of Systems  CONSTITUTIONAL: NEGATIVE for fever, chills, change in weight  INTEGUMENTARY/SKIN: NEGATIVE for worrisome rashes, moles or lesions  EYES: NEGATIVE for vision changes or irritation  ENT/MOUTH: NEGATIVE for ear, mouth and throat problems  RESP: NEGATIVE for significant cough or SOB  CV: NEGATIVE for chest pain, palpitations or peripheral edema  GI: nausea, abdominal pain, no heartburn, or change in bowel habits, loose stools  : NEGATIVE for frequency,  dysuria, or hematuria  MUSCULOSKELETAL: NEGATIVE for significant arthralgias or myalgia  NEURO: NEGATIVE for weakness, dizziness or paresthesias  ENDOCRINE: NEGATIVE for temperature intolerance, skin/hair changes  HEME: NEGATIVE for bleeding problems  PSYCHIATRIC: NEGATIVE for changes in mood or affect    Patient Active Problem List    Diagnosis Date Noted    Chronic, continuous use of opioids 10/04/2023     Priority: Medium    Essential hypertension 11/09/2020     Priority: Medium    Gastroesophageal reflux disease without esophagitis 02/26/2019     Priority: Medium    Type 1 diabetes mellitus with diabetic autonomic neuropathy (H) 08/07/2018     Priority: Medium    Insulin pump status 08/07/2018     Priority: Medium    Chronic pain syndrome 07/10/2018     Priority: Medium    Exocrine pancreatic insufficiency 11/24/2016     Priority: Medium    Mild major depression (H) 11/14/2014     Priority: Medium    Hypothyroidism      Priority: Medium     Dr Best      Gastroparesis      Priority: Medium     gastric stimulator helps, [facility] manages that      Cervical stenosis of spine 04/16/2012     Priority: Medium    Hyperlipidemia 06/06/2008     Priority: Medium      Past Medical History:   Diagnosis Date    Arthritis     Chronic neck pain     percocet for years now,     Depressive disorder     Exocrine pancreatic insufficiency     Gastroparesis due to DM (H)     gastric stimulator helps, [facility] manages that    Hyperlipidemia     Hypertension     Hypothyroidism     Neuropathy, diabetic (H)     Type 1 diabetes (H) age 31    neuropathy- peripheral and autonomic     Past Surgical History:   Procedure Laterality Date    ABDOMEN SURGERY  Dec. 13 2023    This is the 10th gastric stimulator brandon had inplanted due to battery life    BIOPSY  ?    Non cancerous polups.    COLONOSCOPY  07/18/2012    FUSION CERVICAL ANTERIOR TWO LEVELS      HEAD & NECK SURGERY      INJECT BLOCK MEDIAL BRANCH CERVICAL/THORACIC/LUMBAR Bilateral 11/20/2018     Procedure: Bilateral Cervical 3-4-5 medial branch block;  Surgeon: Jef Edwards MD;  Location: [facility]    INJECT BLOCK MEDIAL BRANCH CERVICAL/THORACIC/LUMBAR N/A 01/11/2019    Procedure: Cervical 3,4,5 Bilateral Medial branch blocks;  Surgeon: Jef Edwards MD;  Location: [facility]    ORTHOPEDIC SURGERY      RADIO FREQUENCY ABLATION PULSED CERVICAL Right 02/28/2019    Procedure: RADIO FREQUENCY ABLATION CERVICAL THREE, FOUR, FIVE, AND THIRD OCCUPITAL NERVE RIGHT SIDE;  Surgeon: Jef Edwards MD;  Location: [facility]    RADIO FREQUENCY ABLATION PULSED CERVICAL Left 03/15/2019    Procedure: radiofrequency ablation cervical 3,4,5;  Surgeon: Jef Edwards MD;  Location: [facility]    RADIO FREQUENCY ABLATION PULSED CERVICAL Right 09/04/2020    Procedure: RADIOFREQUENCY ABLATION CERVICAL 3,4 5 right SIDE;  Surgeon: Jef Edwards MD;  Location: [facility]    RADIO FREQUENCY ABLATION PULSED CERVICAL Left 09/10/2020    Procedure: RADIOFREQUENCY ABLATION CERVICAL 3,4 5 LEFT SIDE;  Surgeon: Jef Edwards MD;  Location: [facility]    SOFT TISSUE SURGERY      THORACOSCOPIC DECORTICATION LUNG  01/09/2014    Procedure: THORACOSCOPIC DECORTICATION LUNG;  RIGHT VATS/RIGHT THORACOTOMY , DECORTICATION RIGHT LUNG ,WEDGE RESECTION RIGHT MIDDLE LOBE LUNG NODULE;  Surgeon: Harley Felix MD;  Location:  OR    C LUMBAR SPINE FUSION,ANTER APPRCH      2 L4-5 L5-21     Current Outpatient Medications   Medication Sig Dispense Refill    amylase-lipase-protease (CREON 24) 61172-66[address] units CPEP per EC capsule Take 1 capsule by mouth every other day Pt reports taking 6-8 capsules per day      atorvastatin (LIPITOR) 80 MG tablet TAKE 1 TABLET BY MOUTH EVERY DAY 90 tablet 0    citalopram (CELEXA) 40 MG tablet TAKE 1 TABLET BY MOUTH EVERY DAY 90 tablet 0    Continuous Blood Gluc  (DEXCOM G6 ) AMBER Use to read glucose levels per 's instructions 1 Device 0    insulin aspart (NOVOLOG VIAL) 100 UNITS/ML vial USE WITH  INSULIN PUMP TOTAL DAILY DOSE APPROX 45 UNITS 20 mL 4    levothyroxine (SYNTHROID/LEVOTHROID) 125 MCG tablet Take 1 tablet (125 mcg) by mouth daily 90 tablet 3    lidocaine-prilocaine (EMLA) 2.5-2.5 % external cream Apply topically as needed for moderate pain 30 g 3    lisinopril (ZESTRIL) 40 MG tablet TAKE ONE TABLET BY MOUTH ONCE DAILY 90 tablet 1    medical cannabis (Patient's own supply) See Admin Instructions Topical creams and vaporizer cartridges  (The purpose of this order is to document that the patient reports taking medical cannabis.  This is not a prescription, and is not used to certify that the patient has a qualifying medical condition.)      naproxen (NAPROSYN) 500 MG tablet TAKE ONE TABLET BY MOUTH TWO TIMES A DAY AS NEEDED FOR MODERATE PAIN 180 tablet 0    NOVOLOG VIAL 100 UNIT/ML soln USE UP TO 45 UNITS DAILY WITH INSULIN PUMP 20 mL 5    omeprazole (PRILOSEC) 20 MG DR capsule TAKE 1 CAPSULE BY MOUTH ONCE DAILY 90 capsule 0    ondansetron (ZOFRAN-ODT) 8 MG ODT tab Take 1 tablet (8 mg) by mouth every 8 hours as needed (for nauesa) 60 tablet 0    oxyCODONE-acetaminophen (PERCOCET) 5-325 MG tablet Take 1 tablet by mouth every 6 hours as needed for severe pain (*CAUTION OPIOD.  RISK OF OVERDOSE AND ADDICTION.**MAX OF 3 TABLETS PER DAY**) To last for month 70 tablet 0    SUMAtriptan (IMITREX) 100 MG tablet Take 1 tablet (100 mg) by mouth at onset of headache 1/2 to 1 tablet by mouth at onset of headache. May repeat 1 dose after 2 hours if headache persists. 9 tablet 3    amylase-lipase-protease (PERTZYE) 32383 units CPEP Take 1 capsule by mouth every other day (Patient not taking: Reported on 3/1/2023)      Continuous Blood Gluc Sensor (DEXCOM G6 SENSOR) MISC CHANGE EVERY 10 DAYS 3 each 5    Continuous Blood Gluc Transmit (DEXCOM G6 TRANSMITTER) MISC CHANGE EVERY 90 DAYS 1 each 3    CONTOUR NEXT TEST test strip Use to test blood sugar 5x times daily or as directed. (Patient not taking: Reported on  "10/4/2023) 450 strip 3    naloxone (NARCAN) 4 MG/0.1ML nasal spray Spray 1 spray (4 mg) into one nostril alternating nostrils once as needed for opioid reversal every 2-3 minutes until assistance arrives 0.2 mL 0       No Known Allergies     Social History     Tobacco Use    Smoking status: Former     Types: Cigarettes     Quit date: 2010     Years since quittin.9    Smokeless tobacco: Never   Substance Use Topics    Alcohol use: No     Family History   Problem Relation Age of Onset    Hypertension Mother     Diabetes Father         type 2    Hypertension Father     Cerebrovascular Disease Maternal Grandmother     Unknown/Adopted Maternal Grandfather     Unknown/Adopted Paternal Grandmother     Unknown/Adopted Paternal Grandfather     Liver Disease Brother     Heart Disease Sister     Thyroid Disease Sister     Thyroid Disease Sister     Thyroid Disease Sister      History   Drug Use    Types: Marijuana, Oxycodone     Comment: medical marijuana         Objective     /70 (BP Location: Left arm, Patient Position: Chair)   Pulse 80   Temp 97.9  F (36.6  C) (Temporal)   Resp 20   Ht 1.734 m (5' 8.25\")   Wt 65.6 kg (144 lb 11.2 oz)   SpO2 94%   BMI 21.84 kg/m      Physical Exam    GENERAL APPEARANCE: alert and no distress     EYES: EOMI,  PERRL     HENT: ear canals and TM's normal and nose and mouth without ulcers or lesions     NECK: no adenopathy, no asymmetry, masses, or scars and thyroid normal to palpation     RESP: lungs clear to auscultation - no rales, rhonchi or wheezes     CV: regular rates and rhythm, normal S1 S2, and no murmur, click or rub     ABDOMEN:  soft,epigastric tenderness, no HSM or masses and bowel sounds normal     MS: extremities normal- no gross deformities noted, no evidence of inflammation in joints, FROM in all extremities.     SKIN: no suspicious lesions or rashes     NEURO: Normal strength and tone, sensory exam grossly normal, mentation intact and speech normal     " PSYCH: mentation appears normal. and affect normal/bright     LYMPHATICS: No cervical adenopathy    Recent Labs   Lab Test 11/02/23  1318 07/25/23  1043    136   POTASSIUM 4.4 4.3   CR 1.01 0.96   A1C 8.9* 8.9*        Diagnostics:  No labs were ordered during this visit.   EKG: appears normal, NSR, normal axis, normal intervals, no acute ST/T changes c/w ischemia, no LVH by voltage criteria, unchanged from previous tracings    Revised Cardiac Risk Index (RCRI):  The patient has the following serious cardiovascular risks for perioperative complications:   - No serious cardiac risks = 0 points     RCRI Interpretation: 0 points: Class I (very low risk - 0.4% complication rate)         Signed Electronically by: Mushtaq Haq MD  Copy of this evaluation report is provided to requesting physician.      Answers submitted by the patient for this visit:  Patient Health Questionnaire (Submitted on 11/28/2023)  If you checked off any problems, how difficult have these problems made it for you to do your work, take care of things at home, or get along with other people?: Not difficult at all  PHQ9 TOTAL SCORE: 4

## 2023-12-13 ENCOUNTER — TELEPHONE (OUTPATIENT)
Dept: PALLIATIVE MEDICINE | Facility: CLINIC | Age: 58
End: 2023-12-13
Payer: COMMERCIAL

## 2023-12-13 DIAGNOSIS — B02.29 POST HERPETIC NEURALGIA: ICD-10-CM

## 2023-12-13 DIAGNOSIS — G58.8 INTERCOSTAL NEURALGIA: ICD-10-CM

## 2023-12-13 NOTE — TELEPHONE ENCOUNTER
Received fax from pharmacy requesting refill(s) for     lidocaine-prilocaine (EMLA) 2.5-2.5 % external cream    Date last filled 12/13/2023    Last Appt Date:02/27/23    Next Appt scheduled: None    Pharmacy:      [id number] - [place] - [address] [place] [address] route for processing    Alyssa Leach MA  [facility] [place]

## 2023-12-14 ENCOUNTER — TELEPHONE (OUTPATIENT)
Dept: PALLIATIVE MEDICINE | Facility: CLINIC | Age: 58
End: 2023-12-14

## 2023-12-14 RX ORDER — LIDOCAINE/PRILOCAINE 2.5 %-2.5%
CREAM (GRAM) TOPICAL PRN
Qty: 30 G | Refills: 3 | Status: SHIPPED | OUTPATIENT
Start: 2023-12-14

## 2023-12-14 NOTE — TELEPHONE ENCOUNTER
PRIOR AUTHORIZATION DENIED    Medication: LIDOCAINE-PRILOCAINE 2.5-2.5 % EX CREA  Insurance Company: [employer] - Phone [phone] Fax [phone]  Denial Date: 12/14/2023  Denial Reason(s): per call to insurance rep Jeanine said coverage is provided when its for the used for topical anesthesia    Appeal Information:     Patient Notified: No

## 2023-12-15 NOTE — TELEPHONE ENCOUNTER
Consulted provider who advises medication be used twice daily.     Communicated this to pharmacy.     Leesa Briseno RN  [facility]  [phone]

## 2023-12-15 NOTE — TELEPHONE ENCOUNTER
[facility] Call Center    Phone Message    May a detailed message be left on voicemail: yes     Reason for Call: Other: Pharmacy is needing clarification on how many times a day the EMLA cream should be applied. Please call back to clarify.      Action Taken: Other: Pain    Travel Screening: Not Applicable

## 2023-12-18 ENCOUNTER — MYC REFILL (OUTPATIENT)
Dept: FAMILY MEDICINE | Facility: CLINIC | Age: 58
End: 2023-12-18
Payer: COMMERCIAL

## 2023-12-18 DIAGNOSIS — G89.4 CHRONIC PAIN SYNDROME: ICD-10-CM

## 2023-12-18 DIAGNOSIS — M48.02 CERVICAL STENOSIS OF SPINE: ICD-10-CM

## 2023-12-19 RX ORDER — OXYCODONE AND ACETAMINOPHEN 5; 325 MG/1; MG/1
1 TABLET ORAL EVERY 6 HOURS PRN
Qty: 70 TABLET | Refills: 0 | Status: SHIPPED | OUTPATIENT
Start: 2023-12-19 | End: 2024-01-17

## 2024-01-03 NOTE — TELEPHONE ENCOUNTER
Lactation Consultant Note  Lactation Consultation   Alana Ureña RN, IBCLC      Recommendations/Summary       I met with mom at pt's bedside around 1145 am to assist her with latching the baby.  Baby was brought to mom's left breast in a cross cradle hold.  Her baby did some brief exploration of mom's nipple but then just fell asleep.  Mom was provide information  on how to assist baby with achieving a deep latch.  The baby was just to sleepy to try today.  She had a eye exam prior to my arrival and she was breathing 70 - 80 times per minute intermittently during this trial.  Mom was encouraged to hold baby skin to skin while she received her milk via the NG tube.  This was  to provide the baby with a comfortable experience with feeding and to assist mom with milk production .  Mom was encouraged to try baby back to breast when she is more awake and rested from her recent procedure.  Mom was invited to follow up with LC services as needed.   Will route request for referral to provider to review.    WILLIAM DacostaN, RN  Care Coordinator  [facility] [place]

## 2024-01-16 ENCOUNTER — VIRTUAL VISIT (OUTPATIENT)
Dept: ENDOCRINOLOGY | Facility: CLINIC | Age: 59
End: 2024-01-16
Payer: COMMERCIAL

## 2024-01-16 DIAGNOSIS — E10.40 TYPE 1 DIABETES MELLITUS WITH DIABETIC NEUROPATHY (H): ICD-10-CM

## 2024-01-16 DIAGNOSIS — Z96.41 INSULIN PUMP STATUS: ICD-10-CM

## 2024-01-16 DIAGNOSIS — K31.84 GASTROPARESIS: ICD-10-CM

## 2024-01-16 DIAGNOSIS — E10.43 TYPE 1 DIABETES MELLITUS WITH DIABETIC AUTONOMIC NEUROPATHY (H): Primary | ICD-10-CM

## 2024-01-16 DIAGNOSIS — E03.9 HYPOTHYROIDISM, UNSPECIFIED TYPE: ICD-10-CM

## 2024-01-16 PROCEDURE — 99214 OFFICE O/P EST MOD 30 MIN: CPT | Mod: 95 | Performed by: INTERNAL MEDICINE

## 2024-01-16 PROCEDURE — 95251 CONT GLUC MNTR ANALYSIS I&R: CPT | Performed by: INTERNAL MEDICINE

## 2024-01-16 NOTE — LETTER
"    1/16/2024         RE: Stone De Paz  [id number] [address] [facility] [address]        Dear Colleague,    Thank you for referring your patient, Stone De Paz, to the [facility] [place]. Please see a copy of my visit note below.    Virtual Visit Details    Type of service:  Video Visit   Video Start Time: {video visit start/end time for provider to select:332795}  Video End Time:{video visit start/end time for provider to select:564367}    Originating Location (pt. Location): {video visit patient location:767604::\"Home\"}  {PROVIDER LOCATION On-site should be selected for visits conducted from your clinic location or adjoining [facility] hospital, academic office, or other nearby [facility] building. Off-site should be selected for all other provider locations, including home:959616}  Distant Location (provider location):  {virtual location provider:370472}  Platform used for Video Visit: {Virtual Visit Platforms:606791::\"AmWell\"}      Virtual Visit Details    Type of service:  Video Visit   Video Start Time: 2:58 PM  Video End Time:3:23 PM    Originating Location (pt. Location): Home  Distant Location (provider location):  Off-site  Platform used for Video Visit: Workday        Recent issues:  Diabetes and thyroid follow-up evaluation  Using the Medtronic 770G pump since 9/2021, along with the DexcomG6 CGM sensor  Taking the medical Canibis via pain clinic in [facility], which is helpful for pain and nausea  Still has issues with neck pain/stiffness   Has malabsorption and chronic diarrhea, reports having diagnosis of exocrine pancreas insufficiency (EPI)   Takes Zenpep vs Creon (35,000 mg/dose, 1-capsule TID vs QID, ~$800/mo), with improvement of diarrhea, improved energy when taking it   Perceives higher glucose levels when less malabsorption    GI eval with Dr. Jennifer Thompson/[facility], reports his gastric pacer not working in 9/2023. . low battery, then loss of function and increase diarrhea, abd pain " "10/2023    Plans for gastric pacer replacement in 12/2023 with Dr. Kyra Summers at [facility]  He's started process of pump upgrade to Medtronic 780G with Guardian4 CGM           1995. Diagnosis of diabetes mellitus after 2nd back surgery, age 29  Initial treatment with insulin injections using NPH and Regular insulin  Switched to premixed 70/30 insulin BID dosing  1999. Started Medtronic insulin pump model 508, used base-dose Humalog insulin at meals  Had seen Dr. Iggy Briggs/[facility]     7/24/01. Initial evaluation with me at my former clinic ([facility])  Upgraded pump to Medtronic 523   Used the Medtronic 630G pump  Meals BID, had carb \"exchanges\" with his pump settings    Upgraded to Medtronic 770G   Novolog in pump    Chronic high BG and hgbA1c trends despite dose changes  Tried square wave bolus  Has Contour Link? BG meter, variable testing pattern   Recently testing 4x/day for at least 60 days  Glucose sensor (CGM) use:   Tried Medtronic CGMS in the past, didn't like it   Switched to DexcomG5, used for approx 6 months but issues with getting supply order   Early 2019, started Freestyle Sukhi but issues with skin site redness    Meal schedule:  Snacks during the day, supper meal 5-7pm  Previous pump settings:       Recent Carelink data:        Recent DexcomG6 data:          Previous [facility] labs include:    Previous [facility] hgbA1c trends include:  Lab Results   Component Value Date    A1C 8.9 (H) 11/02/2023    A1C 8.9 (H) 07/25/2023    A1C 8.0 (H) 02/27/2023    A1C 8.9 (H) 06/27/2022    A1C 8.8 (H) 03/02/2022      Recent [facility] labs include:  Lab Results   Component Value Date    A1C 8.9 (H) 11/02/2023     11/02/2023    POTASSIUM 4.4 11/02/2023    CHLORIDE 99 11/02/2023    CO2 24 11/02/2023    ANIONGAP 12 11/02/2023     (H) 11/02/2023    BUN 17.9 11/02/2023    CR 1.01 11/02/2023    GFRESTIMATED 87 11/02/2023    GFRESTBLACK >90 03/01/2021    EMILEE 9.1 11/02/2023    CPEPT <0.1 (L) 06/24/2021    CHOL 142 " 07/25/2023    TRIG 47 07/25/2023    HDL 65 07/25/2023    LDL 68 07/25/2023    NHDL 77 07/25/2023    UCRR 62 02/27/2023    UCRR 63 02/27/2023    MICROL 6 02/27/2023    UMALCR 9.68 02/27/2023     Last eye exam date?  Goes to eye clinic in [facility]  7/2018. Met with Destiny Warren RD, CDE at [facility]  DM Complications:              Neuropathy                          Peripheral neuropathy                                      Decreased sensation at feet                          Autonomic neuropathy- gastroparesis                                      Symptoms of nausea, bloating, episodes of diarrhea and emesis                                      Has Medtronic gastric stimulator                                      Sees physicians and Ms Olga Ramirez, PA/[facility]                   Thyroid:  1994. History of hypothyroidism  Chronic levothyroxine treatment, has used Levoxyl in the past  Supplements:  Takes vitamin D 1000 U daily  7/2018. Had levothyroxine dose increase   Recent labs include:  Lab Results   Component Value Date    TSH 1.66 07/25/2023    T4 1.57 07/25/2023     Current dose:  Levothyroxine 0.125 mg daily        Lives in [place], [place] with Ada, also (1) adopted dog named Marko  Has seen Dr. Mushtaq Haq/[facility] for general medicine evaluations  Also sees Olga Ramirez [facility] and Dr. Jennifer Thompson/[facility].       PMH/PSH:  Past Medical History:   Diagnosis Date     Arthritis      Chronic neck pain     percocet for years now,      Depressive disorder      Exocrine pancreatic insufficiency      Gastroparesis due to DM (H)     gastric stimulator helps, [facility] manages that     Hyperlipidemia      Hypertension      Hypothyroidism      Neuropathy, diabetic (H)      Type 1 diabetes (H) age 31    neuropathy- peripheral and autonomic     Past Surgical History:   Procedure Laterality Date     ABDOMEN SURGERY  Dec. 13 2023    This is the 10th gastric stimulator brandon had inplanted due  to battery life     BIOPSY  ?    Non cancerous polups.     COLONOSCOPY  07/18/2012     FUSION CERVICAL ANTERIOR TWO LEVELS       HEAD & NECK SURGERY       INJECT BLOCK MEDIAL BRANCH CERVICAL/THORACIC/LUMBAR Bilateral 11/20/2018    Procedure: Bilateral Cervical 3-4-5 medial branch block;  Surgeon: Jef Edwards MD;  [facility]     INJECT BLOCK MEDIAL BRANCH CERVICAL/THORACIC/LUMBAR N/A 01/11/2019    Procedure: Cervical 3,4,5 Bilateral Medial branch blocks;  Surgeon: Jef Edwards MD;  Location: [facility]     ORTHOPEDIC SURGERY       RADIO FREQUENCY ABLATION PULSED CERVICAL Right 02/28/2019    Procedure: RADIO FREQUENCY ABLATION CERVICAL THREE, FOUR, FIVE, AND THIRD OCCUPITAL NERVE RIGHT SIDE;  Surgeon: Jef Edwards MD;  [facility]     RADIO FREQUENCY ABLATION PULSED CERVICAL Left 03/15/2019    Procedure: radiofrequency ablation cervical 3,4,5;  Surgeon: Jef Edwards MD;  [facility]     RADIO FREQUENCY ABLATION PULSED CERVICAL Right 09/04/2020    Procedure: RADIOFREQUENCY ABLATION CERVICAL 3,4 5 right SIDE;  Surgeon: Jef Edwards MD;  [facility]     RADIO FREQUENCY ABLATION PULSED CERVICAL Left 09/10/2020    Procedure: RADIOFREQUENCY ABLATION CERVICAL 3,4 5 LEFT SIDE;  Surgeon: Jef Edwards MD;  Location[facility]     SOFT TISSUE SURGERY       THORACOSCOPIC DECORTICATION LUNG  01/09/2014    Procedure: THORACOSCOPIC DECORTICATION LUNG;  RIGHT VATS/RIGHT THORACOTOMY , DECORTICATION RIGHT LUNG ,WEDGE RESECTION RIGHT MIDDLE LOBE LUNG NODULE;  Surgeon: Harley Felix MD;  Location: [facility] OR     ZZC LUMBAR SPINE FUSION,ANTER APPRCH      2 L4-5 L5-21       Family Hx:  Family History   Problem Relation Age of Onset     Hypertension Mother      Diabetes Father         type 2     Hypertension Father      Cerebrovascular Disease Maternal Grandmother      Unknown/Adopted Maternal Grandfather      Unknown/Adopted Paternal Grandmother      Unknown/Adopted Paternal Grandfather      Liver Disease  Brother      Heart Disease Sister      Thyroid Disease Sister      Thyroid Disease Sister      Thyroid Disease Sister          Social Hx:  Social History     Socioeconomic History     Marital status:      Spouse name: Not on file     Number of children: Not on file     Years of education: Not on file     Highest education level: Not on file   Occupational History     Not on file   Tobacco Use     Smoking status: Former     Types: Cigarettes     Quit date: 2010     Years since quittin.0     Smokeless tobacco: Never   Vaping Use     Vaping Use: Never used   Substance and Sexual Activity     Alcohol use: No     Drug use: Yes     Types: Marijuana, Oxycodone     Comment: medical marijuana     Sexual activity: Not Currently     Comment: Prescription Marijuana   Other Topics Concern     Parent/sibling w/ CABG, MI or angioplasty before 65F 55M? Yes   Social History Narrative     Not on file     Social Determinants of Health     Financial Resource Strain: Low Risk  (2024)    Financial Resource Strain      Within the past 12 months, have you or your family members you live with been unable to get utilities (heat, electricity) when it was really needed?: No   Food Insecurity: Low Risk  (2024)    Food Insecurity      Within the past 12 months, did you worry that your food would run out before you got money to buy more?: No      Within the past 12 months, did the food you bought just not last and you didn t have money to get more?: No   Transportation Needs: Low Risk  (2024)    Transportation Needs      Within the past 12 months, has lack of transportation kept you from medical appointments, getting your medicines, non-medical meetings or appointments, work, or from getting things that you need?: No   Physical Activity: Not on file   Stress: Not on file   Social Connections: Not on file   Interpersonal Safety: Low Risk  (2023)    Interpersonal Safety      Do you feel physically and  emotionally safe where you currently live?: Yes      Within the past 12 months, have you been hit, slapped, kicked or otherwise physically hurt by someone?: No      Within the past 12 months, have you been humiliated or emotionally abused in other ways by your partner or ex-partner?: No   Housing Stability: Low Risk  (1/11/2024)    Housing Stability      Do you have housing? : Yes      Are you worried about losing your housing?: No          MEDICATIONS:  has a current medication list which includes the following prescription(s): lipase-protease-amylase, lipase-protease-amylase, atorvastatin, citalopram, dexcom g6 , dexcom g6 sensor, dexcom g6 transmitter, contour next test, insulin aspart, levothyroxine, lidocaine-prilocaine, lisinopril, medical cannabis, naloxone, naproxen, novolog vial, omeprazole, ondansetron, oxycodone-acetaminophen, and sumatriptan.    ROS: 10 point ROS neg other than the symptoms noted above in the HPI.     GENERAL: some fatigue, wt stable; denies fevers, chills, night sweats.   HEENT: no dysphagia, odonophagia, diplopia, neck pain  THYROID:  no apparent hyper or hypothyroid symptoms  CV: no recent chest pain, pressure, palpitations  LUNGS: no SOB, KRAFT, cough, wheezing   ABDOMEN: indigestion and some diarrhea, postmeal nausea, frequent BM's; no constipation  EXTREMITIES: no rashes, ulcers, edema  NEUROLOGY: no headaches, denies changes in vision, tingling, extremitiy numbness   MSK: neck pain/stiffness and back pains; no muscle weakness  SKIN: no rashes or lesions  PSYCH:  stable mood, no significant anxiety or depression  ENDOCRINE: no heat or cold intolerance    Physical Exam (visual exam)  VS:  no vital signs taken for video visit  CONSTITUTIONAL: healthy, alert and NAD, well dressed, answering questions appropriately  ENT: no nose swelling or nasal discharge, mouth redness or gum changes.  EYES: eyes grossly normal to inspection, conjunctivae and sclerae normal, no exophthalmos or  proptosis  THYROID:  no apparent nodules or goiter  LUNGS: no audible wheeze, cough or visible cyanosis, no visible retractions or increased work of breathing  ABDOMEN: abdomen not evaluated  EXTREMITIES: no hand tremors, limited exam  NEUROLOGY: CN grossly intact, mentation intact and speech normal   SKIN:  no apparent skin lesions, rash, or edema with visualized skin appearance  PSYCH: mentation appears normal, affect normal/bright, judgement and insight intact,   normal speech and appearance well groomed       LABS:     All pertinent notes, labs, and images personally reviewed by me.       A/P:  No diagnosis found.        Comments:  Reviewed complicated health history, diabetes and thyroid issues.  Glycemic control complicated by irreg meal intake, delayed absorption from gastroparesis, and non-functioning gastric pacer  Recent SG trends show postmeal hyperglycemia, also less frequent hypoglycemia trends  Reviewed and interpreted tests that I previously ordered.   Ordered appropriate tests for the endocrinology disease management.    Management options discussed and implemented after shared medical decision making with the patient.  Diabetes problem is chronic with exacerbation progression, hyperglycemia    Plan:  Reviewed the overall T1DM management and insulin pump use.  Discussed optimal BG testing to assess glucose trends.  We reviewed insulin pump settings, basal rate and bolus dosing  Use of automated pump bolus dosing for meal/snack carb & correction dosing  Reviewed the Medtronic Carelink report data today  Reviewed recent DexcomG6 CGM glucose sensor trends, in detail     Recommend:  Continue treatment plan using the Medtronic insulin pump and DexcomG6 CGM sensor  Use SG value for the pump bolus wizard dosing.  No pump setting changes at this time.    Continue use of the DexcomG6 CGM sensor, until able to switch to Medtronic system using G4 CGM              Patient makes frequent adjustments to insulin  "regimen on basis of therapeutic glucose testing  Agree with plan for pump upgrade to Medtronic 780G with Guardian4 CGM, process discussed   Encouraged him to contact [employer] rep ([employer]) for guidance on process  Prepare for diabetes appointments by synchronizing pump data when he is on Wi-fi before appointments  Would benefit from additional CDE evaluation at [facility]  No labs ordered at this time  Consider followup diabetes education appt with the CDE at [facility]  Continue simvastatin 40 mg each evening  Continue current levothyroxine 0.125 mg daily  We have discussed differences between endocrine vs exocrine pancreas failure   Contact GI team for advice on the chronic diarrhea, EPI, gastric pacemaker function  Arrange annual dilated eye exam, fasting lipid panel testing.  We reviewed importance of timely clinic appointments with me Q3 months, to satisfy Medicare with his diabetes device coverage  He will message me when ready to schedule next appt, with use of new pump & sensor system  Will communicate feedback with his testing by phonecall and/or USMail    Addressed patient's questions today.    There are no Patient Instructions on file for this visit.    Future labs ordered today: No orders of the defined types were placed in this encounter.    Radiology/Consults ordered today: None    Total time spent on day of encounter:  ***    Follow-up:  ***    VIKTOR Best MD, MS  Endocrinology  [facility]    CC:  Care Team                                    Virtual Visit Details    Type of service:  Video Visit   Video Start Time: {video visit start/end time for provider to select:698972}  Video End Time:{video visit start/end time for provider to select:751490}    Originating Location (pt. Location): {video visit patient location:853145::\"Home\"}  {PROVIDER LOCATION On-site should be selected for visits conducted from your clinic location or adjoining [facility] hospital, academic office, or " "other nearby [facility] building. Off-site should be selected for all other provider locations, including home:254631}  Distant Location (provider location):  {virtual location provider:839838}  Platform used for Video Visit: {Virtual Visit Platforms:587068::\"AmWell\"}        Recent issues:  Diabetes and thyroid follow-up evaluation  Using the Medtronic 770G pump since 9/2021, along with the DexcomG6 CGM sensor  Taking the medical Canibis via pain clinic in [facility], which is helpful for pain and nausea  Still has issues with neck pain/stiffness   Has malabsorption and chronic diarrhea, reports having diagnosis of exocrine pancreas insufficiency (EPI)   Takes Zenpep vs Creon (35,000 mg/dose, 1-capsule TID vs QID, ~$800/mo), with improvement of diarrhea, improved energy when taking it   Perceives higher glucose levels when less malabsorption    GI eval with Dr. Jennifer Thompson/[facility], reports his gastric pacer not working in 9/2023. . low battery, then loss of function and increase diarrhea, abd pain 10/2023    Plans for gastric pacer replacement in 12/2023 with Dr. Kyra Summers at [facility]  He's started process of pump upgrade to Medtronic 780G with Guardian4 CGM               1995. Diagnosis of diabetes mellitus after 2nd back surgery, age 29  Initial treatment with insulin injections using NPH and Regular insulin  Switched to premixed 70/30 insulin BID dosing  1999. Started Medtronic insulin pump model 508, used base-dose Humalog insulin at meals  Had seen Dr. Iggy Briggs/[facility]     7/24/01. Initial evaluation with me at my former clinic ([facility])  Upgraded pump to Medtronic 523   Used the Medtronic 630G pump  Meals BID, had carb \"exchanges\" with his pump settings    Upgraded to Medtronic 770G   Novolog in pump    Chronic high BG and hgbA1c trends despite dose changes  Tried square wave bolus  Has Contour Link? BG meter, variable testing pattern   Recently testing 4x/day for at least 60 days  Glucose sensor (CGM) " use:   Tried Medtronic CGMS in the past, didn't like it   Switched to DexcomG5, used for approx 6 months but issues with getting supply order   Early 2019, started Freestyle Sukhi but issues with skin site redness    Meal schedule:  Snacks during the day, supper meal 5-7pm  Previous pump settings:       Recent Carelink data:        Recent DexcomG6 data:          Previous [facility] labs include:    Previous [facility] hgbA1c trends include:  Lab Results   Component Value Date    A1C 8.9 (H) 11/02/2023    A1C 8.9 (H) 07/25/2023    A1C 8.0 (H) 02/27/2023    A1C 8.9 (H) 06/27/2022    A1C 8.8 (H) 03/02/2022      Recent [facility] labs include:  Lab Results   Component Value Date    A1C 8.9 (H) 11/02/2023     11/02/2023    POTASSIUM 4.4 11/02/2023    CHLORIDE 99 11/02/2023    CO2 24 11/02/2023    ANIONGAP 12 11/02/2023     (H) 11/02/2023    BUN 17.9 11/02/2023    CR 1.01 11/02/2023    GFRESTIMATED 87 11/02/2023    GFRESTBLACK >90 03/01/2021    EMILEE 9.1 11/02/2023    CPEPT <0.1 (L) 06/24/2021    CHOL 142 07/25/2023    TRIG 47 07/25/2023    HDL 65 07/25/2023    LDL 68 07/25/2023    NHDL 77 07/25/2023    UCRR 62 02/27/2023    UCRR 63 02/27/2023    MICROL 6 02/27/2023    UMALCR 9.68 02/27/2023     Last eye exam date?  Goes [facility] eye clinic in [facility]  7/2018. [facility]  DM Complications:              Neuropathy                          Peripheral neuropathy                                      Decreased sensation at feet                          Autonomic neuropathy- gastroparesis                                      Symptoms of nausea, bloating, episodes of diarrhea and emesis                                      Has Medtronic gastric stimulator                                      Sees physicians and Ms Olga Ramirez, PA/[facility]                   Thyroid:  1994. History of hypothyroidism  Chronic levothyroxine treatment, has used Levoxyl in the past  Supplements:  Takes vitamin D 1000  U daily  7/2018. Had levothyroxine dose increase   Recent labs include:  Lab Results   Component Value Date    TSH 1.66 07/25/2023    T4 1.57 07/25/2023     Current dose:  Levothyroxine 0.125 mg daily        Lives in [place], [place] with Ada, also (1) adopted dog named Marko  Has seen Dr. Mushtaq Haq/[facility] for general medicine evaluations  Also sees Olga Ramirez PAC and Dr. Jennifer Thompson/[facility] clinic.       PMH/PSH:  Past Medical History:   Diagnosis Date     Arthritis      Chronic neck pain     percocet for years now,      Depressive disorder      Exocrine pancreatic insufficiency      Gastroparesis due to DM (H)     gastric stimulator helps, [facility] manages that     Hyperlipidemia      Hypertension      Hypothyroidism      Neuropathy, diabetic (H)      Type 1 diabetes (H) age 31    neuropathy- peripheral and autonomic     Past Surgical History:   Procedure Laterality Date     ABDOMEN SURGERY  Dec. 13 2023    This is the 10th gastric stimulator brandon had inplanted due to battery life     BIOPSY  ?    Non cancerous polups.     COLONOSCOPY  07/18/2012     FUSION CERVICAL ANTERIOR TWO LEVELS       HEAD & NECK SURGERY       INJECT BLOCK MEDIAL BRANCH CERVICAL/THORACIC/LUMBAR Bilateral 11/20/2018    Procedure: Bilateral Cervical 3-4-5 medial branch block;  Surgeon: Jef Edwards MD;  [facility]     INJECT BLOCK MEDIAL BRANCH CERVICAL/THORACIC/LUMBAR N/A 01/11/2019    Procedure: Cervical 3,4,5 Bilateral Medial branch blocks;  Surgeon: Jef Edwards MD;  Location[facility]     ORTHOPEDIC SURGERY       RADIO FREQUENCY ABLATION PULSED CERVICAL Right 02/28/2019    Procedure: RADIO FREQUENCY ABLATION CERVICAL THREE, FOUR, FIVE, AND THIRD OCCUPITAL NERVE RIGHT SIDE;  Surgeon: Jef Edwards MD;  [facility]     RADIO FREQUENCY ABLATION PULSED CERVICAL Left 03/15/2019    Procedure: radiofrequency ablation cervical 3,4,5;  Surgeon: Jef Edwards MD;  [facility]     RADIO FREQUENCY ABLATION  PULSED CERVICAL Right 2020    Procedure: RADIOFREQUENCY ABLATION CERVICAL 3,4 5 right SIDE;  Surgeon: Jef Edwards MD;  [facility]     RADIO FREQUENCY ABLATION PULSED CERVICAL Left 09/10/2020    Procedure: RADIOFREQUENCY ABLATION CERVICAL 3,4 5 LEFT SIDE;  Surgeon: Jef Edwards MD;  Location: [facility]     SOFT TISSUE SURGERY       THORACOSCOPIC DECORTICATION LUNG  2014    Procedure: THORACOSCOPIC DECORTICATION LUNG;  RIGHT VATS/RIGHT THORACOTOMY , DECORTICATION RIGHT LUNG ,WEDGE RESECTION RIGHT MIDDLE LOBE LUNG NODULE;  Surgeon: Harley Felix MD;  Location: [facility] OR     ZZC LUMBAR SPINE FUSION,ANTER APPRCH      2 L4-5 L5-21       Family Hx:  Family History   Problem Relation Age of Onset     Hypertension Mother      Diabetes Father         type 2     Hypertension Father      Cerebrovascular Disease Maternal Grandmother      Unknown/Adopted Maternal Grandfather      Unknown/Adopted Paternal Grandmother      Unknown/Adopted Paternal Grandfather      Liver Disease Brother      Heart Disease Sister      Thyroid Disease Sister      Thyroid Disease Sister      Thyroid Disease Sister          Social Hx:  Social History     Socioeconomic History     Marital status:      Spouse name: Not on file     Number of children: Not on file     Years of education: Not on file     Highest education level: Not on file   Occupational History     Not on file   Tobacco Use     Smoking status: Former     Types: Cigarettes     Quit date: 2010     Years since quittin.0     Smokeless tobacco: Never   Vaping Use     Vaping Use: Never used   Substance and Sexual Activity     Alcohol use: No     Drug use: Yes     Types: Marijuana, Oxycodone     Comment: medical marijuana     Sexual activity: Not Currently     Comment: Prescription Marijuana   Other Topics Concern     Parent/sibling w/ CABG, MI or angioplasty before 65F 55M? Yes   Social History Narrative     Not on file     Social Determinants of  Health     Financial Resource Strain: Low Risk  (1/11/2024)    Financial Resource Strain      Within the past 12 months, have you or your family members you live with been unable to get utilities (heat, electricity) when it was really needed?: No   Food Insecurity: Low Risk  (1/11/2024)    Food Insecurity      Within the past 12 months, did you worry that your food would run out before you got money to buy more?: No      Within the past 12 months, did the food you bought just not last and you didn t have money to get more?: No   Transportation Needs: Low Risk  (1/11/2024)    Transportation Needs      Within the past 12 months, has lack of transportation kept you from medical appointments, getting your medicines, non-medical meetings or appointments, work, or from getting things that you need?: No   Physical Activity: Not on file   Stress: Not on file   Social Connections: Not on file   Interpersonal Safety: Low Risk  (11/28/2023)    Interpersonal Safety      Do you feel physically and emotionally safe where you currently live?: Yes      Within the past 12 months, have you been hit, slapped, kicked or otherwise physically hurt by someone?: No      Within the past 12 months, have you been humiliated or emotionally abused in other ways by your partner or ex-partner?: No   Housing Stability: Low Risk  (1/11/2024)    Housing Stability      Do you have housing? : Yes      Are you worried about losing your housing?: No          MEDICATIONS:  has a current medication list which includes the following prescription(s): lipase-protease-amylase, lipase-protease-amylase, atorvastatin, citalopram, dexcom g6 , dexcom g6 sensor, dexcom g6 transmitter, contour next test, insulin aspart, levothyroxine, lidocaine-prilocaine, lisinopril, medical cannabis, naloxone, naproxen, novolog vial, omeprazole, ondansetron, oxycodone-acetaminophen, and sumatriptan.    ROS: 10 point ROS neg other than the symptoms noted above in the HPI.      GENERAL: some fatigue, wt stable; denies fevers, chills, night sweats.   HEENT: no dysphagia, odonophagia, diplopia, neck pain  THYROID:  no apparent hyper or hypothyroid symptoms  CV: no recent chest pain, pressure, palpitations  LUNGS: no SOB, KRAFT, cough, wheezing   ABDOMEN: indigestion and some diarrhea, postmeal nausea, frequent BM's; no constipation  EXTREMITIES: no rashes, ulcers, edema  NEUROLOGY: no headaches, denies changes in vision, tingling, extremitiy numbness   MSK: neck pain/stiffness and back pains; no muscle weakness  SKIN: no rashes or lesions  PSYCH:  stable mood, no significant anxiety or depression  ENDOCRINE: no heat or cold intolerance    Physical Exam (visual exam)  VS:  no vital signs taken for video visit  CONSTITUTIONAL: healthy, alert and NAD, well dressed, answering questions appropriately  ENT: no nose swelling or nasal discharge, mouth redness or gum changes.  EYES: eyes grossly normal to inspection, conjunctivae and sclerae normal, no exophthalmos or proptosis  THYROID:  no apparent nodules or goiter  LUNGS: no audible wheeze, cough or visible cyanosis, no visible retractions or increased work of breathing  ABDOMEN: abdomen not evaluated  EXTREMITIES: no hand tremors, limited exam  NEUROLOGY: CN grossly intact, mentation intact and speech normal   SKIN:  no apparent skin lesions, rash, or edema with visualized skin appearance  PSYCH: mentation appears normal, affect normal/bright, judgement and insight intact,   normal speech and appearance well groomed       LABS:     All pertinent notes, labs, and images personally reviewed by me.       A/P:  No diagnosis found.        Comments:  Reviewed complicated health history, diabetes and thyroid issues.  Glycemic control complicated by irreg meal intake, delayed absorption from gastroparesis, and non-functioning gastric pacer  Recent SG trends show postmeal hyperglycemia, also less frequent hypoglycemia trends  Reviewed and interpreted  tests that I previously ordered.   Ordered appropriate tests for the endocrinology disease management.    Management options discussed and implemented after shared medical decision making with the patient.  Diabetes problem is chronic with exacerbation progression, hyperglycemia    Plan:  Reviewed the overall T1DM management and insulin pump use.  Discussed optimal BG testing to assess glucose trends.  We reviewed insulin pump settings, basal rate and bolus dosing  Use of automated pump bolus dosing for meal/snack carb & correction dosing  Reviewed the [employer] Carelink report data today  Reviewed recent DexcomG6 CGM glucose sensor trends, in detail     Recommend:  Continue treatment plan using the Medtronic insulin pump and DexcomG6 CGM sensor  Use SG value for the pump bolus wizard dosing.  No pump setting changes at this time.    Continue use of the DexcomG6 CGM sensor, until able to switch to Medtronic system using G4 CGM              Patient makes frequent adjustments to insulin regimen on basis of therapeutic glucose testing  Agree with plan for pump upgrade to Medtronic 780G with Guardian4 CGM, process discussed   Encouraged him to contact [employer] rep ([place]) for guidance on process  Prepare for diabetes appointments by synchronizing pump data when he is on Wi-fi before appointments  Would benefit from additional CDE evaluation at [facility]  No labs ordered at this time  Consider followup diabetes education appt with the CDE at [facility]  Continue simvastatin 40 mg each evening  Continue current levothyroxine 0.125 mg daily  We have discussed differences between endocrine vs exocrine pancreas failure   Contact GI team for advice on the chronic diarrhea, EPI, gastric pacemaker function  Arrange annual dilated eye exam, fasting lipid panel testing.  We reviewed importance of timely clinic appointments with me Q3 months, to satisfy Medicare with his diabetes device coverage  He will  message me when ready to schedule next appt, with use of new pump & sensor system  Will communicate feedback with his testing by phonecall and/or USMail    Addressed patient's questions today.    There are no Patient Instructions on file for this visit.    Future labs ordered today: No orders of the defined types were placed in this encounter.    Radiology/Consults ordered today: None    Total time spent on day of encounter:  ***    Follow-up:  ***    VIKTOR Best MD, MS  Endocrinology  [facility]    CC:  Care Team                                    This encounter was opened in error. Please disregard.       Again, thank you for allowing me to participate in the care of your patient.        Sincerely,        Frankie Best MD

## 2024-01-16 NOTE — PROGRESS NOTES
"Virtual Visit Details    Type of service:  Video Visit   Video Start Time: 2:33 PM  Video End Time:2:56 PM    Originating Location (pt. Location): Home  Distant Location (provider location):  Off-site  Platform used for Video Visit: Terri      Recent issues:  Diabetes and thyroid follow-up evaluation  Still has issues with neck pain/stiffness   Has malabsorption and chronic diarrhea, reports having diagnosis of exocrine pancreas insufficiency (EPI)   Has taken Zenpep vs Creon (35,000 mg/dose, 1-capsule TID vs QID, ~$800/mo), with improvement of diarrhea, improved energy when taking it  Had replacement of the gastric pacer replacement in 12/2023 with Dr. Kyra Summers at [facility]  Upgraded to the new Medtronic 780G with Guardian4 CGM in early 1/2024  Planning to have cataract surgery later this week           1995. Diagnosis of diabetes mellitus after 2nd back surgery, age 29  Initial treatment with insulin injections using NPH and Regular insulin  Switched to premixed 70/30 insulin BID dosing  1999. Started Medtronic insulin pump model 508, used base-dose Humalog insulin at meals  Had seen Dr. Iggy Briggs/[facility]     7/24/01. Initial evaluation with me at my former clinic ([facility])  Upgraded pump to Medtronic 523   Used the Medtronic 630G pump  Meals BID, had carb \"exchanges\" with his pump settings    Chronic high BG and hgbA1c trends despite dose changes  Tried square wave bolus  Has Contour Link? BG meter, variable testing pattern   Recently testing 4x/day for at least 60 days  Glucose sensor (CGM) use:   Tried Medtronic CGMS in the past, didn't like it   Switched to DexcomG5, used for approx 6 months but issues with getting supply order   Early 2019, started Freestyle Sukhi but issues with skin site redness  Meal schedule:  Snacks during the day, supper meal 5-7pm  Upgraded to Medtronic 770G  12/2023. Upgraded to Medtronic 780G with Guardian4 CGM   Novolog in pump    Current Medtronic 780G pump " settings:       Recent Carelink data:                Previous [facility] labs include:    Previous [facility] hgbA1c trends include:  Lab Results   Component Value Date    A1C 8.9 (H) 11/02/2023    A1C 8.9 (H) 07/25/2023    A1C 8.0 (H) 02/27/2023    A1C 8.9 (H) 06/27/2022    A1C 8.8 (H) 03/02/2022      Recent [facility] labs include:  Lab Results   Component Value Date    A1C 8.9 (H) 11/02/2023     11/02/2023    POTASSIUM 4.4 11/02/2023    CHLORIDE 99 11/02/2023    CO2 24 11/02/2023    ANIONGAP 12 11/02/2023     (H) 11/02/2023    BUN 17.9 11/02/2023    CR 1.01 11/02/2023    GFRESTIMATED 87 11/02/2023    GFRESTBLACK >90 03/01/2021    EMILEE 9.1 11/02/2023    CPEPT <0.1 (L) 06/24/2021    CHOL 142 07/25/2023    TRIG 47 07/25/2023    HDL 65 07/25/2023    LDL 68 07/25/2023    NHDL 77 07/25/2023    UCRR 62 02/27/2023    UCRR 63 02/27/2023    MICROL 6 02/27/2023    UMALCR 9.68 02/27/2023     Last eye exam date?  [facility] eye clinic [facility]  7/2018. Met with Destiny Warren RD, CDE at [facility]  DM Complications:              Neuropathy                          Peripheral neuropathy                                      Decreased sensation at feet                          Autonomic neuropathy- gastroparesis                                      Symptoms of nausea, bloating, episodes of diarrhea and emesis                                      Has Tipzu gastric stimulator                                      Sees physicians and WARREN Gilmore/[facility]                   Thyroid:  1994. History of hypothyroidism  Chronic levothyroxine treatment, has used Levoxyl in the past  Supplements:  Takes vitamin D 1000 U daily  7/2018. Had levothyroxine dose increase   Recent labs include:  Lab Results   Component Value Date    TSH 1.66 07/25/2023    T4 1.57 07/25/2023     Current dose:  Levothyroxine 0.125 mg daily        Lives in [place], [place] with Ada, also (1) adopted dog named Marko  Has seen Dr. Landin  Severino/[facility] for general medicine evaluations  Also sees Olga Ramirez PAC and Dr. Jennifer Thompson/[facility].       PMH/PSH:  Past Medical History:   Diagnosis Date    Arthritis     Chronic neck pain     percocet for years now,     Depressive disorder     Exocrine pancreatic insufficiency     Gastroparesis due to DM (H)     gastric stimulator helps, [facility] manages that    Hyperlipidemia     Hypertension     Hypothyroidism     Neuropathy, diabetic (H)     Type 1 diabetes (H) age 31    neuropathy- peripheral and autonomic     Past Surgical History:   Procedure Laterality Date    ABDOMEN SURGERY  Dec. 13 2023    This is the 10th gastric stimulator brandon had inplanted due to battery life    BIOPSY  ?    Non cancerous polups.    COLONOSCOPY  07/18/2012    FUSION CERVICAL ANTERIOR TWO LEVELS      HEAD & NECK SURGERY      INJECT BLOCK MEDIAL BRANCH CERVICAL/THORACIC/LUMBAR Bilateral 11/20/2018    Procedure: Bilateral Cervical 3-4-5 medial branch block;  Surgeon: Jef Edwards MD;  Location: [facility] OR    INJECT BLOCK MEDIAL BRANCH CERVICAL/THORACIC/LUMBAR N/A 01/11/2019    Procedure: Cervical 3,4,5 Bilateral Medial branch blocks;  Surgeon: Jef Edwards MD;  Location: [facility] OR    ORTHOPEDIC SURGERY      RADIO FREQUENCY ABLATION PULSED CERVICAL Right 02/28/2019    Procedure: RADIO FREQUENCY ABLATION CERVICAL THREE, FOUR, FIVE, AND THIRD OCCUPITAL NERVE RIGHT SIDE;  Surgeon: Jef Edwards MD;  Location: [facility]    RADIO FREQUENCY ABLATION PULSED CERVICAL Left 03/15/2019    Procedure: radiofrequency ablation cervical 3,4,5;  Surgeon: Jef Edwards MD;  Location: [facility]    RADIO FREQUENCY ABLATION PULSED CERVICAL Right 09/04/2020    Procedure: RADIOFREQUENCY ABLATION CERVICAL 3,4 5 right SIDE;  Surgeon: Jef Edwards MD;  Location: [facility]    RADIO FREQUENCY ABLATION PULSED CERVICAL Left 09/10/2020    Procedure: RADIOFREQUENCY ABLATION CERVICAL 3,4 5 LEFT SIDE;  Surgeon: Jef Edwards MD;  Location: [facility]     SOFT TISSUE SURGERY      THORACOSCOPIC DECORTICATION LUNG  2014    Procedure: THORACOSCOPIC DECORTICATION LUNG;  RIGHT VATS/RIGHT THORACOTOMY , DECORTICATION RIGHT LUNG ,WEDGE RESECTION RIGHT MIDDLE LOBE LUNG NODULE;  Surgeon: Harley Felix MD;  Location: [facility] OR    ZZC LUMBAR SPINE FUSION,ANTER APPRCH      2 L4-5 L5-21       Family Hx:  Family History   Problem Relation Age of Onset    Hypertension Mother     Diabetes Father         type 2    Hypertension Father     Cerebrovascular Disease Maternal Grandmother     Unknown/Adopted Maternal Grandfather     Unknown/Adopted Paternal Grandmother     Unknown/Adopted Paternal Grandfather     Liver Disease Brother     Heart Disease Sister     Thyroid Disease Sister     Thyroid Disease Sister     Thyroid Disease Sister          Social Hx:  Social History     Socioeconomic History    Marital status:      Spouse name: Not on file    Number of children: Not on file    Years of education: Not on file    Highest education level: Not on file   Occupational History    Not on file   Tobacco Use    Smoking status: Former     Types: Cigarettes     Quit date: 2010     Years since quittin.0    Smokeless tobacco: Never   Vaping Use    Vaping Use: Never used   Substance and Sexual Activity    Alcohol use: No    Drug use: Yes     Types: Marijuana, Oxycodone     Comment: medical marijuana    Sexual activity: Not Currently     Comment: Prescription Marijuana   Other Topics Concern    Parent/sibling w/ CABG, MI or angioplasty before 65F 55M? Yes   Social History Narrative    Not on file     Social Determinants of Health     Financial Resource Strain: Low Risk  (2024)    Financial Resource Strain     Within the past 12 months, have you or your family members you live with been unable to get utilities (heat, electricity) when it was really needed?: No   Food Insecurity: Low Risk  (2024)    Food Insecurity     Within the past 12 months, did you worry  that your food would run out before you got money to buy more?: No     Within the past 12 months, did the food you bought just not last and you didn t have money to get more?: No   Transportation Needs: Low Risk  (1/11/2024)    Transportation Needs     Within the past 12 months, has lack of transportation kept you from medical appointments, getting your medicines, non-medical meetings or appointments, work, or from getting things that you need?: No   Physical Activity: Not on file   Stress: Not on file   Social Connections: Not on file   Interpersonal Safety: Low Risk  (11/28/2023)    Interpersonal Safety     Do you feel physically and emotionally safe where you currently live?: Yes     Within the past 12 months, have you been hit, slapped, kicked or otherwise physically hurt by someone?: No     Within the past 12 months, have you been humiliated or emotionally abused in other ways by your partner or ex-partner?: No   Housing Stability: Low Risk  (1/11/2024)    Housing Stability     Do you have housing? : Yes     Are you worried about losing your housing?: No          MEDICATIONS:  has a current medication list which includes the following prescription(s): lipase-protease-amylase, atorvastatin, citalopram, insulin aspart, levothyroxine, lisinopril, medical cannabis, omeprazole, ondansetron, oxycodone-acetaminophen, sumatriptan, lipase-protease-amylase, dexcom g6 , dexcom g6 sensor, dexcom g6 transmitter, contour next test, lidocaine-prilocaine, naloxone, naproxen, and novolog vial.    ROS: 10 point ROS neg other than the symptoms noted above in the HPI.     GENERAL: some fatigue, wt stable; denies fevers, chills, night sweats.   HEENT: no dysphagia, odonophagia, diplopia, neck pain  THYROID:  no apparent hyper or hypothyroid symptoms  CV: no recent chest pain, pressure, palpitations  LUNGS: no SOB, KRAFT, cough, wheezing   ABDOMEN: less indigestion and some diarrhea, postmeal nausea, frequent BM's; no  constipation  EXTREMITIES: no rashes, ulcers, edema  NEUROLOGY: no headaches, denies changes in vision, tingling, extremitiy numbness   MSK: neck pain/stiffness and back pains; no muscle weakness  SKIN: no rashes or lesions  PSYCH:  stable mood, no significant anxiety or depression  ENDOCRINE: no heat or cold intolerance    Physical Exam (visual exam)  VS:  no vital signs taken for video visit  CONSTITUTIONAL: healthy, alert and NAD, well dressed, answering questions appropriately  ENT: no nose swelling or nasal discharge, mouth redness or gum changes.  EYES: eyes grossly normal to inspection, conjunctivae and sclerae normal, no exophthalmos or proptosis  THYROID:  no apparent nodules or goiter  LUNGS: no audible wheeze, cough or visible cyanosis, no visible retractions or increased work of breathing  ABDOMEN: abdomen not evaluated  EXTREMITIES: no hand tremors, limited exam  NEUROLOGY: CN grossly intact, mentation intact and speech normal   SKIN:  no apparent skin lesions, rash, or edema with visualized skin appearance  PSYCH: mentation appears normal, affect normal/bright, judgement and insight intact,   normal speech and appearance well groomed       LABS:     All pertinent notes, labs, and images personally reviewed by me.       A/P:  Encounter Diagnoses   Name Primary?    Type 1 diabetes mellitus with diabetic autonomic neuropathy (H) Yes    Type 1 diabetes mellitus with diabetic neuropathy (H)     Gastroparesis     Insulin pump status     Hypothyroidism, unspecified type      Comments:  Reviewed complicated health history, diabetes and thyroid issues.  Glycemic control complicated by irreg meal intake, delayed absorption from gastroparesis, and non-functioning gastric pacer  Recent SG trends show postmeal hyperglycemia, also less frequent hypoglycemia trends  Reviewed and interpreted tests that I previously ordered.   Ordered appropriate tests for the endocrinology disease management.    Management options  discussed and implemented after shared medical decision making with the patient.  Diabetes problem is chronic with exacerbation progression, hyperglycemia    Plan:  Reviewed the overall T1DM management and insulin pump use.  Discussed optimal BG testing to assess glucose trends.  We reviewed insulin pump settings, basal rate and bolus dosing  Use of automated pump bolus dosing for meal/snack carb & correction dosing  Reviewed the Medtronic Carelink report data today  Reviewed recent DexcomG6 CGM glucose sensor trends, in detail     Recommend:  Continue treatment plan using the Medtronic 780G with Guardian4 CGM    No pump setting changes at this time.    Consider followup diabetes education appt [facility] CDE at [facility]  Reviewed use of Temp Target for exercise or for the late afternoon lower glucose phase  Repeat non-fasting labs in late 3/2024   Testing at his local [facility] clinic   Lab orders placed  Continue simvastatin 40 mg each evening  Continue current levothyroxine 0.125 mg daily  Reviewed use of pump Temp Target setting during the cataract surgery  Arrange annual dilated eye exam, fasting lipid panel testing.  We reviewed importance of timely clinic appointments with me Q3 months, to satisfy Medicare with his diabetes device coverage  Will communicate feedback with his testing by phonecall and/or USMail    Addressed patient's questions today.    There are no Patient Instructions on file for this visit.    Future labs ordered today:   Orders Placed This Encounter   Procedures    GLUCOSE MONITOR, 72 HOUR, PHYS INTERP    Hemoglobin A1c    Basic metabolic panel    TSH    T4 free     Radiology/Consults ordered today: None    Total time spent on day of encounter:  38 min    Follow-up:  4/2/24 at 1pm, VVAm,     7/10/24 at 11:30 am, Return    VIKTOR Best MD, MS  Endocrinology  [facility]    CC:  Care Team

## 2024-01-17 ENCOUNTER — OFFICE VISIT (OUTPATIENT)
Dept: FAMILY MEDICINE | Facility: CLINIC | Age: 59
End: 2024-01-17
Payer: COMMERCIAL

## 2024-01-17 VITALS
WEIGHT: 148.19 LBS | OXYGEN SATURATION: 97 % | BODY MASS INDEX: 22.46 KG/M2 | DIASTOLIC BLOOD PRESSURE: 73 MMHG | SYSTOLIC BLOOD PRESSURE: 128 MMHG | TEMPERATURE: 98.4 F | RESPIRATION RATE: 18 BRPM | HEIGHT: 68 IN | HEART RATE: 83 BPM

## 2024-01-17 DIAGNOSIS — E03.9 ACQUIRED HYPOTHYROIDISM: ICD-10-CM

## 2024-01-17 DIAGNOSIS — I10 ESSENTIAL HYPERTENSION: ICD-10-CM

## 2024-01-17 DIAGNOSIS — E78.5 HYPERLIPIDEMIA, UNSPECIFIED HYPERLIPIDEMIA TYPE: ICD-10-CM

## 2024-01-17 DIAGNOSIS — H26.9 CATARACT OF BOTH EYES, UNSPECIFIED CATARACT TYPE: ICD-10-CM

## 2024-01-17 DIAGNOSIS — E10.43 TYPE 1 DIABETES MELLITUS WITH DIABETIC AUTONOMIC NEUROPATHY (H): ICD-10-CM

## 2024-01-17 DIAGNOSIS — Z96.41 INSULIN PUMP STATUS: ICD-10-CM

## 2024-01-17 DIAGNOSIS — K21.9 GASTROESOPHAGEAL REFLUX DISEASE WITHOUT ESOPHAGITIS: ICD-10-CM

## 2024-01-17 DIAGNOSIS — K86.81 EXOCRINE PANCREATIC INSUFFICIENCY: ICD-10-CM

## 2024-01-17 DIAGNOSIS — G89.4 CHRONIC PAIN SYNDROME: ICD-10-CM

## 2024-01-17 DIAGNOSIS — Z01.818 PREOP GENERAL PHYSICAL EXAM: Primary | ICD-10-CM

## 2024-01-17 DIAGNOSIS — K31.84 GASTROPARESIS: ICD-10-CM

## 2024-01-17 DIAGNOSIS — M48.02 CERVICAL STENOSIS OF SPINE: ICD-10-CM

## 2024-01-17 DIAGNOSIS — F11.90 CHRONIC, CONTINUOUS USE OF OPIOIDS: ICD-10-CM

## 2024-01-17 PROCEDURE — 99214 OFFICE O/P EST MOD 30 MIN: CPT | Performed by: STUDENT IN AN ORGANIZED HEALTH CARE EDUCATION/TRAINING PROGRAM

## 2024-01-17 RX ORDER — ONDANSETRON 8 MG/1
8 TABLET, ORALLY DISINTEGRATING ORAL EVERY 8 HOURS PRN
Qty: 60 TABLET | Refills: 0 | Status: SHIPPED | OUTPATIENT
Start: 2024-01-17

## 2024-01-17 RX ORDER — OXYCODONE AND ACETAMINOPHEN 5; 325 MG/1; MG/1
1 TABLET ORAL EVERY 6 HOURS PRN
Qty: 70 TABLET | Refills: 0 | Status: SHIPPED | OUTPATIENT
Start: 2024-01-17 | End: 2024-02-21

## 2024-01-17 RX ORDER — ONDANSETRON 8 MG/1
8 TABLET, ORALLY DISINTEGRATING ORAL EVERY 8 HOURS PRN
Qty: 60 TABLET | Refills: 0 | Status: SHIPPED | OUTPATIENT
Start: 2024-01-17 | End: 2024-01-17

## 2024-01-17 ASSESSMENT — PAIN SCALES - GENERAL: PAINLEVEL: MODERATE PAIN (5)

## 2024-01-17 NOTE — PROGRESS NOTES
Preoperative Evaluation  [address] [address]  Phone: [phone]  Fax: [phone]  Primary Provider: Mushtaq Haq  Pre-op Performing Provider: MUSHTAQ HAQ  Jan 17, 2024       Stone is a 58 year old, presenting for the following:  Pre-Op Exam        1/17/2024     1:50 PM   Additional Questions   Roomed by Stefanie ODONNELL   Accompanied by Ada     Surgical Information  Surgery/Procedure: Phacoemulsification with a standard intraocular lens implant  Left  Surgery Location: [facility]  Surgeon: Dr. Aleman  Surgery Date: 1/18/2024  Time of Surgery: 1:00pm  Where patient plans to recover: At home with family  Fax number for surgical facility: Note does not need to be faxed, will be available electronically in Epic.    Assessment & Plan fc    The proposed surgical procedure is considered LOW risk.    Problem List Items Addressed This Visit          Nervous and Auditory    Chronic pain syndrome    Relevant Medications    oxyCODONE-acetaminophen (PERCOCET) 5-325 MG tablet    Type 1 diabetes mellitus with diabetic autonomic neuropathy (H)       Digestive    Gastroparesis    Relevant Medications    ondansetron (ZOFRAN ODT) 8 MG ODT tab    Exocrine pancreatic insufficiency    Gastroesophageal reflux disease without esophagitis    Relevant Medications    ondansetron (ZOFRAN ODT) 8 MG ODT tab       Endocrine    Hypothyroidism    Hyperlipidemia       Circulatory    Essential hypertension       Other    Cervical stenosis of spine    Relevant Medications    oxyCODONE-acetaminophen (PERCOCET) 5-325 MG tablet    Insulin pump status    Chronic, continuous use of opioids     Other Visit Diagnoses       Preop general physical exam    -  Primary    Cataract of both eyes, unspecified cataract type              Cardiovascular clearance for lumbar surgery today.  Patient with new continuous pump and will switch goal to 150 during procedure.  No  further labs or repeat EKG today.  Repeat A1c and TSH in 2 months.     Implanted Device   - Type of device: Gastric stimulator  Patient advised to bring device information on day of surgery.      Risks and Recommendations  The patient has the following additional risks and recommendations for perioperative complications:  Diabetes:  - Patient is on insulin therapy; diabetic NPO guidelines provided and discussed.    Antiplatelet or Anticoagulation Medication Instructions   - Patient is on no antiplatelet or anticoagulation medications.    Additional Medication Instructions  Patient is to take all scheduled medications on the day of surgery   - Insulin pump: New pump. Switch goal to 150 during procedure from baseline of 100 with [employer]onic 780 G and G4 sensors    Recommendation  APPROVAL GIVEN to proceed with proposed procedure, without further diagnostic evaluation.      Subjective       HPI related to upcoming procedure: cataract        1/11/2024     8:07 AM   Preop Questions   1. Have you ever had a heart attack or stroke? No   2. Have you ever had surgery on your heart or blood vessels, such as a stent placement, a coronary artery bypass, or surgery on an artery in your head, neck, heart, or legs? No   3. Do you have chest pain with activity? No   4. Do you have a history of  heart failure? No   5. Do you currently have a cold, bronchitis or symptoms of other infection? No   6. Do you have a cough, shortness of breath, or wheezing? No   7. Do you or anyone in your family have previous history of blood clots? No   8. Do you or does anyone in your family have a serious bleeding problem such as prolonged bleeding following surgeries or cuts? No   9. Have you ever had problems with anemia or been told to take iron pills? No   10. Have you had any abnormal blood loss such as black, tarry or bloody stools? No   11. Have you ever had a blood transfusion? No   12. Are you willing to have a blood transfusion if it is  medically needed before, during, or after your surgery? Yes   13. Have you or any of your relatives ever had problems with anesthesia? No   14. Do you have sleep apnea, excessive snoring or daytime drowsiness? YES    14a. Do you have a CPAP machine? No   15. Do you have any artifical heart valves or other implanted medical devices like a pacemaker, defibrillator, or continuous glucose monitor? YES - stimulator   15a. What type of device do you have? Gastric stimulator   15b. Name of the clinic that manages your device:  [facility]   16. Do you have artificial joints? No   17. Are you allergic to latex? No       Health Care Directive  Patient does not have a Health Care Directive or Living Will: Discussed advance care planning with patient; information given to patient to review.    Preoperative Review of    reviewed - controlled substances reflected in medication list.      Status of Chronic Conditions:  DIABETES - Patient has a longstanding history of DiabetesType Type I . Patient is being treated with insulin pump and denies significant side effects. Control has been good. New pump doing wonderful.     HYPOTHYROIDISM - Patient has a longstanding history of chronic Hypothyroidism. Patient has been doing well, noting no tremor, insomnia, hair loss or changes in skin texture. Continues to take medications as directed, without adverse reactions or side effects. Last TSH   Lab Results   Component Value Date    TSH 1.66 07/25/2023   .      Patient Active Problem List    Diagnosis Date Noted    Chronic, continuous use of opioids 10/04/2023     Priority: Medium    Essential hypertension 11/09/2020     Priority: Medium    Gastroesophageal reflux disease without esophagitis 02/26/2019     Priority: Medium    Type 1 diabetes mellitus with diabetic autonomic neuropathy (H) 08/07/2018     Priority: Medium    Insulin pump status 08/07/2018     Priority: Medium    Chronic pain syndrome 07/10/2018     Priority: Medium    Exocrine  pancreatic insufficiency 11/24/2016     Priority: Medium    Mild major depression (H) 11/14/2014     Priority: Medium    Hypothyroidism      Priority: Medium     Dr Best      Gastroparesis      Priority: Medium     gastric stimulator helps, [facility] manages that      Cervical stenosis of spine 04/16/2012     Priority: Medium    Hyperlipidemia 06/06/2008     Priority: Medium      Past Medical History:   Diagnosis Date    Arthritis     Chronic neck pain     percocet for years now,     Depressive disorder     Exocrine pancreatic insufficiency     Gastroparesis due to DM (H)     gastric stimulator helps, [facility] manages that    Hyperlipidemia     Hypertension     Hypothyroidism     Neuropathy, diabetic (H)     Type 1 diabetes (H) age 31    neuropathy- peripheral and autonomic     Past Surgical History:   Procedure Laterality Date    ABDOMEN SURGERY  Dec. 13 2023    This is the 10th gastric stimulator brandon had inplanted due to battery life    BIOPSY  ?    Non cancerous polups.    COLONOSCOPY  07/18/2012    FUSION CERVICAL ANTERIOR TWO LEVELS      HEAD & NECK SURGERY      INJECT BLOCK MEDIAL BRANCH CERVICAL/THORACIC/LUMBAR Bilateral 11/20/2018    Procedure: Bilateral Cervical 3-4-5 medial branch block;  Surgeon: Jef Edwards MD;  Location: [facility]    INJECT BLOCK MEDIAL BRANCH CERVICAL/THORACIC/LUMBAR N/A 01/11/2019    Procedure: Cervical 3,4,5 Bilateral Medial branch blocks;  Surgeon: Jef Edwards MD;  Location: [facility]    ORTHOPEDIC SURGERY      RADIO FREQUENCY ABLATION PULSED CERVICAL Right 02/28/2019    Procedure: RADIO FREQUENCY ABLATION CERVICAL THREE, FOUR, FIVE, AND THIRD OCCUPITAL NERVE RIGHT SIDE;  Surgeon: Jef Edwards MD;  Location: [facility]    RADIO FREQUENCY ABLATION PULSED CERVICAL Left 03/15/2019    Procedure: radiofrequency ablation cervical 3,4,5;  Surgeon: Jef Edwards MD;  [facility]    RADIO FREQUENCY ABLATION PULSED CERVICAL Right 09/04/2020    Procedure: RADIOFREQUENCY ABLATION CERVICAL 3,4 5  right SIDE;  Surgeon: Jef Edwards MD;  [facility]    RADIO FREQUENCY ABLATION PULSED CERVICAL Left 09/10/2020    Procedure: RADIOFREQUENCY ABLATION CERVICAL 3,4 5 LEFT SIDE;  Surgeon: Jef Edwards MD;  [facility]    SOFT TISSUE SURGERY      THORACOSCOPIC DECORTICATION LUNG  01/09/2014    Procedure: THORACOSCOPIC DECORTICATION LUNG;  RIGHT VATS/RIGHT THORACOTOMY , DECORTICATION RIGHT LUNG ,WEDGE RESECTION RIGHT MIDDLE LOBE LUNG NODULE;  Surgeon: Harley Felix MD;  Location: [facility]    ZZC LUMBAR SPINE FUSION,ANTER APPRCH      2 L4-5 L5-21     Current Outpatient Medications   Medication Sig Dispense Refill    amylase-lipase-protease (CREON 24) 13872-14796 units CPEP per EC capsule Take 1 capsule by mouth every other day Pt reports taking 6-8 capsules per day      amylase-lipase-protease (PERTZYE) 46230 units CPEP Take 1 capsule by mouth every other day      atorvastatin (LIPITOR) 80 MG tablet TAKE 1 TABLET BY MOUTH EVERY DAY 90 tablet 0    citalopram (CELEXA) 40 MG tablet TAKE 1 TABLET BY MOUTH EVERY DAY 90 tablet 0    insulin aspart (NOVOLOG VIAL) 100 UNITS/ML vial USE WITH INSULIN PUMP TOTAL DAILY DOSE APPROX 45 UNITS 20 mL 4    levothyroxine (SYNTHROID/LEVOTHROID) 125 MCG tablet Take 1 tablet (125 mcg) by mouth daily 90 tablet 3    lidocaine-prilocaine (EMLA) 2.5-2.5 % external cream Apply topically as needed for moderate pain 30 g 3    lisinopril (ZESTRIL) 40 MG tablet TAKE ONE TABLET BY MOUTH ONCE DAILY 90 tablet 1    medical cannabis (Patient's own supply) See Admin Instructions Topical creams and vaporizer cartridges  (The purpose of this order is to document that the patient reports taking medical cannabis.  This is not a prescription, and is not used to certify that the patient has a qualifying medical condition.)      naproxen (NAPROSYN) 500 MG tablet TAKE ONE TABLET BY MOUTH TWO TIMES A DAY AS NEEDED FOR MODERATE PAIN 180 tablet 0    NOVOLOG VIAL 100 UNIT/ML soln USE UP TO 45 UNITS  DAILY WITH INSULIN PUMP 20 mL 5    omeprazole (PRILOSEC) 20 MG DR capsule TAKE 1 CAPSULE BY MOUTH ONCE DAILY 90 capsule 0    ondansetron (ZOFRAN ODT) 8 MG ODT tab Take 1 tablet (8 mg) by mouth every 8 hours as needed (for nauesa) 60 tablet 0    oxyCODONE-acetaminophen (PERCOCET) 5-325 MG tablet Take 1 tablet by mouth every 6 hours as needed for severe pain (*CAUTION OPIOD.  RISK OF OVERDOSE AND ADDICTION.**MAX OF 3 TABLETS PER DAY**) To last for month 70 tablet 0    SUMAtriptan (IMITREX) 100 MG tablet Take 1 tablet (100 mg) by mouth at onset of headache 1/2 to 1 tablet by mouth at onset of headache. May repeat 1 dose after 2 hours if headache persists. 9 tablet 3    Continuous Blood Gluc  (DEXCOM G6 ) AMBER Use to read glucose levels per 's instructions (Patient not taking: Reported on 2024) 1 Device 0    Continuous Blood Gluc Sensor (DEXCOM G6 SENSOR) MISC CHANGE EVERY 10 DAYS (Patient not taking: Reported on 2024) 3 each 5    Continuous Blood Gluc Transmit (DEXCOM G6 TRANSMITTER) MISC CHANGE EVERY 90 DAYS (Patient not taking: Reported on 2024) 1 each 3    CONTOUR NEXT TEST test strip Use to test blood sugar 5x times daily or as directed. (Patient not taking: Reported on 10/4/2023) 450 strip 3    naloxone (NARCAN) 4 MG/0.1ML nasal spray Spray 1 spray (4 mg) into one nostril alternating nostrils once as needed for opioid reversal every 2-3 minutes until assistance arrives 0.2 mL 0       No Known Allergies     Social History     Tobacco Use    Smoking status: Former     Types: Cigarettes     Quit date: 2010     Years since quittin.0    Smokeless tobacco: Never   Substance Use Topics    Alcohol use: No     Family History   Problem Relation Age of Onset    Hypertension Mother     Diabetes Father         type 2    Hypertension Father     Cerebrovascular Disease Maternal Grandmother     Unknown/Adopted Maternal Grandfather     Unknown/Adopted Paternal Grandmother      "Unknown/Adopted Paternal Grandfather     Liver Disease Brother     Heart Disease Sister     Thyroid Disease Sister     Thyroid Disease Sister     Thyroid Disease Sister      History   Drug Use    Types: Marijuana, Oxycodone     Comment: medical marijuana         Objective    /73   Pulse 83   Temp 98.4  F (36.9  C) (Temporal)   Resp 18   Ht 1.72 m (5' 7.72\")   Wt 67.2 kg (148 lb 3 oz)   SpO2 97%   BMI 22.72 kg/m     Estimated body mass index is 22.72 kg/m  as calculated from the following:    Height as of this encounter: 1.72 m (5' 7.72\").    Weight as of this encounter: 67.2 kg (148 lb 3 oz).  Review of Systems  CONSTITUTIONAL: NEGATIVE for fever, chills, change in weight  INTEGUMENTARY/SKIN: NEGATIVE for worrisome rashes, moles or lesions  EYES: NEGATIVE for vision changes or irritation  ENT/MOUTH: NEGATIVE for ear, mouth and throat problems  RESP: NEGATIVE for significant cough or SOB  CV: NEGATIVE for chest pain, palpitations or peripheral edema  GI: NEGATIVE for nausea, abdominal pain, heartburn, or change in bowel habits  : NEGATIVE for frequency, dysuria, or hematuria  MUSCULOSKELETAL: NEGATIVE for significant arthralgias or myalgia  NEURO: NEGATIVE for weakness, dizziness or paresthesias  ENDOCRINE: NEGATIVE for temperature intolerance, skin/hair changes  HEME: NEGATIVE for bleeding problems  PSYCHIATRIC: NEGATIVE for changes in mood or affect  Physical Exam  GENERAL: alert and no distress  EYES: Eyes grossly normal to inspection, PERRL and conjunctivae and sclerae normal  HENT: ear canals and TM's normal, nose and mouth without ulcers or lesions  NECK: no adenopathy, no asymmetry, masses, or scars  RESP: lungs clear to auscultation - no rales, rhonchi or wheezes  CV: regular rate and rhythm, normal S1 S2, no S3 or S4, no murmur, click or rub, no peripheral edema  ABDOMEN: soft, nontender, no hepatosplenomegaly, no masses and bowel sounds normal  MS: no gross musculoskeletal defects noted, no " edema  SKIN: no suspicious lesions or rashes, stimulator scar on belly  NEURO: Normal strength and tone, mentation intact and speech normal  PSYCH: mentation appears normal, affect normal/bright  LYMPH: no cervical, supraclavicular, axillary, or inguinal adenopathy    Recent Labs   Lab Test 11/02/23  1318 07/25/23  1043    136   POTASSIUM 4.4 4.3   CR 1.01 0.96   A1C 8.9* 8.9*        Diagnostics  No labs were ordered during this visit.   No EKG required for low risk surgery (cataract, skin procedure, breast biopsy, etc).    Revised Cardiac Risk Index (RCRI)  The patient has the following serious cardiovascular risks for perioperative complications:   - Diabetes Mellitus (on Insulin) = 1 point     RCRI Interpretation: 1 point: Class II (low risk - 0.9% complication rate)         Signed Electronically by: Mushtaq Haq MD  Copy of this evaluation report is provided to requesting physician.

## 2024-01-18 ENCOUNTER — HOSPITAL ENCOUNTER (OUTPATIENT)
Facility: CLINIC | Age: 59
Discharge: HOME | End: 2024-01-18
Attending: INTERNAL MEDICINE | Admitting: INTERNAL MEDICINE
Payer: MEDICARE

## 2024-01-18 ENCOUNTER — ANESTHESIA EVENT (OUTPATIENT)
Dept: SURGERY | Facility: CLINIC | Age: 59
End: 2024-01-18
Payer: MEDICARE

## 2024-01-18 ENCOUNTER — ANESTHESIA (OUTPATIENT)
Dept: SURGERY | Facility: CLINIC | Age: 59
End: 2024-01-18
Payer: MEDICARE

## 2024-01-18 VITALS
HEART RATE: 63 BPM | WEIGHT: 150 LBS | BODY MASS INDEX: 23 KG/M2 | DIASTOLIC BLOOD PRESSURE: 99 MMHG | SYSTOLIC BLOOD PRESSURE: 136 MMHG | RESPIRATION RATE: 18 BRPM | OXYGEN SATURATION: 97 % | TEMPERATURE: 98.1 F

## 2024-01-18 DIAGNOSIS — M48.02 CERVICAL STENOSIS OF SPINE: ICD-10-CM

## 2024-01-18 DIAGNOSIS — G89.4 CHRONIC PAIN SYNDROME: ICD-10-CM

## 2024-01-18 PROCEDURE — 370N000004 HC ANESTHESIA CATARACT PACKAGE: Performed by: INTERNAL MEDICINE

## 2024-01-18 PROCEDURE — 360N000007 HC CATARACT SURGICAL PACKAGE: Performed by: INTERNAL MEDICINE

## 2024-01-18 PROCEDURE — 761N000008 HC RECOVERY CATRACT PACKAGE: Performed by: INTERNAL MEDICINE

## 2024-01-18 PROCEDURE — V2632 POST CHMBR INTRAOCULAR LENS: HCPCS | Performed by: INTERNAL MEDICINE

## 2024-01-18 PROCEDURE — 250N000011 HC RX IP 250 OP 636: Performed by: NURSE ANESTHETIST, CERTIFIED REGISTERED

## 2024-01-18 PROCEDURE — 250N000009 HC RX 250: Performed by: INTERNAL MEDICINE

## 2024-01-18 PROCEDURE — 250N000011 HC RX IP 250 OP 636: Performed by: INTERNAL MEDICINE

## 2024-01-18 DEVICE — EYE IMP IOL AMO PCL TECNIS ZCB00 21.5: Type: IMPLANTABLE DEVICE | Site: EYE | Status: FUNCTIONAL

## 2024-01-18 RX ORDER — ONDANSETRON 4 MG/1
4 TABLET, ORALLY DISINTEGRATING ORAL EVERY 30 MIN PRN
Status: CANCELLED | OUTPATIENT
Start: 2024-01-18

## 2024-01-18 RX ORDER — PHENYLEPHRINE HYDROCHLORIDE 25 MG/ML
1 SOLUTION/ DROPS OPHTHALMIC
Status: COMPLETED | OUTPATIENT
Start: 2024-01-18 | End: 2024-01-18

## 2024-01-18 RX ORDER — FENTANYL CITRATE 50 UG/ML
INJECTION, SOLUTION INTRAMUSCULAR; INTRAVENOUS PRN
Status: DISCONTINUED | OUTPATIENT
Start: 2024-01-18 | End: 2024-01-18

## 2024-01-18 RX ORDER — MOXIFLOXACIN 5 MG/ML
1 SOLUTION/ DROPS OPHTHALMIC
Status: COMPLETED | OUTPATIENT
Start: 2024-01-18 | End: 2024-01-18

## 2024-01-18 RX ORDER — CYCLOPENTOLATE HYDROCHLORIDE 10 MG/ML
1 SOLUTION/ DROPS OPHTHALMIC
Status: COMPLETED | OUTPATIENT
Start: 2024-01-18 | End: 2024-01-18

## 2024-01-18 RX ORDER — DICLOFENAC SODIUM 1 MG/ML
1 SOLUTION/ DROPS OPHTHALMIC
Status: COMPLETED | OUTPATIENT
Start: 2024-01-18 | End: 2024-01-18

## 2024-01-18 RX ORDER — PREDNISOLONE/MOXIFLO/NEPAFENAC 1-0.5-0.1%
SUSPENSION, DROPS(FINAL DOSAGE FORM)(ML) OPHTHALMIC (EYE) PRN
Status: DISCONTINUED | OUTPATIENT
Start: 2024-01-18 | End: 2024-01-18 | Stop reason: HOSPADM

## 2024-01-18 RX ORDER — PROPARACAINE HYDROCHLORIDE 5 MG/ML
1 SOLUTION/ DROPS OPHTHALMIC ONCE
Status: DISCONTINUED | OUTPATIENT
Start: 2024-01-18 | End: 2024-01-18 | Stop reason: HOSPADM

## 2024-01-18 RX ORDER — PROPARACAINE HYDROCHLORIDE 5 MG/ML
1 SOLUTION/ DROPS OPHTHALMIC ONCE
Status: COMPLETED | OUTPATIENT
Start: 2024-01-18 | End: 2024-01-18

## 2024-01-18 RX ORDER — ONDANSETRON 2 MG/ML
4 INJECTION INTRAMUSCULAR; INTRAVENOUS EVERY 30 MIN PRN
Status: CANCELLED | OUTPATIENT
Start: 2024-01-18

## 2024-01-18 RX ADMIN — CYCLOPENTOLATE HYDROCHLORIDE 1 DROP: 10 SOLUTION/ DROPS OPHTHALMIC at 12:32

## 2024-01-18 RX ADMIN — PHENYLEPHRINE HYDROCHLORIDE 1 DROP: 25 SOLUTION/ DROPS OPHTHALMIC at 12:32

## 2024-01-18 RX ADMIN — FENTANYL CITRATE 50 MCG: 50 INJECTION INTRAMUSCULAR; INTRAVENOUS at 13:04

## 2024-01-18 RX ADMIN — MIDAZOLAM 2 MG: 1 INJECTION INTRAMUSCULAR; INTRAVENOUS at 13:04

## 2024-01-18 RX ADMIN — DICLOFENAC SODIUM 1 DROP: 1 SOLUTION/ DROPS OPHTHALMIC at 12:32

## 2024-01-18 RX ADMIN — DICLOFENAC SODIUM 1 DROP: 1 SOLUTION/ DROPS OPHTHALMIC at 12:08

## 2024-01-18 RX ADMIN — PROPARACAINE HYDROCHLORIDE 1 DROP: 5 SOLUTION/ DROPS OPHTHALMIC at 12:08

## 2024-01-18 RX ADMIN — CYCLOPENTOLATE HYDROCHLORIDE 1 DROP: 10 SOLUTION/ DROPS OPHTHALMIC at 12:08

## 2024-01-18 RX ADMIN — MOXIFLOXACIN 1 DROP: 5 SOLUTION/ DROPS OPHTHALMIC at 12:32

## 2024-01-18 RX ADMIN — PHENYLEPHRINE HYDROCHLORIDE 1 DROP: 25 SOLUTION/ DROPS OPHTHALMIC at 12:08

## 2024-01-18 RX ADMIN — DICLOFENAC SODIUM 1 DROP: 1 SOLUTION/ DROPS OPHTHALMIC at 12:23

## 2024-01-18 RX ADMIN — PHENYLEPHRINE HYDROCHLORIDE 1 DROP: 25 SOLUTION/ DROPS OPHTHALMIC at 12:23

## 2024-01-18 RX ADMIN — MOXIFLOXACIN 1 DROP: 5 SOLUTION/ DROPS OPHTHALMIC at 12:23

## 2024-01-18 RX ADMIN — MOXIFLOXACIN 1 DROP: 5 SOLUTION/ DROPS OPHTHALMIC at 12:08

## 2024-01-18 RX ADMIN — CYCLOPENTOLATE HYDROCHLORIDE 1 DROP: 10 SOLUTION/ DROPS OPHTHALMIC at 12:23

## 2024-01-18 ASSESSMENT — ACTIVITIES OF DAILY LIVING (ADL): ADLS_ACUITY_SCORE: 35

## 2024-01-18 NOTE — ANESTHESIA PREPROCEDURE EVALUATION
Anesthesia Pre-Procedure Evaluation    Patient: Stone De Paz   MRN: [id number] : 1965        Procedure : Procedure(s):  Phacoemulsification with a standard intraocular lens implant          Past Medical History:   Diagnosis Date    Arthritis     Chronic neck pain     percocet for years now,     Depressive disorder     Exocrine pancreatic insufficiency     Gastroparesis due to DM (H)     gastric stimulator helps, [facility] manages that    Hyperlipidemia     Hypertension     Hypothyroidism     Neuropathy, diabetic (H)     Type 1 diabetes (H) age 31    neuropathy- peripheral and autonomic      Past Surgical History:   Procedure Laterality Date    ABDOMEN SURGERY  Dec. 13 2023    This is the 10th gastric stimulator brandon had inplanted due to battery life    BIOPSY  ?    Non cancerous polups.    COLONOSCOPY  2012    FUSION CERVICAL ANTERIOR TWO LEVELS      HEAD & NECK SURGERY      INJECT BLOCK MEDIAL BRANCH CERVICAL/THORACIC/LUMBAR Bilateral 2018    Procedure: Bilateral Cervical 3-4-5 medial branch block;  Surgeon: Jef Edwards MD;  [facility]    INJECT BLOCK MEDIAL BRANCH CERVICAL/THORACIC/LUMBAR N/A 2019    Procedure: Cervical 3,4,5 Bilateral Medial branch blocks;  Surgeon: Jef Edwards MD;  Location[facility]    ORTHOPEDIC SURGERY      RADIO FREQUENCY ABLATION PULSED CERVICAL Right 2019    Procedure: RADIO FREQUENCY ABLATION CERVICAL THREE, FOUR, FIVE, AND THIRD OCCUPITAL NERVE RIGHT SIDE;  Surgeon: Jef Edwards MD;  [facility]    RADIO FREQUENCY ABLATION PULSED CERVICAL Left 03/15/2019    Procedure: radiofrequency ablation cervical 3,4,5;  Surgeon: Jef Edwards MD;  [facility]    RADIO FREQUENCY ABLATION PULSED CERVICAL Right 2020    Procedure: RADIOFREQUENCY ABLATION CERVICAL 3,4 5 right SIDE;  Surgeon: Jef Edwards MD;  [facility]    RADIO FREQUENCY ABLATION PULSED CERVICAL Left 09/10/2020    Procedure: RADIOFREQUENCY ABLATION CERVICAL 3,4 5 LEFT SIDE;   Surgeon: Jef Edwards MD;  [facility]    SOFT TISSUE SURGERY      THORACOSCOPIC DECORTICATION LUNG  2014    Procedure: THORACOSCOPIC DECORTICATION LUNG;  RIGHT VATS/RIGHT THORACOTOMY , DECORTICATION RIGHT LUNG ,WEDGE RESECTION RIGHT MIDDLE LOBE LUNG NODULE;  Surgeon: Harley Felix MD;  Location:  OR    ZZC LUMBAR SPINE FUSION,ANTER APPRCH      2 L4-5 L5-21      No Known Allergies   Social History     Tobacco Use    Smoking status: Former     Types: Cigarettes     Quit date: 2010     Years since quittin.0    Smokeless tobacco: Never   Substance Use Topics    Alcohol use: No      Wt Readings from Last 1 Encounters:   24 67.2 kg (148 lb 3 oz)        Anesthesia Evaluation   Pt has had prior anesthetic.         ROS/MED HX  ENT/Pulmonary:  - neg pulmonary ROS     Neurologic: Comment: Chronic pain        Cardiovascular:     (+)  hypertension- -   -  - -                                 Previous cardiac testing   Echo: Date: Results:    Stress Test:  Date: Results:    ECG Reviewed:  Date: 23 Results:  SR  Cath:  Date: Results:      METS/Exercise Tolerance: >4 METS    Hematologic:  - neg hematologic  ROS     Musculoskeletal:   (+)  arthritis,             GI/Hepatic: Comment: Gastroparesis      (+) GERD,                   Renal/Genitourinary:       Endo: Comment: Pancreatic insufficiency    (+) type I DM,  Last HgA1c: 8.9, date: 23,  - using insulin pump.   Diabetic complications: nephropathy. thyroid problem, hypothyroidism,           Psychiatric/Substance Use:     (+) psychiatric history depression  H/O chronic opiod use . Recreational drug usage: Cannabis.    Infectious Disease:  - neg infectious disease ROS     Malignancy:  - neg malignancy ROS     Other:  - neg other ROS          Physical Exam    Airway  airway exam normal      Mallampati: II   TM distance: > 3 FB   Neck ROM: full   Mouth opening: > 3 cm    Respiratory Devices and Support         Dental       (+)  Completely normal teeth      Cardiovascular   cardiovascular exam normal          Pulmonary   pulmonary exam normal                OUTSIDE LABS:  CBC:   Lab Results   Component Value Date    WBC 9.5 07/28/2021    WBC 6.7 03/01/2021    HGB 13.7 07/28/2021    HGB 12.6 (L) 03/01/2021    HCT 40.1 07/28/2021    HCT 37.2 (L) 03/01/2021     07/28/2021     03/01/2021     BMP:   Lab Results   Component Value Date     11/02/2023     07/25/2023    POTASSIUM 4.4 11/02/2023    POTASSIUM 4.3 07/25/2023    CHLORIDE 99 11/02/2023    CHLORIDE 99 07/25/2023    CO2 24 11/02/2023    CO2 25 07/25/2023    BUN 17.9 11/02/2023    BUN 17.8 07/25/2023    CR 1.01 11/02/2023    CR 0.96 07/25/2023     (H) 11/02/2023     (H) 07/25/2023     COAGS:   Lab Results   Component Value Date    INR 1.09 01/09/2014     POC:   Lab Results   Component Value Date     (H) 09/10/2020     HEPATIC:   Lab Results   Component Value Date    ALBUMIN 3.8 06/27/2022    PROTTOTAL 7.2 06/27/2022    ALT 67 06/27/2022    AST 33 06/27/2022    ALKPHOS 64 06/27/2022    BILITOTAL 0.7 06/27/2022     OTHER:   Lab Results   Component Value Date    LACT 1.7 07/28/2021    A1C 8.9 (H) 11/02/2023    EMILEE 9.1 11/02/2023    PHOS 1.8 (L) 07/28/2021    MAG 1.7 07/28/2021    LIPASE 49 (L) 07/28/2021    TSH 1.66 07/25/2023    T4 1.57 07/25/2023    CRP 6.7 01/21/2014    SED 31 (H) 01/21/2014       Anesthesia Plan    ASA Status:  3    NPO Status:  NPO Appropriate    Anesthesia Type: MAC.     - Reason for MAC: straight local not clinically adequate              Consents    Anesthesia Plan(s) and associated risks, benefits, and realistic alternatives discussed. Questions answered and patient/representative(s) expressed understanding.     - Discussed:     - Discussed with:  Patient      - Extended Intubation/Ventilatory Support Discussed: No.      - Patient is DNR/DNI Status: No     Use of blood products discussed: No .     Postoperative Care             Comments:               CHEN Grijalva CRNA    I have reviewed the pertinent notes and labs in the chart from the past 30 days and (re)examined the patient.  Any updates or changes from those notes are reflected in this note.              # DMII: A1C = 8.9 % (Ref range: <5.7 %) within past 6 months

## 2024-01-18 NOTE — ANESTHESIA CARE TRANSFER NOTE
Patient: Stone De Paz    Procedure: Procedure(s):  Phacoemulsification with a standard intraocular lens implant       Diagnosis: Cataract [H26.9]  Diagnosis Additional Information: No value filed.    Anesthesia Type:   MAC     Note:    Oropharynx: spontaneously breathing  Level of Consciousness: awake  Oxygen Supplementation: room air    Independent Airway: airway patency satisfactory and stable  Dentition: dentition unchanged  Vital Signs Stable: post-procedure vital signs reviewed and stable  Report to RN Given: handoff report given  Patient transferred to: Phase II    Handoff Report: Identifed the Patient, Identified the Reponsible Provider, Reviewed the pertinent medical history, Discussed the surgical course, Reviewed Intra-OP anesthesia mangement and issues during anesthesia, Set expectations for post-procedure period and Allowed opportunity for questions and acknowledgement of understanding      Vitals:  Vitals Value Taken Time   /91 01/18/24 1340   Temp     Pulse 63 01/18/24 1340   Resp     SpO2 95 % 01/18/24 1337   Vitals shown include unfiled device data.    Electronically Signed By: CHEN Grijalva CRNA  January 18, 2024  1:49 PM

## 2024-01-18 NOTE — ANESTHESIA POSTPROCEDURE EVALUATION
Patient: Stone De Paz    Procedure: Procedure(s):  Phacoemulsification with a standard intraocular lens implant       Anesthesia Type:  MAC    Note:  Disposition: Outpatient   Postop Pain Control: Uneventful            Sign Out: Well controlled pain   PONV: No   Neuro/Psych: Uneventful            Sign Out: Acceptable/Baseline neuro status   Airway/Respiratory: Uneventful            Sign Out: Acceptable/Baseline resp. status   CV/Hemodynamics: Uneventful            Sign Out: Acceptable CV status; No obvious hypovolemia; No obvious fluid overload   Other NRE: NONE   DID A NON-ROUTINE EVENT OCCUR? No           Last vitals:  Vitals Value Taken Time   /99 01/18/24 1350   Temp     Pulse 63 01/18/24 1350   Resp     SpO2 97 % 01/18/24 1350       Electronically Signed By: CHEN Grijalva CRNA  January 18, 2024  4:09 PM

## 2024-01-18 NOTE — OP NOTE
[facility]  Ophthalmology Operative Note    PREOPERATIVE DIAGNOSIS: Cataract, Left eye.     POSTOPERATIVE DIAGNOSIS: Cataract, Left eye.     OPERATION: Cataract extraction with placement of posterior chamber intraocular lens in the Left eye.     ANESTHESIA: MAC combined with topical     INDICATIONS FOR PROCEDURE: Stone De Paz was seen in the [facility] for decreased visual acuity in the Left eye. The patient was found to have a visually significant cataract in the Left eye. The risks, benefits, alternatives and goals of cataract extraction were discussed with the patient, and after adequate discussion the patient understood and agreed to these, and a signed informed consent was obtained prior to the procedure.     DESCRIPTION OF PROCEDURE: After proper patient identification, topical anesthesia was applied to the Left eye. The patient was brought to the operating room and the Left eye was prepped and draped in the usual sterile fashion for intraocular surgery. A lid speculum was placed in the Left eye. A paracentesis was then created and the anterior chamber was filled with 1% non-preserved lidocaine followed by Endocoat. A clear corneal incision was then created temporally using a 2.4mm keratome. A continuous curvilinear capsulorrhexis was then created using a cystotome and Utrata forceps. Hydrodissection was carried out with BSS on a cannula and the lens rotated freely within the capsular bag. Phacoemulsification was then carried out using the divide and conquer technique. Residual cortical material was removed using the I&A handpiece. The capsular bag was then filled with Healon and a ZCB00 +21.5 diopter intraocular lens was then injected into the capsular bag. The lens showed good centration and stability. Residual viscoelastic was removed using the I&A handpiece. The wound was then hydrated and the anterior chamber reformed. Intracameral Moxifloxacin was then  injected into the anterior chamber. The wounds were then checked and found to be sealed. Topical Prednisolone drops were placed in the patient's Left eye followed by a Diana shield over the top of this. The patient tolerated the procedure well without complications and was told to follow up in the clinic in the next postoperative day.     Implant Name Type Inv. Item Serial No.  Lot No. LRB No. Used Action   EYE IMP IOL RAMU PCL TECNIS ZCB00 21.5 - [id number] Lens/Eye Implant EYE IMP IOL RAMU PCL TECNIS ZCB00 21.5 [id number] [employer]  Left 1 Implanted        Petros Aleman MD

## 2024-01-19 RX ORDER — NAPROXEN 500 MG/1
TABLET ORAL
Qty: 180 TABLET | Refills: 0 | Status: SHIPPED | OUTPATIENT
Start: 2024-01-19 | End: 2024-05-20

## 2024-01-24 ENCOUNTER — MEDICAL CORRESPONDENCE (OUTPATIENT)
Dept: HEALTH INFORMATION MANAGEMENT | Facility: CLINIC | Age: 59
End: 2024-01-24

## 2024-02-02 DIAGNOSIS — F41.1 GENERALIZED ANXIETY DISORDER: ICD-10-CM

## 2024-02-02 DIAGNOSIS — I10 ESSENTIAL HYPERTENSION: ICD-10-CM

## 2024-02-02 RX ORDER — CITALOPRAM HYDROBROMIDE 40 MG/1
40 TABLET ORAL DAILY
Qty: 90 TABLET | Refills: 0 | Status: SHIPPED | OUTPATIENT
Start: 2024-02-02 | End: 2024-05-07

## 2024-02-04 RX ORDER — LISINOPRIL 40 MG/1
40 TABLET ORAL DAILY
Qty: 180 TABLET | Refills: 0 | Status: SHIPPED | OUTPATIENT
Start: 2024-02-04 | End: 2024-08-07

## 2024-02-16 DIAGNOSIS — E78.5 HYPERLIPIDEMIA LDL GOAL <100: ICD-10-CM

## 2024-02-16 DIAGNOSIS — K21.9 GASTROESOPHAGEAL REFLUX DISEASE WITHOUT ESOPHAGITIS: ICD-10-CM

## 2024-02-20 RX ORDER — ATORVASTATIN CALCIUM 80 MG/1
80 TABLET, FILM COATED ORAL DAILY
Qty: 90 TABLET | Refills: 0 | Status: SHIPPED | OUTPATIENT
Start: 2024-02-20 | End: 2024-02-21

## 2024-02-21 ENCOUNTER — OFFICE VISIT (OUTPATIENT)
Dept: FAMILY MEDICINE | Facility: CLINIC | Age: 59
End: 2024-02-21
Payer: COMMERCIAL

## 2024-02-21 ENCOUNTER — HOSPITAL ENCOUNTER (OUTPATIENT)
Dept: GENERAL RADIOLOGY | Facility: CLINIC | Age: 59
Discharge: HOME | End: 2024-02-21
Attending: STUDENT IN AN ORGANIZED HEALTH CARE EDUCATION/TRAINING PROGRAM | Admitting: STUDENT IN AN ORGANIZED HEALTH CARE EDUCATION/TRAINING PROGRAM
Payer: MEDICARE

## 2024-02-21 VITALS
HEART RATE: 74 BPM | HEIGHT: 68 IN | RESPIRATION RATE: 16 BRPM | OXYGEN SATURATION: 97 % | WEIGHT: 145 LBS | SYSTOLIC BLOOD PRESSURE: 126 MMHG | TEMPERATURE: 98.4 F | DIASTOLIC BLOOD PRESSURE: 66 MMHG | BODY MASS INDEX: 21.98 KG/M2

## 2024-02-21 DIAGNOSIS — Z96.41 INSULIN PUMP STATUS: ICD-10-CM

## 2024-02-21 DIAGNOSIS — I10 ESSENTIAL HYPERTENSION: ICD-10-CM

## 2024-02-21 DIAGNOSIS — M48.02 CERVICAL STENOSIS OF SPINE: ICD-10-CM

## 2024-02-21 DIAGNOSIS — K86.81 EXOCRINE PANCREATIC INSUFFICIENCY: ICD-10-CM

## 2024-02-21 DIAGNOSIS — G89.29 CHRONIC LEFT-SIDED LOW BACK PAIN WITHOUT SCIATICA: ICD-10-CM

## 2024-02-21 DIAGNOSIS — Z98.1 HISTORY OF LUMBAR FUSION: ICD-10-CM

## 2024-02-21 DIAGNOSIS — M25.552 HIP PAIN, LEFT: ICD-10-CM

## 2024-02-21 DIAGNOSIS — K31.84 GASTROPARESIS: ICD-10-CM

## 2024-02-21 DIAGNOSIS — G89.4 CHRONIC PAIN SYNDROME: ICD-10-CM

## 2024-02-21 DIAGNOSIS — Z01.818 PREOP GENERAL PHYSICAL EXAM: Primary | ICD-10-CM

## 2024-02-21 DIAGNOSIS — K21.9 GASTROESOPHAGEAL REFLUX DISEASE WITHOUT ESOPHAGITIS: ICD-10-CM

## 2024-02-21 DIAGNOSIS — H26.9 CATARACT OF BOTH EYES, UNSPECIFIED CATARACT TYPE: ICD-10-CM

## 2024-02-21 DIAGNOSIS — E10.43 TYPE 1 DIABETES MELLITUS WITH DIABETIC AUTONOMIC NEUROPATHY (H): ICD-10-CM

## 2024-02-21 DIAGNOSIS — E78.5 HYPERLIPIDEMIA LDL GOAL <100: ICD-10-CM

## 2024-02-21 DIAGNOSIS — M54.50 CHRONIC LEFT-SIDED LOW BACK PAIN WITHOUT SCIATICA: ICD-10-CM

## 2024-02-21 DIAGNOSIS — F11.90 CHRONIC, CONTINUOUS USE OF OPIOIDS: ICD-10-CM

## 2024-02-21 PROCEDURE — 99214 OFFICE O/P EST MOD 30 MIN: CPT | Performed by: STUDENT IN AN ORGANIZED HEALTH CARE EDUCATION/TRAINING PROGRAM

## 2024-02-21 PROCEDURE — 73502 X-RAY EXAM HIP UNI 2-3 VIEWS: CPT

## 2024-02-21 RX ORDER — OXYCODONE AND ACETAMINOPHEN 5; 325 MG/1; MG/1
1 TABLET ORAL EVERY 6 HOURS PRN
Qty: 70 TABLET | Refills: 0 | Status: SHIPPED | OUTPATIENT
Start: 2024-02-21 | End: 2024-03-18

## 2024-02-21 RX ORDER — CYCLOBENZAPRINE HCL 5 MG
5-10 TABLET ORAL 2 TIMES DAILY PRN
Qty: 30 TABLET | Refills: 1 | Status: SHIPPED | OUTPATIENT
Start: 2024-02-21 | End: 2024-08-02

## 2024-02-21 RX ORDER — ATORVASTATIN CALCIUM 80 MG/1
80 TABLET, FILM COATED ORAL DAILY
Qty: 90 TABLET | Refills: 3 | Status: SHIPPED | OUTPATIENT
Start: 2024-02-21

## 2024-02-21 ASSESSMENT — ANXIETY QUESTIONNAIRES
7. FEELING AFRAID AS IF SOMETHING AWFUL MIGHT HAPPEN: NOT AT ALL
5. BEING SO RESTLESS THAT IT IS HARD TO SIT STILL: NOT AT ALL
GAD7 TOTAL SCORE: 0
GAD7 TOTAL SCORE: 0
2. NOT BEING ABLE TO STOP OR CONTROL WORRYING: NOT AT ALL
1. FEELING NERVOUS, ANXIOUS, OR ON EDGE: NOT AT ALL
4. TROUBLE RELAXING: NOT AT ALL
6. BECOMING EASILY ANNOYED OR IRRITABLE: NOT AT ALL
7. FEELING AFRAID AS IF SOMETHING AWFUL MIGHT HAPPEN: NOT AT ALL
8. IF YOU CHECKED OFF ANY PROBLEMS, HOW DIFFICULT HAVE THESE MADE IT FOR YOU TO DO YOUR WORK, TAKE CARE OF THINGS AT HOME, OR GET ALONG WITH OTHER PEOPLE?: NOT DIFFICULT AT ALL
IF YOU CHECKED OFF ANY PROBLEMS ON THIS QUESTIONNAIRE, HOW DIFFICULT HAVE THESE PROBLEMS MADE IT FOR YOU TO DO YOUR WORK, TAKE CARE OF THINGS AT HOME, OR GET ALONG WITH OTHER PEOPLE: NOT DIFFICULT AT ALL
3. WORRYING TOO MUCH ABOUT DIFFERENT THINGS: NOT AT ALL

## 2024-02-21 NOTE — PROGRESS NOTES
Preoperative Evaluation  [address] [address]  Phone: [phone]  Fax: [phone]  Primary Provider: Mushtaq Haq  Pre-op Performing Provider: MUSHTAQ HAQ  Feb 21, 2024       Stone is a 58 year old, presenting for the following:  Pre-Op Exam        2/21/2024     1:59 PM   Additional Questions   Roomed by Jena     Surgical Information  Surgery/Procedure: Cataracts  Surgery Location:  OR  Surgeon: Dr. Camron Duran  Surgery Date: 2/29/24  Time of Surgery: 12:00 pm  Where patient plans to recover: At home with family  Fax number for surgical facility: Note does not need to be faxed, will be available electronically in Epic.    Assessment & Plan     The proposed surgical procedure is considered LOW risk.    Problem List Items Addressed This Visit          Nervous and Auditory    Chronic pain syndrome    Relevant Medications    oxyCODONE-acetaminophen (PERCOCET) 5-325 MG tablet    Type 1 diabetes mellitus with diabetic autonomic neuropathy (H)       Digestive    Gastroparesis    Exocrine pancreatic insufficiency    Gastroesophageal reflux disease without esophagitis       Circulatory    Essential hypertension       Other    Cervical stenosis of spine    Relevant Medications    oxyCODONE-acetaminophen (PERCOCET) 5-325 MG tablet    Insulin pump status    Chronic, continuous use of opioids     Other Visit Diagnoses       Preop general physical exam    -  Primary    Cataract of both eyes, unspecified cataract type        Hip pain, left        Relevant Medications    oxyCODONE-acetaminophen (PERCOCET) 5-325 MG tablet    Other Relevant Orders    XR Hip Left 2-3 Views    History of lumbar fusion        Chronic left-sided low back pain without sciatica        Relevant Medications    oxyCODONE-acetaminophen (PERCOCET) 5-325 MG tablet    Hyperlipidemia LDL goal <100        Relevant Medications    atorvastatin (LIPITOR) 80 MG tablet          Home  blood sugars reviewed and much improved with new pump.  Waiting on repeat A1c and TSH until he follows up with endocrinology and have complete 3 months of data with new pump.  No further repeat labs or EKG today.  Refill atorvastatin as well as oxycodone today and again discussed risk of chronic opioid use including respiratory suppression, death, addiction and overdose.  Patient with worsening left lower lumbar pain and previous fusions x 2.  Does seem to radiate to his groin and will obtain x-ray of hip today.  If negative I would repeat imaging of his lumbar spine which may need to be CT given his stimulator.  Would likely benefit from physical therapy and strengthening.  Trial of Flexeril today but this is been helpful in the past and possible side effects and restrictions discussed.  No red flag symptoms at this time.  Follow-up with GI.     Implanted Device   - Type of device: gastric stimulator Patient advised to bring device information on day of surgery.      Risks and Recommendations  The patient has the following additional risks and recommendations for perioperative complications:  Diabetes:  - Patient is on insulin therapy; diabetic NPO guidelines provided and discussed.  - Insulin pump: New pump. Switch goal to 150 during procedure from baseline of 100 with Medtronic 780 G and G4 sensors     Antiplatelet or Anticoagulation Medication Instructions   - Patient is on no antiplatelet or anticoagulation medications.    Additional Medication Instructions  Patient is to take all scheduled medications on the day of surgery    Recommendation  APPROVAL GIVEN to proceed with proposed procedure, without further diagnostic evaluation.        Subjective       HPI related to upcoming procedure: Cataract        2/14/2024     4:16 PM   Preop Questions   1. Have you ever had a heart attack or stroke? No   2. Have you ever had surgery on your heart or blood vessels, such as a stent placement, a coronary artery bypass, or  surgery on an artery in your head, neck, heart, or legs? No   3. Do you have chest pain with activity? No   4. Do you have a history of  heart failure? No   5. Do you currently have a cold, bronchitis or symptoms of other infection? No   6. Do you have a cough, shortness of breath, or wheezing? No   7. Do you or anyone in your family have previous history of blood clots? No   8. Do you or does anyone in your family have a serious bleeding problem such as prolonged bleeding following surgeries or cuts? No   9. Have you ever had problems with anemia or been told to take iron pills? No   10. Have you had any abnormal blood loss such as black, tarry or bloody stools? No   11. Have you ever had a blood transfusion? No   12. Are you willing to have a blood transfusion if it is medically needed before, during, or after your surgery? Yes   13. Have you or any of your relatives ever had problems with anesthesia? No   14. Do you have sleep apnea, excessive snoring or daytime drowsiness? YES    14a. Do you have a CPAP machine? No   15. Do you have any artifical heart valves or other implanted medical devices like a pacemaker, defibrillator, or continuous glucose monitor? YES - monitor and stimulator   15a. What type of device do you have? Gastric stimulator   15b. Name of the clinic that manages your device:  [place]   16. Do you have artificial joints? No   17. Are you allergic to latex? No       Health Care Directive  Patient does not have a Health Care Directive or Living Will: Discussed advance care planning with patient; information given to patient to review.    Preoperative Review of    reviewed - controlled substances reflected in medication list.      Status of Chronic Conditions:  DIABETES - Patient has a longstanding history of DiabetesType Type I . Patient is being treated with insulin pump and denies significant side effects. Control has been much improved with pump. Complicating factors include but are not  limited to: gastroparesis.     HYPERTENSION - Patient has longstanding history of HTN , currently denies any symptoms referable to elevated blood pressure. Specifically denies chest pain, palpitations, dyspnea, orthopnea, PND or peripheral edema. Blood pressure readings have been in normal range. Current medication regimen is as listed below. Patient denies any side effects of medication.     HYPOTHYROIDISM - Patient has a longstanding history of chronic Hypothyroidism. Patient has been doing well, noting no tremor, insomnia, hair loss or changes in skin texture. Continues to take medications as directed, without adverse reactions or side effects. Last TSH     Gastroparesis  Exocrine pancreatic insufficiency  Following with GI and recent stimulator replaced.  Initially was working well but feels like symptoms have been worse recently.  No other recent illness.  Does note taking Creon less than prescribed due to cost and usually does better when taking correct amount.  Follow-up with GI upcoming.  Lab Results   Component Value Date    TSH 1.66 07/25/2023   .      Patient Active Problem List    Diagnosis Date Noted    Chronic, continuous use of opioids 10/04/2023     Priority: Medium    Essential hypertension 11/09/2020     Priority: Medium    Gastroesophageal reflux disease without esophagitis 02/26/2019     Priority: Medium    Type 1 diabetes mellitus with diabetic autonomic neuropathy (H) 08/07/2018     Priority: Medium    Insulin pump status 08/07/2018     Priority: Medium    Chronic pain syndrome 07/10/2018     Priority: Medium    Exocrine pancreatic insufficiency 11/24/2016     Priority: Medium    Mild major depression (H) 11/14/2014     Priority: Medium    Hypothyroidism      Priority: Medium     Dr Best      Gastroparesis      Priority: Medium     gastric stimulator helps, [facility] manages that      Cervical stenosis of spine 04/16/2012     Priority: Medium    Hyperlipidemia 06/06/2008     Priority: Medium       Past Medical History:   Diagnosis Date    Arthritis     Chronic neck pain     percocet for years now,     Depressive disorder     Exocrine pancreatic insufficiency     Gastroparesis due to DM (H)     gastric stimulator helps, [facility] manages that    Hyperlipidemia     Hypertension     Hypothyroidism     Neuropathy, diabetic (H)     Type 1 diabetes (H) age 31    neuropathy- peripheral and autonomic     Past Surgical History:   Procedure Laterality Date    ABDOMEN SURGERY  Dec. 13 2023    This is the 10th gastric stimulator brandon had inplanted due to battery life    BIOPSY  ?    Non cancerous polups.    COLONOSCOPY  07/18/2012    FUSION CERVICAL ANTERIOR TWO LEVELS      HEAD & NECK SURGERY      INJECT BLOCK MEDIAL BRANCH CERVICAL/THORACIC/LUMBAR Bilateral 11/20/2018    Procedure: Bilateral Cervical 3-4-5 medial branch block;  Surgeon: Jef Edwards MD;  Location: [facility] OR    INJECT BLOCK MEDIAL BRANCH CERVICAL/THORACIC/LUMBAR N/A 01/11/2019    Procedure: Cervical 3,4,5 Bilateral Medial branch blocks;  Surgeon: Jef Edwards MD;  Location: [facility] OR    ORTHOPEDIC SURGERY      PHACOEMULSIFICATION WITH STANDARD INTRAOCULAR LENS IMPLANT Left 1/18/2024    Procedure: Phacoemulsification with a standard intraocular lens implant, Left;  Surgeon: Vazquez Aleman MD;  Location: [facility] OR    RADIO FREQUENCY ABLATION PULSED CERVICAL Right 02/28/2019    Procedure: RADIO FREQUENCY ABLATION CERVICAL THREE, FOUR, FIVE, AND THIRD OCCUPITAL NERVE RIGHT SIDE;  Surgeon: Jef Edwards MD;  Location: [facility] OR    RADIO FREQUENCY ABLATION PULSED CERVICAL Left 03/15/2019    Procedure: radiofrequency ablation cervical 3,4,5;  Surgeon: Jef Edwards MD;  Location: [facility]    RADIO FREQUENCY ABLATION PULSED CERVICAL Right 09/04/2020    Procedure: RADIOFREQUENCY ABLATION CERVICAL 3,4 5 right SIDE;  Surgeon: Jef Edwards MD;  Location: [facility]    RADIO FREQUENCY ABLATION PULSED CERVICAL Left 09/10/2020    Procedure: RADIOFREQUENCY ABLATION  CERVICAL 3,4 5 LEFT SIDE;  Surgeon: Jef Edwards MD;  Location: [facility] OR    SOFT TISSUE SURGERY      THORACOSCOPIC DECORTICATION LUNG  01/09/2014    Procedure: THORACOSCOPIC DECORTICATION LUNG;  RIGHT VATS/RIGHT THORACOTOMY , DECORTICATION RIGHT LUNG ,WEDGE RESECTION RIGHT MIDDLE LOBE LUNG NODULE;  Surgeon: Harley Felix MD;  Location: [facility] OR    ZZC LUMBAR SPINE FUSION,ANTER APPRCH      2 L4-5 L5-21     Current Outpatient Medications   Medication Sig Dispense Refill    amylase-lipase-protease (CREON 24) 96003-59410 units CPEP per EC capsule Take 1 capsule by mouth every other day Pt reports taking 6-8 capsules per day      amylase-lipase-protease (PERTZYE) 82146 units CPEP Take 1 capsule by mouth every other day      atorvastatin (LIPITOR) 80 MG tablet Take 1 tablet (80 mg) by mouth daily 90 tablet 3    citalopram (CELEXA) 40 MG tablet TAKE 1 TABLET BY MOUTH EVERY DAY 90 tablet 0    Continuous Blood Gluc  (DEXCOM G6 ) AMBER Use to read glucose levels per 's instructions 1 Device 0    insulin aspart (NOVOLOG VIAL) 100 UNITS/ML vial USE WITH INSULIN PUMP TOTAL DAILY DOSE APPROX 45 UNITS 20 mL 4    levothyroxine (SYNTHROID/LEVOTHROID) 125 MCG tablet Take 1 tablet (125 mcg) by mouth daily 90 tablet 3    lidocaine-prilocaine (EMLA) 2.5-2.5 % external cream Apply topically as needed for moderate pain 30 g 3    lisinopril (ZESTRIL) 40 MG tablet TAKE 1 TABLET BY MOUTH EVERY  tablet 0    medical cannabis (Patient's own supply) See Admin Instructions Topical creams and vaporizer cartridges  (The purpose of this order is to document that the patient reports taking medical cannabis.  This is not a prescription, and is not used to certify that the patient has a qualifying medical condition.)      naproxen (NAPROSYN) 500 MG tablet TAKE 1 TABLET BY MOUTH TWICE A DAY AS NEEDED FOR MODERATE PAIN 180 tablet 0    NOVOLOG VIAL 100 UNIT/ML soln USE UP TO 45 UNITS DAILY WITH INSULIN PUMP 20 mL  5    omeprazole (PRILOSEC) 20 MG DR capsule TAKE 1 CAPSULE BY MOUTH ONCE DAILY 90 capsule 2    ondansetron (ZOFRAN ODT) 8 MG ODT tab Take 1 tablet (8 mg) by mouth every 8 hours as needed (for nauesa) 60 tablet 0    oxyCODONE-acetaminophen (PERCOCET) 5-325 MG tablet Take 1 tablet by mouth every 6 hours as needed for severe pain (*CAUTION OPIOD.  RISK OF OVERDOSE AND ADDICTION.**MAX OF 3 TABLETS PER DAY**) To last for month 70 tablet 0    SUMAtriptan (IMITREX) 100 MG tablet Take 1 tablet (100 mg) by mouth at onset of headache 1/2 to 1 tablet by mouth at onset of headache. May repeat 1 dose after 2 hours if headache persists. 9 tablet 3    Continuous Blood Gluc Sensor (DEXCOM G6 SENSOR) MISC CHANGE EVERY 10 DAYS (Patient not taking: Reported on 2024) 3 each 5    Continuous Blood Gluc Transmit (DEXCOM G6 TRANSMITTER) MISC CHANGE EVERY 90 DAYS (Patient not taking: Reported on 2024) 1 each 3    CONTOUR NEXT TEST test strip Use to test blood sugar 5x times daily or as directed. (Patient not taking: Reported on 10/4/2023) 450 strip 3    naloxone (NARCAN) 4 MG/0.1ML nasal spray Spray 1 spray (4 mg) into one nostril alternating nostrils once as needed for opioid reversal every 2-3 minutes until assistance arrives 0.2 mL 0       No Known Allergies     Social History     Tobacco Use    Smoking status: Former     Types: Cigarettes     Quit date: 2010     Years since quittin.1    Smokeless tobacco: Never   Substance Use Topics    Alcohol use: No     Family History   Problem Relation Age of Onset    Hypertension Mother     Diabetes Father         type 2    Hypertension Father     Cerebrovascular Disease Maternal Grandmother     Unknown/Adopted Maternal Grandfather     Unknown/Adopted Paternal Grandmother     Unknown/Adopted Paternal Grandfather     Liver Disease Brother     Heart Disease Sister     Thyroid Disease Sister     Thyroid Disease Sister     Thyroid Disease Sister      History   Drug Use    Types:  "Marijuana, Oxycodone     Comment: medical marijuana         Review of Systems    Review of Systems  Constitutional, HEENT, cardiovascular, pulmonary, GI, , musculoskeletal, neuro, skin, endocrine and psych systems are negative, except as otherwise noted.  Objective    /66   Pulse 74   Temp 98.4  F (36.9  C) (Temporal)   Resp 16   Ht 1.721 m (5' 7.75\")   Wt 65.8 kg (145 lb)   SpO2 97%   BMI 22.21 kg/m     Estimated body mass index is 22.21 kg/m  as calculated from the following:    Height as of this encounter: 1.721 m (5' 7.75\").    Weight as of this encounter: 65.8 kg (145 lb).  Physical Exam  GENERAL: alert and no distress  EYES: Eyes grossly normal to inspection, PERRL and conjunctivae and sclerae normal  HENT: ear canals and TM's normal, nose and mouth without ulcers or lesions  NECK: no adenopathy, no asymmetry, masses, or scars  RESP: lungs clear to auscultation - no rales, rhonchi or wheezes  CV: regular rate and rhythm, normal S1 S2, no S3 or S4, no murmur, click or rub, no peripheral edema  ABDOMEN: soft, nontender, no hepatosplenomegaly, no masses and bowel sounds normal  MS: Left-sided lumbar paraspinal tenderness without bony spinal process tenderness.  Difficulty with straight leg test due to discomfort into his low back.  Implanted stimulator visible and skin.  SKIN: no suspicious lesions or rashes  NEURO: Normal strength and tone, no asymmetric dorsiflexion, patellar reflexes 2+ and symmetric, mentation intact and speech normal  PSYCH: mentation appears normal, affect normal/bright    Recent Labs   Lab Test 11/02/23  1318 07/25/23  1043    136   POTASSIUM 4.4 4.3   CR 1.01 0.96   A1C 8.9* 8.9*        Diagnostics  No labs were ordered during this visit.   No EKG required for low risk surgery (cataract, skin procedure, breast biopsy, etc).    Revised Cardiac Risk Index (RCRI)  The patient has the following serious cardiovascular risks for perioperative complications:   - Diabetes " Mellitus (on Insulin) = 1 point     RCRI Interpretation: 1 point: Class II (low risk - 0.9% complication rate)         Signed Electronically by: Mushtaq Haq MD  Copy of this evaluation report is provided to requesting physician.

## 2024-02-28 ENCOUNTER — ANESTHESIA EVENT (OUTPATIENT)
Dept: SURGERY | Facility: CLINIC | Age: 59
End: 2024-02-28
Payer: MEDICARE

## 2024-02-28 NOTE — ANESTHESIA PREPROCEDURE EVALUATION
Anesthesia Pre-Procedure Evaluation    Patient: Stone De Paz   MRN: [id number] : 1965        Procedure : Procedure(s):  Phacoemulsification with standard intraocular lens implant          Past Medical History:   Diagnosis Date     Arthritis      Chronic neck pain     percocet for years now,      Depressive disorder      Exocrine pancreatic insufficiency      Gastroparesis due to DM (H)     gastric stimulator helps, [facility] manages that     Hyperlipidemia      Hypertension      Hypothyroidism      Neuropathy, diabetic (H)      Type 1 diabetes (H) age 31    neuropathy- peripheral and autonomic      Past Surgical History:   Procedure Laterality Date     ABDOMEN SURGERY  Dec. 13 2023    This is the 10th gastric stimulator brandon had inplanted due to battery life     BIOPSY  ?    Non cancerous polups.     COLONOSCOPY  2012     FUSION CERVICAL ANTERIOR TWO LEVELS       HEAD & NECK SURGERY       INJECT BLOCK MEDIAL BRANCH CERVICAL/THORACIC/LUMBAR Bilateral 2018    Procedure: Bilateral Cervical 3-4-5 medial branch block;  Surgeon: Jef Edwards MD;  Location: [facility]     INJECT BLOCK MEDIAL BRANCH CERVICAL/THORACIC/LUMBAR N/A 2019    Procedure: Cervical 3,4,5 Bilateral Medial branch blocks;  Surgeon: Jef Edwards MD;  Location: [facility]     ORTHOPEDIC SURGERY       PHACOEMULSIFICATION WITH STANDARD INTRAOCULAR LENS IMPLANT Left 2024    Procedure: Phacoemulsification with a standard intraocular lens implant, Left;  Surgeon: Vazquez Aleman MD;  Location: [facility]     RADIO FREQUENCY ABLATION PULSED CERVICAL Right 2019    Procedure: RADIO FREQUENCY ABLATION CERVICAL THREE, FOUR, FIVE, AND THIRD OCCUPITAL NERVE RIGHT SIDE;  Surgeon: Jef Edwards MD;  Location: [facility]     RADIO FREQUENCY ABLATION PULSED CERVICAL Left 03/15/2019    Procedure: radiofrequency ablation cervical 3,4,5;  Surgeon: Jef Edwards MD;  Location: [facility]     RADIO FREQUENCY ABLATION PULSED CERVICAL Right 2020     Procedure: RADIOFREQUENCY ABLATION CERVICAL 3,4 5 right SIDE;  Surgeon: Jef Edwards MD;  Location: [facility]     RADIO FREQUENCY ABLATION PULSED CERVICAL Left 09/10/2020    Procedure: RADIOFREQUENCY ABLATION CERVICAL 3,4 5 LEFT SIDE;  Surgeon: Jef Edwards MD;  Location: [facility]     SOFT TISSUE SURGERY       THORACOSCOPIC DECORTICATION LUNG  2014    Procedure: THORACOSCOPIC DECORTICATION LUNG;  RIGHT VATS/RIGHT THORACOTOMY , DECORTICATION RIGHT LUNG ,WEDGE RESECTION RIGHT MIDDLE LOBE LUNG NODULE;  Surgeon: Harley Felix MD;  Location: [facility]     ZZC LUMBAR SPINE FUSION,ANTER APPRCH      2 L4-5 L5-21      No Known Allergies   Social History     Tobacco Use     Smoking status: Former     Types: Cigarettes     Quit date: 2010     Years since quittin.1     Smokeless tobacco: Never   Substance Use Topics     Alcohol use: No      Wt Readings from Last 1 Encounters:   24 65.8 kg (145 lb)        Anesthesia Evaluation   Pt has had prior anesthetic. Type: MAC and General.    No history of anesthetic complications       ROS/MED HX  ENT/Pulmonary:  - neg pulmonary ROS     Neurologic: Comment: Chronic pain        Cardiovascular:     (+) hypertension-----Previous cardiac testing   Echo: Date: Results:    Stress Test: Date: Results:    ECG Reviewed: Date: 23 Results:  SR  Cath: Date: Results:      METS/Exercise Tolerance: >4 METS    Hematologic:  - neg hematologic  ROS     Musculoskeletal:   (+) arthritis,     GI/Hepatic: Comment: Gastroparesis      (+) GERD, Symptomatic,     Renal/Genitourinary:  - neg Renal ROS     Endo: Comment: Pancreatic insufficiency    (+) type I DM, Last HgA1c: 8.9, date: 23, Using insulin, - using insulin pump. not previously admitted for DM/DKA. Diabetic complications: nephropathy. thyroid problem, hypothyroidism,     Psychiatric/Substance Use:     (+) psychiatric history depression H/O chronic opiod use . Recreational drug usage: Cannabis.    Infectious  Disease:  - neg infectious disease ROS     Malignancy:  - neg malignancy ROS     Other:  - neg other ROS    (+) , H/O Chronic Pain,        Physical Exam    Airway  airway exam normal      Mallampati: II   TM distance: > 3 FB   Neck ROM: full   Mouth opening: > 3 cm    Respiratory Devices and Support         Dental       (+) Completely normal teeth      Cardiovascular   cardiovascular exam normal       Rhythm and rate: regular and normal     Pulmonary   pulmonary exam normal        breath sounds clear to auscultation           OUTSIDE LABS:  CBC:   Lab Results   Component Value Date    WBC 9.5 07/28/2021    WBC 6.7 03/01/2021    HGB 13.7 07/28/2021    HGB 12.6 (L) 03/01/2021    HCT 40.1 07/28/2021    HCT 37.2 (L) 03/01/2021     07/28/2021     03/01/2021     BMP:   Lab Results   Component Value Date     11/02/2023     07/25/2023    POTASSIUM 4.4 11/02/2023    POTASSIUM 4.3 07/25/2023    CHLORIDE 99 11/02/2023    CHLORIDE 99 07/25/2023    CO2 24 11/02/2023    CO2 25 07/25/2023    BUN 17.9 11/02/2023    BUN 17.8 07/25/2023    CR 1.01 11/02/2023    CR 0.96 07/25/2023     (H) 11/02/2023     (H) 07/25/2023     COAGS:   Lab Results   Component Value Date    INR 1.09 01/09/2014     POC:   Lab Results   Component Value Date     (H) 09/10/2020     HEPATIC:   Lab Results   Component Value Date    ALBUMIN 3.8 06/27/2022    PROTTOTAL 7.2 06/27/2022    ALT 67 06/27/2022    AST 33 06/27/2022    ALKPHOS 64 06/27/2022    BILITOTAL 0.7 06/27/2022     OTHER:   Lab Results   Component Value Date    LACT 1.7 07/28/2021    A1C 8.9 (H) 11/02/2023    EMILEE 9.1 11/02/2023    PHOS 1.8 (L) 07/28/2021    MAG 1.7 07/28/2021    LIPASE 49 (L) 07/28/2021    TSH 1.66 07/25/2023    T4 1.57 07/25/2023    CRP 6.7 01/21/2014    SED 31 (H) 01/21/2014       Anesthesia Plan    ASA Status:  3   NPO Status:  NPO Appropriate    Anesthesia Type: MAC.     - Reason for MAC: straight local not clinically adequate       Maintenance: TIVA.        Consents    Anesthesia Plan(s) and associated risks, benefits, and realistic alternatives discussed. Questions answered and patient/representative(s) expressed understanding.    - Discussed:     - Discussed with:  Patient    Use of blood products discussed: No .     Postoperative Care    Pain management: IV analgesics, Oral pain medications.        Comments:    Other Comments: The risks and benefits of anesthesia, and the alternatives where applicable, have been discussed with the patient, and they wish to proceed.           Jose Yanez APRN CRNA    I have reviewed the pertinent notes and labs in the chart from the past 30 days and (re)examined the patient.  Any updates or changes from those notes are reflected in this note.              # DMII: A1C = N/A within past 6 months

## 2024-02-29 ENCOUNTER — HOSPITAL ENCOUNTER (OUTPATIENT)
Facility: CLINIC | Age: 59
Discharge: HOME | End: 2024-02-29
Attending: STUDENT IN AN ORGANIZED HEALTH CARE EDUCATION/TRAINING PROGRAM | Admitting: STUDENT IN AN ORGANIZED HEALTH CARE EDUCATION/TRAINING PROGRAM
Payer: MEDICARE

## 2024-02-29 ENCOUNTER — ANESTHESIA (OUTPATIENT)
Dept: SURGERY | Facility: CLINIC | Age: 59
End: 2024-02-29
Payer: MEDICARE

## 2024-02-29 VITALS
SYSTOLIC BLOOD PRESSURE: 130 MMHG | HEART RATE: 73 BPM | RESPIRATION RATE: 16 BRPM | WEIGHT: 145 LBS | BODY MASS INDEX: 22.21 KG/M2 | TEMPERATURE: 98.2 F | DIASTOLIC BLOOD PRESSURE: 91 MMHG | OXYGEN SATURATION: 97 %

## 2024-02-29 LAB — GLUCOSE BLDC GLUCOMTR-MCNC: 271 MG/DL (ref 70–99)

## 2024-02-29 PROCEDURE — 250N000009 HC RX 250: Performed by: STUDENT IN AN ORGANIZED HEALTH CARE EDUCATION/TRAINING PROGRAM

## 2024-02-29 PROCEDURE — 370N000004 HC ANESTHESIA CATARACT PACKAGE: Performed by: STUDENT IN AN ORGANIZED HEALTH CARE EDUCATION/TRAINING PROGRAM

## 2024-02-29 PROCEDURE — V2632 POST CHMBR INTRAOCULAR LENS: HCPCS | Performed by: STUDENT IN AN ORGANIZED HEALTH CARE EDUCATION/TRAINING PROGRAM

## 2024-02-29 PROCEDURE — 82962 GLUCOSE BLOOD TEST: CPT

## 2024-02-29 PROCEDURE — 250N000011 HC RX IP 250 OP 636: Performed by: STUDENT IN AN ORGANIZED HEALTH CARE EDUCATION/TRAINING PROGRAM

## 2024-02-29 PROCEDURE — 761N000008 HC RECOVERY CATRACT PACKAGE: Performed by: STUDENT IN AN ORGANIZED HEALTH CARE EDUCATION/TRAINING PROGRAM

## 2024-02-29 PROCEDURE — 250N000011 HC RX IP 250 OP 636: Performed by: NURSE ANESTHETIST, CERTIFIED REGISTERED

## 2024-02-29 PROCEDURE — 360N000007 HC CATARACT SURGICAL PACKAGE: Performed by: STUDENT IN AN ORGANIZED HEALTH CARE EDUCATION/TRAINING PROGRAM

## 2024-02-29 PROCEDURE — 250N000009 HC RX 250: Performed by: NURSE ANESTHETIST, CERTIFIED REGISTERED

## 2024-02-29 DEVICE — EYE IMP IOL AMO PCL TECNIS ZCB00 21.0: Type: IMPLANTABLE DEVICE | Site: EYE | Status: FUNCTIONAL

## 2024-02-29 RX ORDER — PROPARACAINE HYDROCHLORIDE 5 MG/ML
1 SOLUTION/ DROPS OPHTHALMIC ONCE
Status: DISCONTINUED | OUTPATIENT
Start: 2024-02-29 | End: 2024-02-29 | Stop reason: HOSPADM

## 2024-02-29 RX ORDER — MOXIFLOXACIN 5 MG/ML
1 SOLUTION/ DROPS OPHTHALMIC
Status: COMPLETED | OUTPATIENT
Start: 2024-02-29 | End: 2024-02-29

## 2024-02-29 RX ORDER — PREDNISOLONE/MOXIFLO/NEPAFENAC 1-0.5-0.1%
SUSPENSION, DROPS(FINAL DOSAGE FORM)(ML) OPHTHALMIC (EYE) PRN
Status: DISCONTINUED | OUTPATIENT
Start: 2024-02-29 | End: 2024-02-29 | Stop reason: HOSPADM

## 2024-02-29 RX ORDER — FENTANYL CITRATE 50 UG/ML
INJECTION, SOLUTION INTRAMUSCULAR; INTRAVENOUS PRN
Status: DISCONTINUED | OUTPATIENT
Start: 2024-02-29 | End: 2024-02-29

## 2024-02-29 RX ORDER — LIDOCAINE 40 MG/G
CREAM TOPICAL
Status: DISCONTINUED | OUTPATIENT
Start: 2024-02-29 | End: 2024-02-29 | Stop reason: HOSPADM

## 2024-02-29 RX ORDER — CYCLOPENTOLATE HYDROCHLORIDE 10 MG/ML
1 SOLUTION/ DROPS OPHTHALMIC
Status: COMPLETED | OUTPATIENT
Start: 2024-02-29 | End: 2024-02-29

## 2024-02-29 RX ORDER — PHENYLEPHRINE HYDROCHLORIDE 25 MG/ML
1 SOLUTION/ DROPS OPHTHALMIC
Status: COMPLETED | OUTPATIENT
Start: 2024-02-29 | End: 2024-02-29

## 2024-02-29 RX ORDER — DICLOFENAC SODIUM 1 MG/ML
1 SOLUTION/ DROPS OPHTHALMIC
Status: COMPLETED | OUTPATIENT
Start: 2024-02-29 | End: 2024-02-29

## 2024-02-29 RX ORDER — PROPARACAINE HYDROCHLORIDE 5 MG/ML
1 SOLUTION/ DROPS OPHTHALMIC ONCE
Status: COMPLETED | OUTPATIENT
Start: 2024-02-29 | End: 2024-02-29

## 2024-02-29 RX ADMIN — PHENYLEPHRINE HYDROCHLORIDE 1 DROP: 25 SOLUTION/ DROPS OPHTHALMIC at 10:53

## 2024-02-29 RX ADMIN — PHENYLEPHRINE HYDROCHLORIDE 1 DROP: 25 SOLUTION/ DROPS OPHTHALMIC at 11:10

## 2024-02-29 RX ADMIN — MOXIFLOXACIN OPHTHALMIC SOLUTION 1 DROP: 5 SOLUTION/ DROPS OPHTHALMIC at 11:02

## 2024-02-29 RX ADMIN — DICLOFENAC SODIUM 1 DROP: 1 SOLUTION OPHTHALMIC at 11:11

## 2024-02-29 RX ADMIN — CYCLOPENTOLATE HYDROCHLORIDE 1 DROP: 10 SOLUTION/ DROPS OPHTHALMIC at 11:01

## 2024-02-29 RX ADMIN — DICLOFENAC SODIUM 1 DROP: 1 SOLUTION OPHTHALMIC at 11:01

## 2024-02-29 RX ADMIN — CYCLOPENTOLATE HYDROCHLORIDE 1 DROP: 10 SOLUTION/ DROPS OPHTHALMIC at 11:11

## 2024-02-29 RX ADMIN — MOXIFLOXACIN OPHTHALMIC SOLUTION 1 DROP: 5 SOLUTION/ DROPS OPHTHALMIC at 10:53

## 2024-02-29 RX ADMIN — DICLOFENAC SODIUM 1 DROP: 1 SOLUTION OPHTHALMIC at 10:53

## 2024-02-29 RX ADMIN — FENTANYL CITRATE 50 MCG: 50 INJECTION INTRAMUSCULAR; INTRAVENOUS at 12:05

## 2024-02-29 RX ADMIN — MIDAZOLAM 1 MG: 1 INJECTION INTRAMUSCULAR; INTRAVENOUS at 12:05

## 2024-02-29 RX ADMIN — PROPARACAINE HYDROCHLORIDE 1 DROP: 5 SOLUTION/ DROPS OPHTHALMIC at 10:50

## 2024-02-29 RX ADMIN — MOXIFLOXACIN OPHTHALMIC SOLUTION 1 DROP: 5 SOLUTION/ DROPS OPHTHALMIC at 11:11

## 2024-02-29 RX ADMIN — PHENYLEPHRINE HYDROCHLORIDE 1 DROP: 25 SOLUTION/ DROPS OPHTHALMIC at 11:01

## 2024-02-29 RX ADMIN — CYCLOPENTOLATE HYDROCHLORIDE 1 DROP: 10 SOLUTION/ DROPS OPHTHALMIC at 10:53

## 2024-02-29 RX ADMIN — LIDOCAINE HYDROCHLORIDE 0.1 ML: 10 INJECTION, SOLUTION EPIDURAL; INFILTRATION; INTRACAUDAL; PERINEURAL at 11:12

## 2024-02-29 ASSESSMENT — ACTIVITIES OF DAILY LIVING (ADL)
ADLS_ACUITY_SCORE: 35

## 2024-02-29 NOTE — ANESTHESIA CARE TRANSFER NOTE
Patient: Stone De Paz    Procedure: Procedure(s):  Phacoemulsification with standard intraocular lens implant       Diagnosis: Cataract [H26.9]  Diagnosis Additional Information: No value filed.    Anesthesia Type:   MAC     Note:    Oropharynx: oropharynx clear of all foreign objects and spontaneously breathing  Level of Consciousness: awake  Oxygen Supplementation: room air    Independent Airway: airway patency satisfactory and stable  Dentition: dentition unchanged  Vital Signs Stable: post-procedure vital signs reviewed and stable  Report to RN Given: handoff report given  Patient transferred to: Phase II    Handoff Report: Identifed the Patient, Identified the Reponsible Provider, Reviewed the pertinent medical history, Discussed the surgical course, Reviewed Intra-OP anesthesia mangement and issues during anesthesia, Set expectations for post-procedure period and Allowed opportunity for questions and acknowledgement of understanding      Vitals:  Vitals Value Taken Time   BP     Temp     Pulse     Resp     SpO2         Electronically Signed By: CHEN Douglass CRNA  February 29, 2024  12:40 PM

## 2024-02-29 NOTE — BRIEF OP NOTE
[facility]    Brief Operative Note    Pre-operative diagnosis: Cataract, Right Eye  Post-operative diagnosis Same as pre-operative diagnosis    Procedure: Phacoemulsification with standard intraocular lens implant, Right - Eye    Surgeon: Surgeon(s) and Role:     * Camron Duran MD - Primary  Anesthesia: MAC with Topical   Estimated Blood Loss: 0 mL from 2/29/2024 12:07 PM to 2/29/2024 12:27 PM      Drains: None  Specimens: * No specimens in log *  Findings:   None.  Complications: None.  Implants:   Implant Name Type Inv. Item Serial No.  Lot No. LRB No. Used Action   EYE IMP IOL RAMU PCL TECNIS ZCB00 21.0 - [id number] Lens/Eye Implant EYE IMP IOL RAMU PCL TECNIS ZCB00 21.0 [id number] [employer]  Right 1 Implanted

## 2024-02-29 NOTE — OP NOTE
[facility]  Ophthalmology Operative Note    PREOPERATIVE DIAGNOSIS: Cataract, Right eye.     POSTOPERATIVE DIAGNOSIS: Cataract, Right eye.     OPERATION: Cataract extraction with placement of posterior chamber intraocular lens in the Right eye.     ANESTHESIA: MAC combined with topical     INDICATIONS FOR PROCEDURE: Stone De Paz was seen in the [facility] for decreased visual acuity in the Right eye. The patient was found to have a visually significant cataract in the Right eye. The risks, benefits, alternatives and goals of cataract extraction were discussed with the patient, and after adequate discussion the patient understood and agreed to these, and a signed informed consent was obtained prior to the procedure.     DESCRIPTION OF PROCEDURE: After proper patient identification, topical anesthesia was applied to the Right eye. The patient was brought to the operating room and the Right eye was prepped and draped in the usual sterile fashion for intraocular surgery. A lid speculum was placed in the Right eye. A paracentesis was then created and the anterior chamber was filled with 1% non-preserved lidocaine followed by Endocoat. A clear corneal incision was then created temporally using a 2.4mm keratome. A continuous curvilinear capsulorrhexis was then created using a cystotome and Utrata forceps. Hydrodissection was carried out with BSS on a Allan cannula and the lens rotated freely within the capsular bag. Phacoemulsification was then carried out using the divide and conquer technique. Residual cortical material was removed using the I&A handpiece. The capsular bag was then filled with Healon and a ZCB00 +21.0 diopter intraocular lens was then injected into the capsular bag. The lens showed good centration and stability. Residual viscoelastic was removed using the I&A handpiece. The wound was then hydrated and the anterior chamber reformed. Intracameral Moxifloxacin  was then injected into the anterior chamber. The wounds were then checked and found to be sealed. Topical Prednisolone drops were placed in the patient's Right eye followed by a Diana shield over the top of this. The patient tolerated the procedure well without complications and was told to follow up in the clinic in the next postoperative day.     Implant Name Type Inv. Item Serial No.  Lot No. LRB No. Used Action   EYE IMP IOL RAMU PCL TECNIS ZCB00 21.0 - [id number] Lens/Eye Implant EYE IMP IOL RAMU PCL TECNIS ZCB00 21.0 [id number] [employer]  Right 1 Implanted        Camron Duran MD

## 2024-02-29 NOTE — ANESTHESIA POSTPROCEDURE EVALUATION
Patient: Stone De Paz    Procedure: Procedure(s):  Phacoemulsification with standard intraocular lens implant       Anesthesia Type:  MAC    Note:  Disposition: Outpatient   Postop Pain Control: Uneventful            Sign Out: Well controlled pain   PONV: No   Neuro/Psych: Uneventful            Sign Out: Acceptable/Baseline neuro status   Airway/Respiratory: Uneventful            Sign Out: Acceptable/Baseline resp. status   CV/Hemodynamics: Uneventful            Sign Out: Acceptable CV status   Other NRE: NONE   DID A NON-ROUTINE EVENT OCCUR? No    Event details/Postop Comments:  Pt was happy with anesthesia care.  No complications.  I will follow up with the pt if needed.           Last vitals:  Vitals:    02/29/24 1106   BP: 134/79   Pulse: 85   Resp: 18   Temp: 98.2  F (36.8  C)   SpO2: 96%       Electronically Signed By: CHEN Douglass CRNA  February 29, 2024  12:40 PM

## 2024-03-07 ENCOUNTER — TRANSFERRED RECORDS (OUTPATIENT)
Dept: HEALTH INFORMATION MANAGEMENT | Facility: CLINIC | Age: 59
End: 2024-03-07
Payer: COMMERCIAL

## 2024-03-18 ENCOUNTER — MYC REFILL (OUTPATIENT)
Dept: FAMILY MEDICINE | Facility: CLINIC | Age: 59
End: 2024-03-18
Payer: COMMERCIAL

## 2024-03-18 DIAGNOSIS — G89.4 CHRONIC PAIN SYNDROME: ICD-10-CM

## 2024-03-18 DIAGNOSIS — M48.02 CERVICAL STENOSIS OF SPINE: ICD-10-CM

## 2024-03-21 RX ORDER — OXYCODONE AND ACETAMINOPHEN 5; 325 MG/1; MG/1
1 TABLET ORAL EVERY 6 HOURS PRN
Qty: 70 TABLET | Refills: 0 | Status: SHIPPED | OUTPATIENT
Start: 2024-03-21 | End: 2024-04-18

## 2024-04-01 ENCOUNTER — LAB (OUTPATIENT)
Dept: LAB | Facility: CLINIC | Age: 59
End: 2024-04-01
Payer: COMMERCIAL

## 2024-04-01 DIAGNOSIS — E03.9 HYPOTHYROIDISM, UNSPECIFIED TYPE: ICD-10-CM

## 2024-04-01 DIAGNOSIS — E10.40 TYPE 1 DIABETES MELLITUS WITH DIABETIC NEUROPATHY (H): ICD-10-CM

## 2024-04-01 DIAGNOSIS — E10.43 TYPE 1 DIABETES MELLITUS WITH DIABETIC AUTONOMIC NEUROPATHY (H): ICD-10-CM

## 2024-04-01 DIAGNOSIS — Z96.41 INSULIN PUMP STATUS: ICD-10-CM

## 2024-04-01 DIAGNOSIS — K31.84 GASTROPARESIS: ICD-10-CM

## 2024-04-01 LAB
ANION GAP SERPL CALCULATED.3IONS-SCNC: 13 MMOL/L (ref 7–15)
BUN SERPL-MCNC: 12.2 MG/DL (ref 6–20)
CALCIUM SERPL-MCNC: 9.7 MG/DL (ref 8.6–10)
CHLORIDE SERPL-SCNC: 96 MMOL/L (ref 98–107)
CREAT SERPL-MCNC: 0.88 MG/DL (ref 0.67–1.17)
DEPRECATED HCO3 PLAS-SCNC: 24 MMOL/L (ref 22–29)
EGFRCR SERPLBLD CKD-EPI 2021: >90 ML/MIN/1.73M2
GLUCOSE SERPL-MCNC: 141 MG/DL (ref 70–99)
HBA1C MFR BLD: 7 %
POTASSIUM SERPL-SCNC: 4.7 MMOL/L (ref 3.4–5.3)
SODIUM SERPL-SCNC: 133 MMOL/L (ref 135–145)
T4 FREE SERPL-MCNC: 1.52 NG/DL (ref 0.9–1.7)
TSH SERPL DL<=0.005 MIU/L-ACNC: 0.12 UIU/ML (ref 0.3–4.2)

## 2024-04-01 PROCEDURE — 84443 ASSAY THYROID STIM HORMONE: CPT

## 2024-04-01 PROCEDURE — 80048 BASIC METABOLIC PNL TOTAL CA: CPT

## 2024-04-01 PROCEDURE — 83036 HEMOGLOBIN GLYCOSYLATED A1C: CPT

## 2024-04-01 PROCEDURE — 36415 COLL VENOUS BLD VENIPUNCTURE: CPT

## 2024-04-01 PROCEDURE — 84439 ASSAY OF FREE THYROXINE: CPT

## 2024-04-02 ENCOUNTER — VIRTUAL VISIT (OUTPATIENT)
Dept: ENDOCRINOLOGY | Facility: CLINIC | Age: 59
End: 2024-04-02
Payer: COMMERCIAL

## 2024-04-02 DIAGNOSIS — E55.9 VITAMIN D DEFICIENCY: ICD-10-CM

## 2024-04-02 DIAGNOSIS — Z96.41 INSULIN PUMP STATUS: ICD-10-CM

## 2024-04-02 DIAGNOSIS — E10.40 TYPE 1 DIABETES MELLITUS WITH DIABETIC NEUROPATHY (H): ICD-10-CM

## 2024-04-02 DIAGNOSIS — E87.1 HYPONATREMIA: ICD-10-CM

## 2024-04-02 DIAGNOSIS — E03.9 HYPOTHYROIDISM, UNSPECIFIED TYPE: ICD-10-CM

## 2024-04-02 DIAGNOSIS — K31.84 GASTROPARESIS: ICD-10-CM

## 2024-04-02 DIAGNOSIS — E10.43 TYPE 1 DIABETES MELLITUS WITH DIABETIC AUTONOMIC NEUROPATHY (H): Primary | ICD-10-CM

## 2024-04-02 PROCEDURE — 99214 OFFICE O/P EST MOD 30 MIN: CPT | Mod: 95 | Performed by: INTERNAL MEDICINE

## 2024-04-02 PROCEDURE — 95251 CONT GLUC MNTR ANALYSIS I&R: CPT | Performed by: INTERNAL MEDICINE

## 2024-04-02 PROCEDURE — G2211 COMPLEX E/M VISIT ADD ON: HCPCS | Mod: 95 | Performed by: INTERNAL MEDICINE

## 2024-04-02 RX ORDER — LEVOTHYROXINE SODIUM 112 UG/1
112 TABLET ORAL DAILY
Qty: 90 TABLET | Refills: 1 | Status: SHIPPED | OUTPATIENT
Start: 2024-04-02

## 2024-04-02 NOTE — PROGRESS NOTES
"Virtual Visit Details    Type of service:  Video Visit   Video Start Time: 12:56 PM  Video End Time:1:16 PM    Originating Location (pt. Location): Home  Distant Location (provider location):  Off-site  Platform used for Video Visit: Terri      Recent issues:  Diabetes and thyroid follow-up evaluation  Using the Medtronic 780G with Guardian4 CGM (since 1/2024)  Has malabsorption and chronic diarrhea, diagnosis of exocrine pancreas insufficiency (EPI)   Has taken Zenpep (35,000 mg/dose, 1-capsule TID vs QID, ~$800/mo),   also replacement of the gastric pacer replacement in 12/2023 with Dr. Kyra Summers at [facility]  Had bilateral cataract surgeries, went well and improved vision  Less diarrhea, better food digestion, but still has issues with neck pain/stiffness            1995. Diagnosis of diabetes mellitus after 2nd back surgery, age 29  Initial treatment with insulin injections using NPH and Regular insulin  Switched to premixed 70/30 insulin BID dosing  1999. Started Medtronic insulin pump model 508, used base-dose Humalog insulin at meals  Had seen Dr. Iggy Briggs/[facility]     7/24/01. Initial evaluation with me at my former clinic ([facility])  Upgraded pump to Medtronic 523   Used the Medtronic 630G pump  Meals BID, had carb \"exchanges\" with his pump settings    Chronic high BG and hgbA1c trends despite dose changes  Tried square wave bolus  Has Contour Link? BG meter, variable testing pattern   Recently testing 4x/day for at least 60 days  Glucose sensor (CGM) use:   Tried Medtronic CGMS in the past, didn't like it   Switched to DexcomG5, used for approx 6 months but issues with getting supply order   Early 2019, started Freestyle Sukhi but issues with skin site redness  Meal schedule:  Snacks during the day, supper meal 5-7pm  Upgraded to Medtronic 770G  12/2023. Upgraded to Medtronic 780G with Guardian4 CGM   Novolog in pump    Current Medtronic 780G pump settings:       Recent Carelink data:  "           Previous [facility] labs include:    Previous [facility] hgbA1c trends include:  Lab Results   Component Value Date    A1C 7.0 (H) 04/01/2024    A1C 8.9 (H) 11/02/2023    A1C 8.9 (H) 07/25/2023    A1C 8.0 (H) 02/27/2023    A1C 8.9 (H) 06/27/2022      Recent [facility] labs include:  Lab Results   Component Value Date    A1C 7.0 (H) 04/01/2024     (L) 04/01/2024    POTASSIUM 4.7 04/01/2024    CHLORIDE 96 (L) 04/01/2024    CO2 24 04/01/2024    ANIONGAP 13 04/01/2024     (H) 04/01/2024    BUN 12.2 04/01/2024    CR 0.88 04/01/2024    GFRESTIMATED >90 04/01/2024    GFRESTBLACK >90 03/01/2021    EMILEE 9.7 04/01/2024    CPEPT <0.1 (L) 06/24/2021    CHOL 142 07/25/2023    TRIG 47 07/25/2023    HDL 65 07/25/2023    LDL 68 07/25/2023    NHDL 77 07/25/2023    UCRR 62 02/27/2023    UCRR 63 02/27/2023    MICROL 6 02/27/2023    UMALCR 9.68 02/27/2023     Last eye exam with Dr. AMANUEL Del Angel 11/2023, no DR   7/2018. Met with Destiny Warren RD, CDE at [facility]  DM Complications:              Neuropathy                          Peripheral neuropathy                                      Decreased sensation at feet                          Autonomic neuropathy- gastroparesis                                      Symptoms of nausea, bloating, episodes of diarrhea and emesis                                      Has [employer] gastric stimulator                                      Sees physicians and WARREN Gilmore/[facility]                   Thyroid:  1994. History of hypothyroidism  Chronic levothyroxine treatment, has used Levoxyl in the past  Supplements:  Takes vitamin D 1000 U daily  7/2018. Had levothyroxine dose increase   Recent labs include:  Lab Results   Component Value Date    TSH 0.12 (L) 04/01/2024    T4 1.52 04/01/2024     Current dose:  Levothyroxine 0.125 mg daily        Lives in [place], [place] with Ada, also (1) adopted dog named Marko  Has [facility] Mushtaq [facility] for general  medicine evaluations  Also sees Olga Ramirez PAC and Dr. Jennifer Thompson/[facility].       PMH/PSH:  Past Medical History:   Diagnosis Date    Arthritis     Chronic neck pain     percocet for years now,     Depressive disorder     Exocrine pancreatic insufficiency     Gastroparesis due to DM (H)     gastric stimulator helps, [facility] manages that    Hyperlipidemia     Hypertension     Hypothyroidism     Neuropathy, diabetic (H)     Type 1 diabetes (H) age 31    neuropathy- peripheral and autonomic     Past Surgical History:   Procedure Laterality Date    ABDOMEN SURGERY  Dec. 13 2023    This is the 10th gastric stimulator brandon had inplanted due to battery life    BIOPSY  ?    Non cancerous polups.    COLONOSCOPY  07/18/2012    FUSION CERVICAL ANTERIOR TWO LEVELS      HEAD & NECK SURGERY      INJECT BLOCK MEDIAL BRANCH CERVICAL/THORACIC/LUMBAR Bilateral 11/20/2018    Procedure: Bilateral Cervical 3-4-5 medial branch block;  Surgeon: Jef Edwards MD;  Location: [facility] OR    INJECT BLOCK MEDIAL BRANCH CERVICAL/THORACIC/LUMBAR N/A 01/11/2019    Procedure: Cervical 3,4,5 Bilateral Medial branch blocks;  Surgeon: Jef Edwards MD;  Location: [facility]    ORTHOPEDIC SURGERY      PHACOEMULSIFICATION WITH STANDARD INTRAOCULAR LENS IMPLANT Left 1/18/2024    Procedure: Phacoemulsification with a standard intraocular lens implant, Left;  Surgeon: Vazquez Aleman MD;  Location: [facility]    PHACOEMULSIFICATION WITH STANDARD INTRAOCULAR LENS IMPLANT Right 2/29/2024    Procedure: Phacoemulsification with standard intraocular lens implant, right;  Surgeon: Camron Duran MD;  Location: [facility]    RADIO FREQUENCY ABLATION PULSED CERVICAL Right 02/28/2019    Procedure: RADIO FREQUENCY ABLATION CERVICAL THREE, FOUR, FIVE, AND THIRD OCCUPITAL NERVE RIGHT SIDE;  Surgeon: Jef Edwards MD;  Location: [facility]    RADIO FREQUENCY ABLATION PULSED CERVICAL Left 03/15/2019    Procedure: radiofrequency ablation cervical 3,4,5;  Surgeon:  Jef Edwards MD;  Location: [facility]    RADIO FREQUENCY ABLATION PULSED CERVICAL Right 2020    Procedure: RADIOFREQUENCY ABLATION CERVICAL 3,4 5 right SIDE;  Surgeon: Jef Edwards MD;  Location: [facility]    RADIO FREQUENCY ABLATION PULSED CERVICAL Left 09/10/2020    Procedure: RADIOFREQUENCY ABLATION CERVICAL 3,4 5 LEFT SIDE;  Surgeon: Jef Edwards MD;  Location: [facility]    SOFT TISSUE SURGERY      THORACOSCOPIC DECORTICATION LUNG  2014    Procedure: THORACOSCOPIC DECORTICATION LUNG;  RIGHT VATS/RIGHT THORACOTOMY , DECORTICATION RIGHT LUNG ,WEDGE RESECTION RIGHT MIDDLE LOBE LUNG NODULE;  Surgeon: Harely Felix MD;  Location: [facility] OR    ZZC LUMBAR SPINE FUSION,ANTER APPRCH      2 L4-5 L5-21       Family Hx:  Family History   Problem Relation Age of Onset    Hypertension Mother     Diabetes Father         type 2    Hypertension Father     Cerebrovascular Disease Maternal Grandmother     Unknown/Adopted Maternal Grandfather     Unknown/Adopted Paternal Grandmother     Unknown/Adopted Paternal Grandfather     Liver Disease Brother     Heart Disease Sister     Thyroid Disease Sister     Thyroid Disease Sister     Thyroid Disease Sister          Social Hx:  Social History     Socioeconomic History    Marital status:      Spouse name: Not on file    Number of children: Not on file    Years of education: Not on file    Highest education level: Not on file   Occupational History    Not on file   Tobacco Use    Smoking status: Former     Types: Cigarettes     Quit date: 2010     Years since quittin.2    Smokeless tobacco: Never   Vaping Use    Vaping Use: Never used   Substance and Sexual Activity    Alcohol use: No    Drug use: Yes     Types: Marijuana, Oxycodone     Comment: medical marijuana    Sexual activity: Not Currently     Comment: Prescription Marijuana   Other Topics Concern    Parent/sibling w/ CABG, MI or angioplasty before 65F 55M? Yes   Social History Narrative    Not on  file     Social Determinants of Health     Financial Resource Strain: Low Risk  (1/11/2024)    Financial Resource Strain     Within the past 12 months, have you or your family members you live with been unable to get utilities (heat, electricity) when it was really needed?: No   Food Insecurity: Low Risk  (1/11/2024)    Food Insecurity     Within the past 12 months, did you worry that your food would run out before you got money to buy more?: No     Within the past 12 months, did the food you bought just not last and you didn t have money to get more?: No   Transportation Needs: Low Risk  (1/11/2024)    Transportation Needs     Within the past 12 months, has lack of transportation kept you from medical appointments, getting your medicines, non-medical meetings or appointments, work, or from getting things that you need?: No   Physical Activity: Not on file   Stress: Not on file   Social Connections: Not on file   Interpersonal Safety: Low Risk  (11/28/2023)    Interpersonal Safety     Do you feel physically and emotionally safe where you currently live?: Yes     Within the past 12 months, have you been hit, slapped, kicked or otherwise physically hurt by someone?: No     Within the past 12 months, have you been humiliated or emotionally abused in other ways by your partner or ex-partner?: No   Housing Stability: Low Risk  (1/11/2024)    Housing Stability     Do you have housing? : Yes     Are you worried about losing your housing?: No          MEDICATIONS:  has a current medication list which includes the following prescription(s): lipase-protease-amylase, atorvastatin, citalopram, cyclobenzaprine, insulin aspart, levothyroxine, lidocaine-prilocaine, lisinopril, medical cannabis, oxycodone-acetaminophen, sumatriptan, lipase-protease-amylase, dexcom g6 , dexcom g6 sensor, dexcom g6 transmitter, contour next test, naloxone, naproxen, novolog vial, omeprazole, and ondansetron.    ROS: 10 point ROS neg other  than the symptoms noted above in the HPI.     GENERAL: some fatigue, wt stable; denies fevers, chills, night sweats.   HEENT: no dysphagia, odonophagia, diplopia, neck pain  THYROID:  no apparent hyper or hypothyroid symptoms  CV: no recent chest pain, pressure, palpitations  LUNGS: no SOB, KRAFT, cough, wheezing   ABDOMEN: less indigestion and some diarrhea, postmeal nausea, frequent BM's; no constipation  EXTREMITIES: no rashes, ulcers, edema  NEUROLOGY: no headaches, denies changes in vision, tingling, extremitiy numbness   MSK: neck pain/stiffness and back pains; no muscle weakness  SKIN: no rashes or lesions  PSYCH:  stable mood, no significant anxiety or depression  ENDOCRINE: no heat or cold intolerance    Physical Exam (visual exam)  VS:  no vital signs taken for video visit  CONSTITUTIONAL: healthy, alert and NAD, well dressed, answering questions appropriately  ENT: no nose swelling or nasal discharge, mouth redness or gum changes.  EYES: eyes grossly normal to inspection, conjunctivae and sclerae normal, no exophthalmos or proptosis  THYROID:  no apparent nodules or goiter  LUNGS: no audible wheeze, cough or visible cyanosis, no visible retractions or increased work of breathing  ABDOMEN: abdomen not evaluated  EXTREMITIES: no hand tremors, limited exam  NEUROLOGY: CN grossly intact, mentation intact and speech normal   SKIN:  no apparent skin lesions, rash, or edema with visualized skin appearance  PSYCH: mentation appears normal, affect normal/bright, judgement and insight intact,   normal speech and appearance well groomed       LABS:     All pertinent notes, labs, and images personally reviewed by me.       A/P:  Encounter Diagnoses   Name Primary?    Hyponatremia     Type 1 diabetes mellitus with diabetic autonomic neuropathy (H) Yes    Type 1 diabetes mellitus with diabetic neuropathy (H)     Gastroparesis     Hypothyroidism, unspecified type     Insulin pump status     Vitamin D deficiency         Comments:  Reviewed complicated health history, diabetes and thyroid issues.  Digestive problems relate to irreg meal intake, delayed absorption from gastroparesis, exocrine insufficiency, and need for gastric pacer  Recent SG trends show morning meal postmeal hyperglycemia, but overall CGM trends and hgbA1c much better  Reviewed and interpreted tests that I previously ordered.   Ordered appropriate tests for the endocrinology disease management.    Management options discussed and implemented after shared medical decision making with the patient.  Diabetes problem is chronic and stable, thyroid problem with exacerbation progression- mild hyperthyroidism    Plan:  Reviewed the overall T1DM management and insulin pump use.  Discussed optimal BG testing to assess glucose trends.  We reviewed insulin pump settings, basal rate and bolus dosing  Use of automated pump bolus dosing for meal/snack carb & correction dosing  Reviewed the [employer] Carelink report data today  Reviewed recent Medtronic Guardian4 CGM glucose sensor trends, in detail     Recommend:  Continue use of the Medtronic 780G with Guardian4 CGM with the diabetes management    No pump setting changes at this time.    Use genuine meal carb estimates with premeal bolusin, if possible  Consider followup diabetes education appt with [facility] CDE at [facility]  Reviewed use of Temp Target for exercise or for the late afternoon lower glucose phase  Repeat non-fasting labs in late 6/2024   Testing at [facility]   Lab orders placed  Keep focus on diet, exercise as tolerated, and weight management  Continue simvastatin 40 mg each evening  Resume taking vitamin D 1000 U capsule daily  Lower thyroid med dose as levothyroxine 0.112 mg daily, updated Rx sent to pharmacy  Reviewed use of pump Temp Target setting during the cataract surgery  Arrange annual dilated eye exam, fasting lipid panel testing.  We reviewed importance of timely clinic  appointments with me Q3 months, to satisfy Medicare with his diabetes device coverage  Will communicate feedback with his testing by phonecall and/or USMail    Keep regular follow-up evaluations with gastroenterology provider  Addressed patient's questions today.    The longitudinal plan of care for the endocrine problem(s) were addressed during this visit.  Due to added complexity of care,   we will continue to support the patient and the subsequent management of this condition with ongoing continuity of care.    There are no Patient Instructions on file for this visit.    Future labs ordered today:   Orders Placed This Encounter   Procedures    GLUCOSE MONITOR, 72 HOUR, PHYS INTERP    Hemoglobin A1c    Basic metabolic panel    TSH with free T4 reflex    Vitamin D Deficiency    Cortisol     Radiology/Consults ordered today: None    Total time spent on day of encounter:  31 min    Follow-up:  7/10/24 at 11:30 am, Return    VIKTOR Best MD, MS  Endocrinology  [facility]    CC:  Care Team

## 2024-04-18 ENCOUNTER — MYC REFILL (OUTPATIENT)
Dept: FAMILY MEDICINE | Facility: CLINIC | Age: 59
End: 2024-04-18
Payer: COMMERCIAL

## 2024-04-18 DIAGNOSIS — G89.4 CHRONIC PAIN SYNDROME: ICD-10-CM

## 2024-04-18 DIAGNOSIS — M48.02 CERVICAL STENOSIS OF SPINE: ICD-10-CM

## 2024-04-20 RX ORDER — OXYCODONE AND ACETAMINOPHEN 5; 325 MG/1; MG/1
1 TABLET ORAL EVERY 6 HOURS PRN
Qty: 70 TABLET | Refills: 0 | Status: SHIPPED | OUTPATIENT
Start: 2024-04-20 | End: 2024-05-17

## 2024-04-24 ENCOUNTER — MYC MEDICAL ADVICE (OUTPATIENT)
Dept: FAMILY MEDICINE | Facility: CLINIC | Age: 59
End: 2024-04-24
Payer: COMMERCIAL

## 2024-05-07 DIAGNOSIS — F41.1 GENERALIZED ANXIETY DISORDER: ICD-10-CM

## 2024-05-07 RX ORDER — CITALOPRAM HYDROBROMIDE 40 MG/1
40 TABLET ORAL DAILY
Qty: 90 TABLET | Refills: 2 | Status: SHIPPED | OUTPATIENT
Start: 2024-05-07

## 2024-05-17 ENCOUNTER — MYC REFILL (OUTPATIENT)
Dept: FAMILY MEDICINE | Facility: CLINIC | Age: 59
End: 2024-05-17
Payer: COMMERCIAL

## 2024-05-17 DIAGNOSIS — G89.4 CHRONIC PAIN SYNDROME: ICD-10-CM

## 2024-05-17 DIAGNOSIS — M48.02 CERVICAL STENOSIS OF SPINE: ICD-10-CM

## 2024-05-20 DIAGNOSIS — G89.4 CHRONIC PAIN SYNDROME: ICD-10-CM

## 2024-05-20 DIAGNOSIS — M48.02 CERVICAL STENOSIS OF SPINE: ICD-10-CM

## 2024-05-21 RX ORDER — OXYCODONE AND ACETAMINOPHEN 5; 325 MG/1; MG/1
1 TABLET ORAL EVERY 6 HOURS PRN
Qty: 70 TABLET | Refills: 0 | Status: SHIPPED | OUTPATIENT
Start: 2024-05-21 | End: 2024-06-17

## 2024-05-23 RX ORDER — NAPROXEN 500 MG/1
500 TABLET ORAL 2 TIMES DAILY PRN
Qty: 180 TABLET | Refills: 1 | Status: SHIPPED | OUTPATIENT
Start: 2024-05-23

## 2024-05-29 ENCOUNTER — MYC MEDICAL ADVICE (OUTPATIENT)
Dept: FAMILY MEDICINE | Facility: CLINIC | Age: 59
End: 2024-05-29
Payer: COMMERCIAL

## 2024-05-29 NOTE — TELEPHONE ENCOUNTER
Can add patient to schedule next week to see sooner. If his symptoms worsen prior to that he will need to go to ER or urgent care.

## 2024-06-03 ASSESSMENT — PATIENT HEALTH QUESTIONNAIRE - PHQ9
SUM OF ALL RESPONSES TO PHQ QUESTIONS 1-9: 7
SUM OF ALL RESPONSES TO PHQ QUESTIONS 1-9: 7
10. IF YOU CHECKED OFF ANY PROBLEMS, HOW DIFFICULT HAVE THESE PROBLEMS MADE IT FOR YOU TO DO YOUR WORK, TAKE CARE OF THINGS AT HOME, OR GET ALONG WITH OTHER PEOPLE: VERY DIFFICULT

## 2024-06-04 ENCOUNTER — OFFICE VISIT (OUTPATIENT)
Dept: FAMILY MEDICINE | Facility: CLINIC | Age: 59
End: 2024-06-04
Payer: COMMERCIAL

## 2024-06-04 VITALS
TEMPERATURE: 98.1 F | RESPIRATION RATE: 16 BRPM | OXYGEN SATURATION: 97 % | HEIGHT: 68 IN | DIASTOLIC BLOOD PRESSURE: 66 MMHG | BODY MASS INDEX: 22.28 KG/M2 | SYSTOLIC BLOOD PRESSURE: 132 MMHG | WEIGHT: 147 LBS | HEART RATE: 84 BPM

## 2024-06-04 DIAGNOSIS — E10.43 TYPE 1 DIABETES MELLITUS WITH DIABETIC AUTONOMIC NEUROPATHY (H): ICD-10-CM

## 2024-06-04 DIAGNOSIS — E78.5 HYPERLIPIDEMIA, UNSPECIFIED HYPERLIPIDEMIA TYPE: ICD-10-CM

## 2024-06-04 DIAGNOSIS — K21.9 GASTROESOPHAGEAL REFLUX DISEASE WITHOUT ESOPHAGITIS: ICD-10-CM

## 2024-06-04 DIAGNOSIS — I10 ESSENTIAL HYPERTENSION: ICD-10-CM

## 2024-06-04 DIAGNOSIS — K31.84 GASTROPARESIS: ICD-10-CM

## 2024-06-04 DIAGNOSIS — F33.41 RECURRENT MAJOR DEPRESSIVE DISORDER, IN PARTIAL REMISSION (H): ICD-10-CM

## 2024-06-04 DIAGNOSIS — K86.81 EXOCRINE PANCREATIC INSUFFICIENCY: ICD-10-CM

## 2024-06-04 DIAGNOSIS — E03.9 ACQUIRED HYPOTHYROIDISM: ICD-10-CM

## 2024-06-04 DIAGNOSIS — M79.2 RADICULAR PAIN IN RIGHT ARM: Primary | ICD-10-CM

## 2024-06-04 DIAGNOSIS — F11.90 CHRONIC, CONTINUOUS USE OF OPIOIDS: ICD-10-CM

## 2024-06-04 DIAGNOSIS — Z96.41 INSULIN PUMP STATUS: ICD-10-CM

## 2024-06-04 DIAGNOSIS — G89.4 CHRONIC PAIN SYNDROME: ICD-10-CM

## 2024-06-04 DIAGNOSIS — M48.02 CERVICAL STENOSIS OF SPINE: ICD-10-CM

## 2024-06-04 PROCEDURE — 99214 OFFICE O/P EST MOD 30 MIN: CPT | Performed by: STUDENT IN AN ORGANIZED HEALTH CARE EDUCATION/TRAINING PROGRAM

## 2024-06-04 RX ORDER — METHOCARBAMOL 500 MG/1
500-1000 TABLET, FILM COATED ORAL 4 TIMES DAILY PRN
Qty: 60 TABLET | Refills: 0 | Status: SHIPPED | OUTPATIENT
Start: 2024-06-04 | End: 2024-07-23

## 2024-06-04 ASSESSMENT — PAIN SCALES - GENERAL: PAINLEVEL: SEVERE PAIN (6)

## 2024-06-04 ASSESSMENT — ENCOUNTER SYMPTOMS: BACK PAIN: 1

## 2024-06-11 ENCOUNTER — MYC MEDICAL ADVICE (OUTPATIENT)
Dept: PALLIATIVE MEDICINE | Facility: CLINIC | Age: 59
End: 2024-06-11
Payer: COMMERCIAL

## 2024-06-11 NOTE — TELEPHONE ENCOUNTER
Per patient myChart message:  Stone COLLINS Pain Nurse (supporting Morro Rausch MD)2 minutes ago (9:36 AM)       Good Morning Dr. Rausch. I received my renewal letter from the department of health for my cannabis certification and I am checking with you to ask you for your help again. Do I need an appointment for this? My health department cert number is [id number]. I m seeing Dr. Haq for my neck pain tomorrow at 3:00 right after a CT scan of the neck. The cannabis is most helpful for the nausea and vomiting. Not much for pain if that makes sense.   Thanks for your time Doctor.   Sincerely,   Stone De Paz.   ___________________    Last visit with Dr. Rausch was over a year ago.  [facility] for [facility] only.  Use the tier protocol. Does nto have to be with Dr. Rausch.      Chasity RN-BSN  [facility] [place]   [phone]

## 2024-06-12 ENCOUNTER — HOSPITAL ENCOUNTER (OUTPATIENT)
Dept: CT IMAGING | Facility: CLINIC | Age: 59
Discharge: HOME | End: 2024-06-12
Attending: STUDENT IN AN ORGANIZED HEALTH CARE EDUCATION/TRAINING PROGRAM | Admitting: STUDENT IN AN ORGANIZED HEALTH CARE EDUCATION/TRAINING PROGRAM
Payer: MEDICARE

## 2024-06-12 ENCOUNTER — OFFICE VISIT (OUTPATIENT)
Dept: FAMILY MEDICINE | Facility: CLINIC | Age: 59
End: 2024-06-12
Payer: COMMERCIAL

## 2024-06-12 VITALS
HEART RATE: 73 BPM | BODY MASS INDEX: 22.08 KG/M2 | WEIGHT: 145.7 LBS | DIASTOLIC BLOOD PRESSURE: 64 MMHG | HEIGHT: 68 IN | TEMPERATURE: 98.8 F | OXYGEN SATURATION: 95 % | RESPIRATION RATE: 12 BRPM | SYSTOLIC BLOOD PRESSURE: 128 MMHG

## 2024-06-12 DIAGNOSIS — K31.84 GASTROPARESIS: ICD-10-CM

## 2024-06-12 DIAGNOSIS — M48.02 CERVICAL STENOSIS OF SPINE: ICD-10-CM

## 2024-06-12 DIAGNOSIS — M79.2 RADICULAR PAIN IN RIGHT ARM: Primary | ICD-10-CM

## 2024-06-12 DIAGNOSIS — Z96.41 INSULIN PUMP STATUS: ICD-10-CM

## 2024-06-12 DIAGNOSIS — F11.90 CHRONIC, CONTINUOUS USE OF OPIOIDS: ICD-10-CM

## 2024-06-12 DIAGNOSIS — K21.9 GASTROESOPHAGEAL REFLUX DISEASE WITHOUT ESOPHAGITIS: ICD-10-CM

## 2024-06-12 DIAGNOSIS — M79.2 RADICULAR PAIN IN RIGHT ARM: ICD-10-CM

## 2024-06-12 DIAGNOSIS — E10.43 TYPE 1 DIABETES MELLITUS WITH DIABETIC AUTONOMIC NEUROPATHY (H): ICD-10-CM

## 2024-06-12 DIAGNOSIS — Z23 NEED FOR PROPHYLACTIC VACCINATION AGAINST HEPATITIS B VIRUS: ICD-10-CM

## 2024-06-12 DIAGNOSIS — K86.81 EXOCRINE PANCREATIC INSUFFICIENCY: ICD-10-CM

## 2024-06-12 PROCEDURE — G2211 COMPLEX E/M VISIT ADD ON: HCPCS | Performed by: STUDENT IN AN ORGANIZED HEALTH CARE EDUCATION/TRAINING PROGRAM

## 2024-06-12 PROCEDURE — 72125 CT NECK SPINE W/O DYE: CPT | Mod: MF

## 2024-06-12 PROCEDURE — 99213 OFFICE O/P EST LOW 20 MIN: CPT | Performed by: STUDENT IN AN ORGANIZED HEALTH CARE EDUCATION/TRAINING PROGRAM

## 2024-06-12 ASSESSMENT — PAIN SCALES - GENERAL: PAINLEVEL: MODERATE PAIN (5)

## 2024-06-12 NOTE — PROGRESS NOTES
Assessment & Plan   Problem List Items Addressed This Visit          Nervous and Auditory    Type 1 diabetes mellitus with diabetic autonomic neuropathy (H)       Digestive    Gastroparesis    Exocrine pancreatic insufficiency    Gastroesophageal reflux disease without esophagitis       Other    Cervical stenosis of spine    Relevant Orders    Spine  Referral    Insulin pump status    Chronic, continuous use of opioids     Other Visit Diagnoses       Radicular pain in right arm    -  Primary    Relevant Orders    Spine  Referral    Need for prophylactic vaccination against hepatitis B virus               Stenosis noted in C 6/7 distribution which is consistent with his tingling and radicular symptoms.  I do not think shoulder playing a role at this time.  We did discuss conservative options as well as follow-up with spine clinic.  He is willing to do physical therapy moving forward and not wanting further surgeries.  Will follow-up with spine clinic to discuss potential injection.  Continue other chronic pain medication methocarbamol.     The longitudinal plan of care for the diagnosis(es)/condition(s) as documented were addressed during this visit. Due to the added complexity in care, I will continue to support Stone in the subsequent management and with ongoing continuity of care.         Patient    Nabeel Ritter is a 58 year old, presenting for the following health issues:  Forms (Disability insurance)        6/12/2024     2:51 PM   Additional Questions   Roomed by Fox Finn [facility]         6/12/2024   Forms   Any forms needing to be completed Yes     HPI   Forms for Disability Insurance     Patient with persistent symptoms and no acute worsening.  Methocarbamol more tolerable and helpful to his pain during the day.  Imaging completed today      Review of Systems  Constitutional, HEENT, cardiovascular, pulmonary, gi and gu systems are negative, except as otherwise noted.      Objective   "  /64 (BP Location: Right arm, Patient Position: Chair, Cuff Size: Adult Regular)   Pulse 73   Temp 98.8  F (37.1  C) (Temporal)   Resp 12   Ht 1.727 m (5' 8\")   Wt 66.1 kg (145 lb 11.2 oz)   SpO2 95%   BMI 22.15 kg/m    Body mass index is 22.15 kg/m .  Physical Exam   GENERAL: alert and no distress  EYES: Eyes grossly normal to inspection, PERRL and conjunctivae and sclerae normal  HENT:  nose and mouth without ulcers or lesions  NECK: no adenopathy, no asymmetry, masses, or scars  RESP: Breathing comfortably on room air  MS: no gross musculoskeletal defects noted, no edema, range of motion of the shoulder intact with trap Tenderness  SKIN: no suspicious lesions or rashes  NEURO: Normal strength and tone, decreased sensation on right hand, no focal deficits, mentation intact and speech normal  PSYCH: mentation appears normal, affect normal/bright          Signed Electronically by: Mushtaq Haq MD    "

## 2024-06-12 NOTE — TELEPHONE ENCOUNTER
Hi have a question in the last note that I see Dr Rausch says Follow up:               - will discuss with PCP prior to considering taking over prescribing   Does this mean certification for cannabis ?     Please advise     Magaly

## 2024-06-17 ENCOUNTER — MYC REFILL (OUTPATIENT)
Dept: FAMILY MEDICINE | Facility: CLINIC | Age: 59
End: 2024-06-17
Payer: COMMERCIAL

## 2024-06-17 DIAGNOSIS — M48.02 CERVICAL STENOSIS OF SPINE: ICD-10-CM

## 2024-06-17 DIAGNOSIS — G89.4 CHRONIC PAIN SYNDROME: ICD-10-CM

## 2024-06-17 NOTE — TELEPHONE ENCOUNTER
Called patient to triage as patient comment on refill request stated he was having increased pain.     Patient declined triage, states he just saw Dr. Haq recently and Dr. Haq knows all about his pain. He states he added that comment to let Dr. Haq know he is not requesting the additional 20 tablets of pain medication that were previously offered at his visit, and he just wants the regular amount of 70 tablets filled.     He states he will also be making an appointment with the spine doctor, states he will call them today to set this up. He states he has enough pain medication for today and tomorrow, and maybe even Wednesday.     Danae Stuart, WILLIAMN, RN

## 2024-06-18 RX ORDER — OXYCODONE AND ACETAMINOPHEN 5; 325 MG/1; MG/1
1 TABLET ORAL EVERY 6 HOURS PRN
Qty: 70 TABLET | Refills: 0 | Status: SHIPPED | OUTPATIENT
Start: 2024-06-18 | End: 2024-07-20

## 2024-07-03 ENCOUNTER — TELEPHONE (OUTPATIENT)
Dept: FAMILY MEDICINE | Facility: CLINIC | Age: 59
End: 2024-07-03
Payer: COMMERCIAL

## 2024-07-03 ENCOUNTER — MYC MEDICAL ADVICE (OUTPATIENT)
Dept: FAMILY MEDICINE | Facility: CLINIC | Age: 59
End: 2024-07-03
Payer: COMMERCIAL

## 2024-07-03 NOTE — TELEPHONE ENCOUNTER
Forms/Letter Request    Type of form/letter: OTHER:        Do we have the form/letter: Yes:     Who is the form from? Patient    Where did/will the form come from? Patient or family brought in       When is form/letter needed by: ASAP    How would you like the form/letter returned:     Patient Notified form requests are processed in 5-7 business days:Yes    Could we send this information to you in [employer] or would you prefer to receive a phone call?:   Patient would prefer a phone call   Okay to leave a detailed message?: Yes at Home number on file [phone] (home)

## 2024-07-10 ENCOUNTER — MEDICAL CORRESPONDENCE (OUTPATIENT)
Dept: HEALTH INFORMATION MANAGEMENT | Facility: CLINIC | Age: 59
End: 2024-07-10

## 2024-07-10 ENCOUNTER — TELEPHONE (OUTPATIENT)
Dept: ENDOCRINOLOGY | Facility: CLINIC | Age: 59
End: 2024-07-10

## 2024-07-10 NOTE — CONFIDENTIAL NOTE
NEUROSURGERY - NEW PREVISIT PLANNING    Referring Provider: Mushtaq Haq MD    OVN 06/12/24   Reason For Visit: M48.02 (ICD-10-CM) - Cervical stenosis of spine   M79.2 (ICD-10-CM) - Radicular pain in right arm          IMAGING STATUS/LOCATION DATE/TYPE   MRI N/A *has gastric stimulator, cannot have MRI   CT PACS 06/12/2024  Cervical  [facility]   XRAY PACS 06/05/2012  Cervical  [facility]   NOTES STATUS/LOCATION DATE/TYPE   Other specialist OVN: N/A    EMG N/A    INJECTION  01/11/2019  Cervical MBB    11/20/2018  Cervical MBB   PHYSICAL THERAPY Encounters 2019   SURGERY Encounters        CareEverywhere              Patient Reported 09/10/2020- cervical RFA  09/04/2020- cervical RFA  02/28/2019- cervical RFA    04/16/2012  C5-6 Anterior Cervical Discectomy, Fusion, Vectra Plating, Peek Cages, C-arm 0935       ~2006 cervical disc procedure

## 2024-07-10 NOTE — TELEPHONE ENCOUNTER
[facility] Call Center    Phone Message    May a detailed message be left on voicemail: yes     Reason for Call: Other: Jaden from [employer] is following up on a fax for diabetic supplies that was sent over to us on 7/08/24, writer does not see anything in Pt chart. Please advise.      Action Taken: Other: Endo     Travel Screening: Not Applicable     Date of Service: 7/10/24

## 2024-07-11 NOTE — TELEPHONE ENCOUNTER
Per Jaden called back stating the total daily dose is missing on the form. Please fill that part out and resend it. Per Jaden will resend the form they receive . Please and thank you!

## 2024-07-12 NOTE — TELEPHONE ENCOUNTER
Ada, Patient's ex-spouse is calling on paperwork that was dropped off on July 3rd that needs to be completed by Dr. Haq for disability insurance.  Told them it was in process of being completed.

## 2024-07-15 NOTE — CONFIDENTIAL NOTE
Action [phone] [facility] GADIEL 07/15 AS   Action Taken Patient reported having done possible spine surgery in 2006 somewhere in [place]. Sent fax to [facility] to inquire if they have any records. Rightfax confirmed sent.    Status: waiting on response 07/15  [facility] does not have records of 2006 surgery, only 2012 surgery. Patient cannot remember exactly where 2006 surgery was completed. Writer cannot request 2006 operative note without knowing what clinic. 07/16 AS

## 2024-07-18 ENCOUNTER — PRE VISIT (OUTPATIENT)
Dept: NEUROSURGERY | Facility: CLINIC | Age: 59
End: 2024-07-18

## 2024-07-19 ENCOUNTER — APPOINTMENT (OUTPATIENT)
Dept: CT IMAGING | Facility: CLINIC | Age: 59
End: 2024-07-19
Attending: NURSE PRACTITIONER
Payer: MEDICARE

## 2024-07-19 ENCOUNTER — TELEPHONE (OUTPATIENT)
Dept: FAMILY MEDICINE | Facility: CLINIC | Age: 59
End: 2024-07-19
Payer: COMMERCIAL

## 2024-07-19 ENCOUNTER — MYC MEDICAL ADVICE (OUTPATIENT)
Dept: FAMILY MEDICINE | Facility: CLINIC | Age: 59
End: 2024-07-19

## 2024-07-19 ENCOUNTER — HOSPITAL ENCOUNTER (EMERGENCY)
Facility: CLINIC | Age: 59
Discharge: HOME | End: 2024-07-19
Attending: NURSE PRACTITIONER | Admitting: NURSE PRACTITIONER
Payer: MEDICARE

## 2024-07-19 VITALS
DIASTOLIC BLOOD PRESSURE: 82 MMHG | BODY MASS INDEX: 21.9 KG/M2 | HEART RATE: 61 BPM | TEMPERATURE: 98.2 F | SYSTOLIC BLOOD PRESSURE: 154 MMHG | RESPIRATION RATE: 16 BRPM | WEIGHT: 144 LBS | OXYGEN SATURATION: 97 %

## 2024-07-19 DIAGNOSIS — S29.011A INTERCOSTAL MUSCLE STRAIN, INITIAL ENCOUNTER: ICD-10-CM

## 2024-07-19 DIAGNOSIS — R07.89 RIGHT-SIDED CHEST WALL PAIN: ICD-10-CM

## 2024-07-19 LAB
ALBUMIN SERPL BCG-MCNC: 4 G/DL (ref 3.5–5.2)
ALP SERPL-CCNC: 72 U/L (ref 40–150)
ALT SERPL W P-5'-P-CCNC: 23 U/L (ref 0–70)
ANION GAP SERPL CALCULATED.3IONS-SCNC: 9 MMOL/L (ref 7–15)
AST SERPL W P-5'-P-CCNC: 30 U/L (ref 0–45)
BASOPHILS # BLD AUTO: 0.1 10E3/UL (ref 0–0.2)
BASOPHILS NFR BLD AUTO: 1 %
BILIRUB SERPL-MCNC: 0.4 MG/DL
BUN SERPL-MCNC: 11.1 MG/DL (ref 6–20)
CALCIUM SERPL-MCNC: 9.4 MG/DL (ref 8.8–10.4)
CHLORIDE SERPL-SCNC: 100 MMOL/L (ref 98–107)
CREAT SERPL-MCNC: 1.01 MG/DL (ref 0.67–1.17)
EGFRCR SERPLBLD CKD-EPI 2021: 87 ML/MIN/1.73M2
EOSINOPHIL # BLD AUTO: 0.3 10E3/UL (ref 0–0.7)
EOSINOPHIL NFR BLD AUTO: 3 %
ERYTHROCYTE [DISTWIDTH] IN BLOOD BY AUTOMATED COUNT: 13.6 % (ref 10–15)
GLUCOSE SERPL-MCNC: 81 MG/DL (ref 70–99)
HCO3 SERPL-SCNC: 27 MMOL/L (ref 22–29)
HCT VFR BLD AUTO: 38.5 % (ref 40–53)
HGB BLD-MCNC: 13 G/DL (ref 13.3–17.7)
HOLD SPECIMEN: NORMAL
IMM GRANULOCYTES # BLD: 0 10E3/UL
IMM GRANULOCYTES NFR BLD: 0 %
LYMPHOCYTES # BLD AUTO: 1.8 10E3/UL (ref 0.8–5.3)
LYMPHOCYTES NFR BLD AUTO: 18 %
MCH RBC QN AUTO: 31 PG (ref 26.5–33)
MCHC RBC AUTO-ENTMCNC: 33.8 G/DL (ref 31.5–36.5)
MCV RBC AUTO: 92 FL (ref 78–100)
MONOCYTES # BLD AUTO: 0.9 10E3/UL (ref 0–1.3)
MONOCYTES NFR BLD AUTO: 9 %
NEUTROPHILS # BLD AUTO: 7.3 10E3/UL (ref 1.6–8.3)
NEUTROPHILS NFR BLD AUTO: 70 %
NRBC # BLD AUTO: 0 10E3/UL
NRBC BLD AUTO-RTO: 0 /100
PLATELET # BLD AUTO: 249 10E3/UL (ref 150–450)
POTASSIUM SERPL-SCNC: 4.4 MMOL/L (ref 3.4–5.3)
PROT SERPL-MCNC: 7.5 G/DL (ref 6.4–8.3)
RBC # BLD AUTO: 4.2 10E6/UL (ref 4.4–5.9)
SODIUM SERPL-SCNC: 136 MMOL/L (ref 135–145)
TROPONIN T SERPL HS-MCNC: 15 NG/L
WBC # BLD AUTO: 10.5 10E3/UL (ref 4–11)

## 2024-07-19 PROCEDURE — 84484 ASSAY OF TROPONIN QUANT: CPT | Performed by: NURSE PRACTITIONER

## 2024-07-19 PROCEDURE — 71275 CT ANGIOGRAPHY CHEST: CPT | Mod: MA

## 2024-07-19 PROCEDURE — 36415 COLL VENOUS BLD VENIPUNCTURE: CPT | Performed by: NURSE PRACTITIONER

## 2024-07-19 PROCEDURE — 96374 THER/PROPH/DIAG INJ IV PUSH: CPT | Mod: 59 | Performed by: NURSE PRACTITIONER

## 2024-07-19 PROCEDURE — 99285 EMERGENCY DEPT VISIT HI MDM: CPT | Performed by: NURSE PRACTITIONER

## 2024-07-19 PROCEDURE — 258N000003 HC RX IP 258 OP 636: Performed by: NURSE PRACTITIONER

## 2024-07-19 PROCEDURE — 96376 TX/PRO/DX INJ SAME DRUG ADON: CPT | Mod: 59 | Performed by: NURSE PRACTITIONER

## 2024-07-19 PROCEDURE — 250N000009 HC RX 250: Performed by: NURSE PRACTITIONER

## 2024-07-19 PROCEDURE — 99285 EMERGENCY DEPT VISIT HI MDM: CPT | Mod: 25 | Performed by: NURSE PRACTITIONER

## 2024-07-19 PROCEDURE — 82040 ASSAY OF SERUM ALBUMIN: CPT | Performed by: NURSE PRACTITIONER

## 2024-07-19 PROCEDURE — 93005 ELECTROCARDIOGRAM TRACING: CPT | Performed by: NURSE PRACTITIONER

## 2024-07-19 PROCEDURE — 96361 HYDRATE IV INFUSION ADD-ON: CPT | Performed by: NURSE PRACTITIONER

## 2024-07-19 PROCEDURE — 250N000011 HC RX IP 250 OP 636: Performed by: NURSE PRACTITIONER

## 2024-07-19 PROCEDURE — 96375 TX/PRO/DX INJ NEW DRUG ADDON: CPT | Performed by: NURSE PRACTITIONER

## 2024-07-19 PROCEDURE — 93010 ELECTROCARDIOGRAM REPORT: CPT | Performed by: NURSE PRACTITIONER

## 2024-07-19 PROCEDURE — 85025 COMPLETE CBC W/AUTO DIFF WBC: CPT | Performed by: NURSE PRACTITIONER

## 2024-07-19 RX ORDER — IOPAMIDOL 755 MG/ML
500 INJECTION, SOLUTION INTRAVASCULAR ONCE
Status: COMPLETED | OUTPATIENT
Start: 2024-07-19 | End: 2024-07-19

## 2024-07-19 RX ORDER — HYDROMORPHONE HYDROCHLORIDE 1 MG/ML
0.5 INJECTION, SOLUTION INTRAMUSCULAR; INTRAVENOUS; SUBCUTANEOUS EVERY 30 MIN PRN
Status: COMPLETED | OUTPATIENT
Start: 2024-07-19 | End: 2024-07-19

## 2024-07-19 RX ORDER — ONDANSETRON 2 MG/ML
4 INJECTION INTRAMUSCULAR; INTRAVENOUS EVERY 30 MIN PRN
Status: DISCONTINUED | OUTPATIENT
Start: 2024-07-19 | End: 2024-07-19 | Stop reason: HOSPADM

## 2024-07-19 RX ADMIN — HYDROMORPHONE HYDROCHLORIDE 0.5 MG: 1 INJECTION, SOLUTION INTRAMUSCULAR; INTRAVENOUS; SUBCUTANEOUS at 11:53

## 2024-07-19 RX ADMIN — SODIUM CHLORIDE 1000 ML: 9 INJECTION, SOLUTION INTRAVENOUS at 11:38

## 2024-07-19 RX ADMIN — HYDROMORPHONE HYDROCHLORIDE 0.5 MG: 1 INJECTION, SOLUTION INTRAMUSCULAR; INTRAVENOUS; SUBCUTANEOUS at 12:42

## 2024-07-19 RX ADMIN — SODIUM CHLORIDE 71 ML: 9 INJECTION, SOLUTION INTRAVENOUS at 11:42

## 2024-07-19 RX ADMIN — HYDROMORPHONE HYDROCHLORIDE 0.5 MG: 1 INJECTION, SOLUTION INTRAMUSCULAR; INTRAVENOUS; SUBCUTANEOUS at 10:59

## 2024-07-19 RX ADMIN — ONDANSETRON 4 MG: 2 INJECTION INTRAMUSCULAR; INTRAVENOUS at 10:59

## 2024-07-19 RX ADMIN — IOPAMIDOL 54 ML: 755 INJECTION, SOLUTION INTRAVENOUS at 11:42

## 2024-07-19 RX ADMIN — ONDANSETRON 4 MG: 2 INJECTION INTRAMUSCULAR; INTRAVENOUS at 12:48

## 2024-07-19 ASSESSMENT — ACTIVITIES OF DAILY LIVING (ADL)
ADLS_ACUITY_SCORE: 35

## 2024-07-19 ASSESSMENT — COLUMBIA-SUICIDE SEVERITY RATING SCALE - C-SSRS
1. IN THE PAST MONTH, HAVE YOU WISHED YOU WERE DEAD OR WISHED YOU COULD GO TO SLEEP AND NOT WAKE UP?: NO
2. HAVE YOU ACTUALLY HAD ANY THOUGHTS OF KILLING YOURSELF IN THE PAST MONTH?: NO
6. HAVE YOU EVER DONE ANYTHING, STARTED TO DO ANYTHING, OR PREPARED TO DO ANYTHING TO END YOUR LIFE?: NO

## 2024-07-19 NOTE — ED TRIAGE NOTES
Patient c/o right sided rib pain since changing a tire on Tuesday or Wednesday. He had a thoracotomy about 10 years ago and is concerned pain is related to that.     Triage Assessment (Adult)       Row Name 07/19/24 0935          Triage Assessment    Airway WDL WDL        Respiratory WDL    Respiratory WDL X;rhythm/pattern     Rhythm/Pattern, Respiratory shortness of breath

## 2024-07-19 NOTE — DISCHARGE INSTRUCTIONS
You likely suffered an intercostal muscle strain.  Your labs, EKG, and Chest CT are all reassuring.  It is ok to increase your oxycodone to 1-2 tabs every 4-6 hours as needed for severe pain for the next 1-2 days.  Heating pad alternating with ice packs may also be beneficial.    Return to the emergency department for fevers, worsening shortness of breath, or worse in any way.

## 2024-07-19 NOTE — ED PROVIDER NOTES
"  History     Chief Complaint   Patient presents with    Shortness of Breath     HPI  Stone De Paz is a 58 year old male with history of T1DM, Htn, GERD, hypothyroidism, gastroparesis (s/p gastric stimulatory), exocrine pancreatic insufficiency, chronic pain (on chronic opioids) who present for severe pleuritic right sided chest/rib pain that radiates around to the back. Started after he was changing a tire on Tuesday but has progressively worsened. Short of breath. Hot and cold sweats. Nauseated.   He took zofran at home to help the nausea.  He has new insulin pump. Reports blood sugars have been better (\"not perfect, but better\").  Prior history of right empyema in 2014 s/p  thoracostomy decortication right lung wedge. \  -- he does suffer from chronic right sided pain and neck pain (on oxycodone)    Allergies:  No Known Allergies    Problem List:    Patient Active Problem List    Diagnosis Date Noted    Chronic, continuous use of opioids 10/04/2023     Priority: Medium    Essential hypertension 11/09/2020     Priority: Medium    Gastroesophageal reflux disease without esophagitis 02/26/2019     Priority: Medium    Type 1 diabetes mellitus with diabetic autonomic neuropathy (H) 08/07/2018     Priority: Medium    Insulin pump status 08/07/2018     Priority: Medium    Chronic pain syndrome 07/10/2018     Priority: Medium    Exocrine pancreatic insufficiency 11/24/2016     Priority: Medium    Mild major depression (H) 11/14/2014     Priority: Medium    Hypothyroidism      Priority: Medium     Dr Best      Gastroparesis      Priority: Medium     gastric stimulator helps, [facility] manages that      Cervical stenosis of spine 04/16/2012     Priority: Medium    Hyperlipidemia 06/06/2008     Priority: Medium        Past Medical History:    Past Medical History:   Diagnosis Date    Arthritis     Chronic neck pain     Depressive disorder     Exocrine pancreatic insufficiency     Gastroparesis due to DM (H)     " Hyperlipidemia     Hypertension     Hypothyroidism     Neuropathy, diabetic (H)     Type 1 diabetes (H) age 31       Past Surgical History:    Past Surgical History:   Procedure Laterality Date    ABDOMEN SURGERY  Dec. 13 2023    This is the 10th gastric stimulator brandon had inplanted due to battery life    BIOPSY  ?    Non cancerous polups.    COLONOSCOPY  07/18/2012    FUSION CERVICAL ANTERIOR TWO LEVELS      HEAD & NECK SURGERY      INJECT BLOCK MEDIAL BRANCH CERVICAL/THORACIC/LUMBAR Bilateral 11/20/2018    Procedure: Bilateral Cervical 3-4-5 medial branch block;  Surgeon: Jef Edwards MD;  Location: [facility]    INJECT BLOCK MEDIAL BRANCH CERVICAL/THORACIC/LUMBAR N/A 01/11/2019    Procedure: Cervical 3,4,5 Bilateral Medial branch blocks;  Surgeon: Jef Edwards MD;  Location: [facility]    ORTHOPEDIC SURGERY      PHACOEMULSIFICATION WITH STANDARD INTRAOCULAR LENS IMPLANT Left 1/18/2024    Procedure: Phacoemulsification with a standard intraocular lens implant, Left;  Surgeon: Vazquez Aleman MD;  Location[facility]    PHACOEMULSIFICATION WITH STANDARD INTRAOCULAR LENS IMPLANT Right 2/29/2024    Procedure: Phacoemulsification with standard intraocular lens implant, right;  Surgeon: Camron Duran MD;  [facility]    RADIO FREQUENCY ABLATION PULSED CERVICAL Right 02/28/2019    Procedure: RADIO FREQUENCY ABLATION CERVICAL THREE, FOUR, FIVE, AND THIRD OCCUPITAL NERVE RIGHT SIDE;  Surgeon: Jef Edwards MD;  [facility]    RADIO FREQUENCY ABLATION PULSED CERVICAL Left 03/15/2019    Procedure: radiofrequency ablation cervical 3,4,5;  Surgeon: Jef Edwards MD;  [facility]    RADIO FREQUENCY ABLATION PULSED CERVICAL Right 09/04/2020    Procedure: RADIOFREQUENCY ABLATION CERVICAL 3,4 5 right SIDE;  Surgeon: Jef Edwards MD;  [facility]    RADIO FREQUENCY ABLATION PULSED CERVICAL Left 09/10/2020    Procedure: RADIOFREQUENCY ABLATION CERVICAL 3,4 5 LEFT SIDE;  Surgeon: Jef Edwards MD;   [facility]    SOFT TISSUE SURGERY      THORACOSCOPIC DECORTICATION LUNG  2014    Procedure: THORACOSCOPIC DECORTICATION LUNG;  RIGHT VATS/RIGHT THORACOTOMY , DECORTICATION RIGHT LUNG ,WEDGE RESECTION RIGHT MIDDLE LOBE LUNG NODULE;  Surgeon: Harley Felix MD;  Location: [facility]    ZZC LUMBAR SPINE FUSION,ANTER APPRCH      2 L4-5 L5-21       Family History:    Family History   Problem Relation Age of Onset    Hypertension Mother     Diabetes Father         type 2    Hypertension Father     Cerebrovascular Disease Maternal Grandmother     Unknown/Adopted Maternal Grandfather     Unknown/Adopted Paternal Grandmother     Unknown/Adopted Paternal Grandfather     Liver Disease Brother     Heart Disease Sister     Thyroid Disease Sister     Thyroid Disease Sister     Thyroid Disease Sister        Social History:  Marital Status:   [4]  Social History     Tobacco Use    Smoking status: Former     Current packs/day: 0.00     Types: Cigarettes     Quit date: 2010     Years since quittin.5    Smokeless tobacco: Never   Vaping Use    Vaping status: Never Used   Substance Use Topics    Alcohol use: No    Drug use: Yes     Types: Marijuana, Oxycodone     Comment: medical marijuana        Medications:    amylase-lipase-protease (CREON 24) 78897-51225 units CPEP per EC capsule  amylase-lipase-protease (PERTZYE) 52304 units CPEP  atorvastatin (LIPITOR) 80 MG tablet  citalopram (CELEXA) 40 MG tablet  Continuous Blood Gluc Sensor (DEXCOM G6 SENSOR) MISC  Continuous Blood Gluc Transmit (DEXCOM G6 TRANSMITTER) MISC  CONTOUR NEXT TEST test strip  cyclobenzaprine (FLEXERIL) 5 MG tablet  insulin aspart (NOVOLOG VIAL) 100 UNITS/ML vial  levothyroxine (SYNTHROID/LEVOTHROID) 112 MCG tablet  lidocaine-prilocaine (EMLA) 2.5-2.5 % external cream  lisinopril (ZESTRIL) 40 MG tablet  medical cannabis (Patient's own supply)  methocarbamol (ROBAXIN) 500 MG tablet  naloxone (NARCAN) 4 MG/0.1ML nasal spray  naproxen  (NAPROSYN) 500 MG tablet  NOVOLOG VIAL 100 UNIT/ML soln  omeprazole (PRILOSEC) 20 MG DR capsule  ondansetron (ZOFRAN ODT) 8 MG ODT tab  oxyCODONE-acetaminophen (PERCOCET) 5-325 MG tablet  SUMAtriptan (IMITREX) 100 MG tablet          Review of Systems  As mentioned above in the history present illness. All other systems were reviewed and are negative.    Physical Exam   BP: (!) 162/98  Pulse: 77  Temp: 98.6  F (37  C)  Resp: 20  Weight: 65.3 kg (144 lb)  SpO2: 100 %      Physical Exam  Constitutional:       General: He is in acute distress (tearful).      Appearance: He is well-developed. He is not ill-appearing.   HENT:      Head: Normocephalic and atraumatic.      Right Ear: External ear normal.      Left Ear: External ear normal.      Nose: Nose normal.      Mouth/Throat:      Mouth: Mucous membranes are moist.   Eyes:      Conjunctiva/sclera: Conjunctivae normal.   Cardiovascular:      Rate and Rhythm: Normal rate and regular rhythm.      Heart sounds: Normal heart sounds. No murmur heard.  Pulmonary:      Effort: Pulmonary effort is normal. No respiratory distress.      Breath sounds: Normal breath sounds.       Chest:      Chest wall: Tenderness (right sided.) present.          Comments: Splinting the right lower ribs during my exam.  Abdominal:      General: Bowel sounds are normal. There is no distension.      Palpations: Abdomen is soft.      Tenderness: There is no abdominal tenderness.   Musculoskeletal:         General: Normal range of motion.   Skin:     General: Skin is warm and dry.      Findings: No rash.   Neurological:      General: No focal deficit present.      Mental Status: He is alert and oriented to person, place, and time.         ED Course        Procedures              EKG Interpretation:      Interpreted by CHEN Hadley CNP  Time reviewed: 9:46 am  Symptoms at time of EKG: right chest pain   Rhythm: Normal sinus   Rate: Normal  Axis: Normal  Ectopy: None  Conduction:  Normal  ST Segments/ T Waves: No acute ischemic changes/anteroseptal infarct age undetermined.  Q Waves: None  Comparison to prior: Unchanged    Clinical Impression: NSR with no acute ischemic changes.           Results for orders placed or performed during the hospital encounter of 07/19/24 (from the past 24 hour(s))   CBC with platelets differential    Narrative    The following orders were created for panel order CBC with platelets differential.  Procedure                               Abnormality         Status                     ---------                               -----------         ------                     CBC with platelets and d...[[id number]]  Abnormal            Final result                 Please view results for these tests on the individual orders.   Comprehensive metabolic panel   Result Value Ref Range    Sodium 136 135 - 145 mmol/L    Potassium 4.4 3.4 - 5.3 mmol/L    Carbon Dioxide (CO2) 27 22 - 29 mmol/L    Anion Gap 9 7 - 15 mmol/L    Urea Nitrogen 11.1 6.0 - 20.0 mg/dL    Creatinine 1.01 0.67 - 1.17 mg/dL    GFR Estimate 87 >60 mL/min/1.73m2    Calcium 9.4 8.8 - 10.4 mg/dL    Chloride 100 98 - 107 mmol/L    Glucose 81 70 - 99 mg/dL    Alkaline Phosphatase 72 40 - 150 U/L    AST 30 0 - 45 U/L    ALT 23 0 - 70 U/L    Protein Total 7.5 6.4 - 8.3 g/dL    Albumin 4.0 3.5 - 5.2 g/dL    Bilirubin Total 0.4 <=1.2 mg/dL   Troponin T, High Sensitivity   Result Value Ref Range    Troponin T, High Sensitivity 15 <=22 ng/L   CBC with platelets and differential   Result Value Ref Range    WBC Count 10.5 4.0 - 11.0 10e3/uL    RBC Count 4.20 (L) 4.40 - 5.90 10e6/uL    Hemoglobin 13.0 (L) 13.3 - 17.7 g/dL    Hematocrit 38.5 (L) 40.0 - 53.0 %    MCV 92 78 - 100 fL    MCH 31.0 26.5 - 33.0 pg    MCHC 33.8 31.5 - 36.5 g/dL    RDW 13.6 10.0 - 15.0 %    Platelet Count 249 150 - 450 10e3/uL    % Neutrophils 70 %    % Lymphocytes 18 %    % Monocytes 9 %    % Eosinophils 3 %    % Basophils 1 %    % Immature  Granulocytes 0 %    NRBCs per 100 WBC 0 <1 /100    Absolute Neutrophils 7.3 1.6 - 8.3 10e3/uL    Absolute Lymphocytes 1.8 0.8 - 5.3 10e3/uL    Absolute Monocytes 0.9 0.0 - 1.3 10e3/uL    Absolute Eosinophils 0.3 0.0 - 0.7 10e3/uL    Absolute Basophils 0.1 0.0 - 0.2 10e3/uL    Absolute Immature Granulocytes 0.0 <=0.4 10e3/uL    Absolute NRBCs 0.0 10e3/uL   [place] Draw    Narrative    The following orders were created for panel order [place] Draw.  Procedure                               Abnormality         Status                     ---------                               -----------         ------                     Extra Blue Top Tube[[id number]]                              Final result                 Please view results for these tests on the individual orders.   Extra Blue Top Tube   Result Value Ref Range    Hold Specimen [facility]    CT Chest Pulmonary Embolism w Contrast    Narrative    CT CHEST PULMONARY EMBOLISM WITH CONTRAST  7/19/2024 11:50 AM    CLINICAL HISTORY: Chest pain. Right-sided pleuritic chest pain, short  of breath.    TECHNIQUE: CT angiogram chest during arterial phase injection IV  contrast. 2D and 3D MIP reconstructions were performed by the CT  technologist. Dose reduction techniques were used.     CONTRAST: ISOVUE-370, 55 mL    COMPARISON: November 28, 2023    FINDINGS:  ANGIOGRAM CHEST: Pulmonary arteries are normal caliber and negative  for pulmonary emboli. Thoracic aorta is negative for dissection. No CT  evidence of right heart strain.    LUNGS AND PLEURA: Scattered pulmonary nodules measuring up to 5 mm are  stable since comparison. No effusions. No infiltrates.    MEDIASTINUM/AXILLAE: No adenopathy or aneurysm.    CORONARY ARTERY CALCIFICATIONS: Minimal.    UPPER ABDOMEN: No acute findings.    MUSCULOSKELETAL: No frankly destructive bony lesions.      Impression    IMPRESSION:  No acute process demonstrated.    VIRAL DELA CRUZ MD         SYSTEM ID:  [id number]       Medications    ondansetron (ZOFRAN) injection 4 mg (4 mg Intravenous $Given 7/19/24 1248)   sodium chloride 0.9% BOLUS 1,000 mL (0 mLs Intravenous Stopped 7/19/24 1312)   HYDROmorphone (PF) (DILAUDID) injection 0.5 mg (0.5 mg Intravenous $Given 7/19/24 1242)   iopamidol (ISOVUE-370) solution 500 mL (54 mLs Intravenous $Given 7/19/24 1142)   sodium chloride 0.9 % bag 100ml for CT scan flush use (71 mLs As instructed $Given 7/19/24 1142)       Assessments & Plan (with Medical Decision Making)   58 year old male with complaint of right sided chest pain after changing a tire on Tuesday, 3 days ago. I suspect he strained right intercostal muscles.  Exam without evidence of volume overload so doubt heart failure. Tenderness to the right low anterior ribs. Pain wraps around to the back.  EKG without signs of active ischemia.    Given the timing of pain to ER presentation, a single high sensitivity troponin was normal so doubt NSTEMI.   Chest CT negative for PE, pneumothorax, rib fractures, or consolidation/infiltrate.  HEART score: 2.  So no need for admission for risk stratification.     HEART Score  Background  Calculates the overall risk of adverse event in patient's presenting with chest pain.  Based on 5 criteria (each assigned 0-2 points) including suspiciousness of history, EKG, age, risk factors and troponin.    Data  58 year old male  has Hypothyroidism; Gastroparesis; Mild major depression (H); Cervical stenosis of spine; Exocrine pancreatic insufficiency; Hyperlipidemia; Chronic pain syndrome; Type 1 diabetes mellitus with diabetic autonomic neuropathy (H); Insulin pump status; Gastroesophageal reflux disease without esophagitis; Essential hypertension; and Chronic, continuous use of opioids on their problem list.   reports that he quit smoking about 14 years ago. His smoking use included cigarettes. He has never used smokeless tobacco.  family history includes Cerebrovascular Disease in his maternal grandmother; Diabetes in  his father; Heart Disease in his sister; Hypertension in his father and mother; Liver Disease in his brother; Thyroid Disease in his sister, sister, and sister; Unknown/Adopted in his maternal grandfather, paternal grandfather, and paternal grandmother.  Lab Results   Component Value Date    TROPI  01/29/2016     <0.015  The 99th percentile for upper reference range is 0.045 ug/L.  Troponin values in   the range of 0.045 - 0.120 ug/L may be associated with risks of adverse   clinical events.      TROPONIN <0.015 07/28/2021     Criteria   0-2 points for each of 5 items (maximum of 10 points):  Score 0- History slightly suspicious for coronary syndrome  Score 0- EKG Normal  Score 1- Age 45 to 65 years old  Score 1- One to 2 risk factors for atherosclerotic disease  Score 0- Within normal limits for troponin levels  Interpretation  Risk of adverse outcome  Heart Score: 2  Total Score 0-3- Adverse Outcome Risk 2.5% - Supports early discharge with appropriate follow-up    Plan:  You likely suffered an intercostal muscle strain.  Your labs, EKG, and Chest CT are all reassuring.  It is ok to increase your oxycodone to 1-2 tabs every 4-6 hours as needed for severe pain for the next 1-2 days.  Heating pad alternating with ice packs may also be beneficial.    Return to the emergency department for fevers, worsening shortness of breath, or worse in any way.    Discharge Medication List as of 7/19/2024  1:12 PM          Final diagnoses:   Right-sided chest wall pain   Intercostal muscle strain, initial encounter       7/19/2024   [facility] EMERGENCY DEPT       Alexandra Phoenix APRN CNP  07/19/24 1395

## 2024-07-19 NOTE — TELEPHONE ENCOUNTER
Spoke to pt's spouse (C2C on file), and she said pt was having significant pain in his chest (lung areas, not heart) and hard time breathing.    Quickly educated on going to nearest ED now given the pt's symptoms. Spouse verbalized understanding.

## 2024-07-20 ENCOUNTER — NURSE TRIAGE (OUTPATIENT)
Dept: NURSING | Facility: CLINIC | Age: 59
End: 2024-07-20
Payer: COMMERCIAL

## 2024-07-20 ENCOUNTER — MYC REFILL (OUTPATIENT)
Dept: FAMILY MEDICINE | Facility: CLINIC | Age: 59
End: 2024-07-20
Payer: COMMERCIAL

## 2024-07-20 DIAGNOSIS — M79.2 RADICULAR PAIN IN RIGHT ARM: ICD-10-CM

## 2024-07-20 DIAGNOSIS — M62.838 MUSCLE SPASM: Primary | ICD-10-CM

## 2024-07-20 DIAGNOSIS — G89.4 CHRONIC PAIN SYNDROME: ICD-10-CM

## 2024-07-20 DIAGNOSIS — M48.02 CERVICAL STENOSIS OF SPINE: ICD-10-CM

## 2024-07-20 RX ORDER — METHOCARBAMOL 750 MG/1
750 TABLET, FILM COATED ORAL 4 TIMES DAILY PRN
Qty: 28 TABLET | Refills: 0 | Status: SHIPPED | OUTPATIENT
Start: 2024-07-20

## 2024-07-20 NOTE — TELEPHONE ENCOUNTER
Nurse Triage SBAR    Is this a 2nd Level Triage? YES, LICENSED PRACTITIONER REVIEW IS REQUIRED    Situation: Medication question    Background: Was seen in the ER yesterday and diagnosed with intercostal muscle strain    Assessment: Patient is calling stating that he needs a refill of Methocarbamol.    Protocol Recommended Disposition:   Call PCP Now    Recommendation: Dr. Neymar Kumar was paged and he is going to refill the patient's methocarbamol.  Returned call to patient to let them know that [facility]    Paged to provider    Does the patient meet one of the following criteria for ADS visit consideration? 16+ years old, with an [facility] PCP     TIP  Providers, please consider if this condition is appropriate for management at one of our Acute and Diagnostic Services sites.     If patient is a good candidate, please use dotphrase <dot>triageresponse and select Refer to ADS to document.  [place] in [place]          Reason for Disposition   [1] Prescription refill request for ESSENTIAL medicine (i.e., likelihood of harm to patient if not taken) AND [2] triager unable to refill per department policy    Protocols used: Medication Refill and Renewal Call-A-AH

## 2024-07-22 ENCOUNTER — PATIENT OUTREACH (OUTPATIENT)
Dept: FAMILY MEDICINE | Facility: CLINIC | Age: 59
End: 2024-07-22
Payer: COMMERCIAL

## 2024-07-23 RX ORDER — METHOCARBAMOL 500 MG/1
TABLET, FILM COATED ORAL
Qty: 60 TABLET | Refills: 0 | Status: SHIPPED | OUTPATIENT
Start: 2024-07-23

## 2024-07-23 RX ORDER — OXYCODONE AND ACETAMINOPHEN 5; 325 MG/1; MG/1
1 TABLET ORAL EVERY 6 HOURS PRN
Qty: 70 TABLET | Refills: 0 | Status: SHIPPED | OUTPATIENT
Start: 2024-07-23 | End: 2024-08-02

## 2024-07-23 NOTE — CONFIDENTIAL NOTE
RN TRIAGE CALL:    Patient Contact    Attempt # 1    Was call answered?  No.  Left message on voicemail with information to call me back.    Marielos Dumont, RN, BSN

## 2024-07-24 NOTE — TELEPHONE ENCOUNTER
RN Triage    Patient Contact    Attempt # 2    Was call answered?  No.  Left message on voicemail with information to call me back.

## 2024-07-25 NOTE — TELEPHONE ENCOUNTER
Transitions of Care Outreach  Chief Complaint   Patient presents with    Hospital F/U       Most Recent Admission Date: 7/19/2024   Most Recent Admission Diagnosis:      Most Recent Discharge Date: 7/19/2024   Most Recent Discharge Diagnosis: Right-sided chest wall pain - R07.89  Intercostal muscle strain, initial encounter - S29.011A     Transitions of Care Assessment    Discharge Assessment  How are you doing now that you are home?: Doing better  How are your symptoms? (Red Flag symptoms escalate to triage hotline per guidelines): Improved  Do you know how to contact your clinic care team if you have future questions or changes to your health status? : Yes  Does the patient have their discharge instructions? : Yes  Does the patient have questions regarding their discharge instructions? : No  Were you started on any new medications or were there changes to any of your previous medications? : Yes  Does the patient have all of their medications?: Yes  Do you have questions regarding any of your medications? : No  Do you have all of your needed medical supplies or equipment (DME)?  (i.e. oxygen tank, CPAP, cane, etc.): Yes    Follow up Plan     Discharge Follow-Up  Discharge follow up appointment scheduled in alignment with recommended follow up timeframe or Transitions of Risk Category? (Low = within 30 days; Moderate= within 14 days; High= within 7 days): Yes    Future Appointments   Date Time Provider Department Center   7/30/2024  1:30 PM Frankie Best MD [facility]   8/14/2024  7:40 AM Mushtaq Haq MD [facility]   9/5/2024 11:00 AM Dina Chong NP [facility]   1/22/2025 11:00 AM Frankie Best MD [facility]       Outpatient Plan as outlined on AVS reviewed with patient.    For any urgent concerns, please contact our 24 hour nurse triage line: [phone] ([phone])       Kay Varner RN

## 2024-07-30 ENCOUNTER — MYC MEDICAL ADVICE (OUTPATIENT)
Dept: ENDOCRINOLOGY | Facility: CLINIC | Age: 59
End: 2024-07-30

## 2024-07-31 NOTE — TELEPHONE ENCOUNTER
Patient is in a lot of pain.  He states his nerve pain is getting very bad.  He feels like the pain is out of control.    He states the pain is in his neck, and shoulders.    He states he is having trouble sleeping due to the pain.  The pain is radiating all over his back.  He has pain radiating into his groin.    He states he is very tired due to not being able to sleep.  He is having trouble showering and brushing his teeth.    He states he would not hurt himself.  He does not want to get to that point.    He states 2 oxycodone per day does not help him get through the day.  The methocarbamol- he does take 2 of these 3-4 times daily.  He feels like he would like to take 10 mg Oxycodone twice daily if possible.    He states he is at a constant 4-6 and occasionally up to an 8. He states the constant pain is every where.    Brittani Levi RN on 7/31/2024 at 1:22 PM

## 2024-08-01 ASSESSMENT — PATIENT HEALTH QUESTIONNAIRE - PHQ9
SUM OF ALL RESPONSES TO PHQ QUESTIONS 1-9: 18
SUM OF ALL RESPONSES TO PHQ QUESTIONS 1-9: 18
10. IF YOU CHECKED OFF ANY PROBLEMS, HOW DIFFICULT HAVE THESE PROBLEMS MADE IT FOR YOU TO DO YOUR WORK, TAKE CARE OF THINGS AT HOME, OR GET ALONG WITH OTHER PEOPLE: EXTREMELY DIFFICULT

## 2024-08-02 ENCOUNTER — OFFICE VISIT (OUTPATIENT)
Dept: FAMILY MEDICINE | Facility: CLINIC | Age: 59
End: 2024-08-02
Payer: COMMERCIAL

## 2024-08-02 VITALS
BODY MASS INDEX: 21.73 KG/M2 | HEART RATE: 67 BPM | OXYGEN SATURATION: 98 % | TEMPERATURE: 98 F | DIASTOLIC BLOOD PRESSURE: 87 MMHG | SYSTOLIC BLOOD PRESSURE: 160 MMHG | WEIGHT: 143.4 LBS | HEIGHT: 68 IN | RESPIRATION RATE: 16 BRPM

## 2024-08-02 DIAGNOSIS — K21.9 GASTROESOPHAGEAL REFLUX DISEASE WITHOUT ESOPHAGITIS: ICD-10-CM

## 2024-08-02 DIAGNOSIS — M48.02 CERVICAL STENOSIS OF SPINE: Primary | ICD-10-CM

## 2024-08-02 DIAGNOSIS — G89.4 CHRONIC PAIN SYNDROME: ICD-10-CM

## 2024-08-02 DIAGNOSIS — I10 ESSENTIAL HYPERTENSION: ICD-10-CM

## 2024-08-02 DIAGNOSIS — Z96.41 INSULIN PUMP STATUS: ICD-10-CM

## 2024-08-02 DIAGNOSIS — M54.42 CHRONIC BILATERAL LOW BACK PAIN WITH BILATERAL SCIATICA: ICD-10-CM

## 2024-08-02 DIAGNOSIS — K31.84 GASTROPARESIS: ICD-10-CM

## 2024-08-02 DIAGNOSIS — R07.81 RIB PAIN ON RIGHT SIDE: ICD-10-CM

## 2024-08-02 DIAGNOSIS — Z98.1 HX OF FUSION OF CERVICAL SPINE: ICD-10-CM

## 2024-08-02 DIAGNOSIS — M54.41 CHRONIC BILATERAL LOW BACK PAIN WITH BILATERAL SCIATICA: ICD-10-CM

## 2024-08-02 DIAGNOSIS — F33.1 MODERATE EPISODE OF RECURRENT MAJOR DEPRESSIVE DISORDER (H): ICD-10-CM

## 2024-08-02 DIAGNOSIS — G89.29 CHRONIC BILATERAL LOW BACK PAIN WITH BILATERAL SCIATICA: ICD-10-CM

## 2024-08-02 DIAGNOSIS — K86.81 EXOCRINE PANCREATIC INSUFFICIENCY: ICD-10-CM

## 2024-08-02 DIAGNOSIS — F11.90 CHRONIC, CONTINUOUS USE OF OPIOIDS: ICD-10-CM

## 2024-08-02 DIAGNOSIS — E10.43 TYPE 1 DIABETES MELLITUS WITH DIABETIC AUTONOMIC NEUROPATHY (H): ICD-10-CM

## 2024-08-02 DIAGNOSIS — M79.2 RADICULAR PAIN IN RIGHT ARM: ICD-10-CM

## 2024-08-02 PROCEDURE — 99214 OFFICE O/P EST MOD 30 MIN: CPT | Performed by: STUDENT IN AN ORGANIZED HEALTH CARE EDUCATION/TRAINING PROGRAM

## 2024-08-02 PROCEDURE — G2211 COMPLEX E/M VISIT ADD ON: HCPCS | Performed by: STUDENT IN AN ORGANIZED HEALTH CARE EDUCATION/TRAINING PROGRAM

## 2024-08-02 RX ORDER — OXYCODONE AND ACETAMINOPHEN 5; 325 MG/1; MG/1
1-2 TABLET ORAL EVERY 6 HOURS PRN
Qty: 100 TABLET | Refills: 0 | Status: SHIPPED | OUTPATIENT
Start: 2024-08-02 | End: 2024-09-16

## 2024-08-02 ASSESSMENT — PAIN SCALES - GENERAL: PAINLEVEL: MODERATE PAIN (4)

## 2024-08-02 NOTE — PROGRESS NOTES
Assessment & Plan   Problem List Items Addressed This Visit          Nervous and Auditory    Chronic pain syndrome    Relevant Medications    oxyCODONE-acetaminophen (PERCOCET) 5-325 MG tablet    Type 1 diabetes mellitus with diabetic autonomic neuropathy (H)       Digestive    Gastroparesis    Exocrine pancreatic insufficiency    Gastroesophageal reflux disease without esophagitis       Circulatory    Essential hypertension       Behavioral    Mild major depression (H)       Other    Cervical stenosis of spine - Primary    Relevant Medications    oxyCODONE-acetaminophen (PERCOCET) 5-325 MG tablet    Insulin pump status    Chronic, continuous use of opioids     Other Visit Diagnoses       Chronic bilateral low back pain with bilateral sciatica        Relevant Medications    oxyCODONE-acetaminophen (PERCOCET) 5-325 MG tablet    Other Relevant Orders    CT Lumbar Spine w/o & w Contrast    Hx of fusion of cervical spine        Radicular pain in right arm        Relevant Medications    oxyCODONE-acetaminophen (PERCOCET) 5-325 MG tablet    Rib pain on right side        Relevant Medications    oxyCODONE-acetaminophen (PERCOCET) 5-325 MG tablet           CT from ER reassuring and pain consistent with intercostal strain.  Worsening pain with lumbar radicular symptoms and cervical radiculopathy with recent CT reviewed  Will plan to obtain lumbar CT now given his hardware and stimulator.  Methocarbamol helpful.  Difficulty maintaining function with increased pain and has follow-up with spine clinic upcoming.  Herniation noted with mild stenosis and does have sensation change into his right hand.  Okay to increase oxycodone to 10 mg twice daily until follow-up and recommend he be seen sooner if new or worsening symptoms.  He does have follow-up with GI regarding his stimulator.  Again risk of opioid use including respiratory suppression death and addiction discussed with patient today.  pain contract up-to-date.  Blood  "pressure elevated likely related to the pain he will monitor at home.  Follow-up in 1 month    The longitudinal plan of care for the diagnosis(es)/condition(s) as documented were addressed during this visit. Due to the added complexity in care, I will continue to support Stone in the subsequent management and with ongoing continuity of care.      MED REC REQUIRED  Post Medication Reconciliation Status:  Discharge medications reconciled and changed, see notes/orders  Depression Screening Follow Up               No data to display                      Follow Up Actions Taken  Crisis resource information provided in the After Visit Summary    Discussed the following ways the patient can remain in a safe environment:  remove alcohol, remove drugs, and be around others        Subjective   Stone is a 58 year old, presenting for the following health issues:  ER F/U        8/2/2024     8:22 AM   Additional Questions   Roomed by Madison   Accompanied by Ada         8/2/2024     8:22 AM   Patient Reported Additional Medications   Patient reports taking the following new medications none     HPI       ED/UC Followup:    Facility:  [facility] Emergency Dept   Date of visit: 7/19/2024   Reason for visit: shortness of breathe  Current Status: Pain on right sided ribs persists, low back left sided pain still persists, and neck and arm on the right sided pain as well.    Patient states he also had a CT done about a month ago and got a referral for neurology. Patient is trying to figure out what to address first and would like some guidance in trying to pin down the cause for these new symptoms.         Review of Systems  Constitutional, HEENT, cardiovascular, pulmonary, GI, , musculoskeletal, neuro, skin, endocrine and psych systems are negative, except as otherwise noted.      Objective    BP (!) 160/87   Pulse 67   Temp 98  F (36.7  C) (Oral)   Resp 16   Ht 1.716 m (5' 7.56\")   Wt 65 kg (143 lb 6.4 oz)   " SpO2 98%   BMI 22.09 kg/m    Body mass index is 22.09 kg/m .  Physical Exam   GENERAL: alert and no distress  EYES: Eyes grossly normal to inspection, PERRL and conjunctivae and sclerae normal  HENT: nose and mouth without ulcers or lesions  NECK: no adenopathy, no asymmetry, masses, or scars  RESP: lungs clear to auscultation - no rales, rhonchi or wheezes  CV: regular rate and rhythm, normal S1 S2,  no peripheral edema  ABDOMEN: soft, nontender, no hepatosplenomegaly, no masses and bowel sounds normal  MS: no gross musculoskeletal defects noted, no edema, right ribs intercostal tenderness to palpation with pain on rotation, positive Spurling's to the right and range of motion limited over the shoulder, paraspinal tenderness lumbar and cervical region without spinal process tenderness  SKIN: no suspicious lesions or rashes  NEURO: Normal strength and tone, mentation intact and speech normal  PSYCH: mentation appears normal, affect normal/bright            Signed Electronically by: Mushtaq Haq MD    Answers submitted by the patient for this visit:  Patient Health Questionnaire (Submitted on 8/1/2024)  If you checked off any problems, how difficult have these problems made it for you to do your work, take care of things at home, or get along with other people?: Extremely difficult  PHQ9 TOTAL SCORE: 18

## 2024-08-09 ENCOUNTER — HOSPITAL ENCOUNTER (OUTPATIENT)
Dept: CT IMAGING | Facility: CLINIC | Age: 59
Discharge: HOME | End: 2024-08-09
Attending: STUDENT IN AN ORGANIZED HEALTH CARE EDUCATION/TRAINING PROGRAM | Admitting: STUDENT IN AN ORGANIZED HEALTH CARE EDUCATION/TRAINING PROGRAM
Payer: MEDICARE

## 2024-08-09 DIAGNOSIS — G89.29 CHRONIC BILATERAL LOW BACK PAIN WITH BILATERAL SCIATICA: ICD-10-CM

## 2024-08-09 DIAGNOSIS — M54.42 CHRONIC BILATERAL LOW BACK PAIN WITH BILATERAL SCIATICA: ICD-10-CM

## 2024-08-09 DIAGNOSIS — M54.41 CHRONIC BILATERAL LOW BACK PAIN WITH BILATERAL SCIATICA: ICD-10-CM

## 2024-08-09 PROCEDURE — 72131 CT LUMBAR SPINE W/O DYE: CPT | Mod: MF

## 2024-08-25 ENCOUNTER — HEALTH MAINTENANCE LETTER (OUTPATIENT)
Age: 59
End: 2024-08-25

## 2024-09-07 DIAGNOSIS — E10.43 TYPE 1 DIABETES MELLITUS WITH DIABETIC AUTONOMIC NEUROPATHY (H): ICD-10-CM

## 2024-09-09 RX ORDER — INSULIN ASPART 100 [IU]/ML
INJECTION, SOLUTION INTRAVENOUS; SUBCUTANEOUS
Qty: 20 ML | Refills: 11 | Status: SHIPPED | OUTPATIENT
Start: 2024-09-09 | End: 2024-09-10

## 2024-09-10 ENCOUNTER — TELEPHONE (OUTPATIENT)
Dept: ENDOCRINOLOGY | Facility: CLINIC | Age: 59
End: 2024-09-10

## 2024-09-10 ENCOUNTER — MYC REFILL (OUTPATIENT)
Dept: FAMILY MEDICINE | Facility: CLINIC | Age: 59
End: 2024-09-10
Payer: COMMERCIAL

## 2024-09-10 DIAGNOSIS — E10.43 TYPE 1 DIABETES MELLITUS WITH DIABETIC AUTONOMIC NEUROPATHY (H): ICD-10-CM

## 2024-09-10 NOTE — TELEPHONE ENCOUNTER
Medication Question or Refill        What medication are you calling about (include dose and sig)?: insulin dosage and need presciption    Preferred Pharmacy:   [id number] - [place], [place] - [address]   [address] [facility] [address]  Phone: [phone] Fax: [phone]    [facility] - [place], [place] - [address]  [address]  [facility] [address]  Phone: [phone] Fax: [phone]    [facility] - [place], [place] - [address]  [address]  [facility] [address]  Phone: [phone] Fax: [phone]      Controlled Substance Agreement on file:   CSA -- Patient Level:     [Media Unavailable] Controlled Substance Agreement - Opioid - Scan on 10/4/2023  4:29 PM: OPIOID AGREEMENT   [Media Unavailable] Controlled Substance Agreement - Opioid - Scan on 6/3/2022  9:10 AM   [Media Unavailable] Controlled Substance Agreement - Opioid - Scan on 3/10/2021  3:24 PM   [Media Unavailable] Controlled Substance Agreement - Opioid - Scan on 2/10/2020  1:21 PM   [Media Unavailable] Controlled Substance Agreement - Opioid - Scan on 5/10/2019  1:12 PM   [Media Unavailable] Controlled Substance Agreement - Opioid - Scan on 5/2/2019  2:33 PM       Who prescribed the medication?: Provider Estephania    Do you need a refill? Not at this time. Needs to update the dosages    When did you use the medication last? today    Patient offered an appointment? No    Do you have any questions or concerns?  No      Could we send this information to you in [facility] or would you prefer to receive a phone call?:   Patient would prefer a phone call   Okay to leave a detailed message?: Yes at Cell number on file:    Telephone Information:   Mobile [phone]

## 2024-09-12 ENCOUNTER — MYC MEDICAL ADVICE (OUTPATIENT)
Dept: ENDOCRINOLOGY | Facility: CLINIC | Age: 59
End: 2024-09-12
Payer: COMMERCIAL

## 2024-09-12 RX ORDER — INSULIN ASPART 100 [IU]/ML
INJECTION, SOLUTION INTRAVENOUS; SUBCUTANEOUS
Qty: 20 ML | Refills: 11 | Status: SHIPPED | OUTPATIENT
Start: 2024-09-12

## 2024-09-16 ENCOUNTER — MYC REFILL (OUTPATIENT)
Dept: FAMILY MEDICINE | Facility: CLINIC | Age: 59
End: 2024-09-16
Payer: COMMERCIAL

## 2024-09-16 DIAGNOSIS — M48.02 CERVICAL STENOSIS OF SPINE: ICD-10-CM

## 2024-09-16 DIAGNOSIS — G89.4 CHRONIC PAIN SYNDROME: ICD-10-CM

## 2024-09-18 RX ORDER — OXYCODONE AND ACETAMINOPHEN 5; 325 MG/1; MG/1
1-2 TABLET ORAL EVERY 6 HOURS PRN
Qty: 100 TABLET | Refills: 0 | Status: SHIPPED | OUTPATIENT
Start: 2024-09-18

## 2024-10-04 ENCOUNTER — TRANSFERRED RECORDS (OUTPATIENT)
Dept: HEALTH INFORMATION MANAGEMENT | Facility: CLINIC | Age: 59
End: 2024-10-04
Payer: COMMERCIAL

## 2024-10-07 ENCOUNTER — TELEPHONE (OUTPATIENT)
Dept: NEUROSURGERY | Facility: CLINIC | Age: 59
End: 2024-10-07
Payer: COMMERCIAL

## 2024-10-07 NOTE — TELEPHONE ENCOUNTER
Left message offering to reschedule patient to the [place] location for initial appointment.  Subsequent appointments can be made from the [place] location.     Offered to reschedule to 10-10-24.     Patient advised to call [phone]

## 2024-10-22 ENCOUNTER — MYC REFILL (OUTPATIENT)
Dept: FAMILY MEDICINE | Facility: CLINIC | Age: 59
End: 2024-10-22
Payer: COMMERCIAL

## 2024-10-22 DIAGNOSIS — G89.4 CHRONIC PAIN SYNDROME: ICD-10-CM

## 2024-10-22 DIAGNOSIS — M48.02 CERVICAL STENOSIS OF SPINE: ICD-10-CM

## 2024-10-23 RX ORDER — OXYCODONE AND ACETAMINOPHEN 5; 325 MG/1; MG/1
1-2 TABLET ORAL EVERY 6 HOURS PRN
Qty: 100 TABLET | Refills: 0 | Status: SHIPPED | OUTPATIENT
Start: 2024-10-23

## 2024-11-03 ENCOUNTER — HEALTH MAINTENANCE LETTER (OUTPATIENT)
Age: 59
End: 2024-11-03

## 2024-11-04 ENCOUNTER — OFFICE VISIT (OUTPATIENT)
Dept: NEUROSURGERY | Facility: CLINIC | Age: 59
End: 2024-11-04
Payer: COMMERCIAL

## 2024-11-04 VITALS
SYSTOLIC BLOOD PRESSURE: 130 MMHG | BODY MASS INDEX: 24.33 KG/M2 | HEIGHT: 67 IN | WEIGHT: 155 LBS | DIASTOLIC BLOOD PRESSURE: 70 MMHG | TEMPERATURE: 98.9 F

## 2024-11-04 DIAGNOSIS — M53.3 SACROILIAC JOINT PAIN: Primary | ICD-10-CM

## 2024-11-04 PROCEDURE — 99203 OFFICE O/P NEW LOW 30 MIN: CPT | Performed by: NURSE PRACTITIONER

## 2024-11-04 ASSESSMENT — PAIN SCALES - GENERAL: PAINLEVEL_OUTOF10: MODERATE PAIN (4)

## 2024-11-04 NOTE — PATIENT INSTRUCTIONS
-Bilateral SI joint injection ordered. They will contact you to schedule.  -Lumbar CT myelogram ordered. They will contact you to schedule. I will contact you with the results when they are available.  -Please contact our clinic with questions or concerns at [phone].

## 2024-11-04 NOTE — PROGRESS NOTES
[facility] Neurosurgery  Neurosurgery Clinic Visit      CC: back and leg pain    Primary care Provider: Mushtaq Haq    Reason For Visit:   I was asked by Dr. Mushtaq Haq to consult on the patient for cervical stenosis of spine, radicular arm pain.    HPI: Stone De Paz is a 59 year old male with a history of L5-S2 fusion many years ago who presents with a few months of worsening left>right low back pain. He denies any injury at the onset. Reports back pain which radiates to the bilateral posterior thighs and groins as well. No paresthesias or overt weakness.     Past Medical History:   Diagnosis Date    Arthritis     Chronic neck pain     percocet for years now,     Depressive disorder     Exocrine pancreatic insufficiency     Gastroparesis due to DM (H)     gastric stimulator helps, [facility] manages that    Hyperlipidemia     Hypertension     Hypothyroidism     Neuropathy, diabetic (H)     Type 1 diabetes (H) age 31    neuropathy- peripheral and autonomic       Past Medical History reviewed with patient during visit.    Past Surgical History:   Procedure Laterality Date    ABDOMEN SURGERY  Dec. 13 2023    This is the 10th gastric stimulator brandon had inplanted due to battery life    BIOPSY  ?    Non cancerous polups.    COLONOSCOPY  07/18/2012    FUSION CERVICAL ANTERIOR TWO LEVELS      HEAD & NECK SURGERY      INJECT BLOCK MEDIAL BRANCH CERVICAL/THORACIC/LUMBAR Bilateral 11/20/2018    Procedure: Bilateral Cervical 3-4-5 medial branch block;  Surgeon: Jef Edwards MD;  Location: [facility] OR    INJECT BLOCK MEDIAL BRANCH CERVICAL/THORACIC/LUMBAR N/A 01/11/2019    Procedure: Cervical 3,4,5 Bilateral Medial branch blocks;  Surgeon: Jef Edwards MD;  Location: [facility] OR    ORTHOPEDIC SURGERY      PHACOEMULSIFICATION WITH STANDARD INTRAOCULAR LENS IMPLANT Left 1/18/2024    Procedure: Phacoemulsification with a standard intraocular lens implant, Left;  Surgeon: Vazquez Aleman MD;  Location: [facility] OR     PHACOEMULSIFICATION WITH STANDARD INTRAOCULAR LENS IMPLANT Right 2/29/2024    Procedure: Phacoemulsification with standard intraocular lens implant, right;  Surgeon: Camron Duran MD;  Location: [facility] OR    RADIO FREQUENCY ABLATION PULSED CERVICAL Right 02/28/2019    Procedure: RADIO FREQUENCY ABLATION CERVICAL THREE, FOUR, FIVE, AND THIRD OCCUPITAL NERVE RIGHT SIDE;  Surgeon: Jef Edwards MD;  Location[facility]    RADIO FREQUENCY ABLATION PULSED CERVICAL Left 03/15/2019    Procedure: radiofrequency ablation cervical 3,4,5;  Surgeon: Jef Edwards MD;  [facility]    RADIO FREQUENCY ABLATION PULSED CERVICAL Right 09/04/2020    Procedure: RADIOFREQUENCY ABLATION CERVICAL 3,4 5 right SIDE;  Surgeon: Jef Edwards MD;  [facility]    RADIO FREQUENCY ABLATION PULSED CERVICAL Left 09/10/2020    Procedure: RADIOFREQUENCY ABLATION CERVICAL 3,4 5 LEFT SIDE;  Surgeon: Jef Edwards MD;  [facility]: [facility]    SOFT TISSUE SURGERY      THORACOSCOPIC DECORTICATION LUNG  01/09/2014    Procedure: THORACOSCOPIC DECORTICATION LUNG;  RIGHT VATS/RIGHT THORACOTOMY , DECORTICATION RIGHT LUNG ,WEDGE RESECTION RIGHT MIDDLE LOBE LUNG NODULE;  Surgeon: Harley Felix MD;  Location:  OR    ZZC LUMBAR SPINE FUSION,ANTER APPRCH      2 L4-5 L5-21     Past Surgical History reviewed with patient during visit.    Current Outpatient Medications   Medication Sig Dispense Refill    amylase-lipase-protease (CREON 24) 15417-64502 units CPEP per EC capsule Take 1 capsule by mouth every other day Pt reports taking 6-8 capsules per day      amylase-lipase-protease (PERTZYE) 14863 units CPEP Take 1 capsule by mouth every other day      atorvastatin (LIPITOR) 80 MG tablet Take 1 tablet (80 mg) by mouth daily 90 tablet 3    citalopram (CELEXA) 40 MG tablet Take 1 tablet (40 mg) by mouth daily 90 tablet 2    CONTOUR NEXT TEST test strip Use to test blood sugar 5x times daily or as directed. 450 strip 3    insulin aspart (NOVOLOG  VIAL) 100 UNITS/ML vial Use with insulin pump management, total daily dose approx 60U 20 mL 11    insulin aspart (NOVOLOG VIAL) 100 UNITS/ML vial USE WITH INSULIN PUMP TOTAL DAILY DOSE APPROX 45 UNITS 20 mL 4    levothyroxine (SYNTHROID/LEVOTHROID) 112 MCG tablet Take 1 tablet (112 mcg) by mouth daily 90 tablet 1    lidocaine-prilocaine (EMLA) 2.5-2.5 % external cream Apply topically as needed for moderate pain 30 g 3    lisinopril (ZESTRIL) 40 MG tablet TAKE ONE TABLET BY MOUTH ONCE DAILY 90 tablet 4    medical cannabis (Patient's own supply) See Admin Instructions Topical creams and vaporizer cartridges  (The purpose of this order is to document that the patient reports taking medical cannabis.  This is not a prescription, and is not used to certify that the patient has a qualifying medical condition.)      methocarbamol (ROBAXIN) 500 MG tablet TAKE ONE TO TWO TABLETS BY MOUTH FOUR TIMES A DAY AS NEEDED FOR MUSCLE SPASMS 60 tablet 0    methocarbamol (ROBAXIN) 750 MG tablet Take 1 tablet (750 mg) by mouth 4 times daily as needed for muscle spasms 28 tablet 0    naproxen (NAPROSYN) 500 MG tablet Take 1 tablet (500 mg) by mouth 2 times daily as needed for moderate pain 180 tablet 1    omeprazole (PRILOSEC) 20 MG DR capsule TAKE 1 CAPSULE BY MOUTH ONCE DAILY 90 capsule 2    ondansetron (ZOFRAN ODT) 8 MG ODT tab Take 1 tablet (8 mg) by mouth every 8 hours as needed (for nauesa) 60 tablet 0    oxyCODONE-acetaminophen (PERCOCET) 5-325 MG tablet Take 1-2 tablets by mouth every 6 hours as needed for severe pain (*CAUTION OPIOD.  RISK OF OVERDOSE AND ADDICTION.**MAX OF 4 TABLETS PER DAY**). To last for month 100 tablet 0    SUMAtriptan (IMITREX) 100 MG tablet Take 1 tablet (100 mg) by mouth at onset of headache 1/2 to 1 tablet by mouth at onset of headache. May repeat 1 dose after 2 hours if headache persists. 9 tablet 3    naloxone (NARCAN) 4 MG/0.1ML nasal spray Spray 1 spray (4 mg) into one nostril alternating nostrils  once as needed for opioid reversal every 2-3 minutes until assistance arrives 0.2 mL 0     No current facility-administered medications for this visit.       No Known Allergies    Social History     Socioeconomic History    Marital status:      Spouse name: None    Number of children: None    Years of education: None    Highest education level: None   Tobacco Use    Smoking status: Former     Current packs/day: 0.00     Types: Cigarettes     Quit date: 2010     Years since quittin.8    Smokeless tobacco: Never   Vaping Use    Vaping status: Never Used   Substance and Sexual Activity    Alcohol use: No    Drug use: Yes     Types: Marijuana, Oxycodone     Comment: medical marijuana    Sexual activity: Not Currently     Comment: Prescription Marijuana   Other Topics Concern    Parent/sibling w/ CABG, MI or angioplasty before 65F 55M? Yes     Social Drivers of Health     Financial Resource Strain: Low Risk  (2024)    Financial Resource Strain     Within the past 12 months, have you or your family members you live with been unable to get utilities (heat, electricity) when it was really needed?: No   Food Insecurity: Low Risk  (2024)    Food Insecurity     Within the past 12 months, did you worry that your food would run out before you got money to buy more?: No     Within the past 12 months, did the food you bought just not last and you didn t have money to get more?: No   Transportation Needs: Low Risk  (2024)    Transportation Needs     Within the past 12 months, has lack of transportation kept you from medical appointments, getting your medicines, non-medical meetings or appointments, work, or from getting things that you need?: No    Received from [facility], [facility]    Social Connections   Interpersonal Safety: Low Risk  (2023)    Interpersonal Safety     Do you feel physically and emotionally safe where you  "currently live?: Yes     Within the past 12 months, have you been hit, slapped, kicked or otherwise physically hurt by someone?: No     Within the past 12 months, have you been humiliated or emotionally abused in other ways by your partner or ex-partner?: No   Housing Stability: Low Risk  (1/11/2024)    Housing Stability     Do you have housing? : Yes     Are you worried about losing your housing?: No       Family History   Problem Relation Age of Onset    Hypertension Mother     Diabetes Father         type 2    Hypertension Father     Cerebrovascular Disease Maternal Grandmother     Unknown/Adopted Maternal Grandfather     Unknown/Adopted Paternal Grandmother     Unknown/Adopted Paternal Grandfather     Liver Disease Brother     Heart Disease Sister     Thyroid Disease Sister     Thyroid Disease Sister     Thyroid Disease Sister          ROS: 10 point ROS neg other than the symptoms noted above in the HPI.    Vital Signs:   /70 (BP Location: Right arm, Patient Position: Sitting, Cuff Size: Adult Regular)   Temp 98.9  F (37.2  C) (Temporal)   Ht 5' 7\" (1.702 m)   Wt 155 lb (70.3 kg)   BMI 24.28 kg/m        Examination:  Constitutional:  Alert, well nourished, NAD.  Memory: recent and remote memory   HEENT: Normocephalic, atraumatic.   Pulm:  Without shortness of breath   CV:  No pitting edema of BLE.      Neurological:  Awake  Alert  Oriented x 3  Speech clear    Motor exam:   Hip Flexion:                 Right: 5/5  Left:  5/5  Hip Abduction:             Right:  5/5  Left:  5/5  Hip Adduction:             Right:  5/5  Left:  5/5  Plantar Flexion:           Right:  5/5  Left:  5/5  Dorsal Flexion:            Right:  5/5  Left:  5/5  EHL:                            Right:  5/5  Left:  5/5     Sensation normal to bilateral upper and lower extremities  Muscle tone to bilateral upper and lower extremities   Gait: Able to stand from a seated position. Normal non-antalgic, non-myelopathic gait.  Able to " heel/toe walk without loss of balance    SI joint testing:  Ron: positive left>right  Magdalena: positive left>right  Distraction: positive left>right  Pelvic compression: positive left>right    Imaging:   Lumbar CT 8/9/2024  IMPRESSION:  1. Transitional anatomy at the lumbosacral junction with presumed 5 lumbar vertebral bodies and a transitional partially lumbarized S1 vertebra.  2. No acute osseous abnormality.  3. Solid posterior fusion spanning L5-S2.  4. Multilevel degenerative changes, as described.  5. Moderate/moderate to severe spinal canal stenosis L3-4 and moderate spinal canal stenosis L4-L5. Moderate to severe left L3-4 neural foraminal stenosis. Lesser degrees of spinal canal and neural foraminal narrowing elsewhere.    Assessment/Plan:   Hx lumbar spine fusion  Bilateral SI joint pain    Reviewed lumbar CT scan. At this time would recommend diagnostic/therapeutic bilateral SI joint injection as well as lumbar CT myelogram at this time. I will contact him with the results and further recommendations.    Patient Instructions   -Bilateral SI joint injection ordered. They will contact you to schedule.  -Lumbar CT myelogram ordered. They will contact you to schedule. I will contact you with the results when they are available.  -Please contact our clinic with questions or concerns at [phone].    Dina Chong, [facility] Neurosurgery  [address] [place] [id number]  Tel [phone]  Fax [phone]

## 2024-11-04 NOTE — LETTER
"11/4/2024      Stone De Paz  [address] [facility] [address]      Dear Colleague,    Thank you for referring your patient, Stone De Paz, to the [facility]UR[facility] [place]. Please see a copy of my visit note below.    Stone De Paz is a 59 year old male who presents for:  Chief Complaint   Patient presents with     Consult     Cervical stenosis        Initial Vitals:  /70 (BP Location: Right arm, Patient Position: Sitting, Cuff Size: Adult Regular)   Temp 98.9  F (37.2  C) (Temporal)   Ht 5' 7\" (1.702 m)   Wt 155 lb (70.3 kg)   BMI 24.28 kg/m   Estimated body mass index is 24.28 kg/m  as calculated from the following:    Height as of this encounter: 5' 7\" (1.702 m).    Weight as of this encounter: 155 lb (70.3 kg).. Body surface area is 1.82 meters squared. BP completed using cuff size: regular  Moderate Pain (4)    Nursing Comments:     Laura García MA     [facility]  Neurosurgery Clinic Visit      CC: back and leg pain    Primary care Provider: Mushtaq Haq    Reason For Visit:   I was asked by Dr. Mushtaq Haq to consult on the patient for cervical stenosis of spine, radicular arm pain.    HPI: Stone De Paz is a 59 year old male with a history of L5-S2 fusion many years ago who presents with a few months of worsening left>right low back pain. He denies any injury at the onset. Reports back pain which radiates to the bilateral posterior thighs and groins as well. No paresthesias or overt weakness.     Past Medical History:   Diagnosis Date     Arthritis      Chronic neck pain     percocet for years now,      Depressive disorder      Exocrine pancreatic insufficiency      Gastroparesis due to DM (H)     gastric stimulator helps, [facility] manages that     Hyperlipidemia      Hypertension      Hypothyroidism      Neuropathy, diabetic (H)      Type 1 diabetes (H) age 31    neuropathy- peripheral and autonomic       Past Medical History reviewed with " patient during visit.    Past Surgical History:   Procedure Laterality Date     ABDOMEN SURGERY  Dec. 13 2023    This is the 10th gastric stimulator brandon had inplanted due to battery life     BIOPSY  ?    Non cancerous polups.     COLONOSCOPY  07/18/2012     FUSION CERVICAL ANTERIOR TWO LEVELS       HEAD & NECK SURGERY       INJECT BLOCK MEDIAL BRANCH CERVICAL/THORACIC/LUMBAR Bilateral 11/20/2018    Procedure: Bilateral Cervical 3-4-5 medial branch block;  Surgeon: Jef Edwards MD;  Location: [facility] OR     INJECT BLOCK MEDIAL BRANCH CERVICAL/THORACIC/LUMBAR N/A 01/11/2019    Procedure: Cervical 3,4,5 Bilateral Medial branch blocks;  Surgeon: Jef Edwards MD;  Location: [facility] OR     ORTHOPEDIC SURGERY       PHACOEMULSIFICATION WITH STANDARD INTRAOCULAR LENS IMPLANT Left 1/18/2024    Procedure: Phacoemulsification with a standard intraocular lens implant, Left;  Surgeon: Vazquez Aleman MD;  Location: [facility] OR     PHACOEMULSIFICATION WITH STANDARD INTRAOCULAR LENS IMPLANT Right 2/29/2024    Procedure: Phacoemulsification with standard intraocular lens implant, right;  Surgeon: Camron Duran MD;  Location: [facility] OR     RADIO FREQUENCY ABLATION PULSED CERVICAL Right 02/28/2019    Procedure: RADIO FREQUENCY ABLATION CERVICAL THREE, FOUR, FIVE, AND THIRD OCCUPITAL NERVE RIGHT SIDE;  Surgeon: Jef Edwards MD;  Location: [facility]     RADIO FREQUENCY ABLATION PULSED CERVICAL Left 03/15/2019    Procedure: radiofrequency ablation cervical 3,4,5;  Surgeon: Jef Edwards MD;  [facility]     RADIO FREQUENCY ABLATION PULSED CERVICAL Right 09/04/2020    Procedure: RADIOFREQUENCY ABLATION CERVICAL 3,4 5 right SIDE;  Surgeon: Jef Edwards MD;  [facility]: [facility]     RADIO FREQUENCY ABLATION PULSED CERVICAL Left 09/10/2020    Procedure: RADIOFREQUENCY ABLATION CERVICAL 3,4 5 LEFT SIDE;  Surgeon: Jef Edwards MD;  Location: [facility] OR     SOFT TISSUE SURGERY       THORACOSCOPIC DECORTICATION LUNG  01/09/2014    Procedure:  THORACOSCOPIC DECORTICATION LUNG;  RIGHT VATS/RIGHT THORACOTOMY , DECORTICATION RIGHT LUNG ,WEDGE RESECTION RIGHT MIDDLE LOBE LUNG NODULE;  Surgeon: Harley Felix MD;  Location:  OR     C LUMBAR SPINE FUSION,ANTER APPRCH      2 L4-5 L5-21     Past Surgical History reviewed with patient during visit.    Current Outpatient Medications   Medication Sig Dispense Refill     amylase-lipase-protease (CREON 24) 25831-82774 units CPEP per EC capsule Take 1 capsule by mouth every other day Pt reports taking 6-8 capsules per day       amylase-lipase-protease (PERTZYE) 66949 units CPEP Take 1 capsule by mouth every other day       atorvastatin (LIPITOR) 80 MG tablet Take 1 tablet (80 mg) by mouth daily 90 tablet 3     citalopram (CELEXA) 40 MG tablet Take 1 tablet (40 mg) by mouth daily 90 tablet 2     CONTOUR NEXT TEST test strip Use to test blood sugar 5x times daily or as directed. 450 strip 3     insulin aspart (NOVOLOG VIAL) 100 UNITS/ML vial Use with insulin pump management, total daily dose approx 60U 20 mL 11     insulin aspart (NOVOLOG VIAL) 100 UNITS/ML vial USE WITH INSULIN PUMP TOTAL DAILY DOSE APPROX 45 UNITS 20 mL 4     levothyroxine (SYNTHROID/LEVOTHROID) 112 MCG tablet Take 1 tablet (112 mcg) by mouth daily 90 tablet 1     lidocaine-prilocaine (EMLA) 2.5-2.5 % external cream Apply topically as needed for moderate pain 30 g 3     lisinopril (ZESTRIL) 40 MG tablet TAKE ONE TABLET BY MOUTH ONCE DAILY 90 tablet 4     medical cannabis (Patient's own supply) See Admin Instructions Topical creams and vaporizer cartridges  (The purpose of this order is to document that the patient reports taking medical cannabis.  This is not a prescription, and is not used to certify that the patient has a qualifying medical condition.)       methocarbamol (ROBAXIN) 500 MG tablet TAKE ONE TO TWO TABLETS BY MOUTH FOUR TIMES A DAY AS NEEDED FOR MUSCLE SPASMS 60 tablet 0     methocarbamol (ROBAXIN) 750 MG tablet Take 1  tablet (750 mg) by mouth 4 times daily as needed for muscle spasms 28 tablet 0     naproxen (NAPROSYN) 500 MG tablet Take 1 tablet (500 mg) by mouth 2 times daily as needed for moderate pain 180 tablet 1     omeprazole (PRILOSEC) 20 MG DR capsule TAKE 1 CAPSULE BY MOUTH ONCE DAILY 90 capsule 2     ondansetron (ZOFRAN ODT) 8 MG ODT tab Take 1 tablet (8 mg) by mouth every 8 hours as needed (for nauesa) 60 tablet 0     oxyCODONE-acetaminophen (PERCOCET) 5-325 MG tablet Take 1-2 tablets by mouth every 6 hours as needed for severe pain (*CAUTION OPIOD.  RISK OF OVERDOSE AND ADDICTION.**MAX OF 4 TABLETS PER DAY**). To last for month 100 tablet 0     SUMAtriptan (IMITREX) 100 MG tablet Take 1 tablet (100 mg) by mouth at onset of headache 1/2 to 1 tablet by mouth at onset of headache. May repeat 1 dose after 2 hours if headache persists. 9 tablet 3     naloxone (NARCAN) 4 MG/0.1ML nasal spray Spray 1 spray (4 mg) into one nostril alternating nostrils once as needed for opioid reversal every 2-3 minutes until assistance arrives 0.2 mL 0     No current facility-administered medications for this visit.       No Known Allergies    Social History     Socioeconomic History     Marital status:      Spouse name: None     Number of children: None     Years of education: None     Highest education level: None   Tobacco Use     Smoking status: Former     Current packs/day: 0.00     Types: Cigarettes     Quit date: 2010     Years since quittin.8     Smokeless tobacco: Never   Vaping Use     Vaping status: Never Used   Substance and Sexual Activity     Alcohol use: No     Drug use: Yes     Types: Marijuana, Oxycodone     Comment: medical marijuana     Sexual activity: Not Currently     Comment: Prescription Marijuana   Other Topics Concern     Parent/sibling w/ CABG, MI or angioplasty before 65F 55M? Yes     Social Drivers of Health     Financial Resource Strain: Low Risk  (2024)    Financial Resource Strain       Within the past 12 months, have you or your family members you live with been unable to get utilities (heat, electricity) when it was really needed?: No   Food Insecurity: Low Risk  (1/11/2024)    Food Insecurity      Within the past 12 months, did you worry that your food would run out before you got money to buy more?: No      Within the past 12 months, did the food you bought just not last and you didn t have money to get more?: No   Transportation Needs: Low Risk  (1/11/2024)    Transportation Needs      Within the past 12 months, has lack of transportation kept you from medical appointments, getting your medicines, non-medical meetings or appointments, work, or from getting things that you need?: No    [facility]    Social Connections   Interpersonal Safety: Low Risk  (11/28/2023)    Interpersonal Safety      Do you feel physically and emotionally safe where you currently live?: Yes      Within the past 12 months, have you been hit, slapped, kicked or otherwise physically hurt by someone?: No      Within the past 12 months, have you been humiliated or emotionally abused in other ways by your partner or ex-partner?: No   Housing Stability: Low Risk  (1/11/2024)    Housing Stability      Do you have housing? : Yes      Are you worried about losing your housing?: No       Family History   Problem Relation Age of Onset     Hypertension Mother      Diabetes Father         type 2     Hypertension Father      Cerebrovascular Disease Maternal Grandmother      Unknown/Adopted Maternal Grandfather      Unknown/Adopted Paternal Grandmother      Unknown/Adopted Paternal Grandfather      Liver Disease Brother      Heart Disease Sister      Thyroid Disease Sister      Thyroid Disease Sister      Thyroid Disease Sister          ROS: 10 point ROS neg other than the symptoms noted above in the HPI.    Vital Signs:   /70 (BP  "Location: Right arm, Patient Position: Sitting, Cuff Size: Adult Regular)   Temp 98.9  F (37.2  C) (Temporal)   Ht 5' 7\" (1.702 m)   Wt 155 lb (70.3 kg)   BMI 24.28 kg/m        Examination:  Constitutional:  Alert, well nourished, NAD.  Memory: recent and remote memory   HEENT: Normocephalic, atraumatic.   Pulm:  Without shortness of breath   CV:  No pitting edema of BLE.      Neurological:  Awake  Alert  Oriented x 3  Speech clear    Motor exam:   Hip Flexion:                 Right: 5/5  Left:  5/5  Hip Abduction:             Right:  5/5  Left:  5/5  Hip Adduction:             Right:  5/5  Left:  5/5  Plantar Flexion:           Right:  5/5  Left:  5/5  Dorsal Flexion:            Right:  5/5  Left:  5/5  EHL:                            Right:  5/5  Left:  5/5     Sensation normal to bilateral upper and lower extremities  Muscle tone to bilateral upper and lower extremities   Gait: Able to stand from a seated position. Normal non-antalgic, non-myelopathic gait.  Able to heel/toe walk without loss of balance    SI joint testing:  Ron: positive left>right  Magdalena: positive left>right  Distraction: positive left>right  Pelvic compression: positive left>right    Imaging:   Lumbar CT 8/9/2024  IMPRESSION:  1. Transitional anatomy at the lumbosacral junction with presumed 5 lumbar vertebral bodies and a transitional partially lumbarized S1 vertebra.  2. No acute osseous abnormality.  3. Solid posterior fusion spanning L5-S2.  4. Multilevel degenerative changes, as described.  5. Moderate/moderate to severe spinal canal stenosis L3-4 and moderate spinal canal stenosis L4-L5. Moderate to severe left L3-4 neural foraminal stenosis. Lesser degrees of spinal canal and neural foraminal narrowing elsewhere.    Assessment/Plan:   Hx lumbar spine fusion  Bilateral SI joint pain    Reviewed lumbar CT scan. At this time would recommend diagnostic/therapeutic bilateral SI joint injection as well as lumbar CT myelogram at this " time. I will contact him with the results and further recommendations.    Patient Instructions   -Bilateral SI joint injection ordered. They will contact you to schedule.  -Lumbar CT myelogram ordered. They will contact you to schedule. I will contact you with the results when they are available.  -Please contact our clinic with questions or concerns at [phone].    Dina Chong CNP  [facility]  [address] [place] [id number]  Tel [phone]  Fax [phone]      Again, thank you for allowing me to participate in the care of your patient.        Sincerely,        Dina Chong, NP

## 2024-11-04 NOTE — PROGRESS NOTES
"Stone De Paz is a 59 year old male who presents for:  Chief Complaint   Patient presents with    Consult     Cervical stenosis        Initial Vitals:  /70 (BP Location: Right arm, Patient Position: Sitting, Cuff Size: Adult Regular)   Temp 98.9  F (37.2  C) (Temporal)   Ht 5' 7\" (1.702 m)   Wt 155 lb (70.3 kg)   BMI 24.28 kg/m   Estimated body mass index is 24.28 kg/m  as calculated from the following:    Height as of this encounter: 5' 7\" (1.702 m).    Weight as of this encounter: 155 lb (70.3 kg).. Body surface area is 1.82 meters squared. BP completed using cuff size: regular  Moderate Pain (4)    Nursing Comments:     Laura García MA   "

## 2024-11-14 DIAGNOSIS — K31.84 GASTROPARESIS: ICD-10-CM

## 2024-11-15 RX ORDER — ONDANSETRON 8 MG/1
8 TABLET, ORALLY DISINTEGRATING ORAL EVERY 8 HOURS PRN
Qty: 60 TABLET | Refills: 0 | Status: SHIPPED | OUTPATIENT
Start: 2024-11-15

## 2024-11-19 ENCOUNTER — LAB (OUTPATIENT)
Dept: LAB | Facility: CLINIC | Age: 59
End: 2024-11-19
Payer: COMMERCIAL

## 2024-11-19 DIAGNOSIS — E03.9 HYPOTHYROIDISM, UNSPECIFIED TYPE: ICD-10-CM

## 2024-11-19 DIAGNOSIS — E87.1 HYPONATREMIA: ICD-10-CM

## 2024-11-19 DIAGNOSIS — K31.84 GASTROPARESIS: ICD-10-CM

## 2024-11-19 DIAGNOSIS — K21.9 GASTROESOPHAGEAL REFLUX DISEASE WITHOUT ESOPHAGITIS: ICD-10-CM

## 2024-11-19 DIAGNOSIS — E10.43 TYPE 1 DIABETES MELLITUS WITH DIABETIC AUTONOMIC NEUROPATHY (H): ICD-10-CM

## 2024-11-19 DIAGNOSIS — E10.40 TYPE 1 DIABETES MELLITUS WITH DIABETIC NEUROPATHY (H): ICD-10-CM

## 2024-11-19 DIAGNOSIS — E55.9 VITAMIN D DEFICIENCY: ICD-10-CM

## 2024-11-19 LAB
ANION GAP SERPL CALCULATED.3IONS-SCNC: 11 MMOL/L (ref 7–15)
BUN SERPL-MCNC: 11.5 MG/DL (ref 6–20)
CALCIUM SERPL-MCNC: 9.3 MG/DL (ref 8.8–10.4)
CHLORIDE SERPL-SCNC: 97 MMOL/L (ref 98–107)
CREAT SERPL-MCNC: 1.09 MG/DL (ref 0.67–1.17)
EGFRCR SERPLBLD CKD-EPI 2021: 79 ML/MIN/1.73M2
EST. AVERAGE GLUCOSE BLD GHB EST-MCNC: 151 MG/DL
GLUCOSE SERPL-MCNC: 86 MG/DL (ref 70–99)
HBA1C MFR BLD: 6.9 %
HCO3 SERPL-SCNC: 25 MMOL/L (ref 22–29)
POTASSIUM SERPL-SCNC: 4.3 MMOL/L (ref 3.4–5.3)
SODIUM SERPL-SCNC: 133 MMOL/L (ref 135–145)
T4 FREE SERPL-MCNC: 1.21 NG/DL (ref 0.9–1.7)
TSH SERPL DL<=0.005 MIU/L-ACNC: 7.23 UIU/ML (ref 0.3–4.2)

## 2024-11-19 PROCEDURE — 83036 HEMOGLOBIN GLYCOSYLATED A1C: CPT

## 2024-11-19 PROCEDURE — 82306 VITAMIN D 25 HYDROXY: CPT

## 2024-11-19 PROCEDURE — 84439 ASSAY OF FREE THYROXINE: CPT

## 2024-11-19 PROCEDURE — 82533 TOTAL CORTISOL: CPT

## 2024-11-19 PROCEDURE — 80048 BASIC METABOLIC PNL TOTAL CA: CPT

## 2024-11-19 PROCEDURE — 36415 COLL VENOUS BLD VENIPUNCTURE: CPT

## 2024-11-19 PROCEDURE — 84443 ASSAY THYROID STIM HORMONE: CPT

## 2024-11-20 LAB
CORTIS SERPL-MCNC: 6.4 UG/DL
VIT D+METAB SERPL-MCNC: 49 NG/ML (ref 20–50)

## 2024-11-21 ENCOUNTER — VIRTUAL VISIT (OUTPATIENT)
Dept: ENDOCRINOLOGY | Facility: CLINIC | Age: 59
End: 2024-11-21
Payer: COMMERCIAL

## 2024-11-21 DIAGNOSIS — E10.40 TYPE 1 DIABETES MELLITUS WITH DIABETIC NEUROPATHY (H): Primary | ICD-10-CM

## 2024-11-21 DIAGNOSIS — Z96.41 INSULIN PUMP STATUS: ICD-10-CM

## 2024-11-21 DIAGNOSIS — E03.9 HYPOTHYROIDISM, UNSPECIFIED TYPE: ICD-10-CM

## 2024-11-21 DIAGNOSIS — E87.1 HYPONATREMIA: ICD-10-CM

## 2024-11-21 DIAGNOSIS — E10.43 TYPE 1 DIABETES MELLITUS WITH DIABETIC AUTONOMIC NEUROPATHY (H): ICD-10-CM

## 2024-11-21 DIAGNOSIS — K31.84 GASTROPARESIS: ICD-10-CM

## 2024-11-21 RX ORDER — LEVOTHYROXINE SODIUM 125 UG/1
125 TABLET ORAL DAILY
Qty: 90 TABLET | Refills: 1 | Status: SHIPPED | OUTPATIENT
Start: 2024-11-21

## 2024-11-21 NOTE — PROGRESS NOTES
"Virtual Visit Details    Type of service:  Video Visit   Video Start Time: 2:00 PM  Video End Time:  2:20 PM    Originating Location (pt. Location): Home  Distant Location (provider location):  Off-site  Platform used for Video Visit: del Murray patchy video quality      Recent issues:  Diabetes and thyroid follow-up evaluation  Using the Medtronic 780G with Guardian4 CGM (since 1/2024)  Has malabsorption and chronic diarrhea, diagnosis of exocrine pancreas insufficiency (EPI)   Has taken Zenpep (35,000 mg/dose, 1-capsule TID vs QID, ~$800/mo),   Still using implantable gastric pacer   Worsening mid and lower back, plans CT-myelogram in [facility], also steroid injection plan soon    Then sees Dina Chong CNP for follow-up           1995. Diagnosis of diabetes mellitus after 2nd back surgery, age 29  Initial treatment with insulin injections using NPH and Regular insulin  Switched to premixed 70/30 insulin BID dosing  1999. Started Medtronic insulin pump model 508, used base-dose Humalog insulin at meals  Had seen Dr. Iggy Briggs/[facility]     7/24/01. Initial evaluation with me at my former clinic ([facility])  Upgraded pump to Medtronic 523   Used the Medtronic 630G pump  Meals BID, had carb \"exchanges\" with his pump settings  Chronic high BG and hgbA1c trends despite dose changes  Tried square wave bolus  Has Contour Link? BG meter, variable testing pattern   Recently testing 4x/day for at least 60 days  Glucose sensor (CGM) use:   Tried Medtronic CGMS in the past, didn't like it   Switched to DexcomG5, used for approx 6 months but issues with getting supply order   Early 2019, started Freestyle Sukhi but issues with skin site redness  Meal schedule:  Snacks during the day, supper meal 5-7pm  Upgraded to Medtronic 770G  12/2023. Upgraded to Medtronic 780G with Guardian4 CGM   Novolog in pump    Current Medtronic 780G pump settings:       Recent Carelink data:              Previous [facility] " labs include:    Previous [facility] hgbA1c trends include:  Lab Results   Component Value Date    A1C 6.9 (H) 11/19/2024    A1C 7.0 (H) 04/01/2024    A1C 8.9 (H) 11/02/2023    A1C 8.9 (H) 07/25/2023    A1C 8.0 (H) 02/27/2023      Recent  labs include:  Lab Results   Component Value Date    A1C 6.9 (H) 11/19/2024     (L) 11/19/2024    POTASSIUM 4.3 11/19/2024    CHLORIDE 97 (L) 11/19/2024    CO2 25 11/19/2024    ANIONGAP 11 11/19/2024    GLC 86 11/19/2024    BUN 11.5 11/19/2024    CR 1.09 11/19/2024    GFRESTIMATED 79 11/19/2024    GFRESTBLACK >90 03/01/2021    EMILEE 9.3 11/19/2024    CPEPT <0.1 (L) 06/24/2021    CHOL 142 07/25/2023    TRIG 47 07/25/2023    HDL 65 07/25/2023    LDL 68 07/25/2023    NHDL 77 07/25/2023    UCRR 62 02/27/2023    UCRR 63 02/27/2023    MICROL 6 02/27/2023    UMALCR 9.68 02/27/2023     Last eye exam with Dr. AMANUEL Del Angel 11/2023, no DR   7/2018. Met with Destiny Warren RD, CDE at [facility]  DM Complications:              Neuropathy                          Peripheral neuropathy                                      Decreased sensation at feet                          Autonomic neuropathy- gastroparesis                                      Symptoms of nausea, bloating, episodes of diarrhea and emesis                                      Has [employer] [place] gastric stimulator                                      Sees physicians and WARREN Gilmore/[facility]                  Thyroid:  1994. History of hypothyroidism  Chronic levothyroxine treatment, has used Levoxyl in the past  Supplements:  Takes vitamin D 1000 U daily  7/2018. Had levothyroxine dose increase   Recent labs include:  Lab Results   Component Value Date    TSH 7.23 (H) 11/19/2024    T4 1.21 11/19/2024     Current dose:  Levothyroxine 0.125 mg daily        Lives in [place], [place] with Ada, also (1) adopted dog named Marko  Has seen Dr. Mushtaq Haq/[facility] for general medicine evaluations  Also sees  Olga Ramirez PAC and Dr. Jennifer Thompson/[facility].       PMH/PSH:  Past Medical History:   Diagnosis Date    Arthritis     Chronic neck pain     percocet for years now,     Depressive disorder     Exocrine pancreatic insufficiency     Gastroparesis due to DM (H)     gastric stimulator helps, [facility] manages that    Hyperlipidemia     Hypertension     Hypothyroidism     Neuropathy, diabetic (H)     Type 1 diabetes (H) age 31    neuropathy- peripheral and autonomic     Past Surgical History:   Procedure Laterality Date    ABDOMEN SURGERY  Dec. 13 2023    This is the 10th gastric stimulator brandon had inplanted due to battery life    BIOPSY  ?    Non cancerous polups.    COLONOSCOPY  07/18/2012    FUSION CERVICAL ANTERIOR TWO LEVELS      HEAD & NECK SURGERY      INJECT BLOCK MEDIAL BRANCH CERVICAL/THORACIC/LUMBAR Bilateral 11/20/2018    Procedure: Bilateral Cervical 3-4-5 medial branch block;  Surgeon: Jef Edwards MD;  Location: [facility] OR    INJECT BLOCK MEDIAL BRANCH CERVICAL/THORACIC/LUMBAR N/A 01/11/2019    Procedure: Cervical 3,4,5 Bilateral Medial branch blocks;  Surgeon: Jef Edwards MD;  Location: [facility] OR    ORTHOPEDIC SURGERY      PHACOEMULSIFICATION WITH STANDARD INTRAOCULAR LENS IMPLANT Left 1/18/2024    Procedure: Phacoemulsification with a standard intraocular lens implant, Left;  Surgeon: Vazquez Aleman MD;  Location: [facility]    PHACOEMULSIFICATION WITH STANDARD INTRAOCULAR LENS IMPLANT Right 2/29/2024    Procedure: Phacoemulsification with standard intraocular lens implant, right;  Surgeon: Camron Duran MD;  Location: [facility]    RADIO FREQUENCY ABLATION PULSED CERVICAL Right 02/28/2019    Procedure: RADIO FREQUENCY ABLATION CERVICAL THREE, FOUR, FIVE, AND THIRD OCCUPITAL NERVE RIGHT SIDE;  Surgeon: Jef Edwards MD;  Location: [facility]    RADIO FREQUENCY ABLATION PULSED CERVICAL Left 03/15/2019    Procedure: radiofrequency ablation cervical 3,4,5;  Surgeon: Jef Edwards MD;  Location: [facility]  OR    RADIO FREQUENCY ABLATION PULSED CERVICAL Right 2020    Procedure: RADIOFREQUENCY ABLATION CERVICAL 3,4 5 right SIDE;  Surgeon: Jef Edwards MD;  Location[facility]    RADIO FREQUENCY ABLATION PULSED CERVICAL Left 09/10/2020    Procedure: RADIOFREQUENCY ABLATION CERVICAL 3,4 5 LEFT SIDE;  Surgeon: Jef Edwards MD;  Location: [facility]    SOFT TISSUE SURGERY      THORACOSCOPIC DECORTICATION LUNG  2014    Procedure: THORACOSCOPIC DECORTICATION LUNG;  RIGHT VATS/RIGHT THORACOTOMY , DECORTICATION RIGHT LUNG ,WEDGE RESECTION RIGHT MIDDLE LOBE LUNG NODULE;  Surgeon: Harley Felix MD;  Location: [facility] OR    ZZC LUMBAR SPINE FUSION,ANTER APPRCH      2 L4-5 L5-21       Family Hx:  Family History   Problem Relation Age of Onset    Hypertension Mother     Diabetes Father         type 2    Hypertension Father     Cerebrovascular Disease Maternal Grandmother     Unknown/Adopted Maternal Grandfather     Unknown/Adopted Paternal Grandmother     Unknown/Adopted Paternal Grandfather     Liver Disease Brother     Heart Disease Sister     Thyroid Disease Sister     Thyroid Disease Sister     Thyroid Disease Sister          Social Hx:  Social History     Socioeconomic History    Marital status:      Spouse name: Not on file    Number of children: Not on file    Years of education: Not on file    Highest education level: Not on file   Occupational History    Not on file   Tobacco Use    Smoking status: Former     Current packs/day: 0.00     Types: Cigarettes     Quit date: 2010     Years since quittin.8    Smokeless tobacco: Never   Vaping Use    Vaping status: Never Used   Substance and Sexual Activity    Alcohol use: No    Drug use: Yes     Types: Marijuana, Oxycodone     Comment: medical marijuana    Sexual activity: Not Currently     Comment: Prescription Marijuana   Other Topics Concern    Parent/sibling w/ CABG, MI or angioplasty before 65F 55M? Yes   Social History Narrative    Not on file      Social Drivers of Health     Financial Resource Strain: Low Risk  (1/11/2024)    Financial Resource Strain     Within the past 12 months, have you or your family members you live with been unable to get utilities (heat, electricity) when it was really needed?: No   Food Insecurity: Low Risk  (1/11/2024)    Food Insecurity     Within the past 12 months, did you worry that your food would run out before you got money to buy more?: No     Within the past 12 months, did the food you bought just not last and you didn t have money to get more?: No   Transportation Needs: Low Risk  (1/11/2024)    Transportation Needs     Within the past 12 months, has lack of transportation kept you from medical appointments, getting your medicines, non-medical meetings or appointments, work, or from getting things that you need?: No   Physical Activity: Not on file   Stress: Not on file   Social Connections: Unknown (12/12/2023)    Received from [facility]    Social Connections     Frequency of Communication with Friends and Family: Not on file   Interpersonal Safety: Low Risk  (11/28/2023)    Interpersonal Safety     Do you feel physically and emotionally safe where you currently live?: Yes     Within the past 12 months, have you been hit, slapped, kicked or otherwise physically hurt by someone?: No     Within the past 12 months, have you been humiliated or emotionally abused in other ways by your partner or ex-partner?: No   Housing Stability: Low Risk  (1/11/2024)    Housing Stability     Do you have housing? : Yes     Are you worried about losing your housing?: No          MEDICATIONS:  has a current medication list which includes the following prescription(s): lipase-protease-amylase, atorvastatin, citalopram, insulin aspart, levothyroxine, lisinopril, medical cannabis, methocarbamol, naproxen, omeprazole, ondansetron, oxycodone-acetaminophen,  sumatriptan, lipase-protease-amylase, contour next test, insulin aspart, lidocaine-prilocaine, methocarbamol, and naloxone.    ROS: 10 point ROS neg other than the symptoms noted above in the HPI.     GENERAL: some fatigue, mild wt gain; denies fevers, chills, night sweats.   HEENT: no dysphagia, odonophagia, diplopia, neck pain  THYROID:  no apparent hyper or hypothyroid symptoms  CV: no recent chest pain, pressure, palpitations  LUNGS: no SOB, KRAFT, cough, wheezing   ABDOMEN: indigestion and some diarrhea, postmeal nausea, frequent BM's; no constipation  EXTREMITIES: no rashes, ulcers, edema  NEUROLOGY: no headaches, denies changes in vision, tingling, extremitiy numbness   MSK: worsening back pains, neck pain/stiffness; no muscle weakness  SKIN: no rashes or lesions  PSYCH:  stable mood, no significant anxiety or depression  ENDOCRINE: no heat or cold intolerance    Physical Exam (visual exam)  VS:  no vital signs taken for video visit  CONSTITUTIONAL: healthy, alert and NAD, well dressed, answering questions appropriately  ENT: no nose swelling or nasal discharge, mouth redness or gum changes.  EYES: eyes grossly normal to inspection, conjunctivae and sclerae normal, no exophthalmos or proptosis  THYROID:  no apparent nodules or goiter  LUNGS: no audible wheeze, cough or visible cyanosis, no visible retractions or increased work of breathing  ABDOMEN: abdomen not evaluated  EXTREMITIES: no hand tremors, limited exam  NEUROLOGY: CN grossly intact, mentation intact and speech normal   SKIN:  no apparent skin lesions, rash, or edema with visualized skin appearance  PSYCH: mentation appears normal, affect normal/bright, judgement and insight intact,   normal speech and appearance well groomed       LABS:     All pertinent notes, labs, and images personally reviewed by me.       A/P:  Encounter Diagnoses   Name Primary?    Hypothyroidism, unspecified type     Type 1 diabetes mellitus with diabetic autonomic neuropathy  (H)     Type 1 diabetes mellitus with diabetic neuropathy (H) Yes    Insulin pump status     Gastroparesis     Hyponatremia        Comments:  Reviewed complicated health history, diabetes and thyroid issues.  Digestive problems relate to irreg meal intake, delayed absorption from gastroparesis, exocrine insufficiency, and need for gastric pacer  Recent SG trends show morning meal postmeal hyperglycemia, but overall CGM trends and hgbA1c much better  Reviewed and interpreted tests that I previously ordered.   Ordered appropriate tests for the endocrinology disease management.    Management options discussed and implemented after shared medical decision making with the patient.  Diabetes problem is chronic and stable, thyroid problem with exacerbation progression- mild hyperthyroidism    Plan:  Reviewed the overall T1DM management and insulin pump use.  Discussed optimal BG testing to assess glucose trends.  We reviewed insulin pump settings, basal rate and bolus dosing  Use of automated pump bolus dosing for meal/snack carb & correction dosing  Reviewed the Medtronic Carelink report data today  Reviewed recent Medtronic Guardian4 CGM glucose sensor trends, in detail     Recommend:  Continue use of the Medtronic 780G with Guardian4 CGM with the diabetes management    No pump setting changes at this time.    Use genuine meal carb estimates with premeal bolusin, if possible  Consider followup diabetes education appt with the CDE at [facility]  Reviewed use of Temp Target for exercise or when more active   He will lower the meal carb estimate for meal following also   Keep focus on diet, exercise as tolerated, and weight management  Continue simvastatin 40 mg each evening  Continue the vitamin D 1000 U capsule daily  Advised staying on levothyroxine 0.125 mg daily, sent updated Rx sent to pharmacy  Repeat non-fasting labs in 1/2025   Testing at [facility]   Lab orders placed  Arrange annual dilated  eye exam, fasting lipid panel testing.  We reviewed importance of timely clinic appointments with me Q3 months, to satisfy Medicare with his diabetes device coverage  Will communicate feedback with his testing by phonecall and/or USMail    Keep regular follow-up evaluations with gastroenterology provider  Addressed patient's questions today.    The longitudinal plan of care for the endocrine problem(s) were addressed during this visit.  Due to added complexity of care,   we will continue to support the patient and the subsequent management of this condition with ongoing continuity of care.    There are no Patient Instructions on file for this visit.    Future labs ordered today:   Orders Placed This Encounter   Procedures    GLUCOSE MONITOR, 72 HOUR, PHYS INTERP    Hemoglobin A1c    Basic metabolic panel    Albumin Random Urine Quantitative with Creat Ratio    TSH    T4 free     Radiology/Consults ordered today: None    Total time spent on day of encounter:  35 min    Follow-up:  1/22/25 at 11am, Return    VIKTOR Best MD, MS  Endocrinology  [facility]    CC:  Care Team

## 2024-11-26 ENCOUNTER — HOSPITAL ENCOUNTER (OUTPATIENT)
Dept: CT IMAGING | Facility: CLINIC | Age: 59
Discharge: HOME | End: 2024-11-26
Attending: NURSE PRACTITIONER
Payer: MEDICARE

## 2024-11-26 ENCOUNTER — HOSPITAL ENCOUNTER (OUTPATIENT)
Dept: GENERAL RADIOLOGY | Facility: CLINIC | Age: 59
Discharge: HOME | End: 2024-11-26
Attending: NURSE PRACTITIONER
Payer: MEDICARE

## 2024-11-26 ENCOUNTER — TELEPHONE (OUTPATIENT)
Dept: NEUROSURGERY | Facility: CLINIC | Age: 59
End: 2024-11-26

## 2024-11-26 VITALS
RESPIRATION RATE: 16 BRPM | DIASTOLIC BLOOD PRESSURE: 85 MMHG | SYSTOLIC BLOOD PRESSURE: 137 MMHG | TEMPERATURE: 98.6 F | HEART RATE: 62 BPM | OXYGEN SATURATION: 98 %

## 2024-11-26 DIAGNOSIS — M53.3 SACROILIAC JOINT PAIN: ICD-10-CM

## 2024-11-26 DIAGNOSIS — M48.061 SPINAL STENOSIS, LUMBAR REGION, WITHOUT NEUROGENIC CLAUDICATION: Primary | ICD-10-CM

## 2024-11-26 DIAGNOSIS — M54.16 LUMBAR RADICULOPATHY: ICD-10-CM

## 2024-11-26 LAB — GLUCOSE BLDC GLUCOMTR-MCNC: 170 MG/DL (ref 70–99)

## 2024-11-26 PROCEDURE — 82962 GLUCOSE BLOOD TEST: CPT

## 2024-11-26 PROCEDURE — 255N000002 HC RX 255 OP 636: Performed by: RADIOLOGY

## 2024-11-26 PROCEDURE — 96372 THER/PROPH/DIAG INJ SC/IM: CPT | Performed by: RADIOLOGY

## 2024-11-26 PROCEDURE — 62304 MYELOGRAPHY LUMBAR INJECTION: CPT

## 2024-11-26 PROCEDURE — 250N000009 HC RX 250: Performed by: RADIOLOGY

## 2024-11-26 PROCEDURE — 250N000011 HC RX IP 250 OP 636: Performed by: RADIOLOGY

## 2024-11-26 PROCEDURE — G1010 CDSM STANSON: HCPCS

## 2024-11-26 PROCEDURE — 72132 CT LUMBAR SPINE W/DYE: CPT | Mod: MF

## 2024-11-26 RX ORDER — ONDANSETRON 2 MG/ML
4 INJECTION INTRAMUSCULAR; INTRAVENOUS EVERY 6 HOURS PRN
Status: DISCONTINUED | OUTPATIENT
Start: 2024-11-26 | End: 2024-11-27 | Stop reason: HOSPADM

## 2024-11-26 RX ORDER — OXYCODONE HYDROCHLORIDE 5 MG/1
5 TABLET ORAL
COMMUNITY
Start: 2022-12-13

## 2024-11-26 RX ORDER — IOPAMIDOL 408 MG/ML
20 INJECTION, SOLUTION INTRATHECAL ONCE
Status: COMPLETED | OUTPATIENT
Start: 2024-11-26 | End: 2024-11-26

## 2024-11-26 RX ORDER — LIDOCAINE HYDROCHLORIDE 10 MG/ML
5 INJECTION, SOLUTION EPIDURAL; INFILTRATION; INTRACAUDAL; PERINEURAL ONCE
Status: COMPLETED | OUTPATIENT
Start: 2024-11-26 | End: 2024-11-26

## 2024-11-26 RX ADMIN — LIDOCAINE HYDROCHLORIDE 5 ML: 10 INJECTION, SOLUTION EPIDURAL; INFILTRATION; INTRACAUDAL; PERINEURAL at 10:58

## 2024-11-26 RX ADMIN — IOPAMIDOL 20 ML: 408 INJECTION, SOLUTION INTRATHECAL at 11:08

## 2024-11-26 RX ADMIN — ONDANSETRON 4 MG: 2 INJECTION, SOLUTION INTRAMUSCULAR; INTRAVENOUS at 11:46

## 2024-11-26 NOTE — TELEPHONE ENCOUNTER
Contacted patient to discuss results of lumbar CT myelogram. Will trial a course of PT. If unable to tolerate or symptoms persist, would recommend follow up with Dr. Noble to discuss further recommendations. He verbalized understanding and agreement.

## 2024-11-30 ENCOUNTER — MYC MEDICAL ADVICE (OUTPATIENT)
Dept: NEUROSURGERY | Facility: CLINIC | Age: 59
End: 2024-11-30
Payer: COMMERCIAL

## 2024-11-30 DIAGNOSIS — M79.601 RIGHT ARM PAIN: ICD-10-CM

## 2024-11-30 DIAGNOSIS — M54.2 NECK PAIN: Primary | ICD-10-CM

## 2024-12-02 RX ORDER — METHYLPREDNISOLONE 4 MG/1
TABLET ORAL
Qty: 21 TABLET | Refills: 0 | Status: SHIPPED | OUTPATIENT
Start: 2024-12-02

## 2024-12-02 NOTE — CONFIDENTIAL NOTE
"Called patient to discuss his TrekkSoftt message.     Patient saw Dina Chong NP 11/4/24 for consult on cervical stenosis of spine, radicular arm pain, back and leg pain but focused on one area first being the low back at that time.     Dina ordered Bilateral SI joint injection and lumbar CT myelogram. Lum CT myelogram completed on 11/26/24.     Bilateral SI JI scheduled with Dr Edwards 12/12/24.    Dina called patient 11/26 to discuss lum CT results. Recs were to trial a course of PT. If unable to tolerate PT or symptoms persist, would recommend follow up with Dr. Noble to discuss further recommendations.     Patient sent Uplikehart from over the weekend reporting severe pain in his neck and back after he spoke to dina on 11/26. Reports \"pinching\" nerve pain in neck that radiates down RUE. He said this was triggered by the pressure from the dye that was injected. Notes sleep being disturbed due to the pain. Patient wants to schedule a time to review CT scan from 6/12/24. Patient mentions when coughing gets HA/migraine and feels pressure in head. No worse with sitting or laying flat. HA/pressure gets better with time and goes away until he coughs again. Said when laying flat causes severe right arm pain so he sits and sleeps in a upright position. Said all this started on thanksgiving. Said the pain today is still there today but not as bad as it was.    Reviewed red flag symptoms. Patient denies new numbness, tingling, weakness, foot drag/drop, saddle anesthesia, incontinence, intractable pain, or shortness of breath. Informed patient to seek emergency care should these symptoms arise.    Enc routed to Dina for review.          "

## 2024-12-02 NOTE — TELEPHONE ENCOUNTER
Per Dina Chong, NP Can offer a MDP and have him schedule a visit to discuss his cervical concerns.     Called patient to review above recs. Patient agreeable with above plan.he would like MDP sent to [facility] in [place]. Rx sent.     ENc routed to schedulers to set up appt with Dina.

## 2024-12-10 ENCOUNTER — MEDICAL CORRESPONDENCE (OUTPATIENT)
Dept: HEALTH INFORMATION MANAGEMENT | Facility: CLINIC | Age: 59
End: 2024-12-10

## 2024-12-10 ENCOUNTER — HOSPITAL ENCOUNTER (OUTPATIENT)
Dept: CT IMAGING | Facility: CLINIC | Age: 59
Discharge: HOME | End: 2024-12-10
Attending: NURSE PRACTITIONER
Payer: MEDICARE

## 2024-12-10 ENCOUNTER — OFFICE VISIT (OUTPATIENT)
Dept: NEUROSURGERY | Facility: CLINIC | Age: 59
End: 2024-12-10
Payer: COMMERCIAL

## 2024-12-10 VITALS
HEART RATE: 76 BPM | WEIGHT: 153 LBS | BODY MASS INDEX: 24.01 KG/M2 | OXYGEN SATURATION: 98 % | DIASTOLIC BLOOD PRESSURE: 100 MMHG | HEIGHT: 67 IN | SYSTOLIC BLOOD PRESSURE: 167 MMHG

## 2024-12-10 DIAGNOSIS — M54.12 CERVICAL RADICULOPATHY: Primary | ICD-10-CM

## 2024-12-10 DIAGNOSIS — M54.12 CERVICAL RADICULOPATHY: ICD-10-CM

## 2024-12-10 PROCEDURE — 99213 OFFICE O/P EST LOW 20 MIN: CPT | Performed by: NURSE PRACTITIONER

## 2024-12-10 PROCEDURE — 72125 CT NECK SPINE W/O DYE: CPT | Mod: MG

## 2024-12-10 PROCEDURE — G1010 CDSM STANSON: HCPCS

## 2024-12-10 ASSESSMENT — PAIN SCALES - GENERAL: PAINLEVEL_OUTOF10: SEVERE PAIN (7)

## 2024-12-10 NOTE — NURSING NOTE
"Stone De Paz is a 59 year old male who presents for:  Chief Complaint   Patient presents with    Neurologic Problem     Neck pain. CT Myelogram 11/26/24. CT 6/12/24. Onset: a long time, but recently after he had the myelogram the neck pain has not stopped. Pain starts at the top of the neck and travels down the right arm to the hand, constant. He thinks it is a pinched nerve. He will have some N/T in the arm but has more pain than anything. He has had an ablation about 3 years ago. Any movement of the head causes severe pain. Looking down slightly to the left is the only comfortable position. Tried Medrol dose jordan.        Vitals:    Vitals:    12/10/24 1115   BP: (!) 167/100   Pulse: 76   SpO2: 98%   Weight: 153 lb (69.4 kg)   Height: 5' 7\" (1.702 m)       BMI:  Estimated body mass index is 23.96 kg/m  as calculated from the following:    Height as of this encounter: 5' 7\" (1.702 m).    Weight as of this encounter: 153 lb (69.4 kg).    Pain Score:  Severe Pain (7)        BRANDON Rodrigues to follow up with Primary Care provider regarding elevated blood pressure.    "

## 2024-12-10 NOTE — LETTER
"12/10/2024      Stone De Paz  [address] [address] [place] [id number]      Dear Colleague,    Thank you for referring your patient, Stone De Paz, to the [facility]. Please see a copy of my visit note below.    [facility] Neurosurgery  Neurosurgery Followup:    HPI: 59M who presents today to discuss acute on chronic neck and arm pain. He states his pain substantially increased after lumbar CT myelogram 2 weeks ago. Reports severe neck pain to the right shoulder and into the right arm and 3rd-5th digits. No paresthesias or overt weakness. States he is unable to get comfortable. Tried MDP, oxycodone, and robaxin without much benefit. Slight improvement since onset.     Medical, surgical, family, and social history unchanged since prior exam.    Exam:  Constitutional:  Alert, well nourished, NAD. Acute distress due to pain. Left head tilt forward.   HEENT: Normocephalic, atraumatic.   Pulm:  Without shortness of breath   CV:  No pitting edema of BLE.     Vital Signs:  BP (!) 167/100   Pulse 76   Ht 5' 7\" (1.702 m)   Wt 153 lb (69.4 kg)   SpO2 98%   BMI 23.96 kg/m      Neurological:  Awake  Alert  Oriented x 3  Motor exam:     Shoulder Abduction:  Right:  5/5    Left:  5/5  Biceps:                      Right:  5/5    Left:  5/5  Triceps:                     Right:  5/5    Left:  5/5  Wrist Extensors:       Right:  5/5    Left:  5/5  Wrist Flexors:           Right:  5/5    Left:  5/5  Intrinsics:                  Right:  5/5    Left:  5/5     Able to spontaneously move U/E bilaterally, any RUE movement leads to severe pain.   Sensation intact throughout all U/E dermatomes    Imaging:    Narrative & Impression   CT CERVICAL SPINE WITHOUT CONTRAST June 12, 2024 2:27 PM      HISTORY: Neck pain, history of spine surgery.     TECHNIQUE: Axial images of the cervical spine were obtained without  intravenous contrast. Multiplanar reformations were performed.  Radiation dose for this scan " was reduced using automated exposure  control, adjustment of the mA and/or kV according to patient size, or  iterative reconstruction technique.     COMPARISON: Cervical spine CT 11/1/2018.     FINDINGS: Anterior plate and screw fixation hardware with  intervertebral disc spacer again present at C5-C6 level. Hardware  appears intact and in similar alignment to prior CT 11/1/2018. No  lucency around the surgical screws to suggest loosening. 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 Solid bony fusion of the right  C3 and C4 facets. No acute lucent fracture line visualized. Presumed  Schmorl's node deformity at the superior C7 endplate which is new  since 11/1/2018. Persistent straightening of the normal cervical  lordosis.     Level by level as follows:     C2-C3: No loss of vertebral body height. Shallow posterior disc bulge  with mild uncinate spurring left greater than right. Marked asymmetric  left-sided facet hypertrophy. No spinal canal narrowing. Mild right  neural foraminal narrowing. Moderate left neural foraminal narrowing.      C3-C4: Moderate loss of disc height. Posterior disc bulge with  endplate osteophytic spurring which is more pronounced towards the  right foraminal region. Right greater than left facet hypertrophy with  fusion of the right facet joint. No significant spinal canal  narrowing. Moderate right neural foraminal narrowing. Mild left neural  foraminal narrowing.      C4-C5: Moderate loss of disc height. Circumferential disc bulge with  endplate osteophytic spurring. Marked right greater than left facet  hypertrophy. Mild spinal canal narrowing. Moderate to severe right  neural foraminal narrowing. Mild left neural foraminal narrowing.      C5-C6: Postoperative changes with anterior metallic hardware at the  disc space. Endplate osteophytic spurring. Normal facets. Mild spinal  canal narrowing. Moderate bilateral neural foraminal narrowing.      C6-C7: Marked loss of disc height  with degenerative endplate changes.  Circumferential disc bulge with endplate osteophytic spurring. Normal  facets. Mild spinal canal narrowing. Moderate bilateral neural  foraminal narrowing.      C7-T1: Mild loss of disc height. Posterior disc bulge with mild  endplate osteophytic spurring. Mild facet hypertrophy. No spinal canal  narrowing. Moderate bilateral neural foraminal narrowing.      Visualized paraspinous tissues: Unremarkable.                                                                      IMPRESSION:   1. Postoperative changes at the C5-C6 level. Hardware appears intact  without evidence of loosening. Endplate osteophytic spurring at this  level causes mild spinal canal narrowing as well as moderate bilateral  neural foraminal narrowing which is similar to CT 11/1/2018.   2. Progression of degenerative findings at the C6-C7 level since prior  CT. There is increased disc bulge at this level and new Schmorl's node  deformity at the superior C7 endplate. There is now mild spinal canal  narrowing as well as moderate bilateral neural foraminal narrowing.  3. Additional degenerative changes in the cervical spine as detailed  above otherwise appears similar to prior CT 11/1/2018.  4. At C4-C5, there is mild spinal canal narrowing as well as moderate  to severe right and mild left neural foraminal narrowing.  5. At C3-C4, there is moderate right and mild left neural foraminal  narrowing.  6. At C2-C3, there is mild right and moderate left neural foraminal  narrowing.       A/P: Cervical radiculopathy, hx of cervical spine fusion    Will obtain cervical CT at this time. He is unable to get an MRI and refusing CT myelogram. I will contact him with the results and further recommendations. Discussed red flag symptoms and when to present to the ED. He verbalized understanding and agreement.     Patient Instructions   -Cervical CT ordered. Please complete. I will contact you with the results and further  recommendations.  -Please contact our clinic with questions or concerns at [phone].    Dina Chong, MORRIS  [address] [address]  Tel [phone]  Fax [phone]      Again, thank you for allowing me to participate in the care of your patient.        Sincerely,        Dina Chong, NP

## 2024-12-10 NOTE — PATIENT INSTRUCTIONS
-Cervical CT ordered. Please complete. I will contact you with the results and further recommendations.  -Please contact our clinic with questions or concerns at [phone].

## 2024-12-10 NOTE — PROGRESS NOTES
"[facility] Neurosurgery  Neurosurgery Followup:    HPI: 59M who presents today to discuss acute on chronic neck and arm pain. He states his pain substantially increased after lumbar CT myelogram 2 weeks ago. Reports severe neck pain to the right shoulder and into the right arm and 3rd-5th digits. No paresthesias or overt weakness. States he is unable to get comfortable. Tried MDP, oxycodone, and robaxin without much benefit. Slight improvement since onset.     Medical, surgical, family, and social history unchanged since prior exam.    Exam:  Constitutional:  Alert, well nourished, NAD. Acute distress due to pain. Left head tilt forward.   HEENT: Normocephalic, atraumatic.   Pulm:  Without shortness of breath   CV:  No pitting edema of BLE.     Vital Signs:  BP (!) 167/100   Pulse 76   Ht 5' 7\" (1.702 m)   Wt 153 lb (69.4 kg)   SpO2 98%   BMI 23.96 kg/m      Neurological:  Awake  Alert  Oriented x 3  Motor exam:     Shoulder Abduction:  Right:  5/5    Left:  5/5  Biceps:                      Right:  5/5    Left:  5/5  Triceps:                     Right:  5/5    Left:  5/5  Wrist Extensors:       Right:  5/5    Left:  5/5  Wrist Flexors:           Right:  5/5    Left:  5/5  Intrinsics:                  Right:  5/5    Left:  5/5     Able to spontaneously move U/E bilaterally, any RUE movement leads to severe pain.   Sensation intact throughout all U/E dermatomes    Imaging:    Narrative & Impression   CT CERVICAL SPINE WITHOUT CONTRAST June 12, 2024 2:27 PM      HISTORY: Neck pain, history of spine surgery.     TECHNIQUE: Axial images of the cervical spine were obtained without  intravenous contrast. Multiplanar reformations were performed.  Radiation dose for this scan was reduced using automated exposure  control, adjustment of the mA and/or kV according to patient size, or  iterative reconstruction technique.     COMPARISON: Cervical spine CT 11/1/2018.     FINDINGS: Anterior plate and screw fixation " hardware with  intervertebral disc spacer again present at C5-C6 level. Hardware  appears intact and in similar alignment to prior CT 11/1/2018. No  lucency around the surgical screws to suggest loosening. 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 Solid bony fusion of the right  C3 and C4 facets. No acute lucent fracture line visualized. Presumed  Schmorl's node deformity at the superior C7 endplate which is new  since 11/1/2018. Persistent straightening of the normal cervical  lordosis.     Level by level as follows:     C2-C3: No loss of vertebral body height. Shallow posterior disc bulge  with mild uncinate spurring left greater than right. Marked asymmetric  left-sided facet hypertrophy. No spinal canal narrowing. Mild right  neural foraminal narrowing. Moderate left neural foraminal narrowing.      C3-C4: Moderate loss of disc height. Posterior disc bulge with  endplate osteophytic spurring which is more pronounced towards the  right foraminal region. Right greater than left facet hypertrophy with  fusion of the right facet joint. No significant spinal canal  narrowing. Moderate right neural foraminal narrowing. Mild left neural  foraminal narrowing.      C4-C5: Moderate loss of disc height. Circumferential disc bulge with  endplate osteophytic spurring. Marked right greater than left facet  hypertrophy. Mild spinal canal narrowing. Moderate to severe right  neural foraminal narrowing. Mild left neural foraminal narrowing.      C5-C6: Postoperative changes with anterior metallic hardware at the  disc space. Endplate osteophytic spurring. Normal facets. Mild spinal  canal narrowing. Moderate bilateral neural foraminal narrowing.      C6-C7: Marked loss of disc height with degenerative endplate changes.  Circumferential disc bulge with endplate osteophytic spurring. Normal  facets. Mild spinal canal narrowing. Moderate bilateral neural  foraminal narrowing.      C7-T1: Mild loss of disc height. Posterior  disc bulge with mild  endplate osteophytic spurring. Mild facet hypertrophy. No spinal canal  narrowing. Moderate bilateral neural foraminal narrowing.      Visualized paraspinous tissues: Unremarkable.                                                                      IMPRESSION:   1. Postoperative changes at the C5-C6 level. Hardware appears intact  without evidence of loosening. Endplate osteophytic spurring at this  level causes mild spinal canal narrowing as well as moderate bilateral  neural foraminal narrowing which is similar to CT 11/1/2018.   2. Progression of degenerative findings at the C6-C7 level since prior  CT. There is increased disc bulge at this level and new Schmorl's node  deformity at the superior C7 endplate. There is now mild spinal canal  narrowing as well as moderate bilateral neural foraminal narrowing.  3. Additional degenerative changes in the cervical spine as detailed  above otherwise appears similar to prior CT 11/1/2018.  4. At C4-C5, there is mild spinal canal narrowing as well as moderate  to severe right and mild left neural foraminal narrowing.  5. At C3-C4, there is moderate right and mild left neural foraminal  narrowing.  6. At C2-C3, there is mild right and moderate left neural foraminal  narrowing.       A/P: Cervical radiculopathy, hx of cervical spine fusion    Will obtain cervical CT at this time. He is unable to get an MRI and refusing CT myelogram. I will contact him with the results and further recommendations. Discussed red flag symptoms and when to present to the ED. He verbalized understanding and agreement.     Patient Instructions   -Cervical CT ordered. Please complete. I will contact you with the results and further recommendations.  -Please contact our clinic with questions or concerns at [phone].    Dina Chong, [facility] Neurosurgery  [address] [place] [id number]  Tel [phone]  Fax [phone]

## 2024-12-11 ENCOUNTER — TELEPHONE (OUTPATIENT)
Dept: NEUROSURGERY | Facility: CLINIC | Age: 59
End: 2024-12-11
Payer: COMMERCIAL

## 2024-12-11 ENCOUNTER — MYC MEDICAL ADVICE (OUTPATIENT)
Dept: NEUROSURGERY | Facility: CLINIC | Age: 59
End: 2024-12-11
Payer: COMMERCIAL

## 2024-12-11 DIAGNOSIS — M54.12 CERVICAL RADICULOPATHY: Primary | ICD-10-CM

## 2024-12-11 NOTE — TELEPHONE ENCOUNTER
Pt requesting to stay within  and is willing to travel. XR SLOAN order canceled. Pain management order placed so he can go anywhere within .

## 2024-12-11 NOTE — TELEPHONE ENCOUNTER
Contacted patient to discuss cervical CT results. Will proceed with ROSAURA at this time. He is open to traveling to get the injection sooner. Will have nursing reach out to him with options. He verbalized understanding and agreement.

## 2024-12-11 NOTE — TELEPHONE ENCOUNTER
Pt sent myc with external options. Will fax order when he updates us on where he would like to go for ROSAURA.

## 2024-12-12 ENCOUNTER — TELEPHONE (OUTPATIENT)
Dept: NEUROSURGERY | Facility: CLINIC | Age: 59
End: 2024-12-12
Payer: COMMERCIAL

## 2024-12-12 ENCOUNTER — HOSPITAL ENCOUNTER (OUTPATIENT)
Facility: CLINIC | Age: 59
End: 2024-12-12
Attending: ANESTHESIOLOGY | Admitting: ANESTHESIOLOGY
Payer: MEDICARE

## 2024-12-12 ENCOUNTER — MYC MEDICAL ADVICE (OUTPATIENT)
Dept: NEUROSURGERY | Facility: CLINIC | Age: 59
End: 2024-12-12
Payer: COMMERCIAL

## 2024-12-12 NOTE — TELEPHONE ENCOUNTER
Pt now stating he wants order for Dr. Edwards. Multiple back and forth communication. Will place case request and cancel pain management order.

## 2024-12-12 NOTE — TELEPHONE ENCOUNTER
Patient called to schedule ROSAURA, he would like it done by Dr. Edwards.  Please enter a case request so we can proceed

## 2024-12-16 NOTE — TELEPHONE ENCOUNTER
Notified from management that a clinic has opened up and would like to offer an appt to the patient    Called wife Ada as she has been doing much of the communication with the team    Attempted to reach out to patient, no answer. Left voice message for them to call clinic back to further discuss.     Jeffrey sent

## 2024-12-17 NOTE — TELEPHONE ENCOUNTER
Hendry back from patient that they would actually prefer to discuss injection further with the performing provider    Dr Edwards does not do consults    Will discuss with the team for a [facility] option

## 2024-12-23 ENCOUNTER — MYC REFILL (OUTPATIENT)
Dept: FAMILY MEDICINE | Facility: CLINIC | Age: 59
End: 2024-12-23
Payer: COMMERCIAL

## 2024-12-23 DIAGNOSIS — G89.4 CHRONIC PAIN SYNDROME: ICD-10-CM

## 2024-12-23 DIAGNOSIS — M48.02 CERVICAL STENOSIS OF SPINE: ICD-10-CM

## 2024-12-23 NOTE — TELEPHONE ENCOUNTER
Management called Ada and JANET that pt has been scheduled for a telephone visit on 12/31   The provider will be in [place] that day     Called Stone's phone and JANET    Sent update via [employer] and asked for a call back or [employer] message to confirm the appt

## 2024-12-23 NOTE — CONFIDENTIAL NOTE
"Ada has never once answered the phone when I called  I continued to mychart her with updates as this is the way we have been communicating  I have offered numerous times to schedule a phone call   She continues to ask for Dina to call her.     Offered patient an appt on 1/3 to discuss injection concerns which will be prior to the inejction with Dr Edwards and they claim to not have \"gotten the message\" as they didn't check [employer] messages until 3 days later and then appts were no longer available    Discussed with Dina who will see them but at the next available appt time    Routed to management for advisement       "

## 2024-12-24 RX ORDER — OXYCODONE AND ACETAMINOPHEN 5; 325 MG/1; MG/1
1-2 TABLET ORAL EVERY 6 HOURS PRN
Qty: 100 TABLET | Refills: 0 | Status: SHIPPED | OUTPATIENT
Start: 2024-12-24

## 2024-12-26 NOTE — TELEPHONE ENCOUNTER
"Left Voicemail with Family Member (1st Attempt) for the patient to call back and schedule the following:    Location:  Neurosurgery  Provider: Dina Chong  Appointment type: return adult  Appointment mode: in person  Return date: next available    Specialty phone number: [phone]    Is Imaging Needed: no  Imaging Phone Number to provide to patient: n/a    Additional Notes: please help reschedule appointment 12/31 as above, appointment notes to state \"DISCUSS INJECTION ORDERS, (NO CHARGE VISIT per management)\"  "

## 2024-12-26 NOTE — TELEPHONE ENCOUNTER
Patient sent [employer] message to cancel 1/31 appt and requesting to reschedule. Encounter routed to scheduling team.

## 2025-01-21 ENCOUNTER — LAB (OUTPATIENT)
Dept: LAB | Facility: CLINIC | Age: 60
End: 2025-01-21
Payer: COMMERCIAL

## 2025-01-21 DIAGNOSIS — Z96.41 INSULIN PUMP STATUS: ICD-10-CM

## 2025-01-21 DIAGNOSIS — K31.84 GASTROPARESIS: ICD-10-CM

## 2025-01-21 DIAGNOSIS — E87.1 HYPONATREMIA: ICD-10-CM

## 2025-01-21 DIAGNOSIS — E10.43 TYPE 1 DIABETES MELLITUS WITH DIABETIC AUTONOMIC NEUROPATHY (H): ICD-10-CM

## 2025-01-21 DIAGNOSIS — E10.40 TYPE 1 DIABETES MELLITUS WITH DIABETIC NEUROPATHY (H): ICD-10-CM

## 2025-01-21 DIAGNOSIS — E03.9 HYPOTHYROIDISM, UNSPECIFIED TYPE: ICD-10-CM

## 2025-01-21 LAB
ANION GAP SERPL CALCULATED.3IONS-SCNC: 11 MMOL/L (ref 7–15)
BUN SERPL-MCNC: 7.9 MG/DL (ref 6–20)
CALCIUM SERPL-MCNC: 9.3 MG/DL (ref 8.8–10.4)
CHLORIDE SERPL-SCNC: 95 MMOL/L (ref 98–107)
CREAT SERPL-MCNC: 1.02 MG/DL (ref 0.67–1.17)
CREAT UR-MCNC: 60.4 MG/DL
EGFRCR SERPLBLD CKD-EPI 2021: 85 ML/MIN/1.73M2
EST. AVERAGE GLUCOSE BLD GHB EST-MCNC: 169 MG/DL
GLUCOSE SERPL-MCNC: 134 MG/DL (ref 70–99)
HBA1C MFR BLD: 7.5 %
HCO3 SERPL-SCNC: 26 MMOL/L (ref 22–29)
MICROALBUMIN UR-MCNC: <12 MG/L
MICROALBUMIN/CREAT UR: NORMAL MG/G{CREAT}
POTASSIUM SERPL-SCNC: 4.3 MMOL/L (ref 3.4–5.3)
SODIUM SERPL-SCNC: 132 MMOL/L (ref 135–145)
T4 FREE SERPL-MCNC: 1.54 NG/DL (ref 0.9–1.7)
TSH SERPL DL<=0.005 MIU/L-ACNC: 4.63 UIU/ML (ref 0.3–4.2)

## 2025-01-21 PROCEDURE — 84439 ASSAY OF FREE THYROXINE: CPT

## 2025-01-21 PROCEDURE — 80048 BASIC METABOLIC PNL TOTAL CA: CPT

## 2025-01-21 PROCEDURE — 36415 COLL VENOUS BLD VENIPUNCTURE: CPT

## 2025-01-21 PROCEDURE — 83036 HEMOGLOBIN GLYCOSYLATED A1C: CPT

## 2025-01-21 PROCEDURE — 84443 ASSAY THYROID STIM HORMONE: CPT

## 2025-01-21 PROCEDURE — 82043 UR ALBUMIN QUANTITATIVE: CPT

## 2025-01-21 PROCEDURE — 82570 ASSAY OF URINE CREATININE: CPT

## 2025-01-22 ENCOUNTER — TELEPHONE (OUTPATIENT)
Dept: NEUROSURGERY | Facility: CLINIC | Age: 60
End: 2025-01-22

## 2025-01-22 ENCOUNTER — VIRTUAL VISIT (OUTPATIENT)
Dept: ENDOCRINOLOGY | Facility: CLINIC | Age: 60
End: 2025-01-22
Payer: COMMERCIAL

## 2025-01-22 DIAGNOSIS — E03.9 HYPOTHYROIDISM, UNSPECIFIED TYPE: ICD-10-CM

## 2025-01-22 DIAGNOSIS — K31.84 GASTROPARESIS: ICD-10-CM

## 2025-01-22 DIAGNOSIS — E10.43 TYPE 1 DIABETES MELLITUS WITH DIABETIC AUTONOMIC NEUROPATHY (H): Primary | ICD-10-CM

## 2025-01-22 DIAGNOSIS — Z96.41 INSULIN PUMP STATUS: ICD-10-CM

## 2025-01-22 DIAGNOSIS — E10.40 TYPE 1 DIABETES MELLITUS WITH DIABETIC NEUROPATHY (H): ICD-10-CM

## 2025-01-22 NOTE — PROGRESS NOTES
"Virtual Visit Details    Type of service:  Video Visit   Video Start Time:   11:05 AM  Video End Time:  11:25 AM    Originating Location (pt. Location): Home  Distant Location (provider location):  Off-site  Platform used for Video Visit: Terri      Recent issues:  Diabetes and thyroid follow-up evaluation  Using the Medtronic 780G with Guardian4 CGM (since 1/2024)  Has malabsorption and chronic diarrhea, diagnosis of exocrine pancreas insufficiency (EPI)   Takes Zenpep (35,000 mg/dose, plan as 1-capsule TID vs QID, ~$800/mo but using 1 caps every other day (also GasEx), has implantable gastric pacer   Worsening mid and lower back, plans CT-myelogram in [facility]   His follow-up management plan unclear, though states he has appt with Dina Chong CNP soon  Not using the Guardian4 sensor, states his insurance plan now charging him for sensors... too expensive   He also learned Medtronic out-of-network and his copay 20% fee... now using Contour Next test strips           1995. Diagnosis of diabetes mellitus after 2nd back surgery, age 29  Initial treatment with insulin injections using NPH and Regular insulin  Switched to premixed 70/30 insulin BID dosing  1999. Started Medtronic insulin pump model 508, used base-dose Humalog insulin at meals  Had seen Dr. Iggy Briggs/[facility]     7/24/01. Initial evaluation with me at my former clinic ([facility])  Upgraded pump to Medtronic 523   Used the Medtronic 630G pump  Meals BID, had carb \"exchanges\" with his pump settings  Chronic high BG and hgbA1c trends despite dose changes  Tried square wave bolus  Has Contour Link? BG meter, variable testing pattern   Recently testing 4x/day for at least 60 days  Glucose sensor (CGM) use:   Tried Medtronic CGMS in the past, didn't like it   Switched to DexcomG5, used for approx 6 months but issues with getting supply order   Early 2019, started Freestyle Sukhi but issues with skin site redness  Meal schedule:  " Snacks during the day, supper meal 5-7pm  Upgraded to Medtronic 770G  12/2023. Upgraded to Medtronic 780G with Guardian4 CGM   Novolog in pump    Recent Medtronic 780G pump settings:       Recent Carelink data:            Previous [facility] labs include:    Previous [facility] hgbA1c trends include:  Lab Results   Component Value Date    A1C 7.5 (H) 01/21/2025    A1C 6.9 (H) 11/19/2024    A1C 7.0 (H) 04/01/2024    A1C 8.9 (H) 11/02/2023    A1C 8.9 (H) 07/25/2023      Recent [facility] labs include:  Lab Results   Component Value Date    A1C 7.5 (H) 01/21/2025     (L) 01/21/2025    POTASSIUM 4.3 01/21/2025    CHLORIDE 95 (L) 01/21/2025    CO2 26 01/21/2025    ANIONGAP 11 01/21/2025     (H) 01/21/2025    BUN 7.9 01/21/2025    CR 1.02 01/21/2025    GFRESTIMATED 85 01/21/2025    GFRESTBLACK >90 03/01/2021    EMILEE 9.3 01/21/2025    CPEPT <0.1 (L) 06/24/2021    CHOL 142 07/25/2023    TRIG 47 07/25/2023    HDL 65 07/25/2023    LDL 68 07/25/2023    NHDL 77 07/25/2023    UCRR 60.4 01/21/2025    MICROL <12.0 01/21/2025    UMALCR  01/21/2025      Comment:      Unable to calculate, urine albumin and/or urine creatinine is outside detectable limits.  Microalbuminuria is defined as an albumin:creatinine ratio of 17 to 299 for males and 25 to 299 for females. A ratio of albumin:creatinine of 300 or higher is indicative of overt proteinuria.  Due to biologic variability, positive results should be confirmed by a second, first-morning random or 24-hour timed urine specimen. If there is discrepancy, a third specimen is recommended. When 2 out of 3 results are in the microalbuminuria range, this is evidence for incipient nephropathy and warrants increased efforts at glucose control, blood pressure control, and institution of therapy with an angiotensin-converting-enzyme (ACE) inhibitor (if the patient can tolerate it).       Last eye exam with Dr. AMANUEL Del Angel 11/2023, no DR   7/2018. Met with Destiny Warren RD, CDE at [facility]  DM  Complications:              Neuropathy                          Peripheral neuropathy                                      Decreased sensation at feet                          Autonomic neuropathy- gastroparesis                                      Symptoms of nausea, bloating, episodes of diarrhea and emesis                                      Has Medtronic gastric stimulator                                      Sees physicians and Ms WARREN Rosario/[facility]                  Thyroid:  1994. History of hypothyroidism  Chronic levothyroxine treatment, has used Levoxyl in the past  Supplements:  Takes vitamin D 1000 U daily  7/2018. Had levothyroxine dose increase   Recent labs include:  Lab Results   Component Value Date    TSH 4.63 (H) 01/21/2025    T4 1.54 01/21/2025     Current dose:  Levothyroxine 0.125 mg daily        Lives in [place], [place] with Ada, also (1) adopted dog named Marko  Has seen Dr. Mushtaq Haq/[facility] for general medicine evaluations  Also sees Olga Ramirez PAC and Dr. Jennifer Thompson/[facility] clinic.       PMH/PSH:  Past Medical History:   Diagnosis Date    Arthritis     Chronic neck pain     percocet for years now,     Depressive disorder     Exocrine pancreatic insufficiency     Gastroparesis due to DM (H)     gastric stimulator helps, [facility] manages that    Hyperlipidemia     Hypertension     Hypothyroidism     Neuropathy, diabetic (H)     Type 1 diabetes (H) age 31    neuropathy- peripheral and autonomic     Past Surgical History:   Procedure Laterality Date    ABDOMEN SURGERY  Dec. 13 2023    This is the 10th gastric stimulator [employer] had inplanted due to battery life    BIOPSY  ?    Non cancerous polups.    COLONOSCOPY  07/18/2012    FUSION CERVICAL ANTERIOR TWO LEVELS      HEAD & NECK SURGERY      INJECT BLOCK MEDIAL BRANCH CERVICAL/THORACIC/LUMBAR Bilateral 11/20/2018    Procedure: Bilateral Cervical 3-4-5 medial branch block;  Surgeon: Jef Edwards MD;  Location:   OR    INJECT BLOCK MEDIAL BRANCH CERVICAL/THORACIC/LUMBAR N/A 01/11/2019    Procedure: Cervical 3,4,5 Bilateral Medial branch blocks;  Surgeon: Jef Edwards MD;  Location: [facility] OR    ORTHOPEDIC SURGERY      PHACOEMULSIFICATION WITH STANDARD INTRAOCULAR LENS IMPLANT Left 1/18/2024    Procedure: Phacoemulsification with a standard intraocular lens implant, Left;  Surgeon: Vazquez Aleman MD;  Location: [facility] OR    PHACOEMULSIFICATION WITH STANDARD INTRAOCULAR LENS IMPLANT Right 2/29/2024    Procedure: Phacoemulsification with standard intraocular lens implant, right;  Surgeon: Camron Duran MD;  Location: [facility] OR    RADIO FREQUENCY ABLATION PULSED CERVICAL Right 02/28/2019    Procedure: RADIO FREQUENCY ABLATION CERVICAL THREE, FOUR, FIVE, AND THIRD OCCUPITAL NERVE RIGHT SIDE;  Surgeon: Jef Edwards MD;  Location: [facility] OR    RADIO FREQUENCY ABLATION PULSED CERVICAL Left 03/15/2019    Procedure: radiofrequency ablation cervical 3,4,5;  Surgeon: Jef Edwards MD;  Location: [facility] OR    RADIO FREQUENCY ABLATION PULSED CERVICAL Right 09/04/2020    Procedure: RADIOFREQUENCY ABLATION CERVICAL 3,4 5 right SIDE;  Surgeon: Jef Edwards MD;  Location: [facility] OR    RADIO FREQUENCY ABLATION PULSED CERVICAL Left 09/10/2020    Procedure: RADIOFREQUENCY ABLATION CERVICAL 3,4 5 LEFT SIDE;  Surgeon: Jef Edwards MD;  Location: [facility] OR    SOFT TISSUE SURGERY      THORACOSCOPIC DECORTICATION LUNG  01/09/2014    Procedure: THORACOSCOPIC DECORTICATION LUNG;  RIGHT VATS/RIGHT THORACOTOMY , DECORTICATION RIGHT LUNG ,WEDGE RESECTION RIGHT MIDDLE LOBE LUNG NODULE;  Surgeon: Harley Felix MD;  Location:  OR    ZZC LUMBAR SPINE FUSION,ANTER APPRCH      2 L4-5 L5-21       Family Hx:  Family History   Problem Relation Age of Onset    Hypertension Mother     Diabetes Father         type 2    Hypertension Father     Cerebrovascular Disease Maternal Grandmother     Unknown/Adopted Maternal Grandfather     Unknown/Adopted  Paternal Grandmother     Unknown/Adopted Paternal Grandfather     Liver Disease Brother     Heart Disease Sister     Thyroid Disease Sister     Thyroid Disease Sister     Thyroid Disease Sister          Social Hx:  Social History     Socioeconomic History    Marital status:      Spouse name: Not on file    Number of children: Not on file    Years of education: Not on file    Highest education level: Not on file   Occupational History    Not on file   Tobacco Use    Smoking status: Former     Current packs/day: 0.00     Types: Cigarettes     Quit date: 1/1/2010     Years since quitting: 15.0    Smokeless tobacco: Never   Vaping Use    Vaping status: Never Used   Substance and Sexual Activity    Alcohol use: No    Drug use: Yes     Types: Marijuana, Oxycodone     Comment: medical marijuana    Sexual activity: Not Currently     Comment: Prescription Marijuana   Other Topics Concern    Parent/sibling w/ CABG, MI or angioplasty before 65F 55M? Yes   Social History Narrative    Not on file     Social Drivers of Health     Financial Resource Strain: Low Risk  (1/11/2024)    Financial Resource Strain     Within the past 12 months, have you or your family members you live with been unable to get utilities (heat, electricity) when it was really needed?: No   Food Insecurity: Low Risk  (1/11/2024)    Food Insecurity     Within the past 12 months, did you worry that your food would run out before you got money to buy more?: No     Within the past 12 months, did the food you bought just not last and you didn t have money to get more?: No   Transportation Needs: Low Risk  (1/11/2024)    Transportation Needs     Within the past 12 months, has lack of transportation kept you from medical appointments, getting your medicines, non-medical meetings or appointments, work, or from getting things that you need?: No   Physical Activity: Not on file   Stress: Not on file   Social Connections: Unknown (12/12/2023)    Received from  [facility], [facility]    Social Connections     Frequency of Communication with Friends and Family: Not on file   Interpersonal Safety: Low Risk  (11/28/2023)    Interpersonal Safety     Do you feel physically and emotionally safe where you currently live?: Yes     Within the past 12 months, have you been hit, slapped, kicked or otherwise physically hurt by someone?: No     Within the past 12 months, have you been humiliated or emotionally abused in other ways by your partner or ex-partner?: No   Housing Stability: Low Risk  (1/11/2024)    Housing Stability     Do you have housing? : Yes     Are you worried about losing your housing?: No          MEDICATIONS:  has a current medication list which includes the following prescription(s): lipase-protease-amylase, atorvastatin, citalopram, insulin aspart, levothyroxine, lidocaine-prilocaine, lisinopril, medical cannabis, methocarbamol, naproxen, omeprazole, ondansetron, oxycodone-acetaminophen, sumatriptan, lipase-protease-amylase, contour next test, insulin aspart, methocarbamol, naloxone, and oxycodone.    ROS: 10 point ROS neg other than the symptoms noted above in the HPI.     GENERAL: some fatigue, mild wt gain; denies fevers, chills, night sweats.   HEENT: no dysphagia, odonophagia, diplopia, neck pain  THYROID:  no apparent hyper or hypothyroid symptoms  CV: no recent chest pain, pressure, palpitations  LUNGS: no SOB, KRAFT, cough, wheezing   ABDOMEN: indigestion and some diarrhea, postmeal nausea, frequent BM's; no constipation  EXTREMITIES: no rashes, ulcers, edema  NEUROLOGY: no headaches, denies changes in vision, tingling, extremitiy numbness   MSK: worsening back pains, neck pain/stiffness; no muscle weakness  SKIN: no rashes or lesions  PSYCH:  stable mood, no significant anxiety or depression  ENDOCRINE: no heat or cold intolerance    Physical Exam (visual exam)  VS:  no vital signs taken  for video visit  CONSTITUTIONAL: healthy, alert and NAD, well dressed, answering questions appropriately  ENT: no nose swelling or nasal discharge, mouth redness or gum changes.  EYES: eyes grossly normal to inspection, conjunctivae and sclerae normal, no exophthalmos or proptosis  THYROID:  no apparent nodules or goiter  LUNGS: no audible wheeze, cough or visible cyanosis, no visible retractions or increased work of breathing  ABDOMEN: abdomen not evaluated  EXTREMITIES: no hand tremors, limited exam  NEUROLOGY: CN grossly intact, mentation intact and speech normal   SKIN:  no apparent skin lesions, rash, or edema with visualized skin appearance  PSYCH: mentation appears normal, affect normal/bright, judgement and insight intact,   normal speech and appearance well groomed       LABS:     All pertinent notes, labs, and images personally reviewed by me.       A/P:  Encounter Diagnoses   Name Primary?    Type 1 diabetes mellitus with diabetic autonomic neuropathy (H) Yes    Type 1 diabetes mellitus with diabetic neuropathy (H)     Gastroparesis     Insulin pump status     Hypothyroidism, unspecified type      Comments:  Reviewed complicated health history, diabetes and thyroid issues.  Digestive problems relate to irreg meal intake, delayed absorption from gastroparesis, exocrine insufficiency, and need for gastric pacer   Suboptimal treatment of the exocrine pancreatic insufficiency with infrequent pancreatic enzyme supplement use  Recent CGM trends had been more labile, then less data when no Guardian4 sensor use  Reviewed and interpreted tests that I previously ordered.   Ordered appropriate tests for the endocrinology disease management.    Management options discussed and implemented after shared medical decision making with the patient.  Diabetes problem is chronic and stable, thyroid problem with exacerbation progression- mild hyperthyroidism    Plan:  Reviewed the overall T1DM management and insulin pump  use.  Discussed optimal BG testing to assess glucose trends.  We reviewed insulin pump settings, basal rate and bolus dosing  Use of automated pump bolus dosing for meal/snack carb & correction dosing  Reviewed the Medtronic Carelink report data today  Reviewed recent Medtronic Guardian4 CGM glucose sensor trends, in detail     Recommend:  Continue use of the Medtronic 780G with the diabetes management    No pump setting changes at this time.    Encouraged patient to call the [facility] T[employer] rep regarding the Medtronic supplies and insurance issues,    Get advice on another Medtronic  to investigate his insurance coverage  Needs to resume use of the Guardian4 CGM sensors (and pump auto mode), if possible  Use genuine meal carb estimates with premeal bolusin, if possible  Consider followup diabetes education appt with the CDE at [facility]  Reviewed use of Temp Target for suspected hypoglycemia trends, exercise or when more active   He will lower the meal carb estimate for meal following also   Keep focus on diet, exercise as tolerated, and weight management  Continue simvastatin 40 mg each evening  Continue the vitamin D 1000 U capsule daily  Continue the current levothyroxine 0.125 mg daily at this time  Repeat non-fasting labs in 4/2025   Testing at [facility]   Lab orders placed  Arrange annual dilated eye exam, fasting lipid panel testing.  We reviewed importance of timely clinic appointments with me Q3 months, to satisfy Medicare with his diabetes device coverage  Will communicate feedback with his testing by phonecall and/or USMail    Keep regular follow-up evaluations with gastroenterology provider  Addressed patient's questions today.    The longitudinal plan of care for the endocrine problem(s) were addressed during this visit.  Due to added complexity of care,   we will continue to support the patient and the subsequent management of this condition with ongoing  continuity of care.    There are no Patient Instructions on file for this visit.    Future labs ordered today:   Orders Placed This Encounter   Procedures    TSH    T4 free    Hemoglobin A1c    Basic metabolic panel     Radiology/Consults ordered today: None    Total time spent on day of encounter:  25 min    Follow-up:  4/28/25 at 1:30pm VVAm,    8/6/25 at 12 pm Return    VIKTOR Best MD, MS  Endocrinology  [facility]    CC:  Care Team

## 2025-01-22 NOTE — TELEPHONE ENCOUNTER
Attempted to reach patient to remind them about appointment scheduled with Dina Chong NP on 1/23/25 in our [facility].    A voicemail was left with a call back number if the patient has questions or would like to reschedule.

## 2025-01-23 ENCOUNTER — OFFICE VISIT (OUTPATIENT)
Dept: NEUROSURGERY | Facility: CLINIC | Age: 60
End: 2025-01-23
Payer: COMMERCIAL

## 2025-01-23 VITALS
WEIGHT: 156 LBS | BODY MASS INDEX: 24.48 KG/M2 | TEMPERATURE: 99 F | HEIGHT: 67 IN | DIASTOLIC BLOOD PRESSURE: 64 MMHG | SYSTOLIC BLOOD PRESSURE: 126 MMHG | HEART RATE: 90 BPM

## 2025-01-23 DIAGNOSIS — M47.812 FACET ARTHROPATHY, CERVICAL: Primary | ICD-10-CM

## 2025-01-23 DIAGNOSIS — G89.29 CHRONIC SI JOINT PAIN: Primary | ICD-10-CM

## 2025-01-23 DIAGNOSIS — M53.3 CHRONIC SI JOINT PAIN: Primary | ICD-10-CM

## 2025-01-23 ASSESSMENT — PAIN SCALES - GENERAL: PAINLEVEL_OUTOF10: MODERATE PAIN (5)

## 2025-01-23 NOTE — LETTER
"1/23/2025      Stone De Paz  [address] [address] [place] [id number]      Dear Colleague,    Thank you for referring your patient, Stone De Paz, to the [facility] [place]. Please see a copy of my visit note below.    Stone De Paz is a 59 year old male who presents for:  Chief Complaint   Patient presents with     Neurologic Problem        Initial Vitals:  /64 (BP Location: Right arm, Cuff Size: Adult Regular)   Pulse 90   Temp 99  F (37.2  C) (Temporal)   Ht 5' 7\" (1.702 m)   Wt 156 lb (70.8 kg)   BMI 24.43 kg/m   Estimated body mass index is 24.43 kg/m  as calculated from the following:    Height as of this encounter: 5' 7\" (1.702 m).    Weight as of this encounter: 156 lb (70.8 kg).. Body surface area is 1.83 meters squared. BP completed using cuff size: regular  Moderate Pain (5)    Nursing Comments:     Jena Randall, BRANDON      [facility] Neurosurgery  Neurosurgery Follow Up:    HPI: Stone De Paz is a 59 year old male with a history of L5-S2 fusion many years ago who presents with a few months of worsening left>right low back pain. He denies any injury at the onset. Reports back pain which radiates to the bilateral posterior thighs and groins as well. No paresthesias or overt weakness.   Presents for a follow up. Reports ongoing bilateral low back and groin pain pain as well as bilateral buttock pain.    Ongoing neck pain with improved right arm pain.     Medical, surgical, family, and social history unchanged since prior exam.  Exam:  Constitutional:  Alert, well nourished, NAD.  HEENT: Normocephalic, atraumatic.   Pulm:  Without shortness of breath   CV:  No pitting edema of BLE.      Vital Signs:  /64 (BP Location: Right arm, Cuff Size: Adult Regular)   Pulse 90   Temp 99  F (37.2  C) (Temporal)   Ht 5' 7\" (1.702 m)   Wt 156 lb (70.8 kg)   BMI 24.43 kg/m      Neurological:  Awake  Alert  Oriented x 3  Motor exam:        IP Q DF PF EHL  R   5  5   5   5   "  5  L   5  5   5   5    5     Able to spontaneously move L/E bilaterally  Sensation intact throughout all L/E dermatomes    SI joint testing:  Ron: positive left>right  Magdalena: positive left>right  Distraction: positive left>right  Pelvic compression: positive left>right     Imaging:   IMPRESSION:  1. Transitional lumbosacral anatomy, with presumed 5 lumbar vertebral  bodies and a transitional partially lumbarized S1 vertebra.  2. No acute osseous abnormality.  3. Solid bilateral posterior fusion L5-S2.  4. Multilevel degenerative changes, as described.  5. At L3-L4, severe spinal canal stenosis and at least moderate  bilateral neural foraminal stenosis.  6. At L4-L5, moderate bordering on moderate to severe central spinal  canal stenosis with at least moderate bilateral neural foraminal  stenosis.  7. Lesser degrees of degenerative change/stenosis at other levels.  Please see body of report for additional details.    A/P: Bilateral SI joint pain, cervical radiculopathy  He would like to proceed with bilateral SI joint injections as well as repeat cervical facet injections. He is unable to tolerate PT at this time due to the degree of pain he is in. Will order injections. He would like to start with SI joint injections first. Orders placed. He verbalized understanding and agreement.   Patient Instructions   -Bilateral SI joint injections ordered. They will contact you to schedule.   -Repeat cervical facet MBB workup for RFA ordered. They will contact you to schedule.   -Please contact our clinic with questions or concerns at [phone].    Dina Chong, MORRIS  [facility] Neurosurgery  [address] [place] [address]  Tel [phone]  Fax [phone]      Again, thank you for allowing me to participate in the care of your patient.        Sincerely,        Dina Chong, NP    Electronically signed

## 2025-01-23 NOTE — PROGRESS NOTES
"Stone De Paz is a 59 year old male who presents for:  Chief Complaint   Patient presents with    Neurologic Problem        Initial Vitals:  /64 (BP Location: Right arm, Cuff Size: Adult Regular)   Pulse 90   Temp 99  F (37.2  C) (Temporal)   Ht 5' 7\" (1.702 m)   Wt 156 lb (70.8 kg)   BMI 24.43 kg/m   Estimated body mass index is 24.43 kg/m  as calculated from the following:    Height as of this encounter: 5' 7\" (1.702 m).    Weight as of this encounter: 156 lb (70.8 kg).. Body surface area is 1.83 meters squared. BP completed using cuff size: regular  Moderate Pain (5)    Nursing Comments:     Jena Randall CMA    "

## 2025-01-23 NOTE — PROGRESS NOTES
"[facility] Neurosurgery  Neurosurgery Follow Up:    HPI: Stone De Paz is a 59 year old male with a history of L5-S2 fusion many years ago who presents with a few months of worsening left>right low back pain. He denies any injury at the onset. Reports back pain which radiates to the bilateral posterior thighs and groins as well. No paresthesias or overt weakness.   Presents for a follow up. Reports ongoing bilateral low back and groin pain pain as well as bilateral buttock pain.    Ongoing neck pain with improved right arm pain.     Medical, surgical, family, and social history unchanged since prior exam.  Exam:  Constitutional:  Alert, well nourished, NAD.  HEENT: Normocephalic, atraumatic.   Pulm:  Without shortness of breath   CV:  No pitting edema of BLE.      Vital Signs:  /64 (BP Location: Right arm, Cuff Size: Adult Regular)   Pulse 90   Temp 99  F (37.2  C) (Temporal)   Ht 5' 7\" (1.702 m)   Wt 156 lb (70.8 kg)   BMI 24.43 kg/m      Neurological:  Awake  Alert  Oriented x 3  Motor exam:        IP Q DF PF EHL  R   5  5   5   5    5  L   5  5   5   5    5     Able to spontaneously move L/E bilaterally  Sensation intact throughout all L/E dermatomes    SI joint testing:  Ron: positive left>right  Magdalena: positive left>right  Distraction: positive left>right  Pelvic compression: positive left>right     Imaging:   IMPRESSION:  1. Transitional lumbosacral anatomy, with presumed 5 lumbar vertebral  bodies and a transitional partially lumbarized S1 vertebra.  2. No acute osseous abnormality.  3. Solid bilateral posterior fusion L5-S2.  4. Multilevel degenerative changes, as described.  5. At L3-L4, severe spinal canal stenosis and at least moderate  bilateral neural foraminal stenosis.  6. At L4-L5, moderate bordering on moderate to severe central spinal  canal stenosis with at least moderate bilateral neural foraminal  stenosis.  7. Lesser degrees of degenerative change/stenosis at other " levels.  Please see body of report for additional details.    A/P: Bilateral SI joint pain, cervical radiculopathy  He would like to proceed with bilateral SI joint injections as well as repeat cervical facet injections. He is unable to tolerate PT at this time due to the degree of pain he is in. Will order injections. He would like to start with SI joint injections first. Orders placed. He verbalized understanding and agreement.   Patient Instructions   -Bilateral SI joint injections ordered. They will contact you to schedule.   -Repeat cervical facet MBB workup for RFA ordered. They will contact you to schedule.   -Please contact our clinic with questions or concerns at [phone].    Dina Chong, [facility] Neurosurgery  [address] [place] [address]  Tel [phone]  Fax [phone]

## 2025-01-23 NOTE — PATIENT INSTRUCTIONS
-Bilateral SI joint injections ordered. They will contact you to schedule.   -Repeat cervical facet MBB workup for RFA ordered. They will contact you to schedule.   -Please contact our clinic with questions or concerns at [phone].

## 2025-01-28 ENCOUNTER — MYC REFILL (OUTPATIENT)
Dept: FAMILY MEDICINE | Facility: CLINIC | Age: 60
End: 2025-01-28
Payer: COMMERCIAL

## 2025-01-28 DIAGNOSIS — M48.02 CERVICAL STENOSIS OF SPINE: ICD-10-CM

## 2025-01-28 DIAGNOSIS — G89.4 CHRONIC PAIN SYNDROME: ICD-10-CM

## 2025-01-28 RX ORDER — OXYCODONE AND ACETAMINOPHEN 5; 325 MG/1; MG/1
1-2 TABLET ORAL EVERY 6 HOURS PRN
Qty: 100 TABLET | Refills: 0 | Status: SHIPPED | OUTPATIENT
Start: 2025-01-28

## 2025-02-13 ENCOUNTER — MEDICAL CORRESPONDENCE (OUTPATIENT)
Dept: HEALTH INFORMATION MANAGEMENT | Facility: CLINIC | Age: 60
End: 2025-02-13

## 2025-02-27 ENCOUNTER — HOSPITAL ENCOUNTER (OUTPATIENT)
Facility: CLINIC | Age: 60
Discharge: HOME | End: 2025-02-27
Attending: ANESTHESIOLOGY | Admitting: ANESTHESIOLOGY
Payer: MEDICARE

## 2025-02-27 VITALS
TEMPERATURE: 98.2 F | HEART RATE: 71 BPM | DIASTOLIC BLOOD PRESSURE: 86 MMHG | RESPIRATION RATE: 16 BRPM | OXYGEN SATURATION: 95 % | SYSTOLIC BLOOD PRESSURE: 158 MMHG

## 2025-02-27 PROCEDURE — 250N000009 HC RX 250: Performed by: ANESTHESIOLOGY

## 2025-02-27 PROCEDURE — 27096 INJECT SACROILIAC JOINT: CPT | Mod: 50 | Performed by: ANESTHESIOLOGY

## 2025-02-27 PROCEDURE — 250N000011 HC RX IP 250 OP 636: Performed by: ANESTHESIOLOGY

## 2025-02-27 RX ORDER — LIDOCAINE HYDROCHLORIDE 40 MG/ML
INJECTION, SOLUTION RETROBULBAR PRN
Status: DISCONTINUED | OUTPATIENT
Start: 2025-02-27 | End: 2025-02-27 | Stop reason: HOSPADM

## 2025-02-27 RX ORDER — IOPAMIDOL 612 MG/ML
INJECTION, SOLUTION INTRATHECAL PRN
Status: DISCONTINUED | OUTPATIENT
Start: 2025-02-27 | End: 2025-02-27 | Stop reason: HOSPADM

## 2025-02-27 RX ORDER — TRIAMCINOLONE ACETONIDE 40 MG/ML
INJECTION, SUSPENSION INTRA-ARTICULAR; INTRAMUSCULAR PRN
Status: DISCONTINUED | OUTPATIENT
Start: 2025-02-27 | End: 2025-02-27 | Stop reason: HOSPADM

## 2025-02-27 ASSESSMENT — ACTIVITIES OF DAILY LIVING (ADL)
ADLS_ACUITY_SCORE: 41

## 2025-02-27 NOTE — OP NOTE
CHIEF COMPLAINT:    1.Sacroiliac joint pain (Sacroilitis 724.6)     INDICATIONS FOR PROCEDURE:  1.This patient suffers from moderate to severe bilateral low back pain and bilateral buttock pain  2.This pain has persisted for more than 3 months and is causing significant functional disability when they are trying to perform ADL's.   3. They failed conservative care which consisted of giving this pain time to nila, meds, PT, previous spine surgery.  4. Preoperative NRS pain score was verbally reported to me today as a  8/10.   5. The patients patient's preop exam reveals pain predominately below L5 with no major radicular pains and a positive bilateral-sided Finger Ron sign, a positive bilateral-sided SRIDEVI maneuver, and a positive Gaenslen's test bilaterally.  6.  An order was sent to me to perform the technical component of a bilateral sacroiliac joint injection with local anesthetic and steroid.    PROCEDURE:   Fluoroscopically-guided injection of the bilateral  sacroiliac joint with local anesthetic and steroid.    PROCEDURE DETAILS: After written informed consent was obtained from the patient, the patient was escorted to the procedure room.  The patient was placed in the prone position.   A time out was conducted to verify patient identity, procedure to be performed, side, site, allergies and any special requirements.  The skin over the lumbosacral region was prepped and draped in normal sterile fashion. Fluoroscopy was used to identify the posteroinferior region of the right sacroiliac joint.  The skin was anesthetized with 2 mL of 1% lidocaine with bicarbonate buffer.  Using fluoroscopic guidance, a 22 gauge, 3.5  Quincke spinal needle was advanced into the joint from an inferior approach.  After negative aspiration, 0.5 ml of Isovue contrast was injected showing intra-articular spread of contrast without evidence of intravascular spread. I then injected 1.5 mL of a solution consisting of 20 mg of  "Triamcinolone and 1.0 cc of 2.0% Lidocaine was injected slowly into the joint.  I then moved to his right side after his left side was easily completed and injected with corticosteroid and local anesthetic.  On the right side I struggle to get into the joint.  There was a 1 point about 30% of the contrast going into the joint and about 70% extravasating out of the joint.  Multiple different needle angles and attempts were made to get all of the injected contrast into the joint but I was unable to do that and therefore I did inject a larger volume of solution to overcome some the extravasation of the injectate out of the joint.  I injected 3 cc of 4% lidocaine with 20 mg of triamcinolone into the right joint and of course some of this extravasated out of the joint.  Images were saved after the intra-articular needle placement was confirmed with contrast and after a \"washout\" of the contrast from the local anesthetic/steroid mixture was injected. This was then repeated on the left side to complete the procedure bilaterally.  The needle was then removed and sterile bandage was placed over the needle insertion site.      DIAGNOSIS:  1. Bilateral sacroilitis  PLAN:  1. Performed a technically successful bilateral Sacroiliac joint injection.  2. Home exercise program instructions for SI joint mediated pain provided  3. NRS pain score 30 minutes post-procedure was a 2/10    Jef Edwards MD  Diplomate of the American Board of Anesthesiology, Pain Medicine   "

## 2025-02-27 NOTE — DISCHARGE INSTRUCTIONS

## 2025-03-03 ENCOUNTER — MYC REFILL (OUTPATIENT)
Dept: FAMILY MEDICINE | Facility: CLINIC | Age: 60
End: 2025-03-03
Payer: COMMERCIAL

## 2025-03-03 DIAGNOSIS — G89.4 CHRONIC PAIN SYNDROME: ICD-10-CM

## 2025-03-03 DIAGNOSIS — M48.02 CERVICAL STENOSIS OF SPINE: ICD-10-CM

## 2025-03-04 DIAGNOSIS — G58.8 INTERCOSTAL NEURALGIA: ICD-10-CM

## 2025-03-04 DIAGNOSIS — B02.29 POST HERPETIC NEURALGIA: ICD-10-CM

## 2025-03-05 ENCOUNTER — MYC MEDICAL ADVICE (OUTPATIENT)
Dept: FAMILY MEDICINE | Facility: CLINIC | Age: 60
End: 2025-03-05
Payer: COMMERCIAL

## 2025-03-05 RX ORDER — LIDOCAINE AND PRILOCAINE 25; 25 MG/G; MG/G
CREAM TOPICAL PRN
Qty: 30 G | Refills: 3 | Status: SHIPPED | OUTPATIENT
Start: 2025-03-05

## 2025-03-05 RX ORDER — OXYCODONE AND ACETAMINOPHEN 5; 325 MG/1; MG/1
1-2 TABLET ORAL EVERY 6 HOURS PRN
Qty: 100 TABLET | Refills: 0 | Status: SHIPPED | OUTPATIENT
Start: 2025-03-05

## 2025-03-25 DIAGNOSIS — E78.5 HYPERLIPIDEMIA LDL GOAL <100: ICD-10-CM

## 2025-03-26 RX ORDER — ATORVASTATIN CALCIUM 80 MG/1
80 TABLET, FILM COATED ORAL
Qty: 360 TABLET | Refills: 0 | Status: SHIPPED | OUTPATIENT
Start: 2025-03-26

## 2025-04-01 ASSESSMENT — PATIENT HEALTH QUESTIONNAIRE - PHQ9
SUM OF ALL RESPONSES TO PHQ QUESTIONS 1-9: 9
10. IF YOU CHECKED OFF ANY PROBLEMS, HOW DIFFICULT HAVE THESE PROBLEMS MADE IT FOR YOU TO DO YOUR WORK, TAKE CARE OF THINGS AT HOME, OR GET ALONG WITH OTHER PEOPLE: SOMEWHAT DIFFICULT
SUM OF ALL RESPONSES TO PHQ QUESTIONS 1-9: 9

## 2025-04-02 ENCOUNTER — OFFICE VISIT (OUTPATIENT)
Dept: FAMILY MEDICINE | Facility: CLINIC | Age: 60
End: 2025-04-02
Payer: COMMERCIAL

## 2025-04-02 VITALS
WEIGHT: 145.7 LBS | HEART RATE: 72 BPM | BODY MASS INDEX: 22.87 KG/M2 | OXYGEN SATURATION: 96 % | HEIGHT: 67 IN | RESPIRATION RATE: 18 BRPM | DIASTOLIC BLOOD PRESSURE: 72 MMHG | TEMPERATURE: 98.5 F | SYSTOLIC BLOOD PRESSURE: 130 MMHG

## 2025-04-02 DIAGNOSIS — F11.90 CHRONIC, CONTINUOUS USE OF OPIOIDS: Primary | ICD-10-CM

## 2025-04-02 DIAGNOSIS — K31.84 GASTROPARESIS: ICD-10-CM

## 2025-04-02 DIAGNOSIS — G89.4 CHRONIC PAIN SYNDROME: ICD-10-CM

## 2025-04-02 DIAGNOSIS — Z79.899 MEDICATION MANAGEMENT: ICD-10-CM

## 2025-04-02 DIAGNOSIS — E78.5 HYPERLIPIDEMIA, UNSPECIFIED HYPERLIPIDEMIA TYPE: ICD-10-CM

## 2025-04-02 DIAGNOSIS — E10.43 TYPE 1 DIABETES MELLITUS WITH DIABETIC AUTONOMIC NEUROPATHY (H): ICD-10-CM

## 2025-04-02 DIAGNOSIS — M48.02 CERVICAL STENOSIS OF SPINE: ICD-10-CM

## 2025-04-02 DIAGNOSIS — M53.3 SI (SACROILIAC) JOINT DYSFUNCTION: ICD-10-CM

## 2025-04-02 DIAGNOSIS — I10 ESSENTIAL HYPERTENSION: ICD-10-CM

## 2025-04-02 DIAGNOSIS — K21.9 GASTROESOPHAGEAL REFLUX DISEASE WITHOUT ESOPHAGITIS: ICD-10-CM

## 2025-04-02 DIAGNOSIS — K86.81 EXOCRINE PANCREATIC INSUFFICIENCY: ICD-10-CM

## 2025-04-02 DIAGNOSIS — Z96.41 INSULIN PUMP STATUS: ICD-10-CM

## 2025-04-02 RX ORDER — OXYCODONE AND ACETAMINOPHEN 5; 325 MG/1; MG/1
1-2 TABLET ORAL EVERY 6 HOURS PRN
Qty: 100 TABLET | Refills: 0 | Status: SHIPPED | OUTPATIENT
Start: 2025-05-02

## 2025-04-02 RX ORDER — OXYCODONE AND ACETAMINOPHEN 5; 325 MG/1; MG/1
1-2 TABLET ORAL EVERY 6 HOURS PRN
Qty: 100 TABLET | Refills: 0 | Status: SHIPPED | OUTPATIENT
Start: 2025-04-02

## 2025-04-02 RX ORDER — OXYCODONE AND ACETAMINOPHEN 5; 325 MG/1; MG/1
1-2 TABLET ORAL EVERY 6 HOURS PRN
Qty: 100 TABLET | Refills: 0 | Status: SHIPPED | OUTPATIENT
Start: 2025-06-02

## 2025-04-02 ASSESSMENT — ANXIETY QUESTIONNAIRES
GAD7 TOTAL SCORE: 0
3. WORRYING TOO MUCH ABOUT DIFFERENT THINGS: NOT AT ALL
8. IF YOU CHECKED OFF ANY PROBLEMS, HOW DIFFICULT HAVE THESE MADE IT FOR YOU TO DO YOUR WORK, TAKE CARE OF THINGS AT HOME, OR GET ALONG WITH OTHER PEOPLE?: NOT DIFFICULT AT ALL
2. NOT BEING ABLE TO STOP OR CONTROL WORRYING: NOT AT ALL
1. FEELING NERVOUS, ANXIOUS, OR ON EDGE: NOT AT ALL
IF YOU CHECKED OFF ANY PROBLEMS ON THIS QUESTIONNAIRE, HOW DIFFICULT HAVE THESE PROBLEMS MADE IT FOR YOU TO DO YOUR WORK, TAKE CARE OF THINGS AT HOME, OR GET ALONG WITH OTHER PEOPLE: NOT DIFFICULT AT ALL
7. FEELING AFRAID AS IF SOMETHING AWFUL MIGHT HAPPEN: NOT AT ALL
5. BEING SO RESTLESS THAT IT IS HARD TO SIT STILL: NOT AT ALL
7. FEELING AFRAID AS IF SOMETHING AWFUL MIGHT HAPPEN: NOT AT ALL
6. BECOMING EASILY ANNOYED OR IRRITABLE: NOT AT ALL
4. TROUBLE RELAXING: NOT AT ALL

## 2025-04-02 ASSESSMENT — PAIN SCALES - GENERAL: PAINLEVEL_OUTOF10: MODERATE PAIN (5)

## 2025-04-02 NOTE — LETTER
Opioid / Opioid Plus Controlled Substance Agreement    This is an agreement between you and your provider about the safe and appropriate use of controlled substance/opioids prescribed by your care team. Controlled substances are medicines that can cause physical and mental dependence (abuse).    There are strict laws about having and using these medicines. We here at [facility] are committing to working with you in your efforts to get better. To support you in this work, we ll help you schedule regular office appointments for medicine refills. If we must cancel or change your appointment for any reason, we ll make sure you have enough medicine to last until your next appointment.     As a Provider, I will:  Listen carefully to your concerns and treat you with respect.   Recommend a treatment plan that I believe is in your best interest. This plan may involve therapies other than opioid pain medication.   Talk with you often about the possible benefits, and the risk of harm of any medicine that we prescribe for you.   Provide a plan on how to taper (discontinue or go off) using this medicine if the decision is made to stop its use.    As a Patient, I understand that opioid(s):   Are a controlled substance prescribed by my care team to help me function or work and manage my condition(s).   Are strong medicines and can cause serious side effects such as:  Drowsiness, which can seriously affect my driving ability  A lower breathing rate, enough to cause death  Harm to my thinking ability   Depression   Abuse of and addiction to this medicine  Need to be taken exactly as prescribed. Combining opioids with certain medicines or chemicals (such as illegal drugs, sedatives, sleeping pills, and benzodiazepines) can be dangerous or even fatal. If I stop opioids suddenly, I may have severe withdrawal symptoms.  Do not work for all types of pain nor for all patients. If they re not helpful, I may be asked to stop  them.        The risks, benefits and side effects of these medicine(s) were explained to me. I agree that:  I will take part in other treatments as advised by my care team. This may be psychiatry or counseling, physical therapy, behavioral therapy, group treatment or a referral to a specialist.     I will keep all my appointments. I understand that this is part of the monitoring of opioids. My care team may require an office visit for EVERY opioid/controlled substance refill. If I miss appointments or don t follow instructions, my care team may stop my medicine.    I will take my medicines as prescribed. I will not change the dose or schedule unless my care team tells me to. There will be no refills if I run out early.     I may be asked to come to the clinic and complete a urine drug test or complete a pill count at any time. If I don t give a urine sample or participate in a pill count, the care team may stop my medicine.    I will only receive prescriptions from this clinic for chronic pain. If I am treated by another provider for acute pain issues, I will tell them that I am taking opioid pain medication for chronic pain and that I have a treatment agreement with this provider. I will inform my [facility] care team within one business day if I am given a prescription for any pain medication by another healthcare provider. My [facility] care team can contact other providers and pharmacists about my use of any medicines.    It is up to me to make sure that I don t run out of my medicines on weekends or holidays. If my care team is willing to refill my opioid prescription without a visit, I must request refills only during office hours. Refills may take up to 3 business days to process. I will use one pharmacy to fill all my opioid and other controlled substance prescriptions. I will notify the clinic about any changes to my insurance or medication availability.    I am responsible for my  prescriptions. If the medicine/prescription is lost, stolen or destroyed, it will not be replaced. I also agree not to share controlled substance medicines with anyone.    I am aware I should not use any illegal or recreational drugs. I agree not to drink alcohol unless my care team says I can.       If I enroll in the [place] [facility] program, I will tell my care team prior to my next refill.     I will tell my care team right away if I become pregnant, have a new medical problem treated outside of my regular clinic, or have a change in my medications.    I understand that this medicine can affect my thinking, judgment and reaction time. Alcohol and drugs affect the brain and body, which can affect the safety of my driving. Being under the influence of alcohol or drugs can affect my decision-making, behaviors, personal safety, and the safety of others. Driving while impaired (DWI) can occur if a person is driving, operating, or in physical control of a car, motorcycle, boat, snowmobile, ATV, motorbike, off-road vehicle, or any other motor vehicle ([place] Statute 169A.20). I understand the risk if I choose to drive or operate any vehicle or machinery.    I understand that if I do not follow any of the conditions above, my prescriptions or treatment may be stopped or changed.          Opioids  What You Need to Know    What are opioids?   Opioids are pain medicines that must be prescribed by a doctor. They are also known as narcotics.     Examples are:   morphine (MS Contin, Sneha)  oxycodone (Oxycontin)  oxycodone and acetaminophen (Percocet)  hydrocodone and acetaminophen (Vicodin, Norco)   fentanyl patch (Duragesic)   hydromorphone (Dilaudid)   methadone  codeine (Tylenol #3)     What do opioids do well?   Opioids are best for severe short-term pain such as after a surgery or injury. They may work well for cancer pain. They may help some people with long-lasting (chronic) pain.     What do opioids NOT do  well?   Opioids never get rid of pain entirely, and they don t work well for most patients with chronic pain. Opioids don t reduce swelling, one of the causes of pain.                                    Other ways to manage chronic pain and improve function include:     Treat the health problem that may be causing pain  Anti-inflammation medicines, which reduce swelling and tenderness, such as ibuprofen (Advil, Motrin) or naproxen (Aleve)  Acetaminophen (Tylenol)  Antidepressants and anti-seizure medicines, especially for nerve pain  Topical treatments such as patches or creams  Injections or nerve blocks  Chiropractic or osteopathic treatment  Acupuncture, massage, deep breathing, meditation, visual imagery, aromatherapy  Use heat or ice at the pain site  Physical therapy   Exercise  Stop smoking  Take part in therapy       Risks and side effects     Talk to your doctor before you start or decide to keep taking opioids. Possible side effects include:    Lowering your breathing rate enough to cause death  Overdose, including death, especially if taking higher than prescribed doses  Worse depression symptoms; less pleasure in things you usually enjoy  Feeling tired or sluggish  Slower thoughts or cloudy thinking  Being more sensitive to pain over time; pain is harder to control  Trouble sleeping or restless sleep  Changes in hormone levels (for example, less testosterone)  Changes in sex drive or ability to have sex  Constipation  Unsafe driving  Itching and sweating  Dizziness  Nausea, throwing up and dry mouth    What else should I know about opioids?    Opioids may lead to dependence, tolerance, or addiction.    Dependence means that if you stop or reduce the medicine too quickly, you will have withdrawal symptoms. These include loose poop (diarrhea), jitters, flu-like symptoms, nervousness and tremors. Dependence is not the same as addiction.                     Tolerance means needing higher doses over time to  get the same effect. This may increase the chance of serious side effects.    Addiction is when people improperly use a substance that harms their body, their mind or their relations with others. Use of opiates can cause a relapse of addiction if you have a history of drug or alcohol abuse.    People who have used opioids for a long time may have a lower quality of life, worse depression, higher levels of pain and more visits to doctors.    You can overdose on opioids. Take these steps to lower your risk of overdose:    Recognize the signs:  Signs of overdose include decrease or loss of consciousness (blackout), slowed breathing, trouble waking up and blue lips. If someone is worried about overdose, they should call 911.    Talk to your doctor about Narcan (naloxone).   If you are at risk for overdose, you may be given a prescription for Narcan. This medicine very quickly reverses the effects of opioids.   If you overdose, a friend or family member can give you Narcan while waiting for the ambulance. They need to know the signs of overdose and how to give Narcan.     Don't use alcohol or street drugs.   Taking them with opioids can cause death.    Do not take any of these medicines unless your doctor says it s OK. Taking these with opioids can cause death:  Benzodiazepines, such as lorazepam (Ativan), alprazolam (Xanax) or diazepam (Valium)  Muscle relaxers, such as cyclobenzaprine (Flexeril)  Sleeping pills like zolpidem (Ambien)   Other opioids      How to keep you and other people safe while taking opioids:    Never share your opioids with others.  Opioid medicines are regulated by the Drug Enforcement Agency ([place]). Selling or sharing medications is a criminal act.    2. Be sure to store opioids in a secure place, locked up if possible. Young children can easily swallow them and overdose.    3. When you are traveling with your medicines, keep them in the original bottles. If you use a pill box, be sure you also  carry a copy of your medicine list from your clinic or pharmacy.    4. Safe disposal of opioids    Most pharmacies have places to get rid of medicine, called disposal kiosks. Medicine disposal options are also available in every [facility]. Search your county and  medication disposal  to find more options. You can find more details at:  https://www.pca.[facility].[place]./living-green/managing-unwanted-medications     I agree that my provider, clinic care team, and pharmacy may work with any city, state or federal law enforcement agency that investigates the misuse, sale, or other diversion of my controlled medicine. I will allow my provider to discuss my care with, or share a copy of, this agreement with any other treating provider, pharmacy or emergency room where I receive care.    I have read this agreement and have asked questions about anything I did not understand.    _______________________________________________________  Patient Signature - Stone De Paz _____________________                   Date     _______________________________________________________  Provider Signature - Mushtaq Haq MD   _____________________                   Date     _______________________________________________________  Witness Signature (required if provider not present while patient signing)   _____________________                   Date

## 2025-04-02 NOTE — PROGRESS NOTES
Assessment & Plan   Problem List Items Addressed This Visit          Nervous and Auditory    Chronic pain syndrome    Relevant Medications    oxyCODONE-acetaminophen (PERCOCET) 5-325 MG tablet    oxyCODONE-acetaminophen (PERCOCET) 5-325 MG tablet (Start on 5/2/2025)    oxyCODONE-acetaminophen (PERCOCET) 5-325 MG tablet (Start on 6/2/2025)    Type 1 diabetes mellitus with diabetic autonomic neuropathy (H)    Relevant Orders    Lipid panel reflex to direct LDL Non-fasting    FOOT EXAM (Completed)       Digestive    Gastroparesis    Exocrine pancreatic insufficiency    Gastroesophageal reflux disease without esophagitis       Endocrine    Hyperlipidemia       Circulatory    Essential hypertension       Behavioral    Chronic, continuous use of opioids - Primary    Relevant Orders    Urine Drug Screen       Other    Cervical stenosis of spine    Relevant Medications    oxyCODONE-acetaminophen (PERCOCET) 5-325 MG tablet    oxyCODONE-acetaminophen (PERCOCET) 5-325 MG tablet (Start on 5/2/2025)    oxyCODONE-acetaminophen (PERCOCET) 5-325 MG tablet (Start on 6/2/2025)    Insulin pump status     Other Visit Diagnoses       SI (sacroiliac) joint dysfunction        Relevant Medications    oxyCODONE-acetaminophen (PERCOCET) 5-325 MG tablet    oxyCODONE-acetaminophen (PERCOCET) 5-325 MG tablet (Start on 5/2/2025)    oxyCODONE-acetaminophen (PERCOCET) 5-325 MG tablet (Start on 6/2/2025)    Medication management        Relevant Orders    Urine Drug Screen           Controlled substance agreement reviewed and signed today.  Plan for repeat urine drug screen and will continue limiting Percocet to 100 tablets monthly.  Continue to work on other modalities to help control his pain and will follow-up with spine clinic regarding his SI joints.  Clearly source of his pain most recently.  Gastroparesis has been stable.  Continue to follow-up with endocrinology.  See me back in 6 months.  Sooner if new or worsening issues arise.     The  longitudinal plan of care for the diagnosis(es)/condition(s) as documented were addressed during this visit. Due to the added complexity in care, I will continue to support Stone in the subsequent management and with ongoing continuity of care.        Nabeel Ritter is a 59 year old, presenting for the following health issues:  Recheck Medication        4/2/2025     2:54 PM   Additional Questions   Roomed by Kelsie PEREZ   Accompanied by JULISSA Caballero         4/2/2025     2:54 PM   Patient Reported Additional Medications   Patient reports taking the following new medications vitamin d, gas-x     History of Present Illness       Back Pain:  He presents for follow up of back pain. Patient's back pain is a chronic problem.  Location of back pain:  Left lower back, right side of neck, right shoulder, right buttock, left buttock and right hip  Description of back pain: burning and sharp  Back pain spreads: right buttocks, left buttocks, right shoulder and right side of neck    Since patient first noticed back pain, pain is: always present, but gets better and worse  Does back pain interfere with his job:  Not applicable       He eats 0-1 servings of fruits and vegetables daily.He consumes 0 sweetened beverage(s) daily.He exercises with enough effort to increase his heart rate 10 to 19 minutes per day.  He exercises with enough effort to increase his heart rate 7 days per week.   He is taking medications regularly.        Patient following up for chronic pain management he continues on Percocet limited to 100 tablets monthly.  He did follow-up with spine clinic recently and had SI joint injections which gave him immediate relief with lidocaine but this did wear off.  He does think left has been better but right did not respond as well.  His right cervical radiculopathy has been better as well but did have severe pain after injection and he would like to avoid future neck injections.  He follows with endocrinology for his  "diabetes which has been stable.  He is working on the modalities to help control his pain as well.      Review of Systems  Constitutional, HEENT, cardiovascular, pulmonary, GI, , musculoskeletal, neuro, skin, endocrine and psych systems are negative, except as otherwise noted.      Objective    /72   Pulse 72   Temp 98.5  F (36.9  C) (Temporal)   Resp 18   Ht 1.702 m (5' 7\")   Wt 66.1 kg (145 lb 11.2 oz)   SpO2 96%   BMI 22.82 kg/m    Body mass index is 22.82 kg/m .  Physical Exam   GENERAL: alert and no distress  EYES: Eyes grossly normal to inspection, PERRL and conjunctivae and sclerae normal  HENT: nose and mouth without ulcers or lesions  NECK: no adenopathy, no asymmetry, masses, or scars  RESP: lungs clear to auscultation - no rales, rhonchi or wheezes  CV: regular rate and rhythm, normal S1 S2,  no peripheral edema  MS: no gross musculoskeletal defects noted, no edema  SKIN: no suspicious lesions or rashes  NEURO: Normal strength and tone, decreased sensation in both feet at toes, mentation intact and speech normal  PSYCH: mentation appears normal, affect normal/bright          Signed Electronically by: Mushtaq Haq MD    "

## 2025-04-22 DIAGNOSIS — M48.02 CERVICAL STENOSIS OF SPINE: ICD-10-CM

## 2025-04-22 DIAGNOSIS — G89.4 CHRONIC PAIN SYNDROME: ICD-10-CM

## 2025-04-23 ENCOUNTER — LAB (OUTPATIENT)
Dept: LAB | Facility: CLINIC | Age: 60
End: 2025-04-23
Payer: COMMERCIAL

## 2025-04-23 DIAGNOSIS — K31.84 GASTROPARESIS: ICD-10-CM

## 2025-04-23 DIAGNOSIS — E10.40 TYPE 1 DIABETES MELLITUS WITH DIABETIC NEUROPATHY (H): ICD-10-CM

## 2025-04-23 DIAGNOSIS — E03.9 HYPOTHYROIDISM, UNSPECIFIED TYPE: ICD-10-CM

## 2025-04-23 DIAGNOSIS — E10.43 TYPE 1 DIABETES MELLITUS WITH DIABETIC AUTONOMIC NEUROPATHY (H): ICD-10-CM

## 2025-04-23 DIAGNOSIS — Z96.41 INSULIN PUMP STATUS: ICD-10-CM

## 2025-04-23 LAB
ANION GAP SERPL CALCULATED.3IONS-SCNC: 11 MMOL/L (ref 7–15)
BUN SERPL-MCNC: 8.8 MG/DL (ref 6–20)
CALCIUM SERPL-MCNC: 9.2 MG/DL (ref 8.8–10.4)
CHLORIDE SERPL-SCNC: 96 MMOL/L (ref 98–107)
CHOLEST SERPL-MCNC: 118 MG/DL
CREAT SERPL-MCNC: 1.05 MG/DL (ref 0.67–1.17)
EGFRCR SERPLBLD CKD-EPI 2021: 82 ML/MIN/1.73M2
EST. AVERAGE GLUCOSE BLD GHB EST-MCNC: 177 MG/DL
FASTING STATUS PATIENT QL REPORTED: NO
FASTING STATUS PATIENT QL REPORTED: NO
GLUCOSE SERPL-MCNC: 219 MG/DL (ref 70–99)
HBA1C MFR BLD: 7.8 %
HCO3 SERPL-SCNC: 25 MMOL/L (ref 22–29)
HDLC SERPL-MCNC: 57 MG/DL
LDLC SERPL CALC-MCNC: 44 MG/DL
NONHDLC SERPL-MCNC: 61 MG/DL
POTASSIUM SERPL-SCNC: 4.3 MMOL/L (ref 3.4–5.3)
SODIUM SERPL-SCNC: 132 MMOL/L (ref 135–145)
T4 FREE SERPL-MCNC: 1.88 NG/DL (ref 0.9–1.7)
TRIGL SERPL-MCNC: 86 MG/DL
TSH SERPL DL<=0.005 MIU/L-ACNC: 0.69 UIU/ML (ref 0.3–4.2)

## 2025-04-23 PROCEDURE — 84443 ASSAY THYROID STIM HORMONE: CPT

## 2025-04-23 PROCEDURE — 80061 LIPID PANEL: CPT

## 2025-04-23 PROCEDURE — 84439 ASSAY OF FREE THYROXINE: CPT

## 2025-04-23 PROCEDURE — 83036 HEMOGLOBIN GLYCOSYLATED A1C: CPT

## 2025-04-23 PROCEDURE — 36415 COLL VENOUS BLD VENIPUNCTURE: CPT

## 2025-04-23 PROCEDURE — 80048 BASIC METABOLIC PNL TOTAL CA: CPT

## 2025-04-23 RX ORDER — NAPROXEN 500 MG/1
500 TABLET ORAL 2 TIMES DAILY PRN
Qty: 180 TABLET | Refills: 1 | Status: SHIPPED | OUTPATIENT
Start: 2025-04-23

## 2025-04-25 NOTE — LETTER
Opioid / Opioid Plus Controlled Substance Agreement    This is an agreement between you and your provider about the safe and appropriate use of controlled substance/opioids prescribed by your care team. Controlled substances are medicines that can cause physical and mental dependence (abuse).    There are strict laws about having and using these medicines. We here at [facility] are committing to working with you in your efforts to get better. To support you in this work, we ll help you schedule regular office appointments for medicine refills. If we must cancel or change your appointment for any reason, we ll make sure you have enough medicine to last until your next appointment.     As a Provider, I will:  Listen carefully to your concerns and treat you with respect.   Recommend a treatment plan that I believe is in your best interest. This plan may involve therapies other than opioid pain medication.   Talk with you often about the possible benefits, and the risk of harm of any medicine that we prescribe for you.   Provide a plan on how to taper (discontinue or go off) using this medicine if the decision is made to stop its use.    As a Patient, I understand that opioid(s):   Are a controlled substance prescribed by my care team to help me function or work and manage my condition(s).   Are strong medicines and can cause serious side effects such as:  Drowsiness, which can seriously affect my driving ability  A lower breathing rate, enough to cause death  Harm to my thinking ability   Depression   Abuse of and addiction to this medicine  Need to be taken exactly as prescribed. Combining opioids with certain medicines or chemicals (such as illegal drugs, sedatives, sleeping pills, and benzodiazepines) can be dangerous or even fatal. If I stop opioids suddenly, I may have severe withdrawal symptoms.  Do not work for all types of pain nor for all patients. If they re not helpful, I may be asked to stop  [de-identified] : This patient has bilateral knee osteoarthritis. I had a discussion with the patient, who is a candidate for total knee replacement. Risks, benefits and alternatives were discussed. At this point, the patient wants to hold off as the pain is not quite severe enough. An extensive discussion was conducted on the natural history of the disease and the variety of surgical and non-surgical options available to the patient including, but not limited to non-steroidal anti-inflammatory medications, steroid injections, physical therapy, maintenance of ideal body weight, and reduction of activity.  Today we performed bilateral intra-articular cortisone injections.   Celebrex prescribed. The patient will schedule an appointment as needed.  Informed consent for bilateral knee injections was obtained. All risks, benefits and alternatives were discussed. These included but were not limited to bleeding, infection, and allergic reaction.  All questions were answered. A time out was performed. The bilateral knees were prepped and draped in sterile fashion. Using sterile technique, the bilateral knees were each injected with 40mg of Kenalog, 4cc of 1% lidocaine, 4cc of 0.25% marcaine using a 21-gauge needle. A sterile dressing was applied. Post injection instructions were reviewed. The patient tolerated the procedure well. them.      The risks, benefits and side effects of these medicine(s) were explained to me. I agree that:  I will take part in other treatments as advised by my care team. This may be psychiatry or counseling, physical therapy, behavioral therapy, group treatment or a referral to a specialist.     I will keep all my appointments. I understand that this is part of the monitoring of opioids. My care team may require an office visit for EVERY opioid/controlled substance refill. If I miss appointments or don t follow instructions, my care team may stop my medicine.    I will take my medicines as prescribed. I will not change the dose or schedule unless my care team tells me to. There will be no refills if I run out early.     I may be asked to come to the clinic and complete a urine drug test or complete a pill count at any time. If I don t give a urine sample or participate in a pill count, the care team may stop my medicine.    I will only receive prescriptions from this clinic for chronic pain. If I am treated by another provider for acute pain issues, I will tell them that I am taking opioid pain medication for chronic pain and that I have a treatment agreement with this provider. I will inform my [facility] care team within one business day if I am given a prescription for any pain medication by another healthcare provider. My [facility] care team can contact other providers and pharmacists about my use of any medicines.    It is up to me to make sure that I don t run out of my medicines on weekends or holidays. If my care team is willing to refill my opioid prescription without a visit, I must request refills only during office hours. Refills may take up to 3 business days to process. I will use one pharmacy to fill all my opioid and other controlled substance prescriptions. I will notify the clinic about any changes to my insurance or medication availability.    I am responsible for my prescriptions.  If the medicine/prescription is lost, stolen or destroyed, it will not be replaced. I also agree not to share controlled substance medicines with anyone.    I am aware I should not use any illegal or recreational drugs. I agree not to drink alcohol unless my care team says I can.       If I enroll in the [place] [facility] program, I will tell my care team prior to my next refill.     I will tell my care team right away if I become pregnant, have a new medical problem treated outside of my regular clinic, or have a change in my medications.    I understand that this medicine can affect my thinking, judgment and reaction time. Alcohol and drugs affect the brain and body, which can affect the safety of my driving. Being under the influence of alcohol or drugs can affect my decision-making, behaviors, personal safety, and the safety of others. Driving while impaired (DWI) can occur if a person is driving, operating, or in physical control of a car, motorcycle, boat, snowmobile, ATV, motorbike, off-road vehicle, or any other motor vehicle ([place] Statute 169A.20). I understand the risk if I choose to drive or operate any vehicle or machinery.    I understand that if I do not follow any of the conditions above, my prescriptions or treatment may be stopped or changed.          Opioids  What You Need to Know    What are opioids?   Opioids are pain medicines that must be prescribed by a doctor. They are also known as narcotics.     Examples are:   morphine (MS Contin, Sneha)  oxycodone (Oxycontin)  oxycodone and acetaminophen (Percocet)  hydrocodone and acetaminophen (Vicodin, Norco)   fentanyl patch (Duragesic)   hydromorphone (Dilaudid)   methadone  codeine (Tylenol #3)     What do opioids do well?   Opioids are best for severe short-term pain such as after a surgery or injury. They may work well for cancer pain. They may help some people with long-lasting (chronic) pain.     What do opioids NOT do well?   Opioids  never get rid of pain entirely, and they don t work well for most patients with chronic pain. Opioids don t reduce swelling, one of the causes of pain.                                    Other ways to manage chronic pain and improve function include:     Treat the health problem that may be causing pain  Anti-inflammation medicines, which reduce swelling and tenderness, such as ibuprofen (Advil, Motrin) or naproxen (Aleve)  Acetaminophen (Tylenol)  Antidepressants and anti-seizure medicines, especially for nerve pain  Topical treatments such as patches or creams  Injections or nerve blocks  Chiropractic or osteopathic treatment  Acupuncture, massage, deep breathing, meditation, visual imagery, aromatherapy  Use heat or ice at the pain site  Physical therapy   Exercise  Stop smoking  Take part in therapy       Risks and side effects     Talk to your doctor before you start or decide to keep taking opioids. Possible side effects include:    Lowering your breathing rate enough to cause death  Overdose, including death, especially if taking higher than prescribed doses  Worse depression symptoms; less pleasure in things you usually enjoy  Feeling tired or sluggish  Slower thoughts or cloudy thinking  Being more sensitive to pain over time; pain is harder to control  Trouble sleeping or restless sleep  Changes in hormone levels (for example, less testosterone)  Changes in sex drive or ability to have sex  Constipation  Unsafe driving  Itching and sweating  Dizziness  Nausea, throwing up and dry mouth    What else should I know about opioids?    Opioids may lead to dependence, tolerance, or addiction.    Dependence means that if you stop or reduce the medicine too quickly, you will have withdrawal symptoms. These include loose poop (diarrhea), jitters, flu-like symptoms, nervousness and tremors. Dependence is not the same as addiction.                     Tolerance means needing higher doses over time to get the same  effect. This may increase the chance of serious side effects.    Addiction is when people improperly use a substance that harms their body, their mind or their relations with others. Use of opiates can cause a relapse of addiction if you have a history of drug or alcohol abuse.    People who have used opioids for a long time may have a lower quality of life, worse depression, higher levels of pain and more visits to doctors.    You can overdose on opioids. Take these steps to lower your risk of overdose:    Recognize the signs:  Signs of overdose include decrease or loss of consciousness (blackout), slowed breathing, trouble waking up and blue lips. If someone is worried about overdose, they should call 911.    Talk to your doctor about Narcan (naloxone).   If you are at risk for overdose, you may be given a prescription for Narcan. This medicine very quickly reverses the effects of opioids.   If you overdose, a friend or family member can give you Narcan while waiting for the ambulance. They need to know the signs of overdose and how to give Narcan.     Don't use alcohol or street drugs.   Taking them with opioids can cause death.    Do not take any of these medicines unless your doctor says it s OK. Taking these with opioids can cause death:  Benzodiazepines, such as lorazepam (Ativan), alprazolam (Xanax) or diazepam (Valium)  Muscle relaxers, such as cyclobenzaprine (Flexeril)  Sleeping pills like zolpidem (Ambien)   Other opioids      How to keep you and other people safe while taking opioids:    Never share your opioids with others.  Opioid medicines are regulated by the Drug Enforcement Agency ([place]). Selling or sharing medications is a criminal act.    2. Be sure to store opioids in a secure place, locked up if possible. Young children can easily swallow them and overdose.    3. When you are traveling with your medicines, keep them in the original bottles. If you use a pill box, be sure you also carry a copy  of your medicine list from your clinic or pharmacy.    4. Safe disposal of opioids    Most pharmacies have places to get rid of medicine, called disposal kiosks. Medicine disposal options are also available in every [facility]. Search your county and  medication disposal  to find more options. You can find more details at:  https://www.pca.[facility].[place]./living-green/managing-unwanted-medications     I agree that my provider, clinic care team, and pharmacy may work with any city, state or federal law enforcement agency that investigates the misuse, sale, or other diversion of my controlled medicine. I will allow my provider to discuss my care with, or share a copy of, this agreement with any other treating provider, pharmacy or emergency room where I receive care.    I have read this agreement and have asked questions about anything I did not understand.    _______________________________________________________  Patient Signature - Stone De Paz _____________________                   Date     _______________________________________________________  Provider Signature - Mushtaq Haq MD   _____________________                   Date     _______________________________________________________  Witness Signature (required if provider not present while patient signing)   _____________________                   Date

## 2025-04-28 ENCOUNTER — VIRTUAL VISIT (OUTPATIENT)
Dept: ENDOCRINOLOGY | Facility: CLINIC | Age: 60
End: 2025-04-28
Payer: COMMERCIAL

## 2025-04-28 DIAGNOSIS — Z96.41 INSULIN PUMP STATUS: ICD-10-CM

## 2025-04-28 DIAGNOSIS — E10.40 TYPE 1 DIABETES MELLITUS WITH DIABETIC NEUROPATHY (H): ICD-10-CM

## 2025-04-28 DIAGNOSIS — E87.1 HYPONATREMIA: ICD-10-CM

## 2025-04-28 DIAGNOSIS — K31.84 GASTROPARESIS: ICD-10-CM

## 2025-04-28 DIAGNOSIS — E03.9 HYPOTHYROIDISM, UNSPECIFIED TYPE: ICD-10-CM

## 2025-04-28 DIAGNOSIS — E10.43 TYPE 1 DIABETES MELLITUS WITH DIABETIC AUTONOMIC NEUROPATHY (H): Primary | ICD-10-CM

## 2025-04-28 PROCEDURE — 95251 CONT GLUC MNTR ANALYSIS I&R: CPT | Performed by: INTERNAL MEDICINE

## 2025-04-28 PROCEDURE — G2211 COMPLEX E/M VISIT ADD ON: HCPCS | Performed by: INTERNAL MEDICINE

## 2025-04-28 PROCEDURE — 98006 SYNCH AUDIO-VIDEO EST MOD 30: CPT | Performed by: INTERNAL MEDICINE

## 2025-04-28 RX ORDER — INSULIN ASPART 100 [IU]/ML
INJECTION, SOLUTION INTRAVENOUS; SUBCUTANEOUS
Qty: 60 ML | Refills: 3 | Status: SHIPPED | OUTPATIENT
Start: 2025-04-28

## 2025-04-28 NOTE — PROGRESS NOTES
"Virtual Visit Details    Type of service:  Video Visit   Video Start Time:  1:34 PM  Video End Time:  1:53 PM    Originating Location (pt. Location): Home  Distant Location (provider location):  Off-site  Platform used for Video Visit: Terri      Recent issues:  Diabetes and thyroid follow-up evaluation  Using the Medtronic 780G with Guardian4 CGM (since 1/2024)  Has malabsorption and chronic diarrhea, diagnosis of exocrine pancreas insufficiency (EPI)   Has used Zenpep (35,000 mg/dose, plan as 1-capsule TID vs QID, ~$800/mo... but not using now due to cost  Takes GasEx daily   Has implantable gastric pacer, yet perceives his gastric stimulator not working...   eating less, persistent nausea, and using medical marijuana but some vomiting  Previous issues unable to acquire Guardian 4 sensors 1/2025- early 3/2025   Worsening mid and lower back, plans CT[facility]myelogram in [facility]   Has seen iDna Chong CNP, then lower back (SI) steroid and lidocaine? injections             1995. Diagnosis of diabetes mellitus after 2nd back surgery, age 29  Initial treatment with insulin injections using NPH and Regular insulin  Switched to premixed 70/30 insulin BID dosing  1999. Started Medtronic insulin pump model 508, used base-dose Humalog insulin at meals  Had seen Dr. Iggy Briggs/[facility]     7/24/01. Initial evaluation with me at my former clinic ([facility])  Upgraded pump to Medtronic 523   Used the Medtronic 630G pump  Meals BID, had carb \"exchanges\" with his pump settings  Chronic high BG and hgbA1c trends despite dose changes  Tried square wave bolus  Has Contour Link? BG meter, variable testing pattern   Recently testing 4x/day for at least 60 days  Glucose sensor (CGM) use:   Tried Medtronic CGMS in the past, didn't like it   Switched to DexcomG5, used for approx 6 months but issues with getting supply order   Early 2019, started Freestyle Sukhi but issues with skin site redness  Meal schedule:  " Snacks during the day, supper meal 5-7pm  Upgraded to Medtronic 770G  12/2023. Upgraded to Medtronic 780G with Guardian4 CGM   Novolog in pump    Current Medtronic 780G pump settings:       Recent Carelink data:              Previous [facility] labs include:    Previous [facility] hgbA1c trends include:  Lab Results   Component Value Date    A1C 7.8 (H) 04/23/2025    A1C 7.5 (H) 01/21/2025    A1C 6.9 (H) 11/19/2024    A1C 7.0 (H) 04/01/2024    A1C 8.9 (H) 11/02/2023      Recent [facility] labs include:  Lab Results   Component Value Date    A1C 7.8 (H) 04/23/2025     (L) 04/23/2025    POTASSIUM 4.3 04/23/2025    CHLORIDE 96 (L) 04/23/2025    CO2 25 04/23/2025    ANIONGAP 11 04/23/2025     (H) 04/23/2025    BUN 8.8 04/23/2025    CR 1.05 04/23/2025    GFRESTIMATED 82 04/23/2025    GFRESTBLACK >90 03/01/2021    EMILEE 9.2 04/23/2025    CPEPT <0.1 (L) 06/24/2021    CHOL 118 04/23/2025    TRIG 86 04/23/2025    HDL 57 04/23/2025    LDL 44 04/23/2025    NHDL 61 04/23/2025    UCRR 60.4 01/21/2025    MICROL <12.0 01/21/2025    UMALCR  01/21/2025      Comment:      Unable to calculate, urine albumin and/or urine creatinine is outside detectable limits.  Microalbuminuria is defined as an albumin:creatinine ratio of 17 to 299 for males and 25 to 299 for females. A ratio of albumin:creatinine of 300 or higher is indicative of overt proteinuria.  Due to biologic variability, positive results should be confirmed by a second, first-morning random or 24-hour timed urine specimen. If there is discrepancy, a third specimen is recommended. When 2 out of 3 results are in the microalbuminuria range, this is evidence for incipient nephropathy and warrants increased efforts at glucose control, blood pressure control, and institution of therapy with an angiotensin-converting-enzyme (ACE) inhibitor (if the patient can tolerate it).       Last eye exam with Dr. AMANUEL Del Angel 11/2023, no DR   7/2018. Met with Destiny Warren RD, CDE at [facility]  cl[facility]  DM Complications:              Neuropathy                          Peripheral neuropathy                                      Decreased sensation at feet                          Autonomic neuropathy- gastroparesis                                      Symptoms of nausea, bloating, episodes of diarrhea and emesis                                      Has Medtronic gastric stimulator                                      Sees physicians and Ms WARREN Rosario/[facility]                  Thyroid:  1994. History of hypothyroidism  Chronic levothyroxine treatment, has used Levoxyl in the past  Supplements:  Takes vitamin D 1000 U daily  7/2018. Had levothyroxine dose increase   Recent labs include:  Lab Results   Component Value Date    TSH 0.69 04/23/2025    T4 1.88 (H) 04/23/2025     Current dose:  Levothyroxine 0.125 mg daily        Lives in [place], [place] with Ada, also (1) adopted dog named Marko  Has seen Dr. Mushtaq Haq/[facility] for general medicine evaluations  Also sees Olga Ramirez PAC and Dr. Jennifer Thompson/[facility].       PMH/PSH:  Past Medical History:   Diagnosis Date    Arthritis     Chronic neck pain     percocet for years now,     Depressive disorder     Exocrine pancreatic insufficiency     Gastroparesis due to DM (H)     gastric stimulator helps, [facility] manages that    Hyperlipidemia     Hypertension     Hypothyroidism     Neuropathy, diabetic (H)     Type 1 diabetes (H) age 31    neuropathy- peripheral and autonomic     Past Surgical History:   Procedure Laterality Date    ABDOMEN SURGERY  Dec. 13 2023    This is the 10th gastric stimulator brandon had inplanted due to battery life    BIOPSY  ?    Non cancerous polups.    COLONOSCOPY  07/18/2012    FUSION CERVICAL ANTERIOR TWO LEVELS      HEAD & NECK SURGERY      INJECT BLOCK MEDIAL BRANCH CERVICAL/THORACIC/LUMBAR Bilateral 11/20/2018    Procedure: Bilateral Cervical 3-4-5 medial branch block;  Surgeon: Jef Edwards MD;   Location: [facility]    INJECT BLOCK MEDIAL BRANCH CERVICAL/THORACIC/LUMBAR N/A 01/11/2019    Procedure: Cervical 3,4,5 Bilateral Medial branch blocks;  Surgeon: Jef Edwards MD;  Location: [facility]    INJECT JOINT SACROILIAC Bilateral 2/27/2025    Procedure: Fluoroscopically-guided injection of the bilateral sacroiliac joint with local anesthetic and steroid.;  Surgeon: Jef Edwards MD;  Location: [facility]    ORTHOPEDIC SURGERY      PHACOEMULSIFICATION WITH STANDARD INTRAOCULAR LENS IMPLANT Left 1/18/2024    Procedure: Phacoemulsification with a standard intraocular lens implant, Left;  Surgeon: Vazquez Aleman MD;  Location: [facility]    PHACOEMULSIFICATION WITH STANDARD INTRAOCULAR LENS IMPLANT Right 2/29/2024    Procedure: Phacoemulsification with standard intraocular lens implant, right;  Surgeon: Camron Duran MD;  Location: [facility]    RADIO FREQUENCY ABLATION PULSED CERVICAL Right 02/28/2019    Procedure: RADIO FREQUENCY ABLATION CERVICAL THREE, FOUR, FIVE, AND THIRD OCCUPITAL NERVE RIGHT SIDE;  Surgeon: Jef Edwards MD;  [facility]    RADIO FREQUENCY ABLATION PULSED CERVICAL Left 03/15/2019    Procedure: radiofrequency ablation cervical 3,4,5;  Surgeon: Jef Edwards MD;  [facility]    RADIO FREQUENCY ABLATION PULSED CERVICAL Right 09/04/2020    Procedure: RADIOFREQUENCY ABLATION CERVICAL 3,4 5 right SIDE;  Surgeon: Jef Edwards MD;  [facility]    RADIO FREQUENCY ABLATION PULSED CERVICAL Left 09/10/2020    Procedure: RADIOFREQUENCY ABLATION CERVICAL 3,4 5 LEFT SIDE;  Surgeon: Jef Edwards MD;  Location: [facility]    SOFT TISSUE SURGERY      THORACOSCOPIC DECORTICATION LUNG  01/09/2014    Procedure: THORACOSCOPIC DECORTICATION LUNG;  RIGHT VATS/RIGHT THORACOTOMY , DECORTICATION RIGHT LUNG ,WEDGE RESECTION RIGHT MIDDLE LOBE LUNG NODULE;  Surgeon: Harley Felix MD;  Location: [facility] OR    ZZC LUMBAR SPINE FUSION,ANTER APPRCH      2 L4-5 L5-21       Family Hx:  Family History    Problem Relation Age of Onset    Hypertension Mother     Diabetes Father         type 2    Hypertension Father     Cerebrovascular Disease Maternal Grandmother     Unknown/Adopted Maternal Grandfather     Unknown/Adopted Paternal Grandmother     Unknown/Adopted Paternal Grandfather     Liver Disease Brother     Heart Disease Sister     Thyroid Disease Sister     Thyroid Disease Sister     Thyroid Disease Sister          Social Hx:  Social History     Socioeconomic History    Marital status:      Spouse name: Not on file    Number of children: Not on file    Years of education: Not on file    Highest education level: Not on file   Occupational History    Not on file   Tobacco Use    Smoking status: Former     Current packs/day: 0.00     Types: Cigarettes     Quit date: 1/1/2010     Years since quitting: 15.3    Smokeless tobacco: Never   Vaping Use    Vaping status: Never Used   Substance and Sexual Activity    Alcohol use: No    Drug use: Yes     Types: Marijuana, Oxycodone     Comment: medical marijuana    Sexual activity: Not Currently     Comment: Prescription Marijuana   Other Topics Concern    Parent/sibling w/ CABG, MI or angioplasty before 65F 55M? Yes   Social History Narrative    Not on file     Social Drivers of Health     Financial Resource Strain: Low Risk  (1/11/2024)    Financial Resource Strain     Within the past 12 months, have you or your family members you live with been unable to get utilities (heat, electricity) when it was really needed?: No   Food Insecurity: Low Risk  (1/11/2024)    Food Insecurity     Within the past 12 months, did you worry that your food would run out before you got money to buy more?: No     Within the past 12 months, did the food you bought just not last and you didn t have money to get more?: No   Transportation Needs: Low Risk  (1/11/2024)    Transportation Needs     Within the past 12 months, has lack of transportation kept you from medical appointments,  getting your medicines, non-medical meetings or appointments, work, or from getting things that you need?: No   Physical Activity: Not on file   Stress: Not on file   Social Connections: Unknown (12/12/2023)    [facility]    Social Connections     Frequency of Communication with Friends and Family: Not on file   Interpersonal Safety: Low Risk  (11/28/2023)    Interpersonal Safety     Do you feel physically and emotionally safe where you currently live?: Yes     Within the past 12 months, have you been hit, slapped, kicked or otherwise physically hurt by someone?: No     Within the past 12 months, have you been humiliated or emotionally abused in other ways by your partner or ex-partner?: No   Housing Stability: Low Risk  (1/11/2024)    Housing Stability     Do you have housing? : Yes     Are you worried about losing your housing?: No          MEDICATIONS:  has a current medication list which includes the following prescription(s): insulin aspart, lipase-protease-amylase, lipase-protease-amylase, atorvastatin, citalopram, contour next test, levothyroxine, lidocaine-prilocaine, lisinopril, medical cannabis, methocarbamol, naproxen, omeprazole, ondansetron, oxycodone-acetaminophen, [START ON 5/2/2025] oxycodone-acetaminophen, [START ON 6/2/2025] oxycodone-acetaminophen, and sumatriptan.    ROS: 10 point ROS neg other than the symptoms noted above in the HPI.     GENERAL: some fatigue, mild wt gain; denies fevers, chills, night sweats.   HEENT: no dysphagia, odonophagia, diplopia, neck pain  THYROID:  no apparent hyper or hypothyroid symptoms  CV: no recent chest pain, pressure, palpitations  LUNGS: no SOB, KRAFT, cough, wheezing   ABDOMEN: indigestion, nausea, and some diarrhea, postmeal nausea, frequent BM's; no constipation  EXTREMITIES: no rashes, ulcers, edema  NEUROLOGY: no headaches, denies changes in vision, tingling,  extremitiy numbness   MSK: worsening back pains, neck pain/stiffness; no muscle weakness  SKIN: no rashes or lesions  PSYCH:  stable mood, no significant anxiety or depression  ENDOCRINE: no heat or cold intolerance    Physical Exam (visual exam)  VS:  no vital signs taken for video visit  CONSTITUTIONAL: healthy, alert and NAD, well dressed, answering questions appropriately  ENT: no nose swelling or nasal discharge, mouth redness or gum changes.  EYES: eyes grossly normal to inspection, conjunctivae and sclerae normal, no exophthalmos or proptosis  THYROID:  no apparent nodules or goiter  LUNGS: no audible wheeze, cough or visible cyanosis, no visible retractions or increased work of breathing  ABDOMEN: abdomen not evaluated  EXTREMITIES: no hand tremors, limited exam  NEUROLOGY: CN grossly intact, mentation intact and speech normal   SKIN:  no apparent skin lesions, rash, or edema with visualized skin appearance  PSYCH: mentation appears normal, affect normal/bright, judgement and insight intact,   normal speech and appearance well groomed       LABS:     All pertinent notes, labs, and images personally reviewed by me.       A/P:  Encounter Diagnoses   Name Primary?    Type 1 diabetes mellitus with diabetic autonomic neuropathy (H) Yes    Type 1 diabetes mellitus with diabetic neuropathy (H)     Gastroparesis     Insulin pump status     Hypothyroidism, unspecified type     Hyponatremia        Comments:  Reviewed complicated health history, diabetes and thyroid issues.  Digestive problems relate to irreg meal intake, delayed absorption from gastroparesis, exocrine insufficiency, and need for gastric pacer   Suboptimal treatment of the exocrine pancreatic insufficiency with infrequent pancreatic enzyme supplement use  Recent CGM trends good overall  Chronic back pain and indigestion issues  Cause for low sodium unclear  Reviewed and interpreted tests that I previously ordered.   Ordered appropriate tests for the  endocrinology disease management.    Management options discussed and implemented after shared medical decision making with the patient.  Diabetes problem is chronic and stable, thyroid problem with exacerbation progression- mild hyperthyroidism    Plan:  Reviewed the overall T1DM management and insulin pump use.  Discussed optimal BG testing to assess glucose trends.  We reviewed insulin pump settings, basal rate and bolus dosing  Use of automated pump bolus dosing for meal/snack carb & correction dosing  Reviewed the Medtronic Carelink report data today  Reviewed recent Medtronic Guardian4 CGM glucose sensor trends, in detail     Recommend:  Continue use of the Medtronic 780G with the diabetes management    No pump setting changes at this time.    Continue use of the Medtronic Guardian4 CGM sensors and pump auto mode  Sent new Novolog vial 3-mo Rx to his local pharmacy  Use genuine meal carb estimates with premeal bolusing, if possible  Consider followup diabetes education appt with the CDE at [facility]  We have reviewed use of Temp Target for suspected hypoglycemia trends, exercise or when more active   He will lower the meal carb estimate for meal following also   Keep focus on diet, exercise as tolerated, and weight management  Continue simvastatin 40 mg each evening  Continue the vitamin D 1000 U capsule daily  Continue the current levothyroxine 0.125 mg daily at this time  Repeat non-fasting labs in late 7/2025   Testing at [facility]   Lab orders placed  Arrange annual dilated eye exam, fasting lipid panel testing.  We reviewed importance of timely clinic appointments with me Q3 months, to satisfy Medicare with his diabetes device coverage  Will communicate feedback with his testing by phonecall and/or USMail    Keep regular follow-up evaluations with gastroenterology and orthopedics providers  Addressed patient's questions today.    The longitudinal plan of care for the endocrine  problem(s) were addressed during this visit.  Due to added complexity of care,   we will continue to support the patient and the subsequent management of this condition with ongoing continuity of care.    There are no Patient Instructions on file for this visit.    Future labs ordered today:   Orders Placed This Encounter   Procedures    GLUCOSE MONITOR, 72 HOUR, PHYS INTERP    Hemoglobin A1c    Basic metabolic panel    Thyroxine total    TSH    Cortisol    Adrenal corticotropin     Radiology/Consults ordered today: None    Total time spent on day of encounter:  32 min    Follow-up:  8/6/25 at 12 pm, Return    VIKTOR Best MD, MS  Endocrinology  [facility]    CC:  Care Team

## 2025-05-05 ENCOUNTER — MYC REFILL (OUTPATIENT)
Dept: FAMILY MEDICINE | Facility: CLINIC | Age: 60
End: 2025-05-05
Payer: COMMERCIAL

## 2025-05-05 DIAGNOSIS — M48.02 CERVICAL STENOSIS OF SPINE: ICD-10-CM

## 2025-05-05 DIAGNOSIS — G89.4 CHRONIC PAIN SYNDROME: ICD-10-CM

## 2025-05-05 RX ORDER — OXYCODONE AND ACETAMINOPHEN 5; 325 MG/1; MG/1
1-2 TABLET ORAL EVERY 6 HOURS PRN
Qty: 100 TABLET | Refills: 0 | OUTPATIENT
Start: 2025-05-05

## 2025-05-05 NOTE — TELEPHONE ENCOUNTER
Refill of oxycodone sent to [facility].    They wanted it sent to [facility].    Brittani Levi RN on 5/5/2025 at 3:36 PM

## 2025-05-06 RX ORDER — OXYCODONE AND ACETAMINOPHEN 5; 325 MG/1; MG/1
1-2 TABLET ORAL EVERY 6 HOURS PRN
Qty: 100 TABLET | Refills: 0 | Status: SHIPPED | OUTPATIENT
Start: 2025-05-06

## 2025-05-20 DIAGNOSIS — E03.9 HYPOTHYROIDISM, UNSPECIFIED TYPE: ICD-10-CM

## 2025-05-20 DIAGNOSIS — K21.9 GASTROESOPHAGEAL REFLUX DISEASE WITHOUT ESOPHAGITIS: ICD-10-CM

## 2025-05-20 RX ORDER — OMEPRAZOLE 20 MG/1
20 CAPSULE, DELAYED RELEASE ORAL DAILY
Qty: 90 CAPSULE | Refills: 0 | Status: SHIPPED | OUTPATIENT
Start: 2025-05-20

## 2025-05-20 RX ORDER — LEVOTHYROXINE SODIUM 125 UG/1
125 TABLET ORAL DAILY
Qty: 90 TABLET | Refills: 1 | Status: SHIPPED | OUTPATIENT
Start: 2025-05-20

## 2025-05-20 NOTE — TELEPHONE ENCOUNTER
Last Written Prescription Date:  11/21/24  Last Fill Quantity: 90,  # refills: 1   Last office visit: Visit date not found ; last virtual visit: 4/28/2025 with prescribing provider:  Dr. Best   Future Office Visit: 8/6/25    Next 5 appointments (look out 90 days)      Jun 03, 2025 4:00 PM  (Arrive by 3:55 PM)  Provider Visit with Mushtaq Haq MD  [facility] ([facility] ) [address] [place] [id number] [phone]           Requested Prescriptions   Pending Prescriptions Disp Refills    levothyroxine (SYNTHROID/LEVOTHROID) 125 MCG tablet [Pharmacy Med Name: LEVOTHYROXINE SODIUM 125MCG TABS] 90 tablet 1     Sig: TAKE ONE TABLET BY MOUTH ONCE DAILY       Thyroid Protocol Passed - 5/20/2025 12:23 PM        Passed - Patient is 12 years or older        Passed - Medication is active on med list and the sig matches. RN to manually verify dose and sig if red X/fail.     If the protocol passes (green check), you do not need to verify med dose and sig.    A prescription matches if they are the same clinical intention.    For Example: once daily and every morning are the same.    The protocol can not identify upper and lower case letters as matching and will fail.     For Example: Take 1 tablet (50 mg) by mouth daily     TAKE 1 TABLET (50 MG) BY MOUTH DAILY    For all fails (red x), verify dose and sig.    If the refill does match what is on file, the RN can still proceed to approve the refill request.       If they do not match, route to the appropriate provider.             Passed - Recent (12 month) or future (90 days) visit with authorizing provider's specialty (provided they have been seen in the past 15 months)     The patient must have completed an in-person or virtual visit within the past 12 months or has a future visit scheduled within the next 90 days with the authorizing provider s specialty.  Urgent care and e-visits do not qualify as an  office visit for this protocol.          Passed - Medication indicated for associated diagnosis     Medication is associated with one or more of the following diagnoses:  Hypothyroidism  Thyroid stimulating hormone suppression therapy  Thyroid cancer  Acquired atrophy of thyroid          Passed - Normal TSH on file in past 12 months     Recent Labs   Lab Test 04/23/25  1508   TSH 0.69                 Refills sent  Sola Veliz RN

## 2025-06-03 ENCOUNTER — VIRTUAL VISIT (OUTPATIENT)
Dept: FAMILY MEDICINE | Facility: CLINIC | Age: 60
End: 2025-06-03
Payer: COMMERCIAL

## 2025-06-03 DIAGNOSIS — G89.4 CHRONIC PAIN SYNDROME: ICD-10-CM

## 2025-06-03 DIAGNOSIS — K21.9 GASTROESOPHAGEAL REFLUX DISEASE WITHOUT ESOPHAGITIS: ICD-10-CM

## 2025-06-03 DIAGNOSIS — Z96.41 INSULIN PUMP STATUS: ICD-10-CM

## 2025-06-03 DIAGNOSIS — K31.84 GASTROPARESIS: Primary | ICD-10-CM

## 2025-06-03 DIAGNOSIS — I10 ESSENTIAL HYPERTENSION: ICD-10-CM

## 2025-06-03 DIAGNOSIS — E10.43 TYPE 1 DIABETES MELLITUS WITH DIABETIC AUTONOMIC NEUROPATHY (H): ICD-10-CM

## 2025-06-03 DIAGNOSIS — F11.90 CHRONIC, CONTINUOUS USE OF OPIOIDS: ICD-10-CM

## 2025-06-03 DIAGNOSIS — M48.02 CERVICAL STENOSIS OF SPINE: ICD-10-CM

## 2025-06-03 DIAGNOSIS — K86.81 EXOCRINE PANCREATIC INSUFFICIENCY: ICD-10-CM

## 2025-06-03 PROCEDURE — 98005 SYNCH AUDIO-VIDEO EST LOW 20: CPT | Performed by: STUDENT IN AN ORGANIZED HEALTH CARE EDUCATION/TRAINING PROGRAM

## 2025-06-03 PROCEDURE — 1125F AMNT PAIN NOTED PAIN PRSNT: CPT | Mod: 95 | Performed by: STUDENT IN AN ORGANIZED HEALTH CARE EDUCATION/TRAINING PROGRAM

## 2025-06-03 ASSESSMENT — PATIENT HEALTH QUESTIONNAIRE - PHQ9
SUM OF ALL RESPONSES TO PHQ QUESTIONS 1-9: 11
SUM OF ALL RESPONSES TO PHQ QUESTIONS 1-9: 11
10. IF YOU CHECKED OFF ANY PROBLEMS, HOW DIFFICULT HAVE THESE PROBLEMS MADE IT FOR YOU TO DO YOUR WORK, TAKE CARE OF THINGS AT HOME, OR GET ALONG WITH OTHER PEOPLE: EXTREMELY DIFFICULT

## 2025-06-03 NOTE — PROGRESS NOTES
Stone is a 59 year old who is being evaluated via a billable video visit.    How would you like to obtain your AVS? MyChart  If the video visit is dropped, the invitation should be resent by: Text to cell phone: [phone]  Will anyone else be joining your video visit? No      Assessment & Plan   Problem List Items Addressed This Visit          Nervous and Auditory    Chronic pain syndrome    Type 1 diabetes mellitus with diabetic autonomic neuropathy (H)       Digestive    Gastroparesis - Primary    Exocrine pancreatic insufficiency    Gastroesophageal reflux disease without esophagitis       Circulatory    Essential hypertension       Behavioral    Chronic, continuous use of opioids       Other    Cervical stenosis of spine    Insulin pump status      A1c stable and following with endocrinology. Gastroparesis continuous but pain manageable recently. Diarrhea ongoing. Following with GI and does have enzyme replacement which is helpful but cost limits how much he wants to use it.     Extremely limited by his back. He wants to see pain clinic and will follow up with spine clinic. Did not get much benefit with injection. He will follow up with pain clinic and interested in stimulator.     Unclear why negative drugs screen. I think unlikely absorption contributing. Will don confirmation at next testing.     The longitudinal plan of care for the diagnosis(es)/condition(s) as documented were addressed during this visit. Due to the added complexity in care, I will continue to support Stone in the subsequent management and with ongoing continuity of care.         Nabeel Ritter is a 59 year old, presenting for the following health issues:  Consult (Discuss disability)        6/3/2025     3:54 PM   Additional Questions   Roomed by Kelsie PEREZ         6/3/2025     3:54 PM   Patient Reported Additional Medications   Patient reports taking the following new medications Amylase-Lipase-protease 60,000       Video Start  Time: 4:12 PM    History of Present Illness       Back Pain:  He presents for follow up of back pain. Patient's back pain is a chronic problem.  Location of back pain:  Right lower back, left lower back, right side of neck, left side of neck, right buttock, left buttock, right side of waist and left side of waist  Description of back pain: burning, cramping, dull ache, sharp, shooting and stabbing  Back pain spreads: right foot, left foot, right side of neck and left side of neck    Since patient first noticed back pain, pain is: always present, but gets better and worse  Does back pain interfere with his job:  Not applicable       Diabetes:   He presents for follow up of diabetes.   He is checking home blood glucose with a continuous glucose monitor.   He checks blood glucose before meals, after meals, before and after meals and at bedtime.  Blood glucose is sometimes over 200 and sometimes under 70. He is aware of hypoglycemia symptoms including shakiness, dizziness, weakness, lethargy, blurred vision and confusion.   He is concerned about frequent infections.   He is having numbness in feet, burning in feet and weight loss.  The patient has not had a diabetic eye exam in the last 12 months.          He eats 0-1 servings of fruits and vegetables daily.He consumes 0 sweetened beverage(s) daily.He exercises with enough effort to increase his heart rate 9 or less minutes per day.  He exercises with enough effort to increase his heart rate 3 or less days per week.   He is taking medications regularly.        No new red flag symptoms. Regarding his back. Injection not helpful. Low back has been the most bothersome and limiting him recently.       Review of Systems  Constitutional, neuro, ENT, endocrine, pulmonary, cardiac, gastrointestinal, genitourinary, musculoskeletal, integument and psychiatric systems are negative, except as otherwise noted.      Objective    Vitals - Patient Reported  Pain Score: Moderate Pain  (4) (back)        Physical Exam   GENERAL: alert and no distress  EYES: Eyes grossly normal to inspection.  No discharge or erythema, or obvious scleral/conjunctival abnormalities.  RESP: No audible wheeze, cough, or visible cyanosis.    SKIN: Visible skin clear. No significant rash, abnormal pigmentation or lesions.  NEURO: Cranial nerves grossly intact.  Mentation and speech appropriate for age.  PSYCH: Appropriate affect, tone, and pace of words        Video-Visit Details    Type of service:  Video Visit   Video End Time:4:32 PM  Originating Location (pt. Location): Home    Distant Location (provider location):  On-site  Platform used for Video Visit: Terri  Signed Electronically by: Mushtaq Haq MD

## 2025-06-08 ENCOUNTER — MYC REFILL (OUTPATIENT)
Dept: FAMILY MEDICINE | Facility: CLINIC | Age: 60
End: 2025-06-08
Payer: COMMERCIAL

## 2025-06-08 DIAGNOSIS — G89.4 CHRONIC PAIN SYNDROME: ICD-10-CM

## 2025-06-08 DIAGNOSIS — M48.02 CERVICAL STENOSIS OF SPINE: ICD-10-CM

## 2025-06-10 RX ORDER — OXYCODONE AND ACETAMINOPHEN 5; 325 MG/1; MG/1
1-2 TABLET ORAL EVERY 6 HOURS PRN
Qty: 100 TABLET | Refills: 0 | Status: SHIPPED | OUTPATIENT
Start: 2025-06-10

## 2025-06-18 ENCOUNTER — TELEPHONE (OUTPATIENT)
Dept: NEUROSURGERY | Facility: CLINIC | Age: 60
End: 2025-06-18
Payer: COMMERCIAL

## 2025-06-18 NOTE — TELEPHONE ENCOUNTER
Attempted to reach patient to remind them about appointment scheduled with Dina Chong NP on 6/19/25  in our [facility].    A voicemail was left with a call back number if the patient has questions or would like to reschedule.

## 2025-06-18 NOTE — TELEPHONE ENCOUNTER
LONNIE for return call. I see that he had the Bilateral SI joint injections recently with a pain relief of 2/10.    How many hours/weeks/months did you experience relief?  Are you wanting a repeat injection?  This visit is for your low back, correct?  Are yo having any new symptoms?    Depending on patient's response, might be able take care of concerns over the phone without needing a visit.    Cecilia Moncada RN on 6/18/2025 at 2:55 PM

## 2025-06-19 ENCOUNTER — OFFICE VISIT (OUTPATIENT)
Dept: NEUROSURGERY | Facility: CLINIC | Age: 60
End: 2025-06-19
Payer: COMMERCIAL

## 2025-06-19 VITALS
BODY MASS INDEX: 22.6 KG/M2 | DIASTOLIC BLOOD PRESSURE: 60 MMHG | WEIGHT: 144 LBS | HEIGHT: 67 IN | TEMPERATURE: 98.1 F | SYSTOLIC BLOOD PRESSURE: 130 MMHG

## 2025-06-19 DIAGNOSIS — M53.3 SACROILIAC JOINT PAIN: Primary | ICD-10-CM

## 2025-06-19 ASSESSMENT — PAIN SCALES - GENERAL: PAINLEVEL_OUTOF10: MODERATE PAIN (6)

## 2025-06-19 NOTE — PROGRESS NOTES
"[facility] Neurosurgery  Neurosurgery Follow Up:    HPI: Stone De Paz is a 59 year old male with a history of L5-S2 fusion many years ago who presents with a few months of worsening left>right low back pain. He denies any injury at the onset. Reports back pain which radiates to the bilateral posterior thighs and groins as well. No paresthesias or overt weakness.   Presents for a follow up. Reports ongoing bilateral low back and groin pain pain as well as bilateral buttock pain.    6/19/2025: Presents for follow up after bilateral SI joint injection. Reports nearly 100% relief of low back pain after recent SI joint injection. Felt his low back was \"loose\" despite resolution of pain. No leg/groin pain initially. Symptoms gradually returned after 2 hours. Some ongoing benefit from injection.     Medical, surgical, family, and social history unchanged since prior exam.  Exam:  Constitutional:  Alert, well nourished, NAD.  HEENT: Normocephalic, atraumatic.   Pulm:  Without shortness of breath   CV:  No pitting edema of BLE.      Vital Signs:  /60 (BP Location: Right arm, Cuff Size: Adult Regular)   Temp 98.1  F (36.7  C) (Temporal)   Ht 5' 7\" (1.702 m)   Wt 144 lb (65.3 kg)   BMI 22.55 kg/m      Neurological:  Awake  Alert  Oriented x 3  Motor exam:        IP Q DF PF EHL  R   5  5   5   5    5  L   5  5   5   5    5     Able to spontaneously move L/E bilaterally  Sensation intact throughout all L/E dermatomes    SI joint testing:  Ron: positive left>right  Magdalena: positive left>right  Distraction: positive left>right  Pelvic compression: positive left>right     Imaging:   IMPRESSION:  1. Transitional lumbosacral anatomy, with presumed 5 lumbar vertebral  bodies and a transitional partially lumbarized S1 vertebra.  2. No acute osseous abnormality.  3. Solid bilateral posterior fusion L5-S2.  4. Multilevel degenerative changes, as described.  5. At L3-L4, severe spinal canal stenosis and at least " moderate  bilateral neural foraminal stenosis.  6. At L4-L5, moderate bordering on moderate to severe central spinal  canal stenosis with at least moderate bilateral neural foraminal  stenosis.  7. Lesser degrees of degenerative change/stenosis at other levels.  Please see body of report for additional details.    A/P: Bilateral SI joint dysfunction  Great initial relief from bilateral SI joint injection meeting diagnostic criteria. Will plan for referral to Dr. Hemanth Padgett for further evaluation. Could consider follow up with Dr. Giorgio Noble as well to discuss further options as well. He verbalized understanding and agreement.   Patient Instructions   -Referral to Dr. Hemanth Padgett to evaluate SI joint.   -Consider return to see Dr. Giorgio Noble to discuss further options as well.   -Please contact our clinic with questions or concerns at [phone].    Dina Chong, [facility] Neurosurgery  [address] [place] [id number]  Tel [phone]  Fax [phone]

## 2025-06-19 NOTE — PATIENT INSTRUCTIONS
-Referral to Dr. Hemanth Padgett to evaluate SI joint.   -Consider return to see Dr. Giorgio Noble to discuss further options as well.   -Please contact our clinic with questions or concerns at [phone].

## 2025-06-19 NOTE — PROGRESS NOTES
"Stone De Paz is a 59 year old male who presents for:  Chief Complaint   Patient presents with    Neurologic Problem        Initial Vitals:  /60 (BP Location: Right arm, Cuff Size: Adult Regular)   Temp 98.1  F (36.7  C) (Temporal)   Ht 5' 7\" (1.702 m)   Wt 144 lb (65.3 kg)   BMI 22.55 kg/m   Estimated body mass index is 22.55 kg/m  as calculated from the following:    Height as of this encounter: 5' 7\" (1.702 m).    Weight as of this encounter: 144 lb (65.3 kg).. Body surface area is 1.76 meters squared. BP completed using cuff size: regular  Moderate Pain (6)    Nursing Comments:     Jena Randall CMA    "

## 2025-06-19 NOTE — LETTER
"6/19/2025      Stone De Paz  [address] [address] [place] [id number]      Dear Colleague,    Thank you for referring your patient, Stone De Paz, to the [facility] [place]. Please see a copy of my visit note below.    Stone De Paz is a 59 year old male who presents for:  Chief Complaint   Patient presents with     Neurologic Problem        Initial Vitals:  /60 (BP Location: Right arm, Cuff Size: Adult Regular)   Temp 98.1  F (36.7  C) (Temporal)   Ht 5' 7\" (1.702 m)   Wt 144 lb (65.3 kg)   BMI 22.55 kg/m   Estimated body mass index is 22.55 kg/m  as calculated from the following:    Height as of this encounter: 5' 7\" (1.702 m).    Weight as of this encounter: 144 lb (65.3 kg).. Body surface area is 1.76 meters squared. BP completed using cuff size: regular  Moderate Pain (6)    Nursing Comments:     Jena Randall, BRANDON      [facility] Neurosurgery  Neurosurgery Follow Up:    HPI: Stone De Paz is a 59 year old male with a history of L5-S2 fusion many years ago who presents with a few months of worsening left>right low back pain. He denies any injury at the onset. Reports back pain which radiates to the bilateral posterior thighs and groins as well. No paresthesias or overt weakness.   Presents for a follow up. Reports ongoing bilateral low back and groin pain pain as well as bilateral buttock pain.    6/19/2025: Presents for follow up after bilateral SI joint injection. Reports nearly 100% relief of low back pain after recent SI joint injection. Felt his low back was \"loose\" despite resolution of pain. No leg/groin pain initially. Symptoms gradually returned after 2 hours. Some ongoing benefit from injection.     Medical, surgical, family, and social history unchanged since prior exam.  Exam:  Constitutional:  Alert, well nourished, NAD.  HEENT: Normocephalic, atraumatic.   Pulm:  Without shortness of breath   CV:  No pitting edema of BLE.      Vital Signs:  /60 (BP " "Location: Right arm, Cuff Size: Adult Regular)   Temp 98.1  F (36.7  C) (Temporal)   Ht 5' 7\" (1.702 m)   Wt 144 lb (65.3 kg)   BMI 22.55 kg/m      Neurological:  Awake  Alert  Oriented x 3  Motor exam:        IP Q DF PF EHL  R   5  5   5   5    5  L   5  5   5   5    5     Able to spontaneously move L/E bilaterally  Sensation intact throughout all L/E dermatomes    SI joint testing:  Ron: positive left>right  Magdalena: positive left>right  Distraction: positive left>right  Pelvic compression: positive left>right     Imaging:   IMPRESSION:  1. Transitional lumbosacral anatomy, with presumed 5 lumbar vertebral  bodies and a transitional partially lumbarized S1 vertebra.  2. No acute osseous abnormality.  3. Solid bilateral posterior fusion L5-S2.  4. Multilevel degenerative changes, as described.  5. At L3-L4, severe spinal canal stenosis and at least moderate  bilateral neural foraminal stenosis.  6. At L4-L5, moderate bordering on moderate to severe central spinal  canal stenosis with at least moderate bilateral neural foraminal  stenosis.  7. Lesser degrees of degenerative change/stenosis at other levels.  Please see body of report for additional details.    A/P: Bilateral SI joint dysfunction  Great initial relief from bilateral SI joint injection meeting diagnostic criteria. Will plan for referral to Dr. Hemanth Padgett for further evaluation. Could consider follow up with Dr. Giorgio Noble as well to discuss further options as well. He verbalized understanding and agreement.   Patient Instructions   -Referral to Dr. Hemanth Padgett to evaluate SI joint.   -Consider return to see Dr. Giorgio Noble to discuss further options as well.   -Please contact our clinic with questions or concerns at [phone].    Dina Chong, [facility]surgery  [address] [place] [address]  [address] [place] [address]  Tel [phone]  Fax [phone]      Again, thank you for allowing me to participate in the care of " your patient.        Sincerely,        Dina Chong NP    Electronically signed

## 2025-07-11 ENCOUNTER — MYC REFILL (OUTPATIENT)
Dept: FAMILY MEDICINE | Facility: CLINIC | Age: 60
End: 2025-07-11
Payer: COMMERCIAL

## 2025-07-11 DIAGNOSIS — G89.4 CHRONIC PAIN SYNDROME: ICD-10-CM

## 2025-07-11 DIAGNOSIS — M48.02 CERVICAL STENOSIS OF SPINE: ICD-10-CM

## 2025-07-15 RX ORDER — OXYCODONE AND ACETAMINOPHEN 5; 325 MG/1; MG/1
1-2 TABLET ORAL EVERY 6 HOURS PRN
Qty: 100 TABLET | Refills: 0 | Status: SHIPPED | OUTPATIENT
Start: 2025-07-15

## 2025-08-06 ENCOUNTER — OFFICE VISIT (OUTPATIENT)
Dept: ENDOCRINOLOGY | Facility: CLINIC | Age: 60
End: 2025-08-06
Payer: COMMERCIAL

## 2025-08-06 VITALS
SYSTOLIC BLOOD PRESSURE: 143 MMHG | BODY MASS INDEX: 22.44 KG/M2 | DIASTOLIC BLOOD PRESSURE: 82 MMHG | WEIGHT: 143 LBS | HEIGHT: 67 IN | HEART RATE: 69 BPM

## 2025-08-06 DIAGNOSIS — E10.43 TYPE 1 DIABETES MELLITUS WITH DIABETIC AUTONOMIC NEUROPATHY (H): Primary | ICD-10-CM

## 2025-08-06 DIAGNOSIS — E03.9 HYPOTHYROIDISM, UNSPECIFIED TYPE: ICD-10-CM

## 2025-08-06 DIAGNOSIS — E10.40 TYPE 1 DIABETES MELLITUS WITH DIABETIC NEUROPATHY (H): ICD-10-CM

## 2025-08-06 DIAGNOSIS — Z96.41 INSULIN PUMP STATUS: ICD-10-CM

## 2025-08-06 DIAGNOSIS — K31.84 GASTROPARESIS: ICD-10-CM

## 2025-08-06 PROCEDURE — 3079F DIAST BP 80-89 MM HG: CPT | Performed by: INTERNAL MEDICINE

## 2025-08-06 PROCEDURE — 99215 OFFICE O/P EST HI 40 MIN: CPT | Performed by: INTERNAL MEDICINE

## 2025-08-06 PROCEDURE — G2211 COMPLEX E/M VISIT ADD ON: HCPCS | Performed by: INTERNAL MEDICINE

## 2025-08-06 PROCEDURE — 95251 CONT GLUC MNTR ANALYSIS I&R: CPT | Performed by: INTERNAL MEDICINE

## 2025-08-06 PROCEDURE — 3077F SYST BP >= 140 MM HG: CPT | Performed by: INTERNAL MEDICINE

## 2025-08-06 ASSESSMENT — PATIENT HEALTH QUESTIONNAIRE - PHQ9: SUM OF ALL RESPONSES TO PHQ QUESTIONS 1-9: 11

## 2025-08-20 DIAGNOSIS — K21.9 GASTROESOPHAGEAL REFLUX DISEASE WITHOUT ESOPHAGITIS: ICD-10-CM

## 2025-08-20 RX ORDER — OMEPRAZOLE 20 MG/1
20 CAPSULE, DELAYED RELEASE ORAL DAILY
Qty: 90 CAPSULE | Refills: 2 | Status: SHIPPED | OUTPATIENT
Start: 2025-08-20

## (undated) DEVICE — SYR 03ML LL W/O NDL

## (undated) DEVICE — DRAPE STERI TOWEL SM 1000

## (undated) DEVICE — DRAPE SHEET REV FOLD 3/4 9349

## (undated) DEVICE — ESU CANNULA VENOM RF 18GA 10X100MM LF 0406840125

## (undated) DEVICE — GLOVE PROTEGRITY 7.5 LATEX

## (undated) DEVICE — NDL ECLIPSE 18GA 1.5"

## (undated) DEVICE — SOL WATER IRRIG 1000ML BOTTLE 07139-09

## (undated) DEVICE — NDL COUNTER 20CT 31142493

## (undated) DEVICE — GLOVE BIOGEL PI ULTRATOUCH G SZ 7.5 42175

## (undated) DEVICE — SYR 05ML LL W/O NDL

## (undated) DEVICE — TRAY EPDRL 3.5IN 17GA 19GA PRFX BPA DEHP 332079

## (undated) DEVICE — PREP CHLORAPREP 26ML TINTED ORANGE  260815

## (undated) DEVICE — SOL NACL 0.9% IRRIG 1000ML BOTTLE 07138-09

## (undated) DEVICE — GLOVE PROTEXIS W/NEU-THERA 7.5  2D73TE75

## (undated) DEVICE — TUBING IV EXTENSION SET 34"

## (undated) DEVICE — NDL SPINAL 25GA 3.5" QUINCKE 405180

## (undated) DEVICE — DRAPE C-ARM

## (undated) DEVICE — NEEDLE SPINAL DISP 22GA X 3.5" QUINCKE 333320

## (undated) DEVICE — RADIOFREQUENCY CANNULA

## (undated) DEVICE — SYR 10ML LL W/O NDL

## (undated) DEVICE — TRAY PROCEDURE SUPPORT PAIN MANAGEMENT 332114

## (undated) DEVICE — NDL SPINAL 22GA 3.5" QUINCKE 405181

## (undated) RX ORDER — FENTANYL CITRATE 50 UG/ML
INJECTION, SOLUTION INTRAMUSCULAR; INTRAVENOUS
Status: DISPENSED
Start: 2019-02-28

## (undated) RX ORDER — FENTANYL CITRATE 50 UG/ML
INJECTION, SOLUTION INTRAMUSCULAR; INTRAVENOUS
Status: DISPENSED
Start: 2020-09-04

## (undated) RX ORDER — LIDOCAINE HYDROCHLORIDE 10 MG/ML
INJECTION, SOLUTION EPIDURAL; INFILTRATION; INTRACAUDAL; PERINEURAL
Status: DISPENSED
Start: 2018-11-20

## (undated) RX ORDER — FENTANYL CITRATE 50 UG/ML
INJECTION, SOLUTION INTRAMUSCULAR; INTRAVENOUS
Status: DISPENSED
Start: 2019-03-15

## (undated) RX ORDER — FENTANYL CITRATE 50 UG/ML
INJECTION, SOLUTION INTRAMUSCULAR; INTRAVENOUS
Status: DISPENSED
Start: 2024-02-29

## (undated) RX ORDER — FENTANYL CITRATE 50 UG/ML
INJECTION, SOLUTION INTRAMUSCULAR; INTRAVENOUS
Status: DISPENSED
Start: 2020-09-10

## (undated) RX ORDER — FENTANYL CITRATE 50 UG/ML
INJECTION, SOLUTION INTRAMUSCULAR; INTRAVENOUS
Status: DISPENSED
Start: 2024-01-18